# Patient Record
Sex: MALE | Race: BLACK OR AFRICAN AMERICAN | Employment: UNEMPLOYED | URBAN - NONMETROPOLITAN AREA
[De-identification: names, ages, dates, MRNs, and addresses within clinical notes are randomized per-mention and may not be internally consistent; named-entity substitution may affect disease eponyms.]

---

## 2017-04-20 ENCOUNTER — OFFICE VISIT (OUTPATIENT)
Dept: ONCOLOGY | Age: 52
End: 2017-04-20

## 2017-04-20 VITALS
TEMPERATURE: 97.6 F | SYSTOLIC BLOOD PRESSURE: 117 MMHG | OXYGEN SATURATION: 98 % | BODY MASS INDEX: 23.07 KG/M2 | DIASTOLIC BLOOD PRESSURE: 79 MMHG | RESPIRATION RATE: 18 BRPM | HEIGHT: 71 IN | HEART RATE: 84 BPM | WEIGHT: 164.8 LBS

## 2017-04-20 DIAGNOSIS — C06.2 SQUAMOUS CELL CANCER OF RETROMOLAR TRIGONE (HCC): Primary | ICD-10-CM

## 2017-04-20 PROCEDURE — 99214 OFFICE O/P EST MOD 30 MIN: CPT | Performed by: INTERNAL MEDICINE

## 2017-04-28 ENCOUNTER — OFFICE VISIT (OUTPATIENT)
Dept: PHYSICAL MEDICINE AND REHAB | Age: 52
End: 2017-04-28

## 2017-04-28 VITALS
WEIGHT: 171.2 LBS | SYSTOLIC BLOOD PRESSURE: 123 MMHG | HEART RATE: 86 BPM | DIASTOLIC BLOOD PRESSURE: 89 MMHG | HEIGHT: 71 IN | BODY MASS INDEX: 23.97 KG/M2

## 2017-04-28 DIAGNOSIS — M79.18 MYOFASCIAL PAIN SYNDROME: ICD-10-CM

## 2017-04-28 DIAGNOSIS — C06.2 SQUAMOUS CELL CANCER OF RETROMOLAR TRIGONE (HCC): ICD-10-CM

## 2017-04-28 DIAGNOSIS — M47.816 SPONDYLOSIS OF LUMBAR REGION WITHOUT MYELOPATHY OR RADICULOPATHY: Primary | ICD-10-CM

## 2017-04-28 PROCEDURE — 99244 OFF/OP CNSLTJ NEW/EST MOD 40: CPT | Performed by: PAIN MEDICINE

## 2017-04-28 RX ORDER — TRAMADOL HYDROCHLORIDE 50 MG/1
50 TABLET ORAL EVERY 8 HOURS PRN
Qty: 90 TABLET | Refills: 0 | Status: SHIPPED | OUTPATIENT
Start: 2017-04-28 | End: 2017-05-28

## 2017-04-28 RX ORDER — CYCLOBENZAPRINE HCL 5 MG
5 TABLET ORAL 3 TIMES DAILY PRN
Qty: 90 TABLET | Refills: 0 | Status: SHIPPED | OUTPATIENT
Start: 2017-04-28 | End: 2017-05-28

## 2017-04-28 ASSESSMENT — ENCOUNTER SYMPTOMS
SINUS PRESSURE: 0
BACK PAIN: 1
PHOTOPHOBIA: 0
VOMITING: 0
CONSTIPATION: 0
NAUSEA: 0
SHORTNESS OF BREATH: 0
EYE PAIN: 0
ABDOMINAL PAIN: 0
COLOR CHANGE: 0
RHINORRHEA: 0
WHEEZING: 0
COUGH: 0
DIARRHEA: 0
SORE THROAT: 0
CHEST TIGHTNESS: 0

## 2017-06-08 ENCOUNTER — OFFICE VISIT (OUTPATIENT)
Dept: PHYSICAL MEDICINE AND REHAB | Age: 52
End: 2017-06-08

## 2017-06-08 VITALS
SYSTOLIC BLOOD PRESSURE: 121 MMHG | HEIGHT: 71 IN | WEIGHT: 171.3 LBS | HEART RATE: 86 BPM | BODY MASS INDEX: 23.98 KG/M2 | DIASTOLIC BLOOD PRESSURE: 82 MMHG

## 2017-06-08 DIAGNOSIS — M79.18 MYOFASCIAL PAIN SYNDROME: ICD-10-CM

## 2017-06-08 DIAGNOSIS — M47.816 SPONDYLOSIS OF LUMBAR REGION WITHOUT MYELOPATHY OR RADICULOPATHY: Primary | ICD-10-CM

## 2017-06-08 DIAGNOSIS — C06.2 SQUAMOUS CELL CANCER OF RETROMOLAR TRIGONE (HCC): ICD-10-CM

## 2017-06-08 PROCEDURE — 99213 OFFICE O/P EST LOW 20 MIN: CPT | Performed by: NURSE PRACTITIONER

## 2017-06-08 ASSESSMENT — ENCOUNTER SYMPTOMS
NAUSEA: 0
EYE PAIN: 0
SORE THROAT: 0
CONSTIPATION: 0
SINUS PRESSURE: 0
COLOR CHANGE: 0
WHEEZING: 0
PHOTOPHOBIA: 0
COUGH: 0
VOMITING: 0
ABDOMINAL PAIN: 0
SHORTNESS OF BREATH: 0
RHINORRHEA: 0
BACK PAIN: 1
DIARRHEA: 0
CHEST TIGHTNESS: 0

## 2017-07-18 ENCOUNTER — HOSPITAL ENCOUNTER (OUTPATIENT)
Age: 52
Discharge: HOME | End: 2017-07-18
Payer: COMMERCIAL

## 2017-07-18 LAB
T4 FREE: 0.97 NG/DL (ref 0.93–1.76)
TSH SERPL DL<=0.05 MIU/L-ACNC: 6.61 UIU/ML (ref 0.4–4.2)

## 2017-07-18 PROCEDURE — 84443 ASSAY THYROID STIM HORMONE: CPT

## 2017-07-18 PROCEDURE — 84439 ASSAY OF FREE THYROXINE: CPT

## 2017-07-18 PROCEDURE — 36415 COLL VENOUS BLD VENIPUNCTURE: CPT

## 2017-07-24 ENCOUNTER — OFFICE VISIT (OUTPATIENT)
Dept: PHYSICAL MEDICINE AND REHAB | Age: 52
End: 2017-07-24
Payer: COMMERCIAL

## 2017-07-24 VITALS
DIASTOLIC BLOOD PRESSURE: 92 MMHG | BODY MASS INDEX: 23.98 KG/M2 | SYSTOLIC BLOOD PRESSURE: 125 MMHG | HEART RATE: 91 BPM | WEIGHT: 171.3 LBS | HEIGHT: 71 IN

## 2017-07-24 DIAGNOSIS — C06.2 SQUAMOUS CELL CANCER OF RETROMOLAR TRIGONE (HCC): ICD-10-CM

## 2017-07-24 DIAGNOSIS — M79.18 MYOFASCIAL PAIN SYNDROME: ICD-10-CM

## 2017-07-24 DIAGNOSIS — M47.816 SPONDYLOSIS OF LUMBAR REGION WITHOUT MYELOPATHY OR RADICULOPATHY: Primary | ICD-10-CM

## 2017-07-24 PROCEDURE — 99213 OFFICE O/P EST LOW 20 MIN: CPT | Performed by: NURSE PRACTITIONER

## 2017-07-24 ASSESSMENT — ENCOUNTER SYMPTOMS
BACK PAIN: 1
WHEEZING: 0
DIARRHEA: 0
VOMITING: 0
NAUSEA: 0
COLOR CHANGE: 0
CHEST TIGHTNESS: 0
COUGH: 0
PHOTOPHOBIA: 0
EYE PAIN: 0
RHINORRHEA: 0
CONSTIPATION: 0
SHORTNESS OF BREATH: 0
ABDOMINAL PAIN: 0
SORE THROAT: 0
SINUS PRESSURE: 0

## 2017-10-18 DIAGNOSIS — C06.2 SQUAMOUS CELL CANCER OF RETROMOLAR TRIGONE (HCC): Primary | ICD-10-CM

## 2017-10-19 ENCOUNTER — OFFICE VISIT (OUTPATIENT)
Dept: ONCOLOGY | Age: 52
End: 2017-10-19
Payer: MEDICARE

## 2017-10-19 ENCOUNTER — HOSPITAL ENCOUNTER (OUTPATIENT)
Dept: INFUSION THERAPY | Age: 52
Discharge: HOME | End: 2017-10-19
Payer: MEDICARE

## 2017-10-19 VITALS
HEART RATE: 63 BPM | RESPIRATION RATE: 18 BRPM | DIASTOLIC BLOOD PRESSURE: 91 MMHG | TEMPERATURE: 97.3 F | SYSTOLIC BLOOD PRESSURE: 134 MMHG | OXYGEN SATURATION: 99 % | BODY MASS INDEX: 23.6 KG/M2 | WEIGHT: 168.6 LBS | HEIGHT: 71 IN

## 2017-10-19 DIAGNOSIS — C06.2 SQUAMOUS CELL CANCER OF RETROMOLAR TRIGONE (HCC): ICD-10-CM

## 2017-10-19 DIAGNOSIS — D70.1 CHEMOTHERAPY INDUCED NEUTROPENIA (HCC): ICD-10-CM

## 2017-10-19 DIAGNOSIS — T45.1X5A CHEMOTHERAPY INDUCED NEUTROPENIA (HCC): ICD-10-CM

## 2017-10-19 DIAGNOSIS — C06.2 SQUAMOUS CELL CANCER OF RETROMOLAR TRIGONE (HCC): Primary | ICD-10-CM

## 2017-10-19 LAB
ALBUMIN SERPL-MCNC: 4.5 G/DL (ref 3.5–5.1)
ALP BLD-CCNC: 78 U/L (ref 38–126)
ALT SERPL-CCNC: 14 U/L (ref 11–66)
AST SERPL-CCNC: 27 U/L (ref 5–40)
BASINOPHIL, AUTOMATED: 0 % (ref 0–12)
BILIRUB SERPL-MCNC: 0.4 MG/DL (ref 0.3–1.2)
BILIRUBIN DIRECT: < 0.2 MG/DL (ref 0–0.3)
BUN, WHOLE BLOOD: 12 MG/DL (ref 8–26)
CHLORIDE, WHOLE BLOOD: 103 MEQ/L (ref 98–109)
CREATININE, WHOLE BLOOD: 1.1 MG/DL (ref 0.5–1.2)
EOSINOPHILS RELATIVE PERCENT: 2 % (ref 0–12)
GFR, ESTIMATED: 75 ML/MIN/1.73M2
GLUCOSE, WHOLE BLOOD: 94 MG/DL (ref 70–108)
HCT VFR BLD CALC: 40.4 % (ref 42–52)
HEMOGLOBIN: 13.6 GM/DL (ref 14–18)
IONIZED CALCIUM, WHOLE BLOOD: 1.31 MMOL/L (ref 1.12–1.32)
LYMPHOCYTES # BLD: 17 % (ref 15–47)
MCH RBC QN AUTO: 32.1 PG (ref 27–31)
MCHC RBC AUTO-ENTMCNC: 33.5 GM/DL (ref 33–37)
MCV RBC AUTO: 96 FL (ref 80–94)
MONOCYTES: 9 % (ref 0–12)
PDW BLD-RTO: 11.7 % (ref 11.5–14.5)
PLATELET # BLD: 298 THOU/MM3 (ref 130–400)
PMV BLD AUTO: 6.7 MCM (ref 7.4–10.4)
POTASSIUM, WHOLE BLOOD: 4.5 MEQ/L (ref 3.5–4.9)
RBC # BLD: 4.23 MILL/MM3 (ref 4.7–6.1)
SEG NEUTROPHILS: 72 % (ref 43–75)
SODIUM, WHOLE BLOOD: 138 MEQ/L (ref 138–146)
TOTAL CO2, WHOLE BLOOD: 25 MEQ/L (ref 23–33)
TOTAL PROTEIN: 7.4 G/DL (ref 6.1–8)
WBC # BLD: 6.8 THOU/MM3 (ref 4.8–10.8)

## 2017-10-19 PROCEDURE — 1036F TOBACCO NON-USER: CPT | Performed by: INTERNAL MEDICINE

## 2017-10-19 PROCEDURE — 80076 HEPATIC FUNCTION PANEL: CPT

## 2017-10-19 PROCEDURE — G8428 CUR MEDS NOT DOCUMENT: HCPCS | Performed by: INTERNAL MEDICINE

## 2017-10-19 PROCEDURE — G8420 CALC BMI NORM PARAMETERS: HCPCS | Performed by: INTERNAL MEDICINE

## 2017-10-19 PROCEDURE — 99213 OFFICE O/P EST LOW 20 MIN: CPT | Performed by: INTERNAL MEDICINE

## 2017-10-19 PROCEDURE — 36415 COLL VENOUS BLD VENIPUNCTURE: CPT

## 2017-10-19 PROCEDURE — 3017F COLORECTAL CA SCREEN DOC REV: CPT | Performed by: INTERNAL MEDICINE

## 2017-10-19 PROCEDURE — 99211 OFF/OP EST MAY X REQ PHY/QHP: CPT

## 2017-10-19 PROCEDURE — G8484 FLU IMMUNIZE NO ADMIN: HCPCS | Performed by: INTERNAL MEDICINE

## 2017-10-19 PROCEDURE — 80047 BASIC METABLC PNL IONIZED CA: CPT

## 2017-10-19 PROCEDURE — 85025 COMPLETE CBC W/AUTO DIFF WBC: CPT

## 2017-10-19 NOTE — PROGRESS NOTES
[facility] PROFESSIONAL SERVICES  [facility] OF [facility]  [address] [facility] [address]  Nneka Garciabulmaro  Dept: [phone]  Dept Fax: [phone]  Loc: [phone]    Subjective:     Chief Complaint:  Dio Duggan is a 46 y.o. male with head and neck cancer. HPI:  The patient is here today for follow up evaluation. He is here today for follow-up of his history head and neck cancer. The patient previously receiving received combination radiation and chemotherapy treatment. He tolerated this fairly well and any posttreatment symptoms have resolved. The patient continues to be followed by otolaryngology at [facility]. He has had no evidence of recurrence of his malignancy. On my limited examination today there is no evidence of recurrence. He does complain of chronic oral pain related to his previous therapy. His bowel and bladder habits have been fairly stable. The patient denies fever or other signs of infection. His ECOG performance status is level 0. PMH, SH, and FH:  I reviewed the PMH, SH and FH as noted on the electronic medical record. There have been no changes as noted in the previous documentation. Review of Systems   Constitutional: Fatigue. HENT: Chronic pain. Eyes: Negative. Respiratory: Negative. Cardiovascular: Negative. Gastrointestinal: Negative. Genitourinary: Negative. Musculoskeletal: Negative. Skin: Negative. Neurological: Negative. Hematological: Negative. Psychiatric/Behavioral: Negative. Objective:   Physical Exam   Vitals:    10/19/17 1014   BP: (!) 134/91   Pulse: 63   Resp: 18   Temp: 97.3 °F (36.3 °C)   SpO2: 99%   Vitals reviewed and are stable. Constitutional: Well-developed and well-nourished. No acute distress. HENT: Normocephalic and atraumatic. Post surgical changes from ENT noted. No evidence of recurrence of malignancy identified. Eyes: Pupils are equal and reactive. No scleral icterus. Neck: Overall appearance is symmetrical. No identifiable masses. Chest: Inspection and palpation of chest is normal.  Pulmonary: Effort normal. No respiratory distress. Cardiovascular: RRR. No edema in any of the four extremities. Abdominal: Soft. No hepatomegaly or splenomegaly. Musculoskeletal: Gait is normal. Muscle strength and tone good. Neurological: Alert and oriented to person, place, and time. Judgment and thought content normal.  Skin: Skin is warm and dry. No rash. Psychiatric: Mood and affect appropriate for the clinical situation. Behavior is normal.        Data Analysis:    Hematology 10/19/2017 4/20/2017 1/31/2017   WBC 6.8 6.2 5.4   RBC 4.23 (L) 4.56 (L) 4.70   HGB 13.6 (L) 14.7 14.5   HCT 40.4 (L) 44.3 43.7   MCV 96 (H) 97 (H) 93.0   RDW 11.7 11.4 (L) 14.0    269 263     Hematology 11/23/2016   WBC 6.2   RBC 4.41 (L)   HGB 14.1   HCT 41.9 (L)   MCV 95.0 (H)   RDW 13.9        Assessment:   1. Squamous cell carcinoma of the head and neck. 2.  Chronic pain. 3.  Mild anemia. 4.  Hypertension. Plan:   1. Monitor for recurrence of malignancy. 2.  Follow-up with otolaryngology as scheduled. 3.  Follow-up with radiation oncology as scheduled. 4.  Monitor hemoglobin/hematocrit and for any signs of blood loss. 5.  Monitor blood pressure and follow up with primary care provider for further surveillance and management. Enio Nava M.D.   [facility]

## 2017-11-09 ENCOUNTER — HOSPITAL ENCOUNTER (OUTPATIENT)
Dept: INTERVENTIONAL RADIOLOGY/VASCULAR | Age: 52
Discharge: HOME | End: 2017-11-09
Payer: MEDICARE

## 2017-11-09 VITALS
HEART RATE: 78 BPM | WEIGHT: 167.6 LBS | BODY MASS INDEX: 23.46 KG/M2 | DIASTOLIC BLOOD PRESSURE: 62 MMHG | RESPIRATION RATE: 16 BRPM | SYSTOLIC BLOOD PRESSURE: 125 MMHG | TEMPERATURE: 97.9 F

## 2017-11-09 PROCEDURE — 6360000002 HC RX W HCPCS

## 2017-11-09 PROCEDURE — A6402 STERILE GAUZE <= 16 SQ IN: HCPCS

## 2017-11-09 PROCEDURE — 2500000003 HC RX 250 WO HCPCS

## 2017-11-09 PROCEDURE — A6413 ADHESIVE BANDAGE, FIRST-AID: HCPCS

## 2017-11-09 PROCEDURE — 6360000004 HC RX CONTRAST MEDICATION: Performed by: RADIOLOGY

## 2017-11-09 PROCEDURE — 62323 NJX INTERLAMINAR LMBR/SAC: CPT | Performed by: RADIOLOGY

## 2017-11-09 RX ORDER — METHYLPREDNISOLONE ACETATE 80 MG/ML
80 INJECTION, SUSPENSION INTRA-ARTICULAR; INTRALESIONAL; INTRAMUSCULAR; SOFT TISSUE ONCE
Status: COMPLETED | OUTPATIENT
Start: 2017-11-09 | End: 2017-11-09

## 2017-11-09 RX ORDER — ESOMEPRAZOLE MAGNESIUM 20 MG/1
20 FOR SUSPENSION ORAL DAILY
COMMUNITY

## 2017-11-09 RX ORDER — BUPIVACAINE HYDROCHLORIDE 2.5 MG/ML
5 INJECTION, SOLUTION EPIDURAL; INFILTRATION; INTRACAUDAL ONCE
Status: COMPLETED | OUTPATIENT
Start: 2017-11-09 | End: 2017-11-09

## 2017-11-09 RX ADMIN — BUPIVACAINE HYDROCHLORIDE 2 ML: 2.5 INJECTION, SOLUTION EPIDURAL; INFILTRATION; INTRACAUDAL at 14:26

## 2017-11-09 RX ADMIN — IOHEXOL 1 ML: 180 INJECTION INTRAVENOUS at 14:26

## 2017-11-09 RX ADMIN — METHYLPREDNISOLONE ACETATE 80 MG: 80 INJECTION, SUSPENSION INTRA-ARTICULAR; INTRALESIONAL; INTRAMUSCULAR; SOFT TISSUE at 14:27

## 2017-11-09 ASSESSMENT — PAIN SCALES - GENERAL: PAINLEVEL_OUTOF10: 0

## 2017-11-09 ASSESSMENT — PAIN DESCRIPTION - DESCRIPTORS
DESCRIPTORS: THROBBING
DESCRIPTORS: ACHING;THROBBING;CONSTANT

## 2017-11-09 ASSESSMENT — PAIN - FUNCTIONAL ASSESSMENT
PAIN_FUNCTIONAL_ASSESSMENT: 0-10
PAIN_FUNCTIONAL_ASSESSMENT: 0-10

## 2017-11-09 NOTE — PROGRESS NOTES
Formulation and discussion of sedation / procedure plans, risks, benefits, side effects and alternatives with patient and/or responsible adult completed.     Electronically signed by Armando Faye MD on 11/9/2017 at 2:28 PM

## 2017-12-22 ENCOUNTER — HOSPITAL ENCOUNTER (OUTPATIENT)
Dept: RADIATION ONCOLOGY | Age: 52
Discharge: HOME | End: 2017-12-22
Payer: MEDICARE

## 2017-12-22 DIAGNOSIS — Z72.0 TOBACCO ABUSE: Primary | ICD-10-CM

## 2017-12-22 DIAGNOSIS — C06.2 SQUAMOUS CELL CANCER OF RETROMOLAR TRIGONE (HCC): ICD-10-CM

## 2017-12-22 PROCEDURE — 99211 OFF/OP EST MAY X REQ PHY/QHP: CPT

## 2017-12-22 NOTE — PROGRESS NOTES
[facility], [address]  Phone: [phone]   Toll Free: [phone]   Fax: [phone]    RADIATION ONCOLOGY FOLLOW UP REPORT    PATIENT NAME:  Myrna Burrell     : 1965  MEDICAL RECORD NO: [id number]    LOCATION: [facility] NO: [id number]      PROVIDER: Zev Nova CNP        DATE OF SERVICE: 2017    FOLLOW UP PHYSICIANS: Dr. Sivan Patrick; Dr. 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 Inspection revealed a fungating mass in the left retromolar trigone. He completed a CT of the head and neck which again revealed this primary tumor and suspected adenopathy submentally in the left submandibular gland and level II in the left neck. The patient was referred to [facility] and completed a left inferior maxillectomy, composite resection with segmental mandibulectomy, bilateral neck dissections, and reconstruction with free flap and bone graft from a right tibial donor site. Surgery was completed on 10/02/2015. The patient had a PEG placed at this time also. Samuel Feldman had an uncomplicated postoperative course and was seen to discuss adjuvant radiotherapy for which he was agreeable. He did have brisk skin reaction with significant erythema culminating in hyperpigmentation with  patchy dry desquamation. The patient did not develop any moist desquamation.     INTERVAL HISTORY: Samuel Feldman returns to [address] 2017 for a posttreatment checkup as part of his survivorship care after undergoing surgery, chemo and radiation therapy for cancer of the left retromolar trigone. At this time Severa Ingle continues to have mild xerostomia. This is well controlled with Biotene. Feels his taste has improved since completed treatment. He continues to have off and on lymphedema of the left neck. Severa Ingle states this is particularly noticeable on cold days. He has completed physical therapy for left neck decreased range of motion and lymphedema in June 2017. He denies any changes with swallowing. He denies any pain at this time. He does continue to smoke 2-3 cigarettes per day. His weight has remained stable compared to his last visit. TESTS: 7/18/2017: T4: 0.97, 7/18/2017: TSH: 6.610 this has decreased from 8.7 in February. MEDICATIONS:   Current Outpatient Prescriptions   Medication Sig Dispense Refill    esomeprazole Magnesium (NEXIUM) 20 MG PACK Take 20 mg by mouth daily      Mouthwashes (BIOTENE DRY MOUTH) LIQD daily       lisinopril (PRINIVIL;ZESTRIL) 20 MG tablet Take 20 mg by mouth daily       No current facility-administered medications for this encounter. EXAMINATION:   GENERAL: Severa Ingle is a pleasant, well-developed adult male. He is alert  and oriented x3, in no acute distress  VITAL SIGNS:  Weight 77.5 kg, temperature 36.1°C, pulse 86, blood pressure 146/98, respirations 16, pulse ox 98% on room air. HEENT: Head is normocephalic and atraumatic. PERRL. EOMI. Ears,  nose and lips are within normal limits. On external examination,  of the oral cavity and oropharynx, the reconstructed left oropharynx is smooth without any visible lesions or exudates. xerostomia. There are extensive postoperative changes to the left neck and also within the oral cavity as previously mentioned. There is no palpable cervical, supraclavicular, or axillary adenopathy  NECK: Without palpable adenopathy. Postoperative changes to neck as described above.

## 2018-04-06 ENCOUNTER — HOSPITAL ENCOUNTER (OUTPATIENT)
Dept: GENERAL RADIOLOGY | Age: 53
Discharge: HOME | End: 2018-04-06
Payer: MEDICARE

## 2018-04-06 ENCOUNTER — HOSPITAL ENCOUNTER (OUTPATIENT)
Age: 53
Discharge: HOME | End: 2018-04-06
Payer: MEDICARE

## 2018-04-06 DIAGNOSIS — H00.13 CHALAZION OF RIGHT EYE, UNSPECIFIED EYELID: ICD-10-CM

## 2018-04-06 DIAGNOSIS — H26.9 CATARACT, UNSPECIFIED CATARACT TYPE, UNSPECIFIED LATERALITY: ICD-10-CM

## 2018-04-06 DIAGNOSIS — R51.9 INTRACTABLE HEADACHE, UNSPECIFIED CHRONICITY PATTERN, UNSPECIFIED HEADACHE TYPE: ICD-10-CM

## 2018-04-06 LAB
ALBUMIN SERPL-MCNC: 4 G/DL (ref 3.5–5.1)
ALP BLD-CCNC: 66 U/L (ref 38–126)
ALT SERPL-CCNC: 18 U/L (ref 11–66)
ANION GAP SERPL CALCULATED.3IONS-SCNC: 13 MEQ/L (ref 8–16)
AST SERPL-CCNC: 24 U/L (ref 5–40)
BASOPHILS # BLD: 0.7 %
BASOPHILS ABSOLUTE: 0 THOU/MM3 (ref 0–0.1)
BILIRUB SERPL-MCNC: 0.3 MG/DL (ref 0.3–1.2)
BUN BLDV-MCNC: 9 MG/DL (ref 7–22)
CALCIUM SERPL-MCNC: 9.4 MG/DL (ref 8.5–10.5)
CHLORIDE BLD-SCNC: 100 MEQ/L (ref 98–111)
CHOLESTEROL, TOTAL: 208 MG/DL (ref 100–199)
CO2: 26 MEQ/L (ref 23–33)
CREAT SERPL-MCNC: 1.3 MG/DL (ref 0.4–1.2)
EOSINOPHIL # BLD: 1.4 %
EOSINOPHILS ABSOLUTE: 0.1 THOU/MM3 (ref 0–0.4)
GFR SERPL CREATININE-BSD FRML MDRD: 70 ML/MIN/1.73M2
GLUCOSE BLD-MCNC: 83 MG/DL (ref 70–108)
HCT VFR BLD CALC: 41.2 % (ref 42–52)
HDLC SERPL-MCNC: 100 MG/DL
HEMOGLOBIN: 14 GM/DL (ref 14–18)
LDL CHOLESTEROL CALCULATED: 70 MG/DL
LYMPHOCYTES # BLD: 37.7 %
LYMPHOCYTES ABSOLUTE: 1.8 THOU/MM3 (ref 1–4.8)
MCH RBC QN AUTO: 32.8 PG (ref 27–31)
MCHC RBC AUTO-ENTMCNC: 34 GM/DL (ref 33–37)
MCV RBC AUTO: 96.3 FL (ref 80–94)
MONOCYTES # BLD: 13.8 %
MONOCYTES ABSOLUTE: 0.7 THOU/MM3 (ref 0.4–1.3)
NUCLEATED RED BLOOD CELLS: 0 /100 WBC
PDW BLD-RTO: 14 % (ref 11.5–14.5)
PLATELET # BLD: 247 THOU/MM3 (ref 130–400)
PMV BLD AUTO: 8.2 FL (ref 7.4–10.4)
POTASSIUM SERPL-SCNC: 4 MEQ/L (ref 3.5–5.2)
PROSTATE SPECIFIC ANTIGEN: 1 NG/ML (ref 0–1)
RBC # BLD: 4.28 MILL/MM3 (ref 4.7–6.1)
SEG NEUTROPHILS: 46.4 %
SEGMENTED NEUTROPHILS ABSOLUTE COUNT: 2.3 THOU/MM3 (ref 1.8–7.7)
SODIUM BLD-SCNC: 139 MEQ/L (ref 135–145)
T4 FREE: 0.92 NG/DL (ref 0.93–1.76)
TOTAL PROTEIN: 7 G/DL (ref 6.1–8)
TRIGL SERPL-MCNC: 191 MG/DL (ref 0–199)
TSH SERPL DL<=0.05 MIU/L-ACNC: 6.56 UIU/ML (ref 0.4–4.2)
WBC # BLD: 4.9 THOU/MM3 (ref 4.8–10.8)

## 2018-04-06 PROCEDURE — 84439 ASSAY OF FREE THYROXINE: CPT

## 2018-04-06 PROCEDURE — 80061 LIPID PANEL: CPT

## 2018-04-06 PROCEDURE — 80053 COMPREHEN METABOLIC PANEL: CPT

## 2018-04-06 PROCEDURE — 84443 ASSAY THYROID STIM HORMONE: CPT

## 2018-04-06 PROCEDURE — 70220 X-RAY EXAM OF SINUSES: CPT

## 2018-04-06 PROCEDURE — 84153 ASSAY OF PSA TOTAL: CPT

## 2018-04-06 PROCEDURE — 85025 COMPLETE CBC W/AUTO DIFF WBC: CPT

## 2018-04-06 PROCEDURE — 36415 COLL VENOUS BLD VENIPUNCTURE: CPT

## 2018-04-06 PROCEDURE — 70260 X-RAY EXAM OF SKULL: CPT

## 2018-05-04 ENCOUNTER — HOSPITAL ENCOUNTER (OUTPATIENT)
Dept: ULTRASOUND IMAGING | Age: 53
Discharge: HOME | End: 2018-05-04
Payer: MEDICARE

## 2018-05-04 DIAGNOSIS — E03.9 HYPOTHYROIDISM, UNSPECIFIED TYPE: ICD-10-CM

## 2018-05-04 DIAGNOSIS — R79.89 ELEVATED TSH: ICD-10-CM

## 2018-05-04 DIAGNOSIS — C06.9 ORAL CANCER (HCC): ICD-10-CM

## 2018-05-04 PROCEDURE — 76536 US EXAM OF HEAD AND NECK: CPT

## 2018-06-29 ENCOUNTER — HOSPITAL ENCOUNTER (OUTPATIENT)
Dept: RADIATION ONCOLOGY | Age: 53
Discharge: HOME | End: 2018-06-29

## 2018-06-29 DIAGNOSIS — R29.898 DECREASED ROM OF NECK: ICD-10-CM

## 2018-06-29 DIAGNOSIS — E04.1 THYROID NODULE: Primary | ICD-10-CM

## 2018-06-29 DIAGNOSIS — C06.2 SQUAMOUS CELL CANCER OF RETROMOLAR TRIGONE (HCC): ICD-10-CM

## 2018-06-29 PROCEDURE — 99211 OFF/OP EST MAY X REQ PHY/QHP: CPT

## 2019-04-30 ENCOUNTER — HOSPITAL ENCOUNTER (OUTPATIENT)
Age: 54
Discharge: HOME | End: 2019-04-30
Payer: COMMERCIAL

## 2019-04-30 LAB
ALBUMIN SERPL-MCNC: 4.2 G/DL (ref 3.5–5.1)
ALP BLD-CCNC: 83 U/L (ref 38–126)
ALT SERPL-CCNC: 22 U/L (ref 11–66)
ANION GAP SERPL CALCULATED.3IONS-SCNC: 13 MEQ/L (ref 8–16)
AST SERPL-CCNC: 25 U/L (ref 5–40)
BASOPHILS # BLD: 0.4 %
BASOPHILS ABSOLUTE: 0 THOU/MM3 (ref 0–0.1)
BILIRUB SERPL-MCNC: 0.3 MG/DL (ref 0.3–1.2)
BUN BLDV-MCNC: 6 MG/DL (ref 7–22)
CALCIUM SERPL-MCNC: 9.4 MG/DL (ref 8.5–10.5)
CHLORIDE BLD-SCNC: 103 MEQ/L (ref 98–111)
CHOLESTEROL, TOTAL: 217 MG/DL (ref 100–199)
CO2: 26 MEQ/L (ref 23–33)
CREAT SERPL-MCNC: 1 MG/DL (ref 0.4–1.2)
EOSINOPHIL # BLD: 1.3 %
EOSINOPHILS ABSOLUTE: 0.1 THOU/MM3 (ref 0–0.4)
ERYTHROCYTE [DISTWIDTH] IN BLOOD BY AUTOMATED COUNT: 14.8 % (ref 11.5–14.5)
ERYTHROCYTE [DISTWIDTH] IN BLOOD BY AUTOMATED COUNT: 49.6 FL (ref 35–45)
GFR SERPL CREATININE-BSD FRML MDRD: > 90 ML/MIN/1.73M2
GLUCOSE BLD-MCNC: 87 MG/DL (ref 70–108)
HCT VFR BLD CALC: 42.4 % (ref 42–52)
HDLC SERPL-MCNC: 91 MG/DL
HEMOGLOBIN: 14.2 GM/DL (ref 14–18)
IMMATURE GRANS (ABS): 0.04 THOU/MM3 (ref 0–0.07)
IMMATURE GRANULOCYTES: 0.6 %
LDL CHOLESTEROL CALCULATED: 101 MG/DL
LYMPHOCYTES # BLD: 32.9 %
LYMPHOCYTES ABSOLUTE: 2.3 THOU/MM3 (ref 1–4.8)
MCH RBC QN AUTO: 30.8 PG (ref 26–33)
MCHC RBC AUTO-ENTMCNC: 33.5 GM/DL (ref 32.2–35.5)
MCV RBC AUTO: 92 FL (ref 80–94)
MONOCYTES # BLD: 12.7 %
MONOCYTES ABSOLUTE: 0.9 THOU/MM3 (ref 0.4–1.3)
NUCLEATED RED BLOOD CELLS: 0 /100 WBC
PLATELET # BLD: 291 THOU/MM3 (ref 130–400)
PMV BLD AUTO: 9.3 FL (ref 9.4–12.4)
POTASSIUM SERPL-SCNC: 4.3 MEQ/L (ref 3.5–5.2)
RBC # BLD: 4.61 MILL/MM3 (ref 4.7–6.1)
SEG NEUTROPHILS: 52.1 %
SEGMENTED NEUTROPHILS ABSOLUTE COUNT: 3.7 THOU/MM3 (ref 1.8–7.7)
SODIUM BLD-SCNC: 142 MEQ/L (ref 135–145)
T4 FREE: 0.89 NG/DL (ref 0.93–1.76)
TOTAL PROTEIN: 7.5 G/DL (ref 6.1–8)
TRIGL SERPL-MCNC: 124 MG/DL (ref 0–199)
TSH SERPL DL<=0.05 MIU/L-ACNC: 16.16 UIU/ML (ref 0.4–4.2)
WBC # BLD: 7.1 THOU/MM3 (ref 4.8–10.8)

## 2019-04-30 PROCEDURE — 84439 ASSAY OF FREE THYROXINE: CPT

## 2019-04-30 PROCEDURE — 80061 LIPID PANEL: CPT

## 2019-04-30 PROCEDURE — 80053 COMPREHEN METABOLIC PANEL: CPT

## 2019-04-30 PROCEDURE — 85025 COMPLETE CBC W/AUTO DIFF WBC: CPT

## 2019-04-30 PROCEDURE — 36415 COLL VENOUS BLD VENIPUNCTURE: CPT

## 2019-04-30 PROCEDURE — 84443 ASSAY THYROID STIM HORMONE: CPT

## 2019-06-27 ENCOUNTER — HOSPITAL ENCOUNTER (EMERGENCY)
Age: 54
Discharge: HOME | End: 2019-06-27
Payer: COMMERCIAL

## 2019-06-27 ENCOUNTER — APPOINTMENT (OUTPATIENT)
Dept: CT IMAGING | Age: 54
End: 2019-06-27
Payer: COMMERCIAL

## 2019-06-27 VITALS
BODY MASS INDEX: 21.67 KG/M2 | SYSTOLIC BLOOD PRESSURE: 133 MMHG | OXYGEN SATURATION: 97 % | HEIGHT: 72 IN | RESPIRATION RATE: 17 BRPM | DIASTOLIC BLOOD PRESSURE: 91 MMHG | TEMPERATURE: 98.6 F | WEIGHT: 160 LBS | HEART RATE: 68 BPM

## 2019-06-27 DIAGNOSIS — F10.920 ACUTE ALCOHOLIC INTOXICATION WITHOUT COMPLICATION (HCC): Primary | ICD-10-CM

## 2019-06-27 LAB
ALBUMIN SERPL-MCNC: 4.2 G/DL (ref 3.5–5.1)
ALP BLD-CCNC: 80 U/L (ref 38–126)
ALT SERPL-CCNC: 21 U/L (ref 11–66)
AMMONIA: 33 UMOL/L (ref 11–60)
AMPHETAMINE+METHAMPHETAMINE URINE SCREEN: NEGATIVE
ANION GAP SERPL CALCULATED.3IONS-SCNC: 15 MEQ/L (ref 8–16)
AST SERPL-CCNC: 28 U/L (ref 5–40)
BACTERIA: NORMAL
BARBITURATE QUANTITATIVE URINE: NEGATIVE
BASOPHILS # BLD: 0.7 %
BASOPHILS ABSOLUTE: 0.1 THOU/MM3 (ref 0–0.1)
BENZODIAZEPINE QUANTITATIVE URINE: NEGATIVE
BILIRUB SERPL-MCNC: < 0.2 MG/DL (ref 0.3–1.2)
BILIRUBIN URINE: NEGATIVE
BLOOD, URINE: NEGATIVE
BUN BLDV-MCNC: 6 MG/DL (ref 7–22)
CALCIUM SERPL-MCNC: 9.4 MG/DL (ref 8.5–10.5)
CANNABINOID QUANTITATIVE URINE: NEGATIVE
CASTS: NORMAL /LPF
CASTS: NORMAL /LPF
CHARACTER, URINE: CLEAR
CHLORIDE BLD-SCNC: 105 MEQ/L (ref 98–111)
CO2: 20 MEQ/L (ref 23–33)
COCAINE METABOLITE QUANTITATIVE URINE: NEGATIVE
COLOR: YELLOW
CREAT SERPL-MCNC: 0.9 MG/DL (ref 0.4–1.2)
CRYSTALS: NORMAL
EKG ATRIAL RATE: 88 BPM
EKG P AXIS: 60 DEGREES
EKG P-R INTERVAL: 154 MS
EKG Q-T INTERVAL: 360 MS
EKG QRS DURATION: 84 MS
EKG QTC CALCULATION (BAZETT): 435 MS
EKG R AXIS: 14 DEGREES
EKG T AXIS: 48 DEGREES
EKG VENTRICULAR RATE: 88 BPM
EOSINOPHIL # BLD: 1.7 %
EOSINOPHILS ABSOLUTE: 0.1 THOU/MM3 (ref 0–0.4)
EPITHELIAL CELLS, UA: NORMAL /HPF
ERYTHROCYTE [DISTWIDTH] IN BLOOD BY AUTOMATED COUNT: 15.2 % (ref 11.5–14.5)
ERYTHROCYTE [DISTWIDTH] IN BLOOD BY AUTOMATED COUNT: 52.7 FL (ref 35–45)
ETHYL ALCOHOL, SERUM: 0.18 %
GFR SERPL CREATININE-BSD FRML MDRD: > 90 ML/MIN/1.73M2
GLUCOSE BLD-MCNC: 79 MG/DL (ref 70–108)
GLUCOSE BLD-MCNC: 83 MG/DL (ref 70–108)
GLUCOSE, URINE: NEGATIVE MG/DL
HCT VFR BLD CALC: 40.4 % (ref 42–52)
HEMOGLOBIN: 13.5 GM/DL (ref 14–18)
IMMATURE GRANS (ABS): 0.03 THOU/MM3 (ref 0–0.07)
IMMATURE GRANULOCYTES: 0.4 %
INR BLD: 0.89 (ref 0.85–1.13)
KETONES, URINE: NEGATIVE
LEUKOCYTE ESTERASE, URINE: NEGATIVE
LYMPHOCYTES # BLD: 38.1 %
LYMPHOCYTES ABSOLUTE: 2.7 THOU/MM3 (ref 1–4.8)
MAGNESIUM: 2.2 MG/DL (ref 1.6–2.4)
MCH RBC QN AUTO: 31.5 PG (ref 26–33)
MCHC RBC AUTO-ENTMCNC: 33.4 GM/DL (ref 32.2–35.5)
MCV RBC AUTO: 94.2 FL (ref 80–94)
MISCELLANEOUS LAB TEST RESULT: NORMAL
MONOCYTES # BLD: 12.7 %
MONOCYTES ABSOLUTE: 0.9 THOU/MM3 (ref 0.4–1.3)
NITRITE, URINE: NEGATIVE
NUCLEATED RED BLOOD CELLS: 0 /100 WBC
OPIATES, URINE: NEGATIVE
OSMOLALITY CALCULATION: 276.2 MOSMOL/KG (ref 275–300)
OXYCODONE: NEGATIVE
PH UA: 6.5 (ref 5–9)
PHENCYCLIDINE QUANTITATIVE URINE: NEGATIVE
PLATELET # BLD: 286 THOU/MM3 (ref 130–400)
PMV BLD AUTO: 9.5 FL (ref 9.4–12.4)
POTASSIUM SERPL-SCNC: 3.7 MEQ/L (ref 3.5–5.2)
PROTEIN UA: NEGATIVE MG/DL
RBC # BLD: 4.29 MILL/MM3 (ref 4.7–6.1)
RBC URINE: NORMAL /HPF
RENAL EPITHELIAL, UA: NORMAL
SEG NEUTROPHILS: 46.4 %
SEGMENTED NEUTROPHILS ABSOLUTE COUNT: 3.3 THOU/MM3 (ref 1.8–7.7)
SODIUM BLD-SCNC: 140 MEQ/L (ref 135–145)
SPECIFIC GRAVITY UA: < 1.005 (ref 1–1.03)
TOTAL PROTEIN: 7.1 G/DL (ref 6.1–8)
TROPONIN T: < 0.01 NG/ML
UROBILINOGEN, URINE: 0.2 EU/DL (ref 0–1)
WBC # BLD: 7.2 THOU/MM3 (ref 4.8–10.8)
WBC UA: NORMAL /HPF
YEAST: NORMAL

## 2019-06-27 PROCEDURE — 83735 ASSAY OF MAGNESIUM: CPT

## 2019-06-27 PROCEDURE — 70450 CT HEAD/BRAIN W/O DYE: CPT

## 2019-06-27 PROCEDURE — 85610 PROTHROMBIN TIME: CPT

## 2019-06-27 PROCEDURE — 99285 EMERGENCY DEPT VISIT HI MDM: CPT

## 2019-06-27 PROCEDURE — 81001 URINALYSIS AUTO W/SCOPE: CPT

## 2019-06-27 PROCEDURE — 82948 REAGENT STRIP/BLOOD GLUCOSE: CPT

## 2019-06-27 PROCEDURE — 80307 DRUG TEST PRSMV CHEM ANLYZR: CPT

## 2019-06-27 PROCEDURE — 6370000000 HC RX 637 (ALT 250 FOR IP): Performed by: EMERGENCY MEDICINE

## 2019-06-27 PROCEDURE — G0480 DRUG TEST DEF 1-7 CLASSES: HCPCS

## 2019-06-27 PROCEDURE — 82140 ASSAY OF AMMONIA: CPT

## 2019-06-27 PROCEDURE — 85025 COMPLETE CBC W/AUTO DIFF WBC: CPT

## 2019-06-27 PROCEDURE — 93005 ELECTROCARDIOGRAM TRACING: CPT | Performed by: EMERGENCY MEDICINE

## 2019-06-27 PROCEDURE — 80053 COMPREHEN METABOLIC PANEL: CPT

## 2019-06-27 PROCEDURE — 84484 ASSAY OF TROPONIN QUANT: CPT

## 2019-06-27 PROCEDURE — 36415 COLL VENOUS BLD VENIPUNCTURE: CPT

## 2019-06-27 PROCEDURE — 93010 ELECTROCARDIOGRAM REPORT: CPT | Performed by: INTERNAL MEDICINE

## 2019-06-27 RX ORDER — ACETAMINOPHEN 325 MG/1
650 TABLET ORAL ONCE
Status: COMPLETED | OUTPATIENT
Start: 2019-06-27 | End: 2019-06-27

## 2019-06-27 RX ADMIN — ACETAMINOPHEN 650 MG: 325 TABLET ORAL at 21:49

## 2019-06-27 ASSESSMENT — ENCOUNTER SYMPTOMS
BACK PAIN: 0
ABDOMINAL DISTENTION: 0
COLOR CHANGE: 0
SHORTNESS OF BREATH: 0

## 2019-06-27 ASSESSMENT — PAIN SCALES - GENERAL
PAINLEVEL_OUTOF10: 7
PAINLEVEL_OUTOF10: 4

## 2019-06-27 ASSESSMENT — PAIN DESCRIPTION - LOCATION: LOCATION: NECK

## 2019-06-27 ASSESSMENT — PAIN DESCRIPTION - PAIN TYPE: TYPE: ACUTE PAIN

## 2019-06-27 NOTE — ED PROVIDER NOTES
[facility]  eMERGENCY dEPARTMENT eNCOUnter          CHIEF COMPLAINT       Chief Complaint   Patient presents with    Altered Mental Status    Nausea       Nurses Notes reviewed and I agree except as noted in the HPI. HISTORY OF PRESENT ILLNESS    Norris Garcia is a 48 y.o. male who presents to the Emergency Department for the evaluation of confusion that has been increasing over the past week. Wife states that the patient has a history of head and neck cancer. He had multiple surgeries at eyes at [facility]. He was treated 3 years ago with radiation and chemotherapy. He was supposed to have CT of the head neck recently but he missed the appointments. Wife reports that he is typically alert and oriented. She states he has periods of lucidity but then forgets where he is at and startles easily. His gait is off any is confused often. She denies any trauma recent head injuries. Denies any drug use or alcohol use. The patient can help answer some questions but is a poor historian due to his altered mental status. He knows his name, where he is at and is able to identify that he is having problems thinking correctly. The HPI was provided by the patient. REVIEW OF SYSTEMS     Review of Systems   Constitutional: Negative for diaphoresis, fatigue and fever. Respiratory: Negative for shortness of breath. Cardiovascular: Negative for chest pain and leg swelling. Gastrointestinal: Negative for abdominal distention. Genitourinary: Positive for frequency. Musculoskeletal: Negative for arthralgias and back pain. Skin: Negative for color change. Neurological: Positive for weakness. Psychiatric/Behavioral: Positive for confusion and decreased concentration. Negative for hallucinations. PAST MEDICAL HISTORY    has a past medical history of Head and neck cancer ([address] and Hypertension.     SURGICAL HISTORY      has a past surgical history that includes Mandible reconstruction (10/2/15); Skin graft (10/2/15); and Gastrostomy tube placement (10/2/15). CURRENT MEDICATIONS       Discharge Medication List as of 6/27/2019 10:01 PM      CONTINUE these medications which have NOT CHANGED    Details   esomeprazole Magnesium (NEXIUM) 20 MG PACK Take 20 mg by mouth dailyHistorical Med      Mouthwashes (BIOTENE DRY MOUTH) LIQD daily , LILIYA      lisinopril (PRINIVIL;ZESTRIL) 20 MG tablet Take 20 mg by mouth daily             ALLERGIES     has No Known Allergies. FAMILY HISTORY     indicated that his mother is alive. He indicated that his sister is alive. He indicated that his brother is alive. family history includes Asthma in his mother. SOCIAL HISTORY      reports that he has quit smoking. His smoking use included cigarettes. He has a 40.80 pack-year smoking history. He has never used smokeless tobacco. He reports that he drinks alcohol. He reports that he does not use drugs. PHYSICAL EXAM     INITIAL VITALS:  height is 6' (1.829 m) and weight is 160 lb (72.6 kg). His oral temperature is 98.6 °F (37 °C). His blood pressure is 133/91 (abnormal) and his pulse is 68. His respiration is 17 and oxygen saturation is 97%. Physical Exam   Constitutional: He is oriented to person, place, and time. Vital signs are normal. He appears well-developed. No distress. HENT:   Head: Normocephalic and atraumatic. Eyes: Pupils are equal, round, and reactive to light. Conjunctivae and EOM are normal. Right eye exhibits no nystagmus. Left eye exhibits no nystagmus. Neck: Normal range of motion. Muscular tenderness present. No neck rigidity. No Kernig's sign noted. Cardiovascular: Normal rate and regular rhythm. Pulmonary/Chest: Effort normal and breath sounds normal. No respiratory distress. Abdominal: Soft. Bowel sounds are normal.   Lymphadenopathy:     He has cervical adenopathy. Neurological: He is alert and oriented to person, place, and time.  GCS eye subscore is 4. GCS verbal subscore is 5. GCS motor subscore is 6. Gait is unsteady. Slowed thought process. Skin: Skin is warm and dry. Capillary refill takes less than 2 seconds. Psychiatric: He has a normal mood and affect. His behavior is normal. Thought content normal.        DIFFERENTIAL DIAGNOSIS:   Severe hyponatremia, hepatic encephalopathy, metastatic brain tumor, substance abuse,    DIAGNOSTIC RESULTS     EKG: All EKG's are interpreted by the Emergency Department Physician who either signs or Co-signs this chart in the absence of a cardiologist.    Normal sinus rhythm ventricular rate 88 bpm.  NV interval 154, QRS duration 84, corrected  ms. RADIOLOGY: non-plainfilm images(s) such as CT, Ultrasound and MRI are read by the radiologist.    [address]   Final Result    No evidence of acute intracranial abnormality. **This report has been created using voice recognition software. It may contain minor errors which are inherent in voice recognition technology. **      Final report electronically signed by Dr. Abraham Mahoney MD on 6/27/2019 8:18 PM          LABS:     Labs Reviewed   CBC WITH AUTO DIFFERENTIAL - Abnormal; Notable for the following components:       Result Value    RBC 4.29 (*)     Hemoglobin 13.5 (*)     Hematocrit 40.4 (*)     MCV 94.2 (*)     RDW-CV 15.2 (*)     RDW-SD 52.7 (*)     All other components within normal limits   COMPREHENSIVE METABOLIC PANEL - Abnormal; Notable for the following components:    BUN 6 (*)     CO2 20 (*)     Total Bilirubin <0.2 (*)     All other components within normal limits   MAGNESIUM   URINE DRUG SCREEN   URINALYSIS WITH MICROSCOPIC   TROPONIN   AMMONIA   PROTIME-INR   ETHANOL   ANION GAP   OSMOLALITY   GLOMERULAR FILTRATION RATE, ESTIMATED       EMERGENCY DEPARTMENT COURSE:   Vitals:    Vitals:    06/27/19 1928 06/27/19 2053 06/27/19 2150   BP: (!) 159/99 (!) 137/102 (!) 133/91   Pulse: 92 79 68   Resp: 17 16 17 Temp: 98.6 °F (37 °C)     TempSrc: Oral     SpO2: 94% 98% 97%   Weight: 160 lb (72.6 kg)     Height: 6' (1.829 m)         7:45 PM: The patient was seen and evaluated. The patient and significant other initially denied any alcohol or drug use. Patient's ethanol level resulted 0.18. Drug screen negative. Troponin less than 0.01. Ammonia level no elevation. Rest labs and CT reassuring. MDM:  The patient initially denied any alcohol use but has elevation in ethanol level of 0.18. He admitted to having a few beers today that the significant other was not aware of. At this time I decided to discharge the patient with the significant other. He is advised to return to the emergency department if he continues to have symptoms. Otherwise, the patient should follow-up with primary care provider and specialist in [place]. CRITICAL CARE:   None    CONSULTS:  None    PROCEDURES:  NOne    FINAL IMPRESSION      1. Acute alcoholic intoxication without complication [facility])          DISPOSITION/PLAN   Discharge    PATIENT REFERRED TO:  [facility]e [facility]NP[facility] [phone]            DISCHARGE MEDICATIONS:  Discharge Medication List as of 6/27/2019 10:01 PM          (Please note that portions of this note were completed with a voice recognition program.  Efforts were made to edit the dictations but occasionally words are mis-transcribed.)    The patient was given an opportunity to see the Emergency Attending. The patient voiced understanding that I was a Mid-LevelProvider and was in agreement with being seen independently by myself.           MADELIN Rooney CNP  06/27/19 4563

## 2019-06-27 NOTE — ED TRIAGE NOTES
Presents to ED with wife for new onset vision changes. Wife states pt has had vision changes and hearing loss ast few days with intermittent neck pain, but today has been worse. Pt has history of stage 4 bone cancer. Alert and oriented x4. Repeats \"why am I here? \" and \"I want to go home and lay in my bed\" EKG completed.

## 2019-07-02 ENCOUNTER — HOSPITAL ENCOUNTER (OUTPATIENT)
Dept: RADIATION ONCOLOGY | Age: 54
Discharge: HOME | End: 2019-07-02
Payer: COMMERCIAL

## 2019-07-02 VITALS
SYSTOLIC BLOOD PRESSURE: 172 MMHG | RESPIRATION RATE: 18 BRPM | BODY MASS INDEX: 23.77 KG/M2 | DIASTOLIC BLOOD PRESSURE: 112 MMHG | HEART RATE: 90 BPM | WEIGHT: 175.27 LBS | OXYGEN SATURATION: 98 % | TEMPERATURE: 95.5 F

## 2019-07-02 DIAGNOSIS — C06.2 SQUAMOUS CELL CANCER OF RETROMOLAR TRIGONE (HCC): ICD-10-CM

## 2019-07-02 DIAGNOSIS — E04.2 MULTINODULAR THYROID: Primary | ICD-10-CM

## 2019-07-02 PROCEDURE — 99212 OFFICE O/P EST SF 10 MIN: CPT | Performed by: NURSE PRACTITIONER

## 2019-07-02 NOTE — PROGRESS NOTES
[address] [facility], [address]  Phone: [phone]   Toll Free: [phone]   Fax: [phone]    RADIATION ONCOLOGY FOLLOW UP REPORT    PATIENT NAME:  Frankie Johnson     : 1965  MEDICAL RECORD NO: [id number]    LOCATION: [facility] NO: [id number]      PROVIDER: MADELIN Guzman - CNP        DATE OF SERVICE: 2019    FOLLOW UP PHYSICIANS: Dr. Willy Tang; Dr. Vielka Mehta [facility]-CNP     DIAGNOSIS: C06.22 squamous cell carcinoma of the left retromolar trigone, [id number], stage CHAN, status post definitive surgical resection followed by adjuvant chemoradiation     DATE OF DIAGNOSIS: 2015     END OF TREATMENT DATE: 2016     ECOG PERFORMANCE STATUS: 0     PAIN: Denies     CHAPERONE: Significant other        HPI: Lucas is an active smoker with heavy alcohol use history who presented intially to the Emergency Room 2015 for sudden onset of jaw pain after eating chips with a sharp edge. Inspection revealed a fungating mass in the left retromolar trigone.  He completed a CT of the head and neck which again revealed this primary tumor and suspected adenopathy submentally in the left submandibular gland and level II in the left neck.  The patient was referred to [facility] and completed a left inferior maxillectomy, composite resection with segmental mandibulectomy, bilateral neck dissections, and reconstruction with free flap and bone graft from a right tibial donor site. Surgery was completed on 10/02/2015.  The patient had a PEG placed at this time also. Machelle Aguirrein an uncomplicated postoperative course and was seen to discuss adjuvant radiotherapy for which he was agreeable.  He did have brisk skin reaction with significant erythema culminating in hyperpigmentation with  patchy dry desquamation.  The patient did not develop any moist desquamation.     INTERVAL HISTORY: Gaudencio Ordoñez returns to [employer] today

## 2019-07-11 ENCOUNTER — APPOINTMENT (OUTPATIENT)
Dept: WOMENS IMAGING | Age: 54
End: 2019-07-11
Payer: COMMERCIAL

## 2019-07-11 ENCOUNTER — HOSPITAL ENCOUNTER (OUTPATIENT)
Dept: ULTRASOUND IMAGING | Age: 54
Discharge: HOME | End: 2019-07-11
Payer: COMMERCIAL

## 2019-07-11 ENCOUNTER — HOSPITAL ENCOUNTER (OUTPATIENT)
Dept: NUCLEAR MEDICINE | Age: 54
Discharge: HOME | End: 2019-07-11
Payer: COMMERCIAL

## 2019-07-11 DIAGNOSIS — C41.1 MALIGNANT NEOPLASM OF MANDIBLE (HCC): ICD-10-CM

## 2019-07-11 DIAGNOSIS — E04.2 MULTINODULAR THYROID: ICD-10-CM

## 2019-07-11 PROCEDURE — A9503 TC99M MEDRONATE: HCPCS | Performed by: NURSE PRACTITIONER

## 2019-07-11 PROCEDURE — 3430000000 HC RX DIAGNOSTIC RADIOPHARMACEUTICAL: Performed by: NURSE PRACTITIONER

## 2019-07-11 PROCEDURE — 78306 BONE IMAGING WHOLE BODY: CPT

## 2019-07-11 PROCEDURE — 76536 US EXAM OF HEAD AND NECK: CPT

## 2019-07-11 RX ORDER — TC 99M MEDRONATE 20 MG/10ML
23.5 INJECTION, POWDER, LYOPHILIZED, FOR SOLUTION INTRAVENOUS
Status: COMPLETED | OUTPATIENT
Start: 2019-07-11 | End: 2019-07-11

## 2019-07-11 RX ADMIN — TC 99M MEDRONATE 23.5 MILLICURIE: 20 INJECTION, POWDER, LYOPHILIZED, FOR SOLUTION INTRAVENOUS at 10:30

## 2019-07-26 ENCOUNTER — HOSPITAL ENCOUNTER (OUTPATIENT)
Dept: CT IMAGING | Age: 54
Discharge: HOME | End: 2019-07-26
Payer: COMMERCIAL

## 2019-07-26 DIAGNOSIS — C06.2 SQUAMOUS CELL CANCER OF RETROMOLAR TRIGONE (HCC): ICD-10-CM

## 2019-07-26 PROCEDURE — 71260 CT THORAX DX C+: CPT

## 2019-07-26 PROCEDURE — 6360000004 HC RX CONTRAST MEDICATION: Performed by: NURSE PRACTITIONER

## 2019-07-26 RX ADMIN — IOPAMIDOL 85 ML: 755 INJECTION, SOLUTION INTRAVENOUS at 13:10

## 2019-08-07 DIAGNOSIS — C06.2 SQUAMOUS CELL CANCER OF RETROMOLAR TRIGONE (HCC): Primary | ICD-10-CM

## 2019-08-12 ENCOUNTER — HOSPITAL ENCOUNTER (OUTPATIENT)
Dept: CT IMAGING | Age: 54
Discharge: HOME | End: 2019-08-12
Payer: COMMERCIAL

## 2019-08-12 DIAGNOSIS — Z00.6 EXAMINATION FOR NORMAL COMPARISON FOR CLINICAL RESEARCH: ICD-10-CM

## 2019-08-12 PROCEDURE — 3209999900 CT COMPARISON OF OUTSIDE FILMS

## 2019-08-15 ENCOUNTER — TELEPHONE (OUTPATIENT)
Dept: ONCOLOGY | Age: 54
End: 2019-08-15

## 2019-08-15 ENCOUNTER — HOSPITAL ENCOUNTER (OUTPATIENT)
Dept: PET IMAGING | Age: 54
Discharge: HOME | End: 2019-08-15
Payer: COMMERCIAL

## 2019-08-15 DIAGNOSIS — C06.2 SQUAMOUS CELL CANCER OF RETROMOLAR TRIGONE (HCC): ICD-10-CM

## 2019-08-15 PROCEDURE — A9552 F18 FDG: HCPCS | Performed by: NURSE PRACTITIONER

## 2019-08-15 PROCEDURE — 3430000000 HC RX DIAGNOSTIC RADIOPHARMACEUTICAL: Performed by: NURSE PRACTITIONER

## 2019-08-15 PROCEDURE — 78815 PET IMAGE W/CT SKULL-THIGH: CPT

## 2019-08-15 RX ORDER — FLUDEOXYGLUCOSE F 18 200 MCI/ML
14.4 INJECTION, SOLUTION INTRAVENOUS
Status: COMPLETED | OUTPATIENT
Start: 2019-08-15 | End: 2019-08-15

## 2019-08-15 RX ADMIN — FLUDEOXYGLUCOSE F 18 14.4 MILLICURIE: 200 INJECTION, SOLUTION INTRAVENOUS at 10:33

## 2019-08-23 ENCOUNTER — HOSPITAL ENCOUNTER (OUTPATIENT)
Age: 54
Setting detail: SPECIMEN
Discharge: HOME | End: 2019-08-23
Payer: COMMERCIAL

## 2019-08-25 LAB — H PYLORI BREATH TEST: POSITIVE

## 2019-09-06 ENCOUNTER — HOSPITAL ENCOUNTER (OUTPATIENT)
Dept: INFUSION THERAPY | Age: 54
Discharge: HOME | End: 2019-09-06
Payer: COMMERCIAL

## 2019-09-06 ENCOUNTER — OFFICE VISIT (OUTPATIENT)
Dept: ONCOLOGY | Age: 54
End: 2019-09-06
Payer: COMMERCIAL

## 2019-09-06 VITALS
OXYGEN SATURATION: 98 % | HEIGHT: 72 IN | BODY MASS INDEX: 25.11 KG/M2 | TEMPERATURE: 97.9 F | WEIGHT: 185.4 LBS | RESPIRATION RATE: 18 BRPM | HEART RATE: 76 BPM | SYSTOLIC BLOOD PRESSURE: 140 MMHG | DIASTOLIC BLOOD PRESSURE: 105 MMHG

## 2019-09-06 DIAGNOSIS — C06.2 SQUAMOUS CELL CANCER OF RETROMOLAR TRIGONE (HCC): ICD-10-CM

## 2019-09-06 DIAGNOSIS — D64.9 CHRONIC ANEMIA: ICD-10-CM

## 2019-09-06 DIAGNOSIS — M62.838 MUSCLE SPASMS OF NECK: ICD-10-CM

## 2019-09-06 DIAGNOSIS — C06.2 SQUAMOUS CELL CANCER OF RETROMOLAR TRIGONE (HCC): Primary | ICD-10-CM

## 2019-09-06 LAB
BUN, WHOLE BLOOD: 3 MG/DL (ref 8–26)
CHLORIDE, WHOLE BLOOD: 105 MEQ/L (ref 98–109)
CREATININE, WHOLE BLOOD: 1.4 MG/DL (ref 0.5–1.2)
GFR, ESTIMATED: 68 ML/MIN/1.73M2
GLUCOSE, WHOLE BLOOD: 104 MG/DL (ref 70–108)
HCT VFR BLD CALC: 39.9 % (ref 42–52)
HEMOGLOBIN: 13.6 GM/DL (ref 14–18)
IONIZED CALCIUM, WHOLE BLOOD: 1.22 MMOL/L (ref 1.12–1.32)
MCH RBC QN AUTO: 32 PG (ref 27–31)
MCHC RBC AUTO-ENTMCNC: 34.1 GM/DL (ref 33–37)
MCV RBC AUTO: 94 FL (ref 80–94)
PDW BLD-RTO: 12.6 % (ref 11.5–14.5)
PLATELET # BLD: 275 THOU/MM3 (ref 130–400)
PMV BLD AUTO: 6.9 FL (ref 7.4–10.4)
POTASSIUM, WHOLE BLOOD: 3.8 MEQ/L (ref 3.5–4.9)
RBC # BLD: 4.25 MILL/MM3 (ref 4.7–6.1)
SODIUM, WHOLE BLOOD: 141 MEQ/L (ref 138–146)
TOTAL CO2, WHOLE BLOOD: 25 MEQ/L (ref 23–33)
WBC # BLD: 5.9 THOU/MM3 (ref 4.8–10.8)

## 2019-09-06 PROCEDURE — 85027 COMPLETE CBC AUTOMATED: CPT

## 2019-09-06 PROCEDURE — 99211 OFF/OP EST MAY X REQ PHY/QHP: CPT

## 2019-09-06 PROCEDURE — 1036F TOBACCO NON-USER: CPT | Performed by: INTERNAL MEDICINE

## 2019-09-06 PROCEDURE — 3017F COLORECTAL CA SCREEN DOC REV: CPT | Performed by: INTERNAL MEDICINE

## 2019-09-06 PROCEDURE — 36415 COLL VENOUS BLD VENIPUNCTURE: CPT

## 2019-09-06 PROCEDURE — 99214 OFFICE O/P EST MOD 30 MIN: CPT | Performed by: INTERNAL MEDICINE

## 2019-09-06 PROCEDURE — G8427 DOCREV CUR MEDS BY ELIG CLIN: HCPCS | Performed by: INTERNAL MEDICINE

## 2019-09-06 PROCEDURE — G8419 CALC BMI OUT NRM PARAM NOF/U: HCPCS | Performed by: INTERNAL MEDICINE

## 2019-09-06 PROCEDURE — 80047 BASIC METABLC PNL IONIZED CA: CPT

## 2019-09-06 RX ORDER — PANTOPRAZOLE SODIUM 40 MG/1
40 TABLET, DELAYED RELEASE ORAL DAILY
Refills: 6 | COMMUNITY
Start: 2019-08-23

## 2019-09-06 RX ORDER — METHOCARBAMOL 500 MG/1
500 TABLET, FILM COATED ORAL 4 TIMES DAILY
Qty: 40 TABLET | Refills: 3 | Status: SHIPPED | OUTPATIENT
Start: 2019-09-06 | End: 2019-09-16

## 2019-09-06 NOTE — PROGRESS NOTES
[facility] PROFESSIONAL SERVICES  [facility] OF [facility]  [address], [address] [facility],[address]  Joshua Cruz  Dept: [phone]  Dept Fax: [phone]  Loc: [phone]    Subjective:     Chief Complaint:  Josh Landis is a 48 y.o. male with head and neck cancer. HPI: The patient is here today for follow-up evaluation. He has a history of squamous cell carcinoma head and neck. He did complete a course of radiation and chemotherapy given concurrently. The patient tolerated these well without significant complications. He has not had evidence of recurrence of his malignancy since his original treatment. His chief complaint on today's evaluation that he complains of muscle spasms that occur in his neck on a daily basis. He has tried Flexeril in the past but states that this made him too drowsy despite helping the muscle spasms. The patient has no other specific complaints. He denies shortness of breath or chest pain. His bowel bladder habits have been normal.  He has had good oral intake without any evidence of dysphasia. ECOG performance status is level 0. PMH, SH, and FH:  I reviewed the PMH, SH and FH as noted on the electronic medical record. There have been no changes as noted in the previous documentation. Review of Systems   Constitutional: Negative. HENT: Muscle spasms in neck    Eyes: Negative. Respiratory: Negative. Cardiovascular: Negative. Gastrointestinal: Negative. Genitourinary: Negative. Musculoskeletal: Negative. Skin: Negative. Neurological: Negative. Hematological: Negative. Psychiatric/Behavioral: Negative.       Objective:   Physical Exam   Vitals:    09/06/19 1047 09/06/19 1048   BP: (!) 142/98 (!) 140/105   Site: Left Upper Arm Left Upper Arm   Position: Sitting Sitting   Cuff Size: Medium Adult Medium Adult   Pulse: 76    Resp: 18    Temp: 97.9 °F (36.6 °C)    TempSrc: Oral    SpO2: 98%    Weight: 185 lb 6.4 oz (84.1 kg)    Height: 6'

## 2019-09-07 PROBLEM — D64.9 CHRONIC ANEMIA: Status: ACTIVE | Noted: 2019-09-07

## 2019-09-07 PROBLEM — M62.838 MUSCLE SPASMS OF NECK: Status: ACTIVE | Noted: 2019-09-07

## 2019-10-16 ENCOUNTER — HOSPITAL ENCOUNTER (OUTPATIENT)
Dept: NEUROLOGY | Age: 54
Discharge: HOME | End: 2019-10-16
Payer: COMMERCIAL

## 2019-10-16 PROCEDURE — 95819 EEG AWAKE AND ASLEEP: CPT

## 2020-01-23 ENCOUNTER — HOSPITAL ENCOUNTER (OUTPATIENT)
Dept: RADIATION ONCOLOGY | Age: 55
Discharge: HOME | End: 2020-01-23
Payer: COMMERCIAL

## 2020-01-23 VITALS
HEART RATE: 87 BPM | TEMPERATURE: 98.1 F | WEIGHT: 182.98 LBS | SYSTOLIC BLOOD PRESSURE: 130 MMHG | BODY MASS INDEX: 24.82 KG/M2 | RESPIRATION RATE: 18 BRPM | DIASTOLIC BLOOD PRESSURE: 81 MMHG | OXYGEN SATURATION: 97 %

## 2020-01-23 PROCEDURE — 99212 OFFICE O/P EST SF 10 MIN: CPT | Performed by: NURSE PRACTITIONER

## 2020-05-21 ENCOUNTER — TELEPHONE (OUTPATIENT)
Dept: CARDIOLOGY CLINIC | Age: 55
End: 2020-05-21

## 2020-05-21 ENCOUNTER — OFFICE VISIT (OUTPATIENT)
Dept: CARDIOLOGY CLINIC | Age: 55
End: 2020-05-21
Payer: MEDICARE

## 2020-05-21 ENCOUNTER — HOSPITAL ENCOUNTER (OUTPATIENT)
Dept: NON INVASIVE DIAGNOSTICS | Age: 55
Discharge: HOME | End: 2020-05-21

## 2020-05-21 VITALS
SYSTOLIC BLOOD PRESSURE: 120 MMHG | HEIGHT: 69 IN | BODY MASS INDEX: 25.56 KG/M2 | HEART RATE: 84 BPM | DIASTOLIC BLOOD PRESSURE: 78 MMHG | WEIGHT: 172.6 LBS

## 2020-05-21 PROCEDURE — 99204 OFFICE O/P NEW MOD 45 MIN: CPT | Performed by: NUCLEAR MEDICINE

## 2020-05-21 PROCEDURE — 93000 ELECTROCARDIOGRAM COMPLETE: CPT | Performed by: NUCLEAR MEDICINE

## 2020-05-21 ASSESSMENT — ENCOUNTER SYMPTOMS
ANAL BLEEDING: 0
ABDOMINAL PAIN: 0
ABDOMINAL DISTENTION: 0
CONSTIPATION: 0
RECTAL PAIN: 0
DIARRHEA: 0
COLOR CHANGE: 0
NAUSEA: 0
CHEST TIGHTNESS: 0
PHOTOPHOBIA: 0
SHORTNESS OF BREATH: 1
VOMITING: 0
BACK PAIN: 0
BLOOD IN STOOL: 0

## 2020-05-21 NOTE — TELEPHONE ENCOUNTER
LMOM asking for a return call    Patient is scheduled Roula 10, 2020  Echo- 145pm  Arrival time 115pm   Tilt 230pm      instructions mailed to the patient

## 2020-05-21 NOTE — PROGRESS NOTES
[facility],[address] [address]  Dept: [phone]  Dept Fax: [phone]  Loc: [phone]    Visit Date: 5/21/2020    Jessi Kim is a 47 y.o. male who presents todayfor:  Chief Complaint   Patient presents with    New Patient    Loss of Consciousness    Hypertension   here for the first time  Not the best historian   Known bone cancer with radiation and chemo  Some march 2016  In remission   Sent to me from the neurologist  Seen for headaches  Does have syncope  More like near syncope  Was out for few minutes  Getting work up for syncope by neurology   Had two episodes   No chest pain per se  Does have baseline dyspnea on exertion   Does have HTN on medical RX  No kown CAD before  He is smoker  Family history of CAD       HPI:  HPI  Past Medical History:   Diagnosis Date    [address] and neck [facility] [address] [address]     Hypertension       Past Surgical History:   Procedure Laterality Date    GASTROSTOMY TUBE PLACEMENT  10/2/15    MANDIBLE RECONSTRUCTION  10/2/15    SKIN GRAFT  10/2/15    jaw     Family History   Problem Relation Age of Onset    Asthma Mother      Social History     Tobacco Use    Smoking status: Former Smoker     Packs/day: 1.20     Years: 34.00     Pack years: 40.80     Types: Cigarettes    Smokeless tobacco: Never Used   Substance Use Topics    Alcohol use: Yes     Alcohol/week: 0.0 standard drinks     Comment: beer, every other day      Current Outpatient Medications   Medication Sig Dispense Refill    Sertraline HCl (ZOLOFT PO) Take by mouth      pantoprazole (PROTONIX) 40 MG tablet Take 40 mg by mouth daily   6    esomeprazole Magnesium (NEXIUM) 20 MG PACK Take 20 mg by mouth daily      Mouthwashes (BIOTENE DRY MOUTH) LIQD daily       lisinopril (PRINIVIL;ZESTRIL) 20 MG tablet Take 20 mg by mouth daily       No current facility-administered medications for this visit.       No Known Allergies  Health Prescribed:  No orders of the defined types were placed in this encounter. Discussed use, benefit, and side effects of prescribed medications. All patient questions answered. Pt voicedunderstanding. Instructed to continue current medications, diet and exercise. Continue risk factor modification and medical management. Patient agreed with treatment plan. Follow up as directed.     Electronically signedby Kyler Staples MD on 5/21/2020 at 11:14 AM

## 2020-05-21 NOTE — PROGRESS NOTES
New patient for syncope. EKG done today. Patient passed out on Tuesday night. Patient complains of being tired after each episode of passing out. Patient complains of SOB.

## 2020-05-22 ENCOUNTER — HOSPITAL ENCOUNTER (OUTPATIENT)
Dept: CT IMAGING | Age: 55
Discharge: HOME | End: 2020-05-22
Payer: MEDICARE

## 2020-05-22 ENCOUNTER — HOSPITAL ENCOUNTER (OUTPATIENT)
Dept: NON INVASIVE DIAGNOSTICS | Age: 55
Discharge: HOME | End: 2020-05-22
Payer: MEDICARE

## 2020-05-22 PROCEDURE — 93225 XTRNL ECG REC<48 HRS REC: CPT

## 2020-05-22 PROCEDURE — 70470 CT HEAD/BRAIN W/O & W/DYE: CPT

## 2020-05-22 PROCEDURE — 93226 XTRNL ECG REC<48 HR SCAN A/R: CPT

## 2020-05-22 PROCEDURE — 6360000004 HC RX CONTRAST MEDICATION: Performed by: NURSE PRACTITIONER

## 2020-05-22 RX ADMIN — IOPAMIDOL 65 ML: 755 INJECTION, SOLUTION INTRAVENOUS at 09:06

## 2020-05-25 LAB
ACQUISITION DURATION: NORMAL S
AVERAGE HEART RATE: 90 BPM
HOOKUP DATE: NORMAL
HOOKUP TIME: NORMAL
MAX HEART RATE TIME/DATE: NORMAL
MAX HEART RATE: 141 BPM
MIN HEART RATE TIME/DATE: NORMAL
MIN HEART RATE: 60 BPM
NUMBER OF QRS COMPLEXES: NORMAL
NUMBER OF SUPRAVENTRICULAR BEATS IN RUNS: 0
NUMBER OF SUPRAVENTRICULAR COUPLETS: 0
NUMBER OF SUPRAVENTRICULAR ECTOPICS: 2
NUMBER OF SUPRAVENTRICULAR ISOLATED BEATS: 2
NUMBER OF SUPRAVENTRICULAR RUNS: 0
NUMBER OF VENTRICULAR BEATS IN RUNS: 0
NUMBER OF VENTRICULAR BIGEMINAL CYCLES: 0
NUMBER OF VENTRICULAR COUPLETS: 0
NUMBER OF VENTRICULAR ECTOPICS: 1
NUMBER OF VENTRICULAR ISOLATED BEATS: 1
NUMBER OF VENTRICULAR RUNS: 0

## 2020-05-27 ENCOUNTER — HOSPITAL ENCOUNTER (OUTPATIENT)
Dept: NEUROLOGY | Age: 55
Discharge: HOME | End: 2020-05-27
Payer: MEDICARE

## 2020-05-27 PROCEDURE — 95816 EEG AWAKE AND DROWSY: CPT | Performed by: PSYCHIATRY & NEUROLOGY

## 2020-05-27 PROCEDURE — 95819 EEG AWAKE AND ASLEEP: CPT

## 2020-06-10 ENCOUNTER — HOSPITAL ENCOUNTER (OUTPATIENT)
Dept: NON INVASIVE DIAGNOSTICS | Age: 55
Discharge: HOME | End: 2020-06-10
Payer: MEDICAID

## 2020-06-10 LAB
LV EF: 55 %
LVEF MODALITY: NORMAL

## 2020-06-10 PROCEDURE — 93306 TTE W/DOPPLER COMPLETE: CPT

## 2020-06-10 PROCEDURE — 93660 TILT TABLE EVALUATION: CPT | Performed by: NUCLEAR MEDICINE

## 2020-06-11 RX ORDER — METOPROLOL TARTRATE 50 MG/1
50 TABLET, FILM COATED ORAL 2 TIMES DAILY
Qty: 60 TABLET | Refills: 1 | Status: SHIPPED | OUTPATIENT
Start: 2020-06-11 | End: 2020-08-12

## 2020-06-11 RX ORDER — METOPROLOL TARTRATE 50 MG/1
50 TABLET, FILM COATED ORAL 2 TIMES DAILY
COMMUNITY
End: 2020-06-11 | Stop reason: SDUPTHER

## 2020-08-12 ENCOUNTER — HOSPITAL ENCOUNTER (OUTPATIENT)
Dept: RADIATION ONCOLOGY | Age: 55
Discharge: HOME | End: 2020-08-12
Payer: MEDICAID

## 2020-08-12 VITALS
SYSTOLIC BLOOD PRESSURE: 163 MMHG | HEART RATE: 67 BPM | OXYGEN SATURATION: 100 % | RESPIRATION RATE: 16 BRPM | BODY MASS INDEX: 25.64 KG/M2 | DIASTOLIC BLOOD PRESSURE: 91 MMHG | WEIGHT: 173.6 LBS | TEMPERATURE: 97.3 F

## 2020-08-12 PROCEDURE — 99213 OFFICE O/P EST LOW 20 MIN: CPT | Performed by: NURSE PRACTITIONER

## 2020-08-12 PROCEDURE — 99212 OFFICE O/P EST SF 10 MIN: CPT | Performed by: NURSE PRACTITIONER

## 2020-08-12 RX ORDER — METOPROLOL TARTRATE 50 MG/1
TABLET, FILM COATED ORAL
Qty: 60 TABLET | Refills: 3 | Status: SHIPPED | OUTPATIENT
Start: 2020-08-12 | End: 2020-12-16

## 2020-08-12 ASSESSMENT — PAIN DESCRIPTION - DESCRIPTORS: DESCRIPTORS: CRAMPING;SHARP

## 2020-08-12 ASSESSMENT — PAIN DESCRIPTION - PROGRESSION: CLINICAL_PROGRESSION: GRADUALLY WORSENING

## 2020-08-12 ASSESSMENT — PAIN SCALES - GENERAL: PAINLEVEL_OUTOF10: 7

## 2020-08-12 ASSESSMENT — PAIN DESCRIPTION - FREQUENCY: FREQUENCY: CONTINUOUS

## 2020-08-12 ASSESSMENT — PAIN DESCRIPTION - ORIENTATION: ORIENTATION: LEFT

## 2020-08-12 ASSESSMENT — PAIN DESCRIPTION - PAIN TYPE: TYPE: CHRONIC PAIN

## 2020-08-12 ASSESSMENT — PAIN DESCRIPTION - LOCATION: LOCATION: NECK

## 2020-08-12 ASSESSMENT — PAIN DESCRIPTION - ONSET: ONSET: ON-GOING

## 2020-08-12 NOTE — PROGRESS NOTES
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 Inspection revealed a fungating mass in the left retromolar trigone.  He completed a CT of the head and neck which again revealed this primary tumor and suspected adenopathy submentally in the left submandibular gland and level II in the left neck.  The patient was referred to [facility] and completed a left inferior maxillectomy, composite resection with segmental mandibulectomy, bilateral neck dissections, and reconstruction with free flap and bone graft from a right tibial donor site. Surgery was completed on 10/02/2015.  The patient had a PEG placed at this time also. Robert Golds an uncomplicated postoperative course and was seen to discuss adjuvant radiotherapy for which he was agreeable.      He did have brisk skin reaction with significant erythema culminating in hyperpigmentation with patchy dry desquamation.  The patient did not develop any moist desquamation.     INTERVAL HISTORY: Nan Palacios returns to [facility] today for a posttreatment checkup as part of his survivorship care after undergoing surgery, chemo and radiation therapy for cancer of the left retromolar trigone. Tina Solano states he continues to experience xerostomia, he uses biotene which helps a little. He continues to have left neck discomfort, lymphedema and stiffness which he describes as progressively getting worse over the last couple of months. He also states he has been experiencing worsening dysphagia over the last couple of months. He has followed with physical therapy in the past which did help with the lymphedema. He continues to experience balance issues and is following with Neurologist , Dr. Modesta Olivia. He denies chest pain, sob, fever, chills, cough, headaches, nausea, vomiting,changes with bowel or bladder habits. LAB RESULTS:   No recent labs    RADIOLOGY RESULTS:   5/22/2020: CT head w wo contrast:   Impression     Normal CT appearance of the brain. 12/9/19: MRI brain w/wo contrast:   IMPRESSION:  Normal unenhanced and enhanced MRI of the brain    8/15/19: PET:  Impression       No FDG avid malignancy. MEDICATIONS:   Current Outpatient Medications   Medication Sig Dispense Refill    metoprolol tartrate (LOPRESSOR) 50 MG tablet TAKE 1 TABLET BY MOUTH TWICE DAILY 60 tablet 3    Sertraline HCl (ZOLOFT PO) Take by mouth      pantoprazole (PROTONIX) 40 MG tablet Take 40 mg by mouth daily   6    esomeprazole Magnesium (NEXIUM) 20 MG PACK Take 20 mg by mouth daily      Mouthwashes (BIOTENE DRY MOUTH) LIQD daily       lisinopril (PRINIVIL;ZESTRIL) 20 MG tablet Take 20 mg by mouth daily       No current facility-administered medications for this encounter. ROS: As noted in the HPI and Interval history, otherwise negative. EXAMINATION:   GENERAL: Lucas is a pleasant, well-developed adult male. Noel Eye is alert  and oriented x3, in no acute distress  VITAL SIGNS: Weight 78.7 kg, temperature 36.3°C, pulse 67, blood pressure 163/91, respirations 16, pulse ox 100% on room air.   HEENT: Head is normocephalic and atraumatic.  PERRL.  EOMI.  Ears,  nose and lips are within normal limits.  On examination of the oral cavity and oropharynx there are postoperative changes present, the reconstructed left oropharynx is smooth without any visible lesions or exudates. xerostomia. Erendira Rea are extensive postoperative changes to the left neck. NECK: Without palpable cervical adenopathy, however limited exam due to pain.  Postoperative changes to neck as described above. Decreased ROM. Lymphedema left neck. LYMPH: Without palpable supraclavicular or axillary adenopathy. LUNGS: Clear no adventitious sounds. HEART: Regular rate and rhythm without murmur. ABDOMEN: Soft, nontender active bowel sounds x4. EXTREMITIES: Without clubbing or cyanosis no edema noted. NEUROLOGIC EXAMINATION: Cranial nerves grossly intact. ASSESSMENT: Mady Lennox was diagnosed with head and neck cancer in 2015. He underwent treatment with surgery, chemotherapy and radiation. He has been experiencing worsening left neck pain, neck lymphedema and dysphagia over the last couple of months. On physical exam there is swelling and decreased neck ROM. There is no obvious sign of recurrent/new disease. Will obtain CT neck to further evaluate. Also will refer to Speech and Physical therapies. He is a current smoker will require follow up CT chest to follow pulmonary nodules seen in July. However did not light up on PET from 8/2019. CT chest ordered. Mady Lennox' balance continues to be an issue. He has been following with Dr. Ann Parham and has had an MRI brain which showed normal findings in 2019. CT head in May 2020 showed no abnormal findings. He continues follow up with Dr. Ann Parham. PLAN:  Follow up Tyroid US: Completed in July 2019 did not identify the previously seen thyroid nodules, no nodules were seen on the current exam.   CT chest: Completed in July 2019 due to smoking and cancer history.  Showed 2 2mm groundglass pulmonary nodules in the [place]. Follow up PET scan in August 2019 showed: No FDG avid malignancy. CT chest due now order placed. Dysphagia: worsening, speech therapy referral.   Neck pain and lymphedema: will obtain CT neck to rule out any malignant process. Pain could be related to lymphedema. Will also refer to PT for lymphedema management. Neck decreased ROM/stiffness: PT referral placed. Smoking cessation: discussed had quit for a while started back up and is smoking 1ppd. Dizziness: MRI brain in December 2019 and showed normal findings. CT head May 2020 no abnormal findings. Follows with Dr. Indra Kirk  Fatigue: likely related to hypothyroidism. Check TSH  Feeling cold: likely related to hypothyroid  Hypothyroid: managed by PCP  HTN: following with PCP for this. Follow up here in 6 months. Continue follow up with other providers as scheduled  Mariana Aviles is aware he can call for questions, concerns or changes in condition.       Electronically signed by MADELIN Crystal CNP on 8/14/2020 at 7:22 AM    CC: Dr. Suhail Taylor; Dr. Norma Hassan; Evy DOMINGUEZ; Dr. Toro Love

## 2020-08-17 ENCOUNTER — HOSPITAL ENCOUNTER (OUTPATIENT)
Dept: PHYSICAL THERAPY | Age: 55
Setting detail: THERAPIES SERIES
Discharge: HOME | End: 2020-08-17
Payer: MEDICAID

## 2020-08-17 PROCEDURE — 97162 PT EVAL MOD COMPLEX 30 MIN: CPT

## 2020-08-17 PROCEDURE — 97110 THERAPEUTIC EXERCISES: CPT

## 2020-08-17 NOTE — PROGRESS NOTES
Reports was given a walker with wheels due to frequent loss of balance, denies any falls. States he looses his balance posteriorly and to the left. PAIN: 7/10 neck    Social/Functional History and Current Status:  Medications and Allergies have been reviewed and are listed on Medical History Questionnaire. Tayo Diane lives with significant other in a single story home with stairs and no handrail to enter. Task Previous Current   ADLs  Independent Modified Independent, Increased pain with head extension and flexion, reading   Ambulation Independent Mod Independent - fatigue   Transfers Independent Independent   Recreation Independent Dependent/Unable, unable to play basketball, walking for exercise   Community Integration Independent Modified Independent   Driving Does not drive Does not drive   Work Unemployed  Unemployed     OBJECTIVE:   Posture: Poor, Protruded Head, Protracted Scapula  Palpation: Moderate L upper trap and levator tightness  Observation: Poor posture    Range of Motion/Strength (Range of Motion in degrees)    Right Left Comments   Shoulder Flexion PROM 172 154    Shoulder ABDuction PROM 180 177    Shoulder Strength 4+/5 4/5    Elbow Strength 5/5 5/5    Neck flexion 42  4/25/17 62   Neck extension 15  4/25/17 15   Neck rotation 46 58 4/25/17 R 60, L 64   Neck sidebending 22 15 4/25/17 R15, L 30     Circumferential Measurements:   Head and Neck Measurements   NECK (cm)   Superior 40.1   Middle 40.4   Inferior 40   TOTAL NECK COMPOSITE 120.5       Brief Fatigue Inventory: 6.9 (0: none, 1-3: mild, 4-6: moderate, 7-10: severe)    Treatment Initiated: Performed gentle neck distraction and upper trap and levator stretches followed by chin tucks for upper cervical stretch for poor posture. Provided pt with handout of HEP of L upper trap and levator stretches and reclined chin tucks with instructions to perform 3x per day.  Discussed his posture and how it places increased strain on his muscles. Specific Interventions Next Treatment: Gentle neck stretches, posture retraining, neck strengthening, lymphedema management to neck after cleared by CT on 8/22/20    Activity Tolerance: Patient tolerated treatment well    ASSESSMENT:  Assessment: Pt presents with decreased posture and neck mobility and increased neck pain and lymphedema. He would benefit from skilled PT to address these issues and allow improved ease with daily activities. Body Structures/Functions/Activity Limitations::Decreased affected limb strength, Decreased tolerance of activities, Decreased affected limb range of motion, Pain, Lymphedema and Impaired posture  Prognosis: fair    GOALS:  Patient Goal: Reduce neck pain and improve neck motion    Short Term Goals to be met in 6 weeks:  1. Pt to demo neck flexion improved from 42 deg to 62 deg for ease with looking down for safety with walking. 2. Pt to demo neck extension improved from 15 to 25 for ease with showering. 3. Pt to demo R neck rotation improved from 46 to 60 for safety with crossing street. 4. Pt to demo L shoulder flexion improved from 154 deg to 170 deg for ease with reaching overhead. Long Term Goals to be met in 12 weeks:   1. Pt to demo NDI improved from 26 to 15 for improved ease with daily tasks. 2. Pt to demo improved posture with neutral head and neck alignment for reduced frequency of headaches. 3. Pt to demo neck composite girth of 120.5 cm remained stable with progressive neck strengthening program for reduced further complications of lymphedema. Patient Education: Plan of care, goals. See \"Treatment Initiated\" for further details. Education Outcome: Verbalized understanding  Education Barriers: None    PLAN:  Treatment Recommendations: Strengthening, Range of Motion, Lymphedema Management, Manual Techniques, Pain Management, Postural Re-Training, Body Mechanics/Ergonomics, Home Exercise Prescription and Safety Education    Plan of care initiated. Plan to see patient 2 times per week for 12 weeks to address the treatment planned outlined above.     Time In 0810   Time Out 0900       Timed Code Minutes: Min Units   ADL (06571)     Aquatics (03921)     Gait (69709)     Manual Therapy (53401)     Massage (40332)     Neuro (35842)     Th. Activities (77273)     Th. Exercise (97537) 15 1   Ultrasound (11784)     Ionto (41316)     Manual E-Stim (68989)          TOTAL SESSION TIME: 50 min       Electronically Signed by: Gloria Hoover PT, DPT, [facility] [id number] 8/17/2020

## 2020-08-19 ENCOUNTER — HOSPITAL ENCOUNTER (OUTPATIENT)
Dept: PHYSICAL THERAPY | Age: 55
Setting detail: THERAPIES SERIES
Discharge: HOME | End: 2020-08-19
Payer: MEDICAID

## 2020-08-19 PROCEDURE — 97140 MANUAL THERAPY 1/> REGIONS: CPT

## 2020-08-19 PROCEDURE — 97110 THERAPEUTIC EXERCISES: CPT

## 2020-08-19 NOTE — PROGRESS NOTES
[address] [facility]  ONCOLOGY REHABILITATION  PHYSICAL THERAPY  [x] DAILY NOTE [] PROGRESS NOTE [] DISCHARGE NOTE    [x] [facility]     Date: 2020  Patient Name:  Aylin Davidson  : 1965  MRN: [id number]       Referring Practitioner HOOD Carrington*   Diagnosis Malignant neoplasm of retromolar area [C06.2]    Treatment Diagnosis Neck pain, stiffness, abnormal posture and lymphedema   Date of Evaluation 20    Additional Pertinent History Squamus cell carcinoma 9/23/15, extensive surgery at [facility] on 10/2/15 with 8 weeks of radiation and chemo from 12/21/15 thru 16. History of left shoulder pain since 2016 and low back pain since 2017, HTN       Functional Outcome Measure Used O: NDI   Functional Outcome Score Neck Disability Index Raw Score: 26  eval (20)        Insurance: Primary: Payor: Channing Puga /  /  / ,   Secondary:    Authorization Information: Aquatics and modalities covered but no ionto, hot or cold packs   Visit # 1, 1/10 for progress note   Visits Allowed: 30   Recertification Date:    Physician Follow-Up: CT 20, 9/3/20 H[place], 21 SHIN Gillespie CNP        SUBJECTIVE: Reports he hasn't eaten since Tuesday, feels like crap, has no energy and feels really stiff through the left side of his neck. Admits he feels shakey. Reports felt good after last session. Pain: 9/10 L side of neck      Exercise Sets   Repetitions Comments   B manual shoulder depression 3 60 sec supine   Gentle neck manual traction 3 60 sec Supine, performed 4x throughout session   Suboccipital manual traction 2 60 sec    Manual L levator, upper trap and scalene stretches 3 60 sec supine   Active chin tucks 1 10 supine   Retro shoulder rolls 1 10 seated   Scapular retractions 1 10 Seated   Posterior capsule stretch 3 15 sec    Pec stretch 3 15 sec Hands cheek height in doorway   Standing at wall for posture   Pt reports ant.  Neck stretch sensation with upright posture       Specific Interventions for Next Treatment:  Gentle neck stretches, posture retraining, neck strengthening, lymphedema management to neck after cleared by CT on 8/22/20    Activity Tolerance: Patient tolerated treatment well. Reports decreased pain at end of session. ASSESSMENT:  Assessment: Cleared session with pt's Rad. Onc. CNP. Pt with excellent tolerance of session and slow release of tight neck and shoulder girdle muscles. GOALS:  Patient Goal: Reduce neck pain and improve neck motion     Short Term Goals to be met in 6 weeks:  1. Pt to demo neck flexion improved from 42 deg to 62 deg for ease with looking down for safety with walking. 2. Pt to demo neck extension improved from 15 to 25 for ease with showering. 3. Pt to demo R neck rotation improved from 46 to 60 for safety with crossing street. 4. Pt to demo L shoulder flexion improved from 154 deg to 170 deg for ease with reaching overhead.     Long Term Goals to be met in 12 weeks:   1. Pt to demo NDI improved from 26 to 15 for improved ease with daily tasks. 2. Pt to demo improved posture with neutral head and neck alignment for reduced frequency of headaches. 3. Pt to demo neck composite girth of 120.5 cm remained stable with progressive neck strengthening program for reduced further complications of lymphedema.       Patient Education: Continue with previously provided HEP and focus on posture, try backing up to wall to see how far off he is. Importance of posture on allowing neck muscles to stretch and relax.   Education Outcome: Verbalized understanding  Education Barriers: None    PLAN: Continue established plan of care      Time In 0800   Time Out 0845       Timed Code Minutes: Minutes Units   ADL ([id number] [id number])     Aquatics (59183)     Gait (44716)     Manual Therapy (83189) 30 2   Massage (00916)     Neuro (25273)     Th. Activities (57597)     Th. Exercise (19932) 15 1   Ultrasound (46129)     Ionto (05434)     Manual E-Stim (43825)          TOTAL SESSION TIME: 45 min       Electronically Signed by: Teddy Mathew PT, DPT, [facility] [id number] 8/19/2020

## 2020-08-22 ENCOUNTER — HOSPITAL ENCOUNTER (OUTPATIENT)
Dept: CT IMAGING | Age: 55
Discharge: HOME | End: 2020-08-22
Payer: MEDICAID

## 2020-08-22 PROCEDURE — 70491 CT SOFT TISSUE NECK W/DYE: CPT

## 2020-08-22 PROCEDURE — 71260 CT THORAX DX C+: CPT

## 2020-08-22 PROCEDURE — 6360000004 HC RX CONTRAST MEDICATION: Performed by: NURSE PRACTITIONER

## 2020-08-22 RX ADMIN — IOPAMIDOL 80 ML: 755 INJECTION, SOLUTION INTRAVENOUS at 11:21

## 2020-08-24 ENCOUNTER — HOSPITAL ENCOUNTER (OUTPATIENT)
Dept: PHYSICAL THERAPY | Age: 55
Setting detail: THERAPIES SERIES
Discharge: HOME | End: 2020-08-24
Payer: MEDICAID

## 2020-08-26 ENCOUNTER — HOSPITAL ENCOUNTER (OUTPATIENT)
Dept: SPEECH THERAPY | Age: 55
Setting detail: THERAPIES SERIES
Discharge: HOME | End: 2020-08-26
Payer: MEDICAID

## 2020-08-26 PROCEDURE — 92610 EVALUATE SWALLOWING FUNCTION: CPT | Performed by: SPEECH-LANGUAGE PATHOLOGIST

## 2020-08-26 NOTE — PROGRESS NOTES
reports he gets a \"salty\" taste in his mouth and he does not want to eat when that happens. Patient denies a history of pneumonia. Past Medical History:   Diagnosis Date    Head and neck cancer ([address]     Hypertension      Pain:  5/10 in neck - cramps \"every day/all day\"    Current Diet: Regular with thin liquids    Respiratory Status: Independent    Behavioral Observation:  Patient pleasant and cooperative. ORAL MECHANISM EVALUATION:         Comments:  Facial / Labial []WFL [x] Impaired []DNT Decreased labial range of motion and strength on the left, patient reports drooling on the left occasionally   Lingual []WFL [x] Impaired []DNT Decreased range of motion and strength bilaterally, however, left is less than the right   Dentition []WFL [x] Impaired []DNT edentulous   Velum []WFL [x] Impaired []DNT Slight deviation to the left   Vocal Quality [x]WFL [] Impaired []DNT    Sensation []WFL [x] Impaired []DNT Decreased sensation around the left side of the mandible   Cough [x]WFL [] Impaired []DNT Reports productive cough at home   Other: []WFL [] Impaired []DNT    Other: []WFL [] Impaired []DNT        PATIENT WAS EVALUATED USING: [x] Thin liquid [] Nectar thick liquid [] Honey thick liquid   [] Pudding thick liquid  [] Solid [x] Soft   [x] Puree  **Patient refused trials of the solid (cracker) - reports he does not eat them at home    ORAL PHASE: [] [facility]  [x] Impaired   [] Loss of bolus from lips [x] Impaired/slow AP movement [] Pocketing Left   [] Pocketing Right  [] Lingual Pumping  [x] Impaired Mastication - edentulous   [x] Reduced Bolus Formation [] Impaired Oral Initiation    [x] OTHER:suspected decreased bolus control    PHARYNGEAL PHASE: [] WFL: Pharyngeal phase appears WFLs, but cannot completely rule out pharyngeal phase deficits from a bedside swallow evaluation alone.    [x] Impaired   [x] Delayed Swallow  [x] Decreased Hyolaryngeal Elevation   [x] Audible Swallow   [x] Suspected Pharyngeal Residue due to spontaneous multiple swallow - 5+ swallows with majority of food and liquid trials this date. [] OTHER:    SIGNS AND SYMPTOMS OF LARYNGEAL PENETRATION / ASPIRATION:  No overt s/s of aspiration noted throughout this evaluation. Patient reports \"gagging\" and \"choking\" episodes periodically at home. IMPRESSIONS: Patient presents with mild oral phase deficits characterized by impaired/slow AP movement, decreased bolus control and formation and impaired mastication secondary to the patient being edentulous. Patient also presents with pharyngeal phase deficits characterized by a delayed swallow, decreased hyolaryngeal elevation, an audible swallow and suspected pharyngeal residue secondary to spontaneous multiple swallows (5+ per bite/sip) and patient report of feeling residue (around the level of the valleculae). Patient reports liquids assist in clearing the bolus in the pharynx along with multiple swallows. No overt s/s of aspiration evident throughout this evaluation, however, patient reports \"choking/gagging\" episodes at times at home. RECOMMENDATIONS:     Modified Barium Swallow: [] Is indicated to further assess    [x] Is NOT indicated at this time; Will recommend as appropriate. DIET RECOMMENDATIONS: Minced and moist diet with thin liquids    STRATEGIES: [] Strategies pending MBS results.   [x] Full upright position [x] Small bite/sip  [] No Straw   [x]Multiple Swallows as indicated  [] Chin tuck   [] Head turn   []Pulmonary monitoring [] Oral care after all meals [] Supervision    [] Medication in applesauce []Direct 1:1 Supervision [] Spoon all liquids   [x] Alternate solid / liquid [x] Limit distractions  [] Monitor for fatigue  [] PMV in place for all po [x] OTHER: slow rate     EDUCATION:   Learner: [x]Patient [] Significant other [] Son/Daughter      [] Parent [] Other:   Education: [x] Reviewed results and recommendations of this evaluation     [x] Reviewed diet and strategies     [x] Reviewed signs, symptoms and risk of aspiration     [] Demonstrated how to thick liquid appropriately. [x] Reviewed goals and Plan of Care     [x] OTHER: reviewed recommended diet, examples of food on recommended diet and foods to avoid on recommended diet - handouts provided for each this date. Method: [x] Discussion [] Demonstration [x] Hand-out     [] OTHER:   Evaluation of Education:     [x] Verbalizes understanding [] Demonstrates with assistance     [] Demonstrates without assistance [x]Needs further instruction     [] No evidence of learning  [x] Family not present    PATIENT GOALS: [] Pt did not state. Will further assess during treatment. [] Return to the least restricted diet possible     [x] Return to previous level of function     [] OTHER:    PLAN / TREATMENT RECOMMENDATIONS:  [] No further speech therapy services indicated. [] Speech Therapy evaluation to assess speech, language, cognition and/or voice  [] Recommendations pending MBS  [x] Skilled dysphagia treatment 1-2 times per week for 12 weeks. Specific Interventions for next session may include: diet monitor, review s/s of aspiration and swallowing strategies, lingual exercises, pharyngeal strengthening and laryngeal elevation exercises. SHORT TERM GOALS:  Short-term Goals  Timeframe for Short-term Goals: 5 weeks  Goal 1: Patient will tolerate a minced and moist diet with thin liquids without overt s/s of aspiration to safely maintain adequate nutrition and hydration. Goal 2: Patient will verbalize and utilize swallowing strategies and s/s of aspiration with no more than min cues to safely tolerate a minced and moist diet with thin liquids. Goal 3: Patient will complete lingual coordination and strengthening exercises x 20 each for improved bolus control, formation and AP movement. Goal 4: Patient will complete pharyngeal strengthening exercises x 20 for improved strength to reduce potention pharyngeal residue.   Goal 5: Patient will complete laryngeal elevation exercises x 15 for improved airway protection. LONG TERM GOALS:  Long-term Goals  Timeframe for Long-term Goals: 12 weeks  Goal 1: Patient will tolerate a soft and bite sized diet with thin liquids with independent use of swallowing strategies and without overt s/s of aspiration to safely maintain adequate nutrition and hydration.       TIME IN: 0915  TIME OUT: 1014  UNTIMED TREATMENT:  59  TIMED TREATMENT: 0  TOTAL TIME: 59 minutes         YVONNE Dickerson [id number]

## 2020-08-27 ENCOUNTER — HOSPITAL ENCOUNTER (OUTPATIENT)
Dept: PHYSICAL THERAPY | Age: 55
Setting detail: THERAPIES SERIES
Discharge: HOME | End: 2020-08-27
Payer: MEDICAID

## 2020-08-27 PROCEDURE — 97110 THERAPEUTIC EXERCISES: CPT

## 2020-08-27 PROCEDURE — 97140 MANUAL THERAPY 1/> REGIONS: CPT

## 2020-08-27 NOTE — PROGRESS NOTES
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 History of left shoulder pain since 2016 and low back pain since 2017, HTN       Functional Outcome Measure Used O: NDI   Functional Outcome Score Neck Disability Index Raw Score: 26  eval (20)        Insurance: Primary: Payor: Lesle Mention /  /  / ,   Secondary:    Authorization Information: Aquatics and modalities covered but no ionto, hot or cold packs   Visit # 3, 3/10 for progress note   Visits Allowed: 30   Recertification Date:    Physician Follow-Up: CT 20, 9/3/20 Hector, 21 SHIN Kraft CNP        SUBJECTIVE: Pt reports he did some swimming and notes that it helped to relax his neck and shoulder muscles but relief lasted only about a day. Reports CT came back fine and that doctor was only upset about his weight loss and that he may have to have a PEG tube again if he doesn't put on weight.     Pain: 5/10 L side of neck      Exercise Sets   Repetitions Comments   Pulleys 1 1 min Flexion and abduction   AAROM with stick 1 5 Shoulder flexion, abduction, ER, IR, extension for L UE   Green ball AAROM 1 10 Shoulder flexion, abd/add   L levator, upper trap and scalene stretches 3 15 sec    Active chin tucks 1 10 supine   Retro shoulder rolls 1 10 seated   Scapular retractions 1 10 Seated   Posterior capsule stretch 3 15 sec    Pec stretch 3 15 sec Hands cheek height in doorway   Standing at wall for posture   Pt reports ant. Neck stretch sensation with upright posture     Manual Therapy/Provider Interaction:  Performed manual lymphatic drainage to neck and L shoulder with passive gentle shoulder stretches for upper trap, levator and shoulder abduction and flexion mobility to further stimulate lymphatic drainage x15 minutes. Specific Interventions for Next Treatment:  Gentle neck stretches, posture retraining, neck strengthening, manual lymphatic drainage    Activity Tolerance: Patient tolerated treatment well. ASSESSMENT:  Assessment: Good tolerance of stretching program for shoulder mobility with improved ROM noted at end of session. GOALS:  Patient Goal: Reduce neck pain and improve neck motion     Short Term Goals to be met in 6 weeks:  1. Pt to demo neck flexion improved from 42 deg to 62 deg for ease with looking down for safety with walking. 2. Pt to demo neck extension improved from 15 to 25 for ease with showering. 3. Pt to demo R neck rotation improved from 46 to 60 for safety with crossing street. 4. Pt to demo L shoulder flexion improved from 154 deg to 170 deg for ease with reaching overhead.     Long Term Goals to be met in 12 weeks:   1. Pt to demo NDI improved from 26 to 15 for improved ease with daily tasks. 2. Pt to demo improved posture with neutral head and neck alignment for reduced frequency of headaches. 3. Pt to demo neck composite girth of 120.5 cm remained stable with progressive neck strengthening program for reduced further complications of lymphedema.       Patient Education: Provided with HEP from [address] [place] [address] with access code [email or web address] for shoulder AAROM as provided this date. Instructed to perform 2x per day.   Education Outcome: Verbalized understanding  Education Barriers: None    PLAN: Continue established plan of care      Time In 0840   Time Out 920       Timed Code Minutes: Minutes Units   ADL (77585) Aquatics (31997)     Gait (83709)     Manual Therapy (40446) 15 1   Massage (75430)     Neuro (50851)     Th. Activities (70600)     Th. Exercise (96162) 25 2   Ultrasound (79302)     Ionto (16931)     Manual E-Stim (34650)          TOTAL SESSION TIME: 40 min       Electronically Signed by: Teddy Mathew PT, DPT, ELISEO [id number] 8/27/2020

## 2020-08-31 ENCOUNTER — HOSPITAL ENCOUNTER (OUTPATIENT)
Dept: PHYSICAL THERAPY | Age: 55
Setting detail: THERAPIES SERIES
Discharge: HOME | End: 2020-08-31
Payer: MEDICAID

## 2020-08-31 ENCOUNTER — APPOINTMENT (OUTPATIENT)
Dept: SPEECH THERAPY | Age: 55
End: 2020-08-31
Payer: MEDICAID

## 2020-09-03 ENCOUNTER — APPOINTMENT (OUTPATIENT)
Dept: PHYSICAL THERAPY | Age: 55
End: 2020-09-03
Payer: MEDICAID

## 2020-09-03 ENCOUNTER — OFFICE VISIT (OUTPATIENT)
Dept: ONCOLOGY | Age: 55
End: 2020-09-03
Payer: MEDICAID

## 2020-09-03 ENCOUNTER — HOSPITAL ENCOUNTER (OUTPATIENT)
Dept: INFUSION THERAPY | Age: 55
Discharge: HOME | End: 2020-09-03
Payer: MEDICAID

## 2020-09-03 ENCOUNTER — TELEPHONE (OUTPATIENT)
Dept: ONCOLOGY | Age: 55
End: 2020-09-03

## 2020-09-03 VITALS
OXYGEN SATURATION: 98 % | RESPIRATION RATE: 16 BRPM | TEMPERATURE: 98.1 F | WEIGHT: 170.6 LBS | HEART RATE: 65 BPM | DIASTOLIC BLOOD PRESSURE: 96 MMHG | BODY MASS INDEX: 24.42 KG/M2 | SYSTOLIC BLOOD PRESSURE: 137 MMHG | HEIGHT: 70 IN

## 2020-09-03 DIAGNOSIS — C06.2 SQUAMOUS CELL CANCER OF RETROMOLAR TRIGONE (HCC): ICD-10-CM

## 2020-09-03 LAB
ALBUMIN SERPL-MCNC: 3.9 G/DL (ref 3.5–5.1)
ALP BLD-CCNC: 78 U/L (ref 38–126)
ALT SERPL-CCNC: 27 U/L (ref 11–66)
AST SERPL-CCNC: 41 U/L (ref 5–40)
BILIRUB SERPL-MCNC: 0.4 MG/DL (ref 0.3–1.2)
BILIRUBIN DIRECT: < 0.2 MG/DL (ref 0–0.3)
BUN BLDV-MCNC: 5 MG/DL (ref 7–22)
CHLORIDE, WHOLE BLOOD: 106 MEQ/L (ref 98–109)
CO2: 24 MEQ/L (ref 23–33)
CREATININE, WHOLE BLOOD: 1.1 MG/DL (ref 0.5–1.2)
GFR, ESTIMATED: 89 ML/MIN/1.73M2
GLUCOSE, WHOLE BLOOD: 87 MG/DL (ref 70–108)
HCT VFR BLD CALC: 42.3 % (ref 42–52)
HEMOGLOBIN: 13.8 GM/DL (ref 14–18)
IONIZED CALCIUM, WHOLE BLOOD: 1.19 MMOL/L (ref 1.12–1.32)
MCH RBC QN AUTO: 32 PG (ref 26–33)
MCHC RBC AUTO-ENTMCNC: 32.6 GM/DL (ref 32.2–35.5)
MCV RBC AUTO: 98 FL (ref 80–94)
PDW BLD-RTO: 14.4 % (ref 11.5–14.5)
PLATELET # BLD: 210 THOU/MM3 (ref 130–400)
PMV BLD AUTO: 9.7 FL (ref 9.4–12.4)
POTASSIUM, WHOLE BLOOD: 3.6 MEQ/L (ref 3.5–4.9)
RBC # BLD: 4.31 MILL/MM3 (ref 4.7–6.1)
SODIUM, WHOLE BLOOD: 141 MEQ/L (ref 138–146)
TOTAL PROTEIN: 7.1 G/DL (ref 6.1–8)
WBC # BLD: 6.1 THOU/MM3 (ref 4.8–10.8)

## 2020-09-03 PROCEDURE — 99211 OFF/OP EST MAY X REQ PHY/QHP: CPT

## 2020-09-03 PROCEDURE — 36415 COLL VENOUS BLD VENIPUNCTURE: CPT

## 2020-09-03 PROCEDURE — 3017F COLORECTAL CA SCREEN DOC REV: CPT | Performed by: INTERNAL MEDICINE

## 2020-09-03 PROCEDURE — G8427 DOCREV CUR MEDS BY ELIG CLIN: HCPCS | Performed by: INTERNAL MEDICINE

## 2020-09-03 PROCEDURE — 82374 ASSAY BLOOD CARBON DIOXIDE: CPT

## 2020-09-03 PROCEDURE — 99215 OFFICE O/P EST HI 40 MIN: CPT | Performed by: INTERNAL MEDICINE

## 2020-09-03 PROCEDURE — 80076 HEPATIC FUNCTION PANEL: CPT

## 2020-09-03 PROCEDURE — 85027 COMPLETE CBC AUTOMATED: CPT

## 2020-09-03 PROCEDURE — 84520 ASSAY OF UREA NITROGEN: CPT

## 2020-09-03 PROCEDURE — G8420 CALC BMI NORM PARAMETERS: HCPCS | Performed by: INTERNAL MEDICINE

## 2020-09-03 PROCEDURE — 1036F TOBACCO NON-USER: CPT | Performed by: INTERNAL MEDICINE

## 2020-09-03 PROCEDURE — 80047 BASIC METABLC PNL IONIZED CA: CPT

## 2020-09-03 NOTE — TELEPHONE ENCOUNTER
Called the patient and told him of his appointment on 10/7/2020  with Dr Eugenie Link and that he would need to get a CD from [address] [place] [address] of his scans for the appointment with Dr Eugenie Link

## 2020-09-03 NOTE — PROGRESS NOTES
does complain of some neck spasms that is been treated with Flexeril. The patient does state that Flexeril does help his pain. His blood pressure is also modestly elevated today. He is going to continue to monitor his blood pressure and follow-up with his primary care provider for further management. With evidence of an aortic aneurysm that was explained to the patient that it would be of great significant benefit to have his blood pressure well controlled. PMH, SH, and FH:  I reviewed the PMH, SH and FH as noted on the electronic medical record. There have been no changes as noted in the previous documentation. Review of Systems   Constitutional: Negative. HENT: Neck spasms. Eyes: Negative. Respiratory: Negative. Cardiovascular: Negative. Gastrointestinal: Negative. Genitourinary: Negative. Musculoskeletal: Negative. Skin: Negative. Neurological: Negative. Hematological: Negative. Psychiatric/Behavioral: Negative. Objective:   Physical Exam   Vitals:    09/03/20 0818   BP: (!) 137/96   Site: Right Upper Arm   Position: Sitting   Cuff Size: Large Adult   Pulse: 65   Resp: 16   Temp: 98.1 °F (36.7 °C)   TempSrc: Oral   SpO2: 98%   Weight: 170 lb 9.6 oz (77.4 kg)   Height: 5' 10\" (1.778 m)   Vitals reviewed and are stable. Constitutional: Well-developed and well-nourished. No acute distress. HENT: Normocephalic and atraumatic. Eyes: Pupils are equal and reactive. No scleral icterus. Neck: Overall appearance is symmetrical. No identifiable masses. Chest: Inspection and palpation of chest is normal.  Pulmonary: Effort normal. No respiratory distress. Cardiovascular: RRR. No edema in any of the four extremities. Abdominal: Soft. No hepatomegaly or splenomegaly. Musculoskeletal: Gait is normal. Muscle strength and tone good. Neurological: Alert and oriented to person, place, and time. Judgment and thought content normal.  Skin: Skin is warm and dry. No rash. Psychiatric: Mood and affect appropriate for the clinical situation. Behavior is normal.      Data Analysis:    Hematology 9/3/2020 9/6/2019 6/27/2019   WBC 6.1 5.9 7.2   RBC 4.31 (L) 4.25 (L) 4.29 (L)   HGB 13.8 (L) 13.6 (L) 13.5 (L)   HCT 42.3 39.9 (L) 40.4 (L)   MCV 98 (H) 94 94.2 (H)   RDW 14.4 12.6     275 286     Assessment:   1. Squamous cell carcinoma of the head and neck. 2.  Chronic anemia. 3.  Post therapeutic muscle spasms. 4.  Ascending aortic aneurysm. 5.  Hypertension. Plan:   1. Monitor for recurrence of malignancy. 2.  Monitor hemoglobin/hematocrit and for any signs of blood loss. 3.  Continue Flexeril for neck spasms  4. Consult Dr. Adiel Chen, vascular surgery, for evaluation of aortic aneurysm. 5.  Monitor blood pressure and follow up with primary care provider for further surveillance and management. Rain Morris M.D. Medical Director: [facility]  [facility] Network [address] [facility], [address] [place] [address], [address] [place] [address], [address] [place] [address] of the [facility] at [facility]      **This report has been created using voice recognition software. It may contain minor errors which are inherent in voice recognition technology. **

## 2020-09-04 ENCOUNTER — HOSPITAL ENCOUNTER (OUTPATIENT)
Dept: SPEECH THERAPY | Age: 55
Setting detail: THERAPIES SERIES
Discharge: HOME | End: 2020-09-04
Payer: MEDICAID

## 2020-09-04 ENCOUNTER — HOSPITAL ENCOUNTER (OUTPATIENT)
Dept: PHYSICAL THERAPY | Age: 55
Setting detail: THERAPIES SERIES
Discharge: HOME | End: 2020-09-04
Payer: MEDICAID

## 2020-09-04 PROCEDURE — 92526 ORAL FUNCTION THERAPY: CPT | Performed by: SPEECH-LANGUAGE PATHOLOGIST

## 2020-09-04 PROCEDURE — 97110 THERAPEUTIC EXERCISES: CPT

## 2020-09-04 PROCEDURE — 97140 MANUAL THERAPY 1/> REGIONS: CPT

## 2020-09-04 NOTE — PROGRESS NOTES
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 History of left shoulder pain since 2016 and low back pain since 2017, HTN       Functional Outcome Measure Used O: NDI   Functional Outcome Score Neck Disability Index Raw Score: 26  eval (20)        Insurance: Primary: Payor: Rhona Coca /  /  / ,   Secondary:    Authorization Information: Aquatics and modalities covered but no ionto, hot or cold packs   Visit # 4, 4/10 for progress note   Visits Allowed: 30   Recertification Date:    Physician Follow-Up: CT 20, 9/3/20 Hector, 21 SHIN [place], MAKENNA        SUBJECTIVE: States L UE feels \"light\" and has been dropping things in the L UE and has had increasing numbness and tingling in the past 4 days that extends from neck to forearm. Followed up with surgeon last week and was told the cramping will stay with him.      Pain: 5/10 L side of neck      Exercise Sets   Repetitions Comments   Pulleys 1 1 min Flexion and abduction   AAROM with stick 1 5 Shoulder flexion, abduction, ER, IR, extension for L UE   Green ball AAROM 1 10 Shoulder flexion, abd/add   L levator, upper trap and scalene stretches 3 15 sec    Active chin tucks 1 10 Seated with cues for proper tech   Retro shoulder rolls 1 10 Seated   Scapular retractions 1 10 Seated   Posterior capsule stretch 3 15 sec    Pec stretch 3 15 sec Hands cheek height in doorway           Manual Therapy/Provider Interaction:  Performed manual passive gentle shoulder stretches L for upper trap, levator and shoulder abduction with SO release and use of HawkGrips to L upper trap for a total time of 15 minutes. Specific Interventions for Next Treatment:  Gentle neck stretches, posture retraining, neck strengthening, manual lymphatic drainage    Activity Tolerance: Patient tolerated treatment well. ASSESSMENT:  Assessment: Good tolerance of stretching program for shoulder mobility with improved ROM noted at end of session. GOALS:  Patient Goal: Reduce neck pain and improve neck motion     Short Term Goals to be met in 6 weeks:  1. Pt to demo neck flexion improved from 42 deg to 62 deg for ease with looking down for safety with walking. 2. Pt to demo neck extension improved from 15 to 25 for ease with showering. 3. Pt to demo R neck rotation improved from 46 to 60 for safety with crossing street. 4. Pt to demo L shoulder flexion improved from 154 deg to 170 deg for ease with reaching overhead.     Long Term Goals to be met in 12 weeks:   1. Pt to demo NDI improved from 26 to 15 for improved ease with daily tasks. 2. Pt to demo improved posture with neutral head and neck alignment for reduced frequency of headaches. 3. Pt to demo neck composite girth of 120.5 cm remained stable with progressive neck strengthening program for reduced further complications of lymphedema.       Patient Education: Focus on proper posture and frequent stretches to L side of neck.   Education Outcome: Verbalized understanding  Education Barriers: None    PLAN: Continue established plan of care      Time In 1020   Time Out 1100   Timed Code Minutes: 40 min   Total Treatment Time: 40 min       Electronically Signed by: Humberto Gloria PT, DPT, ELISEO [id number] 9/4/2020

## 2020-09-04 NOTE — PROGRESS NOTES
[address] THERAPY    [x] DAILY NOTE [] PROGRESS NOTE [] DISCHARGE NOTE    [x]Outpatient [address]   [][place] [address]   [][facility]              Date: 2020  Patient Name: Marina De Jesus      CSN: [id number]   : 1965  (47 y.o.)  Gender: male     Referring Physician: ALBERTO Knight  Diagnosis: dysphagia, unspecified type (R13.10)  Secondary Diagnosis:  dysphagia  Insurance/Certification Information: [facility]  Visit number / total approved visits: 2 - No precert until after 61OE visit; visit limit based on precert  Visit count since last progress note:  2  Certification Date:   Last scheduled appointment: 20  Date of Last MBS:  n/a   Diet:  Minced and moist diet with thin liquids    Subjective: Patient reports his swallowing is the same, but reports his pain is better, rates it as a 5.5/10 currently. SHORT TERM GOAL #1:  Goal 1: Patient will tolerate a minced and moist diet with thin liquids without overt s/s of aspiration to safely maintain adequate nutrition and hydration. INTERVENTIONS:  Patient reports he had some ribs, mashed potatoes and donut shakes. Patient reports he has also been drinking V8. Patient denies any coughing during meals, but reports he coughs up a lot of mucus in the morning. Ongoing education on foods allowed on minced and moist diet and those that are not included in the diet. Reviewed the importance of making sure that meats are cut up into small pieces and they need to have some kind of sauce on it to make it easier to swallow. Patient verbalized understanding. SHORT TERM GOAL #2:  Goal 2: Patient will verbalize and utilize swallowing strategies and s/s of aspiration with no more than min cues to safely tolerate a minced and moist diet with thin liquids.   INTERVENTIONS:  Further education on swallowing strategies, including upright positioning, small bites/sips, multiple swallows, alternating bites and drinks, limit distractions and take it slow and patient verbalized understanding. Patient reports he has been used to using most of the strategies for awhile now. Briefly reviewed s/s of aspiration with the patient. SHORT TERM GOAL #3:  Goal 3: Patient will complete lingual coordination and strengthening exercises x 20 each for improved bolus control, formation and AP movement. INTERVENTIONS:  Patient provided with a handout of the lingual coordination and strengthening exercises and educated on how to complete each of them and rationale for them. Patient then completed the following lingual exercises this session:   - lingual protrusion with resistance x 5 with fair to good success   - external lingual lateralization x 5 with fair success   - lingual circles x 5 each direction with fair to good success   - internal lingual lateralization with resistance x 5 each side with fair to good success  **Patient encouraged to complete the above exercises up to 20 repetitions of each. SHORT TERM GOAL #4:  Goal 4: Patient will complete pharyngeal strengthening exercises x 20 for improved strength to reduce potention pharyngeal residue. INTERVENTIONS:  Patient educated on the following pharyngeal strengthening exercises and return demonstrated the following this session:   - effortful swallows x 5 with fair to good strength overall   - lingual protrusion with alternating tongue tip back x 5 with fair success  **Patient had a handout with these exercises and educated to complete up to 20 repetitions of each. SHORT TERM GOAL #5:  Goal 5: Patient will complete laryngeal elevation exercises x 15 for improved airway protection. INTERVENTIONS:  Patient completed the mendelsohn maneuver x 5 with fair success and moderate cues. Patient educated on rationale for this exercise.   Patient provided with a handout with this exercise on it and educated to complete up to 20 repetitions. Assessment: [x]Progressing towards goals []Not Progressing towards goals  Plan for Next Session:  Diet monitor, review swallowing strategies, lingual and pharyngeal strengthening exercises, laryngeal elevation exercises  Patient Tolerance of Treatment:  [x]Tolerated well []Tolerated fair []Required rest breaks  []Fatigued    Education:  [x]Education was provided []Education not provided due to:  Learner:  [x]Patient []Significant Other []Other  Education provided regarding:  [x]Goals and POC [x]Diet and swallowing precautions    [x]Home Exercise Program []Progress and/or discharge information  Method of Education: [x]Discussion [x]Demonstration [x]Handout []Other:  Evaluation of Education:  [x]Verbalized understanding []Demonstrates without assistance  [x]Demonstrates with assistance [x]Needs further instruction    []No evidence of learning  [x]No family present      Plan: [x]Continue with current plan of care []Modify current plan of care   []Progress note - frequency and duration:   []Discharge notes - reason for discharge:     [x]Patient continues to require treatment by a licensed therapist to address functional deficits as outlined in the established plan of care. Certification required: [] Yes - See Physician Certification below.       [x] No       Time in: 0958  Time out: 1015  Untimed treatment: 17  Timed treatment: 0  Total time: 17 minutes      Vidya Shore M.S. [address] [id number]

## 2020-09-10 ENCOUNTER — HOSPITAL ENCOUNTER (OUTPATIENT)
Dept: SPEECH THERAPY | Age: 55
Setting detail: THERAPIES SERIES
Discharge: HOME | End: 2020-09-10
Payer: MEDICAID

## 2020-09-10 ENCOUNTER — HOSPITAL ENCOUNTER (OUTPATIENT)
Dept: PHYSICAL THERAPY | Age: 55
Setting detail: THERAPIES SERIES
Discharge: HOME | End: 2020-09-10
Payer: MEDICAID

## 2020-09-10 PROCEDURE — 92526 ORAL FUNCTION THERAPY: CPT | Performed by: SPEECH-LANGUAGE PATHOLOGIST

## 2020-09-10 PROCEDURE — 97110 THERAPEUTIC EXERCISES: CPT

## 2020-09-10 NOTE — PROGRESS NOTES
[address] THERAPY    [x] DAILY NOTE [] PROGRESS NOTE [] DISCHARGE NOTE    [x]Outpatient [address]   [][place] [facility]   [][facility]              Date: 9/10/2020  Patient Name: Elizabeth Cook      CSN: [id number]   : 1965  (47 y.o.)  Gender: male     Referring Physician: ALBERTO Fernández  Diagnosis: dysphagia, unspecified type (R13.10)  Secondary Diagnosis:  dysphagia  Insurance/Certification Information: [facility]  Visit number / total approved visits: 3 - No precert until after 65FP visit; visit limit based on precert  Visit count since last progress note:  3  Certification Date:   Last scheduled appointment: 20  Date of Last MBS:  n/a   Diet:  Minced and moist diet with thin liquids    Subjective: Patient reports his energy level is down today. Reports he has pain of 7/10 in throat, which did not increase with completion of exercises this session. SHORT TERM GOAL #1:  Goal 1: Patient will tolerate a minced and moist diet with thin liquids without overt s/s of aspiration to safely maintain adequate nutrition and hydration. INTERVENTIONS:  Patient reports he continues to have foods get stuck in his throat and he occasionally has to bring them back up into the oral cavity. Patient reports he has been eating fried chicken and hamburgers. Ongoing education to make sure he is only eating foods on the minced and moist diet. Patient reports he has the list of foods that he is allowed to have and those that he should avoid hanging up at home. Encouraged the patient to refer to the list prior to eating any foods to make it easier for him to swallow. Patient completed trials of thin liquids this session without overt difficulty on 5/5 trials. 3-4 swallows completed independently per drink.   Patient reports he has not eaten anything yet this date because of the pain in his throat and just not feeling well. Encouraged the patient to make sure he eats something as the nutrition/hydration should help improve his energy a little. SHORT TERM GOAL #2:  Goal 2: Patient will verbalize and utilize swallowing strategies and s/s of aspiration with no more than min cues to safely tolerate a minced and moist diet with thin liquids. INTERVENTIONS:  Patient was unable to recall swallowing strategies reviewed in past sessions. Ongoing education on the following swallowing strategies: sit upright, small bites/sips, multiple swallows, alternating bites and drinks, limit distractions and take it slow and patient verbalized understanding. Patient reports he typically does not drink anything until after he is finished eating. Educated the patient on the importance of drinking during meals to assist with clearing the bolus through the pharynx. Patient verbalized understanding. SHORT TERM GOAL #3:  Goal 3: Patient will complete lingual coordination and strengthening exercises x 20 each for improved bolus control, formation and AP movement. INTERVENTIONS:  Patient completed the following lingual exercises this session:   - lingual protrusion with resistance x 10 with fair to good success, decreased range of motion with protrusion   - external lingual lateralization x 10 with fair to good success   - lingual circles x 10 each direction with fair to good success   - internal lingual lateralization with resistance x 10 each side with fair to good success  **Patient reports he has been completing his HEP 1 time a day, approximately 5 repetitions of each. Encouraged the patient to increase the number of repetitions to 10 at home. SHORT TERM GOAL #4:  Goal 4: Patient will complete pharyngeal strengthening exercises x 20 for improved strength to reduce potention pharyngeal residue.   INTERVENTIONS:  Patient completed the following pharyngeal strengthening exercises this session:   - effortful swallows x 10 with fair to good strength overall   - lingual protrusion with alternating tongue tip back x 10 with fair success  **Patient encouraged to complete up to 10 repetitions of these exercises, 2 times per day. SHORT TERM GOAL #5:  Goal 5: Patient will complete laryngeal elevation exercises x 15 for improved airway protection. INTERVENTIONS:  Patient completed the mendelsohn maneuver x 10 with fair success and min cues. Further education completed on rationale for this exercise. Patient reported this exercise seemed to wear him out more. Deep breathing encouraged in between each repetition. Assessment: [x]Progressing towards goals []Not Progressing towards goals  Plan for Next Session:  Diet monitor, review swallowing strategies, lingual and pharyngeal strengthening exercises, laryngeal elevation exercises  Patient Tolerance of Treatment:  [x]Tolerated well []Tolerated fair []Required rest breaks  []Fatigued    Education:  [x]Education was provided []Education not provided due to:  Learner:  [x]Patient []Significant Other []Other  Education provided regarding:  [x]Goals and POC [x]Diet and swallowing precautions    [x]Home Exercise Program []Progress and/or discharge information  Method of Education: [x]Discussion [x]Demonstration []Handout []Other:  Evaluation of Education:  [x]Verbalized understanding []Demonstrates without assistance  [x]Demonstrates with assistance [x]Needs further instruction    []No evidence of learning  [x]No family present      Plan: [x]Continue with current plan of care []Modify current plan of care   []Progress note - frequency and duration:   []Discharge notes - reason for discharge:     [x]Patient continues to require treatment by a licensed therapist to address functional deficits as outlined in the established plan of care. Certification required: [] Yes - See Physician Certification below.       [x] No       Time in: 1532  Time out: 1600  Untimed treatment: 28  Timed treatment: 0  Total time: 28 minutes      Haylee Beavers M.S. [id number] [id number]

## 2020-09-10 NOTE — PROGRESS NOTES
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 on 10/2/15 with 8 weeks of radiation and chemo from 12/21/15 thru 16. History of left shoulder pain since 2016 and low back pain since 2017, HTN       Functional Outcome Measure Used O: NDI   Functional Outcome Score Neck Disability Index Raw Score: 26  eval (20)        Insurance: Primary: Payor: Db Arango /  /  / ,   Secondary:    Authorization Information: Aquatics and modalities covered but no ionto, hot or cold packs   Visit # 5, 5/10 for progress note   Visits Allowed: 30   Recertification Date: 06   Physician Follow-Up: CT 20, 9/3/20 Hector, 21 SHIN Duran CNP        SUBJECTIVE: Reports feels shakey today and low energy.     Pain: 0/10 L side of neck      Exercise Sets   Repetitions Comments   Pulleys 1 1 min Flexion and abduction   AAROM with stick 1 10 Shoulder flexion, abduction, ER, IR, extension for L UE   Green ball AAROM 1 10 Shoulder flexion, abd/add   L levator, upper trap and scalene stretches 3 15 sec    Active chin tucks 1 10 Seated with cues for proper tech   C-Stabs with ball: Retro shoulder rolls, scap retractions 1 10    C-Stabs with ball: shoulder flexion, extension, abduction 1 10    Bicep, Tricep, Shoulder Horz ABD 1 10 Peach t-band   Posterior capsule stretch 3 15 sec    Pec stretch 3 15 sec Hands cheek height in doorway             Specific Interventions for Next Treatment:  Gentle neck stretches, posture retraining, neck strengthening, manual lymphatic drainage    Activity Tolerance: Patient tolerated treatment well. ASSESSMENT:  Assessment: Note improving shoulder mobility, strength and posture since starting therapy. GOALS:  Patient Goal: Reduce neck pain and improve neck motion     Short Term Goals to be met in 6 weeks:  1. Pt to demo neck flexion improved from 42 deg to 62 deg for ease with looking down for safety with walking. 2. Pt to demo neck extension improved from 15 to 25 for ease with showering. 3. Pt to demo R neck rotation improved from 46 to 60 for safety with crossing street. 4. Pt to demo L shoulder flexion improved from 154 deg to 170 deg for ease with reaching overhead.     Long Term Goals to be met in 12 weeks:   1. Pt to demo NDI improved from 26 to 15 for improved ease with daily tasks. 2. Pt to demo improved posture with neutral head and neck alignment for reduced frequency of headaches. 3. Pt to demo neck composite girth of 120.5 cm remained stable with progressive neck strengthening program for reduced further complications of lymphedema.       Patient Education: Continue to perform stretches frequently and focus on posture.   Education Outcome: Verbalized understanding  Education Barriers: None    PLAN: Continue established plan of care      Time In 1605   Time Out 1630   Timed Code Minutes: 25   Total Treatment Time: 25       Electronically Signed by: Jenni Youngblood PT, DPT, [facility] [id number] 9/10/2020

## 2020-09-14 ENCOUNTER — APPOINTMENT (OUTPATIENT)
Dept: SPEECH THERAPY | Age: 55
End: 2020-09-14
Payer: MEDICAID

## 2020-09-14 ENCOUNTER — HOSPITAL ENCOUNTER (OUTPATIENT)
Dept: PHYSICAL THERAPY | Age: 55
Setting detail: THERAPIES SERIES
Discharge: HOME | End: 2020-09-14
Payer: MEDICAID

## 2020-09-14 PROCEDURE — 97140 MANUAL THERAPY 1/> REGIONS: CPT

## 2020-09-14 PROCEDURE — 97110 THERAPEUTIC EXERCISES: CPT

## 2020-09-14 NOTE — PROGRESS NOTES
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 History of left shoulder pain since 2016 and low back pain since 2017, HTN       Functional Outcome Measure Used O: NDI   Functional Outcome Score Neck Disability Index Raw Score: 26  eval (20)        Insurance: Primary: Payor: Usman Luciano /  /  / ,   Secondary:    Authorization Information: Aquatics and modalities covered but no ionto, hot or cold packs   Visit # 6, /10 for progress note   Visits Allowed: 30   Recertification Date:    Physician Follow-Up: CT 20, 9/3/20 Hector, 21 SHIN Esparza, MAKENNA        SUBJECTIVE: States he had a better meal and his energy levels are better and doesn't feel shakey today. Admits his  strength is improving and has dropped anything since last session. But notes increased cramping at L side of neck.     Pain: 4/10 L side of neck      Exercise Sets   Repetitions Comments   Pulleys 1 1 min Flexion and abduction   AAROM with stick 1 10 Shoulder flexion, abduction, ER, IR, extension for L UE   Green ball AAROM 1 10 Shoulder flexion, abd/add   L levator, upper trap and scalene stretches 3 15 sec    Active chin tucks 1 10 Seated with cues for proper tech   Craniocervical spine flexion 1 10    C-Stabs with ball: Retro shoulder rolls, scap retractions 1 15    C-Stabs with ball: shoulder flexion, extension, abduction 1 15 Shoulder extension, retractions, punches 2 10 Peach t-band   Cervical spine isometrics 1 10 Flexion, extension, bilateral lateral flexion   Bicep, Tricep, Shoulder Horz ABD 2 10 Orange t-band   Posterior capsule stretch 3 15 sec    Pec stretch 3 15 sec Hands cheek height and with elbows extended in doorway           Manual Treatment: Manual tech for L upper trap trigger point with HawkGrip tool x10 minutes. Specific Interventions for Next Treatment:  Gentle neck stretches, posture retraining, neck strengthening, manual lymphatic drainage    Activity Tolerance: Patient tolerated treatment well. Admits muscle knot at L upper trap is getting smaller. ASSESSMENT:  Assessment: Note improving shoulder mobility, strength and posture since starting therapy. GOALS:  Patient Goal: Reduce neck pain and improve neck motion     Short Term Goals to be met in 6 weeks:  1. Pt to demo neck flexion improved from 42 deg to 62 deg for ease with looking down for safety with walking. 2. Pt to demo neck extension improved from 15 to 25 for ease with showering. 3. Pt to demo R neck rotation improved from 46 to 60 for safety with crossing street. 4. Pt to demo L shoulder flexion improved from 154 deg to 170 deg for ease with reaching overhead.     Long Term Goals to be met in 12 weeks:   1. Pt to demo NDI improved from 26 to 15 for improved ease with daily tasks. 2. Pt to demo improved posture with neutral head and neck alignment for reduced frequency of headaches. 3. Pt to demo neck composite girth of 120.5 cm remained stable with progressive neck strengthening program for reduced further complications of lymphedema.       Patient Education: Continue to stretch frequently throughout the day and focus on posture.   Education Outcome: Verbalized understanding  Education Barriers: None    PLAN: Continue established plan of care      Time In 1345   Time Out 1430   Timed Code Minutes: 45   Total Treatment Time: 45 Electronically Signed by: Willi Owens PT, DPT, [facility] [id number] 9/14/2020

## 2020-09-17 ENCOUNTER — HOSPITAL ENCOUNTER (OUTPATIENT)
Dept: PHYSICAL THERAPY | Age: 55
Setting detail: THERAPIES SERIES
Discharge: HOME | End: 2020-09-17
Payer: MEDICAID

## 2020-09-17 ENCOUNTER — HOSPITAL ENCOUNTER (OUTPATIENT)
Dept: SPEECH THERAPY | Age: 55
Setting detail: THERAPIES SERIES
Discharge: HOME | End: 2020-09-17
Payer: MEDICAID

## 2020-09-17 PROCEDURE — 97110 THERAPEUTIC EXERCISES: CPT

## 2020-09-17 PROCEDURE — 92526 ORAL FUNCTION THERAPY: CPT | Performed by: SPEECH-LANGUAGE PATHOLOGIST

## 2020-09-17 NOTE — PROGRESS NOTES
[address] THERAPY    [x] DAILY NOTE [] PROGRESS NOTE [] DISCHARGE NOTE    [x]Outpatient [address]   [][place] [facility]   [][facility]              Date: 2020  Patient Name: Jose Alvarez      CSN: [id number]   : 1965  (47 y.o.)  Gender: male     Referring Physician: ALBERTO Blake  Diagnosis: dysphagia, unspecified type (R13.10)  Secondary Diagnosis:  dysphagia  Insurance/Certification Information: [facility]  Visit number / total approved visits: 4 - No precert until after 03XU visit; visit limit based on precert  Visit count since last progress note:  4  Certification Date:   Last scheduled appointment: 20  Date of Last MBS:  n/a   Diet:  Minced and moist diet with thin liquids    Subjective: Patient stated, \"I feel great! \"  Patient reports 5/10 in throat this date, but no increase in pain when swallowing. SHORT TERM GOAL #1:  Goal 1: Patient will tolerate a minced and moist diet with thin liquids without overt s/s of aspiration to safely maintain adequate nutrition and hydration. INTERVENTIONS:  Patient reports he had some sloppy joes and some broiled chicken with ranch dressing and he tolerated it well. Patient reports he tolerated ham and beans, but without the ham.  Ongoing education to avoid ham and follow the recommended foods on the minced and moist diet handout that was provided to him the past.  Patient verbalized understanding. Completed trials of thin liquids without overt difficulty on 7/7 trials this session. Patient declined trials of food this session, stating \"I told want to taste anything. \"    SHORT TERM GOAL #2:  Goal 2: Patient will verbalize and utilize swallowing strategies and s/s of aspiration with no more than min cues to safely tolerate a minced and moist diet with thin liquids.   INTERVENTIONS:  Ongoing education to listen for a wet vocal quality after drinking to monitor for possible aspiration. Patient reports he has to take small sips because if he takes too big of drinks or multiple, consecutive drinks, he will cough at times. SHORT TERM GOAL #3:  Goal 3: Patient will complete lingual coordination and strengthening exercises x 20 each for improved bolus control, formation and AP movement. INTERVENTIONS:  Patient completed the following lingual exercises this session:   - lingual protrusion with resistance x 15 with fair to good success, mild fatigue noted with increased repetitions   - external lingual lateralization x 15 with good success   - lingual circles x 15 each direction with fair to good range of motion and coordination   - internal lingual lateralization with resistance x 15 each side with fair to good range of motion to the left, good range of motion to the right, good strength bilaterally. **Patient reports he has been completing his HEP 2 times a day, approximately 5-6 repetitions of each. Encouraged the patient to increase the number of repetitions to 10-15 at home. Patient verbalized understanding. SHORT TERM GOAL #4:  Goal 4: Patient will complete pharyngeal strengthening exercises x 20 for improved strength to reduce potention pharyngeal residue. INTERVENTIONS:  Patient completed the following pharyngeal strengthening exercises this session:   - effortful swallows x 20 (2 sets of 10) with good strength overall   - lingual protrusion with alternating tongue tip back x 15 consecutive with fair to good success  **Patient unable to complete anirudh d/t incomplete closure of his gums to hold the tongue forward    SHORT TERM GOAL #5:  Goal 5: Patient will complete laryngeal elevation exercises x 15 for improved airway protection. INTERVENTIONS:  Patient completed the mendelsohn maneuver x 15 with fair to good success and min cues.   Patient reports he has been completing this exercise at home while lying down because it is a little easier to do. Assessment: [x]Progressing towards goals []Not Progressing towards goals  Plan for Next Session:  Diet monitor, monitor use of swallowing strategies, lingual and pharyngeal strengthening exercises, laryngeal elevation exercises  Patient Tolerance of Treatment:  [x]Tolerated well []Tolerated fair []Required rest breaks  []Fatigued    Education:  [x]Education was provided []Education not provided due to:  Learner:  [x]Patient []Significant Other []Other  Education provided regarding:  [x]Goals and POC [x]Diet and swallowing precautions    [x]Home Exercise Program [x]Progress and/or discharge information  Method of Education: [x]Discussion [x]Demonstration []Handout []Other:  Evaluation of Education:  [x]Verbalized understanding []Demonstrates without assistance  [x]Demonstrates with assistance [x]Needs further instruction    []No evidence of learning  [x]No family present      Plan: [x]Continue with current plan of care []Modify current plan of care   []Progress note - frequency and duration:   []Discharge notes - reason for discharge:     [x]Patient continues to require treatment by a licensed therapist to address functional deficits as outlined in the established plan of care. Certification required: [] Yes - See Physician Certification below.       [x] No       Time in: 1524  Time out: 1551  Untimed treatment: 27  Timed treatment: 0  Total time: 27 minutes      Osman Merlos M.S. [address] [place] [address]

## 2020-09-17 NOTE — PROGRESS NOTES
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 History of left shoulder pain since 2016 and low back pain since 2017, HTN       Functional Outcome Measure Used O: NDI   Functional Outcome Score Neck Disability Index Raw Score: 26  eval (20)        Insurance: Primary: Payor: Duyen Alfonso /  /  / ,   Secondary:    Authorization Information: Aquatics and modalities covered but no ionto, hot or cold packs   Visit # 7, 7/10 for progress note   Visits Allowed: 30   Recertification Date:    Physician Follow-Up: CT 20, 9/3/20 Hector, 21 SHIN Lopez CNP        SUBJECTIVE: Admits felt great after last session but is now a little more tender. Also states he had to walk to the clinic today as his car battery .     Pain: 5/10 L side of neck      Exercise Sets   Repetitions Comments   Pulleys 1 1 min Flexion and abduction   Green ball AAROM 1 10 Shoulder flexion, abd/add   L levator, upper trap and scalene stretches 3 15 sec    Active chin tucks 1 10 Seated with cues for proper tech   Craniocervical spine flexion 1 15    C-Stabs with ball: Retro shoulder rolls, scap retractions 1 15    C-Stabs with ball: shoulder flexion, extension, abduction, horz abd, overhead press 1 15    Shoulder extension, retractions, punches 2 15 Peach t-band   Bicep, Tricep, Shoulder Horz ABD 2 15 Orange t-band   Pec stretch 3 15 sec Hands cheek height and with elbows extended in doorway   Neck flexion and extension stretch 3 15 sec      Specific Interventions for Next Treatment:  Gentle neck stretches, posture retraining, neck strengthening, manual lymphatic drainage    Activity Tolerance: Patient tolerated treatment well. ASSESSMENT:  Assessment: Pt with significant improvement in muscle tightness and posture with only 1 headache in past 2 weeks. Also continues to demo improved neck and shoulder strength and mobility with no longer dropping items in left hand. GOALS:  Patient Goal: Reduce neck pain and improve neck motion     Short Term Goals to be met in 6 weeks:  1. Pt to demo neck flexion improved from 42 deg to 62 deg for ease with looking down for safety with walking. 2. Pt to demo neck extension improved from 15 to 25 for ease with showering. 3. Pt to demo R neck rotation improved from 46 to 60 for safety with crossing street. 4. Pt to demo L shoulder flexion improved from 154 deg to 170 deg for ease with reaching overhead.     Long Term Goals to be met in 12 weeks:   1. Pt to demo NDI improved from 26 to 15 for improved ease with daily tasks. 2. Pt to demo improved posture with neutral head and neck alignment for reduced frequency of headaches. 3. Pt to demo neck composite girth of 120.5 cm remained stable with progressive neck strengthening program for reduced further complications of lymphedema.       Patient Education: Continue with focus on posture and high frequency of stretching.   Education Outcome: Verbalized understanding  Education Barriers: None    PLAN: Continue established plan of care      Time In 1430   Time Out 1513   Timed Code Minutes: 42   Total Treatment Time: 42       Electronically Signed by: Willie Solano PT, DPT, [facility] [id number] 9/17/2020

## 2020-09-21 ENCOUNTER — HOSPITAL ENCOUNTER (OUTPATIENT)
Dept: PHYSICAL THERAPY | Age: 55
Setting detail: THERAPIES SERIES
Discharge: HOME | End: 2020-09-21
Payer: MEDICAID

## 2020-09-21 ENCOUNTER — HOSPITAL ENCOUNTER (OUTPATIENT)
Dept: SPEECH THERAPY | Age: 55
Setting detail: THERAPIES SERIES
Discharge: HOME | End: 2020-09-21
Payer: MEDICAID

## 2020-09-21 PROCEDURE — 92526 ORAL FUNCTION THERAPY: CPT | Performed by: SPEECH-LANGUAGE PATHOLOGIST

## 2020-09-21 PROCEDURE — 97110 THERAPEUTIC EXERCISES: CPT

## 2020-09-21 NOTE — PROGRESS NOTES
[address] THERAPY    [x] DAILY NOTE [] PROGRESS NOTE [] DISCHARGE NOTE    [x]Outpatient [address] [employer] [address]   [][place] [address]   [][facility]              Date: 2020  Patient Name: Tonya Pham      CSN: [id number]   : 1965  (47 y.o.)  Gender: male     Referring Physician: ALBERTO Chou  Diagnosis: dysphagia, unspecified type (R13.10)  Secondary Diagnosis:  dysphagia  Insurance/Certification Information: [facility]  Visit number / total approved visits: 4 - No precert until after 30WJ visit; visit limit based on precert  Visit count since last progress note:  4  Certification Date:   Last scheduled appointment: 20  Date of Last MBS:  n/a   Diet:  Minced and moist diet with thin liquids    Subjective: 5-6/10 pain in his neck. Patient reports he feels better after leaving the therapy sessions. SHORT TERM GOAL #1:  Goal 1: Patient will tolerate a minced and moist diet with thin liquids without overt s/s of aspiration to safely maintain adequate nutrition and hydration. INTERVENTIONS:  Patient reports he continues to have a salty taste in his mouth and the numb tongue (left side) and that affects how much nutrition he takes in. Patient feels it is a little easier to \"get things down\" but he still feels like it is difficult to initiate the swallows. Patient reports he had 1-2 episodes of feeling like foods \"stuck\" in his throat. Reports it happened with a sloppy freeman that he ate with only the bottom half of the bun. Reports the sloppy freeman also had some green peppers in it and that caused a little trouble. Patient educated to avoid breads and and to try the sloppy freeman without the bun next time. Also encouraged the patient to sautee the green peppers prior to making with the hamburger to soften them. Patient verbalized understanding.   Patient completed trials of thin liquids this

## 2020-09-24 ENCOUNTER — HOSPITAL ENCOUNTER (OUTPATIENT)
Dept: PHYSICAL THERAPY | Age: 55
Setting detail: THERAPIES SERIES
Discharge: HOME | End: 2020-09-24
Payer: MEDICAID

## 2020-09-24 ENCOUNTER — APPOINTMENT (OUTPATIENT)
Dept: PHYSICAL THERAPY | Age: 55
End: 2020-09-24
Payer: MEDICAID

## 2020-09-24 ENCOUNTER — HOSPITAL ENCOUNTER (OUTPATIENT)
Dept: SPEECH THERAPY | Age: 55
Setting detail: THERAPIES SERIES
Discharge: HOME | End: 2020-09-24
Payer: MEDICAID

## 2020-09-24 PROCEDURE — 97110 THERAPEUTIC EXERCISES: CPT

## 2020-09-24 PROCEDURE — 92526 ORAL FUNCTION THERAPY: CPT

## 2020-09-24 PROCEDURE — 97535 SELF CARE MNGMENT TRAINING: CPT

## 2020-09-24 NOTE — PROGRESS NOTES
[address] THERAPY    [x] DAILY NOTE [] PROGRESS NOTE [] DISCHARGE NOTE    [x]Outpatient [address]   [][place] [address]   [][facility]              Date: 2020  Patient Name: Marina De Jesus      CSN: [id number]   : 1965  (47 y.o.)  Gender: male     Referring Physician: ALBERTO Knight  Diagnosis: dysphagia, unspecified type (R13.10)  Secondary Diagnosis:  dysphagia  Insurance/Certification Information: [facility]  Visit number / total approved visits: 5 - No precert until after 63OV visit; visit limit based on precert  Visit count since last progress note:  5  Certification Date:   Last scheduled appointment: 20  Date of Last MBS:  n/a   Diet:  Minced and moist diet with thin liquids    Subjective: Pt was pleasant and cooperative. Reported that he was stiff this morning but felt better after having PT. SHORT TERM GOAL #1:  Goal 1: Patient will tolerate a minced and moist diet with thin liquids without overt s/s of aspiration to safely maintain adequate nutrition and hydration. INTERVENTIONS:  Pt reports that he primarily has been eating soup, sloppy freeman, and chicken salad. Patient feels it is a little easier to \"get things down\" but he still feels like it is difficult to initiate the swallows and continued to have food \"stuck\" in his throat, especially with chicken. Encouraged pt to make sure there is plenty of moisture in all of his foods. Patient verbalized understanding. Patient completed trials of thin liquids this session without overt difficulty on 6/6 trials. SHORT TERM GOAL #2:  Goal 2: Patient will verbalize and utilize swallowing strategies and s/s of aspiration with no more than min cues to safely tolerate a minced and moist diet with thin liquids.   INTERVENTIONS:  Pt was able to recall that he needed to be aware of gurgly vocal quality and coughing, and that he should cough if he hears the gurgling. Patient verbalized understanding. Ongoing education on all swallowing strategies. Pt recalled that he should be cutting his foods into small bites, but needed reinforcement to use frequent liquid washes (pt reported that he waits until the end of the meal to take a drink) and utilize multiple swallows, as well as taking small bites and sips. SHORT TERM GOAL #3:  Goal 3: Patient will complete lingual coordination and strengthening exercises x 20 each for improved bolus control, formation and AP movement. INTERVENTIONS:  Patient completed the following lingual exercises this session:   - lingual protrusion with resistance x 20 with fair to good success   - external lingual lateralization x 20 with good success   - lingual circles x 10 each direction with  good range of motion and good coordination   - internal lingual lateralization with resistance x 20 each side with fair range of motion to the left, good range of motion to the right, good strength bilaterally. **Patient reports he has been completing his HEP but unsure about how frequently he's been doing it (\"whenever I think about it\")       SHORT TERM GOAL #4:  Goal 4: Patient will complete pharyngeal strengthening exercises x 20 for improved strength to reduce potention pharyngeal residue. INTERVENTIONS:  Patient completed the following pharyngeal strengthening exercises this session:   - effortful swallows x 20 (2 sets of 10) with good strength overall   - lingual protrusion with alternating tongue tip back x 20 consecutive with fair to good success, min cues for improved range of motion with tongue deviating to the L     SHORT TERM GOAL #5:  Goal 5: Patient will complete laryngeal elevation exercises x 15 for improved airway protection.   INTERVENTIONS:  Patient completed the mendelsohn maneuver x 10 with fair to good success     Assessment: [x]Progressing towards goals []Not Progressing towards goals  Plan for Next

## 2020-09-24 NOTE — PROGRESS NOTES
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 on 10/2/15 with 8 weeks of radiation and chemo from 12/21/15 thru 16. History of left shoulder pain since 2016 and low back pain since 2017, HTN       Functional Outcome Measure Used O: NDI   Functional Outcome Score Neck Disability Index Raw Score: 26  eval (20)        Insurance: Primary: Payor: Sharron Goldsmith /  /  / ,   Secondary:    Authorization Information: Aquatics and modalities covered but no ionto, hot or cold packs   Visit # 9, 0/10 for progress note   Visits Allowed: 30   Recertification Date:    Physician Follow-Up: CT 20, 9/3/20 Hector, 21 SHIN Matthew CNP        SUBJECTIVE: Reports the L UE strength is slowly improving and the painful knot in the L upper trap is signficiantly improved. States he would like to continue with PT to progress his strengthening and improve the range of motion of his shoulder with flexion, abduction and internal rotation.     Pain: 5-6/10 L side of neck      Performed this Date - Indicate with X Exercise Sets   Repetitions Comments   x Pulleys 1 1 min Flexion and abduction   x Shoulder AAROM 1 15 Flexion, abduction, ER   x Green ball AAROM 1 10 Shoulder flexion, abd/add   x C-Stabs with ball: Retro shoulder rolls, scap retractions 1 10 1#   x C-Stabs with ball: shoulder flexion, extension, abduction, horz abd, overhead press 1 10 1#    Ball on wall: shoulder clocks 1 2 Clockwise and counter clockwise each    Shoulder extension, retractions, punches 2 15 Peach t-band    Shoulder ER, IR, protraction, D1 and D2 PNF 1 10 Peach t-band    Bicep, Tricep, Shoulder Horz ABD 2 15 Orange t-band    Pec stretch 3 15 sec Hands cheek height and with elbows extended in doorway    Neck flexion and extension stretch 3 15 sec     L levator, upper trap and scalene stretches 3 15 sec     Active chin tucks 1 10 Seated, without overpressure    Craniocervical spine flexion 1 15      NECK (cm)   Superior 40   Middle 40   Inferior 40.2   TOTAL NECK COMPOSITE 120. 3        8/17/20:          120.5    L shoulder flexion AROM in seated: 128; R: 145 deg  L shoulder abduction AROM in seated: 93; R: 143 deg  L shoulder external rotation AROM in supine: 61; R: 81 deg    Specific Interventions for Next Treatment:  Gentle neck stretches, posture retraining, neck strengthening, manual lymphatic drainage    Activity Tolerance: Patient tolerated treatment well. ASSESSMENT:  Assessment: Pt with excellent progress towards goals with improved neck and shoulder mobility and reduced NDI and headaches. Recommend to continue per POC for remainder of duration, 6 weeks, to focus on further improved shoulder mobility and strength. GOALS:  Patient Goal: Reduce neck pain and improve neck motion     Short Term Goals to be met in 6 weeks:  1. Pt to demo neck flexion improved from 42 deg to 62 deg for ease with looking down for safety with walking. GOAL NOT MET: 48 deg. Continue Goal    2. Pt to demo neck extension improved from 15 to 25 for ease with showering. GOAL MET:  30 deg. Discontinue Goal NEW GOAL: Pt to demo L shoulder AROM abduction in seated improved from 93 deg to 143 deg to assist with dressing. 3. Pt to demo R neck rotation improved from 46 to 60 for safety with crossing street. GOAL MET:  65 deg.   Discontinue Goal NEW GOAL: Pt to demo L shoulder AROM external rotation in supine improved from 61 deg to 81 deg to assist with dressing. 4. Pt to demo L shoulder flexion improved from 154 deg to 170 deg for ease with reaching overhead. GOAL NOT MET: 157 deg in supine. Discontinue Goal. NEW GOAL: Pt to demo L shoulder AROM flexion improved from 128 deg in seated to 145 deg for reaching overhead.       Long Term Goals to be met in 6 weeks:   1. Pt to demo NDI improved from 26 to 15 for improved ease with daily tasks. GOAL NOT MET: Scored 21 this date. .  Continue Goal    2. Pt to demo improved posture with neutral head and neck alignment for reduced frequency of headaches. GOAL NOT MET: Requires cues throughout session to correct. .  Continue Goal    3. Pt to demo neck composite girth of 120.5 cm remained stable with progressive neck strengthening program for reduced further complications of lymphedema. GOAL NOT MET: 120.3 cm this date, program to continue and will continue to monitor. Continue Goal         Patient Education: Stretch throughout the day.   Education Outcome: Verbalized understanding  Education Barriers: None    PLAN: Continue established plan of care 2x per week for 6 weeks      Time In 1430   Time Out 1515   Timed Code Minutes: 45   Total Treatment Time: 45       Electronically Signed by: Darlin Bamberger PT, DPT, ELISEO [id number] 9/24/2020

## 2020-09-29 ENCOUNTER — HOSPITAL ENCOUNTER (OUTPATIENT)
Dept: PHYSICAL THERAPY | Age: 55
Setting detail: THERAPIES SERIES
Discharge: HOME | End: 2020-09-29
Payer: MEDICAID

## 2020-09-29 PROCEDURE — 97110 THERAPEUTIC EXERCISES: CPT

## 2020-09-29 NOTE — PROGRESS NOTES
C-stabs:  Shoulder flexion  Retro shoulder rolls  Shoulder abduction  Scap retractions  Horizontal abduction  Shoulder extension  Overhead press 10x 1# x    Standing shoulder t-band:  D1, D2 - B  Shoulder extension  Shoulder retractions  Shoulder punches  Shoulder protraction  Shoulder ER - L  Shoulder IR - L 15x  x Peach t-band   Wall push ups 10x  x Feet 12 inches from wall   Ball on wall 2 sets each  x Clocks - clockwise and counter clockwise   Seated t-band:  Biceps  Triceps  Shoulder abduction 2x15  x Orange t-band   Craniocervical spine flexion 20x  x    Active chin tucks 10x  x Seated, without overpressure   L neck stretches:  Levator, upper trap, scalene 3x15 sec  x    Neck extension stretches 3x15 sec  x    Pec stretch 3x15 sec  x Hands cheek height and with elbow extended in doorway            Specific Interventions for Next Treatment:  Gentle neck stretches, posture retraining, neck strengthening, manual lymphatic drainage    Activity Tolerance: Patient tolerated treatment well. ASSESSMENT:  Assessment: Continual progression of UE strengthening with excellent tolerance. GOALS:  Patient Goal: Reduce neck pain and improve neck motion     Short Term Goals to be met in 6 weeks:  1. Pt to demo neck flexion improved from 42 deg to 62 deg for ease with looking down for safety with walking. 2. Pt to demo L shoulder AROM abduction in seated improved from 93 deg to 143 deg to assist with dressing. 3. Pt to demo L shoulder AROM external rotation in supine improved from 61 deg to 81 deg to assist with dressing. 4. Pt to demo L shoulder AROM flexion improved from 128 deg in seated to 145 deg for reaching overhead.       Long Term Goals to be met in 6 weeks:   1. Pt to demo NDI improved from 26 to 15 for improved ease with daily tasks. 2. Pt to demo improved posture with neutral head and neck alignment for reduced frequency of headaches.      3. Pt to demo neck composite girth of 120.5 cm remained stable with progressive neck strengthening program for reduced further complications of lymphedema.       Patient Education: Focus on posture and stretching frequently throughout the day  Education Outcome: Verbalized understanding  Education Barriers: None    PLAN: Continue established plan of care       Time In 1545   Time Out 1630   Timed Code Minutes: 45   Total Treatment Time: 45       Electronically Signed by: Anastacia Leach PT, DPT, ELISEO [id number] 9/29/2020

## 2020-09-30 ENCOUNTER — HOSPITAL ENCOUNTER (OUTPATIENT)
Dept: SPEECH THERAPY | Age: 55
Setting detail: THERAPIES SERIES
Discharge: HOME | End: 2020-09-30
Payer: MEDICAID

## 2020-09-30 PROCEDURE — 92526 ORAL FUNCTION THERAPY: CPT | Performed by: SPEECH-LANGUAGE PATHOLOGIST

## 2020-09-30 NOTE — PROGRESS NOTES
[address] THERAPY    [x] DAILY NOTE [] PROGRESS NOTE [] DISCHARGE NOTE    [x]Outpatient [address]   [][place] [address] [facility]   [][facility]              Date: 2020  Patient Name: Kadi Hinkle      CSN: [id number]   : 1965  (47 y.o.)  Gender: male     Referring Physician: ALBERTO Martines  Diagnosis: dysphagia, unspecified type (R13.10)  Secondary Diagnosis:  dysphagia  Insurance/Certification Information: [facility]  Visit number / total approved visits: 6 - No precert until after 51SB visit; visit limit based on precert  Visit count since last progress note:  6  Certification Date:   Last scheduled appointment: 20  Date of Last MBS:  n/a   Diet:  Minced and moist diet with thin liquids    Subjective: Patient reports his throat is raw/dry this morning. No pain. SHORT TERM GOAL #1:  Goal 1: Patient will tolerate a minced and moist diet with thin liquids without overt s/s of aspiration to safely maintain adequate nutrition and hydration. INTERVENTIONS:  Patient denies any overt difficulty with current diet and liquids at home. Completed trials with thin liquids this session without overt difficulty on 8/8 trials. Discussed the recommendation to try to decrease the amount of caffeine he is drinking and increase his water intake to try to help decrease the dry mouth a little. Also discussed that the xerostomia will not go away totally because of his history of radiation. SHORT TERM GOAL #2:  Goal 2: Patient will verbalize and utilize swallowing strategies and s/s of aspiration with no more than min cues to safely tolerate a minced and moist diet with thin liquids. INTERVENTIONS:  Pt was able to recall that he needed to monitor for a gurgly voice, but was unable to verbalize the other s/s of aspiration. Reviewed to watch for coughing and throat clearing as well.   Patient verbalized understanding. Briefly reviewed strategies and patient reports he knows he always has to take small sips and he always has to swallow multiple times to get things down. SHORT TERM GOAL #3:  Goal 3: Patient will complete lingual coordination and strengthening exercises x 20 each for improved bolus control, formation and AP movement. INTERVENTIONS:  Patient completed the following lingual exercises this session:   - lingual protrusion with resistance x 20 with good success   - external lingual lateralization x 20 with good success   - lingual circles x 15 counter clockwise and x 20 going clockwise good range of motion and good coordination   - internal lingual lateralization with resistance x 20 each side with fair range of motion to the left, good range of motion to the right, good strength bilaterally. SHORT TERM GOAL #4:  Goal 4: Patient will complete pharyngeal strengthening exercises x 20 for improved strength to reduce potention pharyngeal residue. INTERVENTIONS:  Patient completed the following pharyngeal strengthening exercises this session:   - effortful swallows x 20 (2 set of 10) with good strength overall   - lingual protrusion with alternating tongue tip back x 20 consecutive with fair to good success, min cues for improved range of motion with protrusion, lingual deviation to the left     SHORT TERM GOAL #5:  Goal 5: Patient will complete laryngeal elevation exercises x 15 for improved airway protection. INTERVENTIONS:  Patient completed the mendelsohn maneuver x 15 (1 set of 10, then 1 set of 5) with fair to good success.      Assessment: [x]Progressing towards goals []Not Progressing towards goals  Plan for Next Session:  Diet monitor, monitor use of swallowing strategies, lingual and pharyngeal strengthening exercises, laryngeal elevation exercises  Patient Tolerance of Treatment:  [x]Tolerated well []Tolerated fair []Required rest breaks  []Fatigued    Education:  [x]Education was provided []Education not provided due to:  Learner:  [x]Patient []Significant Other []Other  Education provided regarding:  [x]Goals and POC [x]Diet and swallowing precautions    [x]Home Exercise Program [x]Progress and/or discharge information  Method of Education: [x]Discussion [x]Demonstration []Handout []Other:  Evaluation of Education:  [x]Verbalized understanding []Demonstrates without assistance  [x]Demonstrates with assistance [x]Needs further instruction    []No evidence of learning  [x]No family present      Plan: [x]Continue with current plan of care []Modify current plan of care   []Progress note - frequency and duration:   []Discharge notes - reason for discharge:     [x]Patient continues to require treatment by a licensed therapist to address functional deficits as outlined in the established plan of care. Certification required: [] Yes - See Physician Certification below.       [x] No       Time in: 1045  Time out: 1112  Untimed treatment: 27  Timed treatment: 0  Total time: 27 minutes      Maria Quintanilla M.S. Elizabeth Curry 5064

## 2020-10-12 ENCOUNTER — HOSPITAL ENCOUNTER (OUTPATIENT)
Dept: PHYSICAL THERAPY | Age: 55
Setting detail: THERAPIES SERIES
Discharge: HOME | End: 2020-10-12
Payer: MEDICAID

## 2020-10-12 ENCOUNTER — HOSPITAL ENCOUNTER (OUTPATIENT)
Dept: SPEECH THERAPY | Age: 55
Setting detail: THERAPIES SERIES
Discharge: HOME | End: 2020-10-12
Payer: MEDICAID

## 2020-10-15 ENCOUNTER — HOSPITAL ENCOUNTER (OUTPATIENT)
Dept: PHYSICAL THERAPY | Age: 55
Setting detail: THERAPIES SERIES
Discharge: HOME | End: 2020-10-15
Payer: MEDICAID

## 2020-10-15 ENCOUNTER — HOSPITAL ENCOUNTER (OUTPATIENT)
Dept: SPEECH THERAPY | Age: 55
Setting detail: THERAPIES SERIES
Discharge: HOME | End: 2020-10-15
Payer: MEDICAID

## 2020-10-15 PROCEDURE — 97110 THERAPEUTIC EXERCISES: CPT

## 2020-10-15 PROCEDURE — 92526 ORAL FUNCTION THERAPY: CPT | Performed by: SPEECH-LANGUAGE PATHOLOGIST

## 2020-10-15 NOTE — PROGRESS NOTES
[address] THERAPY    [x] DAILY NOTE [] PROGRESS NOTE [] DISCHARGE NOTE    [x]Outpatient [address]   [][place] [address] [facility]   [][facility]              Date: 10/15/2020  Patient Name: Ivania Duarte      CSN: [id number]   : 1965  (47 y.o.)  Gender: male     Referring Physician: ALBERTO Castro  Diagnosis: dysphagia, unspecified type (R13.10)  Secondary Diagnosis:  dysphagia  Insurance/Certification Information: [facility]  Visit number / total approved visits: 7 - No precert until after 41RQ visit; visit limit based on precert  Visit count since last progress note:  7  Certification Date:   Last scheduled appointment: 20  Date of Last MBS:  n/a   Diet:  Minced and moist diet with thin liquids  Pain:  5.5-6/10 in left side of neck    Subjective: Patient reports he has been completing his HEP 1 time per day or every other day. Patient denies any change in his swallowing since the last treatment session which was on 20. SHORT TERM GOAL #1:  Goal 1: Patient will tolerate a minced and moist diet with thin liquids without overt s/s of aspiration to safely maintain adequate nutrition and hydration. INTERVENTIONS:  Patient reports good tolerance with the minced and moist diet with thin liquids. Patient reports when he drinks with carbonation make his throat feel scratchy. SHORT TERM GOAL #2:  Goal 2: Patient will verbalize and utilize swallowing strategies and s/s of aspiration with no more than min cues to safely tolerate a minced and moist diet with thin liquids. INTERVENTIONS:  Patient with good recall of s/s of aspiration to monitor for during meals. Briefly reviewed swallowing strategies and patient verbalized understanding and reports \"I have to\" in regards to taking small bites and sips and taking it slow.     SHORT TERM GOAL #3:  Goal 3: Patient will complete lingual coordination and strengthening exercises x 20 each for improved bolus control, formation and AP movement. INTERVENTIONS:  Patient completed the following lingual exercises this session:   - lingual protrusion with resistance x 20 with good success, mild fatigue   - external lingual lateralization x 20 with good success   - lingual circles x 20 in both directions with fair to good range of motion and coordination   - internal lingual lateralization with resistance x 20 each side with mildly decreased range of motion to the left compared to the right, good range of motion to the right, good strength bilaterally. SHORT TERM GOAL #4:  Goal 4: Patient will complete pharyngeal strengthening exercises x 20 for improved strength to reduce potention pharyngeal residue. INTERVENTIONS:  Patient completed the following pharyngeal strengthening exercises this session:   - effortful swallows x 20 (2 set of 10) with good strength overall   - lingual protrusion with alternating tongue tip back x 20 consecutive with good success overall, x 1 cue to hold the stretch 3 seconds    SHORT TERM GOAL #5:  Goal 5: Patient will complete laryngeal elevation exercises x 15 for improved airway protection. INTERVENTIONS:  Patient completed the mendelsohn maneuver x 15 (1 set of 10, then 1 set of 5) with good success. Patient reported feeling scratchy in his throat during completion of this exercise.     Assessment: [x]Progressing towards goals []Not Progressing towards goals  Plan for Next Session:  Diet monitor, monitor use of swallowing strategies, lingual and pharyngeal strengthening exercises, laryngeal elevation exercises  Patient Tolerance of Treatment:  [x]Tolerated well []Tolerated fair []Required rest breaks  []Fatigued    Education:  [x]Education was provided []Education not provided due to:  Learner:  [x]Patient []Significant Other []Other  Education provided regarding:  [x]Goals and POC [x]Diet and swallowing precautions    [x]Home Exercise Program [x]Progress and/or discharge information  Method of Education: [x]Discussion [x]Demonstration []Handout []Other:  Evaluation of Education:  [x]Verbalized understanding []Demonstrates without assistance  [x]Demonstrates with assistance [x]Needs further instruction    []No evidence of learning  [x]No family present      Plan: [x]Continue with current plan of care []Modify current plan of care   []Progress note - frequency and duration:   []Discharge notes - reason for discharge:     [x]Patient continues to require treatment by a licensed therapist to address functional deficits as outlined in the established plan of care. Certification required: [] Yes - See Physician Certification below.       [x] No       Time in: 1548  Time out: 1611  Untimed treatment: 23  Timed treatment: 0  Total time: 23 minutes      Louise Lentz M.S. [id number] [place] [address]

## 2020-10-15 NOTE — PROGRESS NOTES
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 History of left shoulder pain since 2016 and low back pain since 2017, HTN       Functional Outcome Measure Used O: NDI   Functional Outcome Score Neck Disability Index Raw Score: 26  eval (20)        Insurance: Primary: Payor: David Sepulveda /  /  / ,   Secondary:    Authorization Information: Aquatics and modalities covered but no ionto, hot or cold packs   Visit # 11, 2/10 for progress note   Visits Allowed: 30   Recertification Date: 44   Physician Follow-Up: CT 20, 9/3/20 Hector, 21 SHIN Hernández CNP        SUBJECTIVE: Continues to note improved strength but admits more stiffness today, likely due to cold and wet weather.     TREATMENT   Precautions: Difficulty swallowing, neck lymphedema   Pain: 0/10    X in shaded column indicates activity completed today   Modalities Parameters/  Location  Notes         Manual Therapy Time/Technique  Notes         Exercise/Intervention   Notes   Pulleys 1.5 min  x Flexion and abduction          C-stabs:  Shoulder flexion  Retro shoulder rolls  Shoulder abduction  Scap retractions  Horizontal abduction  Shoulder extension  Overhead press 10x 2# x           Standing shoulder t-band:  D1, D2 - B  Shoulder extension  Shoulder retractions  Shoulder punches  Shoulder protraction  Shoulder ER - L  Shoulder IR - L 15x  x Timed Code Minutes: 40   Total Treatment Time: 40       Electronically Signed by: Jose Ordoñez PT, DPT, [facility] [id number] 10/15/2020

## 2020-10-19 ENCOUNTER — HOSPITAL ENCOUNTER (OUTPATIENT)
Dept: SPEECH THERAPY | Age: 55
Setting detail: THERAPIES SERIES
Discharge: HOME | End: 2020-10-19
Payer: MEDICAID

## 2020-10-19 ENCOUNTER — HOSPITAL ENCOUNTER (OUTPATIENT)
Dept: PHYSICAL THERAPY | Age: 55
Setting detail: THERAPIES SERIES
Discharge: HOME | End: 2020-10-19
Payer: MEDICAID

## 2020-10-19 PROCEDURE — 97110 THERAPEUTIC EXERCISES: CPT

## 2020-10-19 PROCEDURE — 92526 ORAL FUNCTION THERAPY: CPT | Performed by: SPEECH-LANGUAGE PATHOLOGIST

## 2020-10-19 NOTE — PROGRESS NOTES
ups 15x  x Feet 18inches from wall   Serratus anterior punches 10x 2# x Standing          Seated t-band:  Biceps  Triceps  Shoulder abduction 2x20  x Orange t-band          Craniocervical spine flexion 20x  x    Active chin tucks 10x  x Seated, without overpressure   L neck stretches:  Levator, upper trap, scalene 3x15 sec  x    Neck extension stretches 3x15 sec  x Arms crossed with hands on clavicles   Pec stretch 3x15 sec  x Hands cheek height and with elbow extended in doorway            Specific Interventions for Next Treatment:  Gentle neck stretches, posture retraining, neck strengthening, manual lymphatic drainage    Activity Tolerance: Patient tolerated treatment well. ASSESSMENT:  Assessment: Progressed reps this date with c-stabs, wall push ups and seated t-band activities with good tolerance. Slowly improving strength. GOALS:  Patient Goal: Reduce neck pain and improve neck motion     Short Term Goals to be met in 6 weeks:  1. Pt to demo neck flexion improved from 42 deg to 62 deg for ease with looking down for safety with walking. 2. Pt to demo L shoulder AROM abduction in seated improved from 93 deg to 143 deg to assist with dressing. 3. Pt to demo L shoulder AROM external rotation in supine improved from 61 deg to 81 deg to assist with dressing. 4. Pt to demo L shoulder AROM flexion improved from 128 deg in seated to 145 deg for reaching overhead.       Long Term Goals to be met in 6 weeks:   1. Pt to demo NDI improved from 26 to 15 for improved ease with daily tasks. 2. Pt to demo improved posture with neutral head and neck alignment for reduced frequency of headaches. 3. Pt to demo neck composite girth of 120.5 cm remained stable with progressive neck strengthening program for reduced further complications of lymphedema.       Patient Education: Progress strengthening at home as tolerated so that the last couple of reps are difficult.  Importance of continuing with stretching

## 2020-10-19 NOTE — PROGRESS NOTES
GOAL #3:  Goal 3: Patient will complete lingual coordination and strengthening exercises x 20 each for improved bolus control, formation and AP movement. INTERVENTIONS:  Patient completed the following lingual exercises this session:   - lingual protrusion with resistance x 20 with good success, x 1 cue for increased protrusion   - external lingual lateralization x 20 with good success   - lingual circles x 20 in both directions with fair to good range of motion and coordination; patient reported his jaw started popping during completion of this exercise. - internal lingual lateralization with resistance x 20 each side with fair to good, good strength bilaterally; patient reported having mild pain in his tongue when going to the right. SHORT TERM GOAL #4:  Goal 4: Patient will complete pharyngeal strengthening exercises x 20 for improved strength to reduce potention pharyngeal residue. INTERVENTIONS:  Patient completed the following pharyngeal strengthening exercises this session:   - effortful swallows x 20 (1 set of 15, then 1 set of 5) with good strength overall   - lingual protrusion with alternating tongue tip back x 20 consecutive with good success overall, mild fatigue noted     SHORT TERM GOAL #5:  Goal 5: Patient will complete laryngeal elevation exercises x 15 for improved airway protection. INTERVENTIONS:  Patient completed the mendelsohn maneuver x 15 consecutive with good success. No cues required.     Assessment: [x]Progressing towards goals []Not Progressing towards goals  Plan for Next Session:  Diet monitor, monitor use of swallowing strategies, lingual and pharyngeal strengthening exercises, laryngeal elevation exercises  Patient Tolerance of Treatment:  [x]Tolerated well []Tolerated fair []Required rest breaks  []Fatigued    Education:  [x]Education was provided []Education not provided due to:  Learner:  [x]Patient []Significant Other []Other  Education provided regarding:  [x]Goals and POC [x]Diet and swallowing precautions    [x]Home Exercise Program [x]Progress and/or discharge information  Method of Education: [x]Discussion [x]Demonstration []Handout []Other:  Evaluation of Education:  [x]Verbalized understanding []Demonstrates without assistance  [x]Demonstrates with assistance [x]Needs further instruction    []No evidence of learning  [x]No family present      Plan: [x]Continue with current plan of care []Modify current plan of care   []Progress note - frequency and duration:   []Discharge notes - reason for discharge:     [x]Patient continues to require treatment by a licensed therapist to address functional deficits as outlined in the established plan of care. Certification required: [] Yes - See Physician Certification below.       [x] No       Time in: 1030  Time out: 1052  Untimed treatment: 22  Timed treatment: 0  Total time: 22 minutes      Melanie Ye M.S. [id number] [id number]

## 2020-10-22 ENCOUNTER — HOSPITAL ENCOUNTER (OUTPATIENT)
Dept: SPEECH THERAPY | Age: 55
Setting detail: THERAPIES SERIES
Discharge: HOME | End: 2020-10-22
Payer: MEDICAID

## 2020-10-22 ENCOUNTER — HOSPITAL ENCOUNTER (OUTPATIENT)
Dept: PHYSICAL THERAPY | Age: 55
Setting detail: THERAPIES SERIES
Discharge: HOME | End: 2020-10-22
Payer: MEDICAID

## 2020-10-22 PROCEDURE — 92526 ORAL FUNCTION THERAPY: CPT | Performed by: SPEECH-LANGUAGE PATHOLOGIST

## 2020-10-22 PROCEDURE — 97110 THERAPEUTIC EXERCISES: CPT

## 2020-10-22 NOTE — PROGRESS NOTES
[address] THERAPY    [x] DAILY NOTE [] PROGRESS NOTE [] DISCHARGE NOTE    [x]Outpatient [address] [facility] [address]   [][place] [facility]   [][facility]              Date: 10/22/2020  Patient Name: Cynthia José      CSN: [id number]   : 1965  (47 y.o.)  Gender: male     Referring Physician: ALBERTO Schmitt  Diagnosis: dysphagia, unspecified type (R13.10)  Secondary Diagnosis:  dysphagia  Insurance/Certification Information: [facility]  Visit number / total approved visits: 9 - No precert until after 41KK visit; visit limit based on precert  Visit count since last progress note:  9  Certification Date:   Last scheduled appointment: 20  Date of Last MBS:  n/a   Diet:  Minced and moist diet with thin liquids  Pain:  6/10 in left side of neck     Subjective: Patient reports he has tightness in his throat (front) that he rates as 5/10. Patient reports he continues to have the scratchy feeling in his throat. SHORT TERM GOAL #1:  Goal 1: Patient will tolerate a minced and moist diet with thin liquids without overt s/s of aspiration to safely maintain adequate nutrition and hydration. INTERVENTIONS:  Patient reports he is tolerating a minced and moist diet with good success. Patient denies any overt difficulty with meals. Trials off thin liquids completed without overt difficulty on 5/5 trials. SHORT TERM GOAL #2:  Goal 2: Patient will verbalize and utilize swallowing strategies and s/s of aspiration with no more than min cues to safely tolerate a minced and moist diet with thin liquids. INTERVENTIONS:  Patient reports he is using all swallowing strategies all the time because he has to. SHORT TERM GOAL #3:  Goal 3: Patient will complete lingual coordination and strengthening exercises x 20 each for improved bolus control, formation and AP movement.   INTERVENTIONS:  Patient completed the following lingual exercises this session:   - lingual protrusion with resistance x 20 with good success, no cues   - external lingual lateralization x 20 with good success, no cues   - lingual circles x 20 in both directions with good range of motion and coordination   - internal lingual lateralization with resistance x 20 each side with fair to good success, good endurance overall, mild pain when pushing to the right     SHORT TERM GOAL #4:  Goal 4: Patient will complete pharyngeal strengthening exercises x 20 for improved strength to reduce potention pharyngeal residue. INTERVENTIONS:  Patient completed the following pharyngeal strengthening exercises this session:   - effortful swallows x 20 consecutive with good strength overall, no cues   - lingual protrusion with alternating tongue tip back x 20 consecutive with good success overall, mild lingual deviation to the left with protrusion    SHORT TERM GOAL #5:  Goal 5: Patient will complete laryngeal elevation exercises x 15 for improved airway protection. INTERVENTIONS:  Patient completed the mendelsohn maneuver x 15 consecutive with good success. No cues required.     Assessment: [x]Progressing towards goals []Not Progressing towards goals  Plan for Next Session:  Diet monitor, monitor use of swallowing strategies, lingual and pharyngeal strengthening exercises, laryngeal elevation exercises  Patient Tolerance of Treatment:  [x]Tolerated well []Tolerated fair []Required rest breaks  []Fatigued    Education:  [x]Education was provided []Education not provided due to:  Learner:  [x]Patient []Significant Other []Other  Education provided regarding:  [x]Goals and POC [x]Diet and swallowing precautions    [x]Home Exercise Program [x]Progress and/or discharge information  Method of Education: [x]Discussion []Demonstration []Handout []Other:  Evaluation of Education:  [x]Verbalized understanding []Demonstrates without assistance  [x]Demonstrates with assistance [x]Needs further instruction    []No evidence of learning  [x]No family present      Plan: [x]Continue with current plan of care []Modify current plan of care   []Progress note - frequency and duration:   []Discharge notes - reason for discharge:     [x]Patient continues to require treatment by a licensed therapist to address functional deficits as outlined in the established plan of care. Certification required: [] Yes - See Physician Certification below.       [x] No       Time in: 1550  Time out: 1610  Untimed treatment: 20  Timed treatment: 0  Total time: 20 minutes      Lawanda Lopez M.S. [id number] [id number]

## 2020-10-22 NOTE — PROGRESS NOTES
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 History of left shoulder pain since 2016 and low back pain since 2017, HTN       Functional Outcome Measure Used O: NDI   Functional Outcome Score Neck Disability Index Raw Score: 26  eval (20)        Insurance: Primary: Payor: Lee Tilley /  /  / ,   Secondary:    Authorization Information: Aquatics and modalities covered but no ionto, hot or cold packs   Visit # 13, 4/10 for progress note   Visits Allowed: 30   Recertification Date:    Physician Follow-Up: CT 20, 9/3/20 Hector, 21 SHIN Mistry, CNP        SUBJECTIVE: Admits increase in fullness and tightness of L side of jaw today for unknown reason.     TREATMENT   Precautions: Difficulty swallowing, neck lymphedema   Pain: 0/10    X in shaded column indicates activity completed today   Modalities Parameters/  Location  Notes         Manual Therapy Time/Technique  Notes         Exercise/Intervention   Notes   Pulleys 1.5 min  x Flexion and abduction          C-stabs:  Shoulder flexion  Retro shoulder rolls  Shoulder abduction  Scap retractions  Horizontal abduction  Shoulder extension  Overhead press 15x 2# x           Standing shoulder t-band:  D1, D2 - B  Shoulder extension  Shoulder retractions  Shoulder punches  Shoulder protraction  Shoulder ER - L  Shoulder IR - L 15-20x  x [place] t-band Wall push ups 20x  x Feet 24 inches from wall   Serratus anterior punches 15x 2# x Standing   Lat pull down 2x10 1 plate x           Seated t-band:  Biceps  Triceps  Shoulder abduction 2x20  x Orange t-band          Craniocervical spine flexion 20x  x    Active chin tucks 10x  x Seated, without overpressure   L neck stretches:  Levator, upper trap, scalene 3x15 sec  x    Neck extension stretches 3x15 sec  x Arms crossed with hands on clavicles   Pec stretch 3x15 sec  x Hands cheek height and with elbow extended in doorway            Specific Interventions for Next Treatment:  Gentle neck stretches, posture retraining, neck strengthening, manual lymphatic drainage    Activity Tolerance: Patient tolerated treatment well. ASSESSMENT:  Assessment: Progressed strengthening this date and used more overpressure with stretches due to increased complaints of neck tightness/fullness. GOALS:  Patient Goal: Reduce neck pain and improve neck motion     Short Term Goals to be met in 6 weeks:  1. Pt to demo neck flexion improved from 42 deg to 62 deg for ease with looking down for safety with walking. 2. Pt to demo L shoulder AROM abduction in seated improved from 93 deg to 143 deg to assist with dressing. 3. Pt to demo L shoulder AROM external rotation in supine improved from 61 deg to 81 deg to assist with dressing. 4. Pt to demo L shoulder AROM flexion improved from 128 deg in seated to 145 deg for reaching overhead.       Long Term Goals to be met in 6 weeks:   1. Pt to demo NDI improved from 26 to 15 for improved ease with daily tasks. 2. Pt to demo improved posture with neutral head and neck alignment for reduced frequency of headaches.      3. Pt to demo neck composite girth of 120.5 cm remained stable with progressive neck strengthening program for reduced further complications of lymphedema.       Patient Education: Use self massage and stretches as often as needed when neck feels more tight and full.  Education Outcome: Verbalized understanding  Education Barriers: None    PLAN: Continue established plan of care       Time In 1500   Time Out 1545   Timed Code Minutes: 45   Total Treatment Time: 45       Electronically Signed by: Gayle Seip PT, DPT, [facility] [id number] 10/22/2020

## 2020-10-26 ENCOUNTER — HOSPITAL ENCOUNTER (OUTPATIENT)
Dept: SPEECH THERAPY | Age: 55
Setting detail: THERAPIES SERIES
Discharge: HOME | End: 2020-10-26
Payer: MEDICAID

## 2020-10-26 ENCOUNTER — APPOINTMENT (OUTPATIENT)
Dept: PHYSICAL THERAPY | Age: 55
End: 2020-10-26
Payer: MEDICAID

## 2020-10-28 ENCOUNTER — HOSPITAL ENCOUNTER (OUTPATIENT)
Age: 55
Discharge: HOME | End: 2020-10-28
Payer: MEDICAID

## 2020-10-28 ENCOUNTER — HOSPITAL ENCOUNTER (OUTPATIENT)
Dept: GENERAL RADIOLOGY | Age: 55
Discharge: HOME | End: 2020-10-28
Payer: MEDICAID

## 2020-10-28 LAB
ANION GAP SERPL CALCULATED.3IONS-SCNC: 11 MEQ/L (ref 8–16)
BASOPHILS # BLD: 0.5 %
BASOPHILS ABSOLUTE: 0 THOU/MM3 (ref 0–0.1)
BUN BLDV-MCNC: 12 MG/DL (ref 7–22)
CALCIUM SERPL-MCNC: 10.3 MG/DL (ref 8.5–10.5)
CHLORIDE BLD-SCNC: 105 MEQ/L (ref 98–111)
CHOLESTEROL, TOTAL: 229 MG/DL (ref 100–199)
CO2: 26 MEQ/L (ref 23–33)
CREAT SERPL-MCNC: 1.1 MG/DL (ref 0.4–1.2)
EOSINOPHIL # BLD: 1.6 %
EOSINOPHILS ABSOLUTE: 0.1 THOU/MM3 (ref 0–0.4)
ERYTHROCYTE [DISTWIDTH] IN BLOOD BY AUTOMATED COUNT: 14 % (ref 11.5–14.5)
ERYTHROCYTE [DISTWIDTH] IN BLOOD BY AUTOMATED COUNT: 50.3 FL (ref 35–45)
GLUCOSE BLD-MCNC: 105 MG/DL (ref 70–108)
HCT VFR BLD CALC: 42.7 % (ref 42–52)
HDLC SERPL-MCNC: 120 MG/DL
HEMOGLOBIN: 14.4 GM/DL (ref 14–18)
IMMATURE GRANS (ABS): 0.02 THOU/MM3 (ref 0–0.07)
IMMATURE GRANULOCYTES: 0.4 %
LDL CHOLESTEROL CALCULATED: 91 MG/DL
LYMPHOCYTES # BLD: 31.7 %
LYMPHOCYTES ABSOLUTE: 1.7 THOU/MM3 (ref 1–4.8)
MCH RBC QN AUTO: 33.5 PG (ref 26–33)
MCHC RBC AUTO-ENTMCNC: 33.7 GM/DL (ref 32.2–35.5)
MCV RBC AUTO: 99.3 FL (ref 80–94)
MONOCYTES # BLD: 13 %
MONOCYTES ABSOLUTE: 0.7 THOU/MM3 (ref 0.4–1.3)
NUCLEATED RED BLOOD CELLS: 0 /100 WBC
PLATELET # BLD: 201 THOU/MM3 (ref 130–400)
PMV BLD AUTO: 10.1 FL (ref 9.4–12.4)
POTASSIUM SERPL-SCNC: 4.9 MEQ/L (ref 3.5–5.2)
RBC # BLD: 4.3 MILL/MM3 (ref 4.7–6.1)
SEG NEUTROPHILS: 52.8 %
SEGMENTED NEUTROPHILS ABSOLUTE COUNT: 2.9 THOU/MM3 (ref 1.8–7.7)
SODIUM BLD-SCNC: 142 MEQ/L (ref 135–145)
T4 FREE: 0.64 NG/DL (ref 0.93–1.76)
TRIGL SERPL-MCNC: 88 MG/DL (ref 0–199)
TSH SERPL DL<=0.05 MIU/L-ACNC: 16.82 UIU/ML (ref 0.4–4.2)
VITAMIN D 25-HYDROXY: 28 NG/ML (ref 30–100)
WBC # BLD: 5.5 THOU/MM3 (ref 4.8–10.8)

## 2020-10-28 PROCEDURE — 80048 BASIC METABOLIC PNL TOTAL CA: CPT

## 2020-10-28 PROCEDURE — 36415 COLL VENOUS BLD VENIPUNCTURE: CPT

## 2020-10-28 PROCEDURE — 82306 VITAMIN D 25 HYDROXY: CPT

## 2020-10-28 PROCEDURE — 80061 LIPID PANEL: CPT

## 2020-10-28 PROCEDURE — 84439 ASSAY OF FREE THYROXINE: CPT

## 2020-10-28 PROCEDURE — 85025 COMPLETE CBC W/AUTO DIFF WBC: CPT

## 2020-10-28 PROCEDURE — 84443 ASSAY THYROID STIM HORMONE: CPT

## 2020-10-28 PROCEDURE — 71046 X-RAY EXAM CHEST 2 VIEWS: CPT

## 2020-10-29 ENCOUNTER — HOSPITAL ENCOUNTER (OUTPATIENT)
Dept: SPEECH THERAPY | Age: 55
Setting detail: THERAPIES SERIES
Discharge: HOME | End: 2020-10-29
Payer: MEDICAID

## 2020-10-29 ENCOUNTER — HOSPITAL ENCOUNTER (OUTPATIENT)
Dept: PHYSICAL THERAPY | Age: 55
Setting detail: THERAPIES SERIES
Discharge: HOME | End: 2020-10-29
Payer: MEDICAID

## 2020-10-29 PROCEDURE — 92526 ORAL FUNCTION THERAPY: CPT | Performed by: SPEECH-LANGUAGE PATHOLOGIST

## 2020-10-29 PROCEDURE — 97110 THERAPEUTIC EXERCISES: CPT

## 2020-10-29 NOTE — PROGRESS NOTES
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 History of left shoulder pain since 2016 and low back pain since 2017, HTN       Functional Outcome Measure Used O: NDI   Functional Outcome Score Neck Disability Index Raw Score: 26  eval (20)        Insurance: Primary: Payor: Jessenia Trejo /  /  / ,   Secondary:    Authorization Information: Aquatics and modalities covered but no ionto, hot or cold packs   Visit # 14, 5\/10 for progress note   Visits Allowed: 30   Recertification Date: 95   Physician Follow-Up: CT 20, 9/3/20 Hector, 21 SHIN Forbes Nurse, CNP        SUBJECTIVE: States has an anyerusym in his heart but its too small to do anything. Reports he slept pretty good last night and has mild pain only today at L neck/jaw. Reports strength is continuing to improve but notes still getting knots in L upper trap when trying to cut the grass.     TREATMENT   Precautions: Difficulty swallowing, neck lymphedema   Pain: 4/10 left neck and jaw    X in shaded column indicates activity completed today   Modalities Parameters/  Location  Notes         Manual Therapy Time/Technique  Notes         Exercise/Intervention   Notes   Pulleys 1.5 min  x Flexion and abduction          C-stabs:  Shoulder flexion  Retro shoulder rolls  Shoulder abduction  Scap retractions  Horizontal abduction  Shoulder extension  Overhead press 20x 2# x           Standing shoulder t-band:  D1, D2 - B  Shoulder extension  Shoulder retractions  Shoulder punches  Shoulder ER - L  Shoulder IR - L 15x  x Orange t-band          Wall push ups 20x  x Feet 24 inches from wall   Serratus anterior punches 20x 2# x Standing   Lat pull down 2x15 1 plate x           Seated t-band:  Biceps  Triceps  Shoulder abduction 2x20  x Orange t-band          Craniocervical spine flexion 20x  x    Active chin tucks 20x  x Seated, without overpressure   L neck stretches:  Levator, upper trap, scalene 3x15 sec  x    Neck extension stretches 3x15 sec  x Arms crossed with hands on clavicles   Pec stretch 3x15 sec  x Hands cheek height and with elbow extended in doorway            Specific Interventions for Next Treatment:  Gentle neck stretches, posture retraining, neck strengthening, manual lymphatic drainage as needed    Activity Tolerance: Patient tolerated treatment well. ASSESSMENT:  Assessment: Pt with improving strength noted with progression this date however pt had popping sensation at L neck and shoulder during strengthening and stretching activities - denied any pain. GOALS:  Patient Goal: Reduce neck pain and improve neck motion     Short Term Goals to be met in 6 weeks:  1. Pt to demo neck flexion improved from 42 deg to 62 deg for ease with looking down for safety with walking. 2. Pt to demo L shoulder AROM abduction in seated improved from 93 deg to 143 deg to assist with dressing. 3. Pt to demo L shoulder AROM external rotation in supine improved from 61 deg to 81 deg to assist with dressing. 4. Pt to demo L shoulder AROM flexion improved from 128 deg in seated to 145 deg for reaching overhead.       Long Term Goals to be met in 6 weeks:   1. Pt to demo NDI improved from 26 to 15 for improved ease with daily tasks. 2. Pt to demo improved posture with neutral head and neck alignment for reduced frequency of headaches.      3. Pt to demo neck composite girth of 120.5 cm remained stable with progressive neck strengthening program for reduced further complications of lymphedema.       Patient Education: When L upper trap \"knots up\" - focus on posture with neutral to depressed/retracted shoulders to help UT to \"turn off\"  +  Education Outcome: Verbalized understanding  Education Barriers: None    PLAN: Continue established plan of care       Time In 1045   Time Out 1130   Timed Code Minutes: 45   Total Treatment Time: 45       Electronically Signed by: Erika Ruelas PT, DPT, [facility] [id number] 10/29/2020

## 2020-10-29 NOTE — PROGRESS NOTES
** PLEASE SIGN, DATE AND TIME CERTIFICATION BELOW AND RETURN TO [facility] OUTPATIENT REHABILITATION (FAX #: [phone]). ATTEST/CO-SIGN IF ACCESSING VIA [place]. THANK YOU.**    I certify that I have examined the patient below and determined that Physical Medicine and Rehabilitation service is necessary and that I approve the established plan of care for up to 90 days or as specifically noted. Attestation, signature or co-signature of physician indicates approval of certification requirements.    ________________________ ____________ __________  Physician Signature   Date   Time    [address] THERAPY    [] DAILY NOTE [x] PROGRESS NOTE [] DISCHARGE NOTE    [x]Outpatient [address]   [][place]   [][facility]         ROBERTO NOMS:  swallowing - 4    Date: 10/29/2020  Patient Name: Ivania Duarte      CSN: [id number]   : 1965  (47 y.o.)  Gender: male     Referring Physician: ALBERTO Castro  Diagnosis: dysphagia, unspecified type (R13.10)  Secondary Diagnosis:  dysphagia  Insurance/Certification Information: [facility]  Visit number / total approved visits: 10 - No precert until after 31NV visit; visit limit based on precert  Visit count since last progress note:  10  Certification Date:   Last scheduled appointment: 20  Date of Last MBS:  n/a   Diet:  Minced and moist diet with thin liquids  Pain:  0/10    Subjective: Patient reports he has not had a sore throat at all this week. \"I still have trouble swallowing, but that's in the morning. \"  Patient feels that his swallowing is better now compared to before treatment. Patient reports it is easier to get things down, but he still feels like he has to bring his chin down a little (about half way).       SHORT TERM GOAL #1:  Goal 1: Patient will tolerate a minced and moist diet with thin liquids without overt s/s of aspiration to safely maintain adequate nutrition and hydration. - GOAL MET. DISCONTINUE GOAL. NEW GOAL:  Patient will tolerate trials of advanced textures (crackers, breads, etc) without overt difficulty on 5/5 trials for safe advancement to a soft and bite sized diet. INTERVENTIONS:  Patient reports he is following the minced and moist diet, except that he had some crackers the other day. Patient reports he had ritz crackers with a sandwich spread on it and he tolerated it well. Patient denies having any breads recently. Completed trials of thin liquids this session without overt difficulty on 10/10 trials. Independently took small sips throughout trials. SHORT TERM GOAL #2:  Goal 2: Patient will verbalize and utilize swallowing strategies and s/s of aspiration with no more than min cues to safely tolerate a minced and moist diet with thin liquids. - GOAL MET. DISCONTINUE GOAL. NO NEW GOAL. INTERVENTIONS:  Patient independently stated 1/3 of the s/s of aspiration, unable to recall the other 2 s/s of aspiration. Patient reports he has not noticed having a wet vocal quality at all. Further education provided on the following swallowing strategies: Full upright position, small bite/sip, multiple swallows as indicated, alternate solid / liquid, limit distractions and slow rate. Patient reports he knows that he has to take small bites and sips or it might cause him trouble when swallowing. SHORT TERM GOAL #3:  Goal 3: Patient will complete lingual coordination and strengthening exercises x 20 each for improved bolus control, formation and AP movement. - GOAL MET. REVISED GOAL:  Patient will complete lingual coordination and strengthening exercises x 25 each for improved bolus control, formation and AP movement.   INTERVENTIONS:  Patient completed the following lingual exercises this session:   - lingual protrusion with resistance x 20 with good success, no cues   - external lingual lateralization x 20 with good success, no cues   - lingual circles x 20 in both directions with good range of motion and coordination, min cues. Patient reports he feels grinding in his jaw when he completes this exercise, but he denies any pain. - internal lingual lateralization with resistance x 20 each side with fair to good success to the left and good success to the right     SHORT TERM GOAL #4:  Goal 4: Patient will complete pharyngeal strengthening exercises x 20 for improved strength to reduce potention pharyngeal residue. - GOAL MET. REVISED GOAL: Patient will complete pharyngeal strengthening exercises x 25 each for improved strength to reduce potention pharyngeal residue. INTERVENTIONS:  Patient completed the following pharyngeal strengthening exercises this session:   - effortful swallows x 20 consecutive with good strength overall, no cues   - lingual protrusion with alternating tongue tip back x 20 consecutive with good success overall, mild lingual deviation to the left with protrusion, x 1 cue for increased retraction  **Patient unable to complete the [facility] d/t his gums not aligning properly. SHORT TERM GOAL #5:  Goal 5: Patient will complete laryngeal elevation exercises x 15 for improved airway protection. - GOAL MET. REVISED GOAL:  Patient will complete the mendelsohn maneuver x 20 for improved airway protection. INTERVENTIONS:  Patient completed the mendelsohn maneuver x 13/15 consecutive with good success. Min cues to hold the elevation on 2/15 trials. Long-term Goals  Timeframe for Long-term Goals: 12 weeks  Goal 1: Patient will tolerate a soft and bite sized diet with thin liquids with independent use of swallowing strategies and without overt s/s of aspiration to safely maintain adequate nutrition and hydration. - GOAL NOT MET. CONTINUE GOAL. Assessment: [x]Progressing towards goals []Not Progressing towards goals  Patient met 5/5 short term goals and 0/1 long term goals this treatment period.   Patient is tolerating a minced and moist diet with thin liquids without overt s/s of aspiration. Patient reports he has been eating a few advanced textures at home, therefore, will plan to complete trials in the upcoming sessions for possible advancement of diet. Patient is completing lingual strengthening and coordination exercises x 20 each and pharyngeal strengthening exercises x 20 with good success overall. Patient is also completing the mendelsohn maneuver x 15 with good success overall. Patient demonstrates good understanding of his HEP, therefore, will plan discharge from therapy after a couple more sessions to assess for advancement of diet. Plan for Next Session:  Diet monitor, trials of advanced textures, lingual and pharyngeal strengthening exercises, laryngeal elevation exercises  Patient Tolerance of Treatment:  [x]Tolerated well []Tolerated fair []Required rest breaks  []Fatigued    Education:  [x]Education was provided []Education not provided due to:  Learner:  [x]Patient []Significant Other []Other  Education provided regarding:  [x]Goals and POC [x]Diet and swallowing precautions    [x]Home Exercise Program [x]Progress and/or discharge information  Method of Education: [x]Discussion []Demonstration []Handout []Other:  Evaluation of Education:  [x]Verbalized understanding []Demonstrates without assistance  [x]Demonstrates with assistance []Needs further instruction    []No evidence of learning  [x]No family present      Plan: []Continue with current plan of care []Modify current plan of care   [x]Progress note - frequency and duration: 2 x per week x 2 weeks   []Discharge notes - reason for discharge:     [x]Patient continues to require treatment by a licensed therapist to address functional deficits as outlined in the established plan of care. Certification required: [x] Yes - See Physician Certification below.       [] No       Time in: 1132  Time out: 1204  Untimed treatment: 32  Timed treatment: 0  Total time: 32 minutes      Brandy Dodd M.S. [id number] [id number]

## 2020-11-04 ENCOUNTER — HOSPITAL ENCOUNTER (OUTPATIENT)
Dept: SPEECH THERAPY | Age: 55
Setting detail: THERAPIES SERIES
Discharge: HOME | End: 2020-11-04
Payer: MEDICAID

## 2020-11-04 ENCOUNTER — HOSPITAL ENCOUNTER (OUTPATIENT)
Dept: PHYSICAL THERAPY | Age: 55
Setting detail: THERAPIES SERIES
Discharge: HOME | End: 2020-11-04
Payer: MEDICAID

## 2020-11-04 PROCEDURE — 92526 ORAL FUNCTION THERAPY: CPT | Performed by: SPEECH-LANGUAGE PATHOLOGIST

## 2020-11-04 PROCEDURE — 97110 THERAPEUTIC EXERCISES: CPT

## 2020-11-04 NOTE — PROGRESS NOTES
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 History of left shoulder pain since 2016 and low back pain since 2017, HTN       Functional Outcome Measure Used O: NDI   Functional Outcome Score Neck Disability Index Raw Score: 26  eval (20)        Insurance: Primary: Payor: Cody Sandoval /  /  / ,   Secondary:    Authorization Information: Aquatics and modalities covered but no ionto, hot or cold packs   Visit # 15, 6/10 for progress note   Visits Allowed: 30   Recertification Date:    Physician Follow-Up:  21 SHIN Beasley CNP        SUBJECTIVE: Reports tightness at L neck today despite self MLD and stretches. Admits L shoulder strength continues to improve.      TREATMENT   Precautions: Difficulty swallowing, neck lymphedema   Pain: 0/10 left neck and jaw    X in shaded column indicates activity completed today   Modalities Parameters/  Location  Notes         Manual Therapy Time/Technique  Notes         Exercise/Intervention   Notes   Pulleys 1.5 min  x Flexion and abduction          C-stabs:  Shoulder flexion  Retro shoulder rolls  Shoulder abduction  Scap retractions  Horizontal abduction  Shoulder extension  Overhead press 20x 2# x           Standing shoulder t-band:  D1, D2 - B  Shoulder extension  Shoulder retractions  Shoulder punches  Shoulder ER - L  Shoulder IR - L 20x  x Orange t-band Wall push ups 20x  x Feet 24 inches from wall   Serratus anterior punches 20x 2# x Standing   Lat pull down 2x20 1 plate x           Seated t-band:  Biceps  Triceps  Shoulder abduction 2x15  x Green t-band          Craniocervical spine flexion 20x  x    Active chin tucks 20x  x Seated, without overpressure   L neck stretches:  Levator, upper trap, scalene 3x15 sec  x    Neck extension stretches 3x15 sec  x Arms crossed with hands on clavicles   Pec stretch 3x15 sec  x Hands cheek height and with elbow extended in doorway            Specific Interventions for Next Treatment:  Gentle neck stretches, posture retraining, neck strengthening, manual lymphatic drainage as needed    Activity Tolerance: Patient tolerated treatment well. ASSESSMENT:  Assessment: L shoulder strength continues to demo slow improvement. GOALS:  Patient Goal: Reduce neck pain and improve neck motion     Short Term Goals to be met in 6 weeks:  1. Pt to demo neck flexion improved from 42 deg to 62 deg for ease with looking down for safety with walking. 2. Pt to demo L shoulder AROM abduction in seated improved from 93 deg to 143 deg to assist with dressing. 3. Pt to demo L shoulder AROM external rotation in supine improved from 61 deg to 81 deg to assist with dressing. 4. Pt to demo L shoulder AROM flexion improved from 128 deg in seated to 145 deg for reaching overhead.       Long Term Goals to be met in 6 weeks:   1. Pt to demo NDI improved from 26 to 15 for improved ease with daily tasks. 2. Pt to demo improved posture with neutral head and neck alignment for reduced frequency of headaches. 3. Pt to demo neck composite girth of 120.5 cm remained stable with progressive neck strengthening program for reduced further complications of lymphedema.       Patient Education: Continue with HEP and focus on posture and avoiding compensation with L upper trap.   Education Outcome: Verbalized understanding  Education Barriers: None    PLAN: Continue established plan of care       Time In 1045   Time Out 1125   Timed Code Minutes: 40   Total Treatment Time: 40       Electronically Signed by: Michelle Kraus PT, DPT, [facility] [id number] 11/4/2020

## 2020-11-04 NOTE — PROGRESS NOTES
swallow. Patient tolerated multiple sips of thin liquids this session without overt difficulty throughout the trials. SHORT TERM GOAL #2:  NO NEW GOAL. INTERVENTIONS:  n/a    SHORT TERM GOAL #3:  Goal 3:  Patient will complete lingual coordination and strengthening exercises x 25 each for improved bolus control, formation and AP movement. INTERVENTIONS:  Patient completed the following lingual exercises this session:   - lingual protrusion with resistance x 25 with good success, no cues   - external lingual lateralization x 25 with good success, no cues   - lingual circles x 25 in both directions with good range of motion and coordination, no cues required. - internal lingual lateralization with resistance x 15 each side with fair to good success to the left and good success to the right, fatigue noted (this was the last exercise completed this session)     SHORT TERM GOAL #4:  Goal 4: Patient will complete pharyngeal strengthening exercises x 25 each for improved strength to reduce potention pharyngeal residue. INTERVENTIONS:  Patient completed the following pharyngeal strengthening exercises this session:   - effortful swallows x 25 (set of 15, then a set of 10) with good strength overall   - lingual protrusion with alternating tongue tip back x 25 consecutive with good success overall, mild lingual deviation to the left with protrusion, mild fatigue noted  **Patient unable to complete the [facility] d/t his gums not aligning properly. SHORT TERM GOAL #5:  Goal 5:  Patient will complete the mendelsohn maneuver x 20 for improved airway protection. INTERVENTIONS:  Patient completed the mendelsohn maneuver x 20 consecutive with good success overall, no cues required    **Patient reports he is completing his HEP every now and then when he thinks about. Ongoing education to complete his HEP at least 1 time per day to get the most benefit from it. Patient verbalized understanding.     Long-term Goals  Timeframe for Long-term Goals: 12 weeks  Goal 1: Patient will tolerate a soft and bite sized diet with thin liquids with independent use of swallowing strategies and without overt s/s of aspiration to safely maintain adequate nutrition and hydration. - ONGOING     Assessment: [x]Progressing towards goals []Not Progressing towards goals  Plan for Next Session:  Diet monitor, trials of advanced textures, lingual and pharyngeal strengthening exercises, laryngeal elevation exercises  Patient Tolerance of Treatment:  [x]Tolerated well []Tolerated fair []Required rest breaks  []Fatigued    Education:  [x]Education was provided []Education not provided due to:  Learner:  [x]Patient []Significant Other []Other  Education provided regarding:  [x]Goals and POC [x]Diet and swallowing precautions    [x]Home Exercise Program [x]Progress and/or discharge information  Method of Education: [x]Discussion []Demonstration []Handout []Other:  Evaluation of Education:  [x]Verbalized understanding []Demonstrates without assistance  [x]Demonstrates with assistance []Needs further instruction    []No evidence of learning  [x]No family present      Plan: [x]Continue with current plan of care []Modify current plan of care   []Progress note - frequency and duration: 2 x per week x 2 weeks   []Discharge notes - reason for discharge:     [x]Patient continues to require treatment by a licensed therapist to address functional deficits as outlined in the established plan of care. Certification required: [] Yes - See Physician Certification below.       [x] No       Time in: 1132  Time out: 1200  Untimed treatment: 28  Timed treatment: 0  Total time: 28 minutes      YVONNE [address]

## 2020-11-05 ENCOUNTER — HOSPITAL ENCOUNTER (OUTPATIENT)
Dept: MRI IMAGING | Age: 55
Discharge: HOME | End: 2020-11-05
Payer: MEDICAID

## 2020-11-05 PROCEDURE — 6360000004 HC RX CONTRAST MEDICATION: Performed by: NURSE PRACTITIONER

## 2020-11-05 PROCEDURE — 70553 MRI BRAIN STEM W/O & W/DYE: CPT

## 2020-11-05 PROCEDURE — A9579 GAD-BASE MR CONTRAST NOS,1ML: HCPCS | Performed by: NURSE PRACTITIONER

## 2020-11-05 RX ADMIN — GADOTERIDOL 15 ML: 279.3 INJECTION, SOLUTION INTRAVENOUS at 09:30

## 2020-11-06 ENCOUNTER — HOSPITAL ENCOUNTER (OUTPATIENT)
Dept: PHYSICAL THERAPY | Age: 55
Setting detail: THERAPIES SERIES
Discharge: HOME | End: 2020-11-06
Payer: MEDICAID

## 2020-11-06 ENCOUNTER — HOSPITAL ENCOUNTER (OUTPATIENT)
Dept: SPEECH THERAPY | Age: 55
Setting detail: THERAPIES SERIES
Discharge: HOME | End: 2020-11-06
Payer: MEDICAID

## 2020-11-06 PROCEDURE — 92526 ORAL FUNCTION THERAPY: CPT

## 2020-11-06 PROCEDURE — 97110 THERAPEUTIC EXERCISES: CPT

## 2020-11-06 NOTE — DISCHARGE SUMMARY
[address] THERAPY    [] DAILY NOTE [] PROGRESS NOTE [x] DISCHARGE NOTE    [x]Outpatient [address]   [][place] [address]   []Charo MEJIA NOMS: Swallowing - 5 (advanced per patient carryover use of strategies)     Date: 2020  Patient Name: Benjamin Cabello      CSN: [id number]   : 1965  (47 y.o.)  Gender: male     Referring Physician: ALBERTO Mejia  Diagnosis: dysphagia, unspecified type (R13.10)  Secondary Diagnosis:  dysphagia  Insurance/Certification Information: [facility]  Visit number / total approved visits: 12 - No precert until after 23RX visit; visit limit based on precert  Visit count since last progress note: 2  Certification Date:   Last scheduled appointment: 20  Date of Last MBS:  n/a   Diet:  Minced and moist diet with thin liquids  Pain:  10 in neck    Subjective: Patient continues to report stiffness in neck. Patient reports 'I know my exercises that I need to keep up with every day.'  Patient feels prepared for discharge this date. SHORT TERM GOAL #1:  Goal 1:  Patient will tolerate trials of advanced textures (crackers, breads, etc) without overt difficulty on 5/5 trials for safe advancement to a soft and bite sized diet. GOAL NOT MET. INTERVENTIONS: Patient tolerated drinks of thin water from water bottle without overt s/s of aspiration with independent use of small drinks and multiple swallows. Patient reports he feels he remembers to take small drinks close to 100% of the time. Patient continues to report that he tolerates mostly minced and moist food consistencies the best and continues to avoid hard solids and breads. PREVIOUS SESSION: Patient reports he had some chili at home recently and he tolerated it well.   Completed trials of yenny crackers x 2 without overt difficulty, however, required 3 swallows to clear most of the residue (per patient report). Patient with difficulty masticating the cracker and reports it was painful to his gums. Discussed the possibility of trying some bread during the next session, but the patient declined this as he reports it \"gets stuck at my alisson's apple\" and it is painful to swallow. Patient reports he can usually eat bread when he dips it in something to moisten the bread, such as soup. Patient reports he is well aware of the foods he tolerates well and those that he does not tolerate. Reports he sticks to eating the foods that are easier for him to swallow. Patient tolerated multiple sips of thin liquids this session without overt difficulty throughout the trials. SHORT TERM GOAL #2:  NO NEW GOAL. INTERVENTIONS:  n/a    SHORT TERM GOAL #3:  Goal 3:  Patient will complete lingual coordination and strengthening exercises x 25 each for improved bolus control, formation and AP movement. GOAL MET. INTERVENTIONS:  Did not address due to focus on discharge education. PREVIOUS SESSION: Patient completed the following lingual exercises this session:   - lingual protrusion with resistance x 25 with good success, no cues   - external lingual lateralization x 25 with good success, no cues   - lingual circles x 25 in both directions with good range of motion and coordination, no cues required. - internal lingual lateralization with resistance x 15 each side with fair to good success to the left and good success to the right, fatigue noted (this was the last exercise completed this session)     SHORT TERM GOAL #4:  Goal 4: Patient will complete pharyngeal strengthening exercises x 25 each for improved strength to reduce potention pharyngeal residue. GOAL MET.   INTERVENTIONS:  Patient completed the following pharyngeal strengthening exercises this session:   - effortful swallows x 25 with signs of good effort, no cues required     SHORT TERM GOAL #5:  Goal 5:  Patient will complete the mendelsohn maneuver x 20 for improved airway protection. GOAL MET. INTERVENTIONS:  Did not address due to focus on discharge education. PREVIOUS SESSION: Patient completed the mendelsohn maneuver x 20 consecutive with good success overall, no cues required    Long-term Goals  Timeframe for Long-term Goals: 12 weeks  Goal 1: Patient will tolerate a soft and bite sized diet with thin liquids with independent use of swallowing strategies and without overt s/s of aspiration to safely maintain adequate nutrition and hydration. GOAL NOT MET. Assessment: [x]Progressing towards goals []Not Progressing towards goals    The patient has met 3/4 short term goals within the progress report period. The patient is tolerating mostly minced and moist food textures and thin liquids without significant difficulty with independent use of strategies, including use of small bolus sizes, slow rate, alternation of liquids/solids, and multiple swallows. The patient has completed clinical trials of advanced food textures in past sessions, but with patient report of painful mastication and significant globus sensation in throat when eating more bread-like consistencies. Patient reports he eats a lot of different soups, but continues to avoid breads. Patient denies s/s of aspiration with recent PO intake, but reports consistent globus sensation in throat with PO intake that clears with use of liquid wash between bites. Patient receives 100% of nutrition and hydration needs orally. Patient reports his weight 'goes up and down' because he 'doesn't eat' on days when food and drink taste 'salty'. Patient states he has maintained his weight in the 170's (pounds) for the last month after being prescribed an appetite stimulant. Patient relates he utilizes a throat spray to address dry mouth. Patient reports he rinses his mouth out with water after eating to clear left buccal cavity of food residuals.   Patient states that he does not utilize any other methods of oral care. Thorough education provided this date re: importance of oral care and different methods to maintaining adequate oral health, including use of a soft bristled toothbrush or toothette with toothpaste followed by a swish and spit as friction is important to break up any bacteria colonization in mouth. HOWEVER, encouraged patient to be careful to avoid any oral lesions when brushing gums and tongue. Toothettes may be an alternative if toothbrush is too abrasive to oral cavity. In addition, suggested equal parts baking soda and water as an oral rinse and spit to assist with oral health. Reviewed s/s of pneumonia to monitor for and the importance of notifying physician immediately if these signs are noted. Encouraged patient to monitor swallow function and to notify physician for potential return to speech therapy as needed if signs of swallow decompensation are noted. Patient demonstrates good understanding of HEP and states he tries to complete HEP when he remembers. Reinforced the importance of continuing completion of HEP daily for swallow function maintenance. Patient demonstrated understanding.      Patient Tolerance of Treatment:  [x]Tolerated well []Tolerated fair []Required rest breaks  []Fatigued    Education:  [x]Education was provided []Education not provided due to:  Learner:  [x]Patient []Significant Other []Other  Education provided regarding:  [x]Goals and POC [x]Diet and swallowing precautions; oral care recommendations (toothettes provided)     [x]Home Exercise Program: increase repetitions to 30 of each as able to tolerate, continue at least 1x daily [x]Progress and/or discharge information  Method of Education: [x]Discussion [x]Demonstration []Handout []Other:  Evaluation of Education:  [x]Verbalized understanding []Demonstrates without assistance  [x]Demonstrates with assistance []Needs further instruction    []No evidence of learning  [x]No family present      Plan: []Continue with current plan of care []Modify current plan of care   []Progress note - frequency and duration:    [x]Discharge notes - reason for discharge: tolerating least restrictive diet; independent with HEP    []Patient continues to require treatment by a licensed therapist to address functional deficits as outlined in the established plan of care. Certification required: [] Yes - See Physician Certification below.       [x] No       Time in: 1115  Time out: 1145  Untimed treatment: 30  Timed treatment: 0  Total time: 30 minutes      Matt Estrada [address], [address]

## 2020-11-06 NOTE — DISCHARGE SUMMARY
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 History of left shoulder pain since 2016 and low back pain since 2017, HTN       Functional Outcome Measure Used O: NDI   Functional Outcome Score Neck Disability Index Raw Score: 26  eval (20)   Discharge: 17       Insurance: Primary: Payor: Braden Garcia /  /  / ,   Secondary:    Authorization Information: Aquatics and modalities covered but no ionto, hot or cold packs   Visit # 16, 7/10 for progress note   Visits Allowed: 30   Recertification Date: 16/3/02   Physician Follow-Up:  21 SHIN Willoughby, CNP        SUBJECTIVE: Notes overall strength has much improved but admits tightness at neck has remained unchanged. States he continues to struggle with fatigue but is on new medication to help. Admits his fatigue limits his abilities more so than his neck.     TREATMENT   Precautions: Difficulty swallowing, neck lymphedema   Pain: 0/10 left neck and jaw    X in shaded column indicates activity completed today   Modalities Parameters/  Location  Notes         Manual Therapy Time/Technique  Notes         Exercise/Intervention   Notes   Pulleys 1.5 min  x Flexion and abduction          C-stabs:  Shoulder flexion  Retro shoulder rolls  Shoulder abduction  Scap retractions  Horizontal abduction  Shoulder extension  Overhead press 20x 2# x           Standing shoulder

## 2020-11-25 ENCOUNTER — OFFICE VISIT (OUTPATIENT)
Dept: CARDIOLOGY CLINIC | Age: 55
End: 2020-11-25
Payer: MEDICAID

## 2020-11-25 VITALS
BODY MASS INDEX: 24.78 KG/M2 | DIASTOLIC BLOOD PRESSURE: 86 MMHG | SYSTOLIC BLOOD PRESSURE: 134 MMHG | WEIGHT: 177 LBS | HEART RATE: 80 BPM | HEIGHT: 71 IN

## 2020-11-25 LAB — GFR SERPL CREATININE-BSD FRML MDRD: 70 ML/MIN/1.73M2

## 2020-11-25 PROCEDURE — G8484 FLU IMMUNIZE NO ADMIN: HCPCS | Performed by: NUCLEAR MEDICINE

## 2020-11-25 PROCEDURE — 99214 OFFICE O/P EST MOD 30 MIN: CPT | Performed by: NUCLEAR MEDICINE

## 2020-11-25 PROCEDURE — 3017F COLORECTAL CA SCREEN DOC REV: CPT | Performed by: NUCLEAR MEDICINE

## 2020-11-25 PROCEDURE — G8427 DOCREV CUR MEDS BY ELIG CLIN: HCPCS | Performed by: NUCLEAR MEDICINE

## 2020-11-25 PROCEDURE — G8420 CALC BMI NORM PARAMETERS: HCPCS | Performed by: NUCLEAR MEDICINE

## 2020-11-25 PROCEDURE — 1036F TOBACCO NON-USER: CPT | Performed by: NUCLEAR MEDICINE

## 2020-11-25 NOTE — PROGRESS NOTES
[address] [place] [address],[address] [address]  Dept: [phone]  Dept Fax: [phone]  Loc: [phone]    Visit Date: 11/25/2020    Twyla Onofre is a 47 y.o. male who presents todayfor:  Chief Complaint   Patient presents with   [place] Hypertension   continues to have syncope  No causes yet   Did have ascending aneurysm 4.4 cm   BP is better  No chest pain  Still smoking   Also drinking   No known CAD  Seeing neurology at [facility]   No records here         HPI:  HPI  Past Medical History:   Diagnosis Date    Head and neck cancer ([facility] [address]     Hypertension       Past Surgical History:   Procedure Laterality Date    GASTROSTOMY TUBE PLACEMENT  10/2/15    MANDIBLE RECONSTRUCTION  10/2/15    SKIN GRAFT  10/2/15    jaw     Family History   Problem Relation Age of Onset    Asthma Mother      Social History     Tobacco Use    Smoking status: Former Smoker     Packs/day: 1.20     Years: 34.00     Pack years: 40.80     Types: Cigarettes    Smokeless tobacco: Never Used   Substance Use Topics    Alcohol use: Yes     Alcohol/week: 0.0 standard drinks     Comment: beer, every other day      Current Outpatient Medications   Medication Sig Dispense Refill    metoprolol tartrate (LOPRESSOR) 50 MG tablet TAKE 1 TABLET BY MOUTH TWICE DAILY 60 tablet 3    Sertraline HCl (ZOLOFT PO) Take by mouth      pantoprazole (PROTONIX) 40 MG tablet Take 40 mg by mouth daily   6    esomeprazole Magnesium (NEXIUM) 20 MG PACK Take 20 mg by mouth daily      Mouthwashes (BIOTENE DRY MOUTH) LIQD daily       lisinopril (PRINIVIL;ZESTRIL) 20 MG tablet Take 20 mg by mouth daily       No current facility-administered medications for this visit.       No Known Allergies  Health Maintenance   Topic Date Due    Hepatitis C screen  1965    Pneumococcal 0-64 years Vaccine (1 of 3 - PCV13) 12/18/1971    HIV screen  12/18/1980    DTaP/Tdap/Td vaccine (1 - Tdap) 12/18/1984    Colon cancer screen colonoscopy  12/18/2015    Flu vaccine (1) 09/01/2020    Potassium monitoring  10/28/2021    Creatinine monitoring  10/28/2021    Lipid screen  10/28/2025    Hepatitis A vaccine  Aged Out    Hepatitis B vaccine  Aged Out    Hib vaccine  Aged Out    Meningococcal (ACWY) vaccine  Aged Out       Subjective:  Review of Systems  General:   No fever, no chills, some fatigue or weight loss  Pulmonary:    some dyspnea, no wheezing  Cardiac:    Denies recent chest pain,   GI:     No nausea or vomiting, no abdominal pain  Neuro:     Recurrent  dizziness or light headedness,   Musculoskeletal:  No recent active issues  Extremities:   No edema, no obvious claudication       Objective:  Physical Exam  /86   Pulse 80   Ht 5' 11\" (1.803 m)   Wt 177 lb (80.3 kg)   BMI 24.69 kg/m²   General:   Well developed, well nourished  Lungs:   Clear to auscultation  Heart:    Normal S1 S2, Slight murmur. no rubs, no gallops  Abdomen:   Soft, non tender, no organomegalies, positive bowel sounds  Extremities:   No edema, no cyanosis, good peripheral pulses  Neurological:   Awake, alert, oriented. No obvious focal deficits  Musculoskelatal:  No obvious deformities    Assessment:      Diagnosis Orders   1. Essential hypertension     2. Thoracic aortic aneurysm without rupture ([address]     3. Syncope and collapse     4. Smoking     concerning patient  Recurrent unexplained syncope  Normal tilt   ?/ arrhythmia   Smoking: discussed with the patient the importance of smoke cessation especially with the risk of CAD. Plan:  No follow-ups on file. Will do a linq  Continue to follow with neurology   Monitor the aneurysm   Continue risk factor modification and medical management  Thank you for allowing me to participate in the care of your patient.  Please don't hesitate to contact me regarding any further issues related to the patient care    Orders Placed:  No orders of the defined types were placed in this encounter. Medications Prescribed:  No orders of the defined types were placed in this encounter. Discussed use, benefit, and side effects of prescribed medications. All patient questions answered. Pt voicedunderstanding. Instructed to continue current medications, diet and exercise. Continue risk factor modification and medical management. Patient agreed with treatment plan. Follow up as directed.     Electronically signedby Donaldo Guevara MD on 11/25/2020 at 8:24 AM

## 2020-11-30 ENCOUNTER — TELEPHONE (OUTPATIENT)
Dept: CARDIOLOGY CLINIC | Age: 55
End: 2020-11-30

## 2020-11-30 NOTE — TELEPHONE ENCOUNTER
Procedure: Loop implant  Date: 12.10.2020  Arrival Time:7am   Meds to Hold: none    Cath lab notified  REp Notified    Covid:  testing ordered, to be done: 12.3.2020      Instructions verbalized to the patient. Patient will  instructions on 12. 3.2020  Instructions at the [facility]

## 2020-11-30 NOTE — LETTER
[address] [facility] Cardiology  [address]  [facility] [address]  Phone: [phone]  Fax: [phone]    Jun Wesley MD        November 30, 2020    Cynthia José  [address]      Dear Vicki New: Your Loop implant is scheduled for 12.10.2020 Arrival time of 7AM    Testing:  Covid test to be done on 12.03.2020 at [facility]. Diet:  Nothing to eat or drink the morning of your procedure. Medications: You may continue your current medications   Admission:  Please report to the 2nd floor - [address] [facility] at [facility]-  2 hours before your scheduled appointment time. Please bring your actual bottle of medications with you to the hospital.   Bring your photo ID and insurance card      If you have any questions or concerns, please don't hesitate to call.     Sincerely,        Jun Wesley MD/ NELLY

## 2020-12-01 ENCOUNTER — TELEPHONE (OUTPATIENT)
Dept: CARDIOLOGY CLINIC | Age: 55
End: 2020-12-01

## 2020-12-01 NOTE — TELEPHONE ENCOUNTER
Auth for loop implant. ,  [id number]  Clinicals sent to Alessandro Hearn via Fax-  [phone]  DOS: [phone]

## 2020-12-03 ENCOUNTER — HOSPITAL ENCOUNTER (OUTPATIENT)
Age: 55
Discharge: HOME | End: 2020-12-03
Payer: MEDICAID

## 2020-12-03 PROCEDURE — U0003 INFECTIOUS AGENT DETECTION BY NUCLEIC ACID (DNA OR RNA); SEVERE ACUTE RESPIRATORY SYNDROME CORONAVIRUS 2 (SARS-COV-2) (CORONAVIRUS DISEASE [COVID-19]), AMPLIFIED PROBE TECHNIQUE, MAKING USE OF HIGH THROUGHPUT TECHNOLOGIES AS DESCRIBED BY CMS-2020-01-R: HCPCS

## 2020-12-05 LAB — SARS-COV-2: NOT DETECTED

## 2020-12-09 ENCOUNTER — PREP FOR PROCEDURE (OUTPATIENT)
Dept: CARDIOLOGY | Age: 55
End: 2020-12-09

## 2020-12-09 RX ORDER — SODIUM CHLORIDE 0.9 % (FLUSH) 0.9 %
10 SYRINGE (ML) INJECTION EVERY 12 HOURS SCHEDULED
Status: CANCELLED | OUTPATIENT
Start: 2020-12-09

## 2020-12-09 RX ORDER — SODIUM CHLORIDE 0.9 % (FLUSH) 0.9 %
10 SYRINGE (ML) INJECTION PRN
Status: CANCELLED | OUTPATIENT
Start: 2020-12-09

## 2020-12-10 ENCOUNTER — HOSPITAL ENCOUNTER (OUTPATIENT)
Dept: INPATIENT UNIT | Age: 55
Discharge: HOME | End: 2020-12-10
Attending: INTERNAL MEDICINE | Admitting: INTERNAL MEDICINE
Payer: MEDICAID

## 2020-12-10 VITALS
DIASTOLIC BLOOD PRESSURE: 104 MMHG | SYSTOLIC BLOOD PRESSURE: 139 MMHG | RESPIRATION RATE: 17 BRPM | HEIGHT: 71 IN | TEMPERATURE: 98.3 F | BODY MASS INDEX: 23.94 KG/M2 | WEIGHT: 171 LBS | OXYGEN SATURATION: 96 % | HEART RATE: 89 BPM

## 2020-12-10 LAB
ANION GAP SERPL CALCULATED.3IONS-SCNC: 18 MEQ/L (ref 8–16)
APTT: 27.1 SECONDS (ref 22–38)
BUN BLDV-MCNC: 10 MG/DL (ref 7–22)
CALCIUM SERPL-MCNC: 9.2 MG/DL (ref 8.5–10.5)
CHLORIDE BLD-SCNC: 102 MEQ/L (ref 98–111)
CO2: 21 MEQ/L (ref 23–33)
CREAT SERPL-MCNC: 1 MG/DL (ref 0.4–1.2)
ERYTHROCYTE [DISTWIDTH] IN BLOOD BY AUTOMATED COUNT: 13.4 % (ref 11.5–14.5)
ERYTHROCYTE [DISTWIDTH] IN BLOOD BY AUTOMATED COUNT: 46.4 FL (ref 35–45)
GLUCOSE BLD-MCNC: 101 MG/DL (ref 70–108)
HCT VFR BLD CALC: 38.8 % (ref 42–52)
HEMOGLOBIN: 13.7 GM/DL (ref 14–18)
INR BLD: 0.92 (ref 0.85–1.13)
MAGNESIUM: 2.1 MG/DL (ref 1.6–2.4)
MCH RBC QN AUTO: 33.5 PG (ref 26–33)
MCHC RBC AUTO-ENTMCNC: 35.3 GM/DL (ref 32.2–35.5)
MCV RBC AUTO: 94.9 FL (ref 80–94)
PLATELET # BLD: 222 THOU/MM3 (ref 130–400)
PMV BLD AUTO: 9.5 FL (ref 9.4–12.4)
POTASSIUM REFLEX MAGNESIUM: 3.3 MEQ/L (ref 3.5–5.2)
RBC # BLD: 4.09 MILL/MM3 (ref 4.7–6.1)
SODIUM BLD-SCNC: 141 MEQ/L (ref 135–145)
WBC # BLD: 5.1 THOU/MM3 (ref 4.8–10.8)

## 2020-12-10 PROCEDURE — 6360000002 HC RX W HCPCS: Performed by: NURSE PRACTITIONER

## 2020-12-10 PROCEDURE — 85027 COMPLETE CBC AUTOMATED: CPT

## 2020-12-10 PROCEDURE — 85610 PROTHROMBIN TIME: CPT

## 2020-12-10 PROCEDURE — 2580000003 HC RX 258: Performed by: NURSE PRACTITIONER

## 2020-12-10 PROCEDURE — 2709999900 HC NON-CHARGEABLE SUPPLY

## 2020-12-10 PROCEDURE — 83735 ASSAY OF MAGNESIUM: CPT

## 2020-12-10 PROCEDURE — 96365 THER/PROPH/DIAG IV INF INIT: CPT

## 2020-12-10 PROCEDURE — 85730 THROMBOPLASTIN TIME PARTIAL: CPT

## 2020-12-10 PROCEDURE — C1764 EVENT RECORDER, CARDIAC: HCPCS

## 2020-12-10 PROCEDURE — 80048 BASIC METABOLIC PNL TOTAL CA: CPT

## 2020-12-10 PROCEDURE — 36415 COLL VENOUS BLD VENIPUNCTURE: CPT

## 2020-12-10 RX ORDER — SODIUM CHLORIDE 0.9 % (FLUSH) 0.9 %
10 SYRINGE (ML) INJECTION PRN
Status: DISCONTINUED | OUTPATIENT
Start: 2020-12-10 | End: 2020-12-10 | Stop reason: HOSPADM

## 2020-12-10 RX ADMIN — Medication 10 ML: at 08:33

## 2020-12-10 RX ADMIN — CEFAZOLIN 2 G: 10 INJECTION, POWDER, FOR SOLUTION INTRAVENOUS at 08:01

## 2020-12-10 NOTE — PROGRESS NOTES
Pt updated on cath lab delays. Pt is expressing wishes to leave and reschedule. RN and Meg Orozco Rep in room encouraging patient to stay to have procedure completed. Pt continues to express desire to leave. RN called over to update Dr Luis Armando Hanks in cath lab. RN also notified nurse manager to come talk to patient.

## 2020-12-10 NOTE — PROGRESS NOTES
Pt admitted to  2E07 ambulatory for loop recorder insertion. Pt NPO. Patient accompanied by his significant other, Jeffrey Anders. Vital signs obtained. Assessment and data collection initiated. Oriented to room. Policies and procedures for  explained   All questions answered with no further questions at this time. Fall prevention and safety precautions discussed with patient.

## 2020-12-10 NOTE — PROGRESS NOTES
José Antonio RN, Manager has spoken with patient. Dr Luis Brewer is aware of situation and states that he is \"okay with patient rescheduling\" stating patient has cancelled on him before. José Antonio making calls to reschedule patient.

## 2020-12-11 ENCOUNTER — PREP FOR PROCEDURE (OUTPATIENT)
Dept: CARDIOLOGY | Age: 55
End: 2020-12-11

## 2020-12-11 RX ORDER — SODIUM CHLORIDE 0.9 % (FLUSH) 0.9 %
10 SYRINGE (ML) INJECTION PRN
Status: CANCELLED | OUTPATIENT
Start: 2020-12-11

## 2020-12-11 RX ORDER — SODIUM CHLORIDE 0.9 % (FLUSH) 0.9 %
10 SYRINGE (ML) INJECTION EVERY 12 HOURS SCHEDULED
Status: CANCELLED | OUTPATIENT
Start: 2020-12-11

## 2020-12-14 ENCOUNTER — HOSPITAL ENCOUNTER (OUTPATIENT)
Dept: INPATIENT UNIT | Age: 55
Discharge: HOME | End: 2020-12-14
Attending: INTERNAL MEDICINE | Admitting: INTERNAL MEDICINE
Payer: MEDICAID

## 2020-12-14 VITALS
OXYGEN SATURATION: 98 % | WEIGHT: 171 LBS | HEIGHT: 71 IN | DIASTOLIC BLOOD PRESSURE: 113 MMHG | HEART RATE: 80 BPM | SYSTOLIC BLOOD PRESSURE: 147 MMHG | BODY MASS INDEX: 23.94 KG/M2 | RESPIRATION RATE: 15 BRPM | TEMPERATURE: 98.4 F

## 2020-12-14 PROCEDURE — 2500000003 HC RX 250 WO HCPCS

## 2020-12-14 PROCEDURE — C1764 EVENT RECORDER, CARDIAC: HCPCS

## 2020-12-14 PROCEDURE — 2709999900 HC NON-CHARGEABLE SUPPLY

## 2020-12-14 PROCEDURE — 96374 THER/PROPH/DIAG INJ IV PUSH: CPT

## 2020-12-14 PROCEDURE — 33285 INSJ SUBQ CAR RHYTHM MNTR: CPT | Performed by: INTERNAL MEDICINE

## 2020-12-14 PROCEDURE — 2580000003 HC RX 258: Performed by: NURSE PRACTITIONER

## 2020-12-14 RX ORDER — SODIUM CHLORIDE 0.9 % (FLUSH) 0.9 %
10 SYRINGE (ML) INJECTION PRN
Status: DISCONTINUED | OUTPATIENT
Start: 2020-12-14 | End: 2020-12-14 | Stop reason: HOSPADM

## 2020-12-14 RX ORDER — SODIUM CHLORIDE 0.9 % (FLUSH) 0.9 %
10 SYRINGE (ML) INJECTION EVERY 12 HOURS SCHEDULED
Status: DISCONTINUED | OUTPATIENT
Start: 2020-12-14 | End: 2020-12-14 | Stop reason: HOSPADM

## 2020-12-14 RX ORDER — CEPHALEXIN 500 MG/1
500 CAPSULE ORAL 3 TIMES DAILY
Qty: 15 CAPSULE | Refills: 0 | Status: SHIPPED | OUTPATIENT
Start: 2020-12-14 | End: 2020-12-19

## 2020-12-14 RX ADMIN — SODIUM CHLORIDE, PRESERVATIVE FREE 10 ML: 5 INJECTION INTRAVENOUS at 06:42

## 2020-12-14 NOTE — PRE SEDATION
Sedation Pre-Procedure Note    Patient Name: Loly Power   YOB: 1965  Room/Bed: 2E-17/017-A  Medical Record Number: [id number]  Date: 12/14/2020   Time: 7:26 AM       Procedure: Loop recorder implantation    Indication:  Syncope    Brief clinical history:  47 male with recurrent syncope was referred for loop recorder implantation. No complaints today. Clinical comorbidities include hypertension. Normal echo, tllt and Holter. Consent: I have discussed with the patient and/or the patient representative the indication, alternatives, and the possible risks and/or complications of the planned procedure and the anesthesia methods. The patient and/or patient representative appear to understand and agree to proceed. Vital Signs:   Vitals:    12/14/20 0633   BP:    Pulse: 88   Resp:    Temp:    SpO2:        Past Medical History:   has a past medical history of Head and neck cancer ([facility] [address] Hypertension, and Syncope and collapse. Past Surgical History:   has a past surgical history that includes Mandible reconstruction (10/2/15); Skin graft (10/2/15); and Gastrostomy tube placement (10/2/15). Medications:   Scheduled Meds:    ceFAZolin (ANCEF) IVPB  2 g Intravenous On Call to OR    sodium chloride flush  10 mL Intravenous 2 times per day     Continuous Infusions:   PRN Meds: sodium chloride flush  Home Meds:   Prior to Admission medications    Medication Sig Start Date End Date Taking?  Authorizing Provider   metoprolol tartrate (LOPRESSOR) 50 MG tablet TAKE 1 TABLET BY MOUTH TWICE DAILY 8/12/20  Yes Hernán Guillen MD   Sertraline HCl (ZOLOFT PO) Take by mouth   Yes Historical Provider, MD   pantoprazole (PROTONIX) 40 MG tablet Take 40 mg by mouth daily  8/23/19  Yes Historical Provider, MD   esomeprazole Magnesium (NEXIUM) 20 MG PACK Take 20 mg by mouth daily   Yes Historical Provider, MD   Mouthwashes ([place]) LIQD daily  4/11/16  Yes Historical Provider, MD   lisinopril

## 2020-12-14 NOTE — OP NOTE
[address]  [id number] [place] [address] [address]  Dept: [phone]  Loc: [phone]          CARDIAC ELECTROPHYSIOLOGY: PROCEDURE NOTE  Date:   12/14/2020  Patient name: Peewee Bell  YOB: 1965  Sex: male   MRN:   [id number]      PRIMARY CARE PROVIDER:     MADELIN Padilla CNP     CARDIOLOGIST:  Eula Dave MD    PROCEDURE PLANNED:  Loop recorder implantation     INDICATION FOR PROCEDURE:   Recurrent syncope (negative evaluation so far). PROCEDURE PERFORMED:  Loop recorder implantation. DESCRIPTION OF THE PROCEDURE:  The patient was brought to the cardiac catheterization lab and identified by 2 identifiers. He was prepped and draped in the usual sterile fashion. Lidocaine, 2% was injected into the left parasternal area in the region of the fourth intercostal space for local anesthesia. Using the provided insertion tool, 1 cm incision was mode in skin and subcutaneous tunnel created. The device was then injected into the subcutaneous pocket. The incision was then closed with 4-0 vicryl suture and site covered with steri-strips and a light pressure dressing. The device was checked and good atrial and ventricular electrograms visualized. The patient tolerated the procedure well. MEDICATIONS:  Lidocaine/Marcaine 15 cc for local anaesthesia. BLOOD LOSS:  Monimal.     COMPLICATIONS:  None. IDEVICE PARAMETERS:    1. Medtronic Reveal Sudhir, model [id number], serial #[id number]. 2. Position:  Left parasternal area. 3. Programmed sensitivity:  0.035 millivolts  4. Measured R wave: 0.17 mV    SETTINGS:  1. Tachycardia, 176 beats per minute for 16 beats. 2. Bradycardia, 40 beats per minute for 4 beats. 3. Pauses more than 3 seconds. 4. AT/AF:  ON.    SUMMARY:  1. Recurrent syncope. 2. Successful loop recorder implantation. RECOMMENDATIONS:  1. Follow-up in device clinic in 1 week.   2. Tylenol 625 mg every 6 hours as needed for pain. 3. Post operative care per protocol.        Electronically signed by Alycia Wise MD, Joey Mendez on 12/14/2020 at 7:56 AM

## 2020-12-14 NOTE — PROGRESS NOTES
0730 cath lab in room, setting up for procedure. 0800 care taken over from cath lab, site with dressing to left side of chest,  dry and intact. Offered patient something to eat but patient states I just want to leave. Spoke with Dr Keara Deluca not given, states not to give, will order Keflex for discharge. Patient states he wants to leave now, not waiting on pills from pharmacy for his blood pressure, instructed to take his home meds soon as he gets home. Dr Luis Armando Hanks aware of blood pressure. 0830 up in room, tolerated activity well.     0820 discharge instructions given, voices understanding. 1755 discharged per wheelchair, stable condition.

## 2020-12-14 NOTE — FLOWSHEET NOTE
Pt admitted to  2E17 ambulatory for linq insertion. Pt NPO. Patient accompanied by wife. Vital signs obtained. Assessment and data collection initiated. Oriented to room. Policies and procedures for 2E explained   All questions answered with no further questions at this time. Fall prevention and safety precautions discussed with patient.

## 2020-12-14 NOTE — FLOWSHEET NOTE
Dr. Prem Bah in for Linq insertion. 14 ml 2% lidocaine inserted under skin. Linq inserted. Stitch applied with steri strips and folded 4 x 4 with Tegaderm dressing. Patient tolerated procedure well. Nikolas Colorado from [employer] was here for insertion.

## 2020-12-16 RX ORDER — METOPROLOL TARTRATE 50 MG/1
TABLET, FILM COATED ORAL
Qty: 60 TABLET | Refills: 4 | Status: SHIPPED | OUTPATIENT
Start: 2020-12-16 | End: 2021-05-12

## 2020-12-22 ENCOUNTER — PROCEDURE VISIT (OUTPATIENT)
Dept: CARDIOLOGY CLINIC | Age: 55
End: 2020-12-22

## 2020-12-22 PROBLEM — R55 SYNCOPE: Status: ACTIVE | Noted: 2020-12-22

## 2020-12-22 LAB — GFR SERPL CREATININE-BSD FRML MDRD: 78 ML/MIN/1.73M2

## 2020-12-22 NOTE — PROGRESS NOTES
medtronic linq in office     No new EGM\"s   Reviewed use of carelink                   steri strips intact, minor puffiness, shadow of bruising, no drainage, tender to touch.     If, at any point, there is any increased redness, swelling, increased discomfort, fever, or the incision opens up, the patient is aware to go to the emergency room

## 2021-01-15 ENCOUNTER — PROCEDURE VISIT (OUTPATIENT)
Dept: CARDIOLOGY CLINIC | Age: 56
End: 2021-01-15
Payer: MEDICAID

## 2021-01-15 DIAGNOSIS — R55 SYNCOPE AND COLLAPSE: Primary | ICD-10-CM

## 2021-01-15 PROCEDURE — 93298 REM INTERROG DEV EVAL SCRMS: CPT | Performed by: NUCLEAR MEDICINE

## 2021-01-15 PROCEDURE — G2066 INTER DEVC REMOTE 30D: HCPCS | Performed by: NUCLEAR MEDICINE

## 2021-02-19 ENCOUNTER — PROCEDURE VISIT (OUTPATIENT)
Dept: CARDIOLOGY CLINIC | Age: 56
End: 2021-02-19
Payer: MEDICAID

## 2021-02-19 DIAGNOSIS — R55 SYNCOPE AND COLLAPSE: Primary | ICD-10-CM

## 2021-02-19 PROCEDURE — G2066 INTER DEVC REMOTE 30D: HCPCS | Performed by: NUCLEAR MEDICINE

## 2021-02-19 PROCEDURE — 93298 REM INTERROG DEV EVAL SCRMS: CPT | Performed by: NUCLEAR MEDICINE

## 2021-02-22 ENCOUNTER — HOSPITAL ENCOUNTER (OUTPATIENT)
Dept: RADIATION ONCOLOGY | Age: 56
Discharge: HOME | End: 2021-02-22
Payer: MEDICAID

## 2021-02-22 VITALS
HEART RATE: 80 BPM | OXYGEN SATURATION: 96 % | WEIGHT: 165.4 LBS | DIASTOLIC BLOOD PRESSURE: 79 MMHG | TEMPERATURE: 97.8 F | BODY MASS INDEX: 23.07 KG/M2 | RESPIRATION RATE: 18 BRPM | SYSTOLIC BLOOD PRESSURE: 116 MMHG

## 2021-02-22 DIAGNOSIS — C06.2 SQUAMOUS CELL CANCER OF RETROMOLAR TRIGONE (HCC): Primary | ICD-10-CM

## 2021-02-22 PROCEDURE — 99212 OFFICE O/P EST SF 10 MIN: CPT | Performed by: NURSE PRACTITIONER

## 2021-02-22 PROCEDURE — 99214 OFFICE O/P EST MOD 30 MIN: CPT | Performed by: NURSE PRACTITIONER

## 2021-02-22 ASSESSMENT — PAIN DESCRIPTION - PAIN TYPE: TYPE: CHRONIC PAIN

## 2021-02-22 ASSESSMENT — PAIN DESCRIPTION - ORIENTATION: ORIENTATION: LEFT

## 2021-02-22 ASSESSMENT — PAIN DESCRIPTION - FREQUENCY: FREQUENCY: INTERMITTENT

## 2021-02-22 ASSESSMENT — PAIN DESCRIPTION - LOCATION: LOCATION: NECK

## 2021-02-22 NOTE — PROGRESS NOTES
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 Inspection revealed a fungating mass in the left retromolar trigone.  He completed a CT of the head and neck which again revealed this primary tumor and suspected adenopathy submentally in the left submandibular gland and level II in the left neck.  The patient was referred to [facility] and completed a left inferior maxillectomy, composite resection with segmental mandibulectomy, bilateral neck dissections, and reconstruction with free flap and bone graft from a right tibial donor site. Surgery was completed on 10/02/2015.  The patient had a PEG placed at this time also.  Jia Crooked an uncomplicated postoperative course and was seen to discuss adjuvant radiotherapy for which he was agreeable.      He did have brisk skin reaction with significant erythema culminating in hyperpigmentation with patchy dry desquamation.  The patient did not develop any moist desquamation.     INTERVAL HISTORY: Sergio Mcdaniels returns to [facility] today for a posttreatment checkup as part of his survivorship care after undergoing surgery, chemo and radiation therapy for cancer of the left retromolar trigone. Nguyen Wakefield states he continues to experience xerostomia, he uses biotene which helps a little. He continues to have left neck discomfort, lymphedema and stiffness which have improved some since working with the physical therapist. He states the left neck swelling comes and goes, it does improve when he massages. Dysphagia improved since working with speech therapy. Still experiencing occasional lightheadedness did have MRI brain in November which was without acute findings. He is scheduled to see GI due to having cramping sensation and occasional drainage from abdomen were his PEG tube was. Fatigue continues to se an issue for him, he also reports extreme hot and cold swings. He denies chest pain, sob, fever, chills, cough, headaches, nausea, vomiting,changes with bowel or bladder habits. LAB RESULTS:   CBC:   Lab Results   Component Value Date    WBC 5.1 12/10/2020    RBC 4.09 12/10/2020    HGB 13.7 12/10/2020    HCT 38.8 12/10/2020    MCV 94.9 12/10/2020    MCH 33.5 12/10/2020    MCHC 35.3 12/10/2020     12/10/2020    MPV 9.5 12/10/2020     CMP:  Lab Results   Component Value Date     12/10/2020    K 3.3 12/10/2020     12/10/2020    CO2 21 12/10/2020    BUN 10 12/10/2020    CREATININE 1.0 12/10/2020    LABGLOM 78 12/10/2020    GLUCOSE 101 12/10/2020    CALCIUM 9.2 12/10/2020     RADIOLOGY RESULTS:   11/5/2020: MRI brain w wo contrast:   Impression       1. Mild atrophy, otherwise negative MRI of brain with and without intravenous contrast, no evidence of intracranial metastatic disease. 2. Mild inflammatory changes in the frontal sinuses and ethmoid air cells bilaterally. 3. Postoperative changes involving the left mandible. .     8/22/2020: CT chest w contrast:   Impression   1. Hepatic steatosis. Parapelvic cyst left kidney. Stable appearing low-density nodule left adrenal gland, likely an adenoma.    2. Stable appearing 4.4 cm fusiform aneurysm ascending thoracic aorta. 3. Lungs are normal in appearance. No infiltrates or nodules seen on today's study. 8/22/2020: CT soft tissue neck:  Impression       1. Anterior subluxation of the left temporomandibular joint. The head mandibular condyle is located  anterior to the glenoid fossa. 2. Expected postoperative changes surrounding the left mandible and left upper neck. MEDICATIONS:   Current Outpatient Medications   Medication Sig Dispense Refill    metoprolol tartrate (LOPRESSOR) 50 MG tablet TAKE 1 TABLET BY MOUTH TWICE DAILY 60 tablet 4    Sertraline HCl (ZOLOFT PO) Take by mouth      pantoprazole (PROTONIX) 40 MG tablet Take 40 mg by mouth daily   6    esomeprazole Magnesium (NEXIUM) 20 MG PACK Take 20 mg by mouth daily      Mouthwashes (BIOTENE DRY MOUTH) LIQD daily       lisinopril (PRINIVIL;ZESTRIL) 20 MG tablet Take 20 mg by mouth daily       No current facility-administered medications for this encounter. ROS: As noted in the HPI and Interval history, otherwise negative. EXAMINATION:   GENERAL: Lucas is a pleasant, well-developed adult male. Mando Mann is alert  and oriented x3, in no acute distress  VITAL SIGNS: Weight 75kg, temperature 36.6°C, pulse 80, blood pressure 116/79, respirations 18, pulse ox 96% on room air. HEENT: Head is normocephalic and atraumatic.  PERRL.  EOMI.  Ears,  nose and lips are within normal limits.  On examination of the oral cavity and oropharynx there are postoperative changes present, the reconstructed left oropharynx is smooth without any visible lesions or exudates. xerostomia. Mike Lapping are extensive postoperative changes to the left neck. NECK: Without palpable cervical adenopathy, however limited exam due to pain.  Postoperative changes to neck as described above. Decreased ROM. Lymphedema left neck. LYMPH: Without palpable supraclavicular or axillary adenopathy.   LUNGS: Clear no adventitious sounds. Respirations easy and unlabored. HEART: Regular rate and rhythm without murmur. ABDOMEN: Soft, nontender active bowel sounds x4. EXTREMITIES: Without clubbing or cyanosis no edema noted. NEUROLOGIC EXAMINATION: Cranial nerves grossly intact.     ASSESSMENT: Delisa Chambers was diagnosed with head and neck cancer in 2015. He underwent treatment with surgery, chemotherapy and radiation.       He has been experiencing worsening left neck pain, neck lymphedema and dysphagia over the last couple of months. On physical exam there is swelling and decreased neck ROM. There is no obvious sign of recurrent/new disease. Will obtain CT neck to further evaluate. Also will refer to Speech and Physical therapies.      He is a current smoker will require follow up CT chest to follow pulmonary nodules seen in July 2019. However did not light up on PET from 8/2019. CT chest August 2020 did not show infiltrates of nodules. Stable low density nodule left adrenal gland, likely an adenoma, hepatic steatosis and parapelvic cyst left kidney. Stable 4.4 fusiform aneurysm of ascending thoracic aorta. CT soft tissue neck showed expected post operative changes surrounding left mandible and left upper neck. Anterior subluxation of the left temporomandibular joint. Delisa Chambers has had improvement with neck ROM and swelling since working with physical therapy. He still notices intermittent swelling int he left neck but it does improve with massage. Lightheaded episodes continue MRI brain in November did not show acute findings. Dysphagia improved since following with speech therapy. Last TSH was elevated at 16.82 he was unsure if medication was adjusted at that time. Could explain the fatigue as well as hot and cold swings he is having. Will discuss with his PCP.  Current smoker will obtain CT chest yearly, due in August. Delisa Chambers does not have concerning findings on today's physical exam.    PLAN:  Neck ROM and pain: improved since seeing PT.   Left neck lymphedema: intermittent, massage does help  Dysphagia: improved since seeing speech therapy. Lightheaded: intermittent, MRI brain in November showed no acute findings. Hypothyroid: last TSH in October was elevated, he was unsure if medication was adjusted at that time. Will discuss with PCP. Fatigue: continues, ? R/t hypothyroid  Hot and cold flashes: ? R/t hypothyroid  CT chest ordered for August, + smoker  HTN: controlled at this time, follows with PCP for this. Continue follow up with other providers as scheduled. Follow up here in 6 months. Sergio Mcdaniels is aware he can call for questions, concerns or changes in condition. Electronically signed by MADELIN Darby CNP on 2/22/21 at 2:37 PM EST    ATTESTATION:  I have spent 50 minutes reviewing previous notes, test results and face to face with the patient discussing the diagnosis and importance of compliance with the treatment plan as well as documenting on the day of the visit.     CC: Dr. Alec Morales; Dr. Manasa Cage; Dulce DOMINGUEZ; Dr. Gabby Padilla

## 2021-03-26 ENCOUNTER — PROCEDURE VISIT (OUTPATIENT)
Dept: CARDIOLOGY CLINIC | Age: 56
End: 2021-03-26
Payer: MEDICAID

## 2021-03-26 DIAGNOSIS — R55 SYNCOPE, UNSPECIFIED SYNCOPE TYPE: Primary | ICD-10-CM

## 2021-03-26 PROCEDURE — G2066 INTER DEVC REMOTE 30D: HCPCS | Performed by: NUCLEAR MEDICINE

## 2021-03-26 PROCEDURE — 93298 REM INTERROG DEV EVAL SCRMS: CPT | Performed by: NUCLEAR MEDICINE

## 2021-04-30 ENCOUNTER — PROCEDURE VISIT (OUTPATIENT)
Dept: CARDIOLOGY CLINIC | Age: 56
End: 2021-04-30
Payer: MEDICAID

## 2021-04-30 DIAGNOSIS — R55 SYNCOPE, UNSPECIFIED SYNCOPE TYPE: Primary | ICD-10-CM

## 2021-04-30 PROCEDURE — 93298 REM INTERROG DEV EVAL SCRMS: CPT | Performed by: NUCLEAR MEDICINE

## 2021-04-30 PROCEDURE — G2066 INTER DEVC REMOTE 30D: HCPCS | Performed by: NUCLEAR MEDICINE

## 2021-04-30 NOTE — PROGRESS NOTES
[facility] [employer]   Patient of Bakvelma  History of Syncope    Battery Okay    Episodes:NONE

## 2021-05-05 ENCOUNTER — HOSPITAL ENCOUNTER (OUTPATIENT)
Age: 56
Discharge: HOME | End: 2021-05-05
Payer: MEDICAID

## 2021-05-05 LAB
INFLUENZA A: NOT DETECTED
INFLUENZA B: NOT DETECTED
SARS-COV-2 RNA, RT PCR: NOT DETECTED

## 2021-05-05 PROCEDURE — 87636 SARSCOV2 & INF A&B AMP PRB: CPT

## 2021-05-11 ENCOUNTER — ANESTHESIA EVENT (OUTPATIENT)
Dept: ENDOSCOPY | Age: 56
End: 2021-05-11
Payer: MEDICAID

## 2021-05-11 ENCOUNTER — HOSPITAL ENCOUNTER (OUTPATIENT)
Age: 56
Setting detail: OUTPATIENT SURGERY
Discharge: HOME | End: 2021-05-11
Attending: INTERNAL MEDICINE | Admitting: INTERNAL MEDICINE
Payer: MEDICAID

## 2021-05-11 ENCOUNTER — ANESTHESIA (OUTPATIENT)
Dept: ENDOSCOPY | Age: 56
End: 2021-05-11
Payer: MEDICAID

## 2021-05-11 VITALS
BODY MASS INDEX: 23.13 KG/M2 | DIASTOLIC BLOOD PRESSURE: 97 MMHG | WEIGHT: 161.6 LBS | OXYGEN SATURATION: 97 % | RESPIRATION RATE: 20 BRPM | TEMPERATURE: 97.6 F | HEIGHT: 70 IN | SYSTOLIC BLOOD PRESSURE: 140 MMHG | HEART RATE: 76 BPM

## 2021-05-11 VITALS
RESPIRATION RATE: 22 BRPM | DIASTOLIC BLOOD PRESSURE: 77 MMHG | SYSTOLIC BLOOD PRESSURE: 136 MMHG | OXYGEN SATURATION: 100 %

## 2021-05-11 PROCEDURE — 2720000010 HC SURG SUPPLY STERILE: Performed by: INTERNAL MEDICINE

## 2021-05-11 PROCEDURE — 2580000003 HC RX 258: Performed by: INTERNAL MEDICINE

## 2021-05-11 PROCEDURE — 3700000000 HC ANESTHESIA ATTENDED CARE: Performed by: INTERNAL MEDICINE

## 2021-05-11 PROCEDURE — 7100000010 HC PHASE II RECOVERY - FIRST 15 MIN: Performed by: INTERNAL MEDICINE

## 2021-05-11 PROCEDURE — 3700000001 HC ADD 15 MINUTES (ANESTHESIA): Performed by: INTERNAL MEDICINE

## 2021-05-11 PROCEDURE — 2709999900 HC NON-CHARGEABLE SUPPLY: Performed by: INTERNAL MEDICINE

## 2021-05-11 PROCEDURE — 3609017100 HC EGD: Performed by: INTERNAL MEDICINE

## 2021-05-11 PROCEDURE — 6360000002 HC RX W HCPCS: Performed by: NURSE ANESTHETIST, CERTIFIED REGISTERED

## 2021-05-11 PROCEDURE — 7100000011 HC PHASE II RECOVERY - ADDTL 15 MIN: Performed by: INTERNAL MEDICINE

## 2021-05-11 RX ORDER — SODIUM CHLORIDE 450 MG/100ML
INJECTION, SOLUTION INTRAVENOUS CONTINUOUS
Status: DISCONTINUED | OUTPATIENT
Start: 2021-05-11 | End: 2021-05-11 | Stop reason: HOSPADM

## 2021-05-11 RX ORDER — PROPOFOL 10 MG/ML
INJECTION, EMULSION INTRAVENOUS PRN
Status: DISCONTINUED | OUTPATIENT
Start: 2021-05-11 | End: 2021-05-11 | Stop reason: SDUPTHER

## 2021-05-11 RX ADMIN — SODIUM CHLORIDE: 4.5 INJECTION, SOLUTION INTRAVENOUS at 12:37

## 2021-05-11 RX ADMIN — PROPOFOL 400 MG: 10 INJECTION, EMULSION INTRAVENOUS at 13:07

## 2021-05-11 ASSESSMENT — LIFESTYLE VARIABLES: SMOKING_STATUS: 1

## 2021-05-11 ASSESSMENT — PAIN SCALES - GENERAL: PAINLEVEL_OUTOF10: 0

## 2021-05-11 ASSESSMENT — PAIN - FUNCTIONAL ASSESSMENT: PAIN_FUNCTIONAL_ASSESSMENT: 0-10

## 2021-05-11 NOTE — PROGRESS NOTES
Recovery mode. , taking fluids. Dr. Vanessa Corrales discussed findings and plan of care, understanding verbalized. Discussed discharge teaching.

## 2021-05-11 NOTE — H&P
Additional information:       PHYSICAL:   Heart:  [x]Regular rate and rhythm  []Other:    Lungs:  [x]Clear    []Other:    Abdomen: [x]Soft    []Other:    Mental Status: [x]Alert & Oriented  []Other:      VITAL SIGNS   Patient Vitals for the past 24 hrs:   BP Temp Temp src Pulse Resp SpO2 Height Weight   05/11/21 1217 (!) 131/97 97.8 °F (36.6 °C) Temporal 80 16 98 % 5' 10\" (1.778 m) 161 lb 9.6 oz (73.3 kg)       PLANNED PROCEDURE  [x]EGD  []Colonoscopy []Flex Sigmoid  []ERCP []EUS   []Cystoscopy  [] CATH [] BRONCH   Consent: I have discussed with the patient and/or the patient representative the indication, alternatives, and the possible risks and/or complications of the planned procedure and the anesthesia methods. The patient and/or patient representative appear to understand and agree to proceed. SEDATION  Planned agent:[]Midazolam []Meperidine []Sublimaze []Morphine  []Diazepam [x]Propofol  []Other:     ASA Classification: Class 2 - A normal healthy patient with mild systemic disease    Airway Assessment: normal    Monitoring and Safety: The patient will be placed on a cardiac monitor and vital signs, pulse oximetry and level of consciousness will be continuously evaluated throughout the procedure. The patient will be closely monitored until recovery from the medications is complete and the patient has returned to baseline status. Respiratory therapy will be on standby during the procedure. [x]Pre-procedure diagnostic studies complete and results available. Comment:    [x]Previous sedation/anesthesia experiences assessed. Comment:    [x]The patient is an appropriate candidate to undergo the planned procedure sedation and anesthesia. (Refer to nursing sedation/analgesia documentation record)  [x]Formulation and discussion of sedation/procedure plan, risks, and expectations with patient and/or responsible adult completed. [x]Patient examined immediately prior to the procedure.  (Refer to nursing sedation/analgesia documentation record)    Anand Ahn MD   Electronically signed 5/11/2021 at 12:45 PM

## 2021-05-11 NOTE — POST SEDATION
[address] [address]  Sedation/Analgesia Post Sedation Record    Patient: Therese Landis : 1965  Med Rec#: [id number] Acc#: [id number]   Procedure Performed By: Myrtle Winchester  Primary Care Physician: MADELIN Galvin CNP    POST-PROCEDURE    Physicians/Assistants: Myrtle Winchester  Procedure Performed:    Sedation/Anesthesia:     Estimated Blood Loss:          ml  Specimens Removed:  [x]None []Other:      Disposition of Specimen:  []Pathology [x]Other      Complications:   [x]None Immediate []Other:     Post-procedure Diagnosis/Findings:             Recommendations:     Esophageal stricture          Myrtle Winchester MD [facility]  Electronically signed 2021 at 1:29 PM

## 2021-05-11 NOTE — ANESTHESIA PRE PROCEDURE
Department of Anesthesiology  Preprocedure Note       Name:  Hermila Salazar   Age:  54 y.o.  :  1965                                          MRN:  [id number]         Date:  2021      Surgeon: Edith Guzman):  Dhara Jennings MD    Procedure: Procedure(s):  EGD ESOPHAGOGASTRODUODENOSCOPY    Medications prior to admission:   Prior to Admission medications    Medication Sig Start Date End Date Taking?  Authorizing Provider   metoprolol tartrate (LOPRESSOR) 50 MG tablet TAKE 1 TABLET BY MOUTH TWICE DAILY 20  Yes Banner MD Laurence   Sertraline HCl (ZOLOFT PO) Take by mouth   Yes Historical Provider, MD   pantoprazole (PROTONIX) 40 MG tablet Take 40 mg by mouth daily  19  Yes Historical Provider, MD   esomeprazole Magnesium (NEXIUM) 20 MG PACK Take 20 mg by mouth daily   Yes Historical Provider, MD   Mouthwashes (Marco A Copa) LIQD daily  16  Yes Historical Provider, MD   lisinopril (PRINIVIL;ZESTRIL) 20 MG tablet Take 20 mg by mouth daily   Yes Historical Provider, MD       Current medications:    Current Facility-Administered Medications   Medication Dose Route Frequency Provider Last Rate Last Admin    0.45 % sodium chloride infusion   Intravenous Continuous Dhara Jennings MD 75 mL/hr at 21 1237 New Bag at 21 1237       Allergies:  No Known Allergies    Problem List:    Patient Active Problem List   Diagnosis Code    Squamous cell cancer of retromolar trigone ([facility] [address] C06.2    Chemotherapy induced neutropenia (HCC) D70.1, T45.1X5A    Chronic anemia D64.9    Muscle spasms of neck M62.838    Syncope R55       Past Medical History:        Diagnosis Date    Asthma     Head and neck cancer ([facility] [address]     Hypertension     Lymphedema     Syncope 2020    Syncope and collapse        Past Surgical History:        Procedure Laterality Date    GASTROSTOMY TUBE PLACEMENT  10/2/15    MANDIBLE RECONSTRUCTION  10/2/15    SKIN GRAFT  10/2/15    jaw       Social History: Component Value Date    INR 0.92 12/10/2020    APTT 27.1 12/10/2020       HCG (If Applicable): No results found for: PREGTESTUR, PREGSERUM, HCG, HCGQUANT     ABGs: No results found for: PHART, PO2ART, YSX2WDA, AWF4IQY, BEART, T8OXNVUE     Type & Screen (If Applicable):  No results found for: LABABO, LABRH    Drug/Infectious Status (If Applicable):  No results found for: HIV, HEPCAB    COVID-19 Screening (If Applicable):   Lab Results   Component Value Date    COVID19 NOT DETECTED 05/05/2021           Anesthesia Evaluation  Patient summary reviewed and Nursing notes reviewed  Airway: Mallampati: III  TM distance: >3 FB   Neck ROM: full  Mouth opening: > = 3 FB Dental:      Comment: endentulous    Pulmonary:   (+) decreased breath sounds,  asthma: current smoker          Patient smoked on day of surgery. Cardiovascular:  Exercise tolerance: good (>4 METS),   (+) hypertension:,          Beta Blocker:  Dose within 24 Hrs         Neuro/Psych:   Negative Neuro/Psych ROS              GI/Hepatic/Renal: Neg GI/Hepatic/Renal ROS            Endo/Other:    (+) blood dyscrasia: anemia:., malignancy/cancer (history of head and neck cancer s/p radiation and mandible reconstruction). Abdominal:           Vascular: negative vascular ROS. Anesthesia Plan      MAC     ASA 3             Anesthetic plan and risks discussed with patient. Plan discussed with attending.                   MADELIN Philip - NATALIYA   5/11/2021

## 2021-05-11 NOTE — ANESTHESIA POSTPROCEDURE EVALUATION
Department of Anesthesiology  Postprocedure Note    Patient: Devin Rodriguez  MRN: [id number]  YOB: 1965  Date of evaluation: 5/11/2021  Time:  1:37 PM     Procedure Summary     Date: 05/11/21 Room / Location: [address] [place] [address]    Anesthesia Start: 2409 Anesthesia Stop: 6365    Procedure: EGD ESOPHAGOGASTRODUODENOSCOPY (N/A ) Diagnosis: (LUQ PAIN)    Surgeons: Leticia Alvarenga MD Responsible Provider:     Anesthesia Type: MAC ASA Status: 3          Anesthesia Type: MAC    Romeo Phase I: Romeo Score: 10    Romeo Phase II:      Last vitals: Reviewed and per EMR flowsheets.        Anesthesia Post Evaluation    Patient location during evaluation: bedside  Patient participation: complete - patient participated  Level of consciousness: awake  Pain score: 0  Airway patency: patent  Nausea & Vomiting: no nausea and no vomiting  Complications: no  Cardiovascular status: hemodynamically stable  Respiratory status: acceptable  Hydration status: euvolemic

## 2021-05-12 RX ORDER — METOPROLOL TARTRATE 50 MG/1
TABLET, FILM COATED ORAL
Qty: 60 TABLET | Refills: 5 | Status: SHIPPED | OUTPATIENT
Start: 2021-05-12 | End: 2021-12-01 | Stop reason: ALTCHOICE

## 2021-05-12 NOTE — OP NOTE
[address] [place] [address]                                OPERATIVE REPORT    PATIENT NAME: Connie Meza                       :        1965  MED REC NO:   [id number]                           ROOM:  ACCOUNT NO:   [de-identified]                           ADMIT DATE: 2021  PROVIDER:     Aracelis Orozco M.D.    Neeru Kelly OF PROCEDURE:  2021    PROCEDURE:  EGD. SURGEON:  Aracelis Orozco MD    INDICATION FOR THE PROCEDURE:  The patient with a history of epigastric  pain, some discharge at times from the previous ostomy. See the preop  note and the office note for rest of clinicals. ASA CLASSIFICATION:  II.    MEDICATION:  Per Anesthesia. BIOPSY:  None. PHOTOGRAPHS:  Yes. BLOOD LOSS:  None. DESCRIPTION OF PROCEDURE:  Informed consent was obtained after  explaining risks and benefits of the procedure and conscious sedation. Possible complications including bleeding, perforation, reaction to  medicine, but not limiting to death, were discussed. Afterwards, GIF-180 gastroscope was advanced through the oropharynx into  the esophagus and the patient having stricturing and changed anatomy and  the scope would not safely go down. At that point, I felt that we  should not push the scope too much and the scope was withdrawn, and  larynx looks normal.  The patient tolerated the procedure well. IMPRESSION:  Limited exam secondary to altered anatomy in the  hypopharynx and difficult to intubate the esophagus from previous  radiation and surgery. RECOMMENDATIONS:  We will bring the patient back in the office to see  Madison Hernandez and we will think about CT of the abdomen to see if  there is any pocket of abscess close to the ostomy site. Clinically, it  is not and I have recommended the patient and wife to put in heating pad  whenever there is history suggestive of muscle spasm around the ostomy  area.         Owen Kuhn Babette Cogan, M.D.    D: 05/11/2021 13:40:12       T: 05/11/2021 15:24:40     [email or web address]  Job#: [id number]     Doc#: [id number]    CC:  Family Physician

## 2021-05-25 ENCOUNTER — HOSPITAL ENCOUNTER (OUTPATIENT)
Dept: ULTRASOUND IMAGING | Age: 56
Discharge: HOME | End: 2021-05-25
Payer: MEDICAID

## 2021-05-25 DIAGNOSIS — C41.1 MALIGNANT NEOPLASM OF MANDIBLE (HCC): ICD-10-CM

## 2021-05-25 PROCEDURE — 76536 US EXAM OF HEAD AND NECK: CPT

## 2021-05-26 ENCOUNTER — OFFICE VISIT (OUTPATIENT)
Dept: CARDIOLOGY CLINIC | Age: 56
End: 2021-05-26
Payer: MEDICAID

## 2021-05-26 VITALS
DIASTOLIC BLOOD PRESSURE: 70 MMHG | HEIGHT: 71 IN | BODY MASS INDEX: 22.54 KG/M2 | SYSTOLIC BLOOD PRESSURE: 112 MMHG | WEIGHT: 161 LBS | HEART RATE: 72 BPM

## 2021-05-26 DIAGNOSIS — R55 SYNCOPE, UNSPECIFIED SYNCOPE TYPE: Primary | ICD-10-CM

## 2021-05-26 DIAGNOSIS — I10 ESSENTIAL HYPERTENSION: ICD-10-CM

## 2021-05-26 DIAGNOSIS — F17.200 SMOKING: ICD-10-CM

## 2021-05-26 DIAGNOSIS — I71.20 THORACIC AORTIC ANEURYSM WITHOUT RUPTURE: ICD-10-CM

## 2021-05-26 PROCEDURE — 4004F PT TOBACCO SCREEN RCVD TLK: CPT | Performed by: NUCLEAR MEDICINE

## 2021-05-26 PROCEDURE — G8420 CALC BMI NORM PARAMETERS: HCPCS | Performed by: NUCLEAR MEDICINE

## 2021-05-26 PROCEDURE — 3017F COLORECTAL CA SCREEN DOC REV: CPT | Performed by: NUCLEAR MEDICINE

## 2021-05-26 PROCEDURE — 93000 ELECTROCARDIOGRAM COMPLETE: CPT | Performed by: NUCLEAR MEDICINE

## 2021-05-26 PROCEDURE — 99214 OFFICE O/P EST MOD 30 MIN: CPT | Performed by: NUCLEAR MEDICINE

## 2021-05-26 PROCEDURE — G8428 CUR MEDS NOT DOCUMENT: HCPCS | Performed by: NUCLEAR MEDICINE

## 2021-05-26 NOTE — PROGRESS NOTES
as well     Plan:  No follow-ups on file. Discussed  Cut back lisinopril to half dose  Monitor the BP at home  Smoking: discussed with the patient the importance of smoke cessation especially with the risk of CAD. Discussed drinking as well   Continue risk factor modification and medical management  Thank you for allowing me to participate in the care of your patient. Please don't hesitate to contact me regarding any further issues related to the patient care    Orders Placed:  Orders Placed This Encounter   Procedures    EKG 12 lead     Order Specific Question:   Reason for Exam?     Answer: Other       Medications Prescribed:  No orders of the defined types were placed in this encounter. Discussed use, benefit, and side effects of prescribed medications. All patient questions answered. Pt voicedunderstanding. Instructed to continue current medications, diet and exercise. Continue risk factor modification and medical management. Patient agreed with treatment plan. Follow up as directed.     Electronically signedby Jocelynn Erickson MD on 5/26/2021 at 9:32 AM

## 2021-06-01 ENCOUNTER — PROCEDURE VISIT (OUTPATIENT)
Dept: CARDIOLOGY CLINIC | Age: 56
End: 2021-06-01
Payer: MEDICAID

## 2021-06-01 DIAGNOSIS — R55 SYNCOPE, UNSPECIFIED SYNCOPE TYPE: Primary | ICD-10-CM

## 2021-06-01 PROCEDURE — G2066 INTER DEVC REMOTE 30D: HCPCS | Performed by: NUCLEAR MEDICINE

## 2021-06-01 PROCEDURE — 93298 REM INTERROG DEV EVAL SCRMS: CPT | Performed by: NUCLEAR MEDICINE

## 2021-06-21 ENCOUNTER — HOSPITAL ENCOUNTER (OUTPATIENT)
Dept: ULTRASOUND IMAGING | Age: 56
Discharge: HOME | End: 2021-06-21
Payer: MEDICAID

## 2021-06-21 DIAGNOSIS — R10.12 ABDOMINAL PAIN, LEFT UPPER QUADRANT: ICD-10-CM

## 2021-06-21 PROCEDURE — 76705 ECHO EXAM OF ABDOMEN: CPT

## 2021-07-06 ENCOUNTER — PROCEDURE VISIT (OUTPATIENT)
Dept: CARDIOLOGY CLINIC | Age: 56
End: 2021-07-06
Payer: MEDICAID

## 2021-07-06 DIAGNOSIS — R55 SYNCOPE, UNSPECIFIED SYNCOPE TYPE: Primary | ICD-10-CM

## 2021-07-06 PROCEDURE — 93298 REM INTERROG DEV EVAL SCRMS: CPT | Performed by: NUCLEAR MEDICINE

## 2021-07-06 PROCEDURE — G2066 INTER DEVC REMOTE 30D: HCPCS | Performed by: NUCLEAR MEDICINE

## 2021-08-10 ENCOUNTER — PROCEDURE VISIT (OUTPATIENT)
Dept: CARDIOLOGY CLINIC | Age: 56
End: 2021-08-10
Payer: MEDICAID

## 2021-08-10 DIAGNOSIS — R55 SYNCOPE, UNSPECIFIED SYNCOPE TYPE: Primary | ICD-10-CM

## 2021-08-11 PROCEDURE — 93298 REM INTERROG DEV EVAL SCRMS: CPT | Performed by: INTERNAL MEDICINE

## 2021-08-11 PROCEDURE — G2066 INTER DEVC REMOTE 30D: HCPCS | Performed by: INTERNAL MEDICINE

## 2021-09-01 ENCOUNTER — HOSPITAL ENCOUNTER (OUTPATIENT)
Age: 56
Discharge: HOME | End: 2021-09-01
Payer: MEDICAID

## 2021-09-01 LAB
BASOPHILS # BLD: 0.8 %
BASOPHILS ABSOLUTE: 0.1 THOU/MM3 (ref 0–0.1)
EOSINOPHIL # BLD: 2.1 %
EOSINOPHILS ABSOLUTE: 0.2 THOU/MM3 (ref 0–0.4)
ERYTHROCYTE [DISTWIDTH] IN BLOOD BY AUTOMATED COUNT: 13.7 % (ref 11.5–14.5)
ERYTHROCYTE [DISTWIDTH] IN BLOOD BY AUTOMATED COUNT: 50.2 FL (ref 35–45)
HCT VFR BLD CALC: 44.9 % (ref 42–52)
HEMOGLOBIN: 14.5 GM/DL (ref 14–18)
IMMATURE GRANS (ABS): 0.04 THOU/MM3 (ref 0–0.07)
IMMATURE GRANULOCYTES: 0.6 %
LYMPHOCYTES # BLD: 31.8 %
LYMPHOCYTES ABSOLUTE: 2.3 THOU/MM3 (ref 1–4.8)
MCH RBC QN AUTO: 32.4 PG (ref 26–33)
MCHC RBC AUTO-ENTMCNC: 32.3 GM/DL (ref 32.2–35.5)
MCV RBC AUTO: 100.2 FL (ref 80–94)
MONOCYTES # BLD: 11.6 %
MONOCYTES ABSOLUTE: 0.8 THOU/MM3 (ref 0.4–1.3)
NUCLEATED RED BLOOD CELLS: 0 /100 WBC
PLATELET # BLD: 265 THOU/MM3 (ref 130–400)
PMV BLD AUTO: 9.3 FL (ref 9.4–12.4)
RBC # BLD: 4.48 MILL/MM3 (ref 4.7–6.1)
SEG NEUTROPHILS: 53.1 %
SEGMENTED NEUTROPHILS ABSOLUTE COUNT: 3.9 THOU/MM3 (ref 1.8–7.7)
T4 FREE: 0.93 NG/DL (ref 0.93–1.76)
TSH SERPL DL<=0.05 MIU/L-ACNC: 17.5 UIU/ML (ref 0.4–4.2)
WBC # BLD: 7.3 THOU/MM3 (ref 4.8–10.8)

## 2021-09-01 PROCEDURE — 36415 COLL VENOUS BLD VENIPUNCTURE: CPT

## 2021-09-01 PROCEDURE — 85025 COMPLETE CBC W/AUTO DIFF WBC: CPT

## 2021-09-01 PROCEDURE — 84439 ASSAY OF FREE THYROXINE: CPT

## 2021-09-01 PROCEDURE — 84443 ASSAY THYROID STIM HORMONE: CPT

## 2021-09-03 ENCOUNTER — HOSPITAL ENCOUNTER (OUTPATIENT)
Dept: INFUSION THERAPY | Age: 56
Discharge: HOME | End: 2021-09-03
Payer: MEDICAID

## 2021-09-03 ENCOUNTER — OFFICE VISIT (OUTPATIENT)
Dept: ONCOLOGY | Age: 56
End: 2021-09-03
Payer: MEDICAID

## 2021-09-03 VITALS
BODY MASS INDEX: 24.77 KG/M2 | DIASTOLIC BLOOD PRESSURE: 83 MMHG | WEIGHT: 173 LBS | TEMPERATURE: 97.6 F | HEIGHT: 70 IN | RESPIRATION RATE: 18 BRPM | OXYGEN SATURATION: 98 % | HEART RATE: 73 BPM | SYSTOLIC BLOOD PRESSURE: 119 MMHG

## 2021-09-03 DIAGNOSIS — C06.2 SQUAMOUS CELL CANCER OF RETROMOLAR TRIGONE (HCC): Primary | ICD-10-CM

## 2021-09-03 DIAGNOSIS — I10 HYPERTENSION, UNSPECIFIED TYPE: ICD-10-CM

## 2021-09-03 DIAGNOSIS — D64.9 CHRONIC ANEMIA: ICD-10-CM

## 2021-09-03 DIAGNOSIS — C06.2 SQUAMOUS CELL CANCER OF RETROMOLAR TRIGONE (HCC): ICD-10-CM

## 2021-09-03 LAB
ABSOLUTE IMMATURE GRANULOCYTE: 0.09 THOU/MM3 (ref 0–0.07)
ALBUMIN SERPL-MCNC: 3.8 G/DL (ref 3.5–5.1)
ALP BLD-CCNC: 118 U/L (ref 38–126)
ALT SERPL-CCNC: 36 U/L (ref 11–66)
AST SERPL-CCNC: 53 U/L (ref 5–40)
BASINOPHIL, AUTOMATED: 0 % (ref 0–3)
BASOPHILS ABSOLUTE: 0 THOU/MM3 (ref 0–0.1)
BILIRUB SERPL-MCNC: 0.2 MG/DL (ref 0.3–1.2)
BILIRUBIN DIRECT: < 0.2 MG/DL (ref 0–0.3)
BUN, WHOLE BLOOD: 7 MG/DL (ref 8–26)
CHLORIDE, WHOLE BLOOD: 111 MEQ/L (ref 98–109)
CREATININE, WHOLE BLOOD: 1.3 MG/DL (ref 0.5–1.2)
EOSINOPHILS ABSOLUTE: 0.1 THOU/MM3 (ref 0–0.4)
EOSINOPHILS RELATIVE PERCENT: 2 % (ref 0–4)
GFR, ESTIMATED: 74 ML/MIN/1.73M2
GLUCOSE, WHOLE BLOOD: 82 MG/DL (ref 70–108)
HCT VFR BLD CALC: 40 % (ref 42–52)
HEMOGLOBIN: 13.2 GM/DL (ref 14–18)
IMMATURE GRANULOCYTES: 1 %
IONIZED CALCIUM, WHOLE BLOOD: 1.32 MMOL/L (ref 1.12–1.32)
LYMPHOCYTES # BLD: 37 % (ref 15–47)
LYMPHOCYTES ABSOLUTE: 2.5 THOU/MM3 (ref 1–4.8)
MCH RBC QN AUTO: 33 PG (ref 26–33)
MCHC RBC AUTO-ENTMCNC: 33 GM/DL (ref 32.2–35.5)
MCV RBC AUTO: 100 FL (ref 80–94)
MONOCYTES ABSOLUTE: 0.6 THOU/MM3 (ref 0.4–1.3)
MONOCYTES: 9 % (ref 0–12)
PDW BLD-RTO: 13.8 % (ref 11.5–14.5)
PLATELET # BLD: 240 THOU/MM3 (ref 130–400)
PMV BLD AUTO: 9.3 FL (ref 9.4–12.4)
POTASSIUM, WHOLE BLOOD: 3.6 MEQ/L (ref 3.5–4.9)
RBC # BLD: 4 MILL/MM3 (ref 4.7–6.1)
SEG NEUTROPHILS: 51 % (ref 43–75)
SEGMENTED NEUTROPHILS ABSOLUTE COUNT: 3.4 THOU/MM3 (ref 1.8–7.7)
SODIUM, WHOLE BLOOD: 146 MEQ/L (ref 138–146)
TOTAL CO2, WHOLE BLOOD: 29 MEQ/L (ref 23–33)
TOTAL PROTEIN: 6.2 G/DL (ref 6.1–8)
WBC # BLD: 6.7 THOU/MM3 (ref 4.8–10.8)

## 2021-09-03 PROCEDURE — G8420 CALC BMI NORM PARAMETERS: HCPCS | Performed by: NURSE PRACTITIONER

## 2021-09-03 PROCEDURE — 3017F COLORECTAL CA SCREEN DOC REV: CPT | Performed by: NURSE PRACTITIONER

## 2021-09-03 PROCEDURE — 80076 HEPATIC FUNCTION PANEL: CPT

## 2021-09-03 PROCEDURE — 99214 OFFICE O/P EST MOD 30 MIN: CPT | Performed by: NURSE PRACTITIONER

## 2021-09-03 PROCEDURE — 99211 OFF/OP EST MAY X REQ PHY/QHP: CPT

## 2021-09-03 PROCEDURE — 80047 BASIC METABLC PNL IONIZED CA: CPT

## 2021-09-03 PROCEDURE — 85025 COMPLETE CBC W/AUTO DIFF WBC: CPT

## 2021-09-03 PROCEDURE — 4004F PT TOBACCO SCREEN RCVD TLK: CPT | Performed by: NURSE PRACTITIONER

## 2021-09-03 PROCEDURE — 36415 COLL VENOUS BLD VENIPUNCTURE: CPT

## 2021-09-03 PROCEDURE — G8428 CUR MEDS NOT DOCUMENT: HCPCS | Performed by: NURSE PRACTITIONER

## 2021-09-03 NOTE — PROGRESS NOTES
[facility], [address], [address] [address],[address]  [address] [address]  Dept: [phone]  Dept Fax: [phone]: [phone]      Visit Date:9/3/2021     Ivania Duarte is a 54 y.o. male who presents today for:   Chief Complaint   Patient presents with    Follow-up     SQUAMOUS CELL CANCER        HPI:   Ivania Duarte is a 54 y.o. male who follows in our office with Dr. Fritz Brown for squamous cell carcinoma of head and neck. Per Dr. Fritz Brown' note on 9/3/2020:  He was originally diagnosed with his malignancy in 2015 and involve the left retromolar trigone. The patient underwent surgical therapy, radiation therapy, and chemotherapy treatment. He has not had evidence of recurrence of his malignancy since his initial diagnosis and treatment. In August 2020 the patient was experiencing some left-sided neck pain and head pressure. He had a CT scan of the neck and abdomen completed. The scans were ordered by the nurse practitioner and radiation oncology. Although this found no evidence of recurrence of his malignancy, there was an ascending aortic aneurysm identified. The patient reports that he has never had an evaluation completed or was aware of a history of an aortic aneurysm. Therefore, I have recommended that he have a evaluation by a vascular surgeon and have referred him to Dr. Luna Churchill. The patient is agreeable to this plan of care. He also continues to be followed by his ear nose and throat physician at [facility]. He was seen by Dr. Brit Perry last week and was noted not to have any evidence of recurrence of his malignancy. The neck pain and head pressure that the patient was experiencing in August 2020 has resolved. He currently has no specific complaints to suggest recurrence of his malignancy. The patient denies pain. He has not had fever, cough, shortness of breath or other signs of infection.   The patient's bowel and bladder habits have been normal.  He has not seen blood in his stool or urine. The patient remains active with an ECOG performance status of level 0. He does complain of some neck spasms that is been treated with Flexeril. The patient does state that Flexeril does help his pain. His blood pressure is also modestly elevated today. He is going to continue to monitor his blood pressure and follow-up with his primary care provider for further management. With evidence of an aortic aneurysm that was explained to the patient that it would be of great significant benefit to have his blood pressure well controlled. Interval History 9/3/2021:   Pt reports to the office today for follow up and evaluation of squamous cell cancer of the head and neck. Pt reports recent chills without fever. TSH recently drawn, 17.500, T4 0.93. Recent medication increase. Pt reports continued chronic H/As, chronic dizziness, chronic cough. Pt reports intermittent cramps in prior PEG area, being worked up. EGD not completed d/t radiation side effects, could not advance tube completely. Pt reports chronic edema in L ankle. Pt reports chronic neck spasms, he continued with home therapy exercises. Pt reports dampening night sweats, recent TSH 17.500 with medication alteration. BP better controlled. Pt denies N/V/C/D, peripheral neuropathy, s/s bleeding, b/b changes, new skeletal pain. Weight has increased by 12 # since 5/26/2021, pt reports he was told if he didn't gain weight he may need PEG tube again. PMH, SH, and FH:  I reviewed the patient's medication and allergy lists as noted on the electronic medical record. The PMH, SH, and FH were also reviewed as noted on the EMR.         Past Medical History:   Diagnosis Date    Asthma     Head and neck cancer ([facility] [address]     Hypertension     Lymphedema     Syncope 12/22/2020    Syncope and collapse       Past Surgical History:   Procedure Laterality Date    GASTROSTOMY TUBE PLACEMENT  10/2/15    MANDIBLE RECONSTRUCTION S1/S2. Abdomen: Soft, non-tender, non-distended with active BS x 4. Musculoskeletal: No clubbing, cyanosis bilaterally. Mild L ankle edema noted. Pt able to ambulate in office without difficulty or use of assistive device. Skin: Skin color, texture, turgor normal.  No visible rashes or lesions. Neurologic:  Neurovascularly intact without any focal sensory/motor deficits. Cranial nerves: II-XII intact, grossly non-focal.  Psychiatric: Alert and oriented x 3, thought content appropriate, normal insight  Capillary Refill: Brisk,< 3 seconds   Peripheral Pulses: +2 palpable, equal bilaterally       Imaging Studies and Labs:   CBC:   Lab Results   Component Value Date    WBC 6.7 09/03/2021    HGB 13.2 (L) 09/03/2021    HCT 40.0 (L) 09/03/2021     (H) 09/03/2021     09/03/2021     BMP:   Lab Results   Component Value Date     09/03/2021     12/10/2020    K 3.6 09/03/2021    K 3.3 12/10/2020     12/10/2020    CO2 21 12/10/2020    BUN 10 12/10/2020    CREATININE 1.3 09/03/2021    CREATININE 1.0 12/10/2020    GLUCOSE 101 12/10/2020    CALCIUM 9.2 12/10/2020      LFT:   Lab Results   Component Value Date    ALT 27 09/03/2020    AST 41 (H) 09/03/2020    ALKPHOS 78 09/03/2020    BILITOT 0.4 09/03/2020         Assessment and Plan:     1. 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 Pt has no s/s of recurrence of disease. Pt does report chills during the day and night sweats with recent TSH of 17.500 and subsequent medication change. Pt advised to monitor this symptom to see if it improves with medication adjustment, pt to report back to our office if night sweats do not improve. 2. Hypertension, unspecified type  /83, improved control from prior office visits. 3. Chronic anemia  H/H 13.2/40. Pt denies s/s bleeding. Mild, trend. Return in about 1 year (around 9/3/2022). All patient questions answered. Pt voiced understanding. Patient agreed with treatment plan. Follow up as directed. Patient instructed to call for questions or concerns. Electronically signed by   MADELIN Rojo CNP     I spent a total of 34 minutes on the day of the visit.

## 2021-09-03 NOTE — PATIENT INSTRUCTIONS
1.  Return to clinic in 1 year to see Dr. Teddy Ambrocio with labs:  CBC, BMP, LFTs  2.  Obtain CT Chest (already scheduled) 9/16/2021 at 10 am

## 2021-09-14 ENCOUNTER — PROCEDURE VISIT (OUTPATIENT)
Dept: CARDIOLOGY CLINIC | Age: 56
End: 2021-09-14
Payer: MEDICAID

## 2021-09-14 DIAGNOSIS — R55 SYNCOPE, UNSPECIFIED SYNCOPE TYPE: Primary | ICD-10-CM

## 2021-09-14 PROCEDURE — 93298 REM INTERROG DEV EVAL SCRMS: CPT | Performed by: NUCLEAR MEDICINE

## 2021-09-14 PROCEDURE — G2066 INTER DEVC REMOTE 30D: HCPCS | Performed by: NUCLEAR MEDICINE

## 2021-09-16 ENCOUNTER — HOSPITAL ENCOUNTER (OUTPATIENT)
Age: 56
Discharge: HOME | End: 2021-09-16
Payer: MEDICAID

## 2021-09-16 ENCOUNTER — HOSPITAL ENCOUNTER (OUTPATIENT)
Dept: CT IMAGING | Age: 56
Discharge: HOME | End: 2021-09-16
Payer: MEDICAID

## 2021-09-16 DIAGNOSIS — C06.2 SQUAMOUS CELL CANCER OF RETROMOLAR TRIGONE (HCC): ICD-10-CM

## 2021-09-16 LAB
BASOPHILS # BLD: 0.9 %
BASOPHILS ABSOLUTE: 0.1 THOU/MM3 (ref 0–0.1)
EOSINOPHIL # BLD: 1.5 %
EOSINOPHILS ABSOLUTE: 0.1 THOU/MM3 (ref 0–0.4)
ERYTHROCYTE [DISTWIDTH] IN BLOOD BY AUTOMATED COUNT: 13.2 % (ref 11.5–14.5)
ERYTHROCYTE [DISTWIDTH] IN BLOOD BY AUTOMATED COUNT: 47.1 FL (ref 35–45)
HCT VFR BLD CALC: 41.1 % (ref 42–52)
HEMOGLOBIN: 14 GM/DL (ref 14–18)
IMMATURE GRANS (ABS): 0.07 THOU/MM3 (ref 0–0.07)
IMMATURE GRANULOCYTES: 1 %
LYMPHOCYTES # BLD: 41.3 %
LYMPHOCYTES ABSOLUTE: 2.8 THOU/MM3 (ref 1–4.8)
MCH RBC QN AUTO: 33.3 PG (ref 26–33)
MCHC RBC AUTO-ENTMCNC: 34.1 GM/DL (ref 32.2–35.5)
MCV RBC AUTO: 97.6 FL (ref 80–94)
MONOCYTES # BLD: 10.6 %
MONOCYTES ABSOLUTE: 0.7 THOU/MM3 (ref 0.4–1.3)
NUCLEATED RED BLOOD CELLS: 0 /100 WBC
PLATELET # BLD: 181 THOU/MM3 (ref 130–400)
PMV BLD AUTO: 10.1 FL (ref 9.4–12.4)
RBC # BLD: 4.21 MILL/MM3 (ref 4.7–6.1)
SEG NEUTROPHILS: 44.7 %
SEGMENTED NEUTROPHILS ABSOLUTE COUNT: 3 THOU/MM3 (ref 1.8–7.7)
T4 FREE: 1.12 NG/DL (ref 0.93–1.76)
TSH SERPL DL<=0.05 MIU/L-ACNC: 11.17 UIU/ML (ref 0.4–4.2)
WBC # BLD: 6.8 THOU/MM3 (ref 4.8–10.8)

## 2021-09-16 PROCEDURE — 6360000004 HC RX CONTRAST MEDICATION: Performed by: NURSE PRACTITIONER

## 2021-09-16 PROCEDURE — 84443 ASSAY THYROID STIM HORMONE: CPT

## 2021-09-16 PROCEDURE — 84439 ASSAY OF FREE THYROXINE: CPT

## 2021-09-16 PROCEDURE — 36415 COLL VENOUS BLD VENIPUNCTURE: CPT

## 2021-09-16 PROCEDURE — 71260 CT THORAX DX C+: CPT

## 2021-09-16 PROCEDURE — 85025 COMPLETE CBC W/AUTO DIFF WBC: CPT

## 2021-09-16 RX ADMIN — IOPAMIDOL 85 ML: 755 INJECTION, SOLUTION INTRAVENOUS at 09:34

## 2021-09-28 ENCOUNTER — CLINICAL DOCUMENTATION (OUTPATIENT)
Dept: NUTRITION | Age: 56
End: 2021-09-28

## 2021-09-28 ENCOUNTER — HOSPITAL ENCOUNTER (OUTPATIENT)
Dept: RADIATION ONCOLOGY | Age: 56
Discharge: HOME | End: 2021-09-28
Payer: MEDICAID

## 2021-09-28 VITALS
RESPIRATION RATE: 18 BRPM | HEART RATE: 88 BPM | BODY MASS INDEX: 24.59 KG/M2 | DIASTOLIC BLOOD PRESSURE: 93 MMHG | WEIGHT: 171.4 LBS | OXYGEN SATURATION: 99 % | TEMPERATURE: 97.9 F | SYSTOLIC BLOOD PRESSURE: 123 MMHG

## 2021-09-28 DIAGNOSIS — C06.2 SQUAMOUS CELL CANCER OF RETROMOLAR TRIGONE (HCC): Primary | ICD-10-CM

## 2021-09-28 PROCEDURE — 99215 OFFICE O/P EST HI 40 MIN: CPT | Performed by: NURSE PRACTITIONER

## 2021-09-28 PROCEDURE — 99212 OFFICE O/P EST SF 10 MIN: CPT | Performed by: NURSE PRACTITIONER

## 2021-09-28 ASSESSMENT — PAIN DESCRIPTION - LOCATION: LOCATION: THROAT

## 2021-09-28 ASSESSMENT — PAIN SCALES - GENERAL: PAINLEVEL_OUTOF10: 7

## 2021-09-28 ASSESSMENT — PAIN DESCRIPTION - ORIENTATION: ORIENTATION: LEFT

## 2021-09-28 ASSESSMENT — PAIN DESCRIPTION - PAIN TYPE: TYPE: CHRONIC PAIN;ACUTE PAIN

## 2021-09-28 NOTE — PROGRESS NOTES
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 Inspection revealed a fungating mass in the left retromolar trigone.  He completed a CT of the head and neck which again revealed this primary tumor and suspected adenopathy submentally in the left submandibular gland and level II in the left neck.  The patient was referred to [place] and completed a left inferior maxillectomy, composite resection with segmental mandibulectomy, bilateral neck dissections, and reconstruction with free flap and bone graft from a right tibial donor site. Surgery was completed on 10/02/2015.  The patient had a PEG placed at this time also.  Angy Felipe an uncomplicated postoperative course and was seen to discuss adjuvant radiotherapy for which he was agreeable.      He did have brisk skin reaction with significant erythema culminating in hyperpigmentation with patchy dry desquamation.  The patient did not develop any moist desquamation.     INTERVAL HISTORY: Ryne Melo returns to [facility] today for a posttreatment checkup as part of his survivorship care after undergoing surgery, chemo and radiation therapy for cancer of the left retromolar trigone. Jeri Moore states he continues to experience xerostomia, he uses biotene which helps a little. He continues to have left neck discomfort, lymphedema and stiffness which have improved some since working with the physical therapist. He states the left neck swelling comes and goes, it does improve when he massages it. He reports worsening dysphagia. He underwent EGD on 5/11/21 which was limited because they were not able to pass the scope due to stricture. He states he was told he will likely require a PEG tube again for nutrition due to this. He is not sure he is wanting to have another tube placed. He reports continued issues with hot and cold flashes. He is following with his PCP for hypothyroidism and she is adjusting his synthroid. He continues to have issues with fatigue. He denies chest pain, sob, fever, chills, cough, headaches, nausea, vomiting,changes with bowel or bladder habits. LAB RESULTS:   9/16/21: T4, Free: 1.12  9/16/21: TSH w Reflex: 11.170    9/16/21: CBC:  Wbc 6.8  Hgb 14.0  Hct 41.1  MCV 97.6  Platelets: 611    CBC:   Lab Results   Component Value Date    WBC 5.1 12/10/2020    RBC 4.09 12/10/2020    HGB 13.7 12/10/2020    HCT 38.8 12/10/2020    MCV 94.9 12/10/2020    MCH 33.5 12/10/2020    MCHC 35.3 12/10/2020     12/10/2020    MPV 9.5 12/10/2020     CMP:  Lab Results   Component Value Date     12/10/2020    K 3.3 12/10/2020     12/10/2020    CO2 21 12/10/2020    BUN 10 12/10/2020    CREATININE 1.0 12/10/2020    LABGLOM 78 12/10/2020    GLUCOSE 101 12/10/2020    CALCIUM 9.2 12/10/2020     RADIOLOGY RESULTS:   9/16/2021: CT chest w contrast:   Impression       1. Stable 2 mm noncalcified nodule in the right middle lobe. No additional pulmonary nodules are identified. No intrathoracic lymphadenopathy.       2. Stable dilation of the ascending thoracic aorta measuring 4.3 cm.       3.  Fatty change of the liver.       4. Additional findings as above.         6/21/21: US Abdomen Limited:   Impression   Focal fluid collection at the PEG tube insertion site as described above. The subcutaneous collection tracks directly to the insertion site on the skin surface. It currently does not have typical appearance of an abscess       5/25/21: US head and neck soft tissue:   Impression   1.: 3.6 x 2.5 x 2.4 mm cystic nodule in the right lobe mid gland. TR1.   2. Heterogeneous echogenicity throughout the right and left lobes of thyroid gland  which could be secondary to thyroiditis, unchanged since previous study dated 11 July 2019. 3. Plaque in the left common carotid artery, unchanged since previous CT scan of the neck dated 8/22/2020. MEDICATIONS:   Current Outpatient Medications   Medication Sig Dispense Refill    metoprolol tartrate (LOPRESSOR) 50 MG tablet TAKE 1 TABLET BY MOUTH TWICE DAILY (Patient taking differently: 100 mg ) 60 tablet 5    Sertraline HCl (ZOLOFT PO) Take by mouth      pantoprazole (PROTONIX) 40 MG tablet Take 40 mg by mouth daily   6    esomeprazole Magnesium (NEXIUM) 20 MG PACK Take 20 mg by mouth daily      Mouthwashes (BIOTENE DRY MOUTH) LIQD daily       lisinopril (PRINIVIL;ZESTRIL) 20 MG tablet Take 10 mg by mouth daily        No current facility-administered medications for this encounter. ROS: As noted in the HPI and Interval history, otherwise negative. EXAMINATION:   GENERAL: Lucas is a pleasant, well-developed adult male.  He is alert  and oriented x3, in no acute distress  VITAL SIGNS: Weight 77.7kg, temperature 36.6°C, pulse 88, blood pressure 123/93, respirations 18, pulse ox 99% on room air.   HEENT: Head is normocephalic and atraumatic.  PERRL.  EOMI.  Ears,  nose and lips are within normal limits.  On examination of the oral cavity and oropharynx there are postoperative changes present, the reconstructed left oropharynx is smooth without any visible lesions or exudates. xerostomia. Noreene Shouts are extensive postoperative changes to the left neck. NECK: Without palpable cervical adenopathy, however limited exam due to pain.  Postoperative changes to neck as described above. Decreased ROM. Lymphedema left neck. LYMPH: Without palpable supraclavicular or axillary adenopathy. LUNGS: Clear no adventitious sounds. Respirations easy and unlabored. HEART: Regular rate and rhythm without murmur. ABDOMEN: Soft, nontender active bowel sounds x4. EXTREMITIES: Without clubbing or cyanosis no edema noted. NEUROLOGIC EXAMINATION: Cranial nerves grossly intact.     ASSESSMENT: Ray Steele was diagnosed with head and neck cancer in 2015. He underwent treatment with surgery, chemotherapy and radiation.       He has been experiencing worsening left neck pain, neck lymphedema and dysphagia over the last couple of months. On physical exam there is swelling and decreased neck ROM. There is no obvious sign of recurrent/new disease. Will obtain CT neck to further evaluate. Also will refer to Speech and Physical therapies.      He is a current smoker will require follow up CT chest to follow pulmonary nodules seen in July 2019. However did not light up on PET from 8/2019. CT chest August 2020 did not show infiltrates of nodules. Stable low density nodule left adrenal gland, likely an adenoma, hepatic steatosis and parapelvic cyst left kidney. Stable 4.4 fusiform aneurysm of ascending thoracic aorta. CT soft tissue neck showed expected post operative changes surrounding left mandible and left upper neck. Anterior subluxation of the left temporomandibular joint. Ray Steele has had improvement with neck ROM and swelling since working with physical therapy. He still notices intermittent swelling and tenderness in the left neck but it does improve with massage. Lightheaded episodes continue MRI brain in November 2021 did not show acute findings.        Dysphagia has worsened since last visit in February. He underwent EGD in May with Dr. Prabhjot Griffin and per report he was not able to pass a  scope due to treatment related changes. He was told he will likely require PEG tube placement again in the near future. He is not certain he would be agreeable to having one placed again. Will have him meet with Dietician today and refer to Speech for swallow evaluation and education on safe foods for him to eat. US head and neck 21 showed cystic nodule RML thyroid gland. Heterogeneous echogenicity throughout the right and left lobes of the tyroid gland which could be secondary to thyroiditis, changed since 19. Fatigue along with hot and cold flashes continue to be an issue for him. His thyroid medication is being adjusted per his PCP. Most recent TSH was improved but still elevated at 11.170, encouraged close continued follow up with PCP for management of this. Can be contributing to his continued symptoms. Current smoker will continue to obtain CT chest yearly. 21 CT chest showed stable 2mm noncalcified nodule in the RML. No new nodules no adenopathy. Conchetta Palmetto does not have concerning findings on today's physical exam.    PLAN:  Neck ROM and pain: improved since seeing PT. Left neck lymphedema: intermittent, massage does help  Dysphagia: worsening, had EGD was a limited study due to treatment related changes and not being able to pass  scope. Was told likely will require PEG tube in the near future. Meet with Dietician today and scheduled Speech evaluation and education. US H&N: showed stable findings from   Lightheaded: intermittent, MRI brain in 2020 showed no acute findings. Hypothyroid: TSH being adjusted per PCP. 2021 TSH was 11.170  Fatigue: continues, ? R/t hypothyroid, offered PT again but declined at this time. Hot and cold flashes: ? R/t hypothyroid  CT chest: 21 showed stable 2mm nodule. No new nodules no adenopathy.

## 2021-10-01 ENCOUNTER — HOSPITAL ENCOUNTER (OUTPATIENT)
Dept: SPEECH THERAPY | Age: 56
Setting detail: THERAPIES SERIES
Discharge: HOME | End: 2021-10-01
Payer: MEDICAID

## 2021-10-01 PROCEDURE — 92610 EVALUATE SWALLOWING FUNCTION: CPT | Performed by: SPEECH-LANGUAGE PATHOLOGIST

## 2021-10-01 NOTE — PROGRESS NOTES
** PLEASE SIGN, DATE AND TIME CERTIFICATION BELOW AND RETURN TO [facility] [place] OUTPATIENT REHABILITATION (FAX #: [phone]). ATTEST/CO-SIGN IF ACCESSING VIA [employer]. THANK YOU.**    I certify that I have examined the patient below and determined that Physical Medicine and Rehabilitation service is necessary and that I approve the established plan of care for up to 90 days or as specifically noted. Attestation, signature or co-signature of physician indicates approval of certification requirements.    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]    Treatment Diagnosis Dysphagia    Date of Evaluation 10/1/21      Functional Outcome Measure Used [facility] NOMS: swallowing    Functional Outcome Score Level 3 (10/1/21)       Insurance: Primary: Payor: Danny Gilmore /  /  / ,   Secondary:    Authorization Information: No precert until after 57UB visit   Visit # 1, 1/10 for progress note   Visits Allowed: Visit limit based on precert   Recertification Date:    Physician Follow-Up: 3/28/22   Physician Orders: Would like dysphagia diet recommend to improve nutrition and safety   Pertinent History: 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   Patient reports having xerostomia, but reports he uses a generic oral moisturizing spray throughout the day, however, the spray has been causing burning/stinging in his throat so he is not using it as consistently as he had  Patient reports this seems to help break up mucus in his throat, especially in the morning. Patient reports he has difficulty swallowing foods and liquids as it seems to \"stick\" in his throat. Patient had an EGD about 1 month ago and they were unable to pass a pediatric scope through his entire esophagus due to radiation treatment related changes. Patient denies any coughing during meals, but reports he \"gags\" a lot in general. Patient reports his weight fluctuates within 10-15 pounds. Patient reports Dr. Norbert Aranda told him he may have to think about getting a feeding tube if his weight continues to fluctuate. SUBJECTIVE: Patient pleasant and cooperative. No family present. Social/Functional History:  Electronic Medical Record reviewed and up to date      Pain:  6/10 - Pain location: left side of neck    Current Diet: Puree/dental soft soups with thin liquids    Respiratory Status:  Independent    Behavioral Observation:  Alert and Oriented    Oral Mechanism Evaluation:      Facial / Labial Impaired Decreased labial range of motion on the left, decreased oral opening   Lingual Impaired Decreased range of motion laterally, impaired strength   Dentition Impaired Patient is edentulous   Velum Impaired Deviates to the right with phonation   Vocal Quality Impaired Hoarse vocal quality   Sensation Impaired Decreased sensation around the incision site   Cough Not Tested      Patient Evaluated Using:  Puree (applesauce), soft solid (diced peaches), thin liquids via cup    Oral Phase:  Impaired:  Impaired Mastication d/t being edentulous and Reduced Bolus Formation    Pharyngeal Phase: Impaired:  Delayed Swallow, Decreased Hyolaryngeal Elevation and Suspected Pharyngeal Residue secondary to spontaneous multiple swallows (5-8 swallows required per food and liquid bolus).   Patient coughed up a chunk of the peaches into the oral cavity following a brief delay.    Signs and Symptoms of Laryngeal Penetration/Aspiration: No signs/symptoms of aspiration evident in this evaluation, but cannot rule out silent aspiration. IMPRESSIONS: Patient presents with oral and pharyngeal phase deficits as evidenced by impaired mastication, decreased bolus formation, delayed swallow, decreased hyolaryngeal elevation with palpation and suspected pharyngeal residue secondary to spontaneous swallows and patient coughed up a chunk of the peaches into the oral cavity shortly after swallowing it. Patient required increased time to masticate the soft textures given no dentition. Patient also required increased time to initiate swallows as well as utilized significant effort every time he swallowed. Patient with a \"built-in\" chin tuck when initiating a swallow. Patient did not demonstrate any overt s/s of aspiration, however, cannot rule out silent aspiration from a clinical swallow evaluation alone. RECOMMENDATIONS/ASSESSMENT:  Instrumental Evaluation: Modified Barium Swallow (MBS)     Diet Recommendations:  Puree/dental soft soups with thin liquids  Strategies:  Full Upright Position, Small Bite/Sip, Multiple Swallow, Monitor for Fatigue and slow rate of intake   Rehabilitation Potential/Prognosis: fair  Areas for Improvement: Impaired swallow function  Specific Interventions Next Treatment: complete MBS    Activity/Treatment Tolerance:  [x]  Patient tolerated treatment well  []  Patient limited by fatigue  []  Patient limited by pain   []  Patient limited by other medical complications  []  Other:     GOALS:  Patient Goal: swallow easier    Short Term Goals:  Short-term Goals  Timeframe for Short-term Goals: 4 weeks  Goal 1: Complete MBS to further assess swallow function. Add goals as indicated. Long Term Goals:  Long-term Goals  Timeframe for Long-term Goals: 4 weeks  Goal 1: Complete MBS to further assess swallow function. Add goals as indicated.     Patient Education:   [x]  HEP/Education Completed: Plan of Care, recommendation to complete MBS to further assess, diet, swallowing strategies  []  No new Education completed  []  Reviewed Prior HEP      [x]  Patient verbalized and/or demonstrated understanding of education provided. []  Patient unable to verbalize and/or demonstrate understanding of education provided. Will continue education. []  Barriers to learning:     PLAN:  Treatment Recommendations:      [x]  Plan of care initiated. Plan to see patient 1 time in 1 month to complete MBS.   []  Continue with current plan of care  []  Modify plan of care as follows:    []  Hold pending physician visit  []  Discharge    Time In 1362   Time Out 0002   Timed Code Minutes: 0 min   Total Treatment Time: 39 min         Jonathan Santana M.SKelsey [id number] [place] [address]

## 2021-10-07 NOTE — TELEPHONE ENCOUNTER
no deformities , breathing is unlabored without accessory muscle use , normal breath sounds. Review abnormal tilt.

## 2021-10-12 ENCOUNTER — HOSPITAL ENCOUNTER (OUTPATIENT)
Dept: GENERAL RADIOLOGY | Age: 56
Discharge: HOME | End: 2021-10-12
Payer: MEDICAID

## 2021-10-12 DIAGNOSIS — C06.2: ICD-10-CM

## 2021-10-12 DIAGNOSIS — R13.12 DYSPHAGIA, OROPHARYNGEAL: ICD-10-CM

## 2021-10-12 PROCEDURE — 2500000003 HC RX 250 WO HCPCS: Performed by: NURSE PRACTITIONER

## 2021-10-12 PROCEDURE — 74230 X-RAY XM SWLNG FUNCJ C+: CPT

## 2021-10-12 PROCEDURE — 92611 MOTION FLUOROSCOPY/SWALLOW: CPT | Performed by: SPEECH-LANGUAGE PATHOLOGIST

## 2021-10-12 RX ADMIN — BARIUM SULFATE 10 ML: 400 PASTE ORAL at 08:34

## 2021-10-12 RX ADMIN — BARIUM SULFATE 20 ML: 0.81 POWDER, FOR SUSPENSION ORAL at 08:34

## 2021-10-12 NOTE — DISCHARGE SUMMARY
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 Patient reports having xerostomia, but reports he uses a generic oral moisturizing spray throughout the day, however, the spray has been causing burning/stinging in his throat so he is not using it as consistently as he had  Patient reports this seems to help break up mucus in his throat, especially in the morning.  Patient reports he has difficulty swallowing foods and liquids as it seems to \"stick\" in his throat. Patient had an EGD about 1 month ago and they were unable to pass a pediatric scope through his entire esophagus due to radiation treatment related changes.  Patient denies any coughing during meals, but reports he \"gags\" a lot in general. Patient reports his weight fluctuates within 10-15 pounds. Patient reports Dr. Kim Jennings told him he may have to think about getting a feeding tube if his weight continues to fluctuate. Patient had a clinical swallow evaluation completed on 10/1/21 with the recommendation for a MBS to further assess swallow function. Patient  has a past medical history of Asthma, Head and neck cancer ([address] Hypertension, Lymphedema, Syncope, and Syncope and collapse.   Current Diet: Puree/dental soft soups with thin liquids    Pain: 7/10 - Pain location: left side of throat     SUBJECTIVE:  Patient sitting upright in the radiology suite chair throughout testing. Patient pleasant and cooperative. Patient's significant other present for education following testing. OBJECTIVE:    Respiratory Status:  Independent    Behavioral Observation:  Alert    PATIENT WAS EVALUATED USING:  Puree, soft solid, thin liquids via cup    ORAL PREPARATION PHASE:  Impaired:  Impaired Mastication (edentulous)    ORAL PHASE: Uncoordinated AP Movement, Slow AP Movement, Uncontrolled Bolus/Diffuse Fall Over Tongue Base and residue along the dorsal tongue and along the tongue base     ORAL PHASE VIMAL SCORE: (Dysphagia outcome and severity scale)  4 = Mild-Moderate Dysphagia - May have one or two diet consistencies restricted - Oral residue clears with cue - Intermittent supervision or cueing    PHARYNGEAL PHASE:  Impaired: Delayed Swallow, Decreased Epiglottic Inversion, Decreased Tongue Based Retraction, Residue in the Valleculae, Residue in the Pyriform Sinus and Residue Along the Posterior Pharyngeal Wall     PHARYNGEAL PHASE VIMAL SCORE: (Dysphagia outcome and severity scale)  4 = Mild-Moderate Dysphagia - One or two consistencies restricted - may exhibit one or more of the following: Residue clears with cue, Aspiration of one consistency with weak or no reflexive cough, Laryngeal penetration to the vocal cords with cough with two consistencies, Laryngeal penetration to the vocal cords without cough on one consistency    EVIDENCE FOR LARYNGEAL PENETRATION AND/OR ASPIRATION:  No evidence of aspiration  Laryngeal penetration evident with thin liquids without use of a chin tuck. Patient able to clear the material in the laryngeal vestibule with a volitional cough. No laryngeal penetration evident with thin liquids with use of a chin tuck. PENETRATION-ASPIRATION SCALE (PAS):   Thin Liquids: 2 = Material enters the airway, remains above vocal folds, and is ejected from the airway - without use of a chin tuck  Puree:  1 = Material does not enter the airway  Soft Solid:  1 = Material does not enter the airway    ESOPHAGEAL PHASE:   Slow passage of the bolus through the upper to mid esophagus, unable to visualize the entire esophagus with testing. Note:  Patient was scheduled to have an EGD 1-2 months ago and reports they were unable to get through the esophagus with use of a pediatric scope. ATTEMPTED TECHNIQUES:  Small Bolus Size Effective    Straw Not Attempted    Cup Effective Especially with use of a chin tuck   Chin Tuck Effective No laryngeal penetration noted with use of a chin tuck   Head Turn Not Attempted    Spoon Presentations Not Attempted with liquids    Volitional Cough Effective Effective in clearing laryngeal penetration of thin liquids   Spontaneous Cough Not Attempted           DIAGNOSTIC IMPRESSIONS:  Patient presents with mild to moderate oropharyngeal dysphagia characterized by impaired mastication (edentulous), slow and uncoordinated AP movement, decreased bolus control, reduced tongue base retraction, delayed swallow, decreased epiglottic movement (especially without use of a chin tuck) and decreased pharyngeal strength resulting in residue in the valleculae, pyriform sinuses and along the posterior pharyngeal wall. Increased effort and time required to initiate most swallows. Patient with less residuals along the base of the tongue and in the pharynx with use of a chin tuck. Liquid assist was also helpful in clearing majority of the residue. Patient with improved airway protection with use of a chin tuck. Patient intermittently brought up some residuals from the pharynx into the oral cavity and expectorated it. This occurred less frequent with use of a chin tuck. Laryngeal penetration evident with thin liquids via cup without use of a chin tuck, however, this cleared with a volitional cough. No laryngeal penetration noted with thin liquids WITH use of a chin tuck. No aspiration identified throughout this testing.     **Slow passage of the bolus through the upper to mid esophagus, but unable to view the entire esophagus. Note:  Patient reports he was scheduled to have an EGD 1-2 months ago, however, the physician was unable to pass a pediatric scope through the esophagus to complete testing. Diet Recommendations:  Minced and moist diet with thin liquids (use of a chin tuck with ALL PO)  Strategies:  Full Upright Position, Small Bite/Sip, No Straw, Multiple Swallow, Chin Tuck with ALL PO, Medications Whole with Thin with use of a chin tuck, Alternate Solids and Liquids, Limit Distractions and slow rate of intake   Rehabilitation Potential: good    EDUCATION:  Learner: Patient and Significant Other  Education:  Reviewed results and recommendations of this evaluation, Reviewed diet and strategies and Reviewed recommendations for follow-up. Patient provided with a handout explaining the minced and moist diet, foods allowed and foods to avoid on the diet. Patient also provided with handout with the diet recommendations and swallowing strategies. Evaluation of Education: Verbalizes understanding and Needs further instruction    PLAN:  Skilled SLP intervention 1 times per week for 8 weeks to address the established treatment plan. Specific interventions for next session may include: review MBS results, diet and swallowing strategies, lingual exercises, pharyngeal strengthening exercises. PATIENT GOAL:    Did not state. Will further assess during treatment.     SHORT TERM GOALS:  Short-term Goals  Timeframe for Short-term Goals: 4 weeks  Goal 1: Patient will tolerate a minced and moist diet with thin liquids without overt s/s of aspiration and implementation of compensatory swallowing strategies with min cues to safely maintain adequate nutrition and hydration/  Goal 2: Patient will tolerate trials of advanced textures without overt s/s of aspiration and use of swallowing strategies with min cues to safely tolerate advancement to a soft and bite sized diet.  Goal 3: Patient will complete lingual range of motion and coordination exercises and bolus manipulation exercises x 20 for improved AP movement and bolus control. Goal 4: Patient will complete pharyngeal strengthening exercises (effortful swallows, tongue base retraction, chin tuck against resistance) x 20 for improved pharyngeal strength and passage of the bolus through the pharynx. LONG TERM GOALS:  Long-term Goals  Timeframe for Long-term Goals: 8 weeks  Goal 1: Patient will tolerate a soft and bite sized diet with thin liquids without overt s/s of aspiration and independent use of swallowing strategies at least 90% of the time to safely maintain adequate nutrition and hydration orally.       Yajaira Fonseca M.S. [facility] [address]

## 2021-10-15 ENCOUNTER — APPOINTMENT (OUTPATIENT)
Dept: SPEECH THERAPY | Age: 56
End: 2021-10-15
Payer: MEDICAID

## 2021-10-18 ENCOUNTER — PROCEDURE VISIT (OUTPATIENT)
Dept: CARDIOLOGY CLINIC | Age: 56
End: 2021-10-18
Payer: MEDICAID

## 2021-10-18 DIAGNOSIS — R55 SYNCOPE, UNSPECIFIED SYNCOPE TYPE: Primary | ICD-10-CM

## 2021-10-18 PROCEDURE — 93298 REM INTERROG DEV EVAL SCRMS: CPT | Performed by: NUCLEAR MEDICINE

## 2021-10-18 PROCEDURE — G2066 INTER DEVC REMOTE 30D: HCPCS | Performed by: NUCLEAR MEDICINE

## 2021-10-20 ENCOUNTER — HOSPITAL ENCOUNTER (OUTPATIENT)
Dept: SPEECH THERAPY | Age: 56
Setting detail: THERAPIES SERIES
Discharge: HOME | End: 2021-10-20
Payer: MEDICAID

## 2021-10-20 PROCEDURE — 92526 ORAL FUNCTION THERAPY: CPT

## 2021-10-20 NOTE — PROGRESS NOTES
throat so he is not using it as consistently as he had  Patient reports this seems to help break up mucus in his throat, especially in the morning.  Patient reports he has difficulty swallowing foods and liquids as it seems to \"stick\" in his throat. Patient had an EGD about 1 month ago and they were unable to pass a pediatric scope through his entire esophagus due to radiation treatment related changes.  Patient denies any coughing during meals, but reports he \"gags\" a lot in general. Patient reports his weight fluctuates within 10-15 pounds. Patient reports Dr. Marcelo Menendez told him he may have to think about getting a feeding tube if his weight continues to fluctuate. MBS completed 10/12/21 recommending a minced and moist diet with thin liquids. See report for further detials. SUBJECTIVE: Patient pleasant and cooperative reporting 5/10 pain located in the throat prior to initiation of session. Patient reports his weight is still fluctuating about 15 pounds. States he often experiences a salty taste on left side of his mouth which reduces his appetite. No observation. *Patient completed MBS on 10/12 and recommended the following goals which are aforementioned. SHORT TERM GOAL #1:  Goal 1: Patient will tolerate a minced and moist diet with thin liquids without overt s/s of aspiration and implementation of compensatory swallowing strategies with min cues to safely maintain adequate nutrition and hydration/ GOAL INITIATED 10/20. ONGOING. INTERVENTIONS: Patient confirms he has been following a minced and moist diet with thin liquids. States he is still experiencing coughing and throat clearing frequently during meals. ST reviewed skilled compensatory swallowing strategies with patient and provided the rationale as evident through [address] on 10/12/2021.  \"Full Upright Position, Small Bite/Sip, No Straw, Multiple Swallow, Chin Tuck with ALL PO, Medications Whole with Thin with use of a chin tuck, Alternate Solids and Liquids, Limit Distractions and slow rate of intake. \" Patient verbalized understanding are reports the chin tuck is \"a lot easier\" to get his food down. ST recommended patient continue to strictly follow all strategies. SHORT TERM GOAL #2:  Goal 2: Patient will tolerate trials of advanced textures without overt s/s of aspiration and use of swallowing strategies with min cues to safely tolerate advancement to a soft and bite sized diet. GOAL INITIATED 10/20. ONGOING. INTERVENTIONS: Not addressed this date due to focus on additional goals. SHORT TERM GOAL #3:  Goal 3: Patient will complete lingual range of motion and coordination exercises and bolus manipulation exercises x 20 for improved AP movement and bolus control. GOAL INITIATED 10/20. ONGOING. INTERVENTIONS: ST provided patient with handout of exercises. Patient and ST completed each of the following exercises following verbal cues. HEP initiated for patient to complete aforementioned exercises 2x a day with 10 repetitions each with the goal to increase to 20 repetitions as therapy progresses.   -Lingual extension: x10 with fair success, min cues not to rest tongue on bottom lip  -Lingual lateralization: x10 with good success, no cues  -Lingual \"lick backs\": E56 with fair success, min cues d/t slightly reduced posterior lingual ROM  -Lingual circles: x10 each direction with fair success, min cues to improve ROM to left bottom lip    SHORT TERM GOAL #4:  Goal 4: Patient will complete pharyngeal strengthening exercises (effortful swallows, tongue base retraction, chin tuck against resistance) x 20 for improved pharyngeal strength and passage of the bolus through the pharynx. GOAL INITIATED 10/20. ONGOING. INTERVENTIONS: ST provided patient with handout of exercises. Patient and ST completed each of the following exercises following verbal cues.  HEP initiated for patient to complete aforementioned exercises 2x a day with 10 repetitions each with the goal to increase to 20 repetitions as therapy progresses.   -Effortful swallow x15 with good success using x3 drinks of thin liquids via cup  -Karol x4 with good success; however, patient reporting fatigue after completing lingual ROM and coordination exercises and pharyngeal strengthening exercises. *ST and patient briefly discussed Mendelsohn maneuver and chin tuck against resistance but did not demonstrate due to time constraints. Long-term Goals  Timeframe for Long-term Goals: 4 weeks  Goal 1: Patient will tolerate a soft and bite sized diet with thin liquids without overt s/s of aspiration and independent use of swallowing strategies at least 90% of the time to safely maintain adequate nutrition and hydration orally. ONGOING. Assessment: Progressing towards goals    PROGRESS SUMMARY: Patient initially seen on 10/1 for a clinical swallow evaluation in which a MBS was recommended to further assess pharyngeal phase integrity. MBS was completed 10/12 in which the aforementioned 4 STGs and 1 LTG were recommended. This session was the first treatment session addressing these skilled goals. ST and patient discussed diet level recommendations and exercises this date in which patient was receptive to education and explanations. Given this very shortened therapy period, patient has not met any of his STGs or LTGs. Continuation of skilled speech therapy warranted 1x/week for 8 weeks to improve lingual coordination/ROM, pharyngeal strength and promote safe swallowing practices within least restrictive diet. Specific Interventions Next Treatment: tolerate minced and moist diet, advanced texture trials, complete lingual ROM and coordination exercises, complete pharyngeal strengthening exercises.      Activity/Treatment Tolerance:  []  Patient tolerated treatment well  [x]  Patient limited by fatigue as repetitions progressed   []  Patient limited by pain   []  Patient limited by other medical complications  []  Other:     Patient Education:   [x]  HEP/Education Completed: Plan of Care, Goals, Swallowing HEP  []  No new Education completed  [x]  Reviewed Prior HEP      [x]  Patient verbalized and/or demonstrated understanding of education provided. []  Patient unable to verbalize and/or demonstrate understanding of education provided. Will continue education. []  Barriers to learning:    PLAN:  []  Plan of care initiated. Plan to see patient 1 times per week for 8 weeks to address the treatment planned outlined above.   [x]  Continue with current plan of care as reported in [facility]  []  Modify plan of care as follows:    []  Hold pending physician visit  []  Discharge    Time In 1330   Time Out 1400   Timed Code Minutes: 0 min   Total Treatment Time: 30 min     Monae Davila M.S., CF-SLP, [id number]

## 2021-11-03 ENCOUNTER — HOSPITAL ENCOUNTER (OUTPATIENT)
Dept: SPEECH THERAPY | Age: 56
Setting detail: THERAPIES SERIES
Discharge: HOME | End: 2021-11-03
Payer: MEDICAID

## 2021-11-03 PROCEDURE — 92526 ORAL FUNCTION THERAPY: CPT | Performed by: SPEECH-LANGUAGE PATHOLOGIST

## 2021-11-03 NOTE — PROGRESS NOTES
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]    Treatment Diagnosis Dysphagia    Date of Evaluation 10/1/21      Functional Outcome Measure Used [facility] NOMS: swallowing    Functional Outcome Score Level 3 (10/1/21)       Insurance: Primary: Payor: Kaelyn Carballo /  /  / ,   Secondary:    Authorization Information: No precert until after 89YR visit   Visit # 3, 3/10 for progress note   Visits Allowed: Visit limit based on precert   Recertification Date: 12/15/18   Physician Follow-Up: 3/28/22   Physician Orders: Would like dysphagia diet recommend to improve nutrition and safety   Pertinent History: 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 Patient reports having xerostomia, but reports he uses a generic oral moisturizing spray throughout the day, however, the spray has been causing burning/stinging in his throat so he is not using it as consistently as he had  Patient reports this seems to help break up mucus in his throat, especially in the morning.  Patient reports he has difficulty swallowing foods and liquids as it seems to \"stick\" in his throat. Patient had an EGD about 1 month ago and they were unable to pass a pediatric scope through his entire esophagus due to radiation treatment related changes.  Patient denies any coughing during meals, but reports he \"gags\" a lot in general. Patient reports his weight fluctuates within 10-15 pounds.   Patient reports Dr. Zee Baez told him he may have to think about getting a feeding tube if his weight continues to fluctuate. [facility] completed 10/12/21 recommending a minced and moist diet with thin liquids. See report for further detials. SUBJECTIVE: Patient states, \"My energy level sucks. \"  Reports he hasn't had much energy for the past couple of days, but reports this is typical - it comes and goes. Patient reports last week for a couple of days his throat felt \"raw. \"  Reports his throat felt extra dry compared to usual.  Patient rated his pain in his throat as 6-7/10 this date. SHORT TERM GOAL #1:  Goal 1: Patient will tolerate a minced and moist diet with thin liquids without overt s/s of aspiration and implementation of compensatory swallowing strategies with min cues to safely maintain adequate nutrition and hydration/  INTERVENTIONS: Patient reports he has been following the minced and moist diet with thin liquids and using a chin tuck with all PO. Patient reports the foods and liquids feel like they are going down much better when he tucks his chin. Patient reports taking his pills whole with water with use of a chin tuck and he is tolerating it well. Reviewed all swallowing strategies and patient reports he is following all strategies. Patient denies any overt difficulty during meals. Completed trials of thin liquids with use of a chin tuck without overt difficulty on 4/4 trials. Patient required min cues for correct use of a chin tuck, but independently took small sips and completed multiple swallows throughout. SHORT TERM GOAL #2:  Goal 2: Patient will tolerate trials of advanced textures without overt s/s of aspiration and use of swallowing strategies with min cues to safely tolerate advancement to a soft and bite sized diet. INTERVENTIONS: Not addressed this date due to focus on additional goals.      SHORT TERM GOAL #3:  Goal 3: Patient will complete lingual range of motion and coordination exercises and bolus manipulation exercises x 20 for improved AP movement and bolus control. INTERVENTIONS: Patient completed the following lingual range of motion exercises this session:  -Lingual extension: x10 with fair to good success, no cues  -Lingual lateralization: x10 with good success, no cues  -Lingual circles: x10 each direction with fair to good success, no cues; reports counter-clockwise was more challenging    SHORT TERM GOAL #4:  Goal 4: Patient will complete pharyngeal strengthening exercises (effortful swallows, tongue base retraction, chin tuck against resistance) x 20 for improved pharyngeal strength and passage of the bolus through the pharynx. INTERVENTIONS: Patient completed the following pharyngeal strengthening exercises this session:  -Effortful swallow x 20 with and without thin liquids with fair to good effort  -Karol - attempted, however, unable to complete d/t patient lacking full closure of mandible  -Lingual protrusion with alternating tongue tip back : x10 with fair to good success, min cues  -Chin tuck against resistance x 10 with 5 second hold each time with good success, min cues  -mendelsohn maneuver x 10 with good success on 9/10 trials, min cue initially  **Patient reports he has not been completing his HEP daily and when he does complete it, it is only 1 time per day. Ongoing education on the recommendation to complete HEP 2 times per day and slowly increase the number of repetitions up to 20 of each exercise. Patient verbalized understanding. Assessment: Progressing towards goals  Specific Interventions Next Treatment: tolerate minced and moist diet, advanced texture trials, complete lingual ROM and coordination exercises, complete pharyngeal strengthening exercises.      Activity/Treatment Tolerance:  [x]  Patient tolerated treatment well  []  Patient limited by fatigue as repetitions progressed   []  Patient limited by pain   []  Patient limited by other medical complications  []  Other:     Patient Education:   [x]  HEP/Education Completed: Plan of Care, Goals, Swallowing HEP and rationale for completing  []  No new Education completed  []  Reviewed Prior HEP      [x]  Patient verbalized and/or demonstrated understanding of education provided. []  Patient unable to verbalize and/or demonstrate understanding of education provided. Will continue education. []  Barriers to learning:    PLAN:  []  Plan of care initiated. Plan to see patient 1 times per week for 8 weeks to address the treatment planned outlined above.   [x]  Continue with current plan of care    []  Modify plan of care as follows:     []  Hold pending physician visit  []  Discharge    Time In 1332   Time Out 1402   Timed Code Minutes: 0 min   Total Treatment Time: 30 min       Arash Arroyo M.S. [address] [place] [address]

## 2021-11-10 ENCOUNTER — HOSPITAL ENCOUNTER (OUTPATIENT)
Dept: SPEECH THERAPY | Age: 56
Setting detail: THERAPIES SERIES
Discharge: HOME | End: 2021-11-10
Payer: MEDICAID

## 2021-11-10 PROCEDURE — 92526 ORAL FUNCTION THERAPY: CPT | Performed by: SPEECH-LANGUAGE PATHOLOGIST

## 2021-11-10 NOTE — PROGRESS NOTES
have to think about getting a feeding tube if his weight continues to fluctuate. [facility] completed 10/12/21 recommending a minced and moist diet with thin liquids. See report for further detials. SUBJECTIVE: Patient reports his throat is \"raw\" when he wakes up in the morning. Reports he tried to use a spray for dry mouth right before bed, but it burned his throat and he had to drink quite a bit of water. Patient reports he is currently having 5-6/10 pain in his throat. SHORT TERM GOAL #1:  Goal 1: Patient will tolerate a minced and moist diet with thin liquids without overt s/s of aspiration and implementation of compensatory swallowing strategies with min cues to safely maintain adequate nutrition and hydration/  INTERVENTIONS: Patient reports his swallowing is \"going pretty good. \" Reports it is about the same. Reports he continues to tuck his chin with everything he eats and drinks. Reports he feels like he occasionally has to throw his head back to get it to go down and \"it goes right down. \"  Further educated the patient on the rationale for tucking his chin. SHORT TERM GOAL #2:  Goal 2: Patient will tolerate trials of advanced textures without overt s/s of aspiration and use of swallowing strategies with min cues to safely tolerate advancement to a soft and bite sized diet. INTERVENTIONS: Discussed with the patient the option of trying some diced peaches. Patient reports he does not do well with the fruit cups, but he does well eating some of the slices of peaches with cottage cheese. Patient reports he will pan to bring in some of the sliced peaches to the next session. SHORT TERM GOAL #3:  Goal 3: Patient will complete lingual range of motion and coordination exercises and bolus manipulation exercises x 20 for improved AP movement and bolus control.   INTERVENTIONS: Patient completed the following lingual range of motion exercises this session:  -Lingual extension: x10 with good success, mild deviation to the left, no cues  -Lingual lateralization: x15 with fair to good success, no cues  -Lingual circles: x15 each direction with fair to good success, min cues for improved range of motion especially with elevation  -internal lingual lateralization x 15 with fair to good success    SHORT TERM GOAL #4:  Goal 4: Patient will complete pharyngeal strengthening exercises (effortful swallows, tongue base retraction, chin tuck against resistance) x 20 for improved pharyngeal strength and passage of the bolus through the pharynx. INTERVENTIONS: Patient completed the following pharyngeal strengthening exercises this session:  -Effortful swallow x 20 with and without thin liquids with good effort  -Karol - attempted, however, unable to complete d/t patient lacking full closure of mandible  -Lingual protrusion with alternating tongue tip back : x 15 with good success, x 1 cue to hold the retraction for 3 seconds  -Chin tuck against resistance x 10 with 5 second hold each time with good success, min cues  **Patient reports he has been completing his HEP 1 x per day. Ongoing education for the recommendation to complete 2 x per day. Encouraged the patient to develop a routine that might make it easier for him to complete twice a day. Patient verbalized understanding. Assessment: Progressing towards goals  Specific Interventions Next Treatment: tolerate minced and moist diet, advanced texture trials, complete lingual ROM and coordination exercises, complete pharyngeal strengthening exercises.      Activity/Treatment Tolerance:  [x]  Patient tolerated treatment well  []  Patient limited by fatigue as repetitions progressed   []  Patient limited by pain   []  Patient limited by other medical complications  []  Other:     Patient Education:   [x]  HEP/Education Completed: Plan of Care, Goals, Swallowing HEP and rationale for completing  []  No new Education completed  []  Reviewed Prior HEP      [x]  Patient verbalized and/or demonstrated understanding of education provided. []  Patient unable to verbalize and/or demonstrate understanding of education provided. Will continue education. []  Barriers to learning:    PLAN:  []  Plan of care initiated. Plan to see patient 1 times per week for 8 weeks to address the treatment planned outlined above.   [x]  Continue with current plan of care    []  Modify plan of care as follows:     []  Hold pending physician visit  []  Discharge    Time In 1047   Time Out 1115   Timed Code Minutes: 0 min   Total Treatment Time: 28 min       Stefan Wallace, M.S. [address] [place] [id number]

## 2021-11-17 ENCOUNTER — HOSPITAL ENCOUNTER (OUTPATIENT)
Dept: SPEECH THERAPY | Age: 56
Setting detail: THERAPIES SERIES
Discharge: HOME | End: 2021-11-17
Payer: MEDICAID

## 2021-11-17 PROCEDURE — 92526 ORAL FUNCTION THERAPY: CPT | Performed by: SPEECH-LANGUAGE PATHOLOGIST

## 2021-11-17 NOTE — PROGRESS NOTES
have to think about getting a feeding tube if his weight continues to fluctuate. [facility] completed 10/12/21 recommending a minced and moist diet with thin liquids. See report for further detials. SUBJECTIVE:  Patient reports his throat is raw again today and has been for the past 2-3 days. Patient reports he also has a \"salty\" taste in his mouth that he has had for the past 2 days and it usually lasts 2-3 days. SHORT TERM GOAL #1:  Goal 1: Patient will tolerate a minced and moist diet with thin liquids without overt s/s of aspiration and implementation of compensatory swallowing strategies with min cues to safely maintain adequate nutrition and hydration/  INTERVENTIONS:  Patient reports he has not been eating much since he has had the salty taste. Patient encouraged to try drinking some kind of supplemental drink that is more sugary tasting, but has good nutrition. Patient reports he has, but it still tastes like salt. Patient reports he will continue to try eating some foods (reports it usually goes away). Completed trials of thin liquids this session without overt difficulty on 5/5 trials. SHORT TERM GOAL #2:  Goal 2: Patient will tolerate trials of advanced textures without overt s/s of aspiration and use of swallowing strategies with min cues to safely tolerate advancement to a soft and bite sized diet. INTERVENTIONS: Patient reports he forgot about bringing in the sliced peaches with cottage cheese. Patient declined any PO trials this session d/t the salty taste that he currently has in his mouth. SHORT TERM GOAL #3:  Goal 3: Patient will complete lingual range of motion and coordination exercises and bolus manipulation exercises x 20 for improved AP movement and bolus control.   INTERVENTIONS: Patient completed the following lingual range of motion exercises this session:  -Lingual lateralization: x 20 with good success, no cues  -Lingual circles: x 20 each direction with fair to good success, mildly decreased range of motion, min cues for improved range of motion especially with elevation  -internal lingual lateralization x 15 with fair to good success to the left, fair to the right; patient reported having some pain on the left side of his tongue when completing this exercise to the right; also reports numbness on the left side of his tongue (reports it comes and goes)    SHORT TERM GOAL #4:  Goal 4: Patient will complete pharyngeal strengthening exercises (effortful swallows, tongue base retraction, chin tuck against resistance) x 20 for improved pharyngeal strength and passage of the bolus through the pharynx. INTERVENTIONS: Patient completed the following pharyngeal strengthening exercises this session:  -Effortful swallow x 20 without thin liquids with good effort  -Lingual protrusion with alternating tongue tip back: x  20 with good success, no cues  -Chin tuck against resistance x 15 with 5 second hold each time with good success, min cues to hold for 5 seconds  **Patient continues to complete his HEP 1 time per day. Ongoing education on the importance/recommendaiton to complete 2 times per day to get the most benefit. Assessment: Progressing towards goals  Specific Interventions Next Treatment: tolerate minced and moist diet, advanced texture trials, complete lingual ROM and coordination exercises, complete pharyngeal strengthening exercises. Activity/Treatment Tolerance:  [x]  Patient tolerated treatment well  []  Patient limited by fatigue as repetitions progressed   []  Patient limited by pain   []  Patient limited by other medical complications  []  Other:     Patient Education:   [x]  HEP/Education Completed: Swallowing HEP and rationale for completing 2 x per day, increase oral intake of foods  []  No new Education completed  []  Reviewed Prior HEP      [x]  Patient verbalized and/or demonstrated understanding of education provided.   []  Patient unable to verbalize and/or demonstrate understanding of education provided. Will continue education. []  Barriers to learning:    PLAN:  []  Plan of care initiated. Plan to see patient 1 times per week for 8 weeks to address the treatment planned outlined above.   [x]  Continue with current plan of care    []  Modify plan of care as follows:     []  Hold pending physician visit  []  Discharge    Time In 1046   Time Out 1115   Timed Code Minutes: 0 min   Total Treatment Time: 29 min       Mo Morris M.S. [address] [place] [address]

## 2021-11-19 ENCOUNTER — HOSPITAL ENCOUNTER (OUTPATIENT)
Age: 56
Setting detail: SPECIMEN
Discharge: HOME | End: 2021-11-19

## 2021-11-19 LAB
SARS-COV-2 ANTIBODY, TOTAL: NEGATIVE
THYROXINE, FREE: 0.68 NG/DL (ref 0.93–1.7)
TSH SERPL DL<=0.05 MIU/L-ACNC: 21.55 MIU/L (ref 0.3–5)

## 2021-11-23 ENCOUNTER — PROCEDURE VISIT (OUTPATIENT)
Dept: CARDIOLOGY CLINIC | Age: 56
End: 2021-11-23
Payer: MEDICAID

## 2021-11-23 DIAGNOSIS — R55 SYNCOPE AND COLLAPSE: Primary | ICD-10-CM

## 2021-11-23 PROCEDURE — 93298 REM INTERROG DEV EVAL SCRMS: CPT | Performed by: NUCLEAR MEDICINE

## 2021-11-23 PROCEDURE — G2066 INTER DEVC REMOTE 30D: HCPCS | Performed by: NUCLEAR MEDICINE

## 2021-12-01 ENCOUNTER — HOSPITAL ENCOUNTER (OUTPATIENT)
Dept: SPEECH THERAPY | Age: 56
Setting detail: THERAPIES SERIES
Discharge: HOME | End: 2021-12-01
Payer: MEDICAID

## 2021-12-01 ENCOUNTER — OFFICE VISIT (OUTPATIENT)
Dept: CARDIOLOGY CLINIC | Age: 56
End: 2021-12-01
Payer: MEDICAID

## 2021-12-01 VITALS
DIASTOLIC BLOOD PRESSURE: 72 MMHG | WEIGHT: 169 LBS | HEIGHT: 71 IN | BODY MASS INDEX: 23.66 KG/M2 | SYSTOLIC BLOOD PRESSURE: 112 MMHG | HEART RATE: 72 BPM

## 2021-12-01 DIAGNOSIS — I10 PRIMARY HYPERTENSION: ICD-10-CM

## 2021-12-01 DIAGNOSIS — R55 SYNCOPE AND COLLAPSE: Primary | ICD-10-CM

## 2021-12-01 PROCEDURE — G8420 CALC BMI NORM PARAMETERS: HCPCS | Performed by: NUCLEAR MEDICINE

## 2021-12-01 PROCEDURE — 99214 OFFICE O/P EST MOD 30 MIN: CPT | Performed by: NUCLEAR MEDICINE

## 2021-12-01 PROCEDURE — 3017F COLORECTAL CA SCREEN DOC REV: CPT | Performed by: NUCLEAR MEDICINE

## 2021-12-01 PROCEDURE — 92526 ORAL FUNCTION THERAPY: CPT | Performed by: SPEECH-LANGUAGE PATHOLOGIST

## 2021-12-01 PROCEDURE — G8427 DOCREV CUR MEDS BY ELIG CLIN: HCPCS | Performed by: NUCLEAR MEDICINE

## 2021-12-01 PROCEDURE — G8484 FLU IMMUNIZE NO ADMIN: HCPCS | Performed by: NUCLEAR MEDICINE

## 2021-12-01 PROCEDURE — 4004F PT TOBACCO SCREEN RCVD TLK: CPT | Performed by: NUCLEAR MEDICINE

## 2021-12-01 RX ORDER — METOPROLOL TARTRATE 100 MG/1
50 TABLET ORAL 2 TIMES DAILY
COMMUNITY

## 2021-12-01 NOTE — PROGRESS NOTES
[address] [facility] [address]  [place] [address]  Dept: [phone]  Dept Fax: [phone]  Loc: [phone]    Visit Date: 12/1/2021    Balbina Bryan is a 54 y.o. male who presents todayfor:  Chief Complaint   Patient presents with    Check-Up    Hypertension    Dizziness   syncope and dizziness  Has a linq  Some fatigue   Lower BP today   Not checked usually   No changes in breathing  No chest pain   Too much caffeine   [place] due usually       HPI:  HPI  Past Medical History:   Diagnosis Date    Asthma     Head and neck cancer ([facility] [address]     Hypertension     Lymphedema     Syncope 12/22/2020    Syncope and collapse       Past Surgical History:   Procedure Laterality Date    GASTROSTOMY TUBE PLACEMENT  10/2/15    MANDIBLE RECONSTRUCTION  10/2/15    SKIN GRAFT  10/2/15    jaw    UPPER GASTROINTESTINAL ENDOSCOPY N/A 5/11/2021    EGD ESOPHAGOGASTRODUODENOSCOPY performed by Kali Larsen MD at [address] [facility]     Family History   Problem Relation Age of Onset    Asthma Mother      Social History     Tobacco Use    Smoking status: Current Every Day Smoker     Packs/day: 0.50     Years: 34.00     Pack years: 17.00     Types: Cigarettes    Smokeless tobacco: Never Used   Substance Use Topics    Alcohol use:  Yes     Alcohol/week: 6.0 standard drinks     Types: 6 Cans of beer per week     Comment: beer, every other day      Current Outpatient Medications   Medication Sig Dispense Refill    metoprolol (LOPRESSOR) 100 MG tablet Take 100 mg by mouth daily Per patient PCP changed dose      Sertraline HCl (ZOLOFT PO) Take by mouth      pantoprazole (PROTONIX) 40 MG tablet Take 40 mg by mouth daily   6    esomeprazole Magnesium (NEXIUM) 20 MG PACK Take 20 mg by mouth daily      Mouthwashes (BIOTENE DRY MOUTH) LIQD daily       lisinopril (PRINIVIL;ZESTRIL) 20 MG tablet Take 10 mg by mouth daily        No current facility-administered medications for this visit. No Known Allergies  Health Maintenance   Topic Date Due    Hepatitis C screen  Never done    Pneumococcal 0-64 years Vaccine (1 of 4 - PCV13) Never done    HIV screen  Never done    DTaP/Tdap/Td vaccine (1 - Tdap) Never done    Colon cancer screen colonoscopy  Never done    COVID-19 Vaccine (3 - Moderna risk 4-dose series) 05/05/2021    Flu vaccine (1) 09/01/2021    Potassium monitoring  12/10/2021    Creatinine monitoring  12/10/2021    Lipid screen  10/28/2025    Hepatitis A vaccine  Aged Out    Hepatitis B vaccine  Aged Out    Hib vaccine  Aged Out    Meningococcal (ACWY) vaccine  Aged Out       Subjective:  Review of Systems  General:   No fever, no chills, No fatigue or weight loss  Pulmonary:    No dyspnea, no wheezing  Cardiac:    Denies recent chest pain,   GI:     No nausea or vomiting, no abdominal pain  Neuro:    some dizziness or light headedness,   Musculoskeletal:  No recent active issues  Extremities:   No edema, no obvious claudication       Objective:  Physical Exam  /72   Pulse 72   Ht 5' 11\" (1.803 m)   Wt 169 lb (76.7 kg)   BMI 23.57 kg/m²   General:   Well developed, well nourished  Lungs:   Clear to auscultation  Heart:    Normal S1 S2, Slight murmur. no rubs, no gallops  Abdomen:   Soft, non tender, no organomegalies, positive bowel sounds  Extremities:   No edema, no cyanosis, good peripheral pulses  Neurological:   Awake, alert, oriented. No obvious focal deficits  Musculoskelatal:  No obvious deformities    Assessment:      Diagnosis Orders   1. Syncope and collapse     2. Primary hypertension     as above   Lower BP  Too much caffeine       Plan:  No follow-ups on file. Discussed  Change metoprolol 50 bid  Cut lisinopril in half  Cut back on caffeine   Consider stopping lisinopril     Orders Placed:  No orders of the defined types were placed in this encounter.       Medications Prescribed:  No orders of the defined types were placed in this encounter. Discussed use, benefit, and side effects of prescribed medications. All patient questions answered. Pt voicedunderstanding. Instructed to continue current medications, diet and exercise. Continue risk factor modification and medical management. Patient agreed with treatment plan. Follow up as directed.     Electronically signedby Mare Bee MD on 12/1/2021 at 11:05 AM

## 2021-12-01 NOTE — PROGRESS NOTES
[address] 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]    Treatment Diagnosis Dysphagia    Date of Evaluation 10/1/21      Functional Outcome Measure Used [facility] NOMS: swallowing    Functional Outcome Score Level 3 (10/1/21)       Insurance: Primary: Payor: Mikael Roque /  /  / ,   Secondary:    Authorization Information: No precert until after 49SJ visit   Visit # 6, 6/10 for progress note   Visits Allowed: Visit limit based on precert   Recertification Date:    Physician Follow-Up: 3/28/22   Physician Orders: Would like dysphagia diet recommend to improve nutrition and safety   Pertinent History: 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 Patient reports having xerostomia, but reports he uses a generic oral moisturizing spray throughout the day, however, the spray has been causing burning/stinging in his throat so he is not using it as consistently as he had  Patient reports this seems to help break up mucus in his throat, especially in the morning.  Patient reports he has difficulty swallowing foods and liquids as it seems to \"stick\" in his throat. Patient had an EGD about 1 month ago and they were unable to pass a pediatric scope through his entire esophagus due to radiation treatment related changes.  Patient denies any coughing during meals, but reports he \"gags\" a lot in general. Patient reports his weight fluctuates within 10-15 pounds.   Patient reports Dr[facility] told him he may have too hard to chew well enough. Patient reports he plans to get some new dentures in the future, but nothing is scheduled at this time. SHORT TERM GOAL #3:  Goal 3: Patient will complete lingual range of motion and coordination exercises and bolus manipulation exercises x 20 for improved AP movement and bolus control. INTERVENTIONS: Patient completed the following lingual range of motion exercises this session:  -Lingual lateralization: x 20 with good success, no cues  -Lingual circles: x 20 each direction with good success  -internal lingual lateralization x 20 with fair to good success overall, improved success going to the right this session    SHORT TERM GOAL #4:  Goal 4: Patient will complete pharyngeal strengthening exercises (effortful swallows, tongue base retraction, chin tuck against resistance) x 20 for improved pharyngeal strength and passage of the bolus through the pharynx. INTERVENTIONS: Patient completed the following pharyngeal strengthening exercises this session:  -Effortful swallow x 27 without thin liquids with good effort  -Lingual protrusion with alternating tongue tip back: x  20 with good success, no cues  -Chin tuck against resistance x 20 (2 sets of 10) with 5 second hold each time with good success  **Patient reports he has not been completing his HEP because of being with family for the .  Highly encouraged the patient to get back to complete his HEP at least 1 time a day, preferably 2 times. Patient verbalized understanding. Assessment: Progressing towards goals  Specific Interventions Next Treatment: tolerate minced and moist diet, advanced texture trials, complete lingual ROM and coordination exercises, complete pharyngeal strengthening exercises.      Activity/Treatment Tolerance:  [x]  Patient tolerated treatment well  []  Patient limited by fatigue as repetitions progressed   []  Patient limited by pain   []  Patient limited by other medical complications  []  Other:

## 2021-12-08 ENCOUNTER — HOSPITAL ENCOUNTER (OUTPATIENT)
Dept: SPEECH THERAPY | Age: 56
Setting detail: THERAPIES SERIES
Discharge: HOME | End: 2021-12-08
Payer: MEDICAID

## 2021-12-08 PROCEDURE — 92526 ORAL FUNCTION THERAPY: CPT | Performed by: SPEECH-LANGUAGE PATHOLOGIST

## 2021-12-08 NOTE — PROGRESS NOTES
[address] [facility]  [] SPEECH LANGUAGE COGNITIVE EVALUATION  [x] DAILY NOTE   [] PROGRESS NOTE [] DISCHARGE NOTE    [x] OUTPATIENT REHABILITATION [facility]   [] [place] [address]    [] 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]    Treatment Diagnosis Dysphagia    Date of Evaluation 10/1/21      Functional Outcome Measure Used [facility] NOMS: swallowing    Functional Outcome Score Level 3 (10/1/21)       Insurance: Primary: Payor: Jayy Chavez /  /  / ,   Secondary:    Authorization Information: No precert until after 18VN visit   Visit # 7, 7/10 for progress note   Visits Allowed: Visit limit based on precert   Recertification Date:    Physician Follow-Up: 3/28/22   Physician Orders: Would like dysphagia diet recommend to improve nutrition and safety   Pertinent History: 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 Patient reports having xerostomia, but reports he uses a generic oral moisturizing spray throughout the day, however, the spray has been causing burning/stinging in his throat so he is not using it as consistently as he had  Patient reports this seems to help break up mucus in his throat, especially in the morning.  Patient reports he has difficulty swallowing foods and liquids as it seems to \"stick\" in his throat. Patient had an EGD about 1 month ago and they were unable to pass a pediatric scope through his entire esophagus due to radiation treatment related changes.  Patient denies any coughing during meals, but reports he \"gags\" a lot in general. Patient reports his weight fluctuates within 10-15 pounds.   Patient reports Dr. Virgil Champagne told him he may have to think about getting a feeding tube if his weight continues to fluctuate. [facility] completed 10/12/21 recommending a minced and moist diet with thin liquids. See report for further detials. SUBJECTIVE:  Patient reports he does not have much energy today and he is having hot and cold flashes. Reports his tongue is numb and he is starting to have the salty taste again. Patient reports he currently has pain 7/10 in his throat. Patient appeared very fatigued this session. Patient reports he does not feel bad enough to see a doctor, but this clinician encouraged him to see a doctor if his pain or fatigue gets worse. SHORT TERM GOAL #1:  Goal 1: Patient will tolerate a minced and moist diet with thin liquids without overt s/s of aspiration and implementation of compensatory swallowing strategies with min cues to safely maintain adequate nutrition and hydration/  INTERVENTIONS:  Patient reports he has not been eating very well over the last 3-4 days. Patient reports he does not feel like eating and his throat is raw. Reports his PCP is looking into trying find him a pain medication to help the 'raw' feeling in his throat. Patient reports he has a follow up with Dr. Will Hernandez in January. Advised the patient to make sure Dr. Will Hernandez is aware of the \"raw\" feeling and the pain that seems to have gotten worse over the last couple of years. Patient reports he has tried Ensure and Ensure clear in the past, but he didn't care for them. Reports when he does not feel well, he mostly drinks water and gatorade. Patient given the option to talk to the Dietician this date, but he declined because he just wants to go home. According to the patient's medical chart, patient's weight was 169 on 12/1/21 and today it was 156.6. Part of the difference could be related to different scales, but highly encouraged the patient to eat what he can or at least take some peter of supplement to try to get his weight back up.   Patient verbalized understanding. SHORT TERM GOAL #2:  Goal 2: Patient will tolerate trials of advanced textures without overt s/s of aspiration and use of swallowing strategies with min cues to safely tolerate advancement to a soft and bite sized diet. INTERVENTIONS: Patient not feeling well enough to try any PO this date. Reports he just wants to get home and lay down. SHORT TERM GOAL #3:  Goal 3: Patient will complete lingual range of motion and coordination exercises and bolus manipulation exercises x 20 for improved AP movement and bolus control. INTERVENTIONS: Patient completed the following lingual range of motion exercises this session:  -Lingual circles: x 20 each direction with fair to good success  -internal lingual lateralization x 11 with fair success overall, reported numbness on the left side of the tongue when lateralizing to the right  **less lingual exercises completed this date d/t pain and fatigue. SHORT TERM GOAL #4:  Goal 4: Patient will complete pharyngeal strengthening exercises (effortful swallows, tongue base retraction, chin tuck against resistance) x 20 for improved pharyngeal strength and passage of the bolus through the pharynx. INTERVENTIONS: Patient completed the following pharyngeal strengthening exercises this session:  -Effortful swallow x 10 without PO with good effort, fatigue and pain noted throughout  -Lingual protrusion with alternating tongue tip back: x  10 with good success, min cues to hold the retraction, fatigue noted  **Patient's fatigue and pain/'raw' feeling in his throat limited the number of exercises/repetitions he could complete this session. Assessment: Progressing towards goals  Specific Interventions Next Treatment: tolerate minced and moist diet, advanced texture trials, complete lingual ROM and coordination exercises, complete pharyngeal strengthening exercises.      Activity/Treatment Tolerance:  [x]  Patient tolerated treatment well  []  Patient

## 2021-12-15 ENCOUNTER — HOSPITAL ENCOUNTER (OUTPATIENT)
Dept: SPEECH THERAPY | Age: 56
Setting detail: THERAPIES SERIES
Discharge: HOME | End: 2021-12-15
Payer: MEDICAID

## 2021-12-15 PROCEDURE — 92526 ORAL FUNCTION THERAPY: CPT | Performed by: SPEECH-LANGUAGE PATHOLOGIST

## 2021-12-15 NOTE — DISCHARGE SUMMARY
[address] [facility]  [] SPEECH LANGUAGE COGNITIVE EVALUATION  [] DAILY NOTE   [] PROGRESS NOTE  [x] DISCHARGE NOTE    [x] OUTPATIENT REHABILITATION 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]    Treatment Diagnosis Dysphagia    Date of Evaluation 10/1/21      Functional Outcome Measure Used [facility] NOMS: swallowing    Functional Outcome Score Level 3 (12/15/21)       Insurance: Primary: Payor: Valle Garber /  /  / ,   Secondary:    Authorization Information: No precert until after 49ZY visit   Visit # 8, /10 for progress note   Visits Allowed: Visit limit based on precert   Recertification Date: 74    Physician Follow-Up: 3/28/22   Physician Orders: Would like dysphagia diet recommend to improve nutrition and safety   Pertinent History: 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 Patient reports having xerostomia, but reports he uses a generic oral moisturizing spray throughout the day, however, the spray has been causing burning/stinging in his throat so he is not using it as consistently as he had  Patient reports this seems to help break up mucus in his throat, especially in the morning.  Patient reports he has difficulty swallowing foods and liquids as it seems to \"stick\" in his throat. Patient had an EGD about 1 month ago and they were unable to pass a pediatric scope through his entire esophagus due to radiation treatment related changes.  Patient denies any coughing during meals, but reports he \"gags\" a lot in general. Patient reports his weight fluctuates within 10-15 pounds.   Patient reports Dr. Mac Dangelo told him he may have to think about getting a feeding tube if his weight continues to fluctuate. [facility] completed 10/12/21 recommending a minced and moist diet with thin liquids. See report for further detials. SUBJECTIVE:  Patient reports he feels better overall today, but his throat is still dry, itchy, scratchy, tender and raw. Patient reports these symptoms have come and gone in the past, but they have lasted for 1-2 months this time, which is the longest duration of the symptoms. Patient reports he also feels \"lightheadedness\" at times. Discussed the importance of making sure he is drinking plenty of fluids so he does not get dehydrated. Also encouraged the patient to go see a doctor if the lightheadedness does not resolve or gets worse. SHORT TERM GOAL #1:  Goal 1: Patient will tolerate a minced and moist diet with thin liquids without overt s/s of aspiration and implementation of compensatory swallowing strategies with min cues to safely maintain adequate nutrition and hydration/ - GOAL MET. INTERVENTIONS:  Patient reports he had some chicken and beef broth over the weekend, meatloaf and potatoes on Monday and then a couple bites of chili yesterday. Patient reports he has been drinking water and gatorade. Patient reports if he takes his time and takes small bites and sips, then he does alright. Patient denies any coughing or choking spells. Patient reports he does not feel that he can eat much more as far as advanced textures since initiating treatment in October. Patient's weight was 156.6 a week ago and 156.4 today. Patient reports he has been eating more over the last week than he was the week prior. Educated the patient to make sure he continues to monitor his weight closely and if he loses too much weight, he needs to go back and see Dr. Norbert Aranda to discuss a possible feeding tube. Patient reports he does not want another feeding tube because he gets spasms around the area where his last one was. If he really does not want a feeding tube, reviewed the importance of taking some kind of supplemental drink to make sure he is getting the nutrition that he needs. Patient verbalized understanding. Brought up the possibility of talking with a Dietician again this date and patient did not care to. SHORT TERM GOAL #2:  Goal 2: Patient will tolerate trials of advanced textures without overt s/s of aspiration and use of swallowing strategies with min cues to safely tolerate advancement to a soft and bite sized diet. - GOAL NOT MET. INTERVENTIONS: Patient declined trials of PO this date d/t having the \"raw\" feeling    SHORT TERM GOAL #3:  Goal 3: Patient will complete lingual range of motion and coordination exercises and bolus manipulation exercises x 20 for improved AP movement and bolus control. - GOAL NOT MET. INTERVENTIONS: Patient completed the following lingual range of motion exercises this session:  -Lingual circles: x 20 each direction with fair to good success, reports a stretch on the left side of the tongue when lateralizing to the right  -internal lingual lateralization x 15 with fair success overall, reported feeling a stretch on the left side of the tongue, fatigue also reported  -bolus manipulation with toothete x 10 each direction with fair success  **Patient reports he completed his exercises this past Sunday and then some of them again yesterday. Patient admits to not completing his HEP 2 x per day as requested. SHORT TERM GOAL #4:  Goal 4: Patient will complete pharyngeal strengthening exercises (effortful swallows, tongue base retraction, chin tuck against resistance) x 20 for improved pharyngeal strength and passage of the bolus through the pharynx. - GOAL NOT MET.   INTERVENTIONS: Patient completed the following pharyngeal strengthening exercises this session:  -Effortful swallow x 10 without PO with good effort, rated pain as 7.5/10   -Lingual protrusion with alternating tongue tip discharge, recommended diet at this time  []  No new Education completed  []  Reviewed Prior HEP      [x]  Patient verbalized and/or demonstrated understanding of education provided. []  Patient unable to verbalize and/or demonstrate understanding of education provided. Will continue education. []  Barriers to learning:    PLAN:  []  Plan of care initiated. Plan to see patient 1 times per week for 8 weeks to address the treatment planned outlined above.   []  Continue with current plan of care    []  Modify plan of care as follows:     []  Hold pending physician visit  [x]  Discharge    Time In 1115   Time Out 1145   Timed Code Minutes: 0 min   Total Treatment Time: 30 min       Maite Blackmon M.S. [id number] [place] [address]

## 2021-12-28 ENCOUNTER — PROCEDURE VISIT (OUTPATIENT)
Dept: CARDIOLOGY CLINIC | Age: 56
End: 2021-12-28
Payer: MEDICAID

## 2021-12-28 DIAGNOSIS — R55 SYNCOPE AND COLLAPSE: Primary | ICD-10-CM

## 2021-12-28 PROCEDURE — G2066 INTER DEVC REMOTE 30D: HCPCS | Performed by: NUCLEAR MEDICINE

## 2021-12-28 PROCEDURE — 93298 REM INTERROG DEV EVAL SCRMS: CPT | Performed by: NUCLEAR MEDICINE

## 2022-01-03 RX ORDER — METOPROLOL TARTRATE 50 MG/1
TABLET, FILM COATED ORAL
Qty: 60 TABLET | Refills: 11 | Status: SHIPPED | OUTPATIENT
Start: 2022-01-03 | End: 2022-06-22

## 2022-01-10 ENCOUNTER — TELEPHONE (OUTPATIENT)
Dept: CARDIOLOGY CLINIC | Age: 57
End: 2022-01-10

## 2022-01-10 NOTE — TELEPHONE ENCOUNTER
[facility] [employer]   Pt of Aram    1/7/22 -- 1 hr 58 minutes afib --? ?PACs. Please review.      No history afib/ [facility]

## 2022-01-12 NOTE — TELEPHONE ENCOUNTER
AFIB per Dr Chris Taylor and Dr Martha Cannon. No history/ [address] on med list.     Pt called. Wife answered. Updated. Stated that patient did wake up one morning and was not feeling well. Will talk with him and call the office back. Dr Chris Taylor, Does he need an office visit or did you want to put him on [address]?

## 2022-01-13 ENCOUNTER — OFFICE VISIT (OUTPATIENT)
Dept: CARDIOLOGY CLINIC | Age: 57
End: 2022-01-13
Payer: MEDICAID

## 2022-01-13 VITALS
HEIGHT: 71 IN | BODY MASS INDEX: 22.51 KG/M2 | DIASTOLIC BLOOD PRESSURE: 82 MMHG | WEIGHT: 160.8 LBS | SYSTOLIC BLOOD PRESSURE: 114 MMHG | HEART RATE: 74 BPM

## 2022-01-13 DIAGNOSIS — I10 ESSENTIAL HYPERTENSION: Primary | ICD-10-CM

## 2022-01-13 DIAGNOSIS — I48.0 PAF (PAROXYSMAL ATRIAL FIBRILLATION) (HCC): ICD-10-CM

## 2022-01-13 PROCEDURE — G8484 FLU IMMUNIZE NO ADMIN: HCPCS | Performed by: STUDENT IN AN ORGANIZED HEALTH CARE EDUCATION/TRAINING PROGRAM

## 2022-01-13 PROCEDURE — 3017F COLORECTAL CA SCREEN DOC REV: CPT | Performed by: STUDENT IN AN ORGANIZED HEALTH CARE EDUCATION/TRAINING PROGRAM

## 2022-01-13 PROCEDURE — 99213 OFFICE O/P EST LOW 20 MIN: CPT | Performed by: STUDENT IN AN ORGANIZED HEALTH CARE EDUCATION/TRAINING PROGRAM

## 2022-01-13 PROCEDURE — G8420 CALC BMI NORM PARAMETERS: HCPCS | Performed by: STUDENT IN AN ORGANIZED HEALTH CARE EDUCATION/TRAINING PROGRAM

## 2022-01-13 PROCEDURE — G8427 DOCREV CUR MEDS BY ELIG CLIN: HCPCS | Performed by: STUDENT IN AN ORGANIZED HEALTH CARE EDUCATION/TRAINING PROGRAM

## 2022-01-13 PROCEDURE — 4004F PT TOBACCO SCREEN RCVD TLK: CPT | Performed by: STUDENT IN AN ORGANIZED HEALTH CARE EDUCATION/TRAINING PROGRAM

## 2022-01-13 RX ORDER — OXCARBAZEPINE 150 MG/1
TABLET, FILM COATED ORAL
COMMUNITY
Start: 2021-12-09

## 2022-01-13 RX ORDER — LEVOTHYROXINE SODIUM 0.12 MG/1
TABLET ORAL
COMMUNITY
Start: 2021-11-22

## 2022-01-13 RX ORDER — OXYCODONE HYDROCHLORIDE AND ACETAMINOPHEN 5; 325 MG/1; MG/1
TABLET ORAL
COMMUNITY
Start: 2021-11-19

## 2022-01-13 RX ORDER — MIRTAZAPINE 15 MG/1
TABLET, FILM COATED ORAL
COMMUNITY
Start: 2021-12-09

## 2022-01-13 RX ORDER — AMLODIPINE BESYLATE 5 MG/1
TABLET ORAL
COMMUNITY
Start: 2021-12-09

## 2022-01-13 RX ORDER — OLANZAPINE 10 MG/1
TABLET ORAL
COMMUNITY
Start: 2021-12-09

## 2022-01-13 NOTE — PROGRESS NOTES
Patient presents in office today for a fu for AFIB. C/o occasional light headedness, dizziness, palpitations, and SOB. SYED Hagen CP.

## 2022-01-13 NOTE — PROGRESS NOTES
[facility] PROFESSIONAL SERVICES  [facility]   [address] [place] [address]   Romulo Buchanan [address]   Dept: [phone]   Dept Fax: [phone]   Loc: [phone]      Chief Complaint   Patient presents with    Follow-up    Atrial Fibrillation     Cardiologist:  Dr. Greyson Sunshine  65 yo male presents for f/u of new afib, found on Linq implant. Hx of HTN, syncope/dizziness. No chest pain, angina, orthopnea, PND, sob at rest, , LE edema. Palpitations at night. Hot flashes periodically. Does have long hx of thyroid issues. Also with bone cancer in remission now per patient. D/w patient about r/b of [facility] [place] newly diagnosed [place]ib. Also discussed his risk specifically using [id number] score, him being a 1 on the scale (HTN). He is still having dizziness and syncope episodes which does not seem correlated to heart rate/rhythm issues. Was sleeping during 2 hour afib episode documented, no symptoms noted by patient. General:   No fever, no chills, no weight loss, no fatigue  Pulmonary:    No dyspnea, no wheezing  Cardiac:    Denies recent chest pain, + palpitations   GI:     No nausea or vomiting, no abdominal pain  Neuro:     + dizziness or light headedness  Musculoskeletal:  sore muscles recent active issues  Extremities:   No edema      Past Medical History:   Diagnosis Date    Asthma     [facility] and neck cancer ([facility] [address]     Hypertension     Lymphedema     Syncope 12/22/2020    Syncope and collapse        No Known Allergies    Current Outpatient Medications   Medication Sig Dispense Refill    amLODIPine (NORVASC) 5 MG tablet       levothyroxine (SYNTHROID) 125 MCG tablet Take one tablet first thing in the morning on an empty stomach.       mirtazapine (REMERON) 15 MG tablet       OLANZapine (ZYPREXA) 10 MG tablet       OXcarbazepine (TRILEPTAL) 150 MG tablet       oxyCODONE-acetaminophen (PERCOCET) 5-325 MG per tablet       metoprolol tartrate (LOPRESSOR) 50 MG tablet TAKE 1 TABLET BY MOUTH TWICE DAILY 60 tablet 11    metoprolol (LOPRESSOR) 100 MG tablet Take 50 mg by mouth 2 times daily Per patient PCP changed dose       Sertraline HCl (ZOLOFT PO) Take by mouth      pantoprazole (PROTONIX) 40 MG tablet Take 40 mg by mouth daily   6    esomeprazole Magnesium (NEXIUM) 20 MG PACK Take 20 mg by mouth daily      Mouthwashes (BIOTENE DRY MOUTH) LIQD daily       lisinopril (PRINIVIL;ZESTRIL) 20 MG tablet Take 5 mg by mouth daily        No current facility-administered medications for this visit. Social History     Socioeconomic History    Marital status: Single     Spouse name: None    Number of children: None    Years of education: None    Highest education level: None   Occupational History    None   Tobacco Use    Smoking status: Current Every Day Smoker     Packs/day: 0.50     Years: 34.00     Pack years: 17.00     Types: Cigarettes    Smokeless tobacco: Never Used   Substance and Sexual Activity    Alcohol use: Yes     Alcohol/week: 6.0 standard drinks     Types: 6 Cans of beer per week     Comment: beer, every other day    Drug use: No    Sexual activity: None   Other Topics Concern    None   Social History Narrative    None     Social Determinants of Health     Financial Resource Strain:     Difficulty of Paying Living Expenses: Not on file   Food Insecurity:     Worried About Running Out of Food in the Last Year: Not on file    Tressa of Food in the Last Year: Not on file   Transportation Needs:     Lack of Transportation (Medical): Not on file    Lack of Transportation (Non-Medical):  Not on file   Physical Activity:     Days of Exercise per Week: Not on file    Minutes of Exercise per Session: Not on file   Stress:     Feeling of Stress : Not on file   Social Connections:     Frequency of Communication with Friends and Family: Not on file    Frequency of Social Gatherings with Friends and Family: Not on file    Attends Worship Services: Not on file   [facility] [employer] or Organizations: Not on file    Attends Club or Organization Meetings: Not on file    Marital Status: Not on file   Intimate Partner Violence:     Fear of Current or Ex-Partner: Not on file    Emotionally Abused: Not on file    Physically Abused: Not on file    Sexually Abused: Not on file   Housing Stability:     Unable to Pay for Housing in the Last Year: Not on file    Number of [place] in the Last Year: Not on file    Unstable Housing in the Last Year: Not on file       Family History   Problem Relation Age of Onset    Asthma Mother        Blood pressure 114/82, pulse 74, height 5' 11\" (1.803 m), weight 160 lb 12.8 oz (72.9 kg). General:   Well developed, well nourished  Lungs:   Clear to auscultation  Heart:    Normal S1 S2, No murmur, rubs, or gallops  Abdomen:   Soft, non tender, no organomegalies, positive bowel sounds  Extremities:   No edema, no cyanosis, good peripheral pulses  Neurological:   Awake, alert, oriented. No obvious focal deficits  Musculoskeletal:  No obvious deformities    EKG:          Diagnosis Orders   1. Essential hypertension     2. PAF (paroxysmal atrial fibrillation) (HCC)         No orders of the defined types were placed in this encounter. Assessment/Plan:     PAF, not on eliquis-- noted by linq monitor. Asymptomatic. Is still having dizziness/syncope episodes, does not appear to be rate/rhythm related. After full discussion of r/b/i for [address] [place] [address], patient has decided to forego [address] [place] [address] at this time. Will continue to monitor for symptoms and consider later. Owing to his still unexplained dizziness/syncope, he is at higher risk for falls still also. HTN-well controlled at present on norvasc 5, lopressor 100, lisinopril 5 daily. Disposition:   F/u with Dr Kierra Altman in 4-5 months.    Continue Dr Talon Almanza current treatment plan  Follow up with Dr Harinder Zafar as scheduled or sooner if needed

## 2022-02-01 ENCOUNTER — PROCEDURE VISIT (OUTPATIENT)
Dept: CARDIOLOGY CLINIC | Age: 57
End: 2022-02-01
Payer: MEDICAID

## 2022-02-01 DIAGNOSIS — R55 SYNCOPE AND COLLAPSE: Primary | ICD-10-CM

## 2022-02-01 PROCEDURE — G2066 INTER DEVC REMOTE 30D: HCPCS | Performed by: NUCLEAR MEDICINE

## 2022-02-01 PROCEDURE — 93298 REM INTERROG DEV EVAL SCRMS: CPT | Performed by: NUCLEAR MEDICINE

## 2022-02-01 NOTE — PROGRESS NOTES
DR Ethelda Cockayne PT   [employer] Query REMOTE  BATTERY OK  1 EPISODE OF NEW ONSET AFIB/ PREVIOUSLY REPORTED AND PT WAS SEEN BY A MID LEVEL.  HE REFUSED AN OAC    NO OTHER EPISODES

## 2022-02-18 ENCOUNTER — HOSPITAL ENCOUNTER (OUTPATIENT)
Age: 57
Setting detail: SPECIMEN
Discharge: HOME | End: 2022-02-18

## 2022-02-18 LAB
THYROXINE, FREE: 0.83 NG/DL (ref 0.93–1.7)
TSH SERPL DL<=0.05 MIU/L-ACNC: 8.32 MIU/L (ref 0.3–5)

## 2022-02-23 ENCOUNTER — HOSPITAL ENCOUNTER (OUTPATIENT)
Age: 57
Discharge: HOME | End: 2022-02-23
Payer: MEDICAID

## 2022-02-23 ENCOUNTER — HOSPITAL ENCOUNTER (OUTPATIENT)
Dept: GENERAL RADIOLOGY | Age: 57
Discharge: HOME | End: 2022-02-23
Payer: MEDICAID

## 2022-02-23 DIAGNOSIS — M54.50 LOW BACK PAIN, UNSPECIFIED BACK PAIN LATERALITY, UNSPECIFIED CHRONICITY, UNSPECIFIED WHETHER SCIATICA PRESENT: ICD-10-CM

## 2022-02-23 DIAGNOSIS — M79.672 PAIN IN LEFT FOOT: ICD-10-CM

## 2022-02-23 DIAGNOSIS — M25.559 HIP PAIN: ICD-10-CM

## 2022-02-23 PROCEDURE — 72100 X-RAY EXAM L-S SPINE 2/3 VWS: CPT

## 2022-02-23 PROCEDURE — 73630 X-RAY EXAM OF FOOT: CPT

## 2022-02-23 PROCEDURE — 73521 X-RAY EXAM HIPS BI 2 VIEWS: CPT

## 2022-03-08 ENCOUNTER — PROCEDURE VISIT (OUTPATIENT)
Dept: CARDIOLOGY CLINIC | Age: 57
End: 2022-03-08
Payer: MEDICAID

## 2022-03-08 DIAGNOSIS — R55 SYNCOPE AND COLLAPSE: Primary | ICD-10-CM

## 2022-03-08 PROCEDURE — 93298 REM INTERROG DEV EVAL SCRMS: CPT | Performed by: NUCLEAR MEDICINE

## 2022-03-08 PROCEDURE — G2066 INTER DEVC REMOTE 30D: HCPCS | Performed by: NUCLEAR MEDICINE

## 2022-04-05 ENCOUNTER — HOSPITAL ENCOUNTER (OUTPATIENT)
Dept: RADIATION ONCOLOGY | Age: 57
Discharge: HOME | End: 2022-04-05
Payer: MEDICAID

## 2022-04-05 VITALS
BODY MASS INDEX: 24.3 KG/M2 | WEIGHT: 174.2 LBS | SYSTOLIC BLOOD PRESSURE: 133 MMHG | OXYGEN SATURATION: 99 % | RESPIRATION RATE: 18 BRPM | DIASTOLIC BLOOD PRESSURE: 90 MMHG | TEMPERATURE: 97.7 F | HEART RATE: 93 BPM

## 2022-04-05 DIAGNOSIS — C06.2 SQUAMOUS CELL CANCER OF RETROMOLAR TRIGONE (HCC): Primary | ICD-10-CM

## 2022-04-05 PROCEDURE — 99212 OFFICE O/P EST SF 10 MIN: CPT | Performed by: RADIOLOGY

## 2022-04-05 PROCEDURE — 99214 OFFICE O/P EST MOD 30 MIN: CPT | Performed by: RADIOLOGY

## 2022-04-05 ASSESSMENT — ENCOUNTER SYMPTOMS
SHORTNESS OF BREATH: 1
SORE THROAT: 1
SINUS PAIN: 0
ABDOMINAL PAIN: 1
VOICE CHANGE: 0
NAUSEA: 0
TROUBLE SWALLOWING: 1
FACIAL SWELLING: 0
SINUS PRESSURE: 0
BACK PAIN: 1
RECTAL PAIN: 0
BLOOD IN STOOL: 0
COUGH: 1

## 2022-04-05 ASSESSMENT — PAIN SCALES - GENERAL: PAINLEVEL_OUTOF10: 5

## 2022-04-05 ASSESSMENT — PAIN DESCRIPTION - LOCATION: LOCATION: THROAT

## 2022-04-05 ASSESSMENT — PAIN DESCRIPTION - PAIN TYPE: TYPE: CHRONIC PAIN

## 2022-04-05 NOTE — PROGRESS NOTES
[address] [facility] [address], [address]        [place], [address] [facility]        Payal Alicia: [phone]        F: [phone]       [employer]            FOLLOW UP NOTE    Date of Service: 2022  Patient ID: Bolivar Pitts   : 1965  MRN: [id number]   Acct Number: [de-identified]       DATE OF SERVICE: 2022   LOCATION: [facility]  PROVIDER: Amee Fuentes MD MS    FOLLOW UP PHYSICIANS: Dr. Donavan Steve ([address]) MADELIN Mazariegos, Dr. Tiki Rubio:  Cancer Staging  Squamous cell cancer of retromolar trigone [facility])  Staging form: Lip And Oral Cavity, AJCC 8th Edition  - Pathologic stage from 2015: Stage CHAN (pT4a, pN2b, cM0) - Signed by Edward Ray MD on 2022    Patient presents today for regularly scheduled follow-up visit doing reasonably well overall. Patient is now over 5 years out from completion of adjuvant treatment completing radiation therapy 2016. Since completion of radiation therapy patient had some difficulty with swallowing, previously was PEG dependent, now tolerating p.o. intake with significant limitations to soft foods and liquids only. Patient being followed by GI medicine, was evaluated for esophageal stricture as well as balloon dilation, as per patient was told that they were unable to pass the scope past the level of his stricture in the upper esophagus. Patient's PEG tube was removed previously, patient states that his weight is stable and/or increasing over time. Patient states that he has intermittent issues associated with his prior PEG tube site noting intermittent leakage. Patient also has complaints of pain with swallowing as well as pain in the left ipsilateral neck and left ear. Also notes intermittent left ear drainage.   Patient was previously treated with surgical resection of his disease at [facility], and has been followed by their ENT clinic previously however wishes to transfer care locally. Will refer to Dr. Rochelle Perla for continued surveillance and evaluation. We will plan to order CT soft tissues neck to evaluate postoperative bed as well as the left neck given recent complaints of pain and discomfort in this area as well as left ear drainage. Patient to continue regular follow-up visits with all the treating physicians. The patient will return to clinic in 6 months or sooner if clinically indicated. They have our clinic number to call with any questions or concerns if needed. Thank you for allowing us to be a part of their care. HPI:  Oncology History Overview Note   Romina Copeland is a 64 y.o. male is an active smoker with heavy alcohol use history who presented intially to the Emergency Room October 2015 for sudden onset of jaw pain after eating chips with a sharp edge. Inspection revealed a fungating mass in the left retromolar trigone. He completed a CT of the head and neck which again revealed this primary tumor and suspected adenopathy submentally in the left submandibular gland and level II in the left neck. The patient was referred to [place] and completed a left inferior maxillectomy, composite resection with segmental mandibulectomy, bilateral neck dissections, and reconstruction with free flap and bone graft from a right tibial donor site. Surgery was completed on 10/02/2015. The patient had a PEG placed at this time also. Jake Valdez had an uncomplicated postoperative course and was seen to discuss adjuvant radiotherapy for which he was agreeable. He did have brisk skin reaction with significant erythema culminating in hyperpigmentation with patchy dry desquamation. The patient did not develop any moist desquamation. 10/05/2021 [facility] Follow up    Jake Valdez returns to [facility] today for a posttreatment checkup as part of his survivorship care after undergoing surgery, chemo and radiation therapy for cancer of the left retromolar trigone. Keysha Roberts states he continues to experience xerostomia, he uses biotene which helps a little. He continues to have left neck discomfort, lymphedema and stiffness which have improved some since working with the physical therapist. He states the left neck swelling comes and goes, it does improve when he massages it. He reports worsening dysphagia. He underwent EGD on 5/11/21 which was limited because they were not able to pass the scope due to stricture. He states he was told he will likely require a PEG tube again for nutrition due to this. He is not sure he is wanting to have another tube placed. He reports continued issues with hot and cold flashes. He is following with his PCP for hypothyroidism and she is adjusting his synthroid. He continues to have issues with fatigue. He denies chest pain, sob, fever, chills, cough, headaches, nausea, vomiting,changes with bowel or bladder habits. Squamous cell cancer of retromolar trigone (HCC)   12/17/2015 Initial Diagnosis    Squamous cell cancer of retromolar trigone ([facility] [address]     12/21/2015 - 2/4/2016 Radiation    Treatment Course Number: 1    Treatment Site (s) Modality Dose (cGy) Fractions Elapsed Days   Left RMT Post Op Bed + Neck Nodes IMRT 5000 25 36   RMT Tumor Bed/Gross Dz IMRT 1400 7 8     Cumulative Dose: 6400 cGy    Radiation therapy delivered under the care of Dr. Nicho Anderson ([facility])          INTERVAL HISTORY:  Peewee Bell is a 64 y.o. male is presenting today for regularly scheduled follow up regarding the above mentioned oncologic history. Review of Systems   Constitutional: Negative for activity change, appetite change, fatigue and unexpected weight change. HENT: Positive for ear pain (left ear), sore throat and trouble swallowing. Negative for congestion, facial swelling, hearing loss, nosebleeds, sinus pressure, sinus pain, tinnitus and voice change. Eyes: Negative for visual disturbance.    Respiratory: Positive for cough (smoking) and shortness of breath (with activity). Cardiovascular: Negative for chest pain. Gastrointestinal: Positive for abdominal pain (Cramps from where PEG was, leaking at site). Negative for blood in stool, nausea and rectal pain. Genitourinary: Negative for dysuria, frequency and urgency. Musculoskeletal: Positive for back pain. Negative for arthralgias, neck pain and neck stiffness. Neurological: Positive for dizziness (afib). Negative for facial asymmetry, speech difficulty, weakness, light-headedness, numbness and headaches. Hematological: Negative for adenopathy. Psychiatric/Behavioral: Negative for confusion. A complete review of systems was performed and found to be negative except as presented above. CHAPERONE: na    PAIN: 5/10    LABORATORY STUDIES:  Onc labs:   Lab Results   Component Value Date    PSA 1.00 04/06/2018       MEDICATIONS:   Current Outpatient Medications   Medication Sig Dispense Refill    amLODIPine (NORVASC) 5 MG tablet       levothyroxine (SYNTHROID) 125 MCG tablet Take one tablet first thing in the morning on an empty stomach.  mirtazapine (REMERON) 15 MG tablet       OLANZapine (ZYPREXA) 10 MG tablet       OXcarbazepine (TRILEPTAL) 150 MG tablet       oxyCODONE-acetaminophen (PERCOCET) 5-325 MG per tablet       metoprolol tartrate (LOPRESSOR) 50 MG tablet TAKE 1 TABLET BY MOUTH TWICE DAILY 60 tablet 11    metoprolol (LOPRESSOR) 100 MG tablet Take 50 mg by mouth 2 times daily Per patient PCP changed dose       Sertraline HCl (ZOLOFT PO) Take by mouth      pantoprazole (PROTONIX) 40 MG tablet Take 40 mg by mouth daily   6    esomeprazole Magnesium (NEXIUM) 20 MG PACK Take 20 mg by mouth daily      Mouthwashes (BIOTENE DRY MOUTH) LIQD daily       lisinopril (PRINIVIL;ZESTRIL) 20 MG tablet Take 5 mg by mouth daily        No current facility-administered medications for this encounter.        PHYSICAL EXAMINATION:  VITAL SIGNS: BP (!) 133/90   Pulse 93

## 2022-04-12 ENCOUNTER — PROCEDURE VISIT (OUTPATIENT)
Dept: CARDIOLOGY CLINIC | Age: 57
End: 2022-04-12
Payer: MEDICAID

## 2022-04-12 DIAGNOSIS — R55 SYNCOPE AND COLLAPSE: Primary | ICD-10-CM

## 2022-04-12 PROCEDURE — G2066 INTER DEVC REMOTE 30D: HCPCS | Performed by: NUCLEAR MEDICINE

## 2022-04-12 PROCEDURE — 93298 REM INTERROG DEV EVAL SCRMS: CPT | Performed by: NUCLEAR MEDICINE

## 2022-04-23 ENCOUNTER — HOSPITAL ENCOUNTER (OUTPATIENT)
Dept: CT IMAGING | Age: 57
Discharge: HOME | End: 2022-04-23
Payer: MEDICAID

## 2022-04-23 DIAGNOSIS — C06.2 SQUAMOUS CELL CANCER OF RETROMOLAR TRIGONE (HCC): ICD-10-CM

## 2022-04-23 DIAGNOSIS — I71.20 THORACIC AORTIC ANEURYSM WITHOUT RUPTURE: ICD-10-CM

## 2022-04-23 PROCEDURE — 6360000004 HC RX CONTRAST MEDICATION: Performed by: THORACIC SURGERY (CARDIOTHORACIC VASCULAR SURGERY)

## 2022-04-23 PROCEDURE — 71275 CT ANGIOGRAPHY CHEST: CPT

## 2022-04-23 PROCEDURE — 70491 CT SOFT TISSUE NECK W/DYE: CPT

## 2022-04-23 RX ADMIN — IOPAMIDOL 90 ML: 755 INJECTION, SOLUTION INTRAVENOUS at 09:32

## 2022-05-12 NOTE — PROGRESS NOTES
[facility] PROFESSIONAL SERVICES  [facility]   [address]   [address] [address]   Dept: [phone]   Dept Fax: [phone]   Loc: [phone]      Chief Complaint   Patient presents with    Follow-up    Atrial Fibrillation     Cardiologist:  Dr. Rachelle Herbert  65 yo male presents for 4 month f/u of afib, found on Linq implant. Hx of HTN, syncope/dizziness. Still having some dizziness but no syncope now. No chest pain, no SOB at rest. No swelling. occ sweating episodes, no associated symptoms. Has night sweats as well, has beeen attributed to thyroid issues. Saw PCP today, is getting thyroid US as well as GI imaging. Energy levels having been improving but still gets fatigued easily. General:   No fever, no chills, no weight loss, no fatigue  Pulmonary:    No dyspnea, no wheezing  Cardiac:    Denies recent chest pain   GI:     No nausea or vomiting, no abdominal pain  Neuro:     + dizziness or light headedness  Musculoskeletal:  sore muscles recent active issues  Extremities:   No edema      Past Medical History:   Diagnosis Date    Aortic aneurysm (HCC)     Asthma     Head and neck cancer ([facility] [address]     Hypertension     Lymphedema     Syncope 12/22/2020    Syncope and collapse        No Known Allergies    Current Outpatient Medications   Medication Sig Dispense Refill    amLODIPine (NORVASC) 5 MG tablet       levothyroxine (SYNTHROID) 125 MCG tablet Take one tablet first thing in the morning on an empty stomach.       mirtazapine (REMERON) 15 MG tablet       OLANZapine (ZYPREXA) 10 MG tablet       OXcarbazepine (TRILEPTAL) 150 MG tablet       oxyCODONE-acetaminophen (PERCOCET) 5-325 MG per tablet       metoprolol tartrate (LOPRESSOR) 50 MG tablet TAKE 1 TABLET BY MOUTH TWICE DAILY 60 tablet 11    metoprolol (LOPRESSOR) 100 MG tablet Take 50 mg by mouth 2 times daily Per patient PCP changed dose       Sertraline HCl (ZOLOFT PO) Take by mouth      pantoprazole (PROTONIX) 40 MG tablet Take 40 mg by mouth daily   6    esomeprazole Magnesium (NEXIUM) 20 MG PACK Take 20 mg by mouth daily      Mouthwashes (BIOTENE DRY MOUTH) LIQD daily       lisinopril (PRINIVIL;ZESTRIL) 20 MG tablet Take 5 mg by mouth daily        No current facility-administered medications for this visit. Social History     Socioeconomic History    Marital status: Single     Spouse name: None    Number of children: None    Years of education: None    Highest education level: None   Occupational History    None   Tobacco Use    Smoking status: Current Every Day Smoker     Packs/day: 0.25     Years: 34.00     Pack years: 8.50     Types: Cigarettes    Smokeless tobacco: Never Used   Substance and Sexual Activity    Alcohol use: Yes     Alcohol/week: 7.0 standard drinks     Types: 7 Cans of beer per week     Comment: 1 beer a day    Drug use: No    Sexual activity: None   Other Topics Concern    None   Social History Narrative    None     Social Determinants of Health     Financial Resource Strain:     Difficulty of Paying Living Expenses: Not on file   Food Insecurity:     Worried About Running Out of Food in the Last Year: Not on file    Tressa of Food in the Last Year: Not on file   Transportation Needs:     Lack of Transportation (Medical): Not on file    Lack of Transportation (Non-Medical):  Not on file   Physical Activity:     Days of Exercise per Week: Not on file    Minutes of Exercise per Session: Not on file   Stress:     Feeling of Stress : Not on file   Social Connections:     Frequency of Communication with Friends and Family: Not on file    Frequency of Social Gatherings with Friends and Family: Not on file    Attends [employer] Services: Not on file    Active Member of Clubs or Organizations: Not on file    Attends Club or Organization Meetings: Not on file    Marital Status: Not on file   Intimate Partner Violence:     Fear of Current or Ex-Partner: Not on file    Emotionally Abused: Not on file    Physically Abused: Not on file    Sexually Abused: Not on file   Housing Stability:     Unable to Pay for Housing in the Last Year: Not on file    Number of Places Lived in the Last Year: Not on file    Unstable Housing in the Last Year: Not on file       Family History   Problem Relation Age of Onset    Asthma Mother        Blood pressure (!) 152/100, pulse 66, height 5' 10\" (1.778 m), weight 172 lb 12.8 oz (78.4 kg). General:   Well developed, well nourished  Lungs:   Clear to auscultation  Heart:    Normal S1 S2, No murmur, rubs, or gallops  Abdomen:   Soft, non tender, no organomegalies, positive bowel sounds  Extremities:   No edema, no cyanosis, good peripheral pulses  Neurological:   Awake, alert, oriented. No obvious focal deficits  Musculoskeletal:  No obvious deformities    EKG:          Diagnosis Orders   1. PAF (paroxysmal atrial fibrillation) ([facility])  EKG 12 lead   2. Syncope, unspecified syncope type  EKG 12 lead       Orders Placed This Encounter   Procedures    EKG 12 lead     Order Specific Question:   Reason for Exam?     Answer: Other       Assessment/Plan:     PAF, not on eliquis-- noted by linq monitor. Asymptomatic. Have discussed [address] multiple times now. Patient states if noticing afib episodes again, will consider [address] and watchman given dizziness and fall history. Will continue without for now and understands the risks and benefits involved. HTN-a little higher today, 152/100, recheck is 140/100. on norvasc 5, lopressor 100, lisinopril 5 daily. Given likely orthostatic changes, do not want to drop him too much and is usually pretty well controlled. Disposition:   F/u with Dr Neha Tubbs as scheduled.    Continue Dr Shakeel Pérez current treatment plan  Follow up with Dr Wilhelminia Claude as scheduled or sooner if needed

## 2022-05-17 ENCOUNTER — PROCEDURE VISIT (OUTPATIENT)
Dept: CARDIOLOGY CLINIC | Age: 57
End: 2022-05-17
Payer: MEDICAID

## 2022-05-17 DIAGNOSIS — R55 SYNCOPE AND COLLAPSE: Primary | ICD-10-CM

## 2022-05-17 PROCEDURE — 93298 REM INTERROG DEV EVAL SCRMS: CPT | Performed by: NUCLEAR MEDICINE

## 2022-05-17 PROCEDURE — G2066 INTER DEVC REMOTE 30D: HCPCS | Performed by: NUCLEAR MEDICINE

## 2022-05-18 ENCOUNTER — OFFICE VISIT (OUTPATIENT)
Dept: CARDIOLOGY CLINIC | Age: 57
End: 2022-05-18
Payer: MEDICAID

## 2022-05-18 VITALS
HEIGHT: 70 IN | HEART RATE: 66 BPM | SYSTOLIC BLOOD PRESSURE: 140 MMHG | BODY MASS INDEX: 24.74 KG/M2 | DIASTOLIC BLOOD PRESSURE: 100 MMHG | WEIGHT: 172.8 LBS

## 2022-05-18 DIAGNOSIS — I48.0 PAF (PAROXYSMAL ATRIAL FIBRILLATION) (HCC): Primary | ICD-10-CM

## 2022-05-18 DIAGNOSIS — R55 SYNCOPE, UNSPECIFIED SYNCOPE TYPE: ICD-10-CM

## 2022-05-18 PROCEDURE — G8420 CALC BMI NORM PARAMETERS: HCPCS | Performed by: STUDENT IN AN ORGANIZED HEALTH CARE EDUCATION/TRAINING PROGRAM

## 2022-05-18 PROCEDURE — 4004F PT TOBACCO SCREEN RCVD TLK: CPT | Performed by: STUDENT IN AN ORGANIZED HEALTH CARE EDUCATION/TRAINING PROGRAM

## 2022-05-18 PROCEDURE — 93000 ELECTROCARDIOGRAM COMPLETE: CPT | Performed by: STUDENT IN AN ORGANIZED HEALTH CARE EDUCATION/TRAINING PROGRAM

## 2022-05-18 PROCEDURE — 99213 OFFICE O/P EST LOW 20 MIN: CPT | Performed by: STUDENT IN AN ORGANIZED HEALTH CARE EDUCATION/TRAINING PROGRAM

## 2022-05-18 PROCEDURE — 3017F COLORECTAL CA SCREEN DOC REV: CPT | Performed by: STUDENT IN AN ORGANIZED HEALTH CARE EDUCATION/TRAINING PROGRAM

## 2022-05-18 PROCEDURE — G8427 DOCREV CUR MEDS BY ELIG CLIN: HCPCS | Performed by: STUDENT IN AN ORGANIZED HEALTH CARE EDUCATION/TRAINING PROGRAM

## 2022-05-24 ENCOUNTER — OFFICE VISIT (OUTPATIENT)
Dept: ENT CLINIC | Age: 57
End: 2022-05-24
Payer: MEDICAID

## 2022-05-24 ENCOUNTER — PREP FOR PROCEDURE (OUTPATIENT)
Dept: ENT CLINIC | Age: 57
End: 2022-05-24

## 2022-05-24 ENCOUNTER — TELEPHONE (OUTPATIENT)
Dept: CARDIOLOGY CLINIC | Age: 57
End: 2022-05-24

## 2022-05-24 VITALS
HEART RATE: 68 BPM | WEIGHT: 172.6 LBS | DIASTOLIC BLOOD PRESSURE: 72 MMHG | SYSTOLIC BLOOD PRESSURE: 126 MMHG | TEMPERATURE: 97.7 F | BODY MASS INDEX: 24.16 KG/M2 | HEIGHT: 71 IN | OXYGEN SATURATION: 98 %

## 2022-05-24 DIAGNOSIS — R13.14 PHARYNGOESOPHAGEAL DYSPHAGIA: ICD-10-CM

## 2022-05-24 DIAGNOSIS — L59.8 RADIATION SKIN FIBROSIS FROM THERAPEUTIC PROCEDURE: Primary | ICD-10-CM

## 2022-05-24 DIAGNOSIS — K22.2 ESOPHAGEAL STRICTURE: ICD-10-CM

## 2022-05-24 DIAGNOSIS — J39.2 PHARYNGEAL STENOSIS: ICD-10-CM

## 2022-05-24 DIAGNOSIS — Z01.818 PRE-OP TESTING: Primary | ICD-10-CM

## 2022-05-24 PROCEDURE — G8420 CALC BMI NORM PARAMETERS: HCPCS | Performed by: OTOLARYNGOLOGY

## 2022-05-24 PROCEDURE — 4004F PT TOBACCO SCREEN RCVD TLK: CPT | Performed by: OTOLARYNGOLOGY

## 2022-05-24 PROCEDURE — 99205 OFFICE O/P NEW HI 60 MIN: CPT | Performed by: OTOLARYNGOLOGY

## 2022-05-24 PROCEDURE — G8427 DOCREV CUR MEDS BY ELIG CLIN: HCPCS | Performed by: OTOLARYNGOLOGY

## 2022-05-24 PROCEDURE — 3017F COLORECTAL CA SCREEN DOC REV: CPT | Performed by: OTOLARYNGOLOGY

## 2022-05-24 PROCEDURE — 92511 NASOPHARYNGOSCOPY: CPT | Performed by: OTOLARYNGOLOGY

## 2022-05-24 NOTE — TELEPHONE ENCOUNTER
Pre op Risk Assessment    Procedure Rigid Laryngoscopy and esophagoscopy with dilation   Physician Manish Nathan  Date of surgery/procedure 6/16/22    Last OV 5/1822 Michela Garzon 12/1/21 Baki  Last Stress none  Last Echo 5/21/20  Last Cath none  Last Stent none  Is patient on blood thinners none

## 2022-05-24 NOTE — TELEPHONE ENCOUNTER
[place] is scheduled for surgery with Dr David Larose on 6/16/22. We are requesting cardiac clearance. Surgery:  Rigid Laryngoscopy and esophagoscopy with dilation    Please advise. Thank you!

## 2022-05-24 NOTE — PROGRESS NOTES
[address] EAR, NOSE AND THROAT  [address] [place] [address] [place] [address]  Dept: [phone]  Dept Fax: [phone]  Loc: [phone]    García Vasquez is a 64 y.o. male who was referred by Jonna Carvajal, * for:  Chief Complaint   Patient presents with    New Patient     New patient with a history of SCC referred by Dr. Nayva Gallardo. Patient had a recent CT Soft Tissue Neck Scan. He said that his back and hip hurts from arthritis, otherwise he feels pretty good. HPI:     García Vasquez is a 64 y.o. 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 He had full course chemotherapy. Radiation therapy as well which included gastrostomy tube feeding and tracheostomy for airway protection. He had his tracheostomy and gastrostomy tube removed and the subsequent year or 2 from his treatment and he is referred for evaluation of persistent dysphagia with difficulty maintaining his body weight. He describes his eating to be very episodic. He states that he can go several days without \"eating anything\" but he drinks daily \"2-3\" 24 ounce beers and greater than a sixpack of [employer]. He also smokes about 1/2 pack/day of cigarettes. His wife states that one of his clinician said that it was fine for him to \"drink his beers\" as a means of increasing his caloric intake based on beer having wheat in it and therefore some degree of nutrition. He understands that his tobacco intake increases his chances of new cancer but he did not realize that alcohol is an independent risk factor for head neck cancer. He wishes to be able to swallow better but has been led to believe by GI medicine that his upper esophageal channel is too scarred to be enlarged.   It is unclear as to whether or not he has had balloon dilations of his upper esophagus in the recent past. In addition he has occasional discharge from his prior gastrostomy site that he describes as green and irritating. This happens to a variable degree and is sometimes associated with the volume of beer he takes in. Other foods he can eat and drink include large volumes of whole milk and chicken nuggets that he minces into small bites. He cannot chew food so he is very limited in this regard. He eats lots of \"cookies\" and often eats and naps. He does not attribute that to his beer intake. He had dentures made for his reconstructed mandible but he states that he \"lost them\". History:     No Known Allergies  Current Outpatient Medications   Medication Sig Dispense Refill    amLODIPine (NORVASC) 5 MG tablet       levothyroxine (SYNTHROID) 125 MCG tablet Take one tablet first thing in the morning on an empty stomach.  mirtazapine (REMERON) 15 MG tablet       OLANZapine (ZYPREXA) 10 MG tablet       OXcarbazepine (TRILEPTAL) 150 MG tablet       oxyCODONE-acetaminophen (PERCOCET) 5-325 MG per tablet       metoprolol tartrate (LOPRESSOR) 50 MG tablet TAKE 1 TABLET BY MOUTH TWICE DAILY 60 tablet 11    metoprolol (LOPRESSOR) 100 MG tablet Take 50 mg by mouth 2 times daily Per patient PCP changed dose       Sertraline HCl (ZOLOFT PO) Take by mouth      pantoprazole (PROTONIX) 40 MG tablet Take 40 mg by mouth daily   6    esomeprazole Magnesium (NEXIUM) 20 MG PACK Take 20 mg by mouth daily      Mouthwashes (BIOTENE DRY MOUTH) LIQD daily       lisinopril (PRINIVIL;ZESTRIL) 20 MG tablet Take 5 mg by mouth daily        No current facility-administered medications for this visit.      Past Medical History:   Diagnosis Date    Aortic aneurysm ([address]     Asthma     [facility] and neck [facility] ([facility]     Hypertension     Lymphedema     Syncope 12/22/2020    Syncope and collapse       Past Surgical History:   Procedure Laterality Date    GASTROSTOMY TUBE PLACEMENT  10/2/15    MANDIBLE RECONSTRUCTION  10/2/15    SKIN GRAFT  10/2/15    jaw    UPPER GASTROINTESTINAL ENDOSCOPY N/A 5/11/2021    EGD ESOPHAGOGASTRODUODENOSCOPY performed by Jesus Snyder MD at [facility]     Family History   Problem Relation Age of Onset    Asthma Mother      Social History     Tobacco Use    Smoking status: Current Every Day Smoker     Packs/day: 0.25     Years: 34.00     Pack years: 8.50     Types: Cigarettes    Smokeless tobacco: Never Used   Substance Use Topics    Alcohol use: Yes     Alcohol/week: 7.0 standard drinks     Types: 7 Cans of beer per week     Comment: 1 beer a day        Subjective:      Review of Systems  Rest of review of systems are negative, except as noted in HPI. Objective:     /72 (Site: Right Upper Arm, Position: Sitting)   Pulse 68   Temp 97.7 °F (36.5 °C) (Infrared)   Ht 5' 11\" (1.803 m)   Wt 172 lb 9.6 oz (78.3 kg)   SpO2 98%   BMI 24.07 kg/m²     Physical Exam       On general physical exam the patient is pleasant cooperative middle-aged -American man who appears significantly older than his stated age. His voice is moderately low in pitch but otherwise clear in character for his age and gender. His speech pattern is somewhat slow and consistent with his edentulous state. His nasal vault is abnormal for marked left-sided nasal septal deviation with obstruction but his right side was widely patent. His facial nerve function was within normal limits with the exception of his marginal mandibular nerve on the left side which is either weak or fully dysfunctional.  His extraocular movements were grossly and symmetrically within normal limits. His oral cavity exam was abnormal for the presence of a large skin grafted area along the left side going from the palate to the floor of mouth in the cul-de-sac of his reconstructed mandible. His soft palate was tented and highly  from the nasopharyngeal aperture. His tongue was fully mobile.   His oral mandibular aperture was normal. He had a surprising amount of salivary flow within his mouth. 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 No adenopathy or thyromegaly was detected but his swallow barely moved any of the surrounding paratracheal structures up or down to definitively examine his thyroid gland. Procedure: Nasopharyngoscopy  Findings: The patient's nasopharynx had vascular ectasia but no stricture. The patient was capable of full closure of the velum on swallowing and on appropriate phonation. The gap of the soft palate to the tongue at the left of the oropharyngeal aperture into the mouth was large and likely not able to be closed on normal swallowing. His split-thickness skin graft was easily visualized and maintained exposure throughout oropharyngeal contraction. His tongue base was somewhat retrodisplaced and his laryngeal inlet was partially obscured by the retrodisplacement of the epiglottis. A trumpet maneuver exposed these tissues to some degree allow me to see into the piriform sinuses on both sides but under an overhang of apparent pharyngeal stricture. No pooling was seen in in the esophageal inlet or in either piriform sinus. The patient's vocal folds were fully mobile. His transglottic aperture was adequate. He tolerated the procedure well. Nasopharyngoscopy images:    High view of hypopharynx    Para mandibular reconstruction    Cords closed with trumpet maneuver    Cords open with trumpet maneuver    Post cricoid space    With trumpet maneuver    Nasopharyngeal aperture          Vitals reviewed. No results found.    Lab Results   Component Value Date     09/03/2021     12/10/2020     10/28/2020     09/03/2020     09/06/2019     06/27/2019    K 3.6 09/03/2021    K 3.3 12/10/2020    K 4.9 10/28/2020    K 3.6 09/03/2020    K 3.8 09/06/2019 K 3.7 06/27/2019    K 4.3 04/30/2019     12/10/2020     10/28/2020     06/27/2019    CO2 21 12/10/2020    CO2 26 10/28/2020    CO2 24 09/03/2020    BUN 10 12/10/2020    BUN 12 10/28/2020    BUN 5 09/03/2020    CREATININE 1.3 09/03/2021    CREATININE 1.0 12/10/2020    CREATININE 1.1 10/28/2020    CREATININE 1.1 09/03/2020    CREATININE 1.4 09/06/2019    CREATININE 0.9 06/27/2019    CALCIUM 9.2 12/10/2020    CALCIUM 10.3 10/28/2020    CALCIUM 9.4 06/27/2019    PROT 6.2 09/03/2021    PROT 7.1 09/03/2020    PROT 7.1 06/27/2019    LABALBU 3.8 09/03/2021    LABALBU 3.9 09/03/2020    LABALBU 4.2 06/27/2019    BILITOT 0.2 09/03/2021    BILITOT 0.4 09/03/2020    BILITOT <0.2 06/27/2019    ALKPHOS 118 09/03/2021    ALKPHOS 78 09/03/2020    ALKPHOS 80 06/27/2019    AST 53 09/03/2021    AST 41 09/03/2020    AST 28 06/27/2019    ALT 36 09/03/2021    ALT 27 09/03/2020    ALT 21 06/27/2019       All of the past medical history, past surgical history, family history,social history, allergies and current medications were reviewed with the patient. Assessment & Plan   Diagnoses and all orders for this visit:     Diagnosis Orders   1. 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 Esophageal stricture  UT ESOPHAGOSCOPY FLEX BALLOON DILAT <30 MM DIAM   3. Pharyngoesophageal dysphagia  LARYNGOSCOPY    UT ESOPHAGOSCOPY FLEX BALLOON DILAT <30 MM DIAM   4. Pharyngeal stenosis         Based on the patient's history and these physical findings, the patient has a complex swallowing mechanism as an adaptation to his reconstructions and his radiation fibrosis. He basically swallows passively until he gets the majority of his food and fluids past the level of the tongue base and then he swallows to Propulsid down through the narrow esophageal inlet.   This is because he cannot seal the oropharyngeal aperture with his soft palate and pharynx due to radiation fibrosis and  of the aperture with his split-thickness skin graft. Given his adaptations have largely worked, I think improving his esophageal inlet is the first order of surgical treatment I would recommend to ease the extent to which she can pass his bolus into the distal esophagus, which is patent just within the thoracic inlet according to the CT scan images. As such I recommend a rigid instrumentation of the esophageal inlet likely with a Daija laryngoscope and noted to visualize the soft tissues involved in dilation to reduce the risk of esophageal perforation. Nonetheless I made it clear to the patient and his wife, who was with us the entire time, that I cannot completely take away the risk of esophageal perforation which could be a very significant complication. But this would also increase the chances that if balloon dilation is a short benefit, he may be a candidate for transoral esophagoplasty with a division of the upper esophageal stricture and the advancement of the mucosa from the hypopharynx into the esophageal aperture. I would be done at a later date if need be. First I will begin with a relatively narrow balloon something on the order of 10 to 15 mm and then gradually enlarge the esophageal inlet up to 20 mm if possible. In addition, and in the future, it may be worth considering every construction of the oropharyngeal inlet to close down the enlarged aperture that is causing him to have this leakage of the tongue base \"piston\" function so that he can better propulse food and fluids into the hypopharynx with his tongue motion. I explained all this in detail to the patient and his wife with the video that was taken of his pharyngeal structures today. They reported understanding the basis of my recommendations and the risks involved and wishing to proceed as recommended.     I spent a substantial period of time (greater than 20 minutes) counseling the patient on his need to definitively quit smoking cigarettes and quit drinking beer in the near future. I recommend that he do both at the same time and that he do his up most to stay off of both given that they are independent risk factors for a new head neck cancer given that he is 7 years out from his original diagnosis. 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 In the meantime I will refer him to Dr Sue Perla to evaluate this fistula with the potential that he can have it removed sometime in the future. For the time being and while he has a chance of needing a gastrostomy tube replaced in the event that he has a perforation in the esophagus, we will leave things as they are. Spent over an hour of face-to-face time with the patient more than half of which was dedicated to reviewing his complex history and structuring this diagnostic and therapeutic program..      Return in about 6 weeks (around 7/5/2022) for Postop follow-up. **This report has been created using voice recognition software. It may contain minor errors which are inherent in voice recognition technology. **

## 2022-05-25 ENCOUNTER — HOSPITAL ENCOUNTER (OUTPATIENT)
Age: 57
Discharge: HOME | End: 2022-05-25
Payer: MEDICAID

## 2022-05-25 ENCOUNTER — HOSPITAL ENCOUNTER (OUTPATIENT)
Dept: GENERAL RADIOLOGY | Age: 57
Discharge: HOME | End: 2022-05-25
Payer: MEDICAID

## 2022-05-25 DIAGNOSIS — Z01.818 PRE-OP TESTING: ICD-10-CM

## 2022-05-25 LAB
ALBUMIN SERPL-MCNC: 4.2 G/DL (ref 3.5–5.1)
ALP BLD-CCNC: 84 U/L (ref 38–126)
ALT SERPL-CCNC: 14 U/L (ref 11–66)
ANION GAP SERPL CALCULATED.3IONS-SCNC: 9 MEQ/L (ref 8–16)
AST SERPL-CCNC: 21 U/L (ref 5–40)
BASOPHILS # BLD: 0.4 %
BASOPHILS ABSOLUTE: 0 THOU/MM3 (ref 0–0.1)
BILIRUB SERPL-MCNC: 0.3 MG/DL (ref 0.3–1.2)
BUN BLDV-MCNC: 7 MG/DL (ref 7–22)
CALCIUM SERPL-MCNC: 9.3 MG/DL (ref 8.5–10.5)
CHLORIDE BLD-SCNC: 104 MEQ/L (ref 98–111)
CO2: 28 MEQ/L (ref 23–33)
CREAT SERPL-MCNC: 1.1 MG/DL (ref 0.4–1.2)
EOSINOPHIL # BLD: 0.9 %
EOSINOPHILS ABSOLUTE: 0.1 THOU/MM3 (ref 0–0.4)
ERYTHROCYTE [DISTWIDTH] IN BLOOD BY AUTOMATED COUNT: 14.6 % (ref 11.5–14.5)
ERYTHROCYTE [DISTWIDTH] IN BLOOD BY AUTOMATED COUNT: 51.2 FL (ref 35–45)
GLUCOSE BLD-MCNC: 88 MG/DL (ref 70–108)
HCT VFR BLD CALC: 44.9 % (ref 42–52)
HEMOGLOBIN: 14.7 GM/DL (ref 14–18)
IMMATURE GRANS (ABS): 0.02 THOU/MM3 (ref 0–0.07)
IMMATURE GRANULOCYTES: 0.3 %
LYMPHOCYTES # BLD: 33.7 %
LYMPHOCYTES ABSOLUTE: 2.5 THOU/MM3 (ref 1–4.8)
MCH RBC QN AUTO: 31.2 PG (ref 26–33)
MCHC RBC AUTO-ENTMCNC: 32.7 GM/DL (ref 32.2–35.5)
MCV RBC AUTO: 95.3 FL (ref 80–94)
MONOCYTES # BLD: 14.1 %
MONOCYTES ABSOLUTE: 1.1 THOU/MM3 (ref 0.4–1.3)
NUCLEATED RED BLOOD CELLS: 0 /100 WBC
PLATELET # BLD: 280 THOU/MM3 (ref 130–400)
PMV BLD AUTO: 9.1 FL (ref 9.4–12.4)
POTASSIUM SERPL-SCNC: 3.9 MEQ/L (ref 3.5–5.2)
RBC # BLD: 4.71 MILL/MM3 (ref 4.7–6.1)
SEG NEUTROPHILS: 50.6 %
SEGMENTED NEUTROPHILS ABSOLUTE COUNT: 3.8 THOU/MM3 (ref 1.8–7.7)
SODIUM BLD-SCNC: 141 MEQ/L (ref 135–145)
TOTAL PROTEIN: 7.3 G/DL (ref 6.1–8)
WBC # BLD: 7.5 THOU/MM3 (ref 4.8–10.8)

## 2022-05-25 PROCEDURE — 80053 COMPREHEN METABOLIC PANEL: CPT

## 2022-05-25 PROCEDURE — 36415 COLL VENOUS BLD VENIPUNCTURE: CPT

## 2022-05-25 PROCEDURE — 85025 COMPLETE CBC W/AUTO DIFF WBC: CPT

## 2022-05-25 PROCEDURE — 71046 X-RAY EXAM CHEST 2 VIEWS: CPT

## 2022-05-25 NOTE — PROGRESS NOTES
Follow all instructions given by your physician    NPO after midnight   Sips of water am of surgery with allowed medications  Bring insurance info and 's license  Wear comfortable clean clothing  No jewelry or contact lenses to be worn day of surgery  No glue on dentures morning of surgery;you will be asked to remove them for surgery. Case for glasses. Shower night before and morning of surgery with a liquid antibacterial soap, dry with fresh clean towel; no lotions, creams or powder. Clean sheets and pillow case on bed night before surgery  Bring medications in original bottles  Bring CPAP/BIPAP machine if you have one ( you may be charged if one is needed in recovery room )   needed at discharge and someone over 18 to stay with you for 24 hours overnight (surgery may be cancelled if you don't have this)  Report to [facility] on 2nd floor  If you would become ill prior to surgery, please call the surgeon  May have a visitor with you, we request that you limit to 2 visitors in pre-op area  Please bring and wear mask  Call [phone] for any questions  Covid questionnaire Complete; Patient negative for symptoms or exposure. See documentation.

## 2022-05-31 ENCOUNTER — PREP FOR PROCEDURE (OUTPATIENT)
Dept: ENT CLINIC | Age: 57
End: 2022-05-31

## 2022-06-02 RX ORDER — SODIUM CHLORIDE 0.9 % (FLUSH) 0.9 %
5-40 SYRINGE (ML) INJECTION EVERY 12 HOURS SCHEDULED
Status: CANCELLED | OUTPATIENT
Start: 2022-06-02

## 2022-06-02 RX ORDER — SODIUM CHLORIDE 0.9 % (FLUSH) 0.9 %
5-40 SYRINGE (ML) INJECTION PRN
Status: CANCELLED | OUTPATIENT
Start: 2022-06-02

## 2022-06-02 RX ORDER — SODIUM CHLORIDE 9 MG/ML
INJECTION, SOLUTION INTRAVENOUS PRN
Status: CANCELLED | OUTPATIENT
Start: 2022-06-02

## 2022-06-02 RX ORDER — DEXAMETHASONE SODIUM PHOSPHATE 4 MG/ML
8 INJECTION, SOLUTION INTRA-ARTICULAR; INTRALESIONAL; INTRAMUSCULAR; INTRAVENOUS; SOFT TISSUE
Status: CANCELLED | OUTPATIENT
Start: 2022-06-02

## 2022-06-03 ENCOUNTER — ANESTHESIA EVENT (OUTPATIENT)
Dept: OPERATING ROOM | Age: 57
End: 2022-06-03
Payer: MEDICAID

## 2022-06-03 ENCOUNTER — ANESTHESIA (OUTPATIENT)
Dept: OPERATING ROOM | Age: 57
End: 2022-06-03
Payer: MEDICAID

## 2022-06-03 ENCOUNTER — HOSPITAL ENCOUNTER (OUTPATIENT)
Age: 57
Setting detail: OUTPATIENT SURGERY
Discharge: HOME | End: 2022-06-03
Attending: OTOLARYNGOLOGY | Admitting: OTOLARYNGOLOGY
Payer: MEDICAID

## 2022-06-03 VITALS
HEART RATE: 91 BPM | BODY MASS INDEX: 23.62 KG/M2 | WEIGHT: 165 LBS | TEMPERATURE: 97.5 F | SYSTOLIC BLOOD PRESSURE: 103 MMHG | DIASTOLIC BLOOD PRESSURE: 71 MMHG | OXYGEN SATURATION: 93 % | RESPIRATION RATE: 16 BRPM | HEIGHT: 70 IN

## 2022-06-03 PROCEDURE — 3600000013 HC SURGERY LEVEL 3 ADDTL 15MIN: Performed by: OTOLARYNGOLOGY

## 2022-06-03 PROCEDURE — 7100000000 HC PACU RECOVERY - FIRST 15 MIN: Performed by: OTOLARYNGOLOGY

## 2022-06-03 PROCEDURE — 7100000011 HC PHASE II RECOVERY - ADDTL 15 MIN: Performed by: OTOLARYNGOLOGY

## 2022-06-03 PROCEDURE — 2709999900 HC NON-CHARGEABLE SUPPLY: Performed by: OTOLARYNGOLOGY

## 2022-06-03 PROCEDURE — 7100000001 HC PACU RECOVERY - ADDTL 15 MIN: Performed by: OTOLARYNGOLOGY

## 2022-06-03 PROCEDURE — 3600000003 HC SURGERY LEVEL 3 BASE: Performed by: OTOLARYNGOLOGY

## 2022-06-03 PROCEDURE — 2580000003 HC RX 258: Performed by: OTOLARYNGOLOGY

## 2022-06-03 PROCEDURE — 2500000003 HC RX 250 WO HCPCS: Performed by: STUDENT IN AN ORGANIZED HEALTH CARE EDUCATION/TRAINING PROGRAM

## 2022-06-03 PROCEDURE — APPNB45 APP NON BILLABLE 31-45 MINUTES: Performed by: NURSE PRACTITIONER

## 2022-06-03 PROCEDURE — 6360000002 HC RX W HCPCS: Performed by: OTOLARYNGOLOGY

## 2022-06-03 PROCEDURE — 6360000002 HC RX W HCPCS: Performed by: STUDENT IN AN ORGANIZED HEALTH CARE EDUCATION/TRAINING PROGRAM

## 2022-06-03 PROCEDURE — 3700000001 HC ADD 15 MINUTES (ANESTHESIA): Performed by: OTOLARYNGOLOGY

## 2022-06-03 PROCEDURE — 2500000003 HC RX 250 WO HCPCS: Performed by: NURSE ANESTHETIST, CERTIFIED REGISTERED

## 2022-06-03 PROCEDURE — 43191 ESOPHAGOSCOPY RIGID TRNSO DX: CPT | Performed by: OTOLARYNGOLOGY

## 2022-06-03 PROCEDURE — 3700000000 HC ANESTHESIA ATTENDED CARE: Performed by: OTOLARYNGOLOGY

## 2022-06-03 PROCEDURE — 7100000010 HC PHASE II RECOVERY - FIRST 15 MIN: Performed by: OTOLARYNGOLOGY

## 2022-06-03 RX ORDER — PROPOFOL 10 MG/ML
INJECTION, EMULSION INTRAVENOUS PRN
Status: DISCONTINUED | OUTPATIENT
Start: 2022-06-03 | End: 2022-06-03 | Stop reason: SDUPTHER

## 2022-06-03 RX ORDER — SUCCINYLCHOLINE/SOD CL,ISO/PF 200MG/10ML
SYRINGE (ML) INTRAVENOUS PRN
Status: DISCONTINUED | OUTPATIENT
Start: 2022-06-03 | End: 2022-06-03 | Stop reason: SDUPTHER

## 2022-06-03 RX ORDER — SODIUM CHLORIDE 9 MG/ML
INJECTION, SOLUTION INTRAVENOUS CONTINUOUS
Status: DISCONTINUED | OUTPATIENT
Start: 2022-06-03 | End: 2022-06-03 | Stop reason: HOSPADM

## 2022-06-03 RX ORDER — MIDAZOLAM HYDROCHLORIDE 1 MG/ML
INJECTION INTRAMUSCULAR; INTRAVENOUS PRN
Status: DISCONTINUED | OUTPATIENT
Start: 2022-06-03 | End: 2022-06-03 | Stop reason: SDUPTHER

## 2022-06-03 RX ORDER — DEXAMETHASONE SODIUM PHOSPHATE 4 MG/ML
8 INJECTION, SOLUTION INTRA-ARTICULAR; INTRALESIONAL; INTRAMUSCULAR; INTRAVENOUS; SOFT TISSUE
Status: DISCONTINUED | OUTPATIENT
Start: 2022-06-03 | End: 2022-06-03 | Stop reason: HOSPADM

## 2022-06-03 RX ORDER — SODIUM CHLORIDE 0.9 % (FLUSH) 0.9 %
5-40 SYRINGE (ML) INJECTION PRN
Status: DISCONTINUED | OUTPATIENT
Start: 2022-06-03 | End: 2022-06-03 | Stop reason: HOSPADM

## 2022-06-03 RX ORDER — SODIUM CHLORIDE 9 MG/ML
INJECTION, SOLUTION INTRAVENOUS PRN
Status: DISCONTINUED | OUTPATIENT
Start: 2022-06-03 | End: 2022-06-03 | Stop reason: HOSPADM

## 2022-06-03 RX ORDER — ROCURONIUM BROMIDE 10 MG/ML
INJECTION, SOLUTION INTRAVENOUS PRN
Status: DISCONTINUED | OUTPATIENT
Start: 2022-06-03 | End: 2022-06-03 | Stop reason: SDUPTHER

## 2022-06-03 RX ORDER — ONDANSETRON 2 MG/ML
4 INJECTION INTRAMUSCULAR; INTRAVENOUS
Status: CANCELLED | OUTPATIENT
Start: 2022-06-03 | End: 2022-06-03

## 2022-06-03 RX ORDER — SODIUM CHLORIDE 9 MG/ML
INJECTION, SOLUTION INTRAVENOUS PRN
Status: CANCELLED | OUTPATIENT
Start: 2022-06-03

## 2022-06-03 RX ORDER — MEPERIDINE HYDROCHLORIDE 25 MG/ML
12.5 INJECTION INTRAMUSCULAR; INTRAVENOUS; SUBCUTANEOUS EVERY 5 MIN PRN
Status: CANCELLED | OUTPATIENT
Start: 2022-06-03

## 2022-06-03 RX ORDER — DIPHENHYDRAMINE HYDROCHLORIDE 50 MG/ML
12.5 INJECTION INTRAMUSCULAR; INTRAVENOUS
Status: CANCELLED | OUTPATIENT
Start: 2022-06-03 | End: 2022-06-03

## 2022-06-03 RX ORDER — SODIUM CHLORIDE 0.9 % (FLUSH) 0.9 %
5-40 SYRINGE (ML) INJECTION PRN
Status: CANCELLED | OUTPATIENT
Start: 2022-06-03

## 2022-06-03 RX ORDER — FENTANYL CITRATE 50 UG/ML
INJECTION, SOLUTION INTRAMUSCULAR; INTRAVENOUS PRN
Status: DISCONTINUED | OUTPATIENT
Start: 2022-06-03 | End: 2022-06-03 | Stop reason: SDUPTHER

## 2022-06-03 RX ORDER — LIDOCAINE HCL/PF 100 MG/5ML
SYRINGE (ML) INJECTION PRN
Status: DISCONTINUED | OUTPATIENT
Start: 2022-06-03 | End: 2022-06-03 | Stop reason: SDUPTHER

## 2022-06-03 RX ORDER — HYDRALAZINE HYDROCHLORIDE 20 MG/ML
10 INJECTION INTRAMUSCULAR; INTRAVENOUS
Status: CANCELLED | OUTPATIENT
Start: 2022-06-03

## 2022-06-03 RX ORDER — SODIUM CHLORIDE 0.9 % (FLUSH) 0.9 %
5-40 SYRINGE (ML) INJECTION EVERY 12 HOURS SCHEDULED
Status: CANCELLED | OUTPATIENT
Start: 2022-06-03

## 2022-06-03 RX ORDER — SODIUM CHLORIDE 0.9 % (FLUSH) 0.9 %
5-40 SYRINGE (ML) INJECTION EVERY 12 HOURS SCHEDULED
Status: DISCONTINUED | OUTPATIENT
Start: 2022-06-03 | End: 2022-06-03 | Stop reason: HOSPADM

## 2022-06-03 RX ORDER — FENTANYL CITRATE 50 UG/ML
50 INJECTION, SOLUTION INTRAMUSCULAR; INTRAVENOUS EVERY 5 MIN PRN
Status: CANCELLED | OUTPATIENT
Start: 2022-06-03

## 2022-06-03 RX ADMIN — MIDAZOLAM 2 MG: 1 INJECTION INTRAMUSCULAR; INTRAVENOUS at 13:50

## 2022-06-03 RX ADMIN — ROCURONIUM BROMIDE 20 MG: 10 INJECTION INTRAVENOUS at 14:29

## 2022-06-03 RX ADMIN — CEFAZOLIN 2000 MG: 10 INJECTION, POWDER, FOR SOLUTION INTRAVENOUS at 14:00

## 2022-06-03 RX ADMIN — PROPOFOL 170 MG: 10 INJECTION, EMULSION INTRAVENOUS at 13:50

## 2022-06-03 RX ADMIN — FENTANYL CITRATE 100 MCG: 50 INJECTION, SOLUTION INTRAMUSCULAR; INTRAVENOUS at 13:50

## 2022-06-03 RX ADMIN — Medication 100 MG: at 13:50

## 2022-06-03 RX ADMIN — DEXAMETHASONE SODIUM PHOSPHATE 8 MG: 4 INJECTION, SOLUTION INTRA-ARTICULAR; INTRALESIONAL; INTRAMUSCULAR; INTRAVENOUS; SOFT TISSUE at 13:16

## 2022-06-03 RX ADMIN — SUGAMMADEX 200 MG: 100 INJECTION, SOLUTION INTRAVENOUS at 14:44

## 2022-06-03 RX ADMIN — Medication 140 MG: at 13:50

## 2022-06-03 RX ADMIN — ROCURONIUM BROMIDE 30 MG: 10 INJECTION INTRAVENOUS at 14:03

## 2022-06-03 RX ADMIN — SODIUM CHLORIDE: 9 INJECTION, SOLUTION INTRAVENOUS at 14:40

## 2022-06-03 RX ADMIN — SODIUM CHLORIDE: 9 INJECTION, SOLUTION INTRAVENOUS at 13:15

## 2022-06-03 ASSESSMENT — PAIN SCALES - GENERAL
PAINLEVEL_OUTOF10: 7
PAINLEVEL_OUTOF10: 0
PAINLEVEL_OUTOF10: 1

## 2022-06-03 ASSESSMENT — PAIN - FUNCTIONAL ASSESSMENT: PAIN_FUNCTIONAL_ASSESSMENT: 0-10

## 2022-06-03 ASSESSMENT — PAIN DESCRIPTION - DESCRIPTORS: DESCRIPTORS: ACHING

## 2022-06-03 ASSESSMENT — LIFESTYLE VARIABLES: SMOKING_STATUS: 1

## 2022-06-03 ASSESSMENT — ENCOUNTER SYMPTOMS: SHORTNESS OF BREATH: 0

## 2022-06-03 NOTE — OP NOTE
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 Highly redundant mucosa of the hypopharynx and esophageal inlet  2. Very tight upper esophageal sphincter despite neuromuscular blockade  3. Very tight thoracic inlet esophagus secondary to a pulsating mass to the left of the esophagus and a rigid mass to the right most likely the patient's first rib    Endoscopy images:    Endolarynx    Post cricoid and hypopharyngeal redundancy with a thick CP bar    Distal cervical esophagus to thoracic inlet with side-to-side compression of esophagus    Same    High view of cervical esophagus with spatula in cricopharyngeus                Detailed Description of Procedure: The patient was taken to the operating room awake and placed in the supine position. General anesthesia was induced and the patient was intubated with a 6.0 endotracheal tube without difficulty or vital sign instability. The table was turned 90 degrees for the procedure. A timeout was employed verifying the patient's identity and planned procedure. The patient was draped in usual fashion for transoral surgery. Given that the patient is edentulous, 2 saline soaked 4 x 4 gauze pads were placed in the maxilla and a Daija laryngoscope was passed into the laryngeal inlet to examine the larynx and hypopharynx. The larynx was abnormal for mild true vocal fold atrophy but no other significant pathology. There was a lot of thick secretions in the hypopharynx which were suctioned away. There was extensive soft tissue redundancy of the hypopharynx particularly posteriorly in the post cricoid space of the larynx and in the hypopharyngeal mucosa proper. No focal lesions were seen. Rigid esophagoscopy: I then passed a 17 cm Daija into the post cricoid space and extended it to allow me rigid instrumentation of the esophageal inlet and beyond. I found the cricopharyngeus to be highly hypertrophic and not responding to neuromuscular blockade. The muscle was tight even with full on rocuronium therapy. I placed a laryngeal spatula within the esophageal inlet and was able to block the thickness of the muscle and feel it is tension despite neuromuscular blockade in a manner consistent with cricopharyngeus achalasia. However when I passed my 30 degree optical telescope through the esophageal inlet I was able to see a secondary point of obstruction that on measurements was consistent with the thoracic inlet at the first rib. The mucosa of the esophagus was compressed side to side and had a vertical orientation AP. I passed the laryngeal spatula into that space and could not move the mucosa to the left or to the right because of the compression of the esophagus at that level. Given the high likelihood that this represented the patient's aortic aneurysm or the takeoff to her left subclavian artery, I opted not to attempt to balloon dilated because of the potential that plaque within this great vessel would cause severe complications including stroke or even myocardial infarction. As such I completed my exam and remove the endoscopic instruments considering the operation concluded. The patient was then reversed from general anesthesia and extubated in the operating room.   He was taken to recovery in satisfactory condition. Estimated blood loss in this in the case was nil and the patient received less than half a liter of intravenous lactated Ringer's intraoperatively. Counts were considered accurate x3. No blood products were transfused. No intraoperative complications were detected. I was present for and participated in the patient's entire operation. Disposition: The patient has multiple etiologies to his obstructive dysphagia but by history the Dysphagia Maisha Galarza is more likely the worse of the primary to problems including cricopharyngeus achalasia. She needs to be evaluated at a [address] [place] [address] for the vascular problem so I will probably send him to the [place] clinic for further work-up.     Electronically signed by Iona Aleman MD on 6/3/2022 at 3:19 PM

## 2022-06-03 NOTE — PROGRESS NOTES
Pt admitted to [facility] room 6 and oriented to unit. SCD sleeves applied. Nares swabbed. Pt verbalized permission for first name, last initial and physicians name on white board. SDS board and discharge criteria explained, pt and family verbalized understanding. Pt denies thoughts of harming self or others. Call light in reach. Family at the bedside.

## 2022-06-03 NOTE — PROGRESS NOTES

## 2022-06-03 NOTE — H&P
Adapted from prior ENT note:    Esophageal stricture; Pharyngoesophageal dysphagia; pharyngeal stenosis. 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   No new symptoms    Past Medical History:   Diagnosis Date    A-fib [facility])     Aortic aneurysm (HCC)     Asthma     Head and neck cancer ([address]     Hypertension     Lymphedema     Syncope 12/22/2020    Syncope and collapse        Past Surgical History:   Procedure Laterality Date    GASTROSTOMY TUBE PLACEMENT  10/2/15    MANDIBLE RECONSTRUCTION  10/2/15    SKIN GRAFT  10/2/15    jaw    TRACHEOSTOMY      UPPER GASTROINTESTINAL ENDOSCOPY N/A 5/11/2021    EGD ESOPHAGOGASTRODUODENOSCOPY performed by Olinda Urbano MD at [facility]       No Known Allergies    Current Facility-Administered Medications   Medication Dose Route Frequency Provider Last Rate Last Admin    0.9 % sodium chloride infusion   IntraVENous PRN Ramona Howell MD        sodium chloride flush 0.9 % injection 5-40 mL  5-40 mL IntraVENous 2 times per day Ramona Howell MD        sodium chloride flush 0.9 % injection 5-40 mL  5-40 mL IntraVENous PRN Ramona Howell MD        ceFAZolin (ANCEF) 2000 mg in dextrose 5 % 50 mL IVPB  2,000 mg IntraVENous On Call to Nataliya Vuong MD        dexamethasone (DECADRON) injection 8 mg  8 mg IntraVENous 30 Min Pre-Op Ramona Howell MD   8 mg at 06/03/22 1316    0.9 % sodium chloride infusion   IntraVENous Continuous Ramona Howell  mL/hr at 06/03/22 1315 New Bag at 06/03/22 1315       Current vitals  /72   Pulse 95   Temp 98 °F (36.7 °C) (Tympanic)   Resp 20   Ht 5' 10\" (1.778 m)   Wt 165 lb (74.8 kg)   SpO2 98%   BMI 23.68 kg/m²     Proceed with original surgical plan:  Suspension rigid laryngoscopy and esophagoscopy with dilation    Electronically signed by Loanne Gottron, APRN - MAKENNA on to be enlarged. It is unclear as to whether or not he has had balloon dilations of his upper esophagus in the recent past.  In addition he has occasional discharge from his prior gastrostomy site that he describes as green and irritating. This happens to a variable degree and is sometimes associated with the volume of beer he takes in. Other foods he can eat and drink include large volumes of whole milk and chicken nuggets that he minces into small bites. He cannot chew food so he is very limited in this regard. He eats lots of \"cookies\" and often eats and naps. He does not attribute that to his beer intake. He had dentures made for his reconstructed mandible but he states that he \"lost them\".                 History:      No Known Allergies  Current Facility-Administered Medications          Current Outpatient Medications   Medication Sig Dispense Refill    amLODIPine (NORVASC) 5 MG tablet          levothyroxine (SYNTHROID) 125 MCG tablet Take one tablet first thing in the morning on an empty stomach.        mirtazapine (REMERON) 15 MG tablet          OLANZapine (ZYPREXA) 10 MG tablet          OXcarbazepine (TRILEPTAL) 150 MG tablet          oxyCODONE-acetaminophen (PERCOCET) 5-325 MG per tablet          metoprolol tartrate (LOPRESSOR) 50 MG tablet TAKE 1 TABLET BY MOUTH TWICE DAILY 60 tablet 11    metoprolol (LOPRESSOR) 100 MG tablet Take 50 mg by mouth 2 times daily Per patient PCP changed dose         Sertraline HCl (ZOLOFT PO) Take by mouth        pantoprazole (PROTONIX) 40 MG tablet Take 40 mg by mouth daily    6    esomeprazole Magnesium (NEXIUM) 20 MG PACK Take 20 mg by mouth daily        Mouthwashes (BIOTENE DRY MOUTH) LIQD daily         lisinopril (PRINIVIL;ZESTRIL) 20 MG tablet Take 5 mg by mouth daily           No current facility-administered medications for this visit.         Past Medical History        Past Medical History:   Diagnosis Date    Aortic aneurysm (HCC)      Asthma    [address] and neck [facility] [address] [address]      Hypertension      Lymphedema      Syncope 12/22/2020    Syncope and collapse           Past Surgical History         Past Surgical History:   Procedure Laterality Date    GASTROSTOMY TUBE PLACEMENT   10/2/15    MANDIBLE RECONSTRUCTION   10/2/15    SKIN GRAFT   10/2/15     jaw    UPPER GASTROINTESTINAL ENDOSCOPY N/A 5/11/2021     EGD ESOPHAGOGASTRODUODENOSCOPY performed by Sony Fowler MD at [facility]         Family History         Family History   Problem Relation Age of Onset    Asthma Mother           Social History            Tobacco Use    Smoking status: Current Every Day Smoker       Packs/day: 0.25       Years: 34.00       Pack years: 8.50       Types: Cigarettes    Smokeless tobacco: Never Used   Substance Use Topics    Alcohol use: Yes       Alcohol/week: 7.0 standard drinks       Types: 7 Cans of beer per week       Comment: 1 beer a day                    Subjective:      Review of Systems  Rest of review of systems are negative, except as noted in HPI.      Objective:      /72 (Site: Right Upper Arm, Position: Sitting)   Pulse 68   Temp 97.7 °F (36.5 °C) (Infrared)   Ht 5' 11\" (1.803 m)   Wt 172 lb 9.6 oz (78.3 kg)   SpO2 98%   BMI 24.07 kg/m²      Physical Exam         On general physical exam the patient is pleasant cooperative middle-aged -American man who appears significantly older than his stated age. His voice is moderately low in pitch but otherwise clear in character for his age and gender. His speech pattern is somewhat slow and consistent with his edentulous state. His nasal vault is abnormal for marked left-sided nasal septal deviation with obstruction but his right side was widely patent.   His facial nerve function was within normal limits with the exception of his marginal mandibular nerve on the left side which is either weak or fully dysfunctional.  His extraocular movements were grossly and symmetrically within normal limits. His oral cavity exam was abnormal for the presence of a large skin grafted area along the left side going from the palate to the floor of mouth in the cul-de-sac of his reconstructed mandible. His soft palate was tented and highly  from the nasopharyngeal aperture. His tongue was fully mobile. His oral mandibular aperture was normal.  He had a surprising amount of salivary flow within his mouth. 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 No adenopathy or thyromegaly was detected but his swallow barely moved any of the surrounding paratracheal structures up or down to definitively examine his thyroid gland.     Procedure: Nasopharyngoscopy  Findings: The patient's nasopharynx had vascular ectasia but no stricture. The patient was capable of full closure of the velum on swallowing and on appropriate phonation. The gap of the soft palate to the tongue at the left of the oropharyngeal aperture into the mouth was large and likely not able to be closed on normal swallowing. His split-thickness skin graft was easily visualized and maintained exposure throughout oropharyngeal contraction. His tongue base was somewhat retrodisplaced and his laryngeal inlet was partially obscured by the retrodisplacement of the epiglottis. A trumpet maneuver exposed these tissues to some degree allow me to see into the piriform sinuses on both sides but under an overhang of apparent pharyngeal stricture. No pooling was seen in in the esophageal inlet or in either piriform sinus. The patient's vocal folds were fully mobile. His transglottic aperture was adequate.   He tolerated the procedure well.     Nasopharyngoscopy images:     High view of hypopharynx    Para mandibular reconstruction    Cords closed with trumpet maneuver    Cords open with trumpet maneuver    Post cricoid space    With trumpet maneuver    Nasopharyngeal aperture             Vitals reviewed.     No results found. Lab Results   Component Value Date      09/03/2021      12/10/2020      10/28/2020      09/03/2020      09/06/2019      06/27/2019     K 3.6 09/03/2021     K 3.3 12/10/2020     K 4.9 10/28/2020     K 3.6 09/03/2020     K 3.8 09/06/2019     K 3.7 06/27/2019     K 4.3 04/30/2019      12/10/2020      10/28/2020      06/27/2019     CO2 21 12/10/2020     CO2 26 10/28/2020     CO2 24 09/03/2020     BUN 10 12/10/2020     BUN 12 10/28/2020     BUN 5 09/03/2020     CREATININE 1.3 09/03/2021     CREATININE 1.0 12/10/2020     CREATININE 1.1 10/28/2020     CREATININE 1.1 09/03/2020     CREATININE 1.4 09/06/2019     CREATININE 0.9 06/27/2019     CALCIUM 9.2 12/10/2020     CALCIUM 10.3 10/28/2020     CALCIUM 9.4 06/27/2019     PROT 6.2 09/03/2021     PROT 7.1 09/03/2020     PROT 7.1 06/27/2019     LABALBU 3.8 09/03/2021     LABALBU 3.9 09/03/2020     LABALBU 4.2 06/27/2019     BILITOT 0.2 09/03/2021     BILITOT 0.4 09/03/2020     BILITOT <0.2 06/27/2019     ALKPHOS 118 09/03/2021     ALKPHOS 78 09/03/2020     ALKPHOS 80 06/27/2019     AST 53 09/03/2021     AST 41 09/03/2020     AST 28 06/27/2019     ALT 36 09/03/2021     ALT 27 09/03/2020     ALT 21 06/27/2019         All of the past medical history, past surgical history, family history,social history, allergies and current medications were reviewed with the patient. Assessment & Plan   Diagnoses and all orders for this visit:       Diagnosis Orders   1. 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 Esophageal stricture  TX ESOPHAGOSCOPY FLEX BALLOON DILAT <30 MM DIAM   3. Pharyngoesophageal dysphagia  LARYNGOSCOPY     TX ESOPHAGOSCOPY FLEX BALLOON DILAT <30 MM DIAM   4.  Pharyngeal stenosis            Based on the patient's history and these physical findings, the patient has a complex swallowing mechanism as an adaptation to his reconstructions and his radiation fibrosis. He basically swallows passively until he gets the majority of his food and fluids past the level of the tongue base and then he swallows to Propulsid down through the narrow esophageal inlet. This is because he cannot seal the oropharyngeal aperture with his soft palate and pharynx due to radiation fibrosis and  of the aperture with his split-thickness skin graft. Given his adaptations have largely worked, I think improving his esophageal inlet is the first order of surgical treatment I would recommend to ease the extent to which she can pass his bolus into the distal esophagus, which is patent just within the thoracic inlet according to the CT scan images. As such I recommend a rigid instrumentation of the esophageal inlet likely with a Daija laryngoscope and noted to visualize the soft tissues involved in dilation to reduce the risk of esophageal perforation. Nonetheless I made it clear to the patient and his wife, who was with us the entire time, that I cannot completely take away the risk of esophageal perforation which could be a very significant complication. But this would also increase the chances that if balloon dilation is a short benefit, he may be a candidate for transoral esophagoplasty with a division of the upper esophageal stricture and the advancement of the mucosa from the hypopharynx into the esophageal aperture. I would be done at a later date if need be.   First I will begin with a relatively narrow balloon something on the order of 10 to 15 mm and then gradually enlarge the esophageal inlet up to 20 mm if possible.     In addition, and in the future, it may be worth considering every construction of the oropharyngeal inlet to close down the enlarged aperture that is causing him to have this leakage of the tongue base \"piston\" function so that he can better propulse food and fluids into the hypopharynx with his tongue motion. I explained all this in detail to the patient and his wife with the video that was taken of his pharyngeal structures today. They reported understanding the basis of my recommendations and the risks involved and wishing to proceed as recommended.     I spent a substantial period of time (greater than 20 minutes) counseling the patient on his need to definitively quit smoking cigarettes and quit drinking beer in the near future. I recommend that he do both at the same time and that he do his up most to stay off of both given that they are independent risk factors for a new head neck cancer given that he is 7 years out from his original diagnosis. 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 In the meantime I will refer him to Dr Birdie Oseguera to evaluate this fistula with the potential that he can have it removed sometime in the future. For the time being and while he has a chance of needing a gastrostomy tube replaced in the event that he has a perforation in the esophagus, we will leave things as they are.     Spent over an hour of face-to-face time with the patient more than half of which was dedicated to reviewing his complex history and structuring this diagnostic and therapeutic program..        Return in about 6 weeks (around 7/5/2022) for Postop follow-up.          **This report has been created using voice recognition software. It may contain minor errors which are inherent in voice recognition technology. **

## 2022-06-03 NOTE — PROGRESS NOTES
Pt returned to [facility] room 6. Vitals and assessment as charted. 0.9 infusing, @500ml to count from PACU. Pt has pudding and water. Family at the bedside. Pt and family verbalized understanding of discharge criteria and call light use. Call light in reach.

## 2022-06-03 NOTE — ANESTHESIA PRE PROCEDURE
Department of Anesthesiology  Preprocedure Note       Name:  Freedom Reed   Age:  64 y.o.  :  1965                                          MRN:  [id number]         Date:  6/3/2022      Surgeon: Joel De La Rosa):  Irene Root MD    Procedure: Procedure(s):  Rigid Laryngoscopy Esophagoscopy with Dilation    Medications prior to admission:   Prior to Admission medications    Medication Sig Start Date End Date Taking? Authorizing Provider   amLODIPine (NORVASC) 5 MG tablet Take am of sx 21   Historical Provider, MD   levothyroxine (SYNTHROID) 125 MCG tablet Take one tablet first thing in the morning on an empty stomach.  21   Historical Provider, MD   mirtazapine (REMERON) 15 MG tablet  21   Historical Provider, MD   OLANZapine (ZYPREXA) 10 MG tablet  21   Historical Provider, MD   OXcarbazepine (TRILEPTAL) 150 MG tablet  21   Historical Provider, MD   oxyCODONE-acetaminophen (PERCOCET) 5-325 MG per tablet  21   Historical Provider, MD   metoprolol tartrate (LOPRESSOR) 50 MG tablet TAKE 1 TABLET BY MOUTH TWICE DAILY 1/3/22   Essie Thorne MD   metoprolol (LOPRESSOR) 100 MG tablet Take 50 mg by mouth 2 times daily Per patient PCP changed dose     Take am of sx    Historical Provider, MD   Sertraline HCl (ZOLOFT PO) Take by mouth    Historical Provider, MD   pantoprazole (PROTONIX) 40 MG tablet Take 40 mg by mouth daily Take am of sx 19   Historical Provider, MD   esomeprazole Magnesium (NEXIUM) 20 MG PACK Take 20 mg by mouth daily    Historical Provider, MD   Mouthwashes (Mliss William) LIQD daily  16   Historical Provider, MD   lisinopril (PRINIVIL;ZESTRIL) 20 MG tablet Take 5 mg by mouth daily Take am of sx    Historical Provider, MD       Current medications:    Current Facility-Administered Medications   Medication Dose Route Frequency Provider Last Rate Last Admin    0.9 % sodium chloride infusion   IntraVENous PRN Irene Root MD        sodium chloride flush 0.9 % injection 5-40 mL  5-40 mL IntraVENous 2 times per day Gadiel Beltrán MD        sodium chloride flush 0.9 % injection 5-40 mL  5-40 mL IntraVENous PRN Gaidel Beltrán MD        ceFAZolin (ANCEF) 2000 mg in dextrose 5 % 50 mL IVPB  2,000 mg IntraVENous On Call to Nataliya Vuong MD        dexamethasone (DECADRON) injection 8 mg  8 mg IntraVENous 30 Min Pre-Op Gadiel Beltrán MD   8 mg at 06/03/22 1316    0.9 % sodium chloride infusion   IntraVENous Continuous Gadiel Beltrán  mL/hr at 06/03/22 1315 New Bag at 06/03/22 1315       Allergies:  No Known Allergies    Problem List:    Patient Active Problem List   Diagnosis Code    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    Chemotherapy induced neutropenia ([facility]) D70.1, T45.1X5A    Chronic anemia D64.9    Muscle spasms of neck M62.838    Syncope R55    PAF (paroxysmal atrial fibrillation) ([facility]) I48.0    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    Esophageal stricture K22.2    Pharyngoesophageal dysphagia R13.14    Pharyngeal stenosis J39.2       Past Medical History:        Diagnosis Date    [facility]-fib [facility])     [facility] [address] [address]     Asthma     Head and neck cancer ([facility] [address]     Hypertension     Lymphedema     Syncope 12/22/2020    Syncope and collapse        Past Surgical History:        Procedure Laterality Date    GASTROSTOMY TUBE PLACEMENT  10/2/15    MANDIBLE RECONSTRUCTION  10/2/15    SKIN GRAFT  10/2/15    jaw    TRACHEOSTOMY      UPPER GASTROINTESTINAL ENDOSCOPY N/A 5/11/2021    EGD ESOPHAGOGASTRODUODENOSCOPY performed by Trevin Gerardo MD at [facility]       Social History:    Social History     Tobacco Use    Smoking status: Current Every Day Smoker     Packs/day: 0.25     Years: 34.00     Pack years: 8.50     Types: Cigarettes    Smokeless tobacco: Never Used   Substance Use Topics    Alcohol use:  Yes     Alcohol/week: 7.0 standard drinks     Types: 7 Cans of beer per week     Comment: occasionally                                Ready to quit: Not Answered  Counseling given: Not Answered      Vital Signs (Current):   Vitals:    05/25/22 1546 06/03/22 1254   BP:  115/72   Pulse:  95   Resp:  20   Temp:  98 °F (36.7 °C)   TempSrc:  Tympanic   SpO2:  98%   Weight: 170 lb (77.1 kg) 165 lb (74.8 kg)   Height: 5' 10\" (1.778 m)                                               BP Readings from Last 3 Encounters:   06/03/22 115/72   05/24/22 126/72   05/18/22 (!) 140/100       NPO Status: Time of last liquid consumption: 2200                        Time of last solid consumption: 0800                        Date of last liquid consumption: 06/02/22                        Date of last solid food consumption: 06/01/22    BMI:   Wt Readings from Last 3 Encounters:   06/03/22 165 lb (74.8 kg)   05/24/22 172 lb 9.6 oz (78.3 kg)   05/18/22 172 lb 12.8 oz (78.4 kg)     Body mass index is 23.68 kg/m². CBC:   Lab Results   Component Value Date    WBC 7.5 05/25/2022    RBC 4.71 05/25/2022    HGB 14.7 05/25/2022    HCT 44.9 05/25/2022    MCV 95.3 05/25/2022    RDW 13.8 09/03/2021     05/25/2022       CMP:   Lab Results   Component Value Date     05/25/2022    K 3.9 05/25/2022    K 3.3 12/10/2020     05/25/2022    CO2 28 05/25/2022    BUN 7 05/25/2022    CREATININE 1.1 05/25/2022    LABGLOM 84 05/25/2022    GLUCOSE 88 05/25/2022    PROT 7.3 05/25/2022    CALCIUM 9.3 05/25/2022    BILITOT 0.3 05/25/2022    ALKPHOS 84 05/25/2022    AST 21 05/25/2022    ALT 14 05/25/2022       POC Tests: No results for input(s): POCGLU, POCNA, POCK, POCCL, POCBUN, POCHEMO, POCHCT in the last 72 hours.     Coags:   Lab Results   Component Value Date    INR 0.92 12/10/2020    APTT 27.1 12/10/2020       HCG (If Applicable): No results found for: PREGTESTUR, PREGSERUM, HCG, HCGQUANT     ABGs: No results found for: PHART, PO2ART, EZL8LZP, QMK7HSM, BEART, F3ILAJIJ     Type & Screen (If Applicable):  No results found for: LABABO, LABRH    Drug/Infectious Status (If Applicable):  No results found for: HIV, HEPCAB    COVID-19 Screening (If Applicable):   Lab Results   Component Value Date    COVID19 NEGATIVE 11/19/2021    COVID19 NOT DETECTED 05/05/2021           Anesthesia Evaluation  Patient summary reviewed no history of anesthetic complications:   Airway: Mallampati: II  TM distance: >3 FB   Neck ROM: full  Mouth opening: > = 3 FB   Dental:    (+) edentulous      Pulmonary:normal exam    (+) asthma (as a child): current smoker    (-) shortness of breath          Patient did not smoke on day of surgery. Cardiovascular:  Exercise tolerance: poor (<4 METS),   (+) hypertension (took lisinopril this morning):, dysrhythmias: atrial fibrillation,     (-) past MI and CAD      Rhythm: irregular                      Neuro/Psych:      (-) seizures and CVA           GI/Hepatic/Renal:   (+) GERD: poorly controlled,      (-) liver disease and no renal disease       Endo/Other:    (+) hypothyroidism::., .    (-) diabetes mellitus, hyperthyroidism               Abdominal:             Vascular:     - DVT and PE. Other Findings:           Anesthesia Plan      general     ASA 3     (GETA. PIV. Additional access can be obtained after induction if needed. Standard ASA monitors. IV/PO opioids and other adjuncts as needed for pain control. PACU post op for recovery. Possible anesthetics complications were discussed with the patient, including but not limited to: PONV, damage to the airway and surrounding structures (teeth, lips, gums, tongue, etc.), adverse reactions to medicine, cardiac complications (MI, CHF, arrhythmias, etc.), respiratory complications (post-op ventilation, respiratory failure, etc.), neurologic complications (nerve damage, stroke, seizure), and death. The patient was given the opportunity to ask questions and all questions were answered to the patient's satisfaction.  The patient is in agreement with the anesthetic plan.  )  Induction: intravenous. Anesthetic plan and risks discussed with patient and spouse.                         Ada Sepulveda,    6/3/2022

## 2022-06-05 NOTE — ANESTHESIA POSTPROCEDURE EVALUATION
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 Romeo Phase I: Romeo Score: 10    Romeo Phase II: Romeo Score: 10    Last vitals: Reviewed and per EMR flowsheets.        Anesthesia Post Evaluation    Patient location during evaluation: PACU  Patient participation: complete - patient participated  Level of consciousness: awake and alert  Airway patency: patent  Nausea & Vomiting: no vomiting and no nausea  Complications: no  Cardiovascular status: hemodynamically stable  Respiratory status: acceptable  Hydration status: stable

## 2022-06-15 ENCOUNTER — HOSPITAL ENCOUNTER (OUTPATIENT)
Dept: ULTRASOUND IMAGING | Age: 57
Discharge: HOME | End: 2022-06-15
Payer: MEDICAID

## 2022-06-15 DIAGNOSIS — R10.9 ABDOMINAL PAIN, UNSPECIFIED ABDOMINAL LOCATION: ICD-10-CM

## 2022-06-15 PROCEDURE — 76705 ECHO EXAM OF ABDOMEN: CPT

## 2022-06-21 ENCOUNTER — PROCEDURE VISIT (OUTPATIENT)
Dept: CARDIOLOGY CLINIC | Age: 57
End: 2022-06-21
Payer: MEDICAID

## 2022-06-21 DIAGNOSIS — R55 SYNCOPE, UNSPECIFIED SYNCOPE TYPE: Primary | ICD-10-CM

## 2022-06-21 PROCEDURE — 93298 REM INTERROG DEV EVAL SCRMS: CPT | Performed by: NUCLEAR MEDICINE

## 2022-06-21 PROCEDURE — G2066 INTER DEVC REMOTE 30D: HCPCS | Performed by: NUCLEAR MEDICINE

## 2022-06-22 ENCOUNTER — OFFICE VISIT (OUTPATIENT)
Dept: ENT CLINIC | Age: 57
End: 2022-06-22
Payer: MEDICAID

## 2022-06-22 VITALS
WEIGHT: 165 LBS | BODY MASS INDEX: 23.68 KG/M2 | OXYGEN SATURATION: 96 % | HEART RATE: 84 BPM | DIASTOLIC BLOOD PRESSURE: 78 MMHG | RESPIRATION RATE: 14 BRPM | SYSTOLIC BLOOD PRESSURE: 112 MMHG | TEMPERATURE: 97.1 F

## 2022-06-22 DIAGNOSIS — Q27.8 DYSPHAGIA LUSORIA: ICD-10-CM

## 2022-06-22 DIAGNOSIS — R13.14 PHARYNGOESOPHAGEAL DYSPHAGIA: ICD-10-CM

## 2022-06-22 DIAGNOSIS — J39.2 PHARYNGEAL STENOSIS: ICD-10-CM

## 2022-06-22 DIAGNOSIS — L59.8 RADIATION SKIN FIBROSIS FROM THERAPEUTIC PROCEDURE: Primary | ICD-10-CM

## 2022-06-22 DIAGNOSIS — K22.2 ESOPHAGEAL STRICTURE: ICD-10-CM

## 2022-06-22 LAB — GFR SERPL CREATININE-BSD FRML MDRD: 69 ML/MIN/1.73M2

## 2022-06-22 PROCEDURE — 4004F PT TOBACCO SCREEN RCVD TLK: CPT | Performed by: OTOLARYNGOLOGY

## 2022-06-22 PROCEDURE — 99213 OFFICE O/P EST LOW 20 MIN: CPT | Performed by: OTOLARYNGOLOGY

## 2022-06-22 PROCEDURE — G8427 DOCREV CUR MEDS BY ELIG CLIN: HCPCS | Performed by: OTOLARYNGOLOGY

## 2022-06-22 PROCEDURE — G8420 CALC BMI NORM PARAMETERS: HCPCS | Performed by: OTOLARYNGOLOGY

## 2022-06-22 PROCEDURE — 3017F COLORECTAL CA SCREEN DOC REV: CPT | Performed by: OTOLARYNGOLOGY

## 2022-06-22 NOTE — PROGRESS NOTES
[facility]   [place] [id number]  Dept: [phone]  Dept Fax: [phone]  Loc: [phone]    Shari Gardner is a 64 y.o. male who was referred by No ref. provider found for:  Chief Complaint   Patient presents with    Post-Op Check     pt is here for post op       HPI:     Shari Gardner is a 64 y.o. male with a history of head neck cancer status postradiation therapy many years ago with resulting obstructive dysphagia. 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 Despite this I dilated his esophageal inlet and he reports having had a significant improvement in his ability to swallow although he describes himself as being significantly less then back to normal.  He has had balloon dilations in the past and states that they usually only work for 1 or 2 months. He is here to discuss options including to be referred to the [facility] for vascular surgery. History:     No Known Allergies  Current Outpatient Medications   Medication Sig Dispense Refill    amLODIPine (NORVASC) 5 MG tablet Take am of sx      levothyroxine (SYNTHROID) 125 MCG tablet Take one tablet first thing in the morning on an empty stomach.       mirtazapine (REMERON) 15 MG tablet       OLANZapine (ZYPREXA) 10 MG tablet       OXcarbazepine (TRILEPTAL) 150 MG tablet       metoprolol (LOPRESSOR) 100 MG tablet Take 50 mg by mouth 2 times daily Per patient PCP changed dose     Take am of sx      Sertraline HCl (ZOLOFT PO) Take by mouth      pantoprazole (PROTONIX) 40 MG tablet Take 40 mg by mouth daily Take am of sx  6    esomeprazole Magnesium (NEXIUM) 20 MG PACK Take 20 mg by mouth daily      Mouthwashes (BIOTENE DRY MOUTH) LIQD daily       lisinopril (PRINIVIL;ZESTRIL) 20 MG tablet Take 5 mg by mouth daily Take am of sx      oxyCODONE-acetaminophen (PERCOCET) 5-325 MG per tablet  (Patient not taking: Reported on 6/22/2022)       No current facility-administered medications for this visit. Past Medical History:   Diagnosis Date    [facility]-fib [facility])     Aortic aneurysm ([facility] [address]     Asthma     [facility]     Hypertension     Lymphedema     Syncope 12/22/2020    Syncope and collapse       Past Surgical History:   Procedure Laterality Date    GASTROSTOMY TUBE PLACEMENT  10/2/15    LARYNGOSCOPY N/A 6/3/2022    Rigid Laryngoscopy Esophagoscopy Diagnostic performed by Jessica Bee MD at [facility]  10/2/15    SKIN GRAFT  10/2/15    jaw    TRACHEOSTOMY      UPPER GASTROINTESTINAL ENDOSCOPY N/A 5/11/2021    EGD ESOPHAGOGASTRODUODENOSCOPY performed by Cinthya Weber MD at [facility]     Family History   Problem Relation Age of Onset    Asthma Mother      Social History     Tobacco Use    Smoking status: Current Every Day Smoker     Packs/day: 0.25     Years: 34.00     Pack years: 8.50     Types: Cigarettes    Smokeless tobacco: Never Used   Substance Use Topics    Alcohol use: Yes     Alcohol/week: 7.0 standard drinks     Types: 7 Cans of beer per week     Comment: occasionally        Subjective:      Review of Systems  Rest of review of systems are negative, except as noted in HPI. Objective:     /78 (Site: Left Upper Arm, Position: Sitting)   Pulse 84   Temp 97.1 °F (36.2 °C) (Infrared)   Resp 14   Wt 165 lb (74.8 kg)   SpO2 96%   BMI 23.68 kg/m²     Physical Exam           Vitals reviewed. XR CHEST (2 VW)    Result Date: 5/25/2022  There is no acute intrathoracic process. **This report has been created using voice recognition software. It may contain minor errors which are inherent in voice recognition technology. ** Final report electronically signed by Dr Deandre Ashraf on 5/25/2022 10:48 AM    US ABDOMEN LIMITED    Result Date: 6/15/2022  1. No definite hernia surrounding the feeding tube in the left upper quadrant. 2. Small hypoechoic area superior to the feeding tube side. CT scan may be helpful for better evaluation if clinically indicated. **This report has been created using voice recognition software. It may contain minor errors which are inherent in voice recognition technology. ** Final report electronically signed by DR Jessica Corbin on 6/15/2022 9:45 AM     Lab Results   Component Value Date     05/25/2022     09/03/2021     12/10/2020     10/28/2020    K 3.9 05/25/2022    K 3.6 09/03/2021    K 3.3 12/10/2020    K 4.9 10/28/2020    K 3.6 09/03/2020    K 3.8 09/06/2019    K 3.7 06/27/2019     05/25/2022     12/10/2020     10/28/2020    CO2 28 05/25/2022    CO2 21 12/10/2020    CO2 26 10/28/2020    BUN 7 05/25/2022    BUN 10 12/10/2020    BUN 12 10/28/2020    CREATININE 1.1 05/25/2022    CREATININE 1.3 09/03/2021    CREATININE 1.0 12/10/2020    CREATININE 1.1 10/28/2020    CALCIUM 9.3 05/25/2022    CALCIUM 9.2 12/10/2020    CALCIUM 10.3 10/28/2020    PROT 7.3 05/25/2022    PROT 6.2 09/03/2021    PROT 7.1 09/03/2020    LABALBU 4.2 05/25/2022    LABALBU 3.8 09/03/2021    LABALBU 3.9 09/03/2020    BILITOT 0.3 05/25/2022    BILITOT 0.2 09/03/2021    BILITOT 0.4 09/03/2020    ALKPHOS 84 05/25/2022    ALKPHOS 118 09/03/2021    ALKPHOS 78 09/03/2020    AST 21 05/25/2022    AST 53 09/03/2021    AST 41 09/03/2020    ALT 14 05/25/2022    ALT 36 09/03/2021    ALT 27 09/03/2020       All of the past medical history, past surgical history, family history,social history, allergies and current medications were reviewed with the patient. Assessment & Plan   Diagnoses and all orders for this visit:     Diagnosis Orders   1. 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  Esophageal stricture 3. Pharyngoesophageal dysphagia     4. Pharyngeal stenosis     5. Dysphagia lusoria         Based on the patient's history and these physical findings, the patient has a complex pair of causes for his obstructive dysphagia. His cricopharyngeus achalasia can be treated with cricopharyngeus myotomy myectomy but his Dysphagia Lusoria can only be treated with extensive vascular surgery. I will set up for him to undergo a cricopharyngeus myotomy myectomy if he wishes to have this done sometime the next couple of months when the results of his dilation have begun to wane. [facility] for a vascular surgical consultation to determine if he is a candidate for vascular reconstruction to improve his ability to swallow. I will see him back in approximately 2 months for an interval follow-up and to plan for surgical care on my side if he wishes to proceed. I spent over 20 minutes of face-to-face time with the patient all of which was dedicated to reviewing his complex history and planning this treatment program.      Return in about 2 months (around 8/22/2022). **This report has been created using voice recognition software. It may contain minor errors which are inherent in voice recognition technology. **

## 2022-08-02 ENCOUNTER — PROCEDURE VISIT (OUTPATIENT)
Dept: CARDIOLOGY CLINIC | Age: 57
End: 2022-08-02
Payer: MEDICAID

## 2022-08-02 DIAGNOSIS — R55 SYNCOPE, UNSPECIFIED SYNCOPE TYPE: Primary | ICD-10-CM

## 2022-08-02 PROCEDURE — G2066 INTER DEVC REMOTE 30D: HCPCS | Performed by: INTERNAL MEDICINE

## 2022-08-02 PROCEDURE — 93298 REM INTERROG DEV EVAL SCRMS: CPT | Performed by: INTERNAL MEDICINE

## 2022-08-24 ENCOUNTER — OFFICE VISIT (OUTPATIENT)
Dept: ENT CLINIC | Age: 57
End: 2022-08-24
Payer: MEDICAID

## 2022-08-24 VITALS
BODY MASS INDEX: 24.35 KG/M2 | TEMPERATURE: 97.5 F | OXYGEN SATURATION: 100 % | DIASTOLIC BLOOD PRESSURE: 84 MMHG | SYSTOLIC BLOOD PRESSURE: 126 MMHG | WEIGHT: 170.1 LBS | RESPIRATION RATE: 16 BRPM | HEIGHT: 70 IN | HEART RATE: 70 BPM

## 2022-08-24 DIAGNOSIS — Q27.8 DYSPHAGIA LUSORIA: Primary | ICD-10-CM

## 2022-08-24 PROCEDURE — 3017F COLORECTAL CA SCREEN DOC REV: CPT | Performed by: NURSE PRACTITIONER

## 2022-08-24 PROCEDURE — G8420 CALC BMI NORM PARAMETERS: HCPCS | Performed by: NURSE PRACTITIONER

## 2022-08-24 PROCEDURE — 4004F PT TOBACCO SCREEN RCVD TLK: CPT | Performed by: NURSE PRACTITIONER

## 2022-08-24 PROCEDURE — 99213 OFFICE O/P EST LOW 20 MIN: CPT | Performed by: NURSE PRACTITIONER

## 2022-08-24 PROCEDURE — G8427 DOCREV CUR MEDS BY ELIG CLIN: HCPCS | Performed by: NURSE PRACTITIONER

## 2022-08-24 NOTE — Clinical Note
Dr. Trista Silver I have placed a request for consultation from you on Mr. Estes See for an obstructive dysphagia problem that appears vascular in nature. While his CT chest angiogram did not show an obvious aberrant subclavian artery, the view from within the patient's esophagus just past the inlet is of a vascular compression that appears to be nearly in the AP plane. I have looked at his reconstructions at length and find them difficult to discern relative to the lumen of the esophagus. I would appreciate your review and we will look for some video that I took of his endoscopy to see if I can send that along to you by some means. This patient has lost a very large amount of weight related to this dysphagia, which fairly is multifactorial.  He also has had radiation therapy to his oropharynx for a head neck cancer several years ago. The obstructive process in his esophagus however is a very important component of his current dysphagia. I look forward to your input. Joey Mathews.  Anthony John, [facility] Cell: [phone]

## 2022-08-29 ENCOUNTER — HOSPITAL ENCOUNTER (OUTPATIENT)
Dept: INFUSION THERAPY | Age: 57
Discharge: HOME | End: 2022-08-29
Payer: MEDICAID

## 2022-08-29 ENCOUNTER — OFFICE VISIT (OUTPATIENT)
Dept: ONCOLOGY | Age: 57
End: 2022-08-29
Payer: MEDICAID

## 2022-08-29 VITALS
OXYGEN SATURATION: 96 % | TEMPERATURE: 98 F | DIASTOLIC BLOOD PRESSURE: 77 MMHG | HEART RATE: 71 BPM | RESPIRATION RATE: 16 BRPM | SYSTOLIC BLOOD PRESSURE: 110 MMHG

## 2022-08-29 VITALS
WEIGHT: 173 LBS | HEART RATE: 71 BPM | RESPIRATION RATE: 16 BRPM | OXYGEN SATURATION: 96 % | DIASTOLIC BLOOD PRESSURE: 77 MMHG | SYSTOLIC BLOOD PRESSURE: 110 MMHG | BODY MASS INDEX: 24.77 KG/M2 | TEMPERATURE: 98 F | HEIGHT: 70 IN

## 2022-08-29 DIAGNOSIS — Z08 ENCOUNTER FOR FOLLOW-UP SURVEILLANCE OF HEAD AND NECK CANCER: ICD-10-CM

## 2022-08-29 DIAGNOSIS — Z85.89 ENCOUNTER FOR FOLLOW-UP SURVEILLANCE OF HEAD AND NECK CANCER: ICD-10-CM

## 2022-08-29 DIAGNOSIS — C06.2 SQUAMOUS CELL CANCER OF RETROMOLAR TRIGONE (HCC): Primary | ICD-10-CM

## 2022-08-29 DIAGNOSIS — C06.2 SQUAMOUS CELL CANCER OF RETROMOLAR TRIGONE (HCC): ICD-10-CM

## 2022-08-29 LAB
ABSOLUTE IMMATURE GRANULOCYTE: 0.03 THOU/MM3 (ref 0–0.07)
ALBUMIN SERPL-MCNC: 3.9 G/DL (ref 3.5–5.1)
ALP BLD-CCNC: 75 U/L (ref 38–126)
ALT SERPL-CCNC: 13 U/L (ref 11–66)
AST SERPL-CCNC: 23 U/L (ref 5–40)
BASINOPHIL, AUTOMATED: 1 % (ref 0–3)
BASOPHILS ABSOLUTE: 0 THOU/MM3 (ref 0–0.1)
BILIRUB SERPL-MCNC: 0.2 MG/DL (ref 0.3–1.2)
BILIRUBIN DIRECT: < 0.2 MG/DL (ref 0–0.3)
BUN, WHOLE BLOOD: 9 MG/DL (ref 8–26)
CHLORIDE, WHOLE BLOOD: 109 MEQ/L (ref 98–109)
CREATININE, WHOLE BLOOD: 1.1 MG/DL (ref 0.5–1.2)
EOSINOPHILS ABSOLUTE: 0.1 THOU/MM3 (ref 0–0.4)
EOSINOPHILS RELATIVE PERCENT: 1 % (ref 0–4)
GLUCOSE, WHOLE BLOOD: 64 MG/DL (ref 70–108)
HCT VFR BLD CALC: 39.9 % (ref 42–52)
HEMOGLOBIN: 13 GM/DL (ref 14–18)
IMMATURE GRANULOCYTES: 1 %
IONIZED CALCIUM, WHOLE BLOOD: 1.24 MMOL/L (ref 1.12–1.32)
LYMPHOCYTES # BLD: 30 % (ref 15–47)
LYMPHOCYTES ABSOLUTE: 1.5 THOU/MM3 (ref 1–4.8)
MCH RBC QN AUTO: 30.3 PG (ref 26–33)
MCHC RBC AUTO-ENTMCNC: 32.6 GM/DL (ref 32.2–35.5)
MCV RBC AUTO: 93 FL (ref 80–94)
MONOCYTES ABSOLUTE: 0.6 THOU/MM3 (ref 0.4–1.3)
MONOCYTES: 12 % (ref 0–12)
PDW BLD-RTO: 14.4 % (ref 11.5–14.5)
PLATELET # BLD: 313 THOU/MM3 (ref 130–400)
PMV BLD AUTO: 8.7 FL (ref 9.4–12.4)
POTASSIUM, WHOLE BLOOD: 4.6 MEQ/L (ref 3.5–4.9)
RBC # BLD: 4.29 MILL/MM3 (ref 4.7–6.1)
SEG NEUTROPHILS: 56 % (ref 43–75)
SEGMENTED NEUTROPHILS ABSOLUTE COUNT: 2.9 THOU/MM3 (ref 1.8–7.7)
SODIUM, WHOLE BLOOD: 143 MEQ/L (ref 138–146)
TOTAL CO2, WHOLE BLOOD: 27 MEQ/L (ref 23–33)
TOTAL PROTEIN: 6.6 G/DL (ref 6.1–8)
WBC # BLD: 5.2 THOU/MM3 (ref 4.8–10.8)

## 2022-08-29 PROCEDURE — 4004F PT TOBACCO SCREEN RCVD TLK: CPT | Performed by: PHYSICIAN ASSISTANT

## 2022-08-29 PROCEDURE — 99211 OFF/OP EST MAY X REQ PHY/QHP: CPT

## 2022-08-29 PROCEDURE — G8420 CALC BMI NORM PARAMETERS: HCPCS | Performed by: PHYSICIAN ASSISTANT

## 2022-08-29 PROCEDURE — 36415 COLL VENOUS BLD VENIPUNCTURE: CPT

## 2022-08-29 PROCEDURE — 80047 BASIC METABLC PNL IONIZED CA: CPT

## 2022-08-29 PROCEDURE — 3017F COLORECTAL CA SCREEN DOC REV: CPT | Performed by: PHYSICIAN ASSISTANT

## 2022-08-29 PROCEDURE — G8427 DOCREV CUR MEDS BY ELIG CLIN: HCPCS | Performed by: PHYSICIAN ASSISTANT

## 2022-08-29 PROCEDURE — 80076 HEPATIC FUNCTION PANEL: CPT

## 2022-08-29 PROCEDURE — 85025 COMPLETE CBC W/AUTO DIFF WBC: CPT

## 2022-08-29 PROCEDURE — 99213 OFFICE O/P EST LOW 20 MIN: CPT | Performed by: PHYSICIAN ASSISTANT

## 2022-08-29 NOTE — PROGRESS NOTES
[facility] [address] [facility] [address]  [facility] [address]  Dept: [phone]  Dept Fax: [phone]: [phone]      Visit Date:8/29/2022     Girish Nunez is a 64 y.o. male who presents today for:   Chief Complaint   Patient presents with    Follow-up     Squamous cell cancer of retromolar trigone ([address]        HPI:   Girish Nunez is a 64 y.o. male history of head and neck cancer. He has followed with Dr. Jake Schaefer previously in the office. Per Dr. Jake Schaefer' note on 9/3/2020:  He was originally diagnosed with his malignancy in 2015 and involve the left retromolar trigone. The patient underwent surgical therapy, radiation therapy, and chemotherapy treatment. He has not had evidence of recurrence of his malignancy since his initial diagnosis and treatment. In August 2020 the patient was experiencing some left-sided neck pain and head pressure. He had a CT scan of the neck and abdomen completed. The scans were ordered by the nurse practitioner and radiation oncology. Although this found no evidence of recurrence of his malignancy, there was an ascending aortic aneurysm identified. The patient reports that he has never had an evaluation completed or was aware of a history of an aortic aneurysm. Therefore, I have recommended that he have a evaluation by a vascular surgeon and have referred him to Dr. Michela Felder. The patient is agreeable to this plan of care. He also continues to be followed by his ear nose and throat physician at [facility]. He was seen by Dr. Tiny Jurado last week and was noted not to have any evidence of recurrence of his malignancy. The neck pain and head pressure that the patient was experiencing in August 2020 has resolved. He currently has no specific complaints to suggest recurrence of his malignancy. The patient denies pain. He has not had fever, cough, shortness of breath or other signs of infection.   The patient's bowel and bladder habits have been normal.  He has not seen blood in his stool or urine. The patient remains active with an ECOG performance status of level 0. He does complain of some neck spasms that is been treated with Flexeril. The patient does state that Flexeril does help his pain. His blood pressure is also modestly elevated today. He is going to continue to monitor his blood pressure and follow-up with his primary care provider for further management. With evidence of an aortic aneurysm that was explained to the patient that it would be of great significant benefit to have his blood pressure well controlled. Interval History 8/29/2022:   The patient is here for annual follow up evaluation of history of head and neck cancer. He is here with his wife today. The patient denies ED visits or hospitalizations in the last year. He affirms weight changes primary fluctuation over the last year. Per chart review his weight was 173 pounds at last visit in August 2021 and 173 pounds today. He affirms history of neuropathy since receiving chemotherapy which has not worsened, described as intermittent and with cold sensitivity. He had follow-up with radiation oncology in April 2022 and had reported firmness around his left neck/surgical site. CT of the neck was obtained on 4/23/2022 which showed redemonstration of partial hemimandibulectomy on the left with retromolar trigone resection with fat graft and left neck dissection without evidence of recurrent disease. No lymphadenopathy is identified. He was last seen at [facility], per Dr. Mana Arizmendi, in August 2021 and recommended one year follow up. He has been following with ENT locally due to dysphagia and underwent rigid laryngoscopy diagnostic esophagoscopy per Dr. Jun Hernández on 6/3/22 with findings of cricopharyngeus achalasia and was referred to cardiothoracic surgery. The patient affirms current tobacco use 3-5 times per week.        PMH, SH, and FH:  I reviewed the patients medication list and allergy list as noted on the electronic medical record. The PMH, SH and FH were also reviewed as noted on the EMR. Review of Systems:   Review of Systems   Pertinent review of systems noted in HPI, all other ROS negative. Objective:   Physical Exam   /77 (Site: Left Upper Arm, Position: Sitting, Cuff Size: Medium Adult)   Pulse 71   Temp 98 °F (36.7 °C) (Oral)   Resp 16   Ht 5' 10\" (1.778 m)   Wt 173 lb (78.5 kg)   SpO2 96%   BMI 24.82 kg/m²    General appearance: No apparent distress, chronically ill appearing, and cooperative. HEENT: Pupils equal, round, and reactive to light. Conjunctivae/corneas clear. Post surgical changes noted. Neck: post surgical changes noted involving left anterior neck. No tenderness with palpation. No skin changes noted. Healed trach scar. Respiratory:  Normal respiratory effort. Clear to auscultation, bilaterally without Rales/Wheezes/Rhonchi. Cardiovascular: Regular rate and rhythm with normal S1/S2   Abdomen: Soft, active bowel sounds. Musculoskeletal: No clubbing, cyanosis or edema bilaterally. Skin: Skin color, texture, turgor normal.  No visible rashes or lesions. Neurologic:  Neurovascularly intact without any focal sensory/motor deficits.    Psychiatric: Alert and oriented       Imaging Studies and Labs:   CBC:   Lab Results   Component Value Date    WBC 7.5 05/25/2022    HGB 14.7 05/25/2022    HCT 44.9 05/25/2022    MCV 95.3 (H) 05/25/2022     05/25/2022     BMP:   Lab Results   Component Value Date/Time     05/25/2022 10:23 AM    K 3.9 05/25/2022 10:23 AM    K 3.3 12/10/2020 07:25 AM     05/25/2022 10:23 AM    CO2 28 05/25/2022 10:23 AM    BUN 7 05/25/2022 10:23 AM    CREATININE 1.1 05/25/2022 10:23 AM    GLUCOSE 88 05/25/2022 10:23 AM    CALCIUM 9.3 05/25/2022 10:23 AM      LFT:   Lab Results   Component Value Date    ALT 14 05/25/2022    AST 21 05/25/2022    ALKPHOS 84 05/25/2022    BILITOT 0.3 05/25/2022      CT soft tissue neck 4/23/22  Impression    Redemonstration of partial hemimandibulectomy on the left with retromolar trigone resection with fat graft and left neck dissection without evidence of recurrent disease. No lymphadenopathy is identified. Assessment and Plan:   Squamous Cell Carcinoma of the Head and Neck  Diagnosed in 2015. S/p left inferior maxillectomy, composite resection with segmental mandibulectomy, left (levels 1-5) and right (levels 2-3) selective neck dissection and reconstruction with FFF per Dr. Merry Tejeda on 10/2/2015. He underwent concurrent chemoradiation completing February 2016. He continues to follow with Dr. Merry Tejeda annually at [facility], [address] [place] [address] appointment on 9/6/2022. He follows with Radiation Oncology and had CT of soft tissue of neck on 4/23/22 which showed no evidence of recurrence disease, no lymphadenopathy. Patient following locally with ENT due to dysphagia.    -Patient instructed to continue to follow up with ENT at [facility], Dr. Merry Tejeda. Appointment as planned on 9/6/2022.   -Follow up as planned with Radiation Oncology, Dr. Bright Flores. Appointment on 10/11/2022.    -will obtain CBC, BMP, LFT today   -Return to Medical Oncology clinic in one year       All patient questions answered. Pt voiced understanding. Patient agreed with treatment plan. Follow up as directed. Patient instructed to call for questions or concerns.           Electronically signed by   Debbie Molina PA-C

## 2022-08-29 NOTE — PATIENT INSTRUCTIONS
Will obtain CBC, BMP, LFT today  Return to clinic with Dr. Mark Hernandez in one year  Labs on RTC  Please call for questions or concerns.

## 2022-09-02 ENCOUNTER — HOSPITAL ENCOUNTER (OUTPATIENT)
Age: 57
Setting detail: SPECIMEN
Discharge: HOME | End: 2022-09-02

## 2022-09-02 LAB — TSH SERPL DL<=0.05 MIU/L-ACNC: 2.51 UIU/ML (ref 0.3–5)

## 2022-09-06 ENCOUNTER — TELEPHONE (OUTPATIENT)
Dept: ONCOLOGY | Age: 57
End: 2022-09-06

## 2022-09-06 ENCOUNTER — PROCEDURE VISIT (OUTPATIENT)
Dept: CARDIOLOGY CLINIC | Age: 57
End: 2022-09-06
Payer: MEDICAID

## 2022-09-06 DIAGNOSIS — R55 SYNCOPE, UNSPECIFIED SYNCOPE TYPE: Primary | ICD-10-CM

## 2022-09-06 PROCEDURE — 93298 REM INTERROG DEV EVAL SCRMS: CPT | Performed by: NUCLEAR MEDICINE

## 2022-09-06 PROCEDURE — G2066 INTER DEVC REMOTE 30D: HCPCS | Performed by: NUCLEAR MEDICINE

## 2022-09-07 ENCOUNTER — TELEPHONE (OUTPATIENT)
Dept: CARDIOTHORACIC SURGERY | Age: 57
End: 2022-09-07

## 2022-09-20 ENCOUNTER — OFFICE VISIT (OUTPATIENT)
Dept: CARDIOTHORACIC SURGERY | Age: 57
End: 2022-09-20
Payer: MEDICAID

## 2022-09-20 VITALS
WEIGHT: 170 LBS | DIASTOLIC BLOOD PRESSURE: 106 MMHG | SYSTOLIC BLOOD PRESSURE: 154 MMHG | HEART RATE: 78 BPM | HEIGHT: 71 IN | BODY MASS INDEX: 23.8 KG/M2

## 2022-09-20 DIAGNOSIS — K22.2 ESOPHAGEAL STRICTURE: Primary | ICD-10-CM

## 2022-09-20 PROCEDURE — 99205 OFFICE O/P NEW HI 60 MIN: CPT | Performed by: THORACIC SURGERY (CARDIOTHORACIC VASCULAR SURGERY)

## 2022-09-20 PROCEDURE — G8427 DOCREV CUR MEDS BY ELIG CLIN: HCPCS | Performed by: THORACIC SURGERY (CARDIOTHORACIC VASCULAR SURGERY)

## 2022-09-20 PROCEDURE — 4004F PT TOBACCO SCREEN RCVD TLK: CPT | Performed by: THORACIC SURGERY (CARDIOTHORACIC VASCULAR SURGERY)

## 2022-09-20 PROCEDURE — G8420 CALC BMI NORM PARAMETERS: HCPCS | Performed by: THORACIC SURGERY (CARDIOTHORACIC VASCULAR SURGERY)

## 2022-09-20 PROCEDURE — 3017F COLORECTAL CA SCREEN DOC REV: CPT | Performed by: THORACIC SURGERY (CARDIOTHORACIC VASCULAR SURGERY)

## 2022-09-20 NOTE — PROGRESS NOTES
CT/CV Surgery New Patient Office Visit      Patient's Name/Date of Birth: Heather Atkinson / 1965 (66 y.o.)      PCP: MADELIN Hoffmann CNP    Date: September 20, 2022     CC: Swallowing difficulty over 1 year. .  Chief Complaint   Patient presents with    New Patient     NP - Dysphagia manuelsoria, referral from [address] [place] [address] Duncan GironMelbourne, CNP   History of left jaw bone cancer 2015, received chemo and radiation therapy and tracheostomy    HPI:   We had the pleasure of seeing Heather Atkinson in the office today, as you know this is a very pleasant 64y.o. year old male with a history of left mandibular cancer, status post composite resection and free flap reconstruction at [address] [place] [address] in 2015 and postop chemo and radiation therapy. He was seen by Dr. Jacky Salgado, ENT surgeon here at [facility] recently. Patient was referred to us for surgical evaluation. He reports that he can only drink water or fluid, not solid food. He had a normal swallowing study a year ago. PastMedical History:  Sylvia Francois  has a past medical history of A-fib [facility]), Aortic aneurysm ([facility] [address] Asthma, Head and neck cancer ([facility] Hypertension, Lymphedema, Syncope, and Syncope and collapse. Past Surgical History:  The patient  has a past surgical history that includes Mandible reconstruction (10/2/15); Skin graft (10/2/15); Gastrostomy tube placement (10/2/15); Upper gastrointestinal endoscopy (N/A, 5/11/2021); tracheostomy; and laryngoscopy (N/A, 6/3/2022). Allergies: The patient has No Known Allergies.     Medications:    Current Outpatient Medications:     amLODIPine (NORVASC) 5 MG tablet, Take am of sx, Disp: , Rfl:     levothyroxine (SYNTHROID) 125 MCG tablet, Take one tablet first thing in the morning on an empty stomach., Disp: , Rfl:     mirtazapine (REMERON) 15 MG tablet, , Disp: , Rfl:     OLANZapine (ZYPREXA) 10 MG tablet, , Disp: , Rfl:     OXcarbazepine (TRILEPTAL) 150 MG tablet, , Disp: , Rfl:     oxyCODONE-acetaminophen (PERCOCET) 5-325 MG per Wall: No deformity, midsternal scar, enlarged palpable supraclavicular lymphnode. Respiratory:  Normal respiratory effort. Clear to auscultation, bilaterally without Rales/Wheezes/Rhonch. Cardiovascular:  Regular rate and rhythm with normal S1/S2 without murmurs, rubs or gallops. Abdomen: Soft, non-tender, non-distended with normal bowel sounds. Scar from previous tube gastrostomy. Ext: No clubbing, cyanosis or edema bilaterally. No chronic ischemic changes, No varicorsity in lower leg. Skin: Skin color, texture, turgor normal.  No rashes or lesions. No rubor. No venous stasis pigmentation. Neurologic:  Neurovascularly intact without any focal sensory/motor deficits. Peripheral Pulses: +2 palpable femoral pulses bilaterally,    Labs:    CBC:  Lab Results   Component Value Date/Time    WBC 5.2 08/29/2022 11:25 AM    HGB 13.0 08/29/2022 11:25 AM    HCT 39.9 08/29/2022 11:25 AM    MCV 93 08/29/2022 11:25 AM     08/29/2022 11:25 AM    INR 0.92 12/10/2020 07:25 AM     BMP:   Lab Results   Component Value Date/Time     08/29/2022 11:25 AM     05/25/2022 10:23 AM    K 4.6 08/29/2022 11:25 AM    K 3.9 05/25/2022 10:23 AM    K 3.3 12/10/2020 07:25 AM     05/25/2022 10:23 AM    CO2 28 05/25/2022 10:23 AM    BUN 7 05/25/2022 10:23 AM    CREATININE 1.1 08/29/2022 11:25 AM    CREATININE 1.1 05/25/2022 10:23 AM    MG 2.1 12/10/2020 07:25 AM       Imaging  I have reviewed all imaging colluding CTA.       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 No radiological evidence of aberrant origin of the right subclavian artery and causing compression of the esophagus. Plan 9/20/22:  1) barium swallow study. 2) follow-up after the study.         Electronically by Saran Soto MD  on 9/20/2022 at 10:42 AM

## 2022-09-28 ENCOUNTER — HOSPITAL ENCOUNTER (OUTPATIENT)
Dept: GENERAL RADIOLOGY | Age: 57
Discharge: HOME | End: 2022-09-28
Payer: MEDICAID

## 2022-09-28 DIAGNOSIS — K22.2 ESOPHAGEAL STRICTURE: ICD-10-CM

## 2022-09-28 LAB — GFR, ESTIMATED: 89 ML/MIN/1.73M2

## 2022-09-28 PROCEDURE — 2500000003 HC RX 250 WO HCPCS: Performed by: THORACIC SURGERY (CARDIOTHORACIC VASCULAR SURGERY)

## 2022-09-28 PROCEDURE — A4641 RADIOPHARM DX AGENT NOC: HCPCS | Performed by: THORACIC SURGERY (CARDIOTHORACIC VASCULAR SURGERY)

## 2022-09-28 PROCEDURE — 6370000000 HC RX 637 (ALT 250 FOR IP): Performed by: THORACIC SURGERY (CARDIOTHORACIC VASCULAR SURGERY)

## 2022-09-28 PROCEDURE — 74220 X-RAY XM ESOPHAGUS 1CNTRST: CPT

## 2022-09-28 PROCEDURE — 6360000004 HC RX CONTRAST MEDICATION: Performed by: THORACIC SURGERY (CARDIOTHORACIC VASCULAR SURGERY)

## 2022-09-28 RX ADMIN — BARIUM SULFATE 50 ML: 0.6 SUSPENSION ORAL at 09:35

## 2022-09-28 RX ADMIN — ANTACID/ANTIFLATULENT 1 EACH: 380; 550; 10; 10 GRANULE, EFFERVESCENT ORAL at 09:35

## 2022-09-28 RX ADMIN — BARIUM SULFATE 140 ML: 980 POWDER, FOR SUSPENSION ORAL at 09:35

## 2022-10-04 ENCOUNTER — OFFICE VISIT (OUTPATIENT)
Dept: CARDIOTHORACIC SURGERY | Age: 57
End: 2022-10-04
Payer: MEDICAID

## 2022-10-04 VITALS
DIASTOLIC BLOOD PRESSURE: 96 MMHG | WEIGHT: 175.4 LBS | BODY MASS INDEX: 24.56 KG/M2 | SYSTOLIC BLOOD PRESSURE: 139 MMHG | HEART RATE: 89 BPM | HEIGHT: 71 IN

## 2022-10-04 DIAGNOSIS — K22.9 ESOPHAGEAL DISORDER: Primary | ICD-10-CM

## 2022-10-04 PROCEDURE — 99214 OFFICE O/P EST MOD 30 MIN: CPT | Performed by: THORACIC SURGERY (CARDIOTHORACIC VASCULAR SURGERY)

## 2022-10-04 PROCEDURE — G8484 FLU IMMUNIZE NO ADMIN: HCPCS | Performed by: THORACIC SURGERY (CARDIOTHORACIC VASCULAR SURGERY)

## 2022-10-04 PROCEDURE — 4004F PT TOBACCO SCREEN RCVD TLK: CPT | Performed by: THORACIC SURGERY (CARDIOTHORACIC VASCULAR SURGERY)

## 2022-10-04 PROCEDURE — G8427 DOCREV CUR MEDS BY ELIG CLIN: HCPCS | Performed by: THORACIC SURGERY (CARDIOTHORACIC VASCULAR SURGERY)

## 2022-10-04 PROCEDURE — G8420 CALC BMI NORM PARAMETERS: HCPCS | Performed by: THORACIC SURGERY (CARDIOTHORACIC VASCULAR SURGERY)

## 2022-10-04 PROCEDURE — 3017F COLORECTAL CA SCREEN DOC REV: CPT | Performed by: THORACIC SURGERY (CARDIOTHORACIC VASCULAR SURGERY)

## 2022-10-04 NOTE — PROGRESS NOTES
[facility], NOSE AND THROAT  [address] [address]  Dept: [phone]  Dept Fax: [phone]  Loc: [phone]    HPI:     Robbi Ye is a 64 y.o. male patient here for follow up regarding dysphagia. His symptoms are unchanged. Last visit:  Robbi Ye is a 64 y.o. male with a history of head neck cancer status postradiation therapy many years ago with resulting obstructive dysphagia. 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 Despite this I dilated his esophageal inlet and he reports having had a significant improvement in his ability to swallow although he describes himself as being significantly less then back to normal.  He has had balloon dilations in the past and states that they usually only work for 1 or 2 months. He is here to discuss options including to be referred to the [facility] clinic for vascular surgery.     History:     No Known Allergies  Current Outpatient Medications   Medication Sig Dispense Refill    amLODIPine (NORVASC) 5 MG tablet Take am of sx      levothyroxine (SYNTHROID) 125 MCG tablet Take one tablet first thing in the morning on an empty stomach.      mirtazapine (REMERON) 15 MG tablet       OLANZapine (ZYPREXA) 10 MG tablet       OXcarbazepine (TRILEPTAL) 150 MG tablet       oxyCODONE-acetaminophen (PERCOCET) 5-325 MG per tablet       metoprolol (LOPRESSOR) 100 MG tablet Take 50 mg by mouth 2 times daily Per patient PCP changed dose     Take am of sx      Sertraline HCl (ZOLOFT PO) Take by mouth      pantoprazole (PROTONIX) 40 MG tablet Take 40 mg by mouth daily Take am of sx  6    esomeprazole Magnesium (NEXIUM) 20 MG PACK Take 20 mg by mouth daily Mouthwashes (BIOTENE DRY MOUTH) LIQD daily       lisinopril (PRINIVIL;ZESTRIL) 20 MG tablet Take 5 mg by mouth daily Take am of sx       No current facility-administered medications for this visit. Past Medical History:   Diagnosis Date    A-fib [facility])     Aortic aneurysm (HCC)     Asthma     Head and neck cancer ([facility] [address]     Hypertension     Lymphedema     Syncope 12/22/2020    Syncope and collapse       Past Surgical History:   Procedure Laterality Date    GASTROSTOMY TUBE PLACEMENT  10/2/15    LARYNGOSCOPY N/A 6/3/2022    Rigid Laryngoscopy Esophagoscopy Diagnostic performed by Farida Alvarado MD at [facility]  10/2/15    SKIN GRAFT  10/2/15    jaw    TRACHEOSTOMY      UPPER GASTROINTESTINAL ENDOSCOPY N/A 5/11/2021    EGD ESOPHAGOGASTRODUODENOSCOPY performed by Luis Carlos Hartley MD at [facility]     Family History   Problem Relation Age of Onset    Asthma Mother      Social History     Tobacco Use    Smoking status: Some Days     Packs/day: 0.25     Years: 34.00     Pack years: 8.50     Types: Cigarettes    Smokeless tobacco: Never   Substance Use Topics    Alcohol use: Yes     Alcohol/week: 7.0 standard drinks     Types: 7 Cans of beer per week     Comment: occasionally        Subjective:      Review of Systems  Rest of review of systems are negative, except as noted in HPI. Objective:     /84 (Site: Left Upper Arm, Position: Sitting)   Pulse 70   Temp 97.5 °F (36.4 °C) (Infrared)   Resp 16   Ht 5' 10\" (1.778 m)   Wt 170 lb 1.6 oz (77.2 kg)   SpO2 100%   BMI 24.41 kg/m²     PHYSICAL EXAM  On general physical exam the patient is pleasant cooperative middle-aged -American man who appears significantly older than his stated age. His voice is moderately low in pitch but otherwise clear in character for his age and gender. His speech pattern is somewhat slow and consistent with his edentulous state.   His nasal vault is abnormal for marked left-sided nasal septal deviation with obstruction but his right side was widely patent. His facial nerve function was within normal limits with the exception of his marginal mandibular nerve on the left side which is either weak or fully dysfunctional.  His extraocular movements were grossly and symmetrically within normal limits. His oral cavity exam was abnormal for the presence of a large skin grafted area along the left side going from the palate to the floor of mouth in the cul-de-sac of his reconstructed mandible. His soft palate was tented and highly  from the nasopharyngeal aperture. His tongue was fully mobile. His oral mandibular aperture was normal.  He had a surprising amount of salivary flow within his mouth. 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 No adenopathy or thyromegaly was detected but his swallow barely moved any of the surrounding paratracheal structures up or down to definitively examine his thyroid gland. Vitals reviewed. Data:  All of the past medical history, past surgical history, family history,social history, allergies and current medications were reviewed with the patient. Assessment & Plan   Diagnoses and all orders for this visit:     Diagnosis Orders   1. Dysphagia lusoria  [id number] [facility] Cardiothoracic and Vascular Surgery          The findings were explained and his questions were answered. After discussion with patient and wife, will refer to Neurosurgeon Dr Toshia Jalloh to see if he can surgically address patient's dysphagia lusoria. Will see patient back in 2 months. If Neurosurgery unable to offer surgical intervention, we will further discuss possibility of performing myotomy release. Patient and wife agreeable to plan of care. Return in about 2 months (around 10/24/2022).            **This report has been created using voice recognition software. It may contain minor errors which are inherent in voice recognition technology. **

## 2022-10-04 NOTE — PROGRESS NOTES
CT/CV Surgery Follow Up Office Visit      Patient's Name/Date of Birth: Terrell Caro / 1965 (70 y.o.)    PCP: MADELIN Fournier - CNP    Date: October 4, 2022    We had the pleasure of seeing Terrell Caro in the office today, as you know this is a very pleasant 64y.o. 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 He returns to the cardiothoracic surgery clinic for follow-up. He still reports difficulty in swallowing of solid food. He also indicates that he cannot swallow water or drink without any difficulty. He had a barium swallow study on 9/28/2022, which demonstrated delayed swallowing with laryngeal penetration of the barium. There was no stricture or extrinsic abnormality identified in the esophagus. PastMedical History:  Steve Barrios  has a past medical history of A-fib [facility]), Aortic aneurysm ([facility] [address] Asthma, Head and neck cancer ([facility] [address] Hypertension, Lymphedema, Syncope, and Syncope and collapse. Past Surgical History:  The patient  has a past surgical history that includes Mandible reconstruction (10/2/15); Skin graft (10/2/15); Gastrostomy tube placement (10/2/15); Upper gastrointestinal endoscopy (N/A, 5/11/2021); tracheostomy; and laryngoscopy (N/A, 6/3/2022). Allergies: The patient has No Known Allergies.     Medications:    Current Outpatient Medications:     amLODIPine (NORVASC) 5 MG tablet, Take am of sx, Disp: , Rfl:     levothyroxine (SYNTHROID) 125 MCG tablet, Take one tablet first thing in the morning on an empty stomach., Disp: , Rfl:     mirtazapine (REMERON) 15 MG tablet, , Disp: , Rfl:     OLANZapine (ZYPREXA) 10 MG tablet, , Disp: , Rfl:     OXcarbazepine (TRILEPTAL) 150 MG tablet, , Disp: , Rfl:     oxyCODONE-acetaminophen (PERCOCET) 5-325 MG per tablet, , Disp: , Rfl:     metoprolol (LOPRESSOR) 100 MG tablet, Take 50 mg by mouth 2 times daily Per patient PCP changed dose   Take am of sx, Disp: , Rfl:     Sertraline HCl (ZOLOFT PO), Take by mouth, Disp: , Rfl:     pantoprazole (PROTONIX) 40 MG tablet, Take 40 mg by mouth daily Take am of sx, Disp: , Rfl: 6    esomeprazole Magnesium (NEXIUM) 20 MG PACK, Take 20 mg by mouth daily, Disp: , Rfl:     Mouthwashes (BIOTENE DRY MOUTH) LIQD, daily , Disp: , Rfl:     lisinopril (PRINIVIL;ZESTRIL) 20 MG tablet, Take 5 mg by mouth daily Take am of sx, Disp: , Rfl:     Family History: This patient's family history includes Asthma in his mother. Social History:  Viraj Marquez  reports that he has been smoking cigarettes. He has a 8.50 pack-year smoking history. He has never used smokeless tobacco. He reports current alcohol use of about 7.0 standard drinks per week. He reports that he does not use drugs. Vital Signs:   BP (!) 139/96   Pulse 89   Ht 5' 11\" (1.803 m)   Wt 175 lb 6.4 oz (79.6 kg)   BMI 24.46 kg/m²     ROS:   Constitutional: Negative for activity change, chills, fatigue, fever and unexpected weight change. Respiratory: COPD from smoking. Cardiac: Negative for midsternal chest pain, arrhythmia, shortness of breath,  Vascular: Negative for claudication, leg  calf muscle pain, hip pain. Gastrointestinal: Negative for hematochezia, melana, constipation, and N/V/D. Musculoskeletal: Status post radiation to the neck. Skin: Negative for color change, rash and wound. Neurological: Negative for dizziness or syncope. Nephrology: Negative for chronic kidney disease,     Physical Exam:  General appearance:  No acute distress, appears stated age and cooperative. Neck: No jugular venous distention. Trachea midline. No carotid bruits. Chest Wall: No deformity, midsternal scar, enlarged palpable supraclavicular lymphnode. Respiratory: Slightly decreased breathing sound bilaterally. Cardiovascular:  Regular rate and rhythm with normal S1/S2 without murmurs, rubs or gallops.   Abdomen: Soft, non-tender, non-distended with normal bowel sounds. Ext: No clubbing, cyanosis or edema bilaterally. No chronic ischemic changes, No varicorsity in lower leg. Skin: Skin color, texture, turgor normal.  No rashes or lesions. No rubor. No venous stasis pigmentation. Neurologic:  Neurovascularly intact without any focal sensory/motor deficits. Peripheral Pulses: +2 palpable femoral pulses bilaterally,    Labs:    CBC:  Lab Results   Component Value Date/Time    WBC 5.2 08/29/2022 11:25 AM    HGB 13.0 08/29/2022 11:25 AM    HCT 39.9 08/29/2022 11:25 AM    MCV 93 08/29/2022 11:25 AM     08/29/2022 11:25 AM    INR 0.92 12/10/2020 07:25 AM     BMP:   Lab Results   Component Value Date/Time     08/29/2022 11:25 AM     05/25/2022 10:23 AM    K 4.6 08/29/2022 11:25 AM    K 3.9 05/25/2022 10:23 AM    K 3.3 12/10/2020 07:25 AM     05/25/2022 10:23 AM    CO2 28 05/25/2022 10:23 AM    BUN 7 05/25/2022 10:23 AM    CREATININE 1.1 08/29/2022 11:25 AM    CREATININE 1.1 05/25/2022 10:23 AM    MG 2.1 12/10/2020 07:25 AM       Imaging: I have reviewed the esophagogram.  There is no extrinsic compression or stricture in the esophagus. 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 Plan 10/4/22:  1) I would recommend conservative treatment. Chewing small amount of solid food well and swallow with water.     Thank you for allowing us to be involved in the patient's care.     Electronically by Valentin Vasquez MD  on 10/4/2022 at 9:22 AM

## 2022-10-11 ENCOUNTER — PROCEDURE VISIT (OUTPATIENT)
Dept: CARDIOLOGY CLINIC | Age: 57
End: 2022-10-11
Payer: MEDICAID

## 2022-10-11 DIAGNOSIS — R55 SYNCOPE, UNSPECIFIED SYNCOPE TYPE: Primary | ICD-10-CM

## 2022-10-11 DIAGNOSIS — R55 SYNCOPE AND COLLAPSE: ICD-10-CM

## 2022-10-11 PROCEDURE — 93298 REM INTERROG DEV EVAL SCRMS: CPT | Performed by: NUCLEAR MEDICINE

## 2022-10-11 PROCEDURE — G2066 INTER DEVC REMOTE 30D: HCPCS | Performed by: NUCLEAR MEDICINE

## 2022-10-13 DIAGNOSIS — C06.2 MALIGNANT NEOPLASM OF RETROMOLAR AREA (HCC): Primary | ICD-10-CM

## 2022-10-13 DIAGNOSIS — Z87.891 PERSONAL HISTORY OF TOBACCO USE, PRESENTING HAZARDS TO HEALTH: ICD-10-CM

## 2022-10-24 ENCOUNTER — OFFICE VISIT (OUTPATIENT)
Dept: ENT CLINIC | Age: 57
End: 2022-10-24
Payer: MEDICAID

## 2022-10-24 VITALS
WEIGHT: 172.2 LBS | DIASTOLIC BLOOD PRESSURE: 70 MMHG | OXYGEN SATURATION: 95 % | HEART RATE: 78 BPM | BODY MASS INDEX: 24.11 KG/M2 | HEIGHT: 71 IN | TEMPERATURE: 97.2 F | SYSTOLIC BLOOD PRESSURE: 110 MMHG

## 2022-10-24 DIAGNOSIS — K22.2 ESOPHAGEAL STRICTURE: Primary | ICD-10-CM

## 2022-10-24 DIAGNOSIS — J39.2 PHARYNGEAL STENOSIS: ICD-10-CM

## 2022-10-24 DIAGNOSIS — L59.8 RADIATION SKIN FIBROSIS FROM THERAPEUTIC PROCEDURE: ICD-10-CM

## 2022-10-24 DIAGNOSIS — R13.14 PHARYNGOESOPHAGEAL DYSPHAGIA: ICD-10-CM

## 2022-10-24 DIAGNOSIS — T85.848A PAIN AROUND PERCUTANEOUS ENDOSCOPIC GASTROSTOMY (PEG) TUBE SITE, INITIAL ENCOUNTER: ICD-10-CM

## 2022-10-24 PROCEDURE — 99214 OFFICE O/P EST MOD 30 MIN: CPT | Performed by: OTOLARYNGOLOGY

## 2022-10-24 PROCEDURE — G8484 FLU IMMUNIZE NO ADMIN: HCPCS | Performed by: OTOLARYNGOLOGY

## 2022-10-24 PROCEDURE — 3017F COLORECTAL CA SCREEN DOC REV: CPT | Performed by: OTOLARYNGOLOGY

## 2022-10-24 PROCEDURE — G8420 CALC BMI NORM PARAMETERS: HCPCS | Performed by: OTOLARYNGOLOGY

## 2022-10-24 PROCEDURE — 4004F PT TOBACCO SCREEN RCVD TLK: CPT | Performed by: OTOLARYNGOLOGY

## 2022-10-24 PROCEDURE — G8427 DOCREV CUR MEDS BY ELIG CLIN: HCPCS | Performed by: OTOLARYNGOLOGY

## 2022-10-24 NOTE — PROGRESS NOTES
[address] EAR, NOSE AND THROAT  [address] [place], [place] [address] [address]  Dept: [phone]  Dept Fax: [phone]  Loc: [phone]    Mike Martinez is a 64 y.o. male who was referred by No ref. provider found for:  Chief Complaint   Patient presents with    Follow-up     Patient is here for a follow up for dysphagia lusoria        HPI:     Mike Martinez is a 64 y.o. 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 He has had this extensively evaluated both here and at [facility]. The potential for a vascular compression was considered given the vascular pulsations right at the stricture which were apparently being transmitted from the innominate artery but through the trachea. The patient reports having gained a couple of pounds despite his stricture by working harder at increasing his overall nutritional intake. He has to liquefy any food that he swallows and it takes him a long time to get an entire meal down. He is having no new problems. History:     No Known Allergies  Current Outpatient Medications   Medication Sig Dispense Refill    amLODIPine (NORVASC) 5 MG tablet Take am of sx      levothyroxine (SYNTHROID) 125 MCG tablet Take one tablet first thing in the morning on an empty stomach.       mirtazapine (REMERON) 15 MG tablet       OLANZapine (ZYPREXA) 10 MG tablet       OXcarbazepine (TRILEPTAL) 150 MG tablet       oxyCODONE-acetaminophen (PERCOCET) 5-325 MG per tablet       metoprolol (LOPRESSOR) 100 MG tablet Take 50 mg by mouth 2 times daily Per patient PCP changed dose     Take am of sx      Sertraline HCl (ZOLOFT PO) Take by mouth      pantoprazole (PROTONIX) 40 MG tablet Take 40 mg by mouth daily Take am of sx  6    esomeprazole Magnesium (NEXIUM) 20 MG PACK Take 20 mg by mouth daily      Mouthwashes (BIOTENE DRY MOUTH) LIQD daily       lisinopril (PRINIVIL;ZESTRIL) 20 MG tablet Take 5 mg by mouth daily Take am of sx       No current facility-administered medications for this visit. Past Medical History:   Diagnosis Date    A-fib [facility])     Aortic aneurysm ([facility] [address]     Asthma     [facility] ([facility]     Hypertension     Lymphedema     Syncope 12/22/2020    Syncope and collapse       Past Surgical History:   Procedure Laterality Date    GASTROSTOMY TUBE PLACEMENT  10/2/15    LARYNGOSCOPY N/A 6/3/2022    Rigid Laryngoscopy Esophagoscopy Diagnostic performed by Nichelle Lott MD at [address] [place] [address]  10/2/15    SKIN GRAFT  10/2/15    jaw    TRACHEOSTOMY      UPPER GASTROINTESTINAL ENDOSCOPY N/A 5/11/2021    EGD ESOPHAGOGASTRODUODENOSCOPY performed by Marcelle Sanchez MD at [facility]     Family History   Problem Relation Age of Onset    Asthma Mother      Social History     Tobacco Use    Smoking status: Some Days     Packs/day: 0.25     Years: 34.00     Pack years: 8.50     Types: Cigarettes    Smokeless tobacco: Never   Substance Use Topics    Alcohol use: Yes     Alcohol/week: 7.0 standard drinks     Types: 7 Cans of beer per week     Comment: occasionally        Subjective:      Review of Systems  Rest of review of systems are negative, except as noted in HPI. Objective:     /70 (Site: Left Upper Arm, Position: Sitting)   Pulse 78   Temp 97.2 °F (36.2 °C) (Infrared)   Ht 5' 11\" (1.803 m)   Wt 172 lb 3.2 oz (78.1 kg)   SpO2 95%   BMI 24.02 kg/m²     Physical Exam       On general physical exam the patient is a pleasant alert cooperative middle-aged man in no acute distress. His voice is within normal limits for his age and gender. His speech pattern is edentulous but otherwise normal.  I heard no throat clearing coughing or inspiratory stridor. He is breathing without labor. Vitals reviewed.     FL ESOPHAGRAM    Result Date: 9/28/2022  Delayed swallowing with laryngeal penetration of barium. Final report electronically signed by Dr. Riley Hawley on 9/28/2022 9:43 AM     Lab Results   Component Value Date/Time     08/29/2022 11:25 AM     05/25/2022 10:23 AM     09/03/2021 10:18 AM     12/10/2020 07:25 AM     10/28/2020 11:41 AM    K 4.6 08/29/2022 11:25 AM    K 3.9 05/25/2022 10:23 AM    K 3.6 09/03/2021 10:18 AM    K 3.3 12/10/2020 07:25 AM    K 4.9 10/28/2020 11:41 AM    K 3.6 09/03/2020 08:33 AM    K 3.7 06/27/2019 07:40 PM     05/25/2022 10:23 AM     12/10/2020 07:25 AM     10/28/2020 11:41 AM    CO2 28 05/25/2022 10:23 AM    CO2 21 12/10/2020 07:25 AM    CO2 26 10/28/2020 11:41 AM    BUN 7 05/25/2022 10:23 AM    BUN 10 12/10/2020 07:25 AM    BUN 12 10/28/2020 11:41 AM    CREATININE 1.1 08/29/2022 11:25 AM    CREATININE 1.1 05/25/2022 10:23 AM    CREATININE 1.3 09/03/2021 10:18 AM    CREATININE 1.0 12/10/2020 07:25 AM    CREATININE 1.1 10/28/2020 11:41 AM    CALCIUM 9.3 05/25/2022 10:23 AM    CALCIUM 9.2 12/10/2020 07:25 AM    CALCIUM 10.3 10/28/2020 11:41 AM    PROT 6.6 08/29/2022 11:25 AM    PROT 7.3 05/25/2022 10:23 AM    PROT 6.2 09/03/2021 10:08 AM    LABALBU 3.9 08/29/2022 11:25 AM    LABALBU 4.2 05/25/2022 10:23 AM    LABALBU 3.8 09/03/2021 10:08 AM    BILITOT 0.2 08/29/2022 11:25 AM    BILITOT 0.3 05/25/2022 10:23 AM    BILITOT 0.2 09/03/2021 10:08 AM    ALKPHOS 75 08/29/2022 11:25 AM    ALKPHOS 84 05/25/2022 10:23 AM    ALKPHOS 118 09/03/2021 10:08 AM    AST 23 08/29/2022 11:25 AM    AST 21 05/25/2022 10:23 AM    AST 53 09/03/2021 10:08 AM    ALT 13 08/29/2022 11:25 AM    ALT 14 05/25/2022 10:23 AM    ALT 36 09/03/2021 10:08 AM       All of the past medical history, past surgical history, family history,social history, allergies and current medications were reviewed with the patient. Assessment & Plan   Diagnoses and all orders for this visit:     Diagnosis Orders   1.  Esophageal stricture  SC ESOPHAGOSCOPY FLEX BALLOON DILAT <30 MM DIAM      2. 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 Pharyngoesophageal dysphagia  NV ESOPHAGOSCOPY FLEX BALLOON DILAT <30 MM DIAM      4. Pharyngeal stenosis        5. Pain around percutaneous endoscopic gastrostomy (PEG) tube site, initial encounter  Ivon Dawson MD, General Surgery, [facility]          Based on the patient's history and physical findings on his last endoscopy as well as his radiographic studies the patient's stricture is a combination of a peptic process and radiation fibrosis. I explained to the patient and his wife, who was with his entire time, that these are very challenging to deal with because of the potential for perforation inherent particularly in balloon dilations. As such I recommend that we proceed with savory wire dilators the can be more gently graduated to larger diameters and that I tried to get him up to somewhere between 8 and 10 mm or up to a 30 Kosovan diameter on the first dilation and then work between 3 and 6 Kosovan increments over 3 additional dilations to further increase his diameter. He may need sustaining dilations thereafter as well. I strongly emphasized to him that there is no way to dilate the esophagus like his without some risk of perforation. I spent over 30 minutes of face-to-face time with the patient and his wife with more than half of it being dedicated to reviewing his complex history and planning this treatment program.    Return in about 6 weeks (around 12/5/2022) for Post dilation follow-up. **This report has been created using voice recognition software. It may contain minor errors which are inherent in voice recognition technology. **

## 2022-10-31 ENCOUNTER — HOSPITAL ENCOUNTER (OUTPATIENT)
Dept: CT IMAGING | Age: 57
Discharge: HOME | End: 2022-10-31
Payer: MEDICAID

## 2022-10-31 DIAGNOSIS — Z87.891 PERSONAL HISTORY OF TOBACCO USE, PRESENTING HAZARDS TO HEALTH: ICD-10-CM

## 2022-10-31 DIAGNOSIS — C06.2 MALIGNANT NEOPLASM OF RETROMOLAR AREA (HCC): ICD-10-CM

## 2022-10-31 PROCEDURE — 71250 CT THORAX DX C-: CPT

## 2022-10-31 NOTE — PROGRESS NOTES
[address] [facility] [address]  Dept: [phone]  Dept Fax: [phone]  Loc: [phone]    Visit Date: 11/1/2022    Starr Maher is a 64 y.o. 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 He is here to discuss takedown of same.   Patient denies any food products coming through but has rather persistent drainage and even pus and occasional blood    Past Medical History:   Diagnosis Date    A-fib [facility])     Aortic aneurysm (HCC)     Asthma     Head and neck cancer ([facility] [address]     Hypertension     Lymphedema     Syncope 12/22/2020    Syncope and collapse       Past Surgical History:   Procedure Laterality Date    GASTROSTOMY TUBE PLACEMENT  10/2/15    LARYNGOSCOPY N/A 6/3/2022    Rigid Laryngoscopy Esophagoscopy Diagnostic performed by Glena Mcardle, MD at [address] [facility] [facility]  10/2/15    SKIN GRAFT  10/2/15    jaw    TRACHEOSTOMY      UPPER GASTROINTESTINAL ENDOSCOPY N/A 5/11/2021    EGD ESOPHAGOGASTRODUODENOSCOPY performed by Jacky Flores MD at [facility]        Family History   Problem Relation Age of Onset    Asthma Mother         Social History     Tobacco Use    Smoking status: Some Days     Packs/day: 0.25     Years: 34.00 Pack years: 8.50     Types: Cigarettes    Smokeless tobacco: Never   Substance Use Topics    Alcohol use: Yes     Alcohol/week: 7.0 standard drinks     Types: 7 Cans of beer per week     Comment: occasionally          Current Outpatient Medications   Medication Sig Dispense Refill    amLODIPine (NORVASC) 5 MG tablet Take am of sx      levothyroxine (SYNTHROID) 125 MCG tablet Take one tablet first thing in the morning on an empty stomach.      mirtazapine (REMERON) 15 MG tablet       OLANZapine (ZYPREXA) 10 MG tablet       OXcarbazepine (TRILEPTAL) 150 MG tablet       oxyCODONE-acetaminophen (PERCOCET) 5-325 MG per tablet       metoprolol (LOPRESSOR) 100 MG tablet Take 50 mg by mouth 2 times daily Per patient PCP changed dose     Take am of sx      Sertraline HCl (ZOLOFT PO) Take by mouth      pantoprazole (PROTONIX) 40 MG tablet Take 40 mg by mouth daily Take am of sx  6    esomeprazole Magnesium (NEXIUM) 20 MG PACK Take 20 mg by mouth daily      Mouthwashes (BIOTENE DRY MOUTH) LIQD daily       lisinopril (PRINIVIL;ZESTRIL) 20 MG tablet Take 5 mg by mouth daily Take am of sx       No current facility-administered medications for this visit. No Known Allergies    Subjective:      Review of Systems   Constitutional:  Positive for fatigue. Negative for activity change, appetite change, chills, diaphoresis, fever and unexpected weight change. HENT:  Positive for facial swelling. Negative for congestion, dental problem, drooling, ear discharge, ear pain, hearing loss, mouth sores, nosebleeds, postnasal drip, rhinorrhea, sinus pressure, sinus pain, sneezing, sore throat, tinnitus, trouble swallowing and voice change. Eyes: Negative. Negative for photophobia, pain, discharge, redness, itching and visual disturbance. Respiratory: Negative. Negative for apnea, cough, choking, chest tightness, shortness of breath, wheezing and stridor. Cardiovascular:  Positive for chest pain, palpitations and leg swelling. Endocrine: Negative for cold intolerance, heat intolerance, polydipsia, polyphagia and polyuria. Genitourinary: Negative. Negative for decreased urine volume, difficulty urinating, dysuria, enuresis, flank pain, frequency, genital sores, hematuria, penile discharge, penile pain, penile swelling, scrotal swelling, testicular pain and urgency. Musculoskeletal:  Positive for back pain. Negative for arthralgias, gait problem, joint swelling, myalgias, neck pain and neck stiffness. Skin:  Negative for color change, pallor, rash and wound. Allergic/Immunologic: Negative. Negative for environmental allergies, food allergies and immunocompromised state. Neurological:  Positive for dizziness, light-headedness, numbness and headaches. Negative for tremors, seizures, syncope, facial asymmetry, speech difficulty and weakness. Hematological: Negative. Negative for adenopathy. Does not bruise/bleed easily. Psychiatric/Behavioral:  Positive for hallucinations and sleep disturbance. Negative for agitation, behavioral problems, confusion, decreased concentration, dysphoric mood, self-injury and suicidal ideas. The patient is nervous/anxious. The patient is not hyperactive. Objective: There were no vitals taken for this visit. Physical Exam  Constitutional:       Appearance: He is well-developed. HENT:      Head: Normocephalic and atraumatic. Eyes:      General: No scleral icterus. Left eye: No discharge. Conjunctiva/sclera: Conjunctivae normal.      Pupils: Pupils are equal, round, and reactive to light. Neck:      Thyroid: No thyromegaly. Trachea: No tracheal deviation. Cardiovascular:      Rate and Rhythm: Normal rate and regular rhythm. Heart sounds: Normal heart sounds. No murmur heard. No friction rub. No gallop. Pulmonary:      Effort: Pulmonary effort is normal. No respiratory distress. Breath sounds: Normal breath sounds. No wheezing or rales.    Chest: Chest wall: No tenderness. Abdominal:      Hernia: A hernia is present. Comments: Gastrocutaneous fistula left upper abdominal wall   patient also has a reducible umbilical hernia   Musculoskeletal:         General: No tenderness. Normal range of motion. Lymphadenopathy:      Cervical: No cervical adenopathy. Skin:     General: Skin is warm and dry. Coloration: Skin is not pale. Findings: No erythema or rash. Neurological:      Mental Status: He is alert and oriented to person, place, and time. Psychiatric:         Behavior: Behavior normal.         Thought Content: Thought content normal.         Judgment: Judgment normal.          Results for orders placed or performed during the hospital encounter of 09/02/22   TSH with Reflex   Result Value Ref Range    TSH 2.51 0.30 - 5.00 uIU/mL       Assessment:     Gastrocutaneous fistula status post PEG placement with the PEG tube removed in June 2016. We discussed exactly what this was and the remedy for it would be a rather simple operation to go down to close the opening in the stomach take out the fistulous tract he also has an umbilical hernia he would like to have it repaired to. needs to do a procedure on the head neck we can do this combined patient would like to proceed    Plan:     1. Schedule Elise Angulo for takedown of gastrocutaneous fistula and umbilical hernia repair  2. The risks, benefits and alternatives were discussed with Elise Angulo. He understands and wishes to proceed with surgical intervention. 3. Restrictions discussed with Elise Angulo and he expresses understanding. 4. He is advised to call back directly if there are further questions/concerns, or if his symptoms worsen prior to surgery.           Electronicallysigned by Malena Kimball MD on 10/31/2022 at 6:27 AM

## 2022-11-01 ENCOUNTER — OFFICE VISIT (OUTPATIENT)
Dept: SURGERY | Age: 57
End: 2022-11-01
Payer: MEDICAID

## 2022-11-01 ENCOUNTER — TELEPHONE (OUTPATIENT)
Dept: SURGERY | Age: 57
End: 2022-11-01

## 2022-11-01 VITALS
SYSTOLIC BLOOD PRESSURE: 120 MMHG | HEART RATE: 72 BPM | RESPIRATION RATE: 18 BRPM | DIASTOLIC BLOOD PRESSURE: 80 MMHG | TEMPERATURE: 97.8 F | OXYGEN SATURATION: 98 % | WEIGHT: 177.5 LBS | HEIGHT: 71 IN | BODY MASS INDEX: 24.85 KG/M2

## 2022-11-01 DIAGNOSIS — K31.6 GASTROCUTANEOUS FISTULA: Primary | ICD-10-CM

## 2022-11-01 PROCEDURE — 99202 OFFICE O/P NEW SF 15 MIN: CPT | Performed by: SURGERY

## 2022-11-01 PROCEDURE — 4004F PT TOBACCO SCREEN RCVD TLK: CPT | Performed by: SURGERY

## 2022-11-01 PROCEDURE — 3017F COLORECTAL CA SCREEN DOC REV: CPT | Performed by: SURGERY

## 2022-11-01 PROCEDURE — G8420 CALC BMI NORM PARAMETERS: HCPCS | Performed by: SURGERY

## 2022-11-01 PROCEDURE — G8484 FLU IMMUNIZE NO ADMIN: HCPCS | Performed by: SURGERY

## 2022-11-01 PROCEDURE — G8427 DOCREV CUR MEDS BY ELIG CLIN: HCPCS | Performed by: SURGERY

## 2022-11-01 ASSESSMENT — ENCOUNTER SYMPTOMS
TROUBLE SWALLOWING: 0
BACK PAIN: 1
PHOTOPHOBIA: 0
STRIDOR: 0
EYE ITCHING: 0
FACIAL SWELLING: 1
SORE THROAT: 0
VOICE CHANGE: 0
RESPIRATORY NEGATIVE: 1
CHOKING: 0
EYE REDNESS: 0
RHINORRHEA: 0
SINUS PAIN: 0
CHEST TIGHTNESS: 0
EYE DISCHARGE: 0
SINUS PRESSURE: 0
COUGH: 0
EYES NEGATIVE: 1
COLOR CHANGE: 0
APNEA: 0
EYE PAIN: 0
ALLERGIC/IMMUNOLOGIC NEGATIVE: 1
WHEEZING: 0
SHORTNESS OF BREATH: 0

## 2022-11-01 NOTE — TELEPHONE ENCOUNTER
Patient to be scheduled for surgery with Dr. Rick Mckeon for a takedown gastrocutaneous fistula and umbilical hernia repair with mesh under general anesthesia. Could you please ask Dr. Demario Nash if the patient is okay to proceed with Surgery?   Thank you

## 2022-11-01 NOTE — TELEPHONE ENCOUNTER
Dr Elsie Dimas appt moved up to 11/9 for pre-op clearance. Spoke to patient's wife, appt time/date given.

## 2022-11-01 NOTE — TELEPHONE ENCOUNTER
When are you looking to schedule this patient? He currently is scheduled to see Dr Ismael Amos on 12/7.

## 2022-11-03 ENCOUNTER — TELEPHONE (OUTPATIENT)
Dept: ENT CLINIC | Age: 57
End: 2022-11-03

## 2022-11-08 ENCOUNTER — TELEPHONE (OUTPATIENT)
Dept: CARDIOLOGY CLINIC | Age: 57
End: 2022-11-08

## 2022-11-08 DIAGNOSIS — Z01.818 PRE-OP TESTING: Primary | ICD-10-CM

## 2022-11-08 NOTE — TELEPHONE ENCOUNTER
I was able to check with Dr Osman Bernard. He said he and Dr Jose Hinkle did speak and agreed to performing surgeries separately. Dr Juana Somers surgery will likely be scheduled ahead of Dr Osman Bernard. I tried to reach the patient to schedule and received voicemail. Left him a # to return my call.

## 2022-11-08 NOTE — TELEPHONE ENCOUNTER
Mark Hernandez returned my call. Yamile Walker is scheduled for the first two procedures on 1/11/23 and 2/23/23. We reviewed instructions and I will mail the instruction sheet to his home address.

## 2022-11-08 NOTE — TELEPHONE ENCOUNTER
Aldair Loaiza is scheduled for surgery with Dr Benny Rodriguez. The first surgery is 1/11/23 and the second is scheduled 2/23/23. He will have 2 more of these every 6 weeks. We are requesting cardiac clearance. Surgery:  Suspension Laryngoscopy for access and Flexible esophagoscopy with savory wire bougie dilation    Please advise. Thank you!

## 2022-11-09 ENCOUNTER — PREP FOR PROCEDURE (OUTPATIENT)
Dept: SURGERY | Age: 57
End: 2022-11-09

## 2022-11-09 ENCOUNTER — TELEPHONE (OUTPATIENT)
Dept: SURGERY | Age: 57
End: 2022-11-09

## 2022-11-09 ENCOUNTER — OFFICE VISIT (OUTPATIENT)
Dept: CARDIOLOGY CLINIC | Age: 57
End: 2022-11-09
Payer: MEDICAID

## 2022-11-09 VITALS
WEIGHT: 176.8 LBS | HEART RATE: 69 BPM | DIASTOLIC BLOOD PRESSURE: 79 MMHG | SYSTOLIC BLOOD PRESSURE: 106 MMHG | HEIGHT: 70 IN | BODY MASS INDEX: 25.31 KG/M2

## 2022-11-09 DIAGNOSIS — I10 PRIMARY HYPERTENSION: ICD-10-CM

## 2022-11-09 DIAGNOSIS — Z01.818 PREOP TESTING: Primary | ICD-10-CM

## 2022-11-09 PROCEDURE — G8427 DOCREV CUR MEDS BY ELIG CLIN: HCPCS | Performed by: NUCLEAR MEDICINE

## 2022-11-09 PROCEDURE — 99214 OFFICE O/P EST MOD 30 MIN: CPT | Performed by: NUCLEAR MEDICINE

## 2022-11-09 PROCEDURE — G8419 CALC BMI OUT NRM PARAM NOF/U: HCPCS | Performed by: NUCLEAR MEDICINE

## 2022-11-09 PROCEDURE — 3074F SYST BP LT 130 MM HG: CPT | Performed by: NUCLEAR MEDICINE

## 2022-11-09 PROCEDURE — 4004F PT TOBACCO SCREEN RCVD TLK: CPT | Performed by: NUCLEAR MEDICINE

## 2022-11-09 PROCEDURE — 3017F COLORECTAL CA SCREEN DOC REV: CPT | Performed by: NUCLEAR MEDICINE

## 2022-11-09 PROCEDURE — 3078F DIAST BP <80 MM HG: CPT | Performed by: NUCLEAR MEDICINE

## 2022-11-09 PROCEDURE — 93000 ELECTROCARDIOGRAM COMPLETE: CPT | Performed by: NUCLEAR MEDICINE

## 2022-11-09 PROCEDURE — G8484 FLU IMMUNIZE NO ADMIN: HCPCS | Performed by: NUCLEAR MEDICINE

## 2022-11-09 RX ORDER — SODIUM CHLORIDE 9 MG/ML
INJECTION, SOLUTION INTRAVENOUS CONTINUOUS
Status: CANCELLED | OUTPATIENT
Start: 2022-11-09

## 2022-11-09 NOTE — PROGRESS NOTES
[address] .  [address] [place] [address]  Dept: [phone]  Dept Fax: [phone]  Loc: [phone]    Visit Date: 11/9/2022    Funmilayo Clark is a 64 y.o. male who presents todayfor:  Chief Complaint   Patient presents with    Cardiac Clearance    Hypertension   Some dizziness  Lower BP  No syncope  Does have a loop recorder  No known CAD  No chest pain   No changes in breathing  Going for hernia and throat surgery   No known Arrhythmia  Some palpitation  High caffeine       HPI:  HPI  Past Medical History:   Diagnosis Date    A-fib ([facility] [address]     Baki-no blood thinners    Aortic aneurysm (HCC)     Asthma     Head and neck cancer ([facility] [address]     Hypertension     Lymphedema     Syncope 12/22/2020    Syncope and collapse       Past Surgical History:   Procedure Laterality Date    GASTROSTOMY TUBE PLACEMENT  10/02/2015    LARYNGOSCOPY N/A 06/03/2022    Rigid Laryngoscopy Esophagoscopy Diagnostic performed by Lucas Taylor MD at [address] [place] [address],[facility]  10/02/2015    OTHER SURGICAL HISTORY  09/06/2022    linq recorder    OTHER SURGICAL HISTORY  06/20/2016    peg tube removal    SKIN GRAFT  10/02/2015    jaw    TRACHEOSTOMY      UPPER GASTROINTESTINAL ENDOSCOPY N/A 05/11/2021    EGD ESOPHAGOGASTRODUODENOSCOPY performed by Yvonne De Anda MD at [facility]     Family History   Problem Relation Age of Onset    Asthma Mother      Social History     Tobacco Use    Smoking status: Some Days     Packs/day: 0.25     Years: 34.00     Pack years: 8.50     Types: Cigarettes    Smokeless tobacco: Never   Substance Use Topics    Alcohol use:  Yes     Alcohol/week: 7.0 standard drinks     Types: 7 Cans of beer per week     Comment: occasionally      Current Outpatient Medications   Medication Sig Dispense Refill    amLODIPine (NORVASC) 5 MG tablet Take am of sx      levothyroxine (SYNTHROID) 125 MCG tablet Take one tablet first thing in the morning on an empty stomach.      mirtazapine (REMERON) 15 MG tablet       OLANZapine (ZYPREXA) 10 MG tablet       OXcarbazepine (TRILEPTAL) 150 MG tablet       oxyCODONE-acetaminophen (PERCOCET) 5-325 MG per tablet       metoprolol (LOPRESSOR) 100 MG tablet Take 50 mg by mouth 2 times daily Per patient PCP changed dose     Take am of sx      Sertraline HCl (ZOLOFT PO) Take by mouth      pantoprazole (PROTONIX) 40 MG tablet Take 40 mg by mouth daily Take am of sx  6    esomeprazole Magnesium (NEXIUM) 20 MG PACK Take 20 mg by mouth daily      Mouthwashes (BIOTENE DRY MOUTH) LIQD daily       lisinopril (PRINIVIL;ZESTRIL) 20 MG tablet Take 5 mg by mouth daily Take am of sx       No current facility-administered medications for this visit. No Known Allergies  Health Maintenance   Topic Date Due    Pneumococcal 0-64 years Vaccine (1 - PCV) Never done    Depression Screen  Never done    HIV screen  Never done    Hepatitis C screen  Never done    DTaP/Tdap/Td vaccine (1 - Tdap) Never done    COVID-19 Vaccine (3 - Moderna risk series) 05/05/2021    Colorectal Cancer Screen  05/20/2022    Flu vaccine (1) 08/01/2022    Lipids  10/28/2025    Hepatitis A vaccine  Aged Out    Hib vaccine  Aged Out    Meningococcal (ACWY) vaccine  Aged Out       Subjective:  Review of Systems  General:   No fever, no chills, some fatigue or weight loss  Pulmonary:    some dyspnea, no wheezing  Cardiac:    Denies recent chest pain,   GI:     No nausea or vomiting, no abdominal pain  Neuro:     No dizziness or light headedness,   Musculoskeletal:  No recent active issues  Extremities:   No edema, no obvious claudication     Objective:  Physical Exam  /79   Pulse 69   Ht 5' 10\" (1.778 m)   Wt 176 lb 12.8 oz (80.2 kg)   BMI 25.37 kg/m²   General:   Well developed, well nourished  Lungs:    Clear to auscultation  Heart:    Normal S1 S2, Slight murmur.  no rubs, no gallops  Abdomen:   Soft, non tender, no organomegalies, positive bowel sounds  Extremities:   No edema, no cyanosis, good peripheral pulses  Neurological:   Awake, alert, oriented. No obvious focal deficits  Musculoskelatal:  No obvious deformities    Assessment:      Diagnosis Orders   1. Preop testing  EKG 12 Lead      2. Primary hypertension        Low BP and symptoms  Some risk for CAD   No angina  ECG in office was done today. I reviewed the ECG. No acute findings      Plan:  No follow-ups on file. Discussed  Hold norvasc  Monitor the BP  Clear for surgery   Continue risk factor modification and medical management  Thank you for allowing me to participate in the care of your patient. Please don't hesitate to contact me regarding any further issues related to the patient care    Orders Placed:  Orders Placed This Encounter   Procedures    EKG 12 Lead     Order Specific Question:   Reason for Exam?     Answer: Other       Medications Prescribed:  No orders of the defined types were placed in this encounter. Discussed use, benefit, and side effects of prescribed medications. All patient questions answered. Pt voicedunderstanding. Instructed to continue current medications, diet and exercise. Continue risk factor modification and medical management. Patient agreed with treatment plan. Follow up as directed.     Electronically signedby Emory Azevedo MD on 11/9/2022 at 10:04 AM

## 2022-11-09 NOTE — TELEPHONE ENCOUNTER
[address]    Phone * [phone]   Surgical Scheduling Direct line Phone * [phone]  Fax * [phone]      Rosy Vegas      1965    male    [address] [place] [address] Way [place] [address]  Marital Status:    Single     Home Phone: [phone]   Cell Phone:   Telephone Information:   Mobile [phone]              Surgeon: Dr. Brigitte Wiggins  Surgery Date:11-   Time: Morris Nava     Procedure: Takedown Gastrocutaneous fistula and umbilical hernia repair possible mesh   Outpatient     Diagnosis: Gastrocutaneous fistula / Umbilical hernia    Important Medical History: In Epic    Special Inst/Equip:     CPT Codes: [id number], [id number]    Latex Allergy:   no Cardiac Device:  no    Anesthesia Type: General    Case Location:  Main OR     Preadmission Testing: Phone Call      PAT Date and Time: ________________________________    PAT Confirmation #: _________________________________    Post Op Visit:  ______________________________________    Need Preop Cardiac Clearance:   yes,     Does patient have Cardiologist/physician?  Cleared by Dr. Nestor Smith #:  ______________________________________________    Alisha Lassiterok: __________________________________ Date:____________________        [employer] Name:  [facility]

## 2022-11-09 NOTE — TELEPHONE ENCOUNTER
Patient scheduled for surgery with Dr. Sarahy Walden on 11- at 10:30am with an arrival of 9:00am.  Preop instructions given verbally to Olimpia Stark, his domestic partner. Surgery consent to be signed on arrival.  Cardiac clearance obtained from Dr. Adilene Miller.

## 2022-11-13 NOTE — H&P
[address] [address]  History and Physical Update      Pt Name: Indu Langford  MRN: [id number]  YOB: 1965  Date of evaluation: 11/13/2022    [x] I have examined the patient and reviewed the H&P/Consult and there are no changes to the patient or plans. [] I have examined the patient and reviewed the H&P/Consult and have noted the following changes:         Mitchell Almaraz MD  Electronically signed 11/13/2022 at 4:28 PM

## 2022-11-13 NOTE — H&P
[address] [facility] [address]  Dept: [phone]  Dept Fax: [phone]  Loc: [phone]    Visit Date: 11/1/2022    Yu Rojas is a 64 y.o. 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 He is here to discuss takedown of same.   Patient denies any food products coming through but has rather persistent drainage and even pus and occasional blood    Past Medical History:   Diagnosis Date    A-fib [facility])     Aortic aneurysm (HCC)     Asthma     Head and neck cancer ([facility] [address]     Hypertension     Lymphedema     Syncope 12/22/2020    Syncope and collapse       Past Surgical History:   Procedure Laterality Date    GASTROSTOMY TUBE PLACEMENT  10/2/15    LARYNGOSCOPY N/A 6/3/2022    Rigid Laryngoscopy Esophagoscopy Diagnostic performed by Josefa Marsh MD at [address] [facility]  10/2/15    SKIN GRAFT  10/2/15    jaw    TRACHEOSTOMY      UPPER GASTROINTESTINAL ENDOSCOPY N/A 5/11/2021    EGD ESOPHAGOGASTRODUODENOSCOPY performed by Jace Francisco MD at [address] [facility]        Family History   Problem Relation Age of Onset    Asthma Mother         Social History     Tobacco Use    Smoking status: Some Days     Packs/day: 0.25     Years: 34.00 Pack years: 8.50     Types: Cigarettes    Smokeless tobacco: Never   Substance Use Topics    Alcohol use: Yes     Alcohol/week: 7.0 standard drinks     Types: 7 Cans of beer per week     Comment: occasionally          Current Outpatient Medications   Medication Sig Dispense Refill    amLODIPine (NORVASC) 5 MG tablet Take am of sx      levothyroxine (SYNTHROID) 125 MCG tablet Take one tablet first thing in the morning on an empty stomach.      mirtazapine (REMERON) 15 MG tablet       OLANZapine (ZYPREXA) 10 MG tablet       OXcarbazepine (TRILEPTAL) 150 MG tablet       oxyCODONE-acetaminophen (PERCOCET) 5-325 MG per tablet       metoprolol (LOPRESSOR) 100 MG tablet Take 50 mg by mouth 2 times daily Per patient PCP changed dose     Take am of sx      Sertraline HCl (ZOLOFT PO) Take by mouth      pantoprazole (PROTONIX) 40 MG tablet Take 40 mg by mouth daily Take am of sx  6    esomeprazole Magnesium (NEXIUM) 20 MG PACK Take 20 mg by mouth daily      Mouthwashes (BIOTENE DRY MOUTH) LIQD daily       lisinopril (PRINIVIL;ZESTRIL) 20 MG tablet Take 5 mg by mouth daily Take am of sx       No current facility-administered medications for this visit. No Known Allergies    Subjective:      Review of Systems   Constitutional:  Positive for fatigue. Negative for activity change, appetite change, chills, diaphoresis, fever and unexpected weight change. HENT:  Positive for facial swelling. Negative for congestion, dental problem, drooling, ear discharge, ear pain, hearing loss, mouth sores, nosebleeds, postnasal drip, rhinorrhea, sinus pressure, sinus pain, sneezing, sore throat, tinnitus, trouble swallowing and voice change. Eyes: Negative. Negative for photophobia, pain, discharge, redness, itching and visual disturbance. Respiratory: Negative. Negative for apnea, cough, choking, chest tightness, shortness of breath, wheezing and stridor. Cardiovascular:  Positive for chest pain, palpitations and leg swelling. Endocrine: Negative for cold intolerance, heat intolerance, polydipsia, polyphagia and polyuria. Genitourinary: Negative. Negative for decreased urine volume, difficulty urinating, dysuria, enuresis, flank pain, frequency, genital sores, hematuria, penile discharge, penile pain, penile swelling, scrotal swelling, testicular pain and urgency. Musculoskeletal:  Positive for back pain. Negative for arthralgias, gait problem, joint swelling, myalgias, neck pain and neck stiffness. Skin:  Negative for color change, pallor, rash and wound. Allergic/Immunologic: Negative. Negative for environmental allergies, food allergies and immunocompromised state. Neurological:  Positive for dizziness, light-headedness, numbness and headaches. Negative for tremors, seizures, syncope, facial asymmetry, speech difficulty and weakness. Hematological: Negative. Negative for adenopathy. Does not bruise/bleed easily. Psychiatric/Behavioral:  Positive for hallucinations and sleep disturbance. Negative for agitation, behavioral problems, confusion, decreased concentration, dysphoric mood, self-injury and suicidal ideas. The patient is nervous/anxious. The patient is not hyperactive. Objective: There were no vitals taken for this visit. Physical Exam  Constitutional:       Appearance: He is well-developed. HENT:      Head: Normocephalic and atraumatic. Eyes:      General: No scleral icterus. Left eye: No discharge. Conjunctiva/sclera: Conjunctivae normal.      Pupils: Pupils are equal, round, and reactive to light. Neck:      Thyroid: No thyromegaly. Trachea: No tracheal deviation. Cardiovascular:      Rate and Rhythm: Normal rate and regular rhythm. Heart sounds: Normal heart sounds. No murmur heard. No friction rub. No gallop. Pulmonary:      Effort: Pulmonary effort is normal. No respiratory distress. Breath sounds: Normal breath sounds. No wheezing or rales.    Chest: Chest wall: No tenderness. Abdominal:      Hernia: A hernia is present. Comments: Gastrocutaneous fistula left upper abdominal wall   patient also has a reducible umbilical hernia   Musculoskeletal:         General: No tenderness. Normal range of motion. Lymphadenopathy:      Cervical: No cervical adenopathy. Skin:     General: Skin is warm and dry. Coloration: Skin is not pale. Findings: No erythema or rash. Neurological:      Mental Status: He is alert and oriented to person, place, and time. Psychiatric:         Behavior: Behavior normal.         Thought Content: Thought content normal.         Judgment: Judgment normal.          Results for orders placed or performed during the hospital encounter of 09/02/22   TSH with Reflex   Result Value Ref Range    TSH 2.51 0.30 - 5.00 uIU/mL       Assessment:     Gastrocutaneous fistula status post PEG placement with the PEG tube removed in June 2016. We discussed exactly what this was and the remedy for it would be a rather simple operation to go down to close the opening in the stomach take out the fistulous tract he also has an umbilical hernia he would like to have it repaired to. needs to do a procedure on the head neck we can do this combined patient would like to proceed    Plan:     1. Schedule Duc Rubio for takedown of gastrocutaneous fistula and umbilical hernia repair  2. The risks, benefits and alternatives were discussed with Duc Rubio. He understands and wishes to proceed with surgical intervention. 3. Restrictions discussed with Duc Rubio and he expresses understanding. 4. He is advised to call back directly if there are further questions/concerns, or if his symptoms worsen prior to surgery.           Electronicallysigned by Lacie Dailey MD on 10/31/2022 at 6:27 AM

## 2022-11-14 ENCOUNTER — ANESTHESIA (OUTPATIENT)
Dept: OPERATING ROOM | Age: 57
End: 2022-11-14
Payer: MEDICAID

## 2022-11-14 ENCOUNTER — ANESTHESIA EVENT (OUTPATIENT)
Dept: OPERATING ROOM | Age: 57
End: 2022-11-14
Payer: MEDICAID

## 2022-11-14 ENCOUNTER — HOSPITAL ENCOUNTER (OUTPATIENT)
Age: 57
Setting detail: OUTPATIENT SURGERY
Discharge: HOME | End: 2022-11-14
Attending: SURGERY | Admitting: SURGERY
Payer: MEDICAID

## 2022-11-14 VITALS
SYSTOLIC BLOOD PRESSURE: 136 MMHG | OXYGEN SATURATION: 94 % | BODY MASS INDEX: 23.76 KG/M2 | HEIGHT: 72 IN | TEMPERATURE: 96.9 F | DIASTOLIC BLOOD PRESSURE: 60 MMHG | WEIGHT: 175.4 LBS | RESPIRATION RATE: 16 BRPM | HEART RATE: 63 BPM

## 2022-11-14 DIAGNOSIS — T85.848D PAIN AROUND PERCUTANEOUS ENDOSCOPIC GASTROSTOMY (PEG) TUBE SITE, SUBSEQUENT ENCOUNTER: Primary | ICD-10-CM

## 2022-11-14 DIAGNOSIS — K31.6 GASTROCUTANEOUS FISTULA: ICD-10-CM

## 2022-11-14 LAB — POTASSIUM SERPL-SCNC: 3.9 MEQ/L (ref 3.5–5.2)

## 2022-11-14 PROCEDURE — 88304 TISSUE EXAM BY PATHOLOGIST: CPT

## 2022-11-14 PROCEDURE — 2500000003 HC RX 250 WO HCPCS: Performed by: NURSE ANESTHETIST, CERTIFIED REGISTERED

## 2022-11-14 PROCEDURE — 7100000010 HC PHASE II RECOVERY - FIRST 15 MIN: Performed by: SURGERY

## 2022-11-14 PROCEDURE — 2709999900 HC NON-CHARGEABLE SUPPLY: Performed by: SURGERY

## 2022-11-14 PROCEDURE — C1781 MESH (IMPLANTABLE): HCPCS | Performed by: SURGERY

## 2022-11-14 PROCEDURE — 49203 PR EXCISION/DESTRUCTION OPEN ABDOMINAL TUMORS 5 CM: CPT | Performed by: SURGERY

## 2022-11-14 PROCEDURE — 7100000011 HC PHASE II RECOVERY - ADDTL 15 MIN: Performed by: SURGERY

## 2022-11-14 PROCEDURE — 49585 PR RPR UMBILICAL HRNA 5 YRS/> REDUCIBLE: CPT | Performed by: SURGERY

## 2022-11-14 PROCEDURE — 49585 REPAIR UMBILICAL HERN,5+Y/O,REDUC: CPT | Performed by: SURGERY

## 2022-11-14 PROCEDURE — 36415 COLL VENOUS BLD VENIPUNCTURE: CPT

## 2022-11-14 PROCEDURE — 6360000002 HC RX W HCPCS: Performed by: NURSE ANESTHETIST, CERTIFIED REGISTERED

## 2022-11-14 PROCEDURE — 6370000000 HC RX 637 (ALT 250 FOR IP)

## 2022-11-14 PROCEDURE — 6360000002 HC RX W HCPCS

## 2022-11-14 PROCEDURE — 3700000000 HC ANESTHESIA ATTENDED CARE: Performed by: SURGERY

## 2022-11-14 PROCEDURE — 49203 PR EXCISION/DESTRUCTION OPEN ABDOMINAL TUMOR 5 CM/<: CPT | Performed by: SURGERY

## 2022-11-14 PROCEDURE — 3700000001 HC ADD 15 MINUTES (ANESTHESIA): Performed by: SURGERY

## 2022-11-14 PROCEDURE — 2500000003 HC RX 250 WO HCPCS: Performed by: SURGERY

## 2022-11-14 PROCEDURE — 2580000003 HC RX 258

## 2022-11-14 PROCEDURE — 7100000001 HC PACU RECOVERY - ADDTL 15 MIN: Performed by: SURGERY

## 2022-11-14 PROCEDURE — 3600000002 HC SURGERY LEVEL 2 BASE: Performed by: SURGERY

## 2022-11-14 PROCEDURE — 3600000012 HC SURGERY LEVEL 2 ADDTL 15MIN: Performed by: SURGERY

## 2022-11-14 PROCEDURE — 7100000000 HC PACU RECOVERY - FIRST 15 MIN: Performed by: SURGERY

## 2022-11-14 PROCEDURE — 84132 ASSAY OF SERUM POTASSIUM: CPT

## 2022-11-14 DEVICE — IMPLANTABLE DEVICE: Type: IMPLANTABLE DEVICE | Status: FUNCTIONAL

## 2022-11-14 RX ORDER — MIDAZOLAM HYDROCHLORIDE 1 MG/ML
INJECTION INTRAMUSCULAR; INTRAVENOUS PRN
Status: DISCONTINUED | OUTPATIENT
Start: 2022-11-14 | End: 2022-11-14 | Stop reason: SDUPTHER

## 2022-11-14 RX ORDER — SODIUM CHLORIDE 0.9 % (FLUSH) 0.9 %
5-40 SYRINGE (ML) INJECTION EVERY 12 HOURS SCHEDULED
Status: CANCELLED | OUTPATIENT
Start: 2022-11-14

## 2022-11-14 RX ORDER — ROCURONIUM BROMIDE 10 MG/ML
INJECTION, SOLUTION INTRAVENOUS PRN
Status: DISCONTINUED | OUTPATIENT
Start: 2022-11-14 | End: 2022-11-14 | Stop reason: SDUPTHER

## 2022-11-14 RX ORDER — FENTANYL CITRATE 50 UG/ML
50 INJECTION, SOLUTION INTRAMUSCULAR; INTRAVENOUS EVERY 5 MIN PRN
Status: CANCELLED | OUTPATIENT
Start: 2022-11-14

## 2022-11-14 RX ORDER — PROPOFOL 10 MG/ML
INJECTION, EMULSION INTRAVENOUS PRN
Status: DISCONTINUED | OUTPATIENT
Start: 2022-11-14 | End: 2022-11-14 | Stop reason: SDUPTHER

## 2022-11-14 RX ORDER — FENTANYL CITRATE 50 UG/ML
INJECTION, SOLUTION INTRAMUSCULAR; INTRAVENOUS PRN
Status: DISCONTINUED | OUTPATIENT
Start: 2022-11-14 | End: 2022-11-14 | Stop reason: SDUPTHER

## 2022-11-14 RX ORDER — OXYCODONE HYDROCHLORIDE AND ACETAMINOPHEN 5; 325 MG/1; MG/1
1 TABLET ORAL EVERY 6 HOURS PRN
Qty: 12 TABLET | Refills: 0 | Status: SHIPPED | OUTPATIENT
Start: 2022-11-14 | End: 2022-11-18

## 2022-11-14 RX ORDER — LABETALOL HYDROCHLORIDE 5 MG/ML
INJECTION, SOLUTION INTRAVENOUS PRN
Status: DISCONTINUED | OUTPATIENT
Start: 2022-11-14 | End: 2022-11-14 | Stop reason: SDUPTHER

## 2022-11-14 RX ORDER — HYDRALAZINE HYDROCHLORIDE 20 MG/ML
10 INJECTION INTRAMUSCULAR; INTRAVENOUS
Status: CANCELLED | OUTPATIENT
Start: 2022-11-14

## 2022-11-14 RX ORDER — MORPHINE SULFATE 2 MG/ML
2 INJECTION, SOLUTION INTRAMUSCULAR; INTRAVENOUS EVERY 5 MIN PRN
Status: CANCELLED | OUTPATIENT
Start: 2022-11-14

## 2022-11-14 RX ORDER — LIDOCAINE HYDROCHLORIDE 20 MG/ML
INJECTION, SOLUTION EPIDURAL; INFILTRATION; INTRACAUDAL; PERINEURAL PRN
Status: DISCONTINUED | OUTPATIENT
Start: 2022-11-14 | End: 2022-11-14 | Stop reason: SDUPTHER

## 2022-11-14 RX ORDER — DEXAMETHASONE SODIUM PHOSPHATE 10 MG/ML
INJECTION, EMULSION INTRAMUSCULAR; INTRAVENOUS PRN
Status: DISCONTINUED | OUTPATIENT
Start: 2022-11-14 | End: 2022-11-14 | Stop reason: SDUPTHER

## 2022-11-14 RX ORDER — SODIUM CHLORIDE 9 MG/ML
INJECTION, SOLUTION INTRAVENOUS CONTINUOUS
Status: DISCONTINUED | OUTPATIENT
Start: 2022-11-14 | End: 2022-11-14 | Stop reason: HOSPADM

## 2022-11-14 RX ORDER — HYDROMORPHONE HCL 110MG/55ML
PATIENT CONTROLLED ANALGESIA SYRINGE INTRAVENOUS PRN
Status: DISCONTINUED | OUTPATIENT
Start: 2022-11-14 | End: 2022-11-14 | Stop reason: SDUPTHER

## 2022-11-14 RX ORDER — SUCCINYLCHOLINE CHLORIDE 20 MG/ML
INJECTION INTRAMUSCULAR; INTRAVENOUS PRN
Status: DISCONTINUED | OUTPATIENT
Start: 2022-11-14 | End: 2022-11-14 | Stop reason: SDUPTHER

## 2022-11-14 RX ORDER — OXYCODONE HYDROCHLORIDE AND ACETAMINOPHEN 5; 325 MG/1; MG/1
1 TABLET ORAL ONCE
Status: COMPLETED | OUTPATIENT
Start: 2022-11-14 | End: 2022-11-14

## 2022-11-14 RX ORDER — OXYCODONE HYDROCHLORIDE AND ACETAMINOPHEN 5; 325 MG/1; MG/1
TABLET ORAL
Status: COMPLETED
Start: 2022-11-14 | End: 2022-11-14

## 2022-11-14 RX ORDER — SODIUM CHLORIDE 9 MG/ML
INJECTION, SOLUTION INTRAVENOUS PRN
Status: CANCELLED | OUTPATIENT
Start: 2022-11-14

## 2022-11-14 RX ORDER — SODIUM CHLORIDE 0.9 % (FLUSH) 0.9 %
5-40 SYRINGE (ML) INJECTION PRN
Status: CANCELLED | OUTPATIENT
Start: 2022-11-14

## 2022-11-14 RX ORDER — BUPIVACAINE HYDROCHLORIDE 5 MG/ML
INJECTION, SOLUTION EPIDURAL; INTRACAUDAL PRN
Status: DISCONTINUED | OUTPATIENT
Start: 2022-11-14 | End: 2022-11-14 | Stop reason: ALTCHOICE

## 2022-11-14 RX ORDER — ONDANSETRON 2 MG/ML
INJECTION INTRAMUSCULAR; INTRAVENOUS PRN
Status: DISCONTINUED | OUTPATIENT
Start: 2022-11-14 | End: 2022-11-14 | Stop reason: SDUPTHER

## 2022-11-14 RX ADMIN — CEFAZOLIN 2000 MG: 10 INJECTION, POWDER, FOR SOLUTION INTRAVENOUS at 12:08

## 2022-11-14 RX ADMIN — OXYCODONE HYDROCHLORIDE AND ACETAMINOPHEN 1 TABLET: 5; 325 TABLET ORAL at 14:12

## 2022-11-14 RX ADMIN — HYDROMORPHONE HYDROCHLORIDE 0.6 MG: 2 INJECTION INTRAMUSCULAR; INTRAVENOUS; SUBCUTANEOUS at 13:12

## 2022-11-14 RX ADMIN — HYDROMORPHONE HYDROCHLORIDE 0.6 MG: 2 INJECTION INTRAMUSCULAR; INTRAVENOUS; SUBCUTANEOUS at 12:32

## 2022-11-14 RX ADMIN — DEXAMETHASONE SODIUM PHOSPHATE 8 MG: 10 INJECTION, EMULSION INTRAMUSCULAR; INTRAVENOUS at 12:04

## 2022-11-14 RX ADMIN — Medication 100 MG: at 12:00

## 2022-11-14 RX ADMIN — Medication 5 MG: at 13:08

## 2022-11-14 RX ADMIN — HYDROMORPHONE HYDROCHLORIDE 0.4 MG: 2 INJECTION INTRAMUSCULAR; INTRAVENOUS; SUBCUTANEOUS at 12:48

## 2022-11-14 RX ADMIN — SODIUM CHLORIDE: 9 INJECTION, SOLUTION INTRAVENOUS at 11:53

## 2022-11-14 RX ADMIN — HYDROMORPHONE HYDROCHLORIDE 0.4 MG: 2 INJECTION INTRAMUSCULAR; INTRAVENOUS; SUBCUTANEOUS at 12:42

## 2022-11-14 RX ADMIN — Medication 5 MG: at 12:48

## 2022-11-14 RX ADMIN — ONDANSETRON 4 MG: 2 INJECTION INTRAMUSCULAR; INTRAVENOUS at 12:45

## 2022-11-14 RX ADMIN — MIDAZOLAM 2 MG: 1 INJECTION INTRAMUSCULAR; INTRAVENOUS at 11:53

## 2022-11-14 RX ADMIN — FENTANYL CITRATE 100 MCG: 50 INJECTION, SOLUTION INTRAMUSCULAR; INTRAVENOUS at 12:04

## 2022-11-14 RX ADMIN — PROPOFOL 150 MG: 10 INJECTION, EMULSION INTRAVENOUS at 12:00

## 2022-11-14 RX ADMIN — SUCCINYLCHOLINE CHLORIDE 140 MG: 20 INJECTION, SOLUTION INTRAMUSCULAR; INTRAVENOUS at 12:00

## 2022-11-14 RX ADMIN — ROCURONIUM BROMIDE 50 MG: 10 INJECTION, SOLUTION INTRAVENOUS at 12:04

## 2022-11-14 RX ADMIN — SUGAMMADEX 200 MG: 100 INJECTION, SOLUTION INTRAVENOUS at 12:45

## 2022-11-14 ASSESSMENT — PAIN SCALES - GENERAL
PAINLEVEL_OUTOF10: 5
PAINLEVEL_OUTOF10: 6

## 2022-11-14 ASSESSMENT — PAIN - FUNCTIONAL ASSESSMENT: PAIN_FUNCTIONAL_ASSESSMENT: 0-10

## 2022-11-14 ASSESSMENT — PAIN DESCRIPTION - LOCATION: LOCATION: ABDOMEN

## 2022-11-14 NOTE — BRIEF OP NOTE
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 Peritoneal Seb Cyst  2. Umbilical Hernia       Procedure(s):  EXCISION OF WOUND AND REMOVAL OF PERITONEAL CYST, Umbilical Hernia Repair with Mesh    Surgeon(s):  Dustin Beltran MD    Assistant:      Anesthesia: General    Estimated Blood Loss (mL): 10 ml    Complications: none    Specimens:   ID Type Source Tests Collected by Time Destination   A : peritoneal mass Tissue Abdomen SURGICAL PATHOLOGY Dustin Beltran MD 11/14/2022 1252        Implants:  Implant Name Type Inv.  Item Serial No.  Lot No. LRB No. Used Action   PATCH EDMOND SM DIA1.7IN CIR W/ STRP [employer] ABS - [id number]  PATCH EDMOND SM DIA1.7IN CIR W/ STRP [employer] ABSRB  [place] DAVOL-WD [id number] N/A 1 Implanted         Drains: none    Findings: see op note    Electronically signed by Dustin Beltran MD on 11/14/2022 at 1:12 PM

## 2022-11-14 NOTE — ANESTHESIA POSTPROCEDURE EVALUATION
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     Romeo Phase I: Romeo Score: 10    Romeo Phase II: Romeo Score: 10      Anesthesia Post Evaluation    Patient location during evaluation: PACU  Patient participation: complete - patient participated  Level of consciousness: awake  Airway patency: patent  Nausea & Vomiting: no vomiting and no nausea  Complications: no  Cardiovascular status: hemodynamically stable  Respiratory status: acceptable and nasal cannula  Hydration status: stable

## 2022-11-14 NOTE — DISCHARGE INSTRUCTIONS
Pt Name: Veronica Lewis  Medical Record Number: [id number]  Today's Date: 11/14/2022  GENERAL ANESTHESIA OR SEDATION   1. Do not drive or operate hazardous machinery for 24 hours. 2. Do not make important business or personal decisions for 24 hours. 3. Do not drink alcoholic beverages or use tobacco for 24 hours. ACTIVITY INSTRUCTIONS   Rest today. Resume light to normal activity tomorrow. You may resume normal activity tomorrow. Do not engage in strenuous activity that may place stress on your incision. Do not drive for 3-5 days and avoid heavy lifting, tugging, pullings greater than 10-20 lbs until seen in the office. DIET INSTRUCTIONS   Begin with clear liquids. If not nauseated, may increase to a low-fat diet when you desire. Greasy and spicy foods are not advised. Regular diet as tolerated. MEDICATIONS   Prescription written for Percocet. Take as directed. Do not drink alcohol or drive while taking pain medications. You may experience dizziness or drowsiness with these medications. You may also experience constipation which can be relieved with stool softners or laxatives. You may resume your daily prescription medication schedule unless otherwise specified. Do not take 325mg Aspirin or other blood thinners such as Coumadin or Plavix for 5 days. WOUND & DRESSING INSTRUCTIONS   Always ensure you and your care giver clean hands before and after caring for the wound. Keep dressing clean and dry for 48 hours. Change when soiled or wet. Allow steri-strips to fall off on their own. Ice operative site for 20 minutes 4 times a day. May wash over incision in shower in 24 hours, but do not soak in a bath. BREAST PROCEDURES   Following breast procedures it is important to use supportive garments  It is also normal with sentinel lymph node biopsy for the urine to have a bluish color. ABDOMINAL & LAPAROSCOPIC PROCEDURES   1.  You are encouraged to get up and move around as this helps with the circulation and speeds up the healing process. 2. Breath deeply and cough from time to time. This helps to clear your lungs and helps prevent pneumonia. 3. Supporting your incision with a pillow or your hand helps to minimize discomfort and pain. 4. Laparoscopic patients may develop shoulder pain in the first 48 hours from the gas used during the procedure. FOLLOW UP CARE, SPECIFICALLY WATCH FOR:    Fever over 101 degrees by mouth   Increased redness, warmth, hardness at operative site. Blood soaked dressing (small amounts of oozing may be normal.)   Increased or progressive drainage from the surgical area   Inability to urinate or blood in the urine   Pain not relieved by the medications ordered   Persistent nausea and/or vomiting, unable to retain fluids. FOLLOW-UP APPOINTMENT in 2 weeks    Call my office if you have any problem that concerns you, [phone]. After hours, you can reach the answering service via the office phone number. IF YOU NEED IMMEDIATE ATTENTION, GO TO THE EMERGENCY ROOM AND YOUR DOCTOR WILL BE CONTACTED.

## 2022-11-14 NOTE — PROGRESS NOTES
DISCHARGE INSTRUCTIONS REVIEWED WITH PATIENT AND FAMILY. DRESSINGS REMAIN CLEAN DRY AND INTACT DISCHARGED BY WHEELCHAIR TO CAR.

## 2022-11-14 NOTE — PROGRESS NOTES
1315 Pt admitted to PACU. Responds to verbal stimuli. VSS. Ice pack applied to dressing. 1330 Pt resting quietly, eyes closed. Resp even and unlabored, NAD.  1342 Pt awake, reports mild pain, tolerable. Denies any needs at this time. 1356 Pt transferred to [facility] in stable condition.

## 2022-11-14 NOTE — PROGRESS NOTES
1561  pt. Admitted to [facility].  VSS. Pt. Denies pain. Wife at bedside. Call light in reach.

## 2022-11-14 NOTE — ANESTHESIA PRE PROCEDURE
Department of Anesthesiology  Preprocedure Note       Name:  Yuni Chapa   Age:  64 y.o.  :  1965                                          MRN:  [id number]         Date:  2022      Surgeon: Bronson Smith):  Seward Saint, MD    Procedure: Procedure(s):  Takedown Gastrocutaneous Fistula Umbilical Hernia Repair possible Mesh    Medications prior to admission:   Prior to Admission medications    Medication Sig Start Date End Date Taking? Authorizing Provider   levothyroxine (SYNTHROID) 125 MCG tablet Take one tablet first thing in the morning on an empty stomach.  21   Historical Provider, MD   mirtazapine (REMERON) 15 MG tablet  21   Historical Provider, MD   OLANZapine (ZYPREXA) 10 MG tablet  21   Historical Provider, MD   OXcarbazepine (TRILEPTAL) 150 MG tablet  21   Historical Provider, MD   oxyCODONE-acetaminophen (PERCOCET) 5-325 MG per tablet  21   Historical Provider, MD   metoprolol (LOPRESSOR) 100 MG tablet Take 50 mg by mouth 2 times daily Per patient PCP changed dose     Take am of sx    Historical Provider, MD   Sertraline HCl (ZOLOFT PO) Take by mouth    Historical Provider, MD   pantoprazole (PROTONIX) 40 MG tablet Take 40 mg by mouth daily Take am of sx 19   Historical Provider, MD   esomeprazole Magnesium (NEXIUM) 20 MG PACK Take 20 mg by mouth daily    Historical Provider, MD   Mouthwashes ([address] [place] [address]) LIQD daily  16   Historical Provider, MD   lisinopril (PRINIVIL;ZESTRIL) 20 MG tablet Take 5 mg by mouth daily Take am of sx    Historical Provider, MD       Current medications:    Current Facility-Administered Medications   Medication Dose Route Frequency Provider Last Rate Last Admin    0.9 % sodium chloride infusion   IntraVENous Continuous Emily Bernard LPN        ceFAZolin (ANCEF) 2000 mg in dextrose 5 % 50 mL IVPB  2,000 mg IntraVENous 30 Min Pre-Op Emily Bernard LPN           Allergies:  No Known Allergies    Problem List: Patient Active Problem List   Diagnosis Code    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    Chemotherapy induced neutropenia ([facility]) D70.1, T45.1X5A    Chronic anemia D64.9    Muscle spasms of neck M62.838    Syncope R55    PAF (paroxysmal atrial fibrillation) ([facility]) I48.0    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    Esophageal stricture K22.2    Pharyngoesophageal dysphagia R13.14    Pharyngeal stenosis J39.2    Laryngeal edema J38.4    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.8, Y84.2    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    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    Pain around PEG tube site T85.848A       Past Medical History:        Diagnosis Date    A-fib ([facility] [address]     Baki-no blood thinners    Aortic aneurysm ([facility])     Asthma     Head and neck cancer ([facility] [address]     Hypertension     Lymphedema     Syncope 12/22/2020    Syncope and collapse        Past Surgical History:        Procedure Laterality Date    GASTROSTOMY TUBE PLACEMENT  10/02/2015    LARYNGOSCOPY N/A 06/03/2022    Rigid Laryngoscopy Esophagoscopy Diagnostic performed by Nichelle Lott MD at [address] [place] [address]  10/02/2015    OTHER SURGICAL HISTORY  09/06/2022    linq recorder    OTHER SURGICAL HISTORY  06/20/2016    peg tube removal    SKIN GRAFT  10/02/2015    jaw    TRACHEOSTOMY      UPPER GASTROINTESTINAL ENDOSCOPY N/A 05/11/2021    EGD ESOPHAGOGASTRODUODENOSCOPY performed by Marcelle Sanchez MD at [facility]       Social History:    Social History     Tobacco Use    Smoking status: Some Days     Packs/day: 0.25     Years: 34.00     Pack years: 8.50     Types: Cigarettes    Smokeless tobacco: Never   Substance Use Topics    Alcohol use:  Yes     Alcohol/week: 7.0 standard drinks     Types: 7 Cans of beer per week     Comment: occasionally                                Ready to quit: Not Answered  Counseling given: Not Answered      Vital Signs (Current):   Vitals:    11/14/22 0914   BP: 121/83   Pulse: 67   Resp: 20   Temp: 97.5 °F (36.4 °C)   TempSrc: Temporal   SpO2: 99%                                              BP Readings from Last 3 Encounters:   11/14/22 121/83   11/09/22 106/79   11/01/22 120/80       NPO Status: Time of last liquid consumption: 1201 (noon)                        Time of last solid consumption: 1200 (noon)                        Date of last liquid consumption: 11/13/22                        Date of last solid food consumption: 11/13/22    BMI:   Wt Readings from Last 3 Encounters:   11/09/22 176 lb 12.8 oz (80.2 kg)   11/01/22 177 lb 8 oz (80.5 kg)   10/24/22 172 lb 3.2 oz (78.1 kg)     There is no height or weight on file to calculate BMI.    CBC:   Lab Results   Component Value Date/Time    WBC 5.2 08/29/2022 11:25 AM    RBC 4.29 08/29/2022 11:25 AM    HGB 13.0 08/29/2022 11:25 AM    HCT 39.9 08/29/2022 11:25 AM    MCV 93 08/29/2022 11:25 AM    RDW 14.4 08/29/2022 11:25 AM     08/29/2022 11:25 AM       CMP:   Lab Results   Component Value Date/Time     08/29/2022 11:25 AM     05/25/2022 10:23 AM    K 4.6 08/29/2022 11:25 AM    K 3.9 05/25/2022 10:23 AM    K 3.3 12/10/2020 07:25 AM     05/25/2022 10:23 AM    CO2 28 05/25/2022 10:23 AM    BUN 7 05/25/2022 10:23 AM    CREATININE 1.1 08/29/2022 11:25 AM    CREATININE 1.1 05/25/2022 10:23 AM    LABGLOM 69 05/25/2022 10:23 AM    GLUCOSE 88 05/25/2022 10:23 AM    PROT 6.6 08/29/2022 11:25 AM    CALCIUM 9.3 05/25/2022 10:23 AM    BILITOT 0.2 08/29/2022 11:25 AM    ALKPHOS 75 08/29/2022 11:25 AM    AST 23 08/29/2022 11:25 AM    ALT 13 08/29/2022 11:25 AM       POC Tests: No results for input(s): POCGLU, POCNA, POCK, POCCL, POCBUN, POCHEMO, POCHCT in the last 72 hours.     Coags:   Lab Results   Component Value Date/Time    INR 0.92 12/10/2020 07:25 AM    APTT 27.1 12/10/2020 07:25 AM       HCG (If Applicable): No results found for: PREGTESTUR, PREGSERUM, HCG, HCGQUANT     ABGs: No results found for: PHART, PO2ART, MFS8ATL, PDO2NSR, BEART, L8FNRGBA     Type & Screen (If Applicable):  No results found for: LABABO, LABRH    Drug/Infectious Status (If Applicable):  No results found for: HIV, HEPCAB    COVID-19 Screening (If Applicable):   Lab Results   Component Value Date/Time    COVID19 NEGATIVE 11/19/2021 10:25 AM    COVID19 NOT DETECTED 05/05/2021 04:54 PM           Anesthesia Evaluation    Airway: Mallampati: III  TM distance: >3 FB   Neck ROM: limited  Mouth opening: > = 3 FB   Dental:          Pulmonary:   (+) decreased breath sounds asthma:                            Cardiovascular:    (+) hypertension:,         Rhythm: regular                      Neuro/Psych:               GI/Hepatic/Renal:             Endo/Other:                     Abdominal:             Vascular: Other Findings:           Anesthesia Plan      general     ASA 3     (S/P MANDIBULAR RECONSTRUCTION AND TRACHEOSTOMY  I TALKED TO DR BROWN REGARDING PT.'S AIR WAY)  Induction: intravenous. MIPS: Postoperative opioids intended and Prophylactic antiemetics administered. Anesthetic plan and risks discussed with patient and spouse. Plan discussed with CRNA.                     Neela Valentin MD   11/14/2022

## 2022-11-15 ENCOUNTER — PROCEDURE VISIT (OUTPATIENT)
Dept: CARDIOLOGY CLINIC | Age: 57
End: 2022-11-15
Payer: MEDICAID

## 2022-11-15 ENCOUNTER — TELEPHONE (OUTPATIENT)
Dept: SURGERY | Age: 57
End: 2022-11-15

## 2022-11-15 DIAGNOSIS — R55 SYNCOPE AND COLLAPSE: Primary | ICD-10-CM

## 2022-11-15 PROCEDURE — 93298 REM INTERROG DEV EVAL SCRMS: CPT | Performed by: NUCLEAR MEDICINE

## 2022-11-15 PROCEDURE — G2066 INTER DEVC REMOTE 30D: HCPCS | Performed by: NUCLEAR MEDICINE

## 2022-11-15 NOTE — TELEPHONE ENCOUNTER
Spoke with patients wife states Bi Landis is still sleeping. He was able to sleep throughout the night without any difficulty. No fever that she is aware of. He was able to eat last evening and keep it down. She is asked to call back if any addition questions or concerns.

## 2022-11-15 NOTE — OP NOTE
[address] [place] [address]                                OPERATIVE REPORT    PATIENT NAME: Erma Cheung                       :        1965  MED REC NO:   [id number]                           ROOM:  ACCOUNT NO:   [de-identified]                           ADMIT DATE: 2022  PROVIDER:     Alycia Martinez. Rosy Cazares MD    DATE OF PROCEDURE:  2022    PREOPERATIVE DIAGNOSES:  1.  Umbilical hernia. 2.  Probable gastrocutaneous fistula. POSTOPERATIVE DIAGNOSES:  1.  Small umbilical hernia. 2.  Peritoneal sebaceous cyst.    OPERATIONS PERFORMED:  1. Repair of umbilical hernia with small Ventralex mesh. 2.  Exploration of PEG site wound with excision of apparent peritoneal  sebaceous cyst.    SURGEON:  Alycia Martinez. MD Cheri    ANESTHESIA:  General.    COMPLICATIONS:  None. ESTIMATED BLOOD LOSS:  10 mL. INDICATIONS FOR PROCEDURE  The patient is a 82-WQFU-PVX male with  umbilical hernia, but also has had persistent drainage from a PEG tube  site. It was felt like he had a probable gastrocutaneous fistula. FINDINGS:  The patient did have a small umbilical hernia. There was no  evidence of gastrocutaneous fistula. However, at the base of the wound  and deep to the muscle was a small mass that was removed and appeared to  be a sebaceous cyst of the peritoneum likely the cause of the drainage. OPERATIVE PROCEDURE:  The patient was brought to the operating suite,  placed supine on the operating room table. After adequate inhalational  anesthesia was administered, the patient's abdomen was prepped and  draped in the usual sterile fashion. An incision was made below the  umbilicus. We took the incision down to the hernia sac. We dissected  the hernia sac off the tissue.   We made sure there were no adhesions of  bowel up to this area, then a small Ventralex mesh was inserted into the  hernia defect and as an underlying suture to the fascia with interrupted  0 Novafil suture. Interrupted 3-0 Vicryl was used to close the  subcutaneous and running 4-0 Vicryl was used to close the skin. At this  point in time, an elliptical incision was made around the opening in the  skin from the previous PEG tube site, taken down through the  subcutaneous tissue and we followed it down to the muscle fascia. We  then dissected in from the periphery towards what was felt to be a  fistula but as we divided the tissue and grabbed the posterior fascia, I  initially thought this was stomach. However, on further investigation,  it was clear that this was just posterior fascia. We incised this and  as we got deep to the fascia and basically the peritoneum, there was a  small round mass, it was sharply excised and proved to be probable  sebaceous cyst.  Otherwise, the stomach was not attached up to the  peritoneum and no other abnormalities there and it was felt this was  likely the cause of the drainage. #1 Vicryl was used to close the  fascia, interrupted 3-0 Vicryl was used to close the subcutaneous, and  running 4-0 Vicryl was used to close the skin. Steri-Strips were  applied. KONG ABARCA [facility], MD    D: 11/14/2022 13:30:15       T: 11/14/2022 13:37:15     [id number]  Job#: [id number]     Doc#: [id number]    CC:  <>

## 2022-11-24 ENCOUNTER — HOSPITAL ENCOUNTER (EMERGENCY)
Age: 57
Discharge: HOME | End: 2022-11-24
Attending: STUDENT IN AN ORGANIZED HEALTH CARE EDUCATION/TRAINING PROGRAM
Payer: MEDICAID

## 2022-11-24 VITALS
SYSTOLIC BLOOD PRESSURE: 105 MMHG | BODY MASS INDEX: 23.06 KG/M2 | DIASTOLIC BLOOD PRESSURE: 78 MMHG | HEART RATE: 59 BPM | RESPIRATION RATE: 16 BRPM | TEMPERATURE: 97.6 F | OXYGEN SATURATION: 98 % | WEIGHT: 170 LBS

## 2022-11-24 DIAGNOSIS — T81.31XA DEHISCENCE OF OPERATIVE WOUND, INITIAL ENCOUNTER: Primary | ICD-10-CM

## 2022-11-24 PROCEDURE — 6370000000 HC RX 637 (ALT 250 FOR IP): Performed by: STUDENT IN AN ORGANIZED HEALTH CARE EDUCATION/TRAINING PROGRAM

## 2022-11-24 PROCEDURE — 99283 EMERGENCY DEPT VISIT LOW MDM: CPT

## 2022-11-24 RX ORDER — GINSENG 100 MG
CAPSULE ORAL 2 TIMES DAILY
Status: DISCONTINUED | OUTPATIENT
Start: 2022-11-24 | End: 2022-11-24 | Stop reason: HOSPADM

## 2022-11-24 RX ADMIN — BACITRACIN: 500 OINTMENT TOPICAL at 14:07

## 2022-11-24 ASSESSMENT — PAIN - FUNCTIONAL ASSESSMENT: PAIN_FUNCTIONAL_ASSESSMENT: NONE - DENIES PAIN

## 2022-11-24 NOTE — DISCHARGE INSTRUCTIONS
You were seen for wound dehiscence. At this time you are stable for discharge. We have discussed this with the surgeon on-call. He feels that you are stable to go home, please continue to dress the wound at all times, apply the antibiotic ointment to the wound, cover with gauze, tape the gauze down. Please return to the ED if any worsening of condition, follow-up as soon as possible with Dr. Yessy Campo office.

## 2022-11-24 NOTE — ED PROVIDER NOTES
[facility] ENCOUNTER          Pt Name: Mike Martinez  MRN: [id number]  [place] 1965  Date of evaluation: 11/24/2022  Treating Resident Physician: Awilda Crawford MD  Supervising Physician: Katya Query COMPLAINT       Chief Complaint   Patient presents with    Wound Dehiscence     History obtained from the patient. HISTORY OF PRESENT ILLNESS    HPI  Mike Martinez is a 64 y.o. male who presents to the emergency department for evaluation of abdominal wound. 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 Surgery showed a sebaceous cyst of the peritoneum, which was excised, patient also had repair of umbilical hernia. Patient coming in for dehiscence of the peritoneal cyst excision site. Patient states that he has had some small amount of serous fluid from the wound, as it started opening today, denies any abdominal pain, nausea, vomiting, change in bowel or bladder habits, fever, chills. The patient has no other acute complaints at this time. REVIEW OF SYSTEMS   Review of Systems   Constitutional:  Negative for fatigue, fever and unexpected weight change. HENT: Negative. Respiratory: Negative. Cardiovascular: Negative. Gastrointestinal:  Negative for abdominal distention, abdominal pain, anal bleeding, constipation, diarrhea, nausea and vomiting. Genitourinary: Negative. Musculoskeletal:  Negative for arthralgias, back pain, myalgias, neck pain and neck stiffness. Skin: Negative. Neurological:  Negative for dizziness, syncope, weakness, light-headedness, numbness and headaches. Hematological: Negative. Psychiatric/Behavioral: Negative.           PAST MEDICAL AND SURGICAL HISTORY     Past Medical History:   Diagnosis Date    A-fib ([address]     Baki-no blood thinners    Aortic aneurysm (HCC)     Asthma     Head and neck cancer [facility])     Hypertension     Lymphedema     Syncope 12/22/2020    Syncope and collapse      Past Surgical History:   Procedure Laterality Date    GASTROSTOMY TUBE PLACEMENT  10/02/2015    LARYNGOSCOPY N/A 06/03/2022    Rigid Laryngoscopy Esophagoscopy Diagnostic performed by Lucas Taylor MD at [address] [facility] [facility]  10/02/2015    OTHER SURGICAL HISTORY  09/06/2022    linq recorder    OTHER SURGICAL HISTORY  06/20/2016    peg tube removal    SKIN GRAFT  10/02/2015    jaw    TRACHEOSTOMY      UMBILICAL HERNIA REPAIR N/A 11/14/2022    EXCISION OF WOUND AND REMOVAL OF PERITONEAL CYST, Umbilical Hernia Repair with Mesh performed by Leandro Martinez MD at [address] 05/11/2021    EGD ESOPHAGOGASTRODUODENOSCOPY performed by Yvonne De Anda MD at [address] [place] [address]   No current facility-administered medications for this encounter.     Current Outpatient Medications:     levothyroxine (SYNTHROID) 125 MCG tablet, Take one tablet first thing in the morning on an empty stomach., Disp: , Rfl:     mirtazapine (REMERON) 15 MG tablet, , Disp: , Rfl:     OLANZapine (ZYPREXA) 10 MG tablet, , Disp: , Rfl:     OXcarbazepine (TRILEPTAL) 150 MG tablet, , Disp: , Rfl:     oxyCODONE-acetaminophen (PERCOCET) 5-325 MG per tablet, , Disp: , Rfl:     metoprolol (LOPRESSOR) 100 MG tablet, Take 50 mg by mouth 2 times daily Per patient PCP changed dose   Take am of sx, Disp: , Rfl:     Sertraline HCl (ZOLOFT PO), Take by mouth, Disp: , Rfl:     pantoprazole (PROTONIX) 40 MG tablet, Take 40 mg by mouth daily Take am of sx, Disp: , Rfl: 6    esomeprazole Magnesium (NEXIUM) 20 MG PACK, Take 20 mg by mouth daily, Disp: , Rfl:     Mouthwashes (BIOTENE DRY MOUTH) LIQD, daily , Disp: , Rfl:     lisinopril (PRINIVIL;ZESTRIL) 20 MG tablet, Take 5 mg by mouth daily Take am of sx, Disp: , Rfl:       SOCIAL HISTORY     Social History     Social History Narrative    Not on file Social History     Tobacco Use    Smoking status: Some Days     Packs/day: 0.25     Years: 34.00     Pack years: 8.50     Types: Cigarettes    Smokeless tobacco: Never   Vaping Use    Vaping Use: Never used   Substance Use Topics    Alcohol use: Yes     Alcohol/week: 7.0 standard drinks     Types: 7 Cans of beer per week     Comment: occasionally    Drug use: No         ALLERGIES   No Known Allergies      FAMILY HISTORY     Family History   Problem Relation Age of Onset    Asthma Mother          PREVIOUS RECORDS   Previous records reviewed: Medical, past surgical, medications, allergies        PHYSICAL EXAM     ED Triage Vitals [11/24/22 1253]   BP Temp Temp src Heart Rate Resp SpO2 Height Weight   105/78 97.6 °F (36.4 °C) -- 59 16 98 % -- 170 lb (77.1 kg)     Initial vital signs and nursing assessment reviewed and normal. Body mass index is 23.06 kg/m². Pulsoximetry is normal per my interpretation. Additional Vital Signs:  Vitals:    11/24/22 1253   BP: 105/78   Pulse: 59   Resp: 16   Temp: 97.6 °F (36.4 °C)   SpO2: 98%       Physical Exam  Constitutional:       Appearance: Normal appearance. He is not ill-appearing. HENT:      Head: Normocephalic and atraumatic. Right Ear: External ear normal.      Left Ear: External ear normal.      Nose: Nose normal.      Mouth/Throat:      Mouth: Mucous membranes are moist.      Pharynx: Oropharynx is clear. Eyes:      Extraocular Movements: Extraocular movements intact. Conjunctiva/sclera: Conjunctivae normal.      Pupils: Pupils are equal, round, and reactive to light. Cardiovascular:      Rate and Rhythm: Normal rate and regular rhythm. Pulses: Normal pulses. Heart sounds: Normal heart sounds. Pulmonary:      Effort: Pulmonary effort is normal.      Breath sounds: Normal breath sounds. Abdominal:      General: Abdomen is flat. Bowel sounds are normal. There is no distension. Palpations: Abdomen is soft. Tenderness:  There is no abdominal tenderness. There is no right CVA tenderness, left CVA tenderness, guarding or rebound. Comments: 4 cm incision superior to the umbilicus, superficial layer dehisced, subcuticular stitches visualized, are unraveling. Musculoskeletal:      Right lower leg: No edema. Left lower leg: No edema. Skin:     General: Skin is warm and dry. Capillary Refill: Capillary refill takes less than 2 seconds. Neurological:      General: No focal deficit present. Mental Status: He is alert and oriented to person, place, and time. Sensory: No sensory deficit. Motor: No weakness. MEDICAL DECISION MAKING   Initial Assessment: This is a 80-year-old male, history of paroxysmal A. fib, 10 days status post peritoneal cyst excision, presenting for wound dehiscence. Physical exam significant for 4 cm incision superior to the umbilicus, superficial layer dehisced, subcuticular stitches visualized, are unraveling. Plan:   Discussed with Dilan Griffin, who understand the depth of dehiscence and recommending abx ointment, bandage changes and followup outpatient  Discharge to home with strict return precautions and followup. ED RESULTS   Laboratory results:  Labs Reviewed - No data to display    Radiologic studies results:  No orders to display       ED Medications administered this visit: Medications - No data to display      ED COURSE         Strict return precautions and follow up instructions were discussed with the patient prior to discharge, with which the patient agrees. MEDICATION CHANGES     Discharge Medication List as of 11/24/2022  2:01 PM            FINAL DISPOSITION     Final diagnoses:   Dehiscence of operative wound, initial encounter     Condition: condition: good  Dispo: Discharge to home      This transcription was electronically signed.  Parts of this transcriptions may have been dictated by use of voice recognition software and electronically transcribed, and parts may have been transcribed with the assistance of an ED scribe. The transcription may contain errors not detected in proofreading. Please refer to my supervising physician's documentation if my documentation differs.     Electronically Signed: Emily Leyva MD, 11/25/22, 1:55 PM          Emily Leyva MD  Resident  11/25/22 4490

## 2022-11-24 NOTE — ED TRIAGE NOTES
Pt to ED due to a wound dehiscence. Pt states that he had a feeding tube removed in 2018, it developed a cyst that he had surgically drained on 11/14/22. Pt states that he sneezed this AM and the site \"burst open. \" Pt states that the site was leaking blood and puss. The site is open at this time, no drainage or blood noted. Pt denies any pain.  Vss.

## 2022-11-25 ASSESSMENT — ENCOUNTER SYMPTOMS
RESPIRATORY NEGATIVE: 1
ABDOMINAL PAIN: 0
DIARRHEA: 0
NAUSEA: 0
BACK PAIN: 0
VOMITING: 0
ABDOMINAL DISTENTION: 0
ANAL BLEEDING: 0
CONSTIPATION: 0

## 2022-11-29 ENCOUNTER — OFFICE VISIT (OUTPATIENT)
Dept: SURGERY | Age: 57
End: 2022-11-29

## 2022-11-29 VITALS
HEART RATE: 76 BPM | HEIGHT: 72 IN | TEMPERATURE: 97.6 F | BODY MASS INDEX: 23.3 KG/M2 | SYSTOLIC BLOOD PRESSURE: 105 MMHG | OXYGEN SATURATION: 97 % | WEIGHT: 172 LBS | DIASTOLIC BLOOD PRESSURE: 68 MMHG

## 2022-11-29 DIAGNOSIS — Z09 POSTOPERATIVE EXAMINATION: Primary | ICD-10-CM

## 2022-11-29 PROCEDURE — 99024 POSTOP FOLLOW-UP VISIT: CPT | Performed by: NURSE PRACTITIONER

## 2022-11-29 RX ORDER — SULFAMETHOXAZOLE AND TRIMETHOPRIM 800; 160 MG/1; MG/1
1 TABLET ORAL 2 TIMES DAILY
Qty: 14 TABLET | Refills: 0 | Status: SHIPPED | OUTPATIENT
Start: 2022-11-29 | End: 2022-12-06

## 2022-11-29 ASSESSMENT — ENCOUNTER SYMPTOMS
SHORTNESS OF BREATH: 0
ABDOMINAL PAIN: 0
VOMITING: 0
NAUSEA: 0

## 2022-11-30 NOTE — PROGRESS NOTES
[address] [facility]  [address] [facility] [address]  Dept: [phone]  Dept Fax: [phone]  Loc: [phone]    Visit Date: 11/29/2022       Yuni Chapa is a 64 y.o. male who presents today for:  Chief Complaint   Patient presents with    Post-Op Check     S/P 1. Repair of umbilical hernia with small Ventralex mesh. 2.  Exploration of PEG site wound with excision of apparent peritoneal sebaceous cyst 11/14/22       HPI:     Citlali Callahan presents today for a post op check. Today He complains of open wound on abdomen. He was seen in ER. The wound dehisced, does not appear to be infected, tissue does have small amount of fat necrosis. Bactrim x 7 days to promote healing. He denies fevers. The hernia repair looks good. He is having bowel movements. Surgical pathology reviewed and discussed. Discussed smoking cessation, smoking inhibits wound healing. Continue lifting restrictions. Follow up in office in 10 days. Call with any concerns.      FINAL DIAGNOSIS:   Soft tissue, peritoneum, biopsy:     Ruptured epidermal inclusion cyst.        Past Medical History:   Diagnosis Date    A-fib ([facility]     Baki-no blood thinners    Aortic aneurysm (HCC)     Asthma     Head and neck cancer ([facility]     Hypertension     Lymphedema     Syncope 12/22/2020    Syncope and collapse       Past Surgical History:   Procedure Laterality Date    GASTROSTOMY TUBE PLACEMENT  10/02/2015    LARYNGOSCOPY N/A 06/03/2022    Rigid Laryngoscopy Esophagoscopy Diagnostic performed by Vielka Pimentel MD at [facility] [facility]  10/02/2015    OTHER SURGICAL HISTORY  09/06/2022    linq recorder    OTHER SURGICAL HISTORY  06/20/2016    peg tube removal    SKIN GRAFT  10/02/2015    jaw    TRACHEOSTOMY      UMBILICAL HERNIA REPAIR N/A 11/14/2022    EXCISION OF WOUND AND REMOVAL OF PERITONEAL CYST, Umbilical Hernia Repair with Mesh performed by Seward Saint, MD at [facility] OR    UPPER GASTROINTESTINAL ENDOSCOPY N/A 05/11/2021    EGD ESOPHAGOGASTRODUODENOSCOPY performed by Amos Maxwell MD at [address] [facility]     Family History   Problem Relation Age of Onset    Asthma Mother      Social History     Tobacco Use    Smoking status: Some Days     Packs/day: 0.50     Years: 34.00     Pack years: 17.00     Types: Cigarettes    Smokeless tobacco: Never   Substance Use Topics    Alcohol use: Yes     Alcohol/week: 7.0 standard drinks     Types: 7 Cans of beer per week     Comment: occasionally        Current Outpatient Medications   Medication Sig Dispense Refill    sulfamethoxazole-trimethoprim (BACTRIM DS) 800-160 MG per tablet Take 1 tablet by mouth 2 times daily for 7 days 14 tablet 0    levothyroxine (SYNTHROID) 125 MCG tablet Take one tablet first thing in the morning on an empty stomach.      mirtazapine (REMERON) 15 MG tablet       OLANZapine (ZYPREXA) 10 MG tablet       OXcarbazepine (TRILEPTAL) 150 MG tablet       oxyCODONE-acetaminophen (PERCOCET) 5-325 MG per tablet       metoprolol (LOPRESSOR) 100 MG tablet Take 50 mg by mouth 2 times daily Per patient PCP changed dose     Take am of sx      Sertraline HCl (ZOLOFT PO) Take by mouth      pantoprazole (PROTONIX) 40 MG tablet Take 40 mg by mouth daily Take am of sx  6    esomeprazole Magnesium (NEXIUM) 20 MG PACK Take 20 mg by mouth daily      Mouthwashes (BIOTENE DRY MOUTH) LIQD daily       lisinopril (PRINIVIL;ZESTRIL) 20 MG tablet Take 5 mg by mouth daily Take am of sx       No current facility-administered medications for this visit. No Known Allergies    Subjective:      Review of Systems   Constitutional:  Negative for chills and fever. Respiratory:  Negative for shortness of breath. Cardiovascular:  Negative for chest pain. Gastrointestinal:  Negative for abdominal pain, nausea and vomiting. Skin:  Positive for wound (open abdominal wound).      Objective:     /68 (Site: Left Upper Arm, Position: Sitting, Cuff Size: Medium Adult)   Pulse 76   Temp 97.6 °F (36.4 °C)   Ht 6' (1.829 m)   Wt 172 lb (78 kg)   SpO2 97%   BMI 23.33 kg/m²     Wt Readings from Last 3 Encounters:   11/29/22 172 lb (78 kg)   11/24/22 170 lb (77.1 kg)   11/14/22 175 lb 6.4 oz (79.6 kg)       Physical Exam  Vitals reviewed. Constitutional:       Appearance: He is well-developed. HENT:      Head: Normocephalic. Eyes:      Pupils: Pupils are equal, round, and reactive to light. Cardiovascular:      Rate and Rhythm: Normal rate. Pulmonary:      Effort: Pulmonary effort is normal.   Abdominal:      Palpations: Abdomen is soft. Musculoskeletal:         General: Normal range of motion. Cervical back: Normal range of motion. Skin:     General: Skin is warm and dry. Neurological:      Mental Status: He is alert and oriented to person, place, and time. Assessment/Plan:     There are no diagnoses linked to this encounter. No follow-ups on file. Patient Instructions   Weight restrictions  x 6 weeks, 10-12lbs or less     Discusseduse, benefit, and side effects of prescribed medications. All patient questionsanswered. Pt voiced understanding.      Electronically signed by MADELIN Jamil CNP on 11/29/2022 at 9:39 PM

## 2022-12-02 ENCOUNTER — HOSPITAL ENCOUNTER (OUTPATIENT)
Age: 57
Setting detail: SPECIMEN
Discharge: HOME | End: 2022-12-02

## 2022-12-02 LAB — TSH SERPL DL<=0.05 MIU/L-ACNC: 1.47 UIU/ML (ref 0.3–5)

## 2022-12-08 ENCOUNTER — OFFICE VISIT (OUTPATIENT)
Dept: SURGERY | Age: 57
End: 2022-12-08

## 2022-12-08 VITALS
HEIGHT: 72 IN | SYSTOLIC BLOOD PRESSURE: 110 MMHG | OXYGEN SATURATION: 98 % | HEART RATE: 72 BPM | TEMPERATURE: 97.6 F | RESPIRATION RATE: 18 BRPM | BODY MASS INDEX: 22.24 KG/M2 | WEIGHT: 164.2 LBS | DIASTOLIC BLOOD PRESSURE: 62 MMHG

## 2022-12-08 DIAGNOSIS — Z09 POSTOPERATIVE EXAMINATION: Primary | ICD-10-CM

## 2022-12-08 PROCEDURE — 99024 POSTOP FOLLOW-UP VISIT: CPT | Performed by: NURSE PRACTITIONER

## 2022-12-09 ASSESSMENT — ENCOUNTER SYMPTOMS
ABDOMINAL PAIN: 0
NAUSEA: 0
SHORTNESS OF BREATH: 0
VOMITING: 0

## 2022-12-09 NOTE — PROGRESS NOTES
[address] [facility]  [address] [facility] [address]  Dept: [phone]  Dept Fax: [phone]  Loc: [phone]    Visit Date: 12/8/2022       Nichol Eduardo is a 64 y.o. male who presents today for:  Chief Complaint   Patient presents with    Post-Op Check     S/P 1. Repair of umbilical hernia with small Ventralex mesh. 2.  Exploration of PEG site wound with excision of apparent peritoneal sebaceous cyst 11/14/22. Last office visit 11/29/22. Started on Bactrim       HPI:     Huong Pond presents today for a wound  post op wound  check. Today He complains of abdominal wound dehiscence. There is drainage noted, there is no erythema. He finished a 7 day supply of Bactrim. The wound is clean. Has minimal pain. Doing well overall. Its starting to close at the depth. He is a smoker. Continue with weight restrictions. Follow up as discussed in 10 days. Call with any needs or concerns.        Past Medical History:   Diagnosis Date    A-fib [facility])     Baki-no blood thinners    Aortic aneurysm (HCC)     Asthma     Head and neck cancer ([facility]     Hypertension     Lymphedema     Syncope 12/22/2020    Syncope and collapse       Past Surgical History:   Procedure Laterality Date    GASTROSTOMY TUBE PLACEMENT  10/02/2015    LARYNGOSCOPY N/A 06/03/2022    Rigid Laryngoscopy Esophagoscopy Diagnostic performed by Jose Leone MD at [address] [facility],[address]  10/02/2015    OTHER SURGICAL HISTORY  09/06/2022    linq recorder    OTHER SURGICAL HISTORY  06/20/2016    peg tube removal    SKIN GRAFT  10/02/2015    jaw    TRACHEOSTOMY      UMBILICAL HERNIA REPAIR N/A 11/14/2022    EXCISION OF WOUND AND REMOVAL OF PERITONEAL CYST, Umbilical Hernia Repair with Mesh performed by Alexis Roman MD at [address] [facility] 05/11/2021    EGD ESOPHAGOGASTRODUODENOSCOPY performed by Ligia Cross MD at [facility]     [facility] History   Problem Relation Age of Onset    Asthma Mother      Social History     Tobacco Use    Smoking status: Some Days     Packs/day: 0.50     Years: 34.00     Pack years: 17.00     Types: Cigarettes    Smokeless tobacco: Never   Substance Use Topics    Alcohol use: Yes     Alcohol/week: 7.0 standard drinks     Types: 7 Cans of beer per week     Comment: occasionally        Current Outpatient Medications   Medication Sig Dispense Refill    levothyroxine (SYNTHROID) 125 MCG tablet Take one tablet first thing in the morning on an empty stomach.      mirtazapine (REMERON) 15 MG tablet       OLANZapine (ZYPREXA) 10 MG tablet       OXcarbazepine (TRILEPTAL) 150 MG tablet       oxyCODONE-acetaminophen (PERCOCET) 5-325 MG per tablet       metoprolol (LOPRESSOR) 100 MG tablet Take 50 mg by mouth 2 times daily Per patient PCP changed dose     Take am of sx      Sertraline HCl (ZOLOFT PO) Take by mouth      pantoprazole (PROTONIX) 40 MG tablet Take 40 mg by mouth daily Take am of sx  6    esomeprazole Magnesium (NEXIUM) 20 MG PACK Take 20 mg by mouth daily      Mouthwashes (BIOTENE DRY MOUTH) LIQD daily       lisinopril (PRINIVIL;ZESTRIL) 20 MG tablet Take 5 mg by mouth daily Take am of sx       No current facility-administered medications for this visit. No Known Allergies    Subjective:      Review of Systems   Constitutional:  Negative for chills and fever. Respiratory:  Negative for shortness of breath. Cardiovascular:  Negative for chest pain. Gastrointestinal:  Negative for abdominal pain, nausea and vomiting. Skin:  Positive for wound (open abdominal wound).      Objective:     /62 (Site: Right Upper Arm, Position: Sitting, Cuff Size: Medium Adult)   Pulse 72   Temp 97.6 °F (36.4 °C) (Temporal)   Resp 18   Ht 6' (1.829 m)   Wt 164 lb 3.2 oz (74.5 kg)   SpO2 98%   BMI 22.27 kg/m²     Wt Readings from Last 3 Encounters:   12/08/22 164 lb 3.2 oz (74.5 kg)   11/29/22 172 lb (78 kg)   11/24/22

## 2022-12-20 ENCOUNTER — OFFICE VISIT (OUTPATIENT)
Dept: SURGERY | Age: 57
End: 2022-12-20

## 2022-12-20 ENCOUNTER — TELEPHONE (OUTPATIENT)
Dept: CARDIOLOGY CLINIC | Age: 57
End: 2022-12-20

## 2022-12-20 VITALS
BODY MASS INDEX: 22.21 KG/M2 | WEIGHT: 164 LBS | TEMPERATURE: 96.4 F | SYSTOLIC BLOOD PRESSURE: 120 MMHG | HEIGHT: 72 IN | HEART RATE: 64 BPM | DIASTOLIC BLOOD PRESSURE: 60 MMHG | RESPIRATION RATE: 18 BRPM | OXYGEN SATURATION: 94 %

## 2022-12-20 DIAGNOSIS — Z09 POSTOPERATIVE EXAMINATION: Primary | ICD-10-CM

## 2022-12-20 PROCEDURE — 99024 POSTOP FOLLOW-UP VISIT: CPT | Performed by: NURSE PRACTITIONER

## 2022-12-20 ASSESSMENT — ENCOUNTER SYMPTOMS
VOMITING: 0
SHORTNESS OF BREATH: 0
ABDOMINAL PAIN: 0
NAUSEA: 0

## 2022-12-20 NOTE — PROGRESS NOTES
[address] [facility]  [address] [facility] [address]  Dept: [phone]  Dept Fax: [phone]  Loc: [phone]    Visit Date: 12/20/2022       Starr Maher is a 62 y.o. male who presents today for:  Chief Complaint   Patient presents with    Post-Op Check     s/p 1. Repair of umbilical hernia with small Ventralex mesh. 2.  Exploration of PEG site wound with excision of apparent peritoneal  sebaceous cyst.11/14/22. Last seen in the office on 12/8/22 -wound check       HPI:     Carola Jean presents today for a post op wound check. Today He has no complaints. The abdominal wall wound has closed nicely and is no longer draining. He is doing well over all. No pain medication needed. No new concerns or complaints. No follow up necessary. Please call with any needs.        Past Medical History:   Diagnosis Date    A-fib [facility])     Baki-no blood thinners    Aortic aneurysm (HCC)     Asthma     Head and neck cancer ([facility] [address]     Hypertension     Lymphedema     Syncope 12/22/2020    Syncope and collapse       Past Surgical History:   Procedure Laterality Date    GASTROSTOMY TUBE PLACEMENT  10/02/2015    LARYNGOSCOPY N/A 06/03/2022    Rigid Laryngoscopy Esophagoscopy Diagnostic performed by Glena Mcardle, MD at [address] [facility]  10/02/2015    OTHER SURGICAL HISTORY  09/06/2022    linq recorder    OTHER SURGICAL HISTORY  06/20/2016    peg tube removal    SKIN GRAFT  10/02/2015    jaw    TRACHEOSTOMY      UMBILICAL HERNIA REPAIR N/A 11/14/2022    EXCISION OF WOUND AND REMOVAL OF PERITONEAL CYST, Umbilical Hernia Repair with Mesh performed by Margie Guillen MD at [address] [facility] N/A 05/11/2021    EGD ESOPHAGOGASTRODUODENOSCOPY performed by Jacky Flores MD at [facility]     Family History   Problem Relation Age of Onset    Asthma Mother      Social History     Tobacco Use    Smoking status: Some Days     Packs/day: 0.50     Years: 34.00     Pack years: 17.00     Types: Cigarettes    Smokeless tobacco: Never   Substance Use Topics    Alcohol use: Yes     Alcohol/week: 7.0 standard drinks     Types: 7 Cans of beer per week     Comment: occasionally        Current Outpatient Medications   Medication Sig Dispense Refill    levothyroxine (SYNTHROID) 125 MCG tablet Take one tablet first thing in the morning on an empty stomach.      mirtazapine (REMERON) 15 MG tablet       OLANZapine (ZYPREXA) 10 MG tablet       OXcarbazepine (TRILEPTAL) 150 MG tablet       oxyCODONE-acetaminophen (PERCOCET) 5-325 MG per tablet       metoprolol (LOPRESSOR) 100 MG tablet Take 50 mg by mouth 2 times daily Per patient PCP changed dose     Take am of sx      Sertraline HCl (ZOLOFT PO) Take by mouth      pantoprazole (PROTONIX) 40 MG tablet Take 40 mg by mouth daily Take am of sx  6    esomeprazole Magnesium (NEXIUM) 20 MG PACK Take 20 mg by mouth daily      Mouthwashes (BIOTENE DRY MOUTH) LIQD daily       lisinopril (PRINIVIL;ZESTRIL) 20 MG tablet Take 5 mg by mouth daily Take am of sx       No current facility-administered medications for this visit. No Known Allergies    Subjective:      Review of Systems   Constitutional:  Negative for chills and fever. Respiratory:  Negative for shortness of breath. Cardiovascular:  Negative for chest pain. Gastrointestinal:  Negative for abdominal pain, nausea and vomiting. Skin:  Positive for wound (abdominal wound). Objective:     /60 (Site: Right Upper Arm, Position: Sitting, Cuff Size: Medium Adult)   Pulse 64   Temp (!) 96.4 °F (35.8 °C) (Temporal)   Resp 18   Ht 6' (1.829 m)   Wt 164 lb (74.4 kg)   SpO2 94%   BMI 22.24 kg/m²     Wt Readings from Last 3 Encounters:   12/20/22 164 lb (74.4 kg)   12/08/22 164 lb 3.2 oz (74.5 kg)   11/29/22 172 lb (78 kg)       Physical Exam  Vitals reviewed. Constitutional:       Appearance: He is well-developed.    HENT: Head: Normocephalic. Eyes:      Pupils: Pupils are equal, round, and reactive to light. Cardiovascular:      Rate and Rhythm: Normal rate. Pulmonary:      Effort: Pulmonary effort is normal.   Abdominal:      Palpations: Abdomen is soft. Musculoskeletal:         General: Normal range of motion. Cervical back: Normal range of motion. Skin:     General: Skin is warm and dry. Neurological:      Mental Status: He is alert and oriented to person, place, and time. Assessment/Plan:     Phillip Donald was seen today for post-op check. Diagnoses and all orders for this visit:    Postoperative examination      Return if symptoms worsen or fail to improve. There are no Patient Instructions on file for this visit. Discusseduse, benefit, and side effects of prescribed medications. All patient questionsanswered. Pt voiced understanding.      Electronically signed by MADELIN Yanes CNP on 12/20/2022 at 5:37 PM

## 2022-12-20 NOTE — TELEPHONE ENCOUNTER
Bedside monitor for loop disconnected since 11/29/22. Summary report due 12/20/22. Call to patient. Wife answered. Asked to send download.

## 2022-12-28 RX ORDER — METOPROLOL TARTRATE 50 MG/1
TABLET, FILM COATED ORAL
Qty: 180 TABLET | Refills: 3 | Status: SHIPPED | OUTPATIENT
Start: 2022-12-28

## 2023-01-03 RX ORDER — TRAZODONE HYDROCHLORIDE 50 MG/1
50 TABLET ORAL DAILY
COMMUNITY
Start: 2022-12-16

## 2023-01-03 RX ORDER — LOSARTAN POTASSIUM 50 MG/1
50 TABLET ORAL DAILY
COMMUNITY
Start: 2022-12-16

## 2023-01-03 NOTE — PROGRESS NOTES
Follow all instructions given by your physician  NPO after midnight   Sips of water am of surgery with allowed medications  Bring insurance info and 's license  Wear comfortable clean, loose fitting clothing  No jewelry or contact lenses to be worn day of surgery  No glue on dentures morning of surgery;you will be asked to remove them for surgery. Case for glasses. Shower night before and morning of surgery with a liquid antibacterial soap, dry with fresh clean towel; no lotions, creams or powder. Clean sheets and pillow case on bed night before surgery  Bring medications in original bottles  Bring CPAP/BIPAP machine if you have one ( you may be charged if one is needed in recovery room )    Please refer to the SSI-Surgical Site Infection Flyer you hopefully received in the mail-together we can prevent infections; signs and symptoms reviewed. When discharged be sure you understand how to care for your wound and that you have the phone # to call if you have any concerns or questions about your wound.  needed at discharge and someone over 18 to stay with you for 24 hours overnight (surgery may be cancelled if you don't have this)  Report to [facility] on 2nd floor  If you would become ill prior to surgery, please call the surgeon  May have a visitor with you, we request that you limit to 2 visitors in pre-op area  Masks are recommended but not required, new masks at entrance desk  Call [phone] for any questions    Covid questionnaire Complete; Patient negative for symptoms or exposure. See documentation.

## 2023-01-06 ENCOUNTER — PREP FOR PROCEDURE (OUTPATIENT)
Dept: ENT CLINIC | Age: 58
End: 2023-01-06

## 2023-01-09 ENCOUNTER — HOSPITAL ENCOUNTER (OUTPATIENT)
Age: 58
Discharge: HOME | End: 2023-01-09
Payer: MEDICAID

## 2023-01-09 DIAGNOSIS — Z01.818 PRE-OP TESTING: ICD-10-CM

## 2023-01-09 LAB
ALBUMIN SERPL-MCNC: 3.9 G/DL (ref 3.5–5.1)
ALP BLD-CCNC: 68 U/L (ref 38–126)
ALT SERPL-CCNC: 11 U/L (ref 11–66)
ANION GAP SERPL CALCULATED.3IONS-SCNC: 16 MEQ/L (ref 8–16)
AST SERPL-CCNC: 19 U/L (ref 5–40)
BASOPHILS # BLD: 0.8 %
BASOPHILS ABSOLUTE: 0.1 THOU/MM3 (ref 0–0.1)
BILIRUB SERPL-MCNC: 0.3 MG/DL (ref 0.3–1.2)
BUN BLDV-MCNC: 9 MG/DL (ref 7–22)
CALCIUM SERPL-MCNC: 9.4 MG/DL (ref 8.5–10.5)
CHLORIDE BLD-SCNC: 98 MEQ/L (ref 98–111)
CO2: 23 MEQ/L (ref 23–33)
CREAT SERPL-MCNC: 1.3 MG/DL (ref 0.4–1.2)
EOSINOPHIL # BLD: 1.6 %
EOSINOPHILS ABSOLUTE: 0.1 THOU/MM3 (ref 0–0.4)
ERYTHROCYTE [DISTWIDTH] IN BLOOD BY AUTOMATED COUNT: 14.6 % (ref 11.5–14.5)
ERYTHROCYTE [DISTWIDTH] IN BLOOD BY AUTOMATED COUNT: 46.5 FL (ref 35–45)
GFR SERPL CREATININE-BSD FRML MDRD: > 60 ML/MIN/1.73M2
GLUCOSE BLD-MCNC: 142 MG/DL (ref 70–108)
HCT VFR BLD CALC: 29.9 % (ref 42–52)
HEMOGLOBIN: 9.3 GM/DL (ref 14–18)
IMMATURE GRANS (ABS): 0.03 THOU/MM3 (ref 0–0.07)
IMMATURE GRANULOCYTES: 0.5 %
LYMPHOCYTES # BLD: 26.7 %
LYMPHOCYTES ABSOLUTE: 1.7 THOU/MM3 (ref 1–4.8)
MCH RBC QN AUTO: 27.2 PG (ref 26–33)
MCHC RBC AUTO-ENTMCNC: 31.1 GM/DL (ref 32.2–35.5)
MCV RBC AUTO: 87.4 FL (ref 80–94)
MONOCYTES # BLD: 9.9 %
MONOCYTES ABSOLUTE: 0.6 THOU/MM3 (ref 0.4–1.3)
NUCLEATED RED BLOOD CELLS: 1 /100 WBC
PLATELET # BLD: 400 THOU/MM3 (ref 130–400)
PMV BLD AUTO: 8.9 FL (ref 9.4–12.4)
POTASSIUM SERPL-SCNC: 4.1 MEQ/L (ref 3.5–5.2)
RBC # BLD: 3.42 MILL/MM3 (ref 4.7–6.1)
SEG NEUTROPHILS: 60.5 %
SEGMENTED NEUTROPHILS ABSOLUTE COUNT: 3.9 THOU/MM3 (ref 1.8–7.7)
SODIUM BLD-SCNC: 137 MEQ/L (ref 135–145)
TOTAL PROTEIN: 6.6 G/DL (ref 6.1–8)
WBC # BLD: 6.5 THOU/MM3 (ref 4.8–10.8)

## 2023-01-09 PROCEDURE — 36415 COLL VENOUS BLD VENIPUNCTURE: CPT

## 2023-01-09 PROCEDURE — 85025 COMPLETE CBC W/AUTO DIFF WBC: CPT

## 2023-01-09 PROCEDURE — 80053 COMPREHEN METABOLIC PANEL: CPT

## 2023-01-11 ENCOUNTER — HOSPITAL ENCOUNTER (OUTPATIENT)
Age: 58
Setting detail: OUTPATIENT SURGERY
Discharge: HOME | End: 2023-01-11
Attending: OTOLARYNGOLOGY | Admitting: OTOLARYNGOLOGY
Payer: MEDICAID

## 2023-01-11 ENCOUNTER — ANESTHESIA (OUTPATIENT)
Dept: OPERATING ROOM | Age: 58
End: 2023-01-11
Payer: MEDICAID

## 2023-01-11 ENCOUNTER — ANESTHESIA EVENT (OUTPATIENT)
Dept: OPERATING ROOM | Age: 58
End: 2023-01-11
Payer: MEDICAID

## 2023-01-11 VITALS
OXYGEN SATURATION: 98 % | BODY MASS INDEX: 24.14 KG/M2 | RESPIRATION RATE: 18 BRPM | HEIGHT: 69 IN | SYSTOLIC BLOOD PRESSURE: 111 MMHG | DIASTOLIC BLOOD PRESSURE: 75 MMHG | HEART RATE: 61 BPM | WEIGHT: 163 LBS | TEMPERATURE: 97 F

## 2023-01-11 DIAGNOSIS — R13.14 PHARYNGOESOPHAGEAL DYSPHAGIA: ICD-10-CM

## 2023-01-11 DIAGNOSIS — K22.2 ESOPHAGEAL STRICTURE: ICD-10-CM

## 2023-01-11 PROCEDURE — 88305 TISSUE EXAM BY PATHOLOGIST: CPT

## 2023-01-11 PROCEDURE — C1769 GUIDE WIRE: HCPCS | Performed by: OTOLARYNGOLOGY

## 2023-01-11 PROCEDURE — 2580000003 HC RX 258: Performed by: OTOLARYNGOLOGY

## 2023-01-11 PROCEDURE — C1726 CATH, BAL DIL, NON-VASCULAR: HCPCS | Performed by: OTOLARYNGOLOGY

## 2023-01-11 PROCEDURE — 3600000014 HC SURGERY LEVEL 4 ADDTL 15MIN: Performed by: OTOLARYNGOLOGY

## 2023-01-11 PROCEDURE — 6360000002 HC RX W HCPCS: Performed by: ANESTHESIOLOGY

## 2023-01-11 PROCEDURE — 3700000001 HC ADD 15 MINUTES (ANESTHESIA): Performed by: OTOLARYNGOLOGY

## 2023-01-11 PROCEDURE — 6370000000 HC RX 637 (ALT 250 FOR IP): Performed by: OTOLARYNGOLOGY

## 2023-01-11 PROCEDURE — 43249 ESOPH EGD DILATION <30 MM: CPT | Performed by: OTOLARYNGOLOGY

## 2023-01-11 PROCEDURE — 7100000001 HC PACU RECOVERY - ADDTL 15 MIN: Performed by: OTOLARYNGOLOGY

## 2023-01-11 PROCEDURE — 6360000002 HC RX W HCPCS

## 2023-01-11 PROCEDURE — 6360000002 HC RX W HCPCS: Performed by: OTOLARYNGOLOGY

## 2023-01-11 PROCEDURE — 7100000011 HC PHASE II RECOVERY - ADDTL 15 MIN: Performed by: OTOLARYNGOLOGY

## 2023-01-11 PROCEDURE — APPNB45 APP NON BILLABLE 31-45 MINUTES: Performed by: NURSE PRACTITIONER

## 2023-01-11 PROCEDURE — 7100000010 HC PHASE II RECOVERY - FIRST 15 MIN: Performed by: OTOLARYNGOLOGY

## 2023-01-11 PROCEDURE — 43239 EGD BIOPSY SINGLE/MULTIPLE: CPT | Performed by: OTOLARYNGOLOGY

## 2023-01-11 PROCEDURE — 3700000000 HC ANESTHESIA ATTENDED CARE: Performed by: OTOLARYNGOLOGY

## 2023-01-11 PROCEDURE — 3600000004 HC SURGERY LEVEL 4 BASE: Performed by: OTOLARYNGOLOGY

## 2023-01-11 PROCEDURE — 7100000000 HC PACU RECOVERY - FIRST 15 MIN: Performed by: OTOLARYNGOLOGY

## 2023-01-11 PROCEDURE — 2709999900 HC NON-CHARGEABLE SUPPLY: Performed by: OTOLARYNGOLOGY

## 2023-01-11 PROCEDURE — 2500000003 HC RX 250 WO HCPCS: Performed by: NURSE ANESTHETIST, CERTIFIED REGISTERED

## 2023-01-11 PROCEDURE — 6360000002 HC RX W HCPCS: Performed by: NURSE ANESTHETIST, CERTIFIED REGISTERED

## 2023-01-11 RX ORDER — FENTANYL CITRATE 50 UG/ML
50 INJECTION, SOLUTION INTRAMUSCULAR; INTRAVENOUS EVERY 5 MIN PRN
Status: COMPLETED | OUTPATIENT
Start: 2023-01-11 | End: 2023-01-11

## 2023-01-11 RX ORDER — MIDAZOLAM HYDROCHLORIDE 1 MG/ML
INJECTION INTRAMUSCULAR; INTRAVENOUS PRN
Status: DISCONTINUED | OUTPATIENT
Start: 2023-01-11 | End: 2023-01-11 | Stop reason: SDUPTHER

## 2023-01-11 RX ORDER — IPRATROPIUM BROMIDE AND ALBUTEROL SULFATE 2.5; .5 MG/3ML; MG/3ML
1 SOLUTION RESPIRATORY (INHALATION) EVERY 4 HOURS PRN
Status: CANCELLED | OUTPATIENT
Start: 2023-01-11

## 2023-01-11 RX ORDER — IPRATROPIUM BROMIDE AND ALBUTEROL SULFATE 2.5; .5 MG/3ML; MG/3ML
1 SOLUTION RESPIRATORY (INHALATION) EVERY 4 HOURS PRN
Status: DISCONTINUED | OUTPATIENT
Start: 2023-01-11 | End: 2023-01-11 | Stop reason: HOSPADM

## 2023-01-11 RX ORDER — SODIUM CHLORIDE 9 MG/ML
INJECTION, SOLUTION INTRAVENOUS PRN
Status: DISCONTINUED | OUTPATIENT
Start: 2023-01-11 | End: 2023-01-11 | Stop reason: HOSPADM

## 2023-01-11 RX ORDER — SODIUM CHLORIDE 0.9 % (FLUSH) 0.9 %
5-40 SYRINGE (ML) INJECTION PRN
Status: DISCONTINUED | OUTPATIENT
Start: 2023-01-11 | End: 2023-01-11 | Stop reason: HOSPADM

## 2023-01-11 RX ORDER — MORPHINE SULFATE 2 MG/ML
2 INJECTION, SOLUTION INTRAMUSCULAR; INTRAVENOUS EVERY 5 MIN PRN
Status: DISCONTINUED | OUTPATIENT
Start: 2023-01-11 | End: 2023-01-11 | Stop reason: HOSPADM

## 2023-01-11 RX ORDER — SODIUM CHLORIDE 9 MG/ML
INJECTION, SOLUTION INTRAVENOUS PRN
Status: CANCELLED | OUTPATIENT
Start: 2023-01-11

## 2023-01-11 RX ORDER — HYDRALAZINE HYDROCHLORIDE 20 MG/ML
10 INJECTION INTRAMUSCULAR; INTRAVENOUS
Status: DISCONTINUED | OUTPATIENT
Start: 2023-01-11 | End: 2023-01-11 | Stop reason: HOSPADM

## 2023-01-11 RX ORDER — SODIUM CHLORIDE 0.9 % (FLUSH) 0.9 %
5-40 SYRINGE (ML) INJECTION EVERY 12 HOURS SCHEDULED
Status: DISCONTINUED | OUTPATIENT
Start: 2023-01-11 | End: 2023-01-11 | Stop reason: HOSPADM

## 2023-01-11 RX ORDER — FENTANYL CITRATE 50 UG/ML
INJECTION, SOLUTION INTRAMUSCULAR; INTRAVENOUS PRN
Status: DISCONTINUED | OUTPATIENT
Start: 2023-01-11 | End: 2023-01-11 | Stop reason: SDUPTHER

## 2023-01-11 RX ORDER — ONDANSETRON 2 MG/ML
INJECTION INTRAMUSCULAR; INTRAVENOUS PRN
Status: DISCONTINUED | OUTPATIENT
Start: 2023-01-11 | End: 2023-01-11 | Stop reason: SDUPTHER

## 2023-01-11 RX ORDER — FENTANYL CITRATE 50 UG/ML
INJECTION, SOLUTION INTRAMUSCULAR; INTRAVENOUS
Status: COMPLETED
Start: 2023-01-11 | End: 2023-01-11

## 2023-01-11 RX ORDER — SODIUM CHLORIDE 0.9 % (FLUSH) 0.9 %
5-40 SYRINGE (ML) INJECTION PRN
Status: CANCELLED | OUTPATIENT
Start: 2023-01-11

## 2023-01-11 RX ORDER — SODIUM CHLORIDE 0.9 % (FLUSH) 0.9 %
5-40 SYRINGE (ML) INJECTION EVERY 12 HOURS SCHEDULED
Status: CANCELLED | OUTPATIENT
Start: 2023-01-11

## 2023-01-11 RX ORDER — SODIUM CHLORIDE 9 MG/ML
INJECTION, SOLUTION INTRAVENOUS CONTINUOUS
Status: DISCONTINUED | OUTPATIENT
Start: 2023-01-11 | End: 2023-01-11 | Stop reason: HOSPADM

## 2023-01-11 RX ORDER — ROCURONIUM BROMIDE 10 MG/ML
INJECTION, SOLUTION INTRAVENOUS PRN
Status: DISCONTINUED | OUTPATIENT
Start: 2023-01-11 | End: 2023-01-11 | Stop reason: SDUPTHER

## 2023-01-11 RX ORDER — HYDROCODONE BITARTRATE AND ACETAMINOPHEN 7.5; 325 MG/1; MG/1
1 TABLET ORAL ONCE
Status: COMPLETED | OUTPATIENT
Start: 2023-01-11 | End: 2023-01-11

## 2023-01-11 RX ORDER — PROPOFOL 10 MG/ML
INJECTION, EMULSION INTRAVENOUS PRN
Status: DISCONTINUED | OUTPATIENT
Start: 2023-01-11 | End: 2023-01-11 | Stop reason: SDUPTHER

## 2023-01-11 RX ORDER — DEXAMETHASONE SODIUM PHOSPHATE 10 MG/ML
10 INJECTION, EMULSION INTRAMUSCULAR; INTRAVENOUS
Status: COMPLETED | OUTPATIENT
Start: 2023-01-11 | End: 2023-01-11

## 2023-01-11 RX ADMIN — HYDROCODONE BITARTRATE AND ACETAMINOPHEN 1 TABLET: 7.5; 325 TABLET ORAL at 17:44

## 2023-01-11 RX ADMIN — PROPOFOL 50 MG: 10 INJECTION, EMULSION INTRAVENOUS at 15:11

## 2023-01-11 RX ADMIN — MIDAZOLAM 2 MG: 1 INJECTION INTRAMUSCULAR; INTRAVENOUS at 14:49

## 2023-01-11 RX ADMIN — FENTANYL CITRATE 100 MCG: 50 INJECTION, SOLUTION INTRAMUSCULAR; INTRAVENOUS at 14:49

## 2023-01-11 RX ADMIN — PROPOFOL 150 MG: 10 INJECTION, EMULSION INTRAVENOUS at 14:49

## 2023-01-11 RX ADMIN — ONDANSETRON 4 MG: 2 INJECTION INTRAMUSCULAR; INTRAVENOUS at 15:13

## 2023-01-11 RX ADMIN — SUGAMMADEX 200 MG: 100 INJECTION, SOLUTION INTRAVENOUS at 16:00

## 2023-01-11 RX ADMIN — SODIUM CHLORIDE 3000 MG: 900 INJECTION INTRAVENOUS at 15:06

## 2023-01-11 RX ADMIN — FENTANYL CITRATE 50 MCG: 50 INJECTION, SOLUTION INTRAMUSCULAR; INTRAVENOUS at 16:33

## 2023-01-11 RX ADMIN — SODIUM CHLORIDE: 9 INJECTION, SOLUTION INTRAVENOUS at 12:34

## 2023-01-11 RX ADMIN — Medication 100 MG: at 14:49

## 2023-01-11 RX ADMIN — DEXAMETHASONE SODIUM PHOSPHATE 10 MG: 10 INJECTION, EMULSION INTRAMUSCULAR; INTRAVENOUS at 12:38

## 2023-01-11 RX ADMIN — SODIUM CHLORIDE: 9 INJECTION, SOLUTION INTRAVENOUS at 16:00

## 2023-01-11 RX ADMIN — ROCURONIUM BROMIDE 50 MG: 100 INJECTION, SOLUTION INTRAVENOUS at 14:49

## 2023-01-11 RX ADMIN — FENTANYL CITRATE 50 MCG: 50 INJECTION, SOLUTION INTRAMUSCULAR; INTRAVENOUS at 16:40

## 2023-01-11 ASSESSMENT — PAIN SCALES - GENERAL
PAINLEVEL_OUTOF10: 3
PAINLEVEL_OUTOF10: 4
PAINLEVEL_OUTOF10: 6
PAINLEVEL_OUTOF10: 6
PAINLEVEL_OUTOF10: 4
PAINLEVEL_OUTOF10: 7
PAINLEVEL_OUTOF10: 7

## 2023-01-11 ASSESSMENT — ENCOUNTER SYMPTOMS: SHORTNESS OF BREATH: 0

## 2023-01-11 ASSESSMENT — PAIN DESCRIPTION - LOCATION
LOCATION: THROAT

## 2023-01-11 ASSESSMENT — PAIN DESCRIPTION - PAIN TYPE
TYPE: SURGICAL PAIN
TYPE: SURGICAL PAIN

## 2023-01-11 ASSESSMENT — PAIN DESCRIPTION - DESCRIPTORS
DESCRIPTORS: SHARP
DESCRIPTORS: ACHING
DESCRIPTORS: SORE

## 2023-01-11 ASSESSMENT — PAIN - FUNCTIONAL ASSESSMENT: PAIN_FUNCTIONAL_ASSESSMENT: 0-10

## 2023-01-11 ASSESSMENT — LIFESTYLE VARIABLES: SMOKING_STATUS: 1

## 2023-01-11 NOTE — ANESTHESIA POSTPROCEDURE EVALUATION
Department of Anesthesiology  Postprocedure Note    Patient: John Sage  MRN: [id number]  YOB: 1965  Date of evaluation: 1/11/2023      Procedure Summary     Date: 01/11/23 Room / Location: [address] [place]    Anesthesia Start: 1445 Anesthesia Stop: 3883    Procedure: SUSPENSION LARYNGOSCOPY (FOR ACCESS), FLEXIBLE ESOPHAGOSCOPY WITH SAVORY WIRE BOUGIE DILATION AND BIOPSIES Diagnosis:       Esophageal stricture      Pharyngoesophageal dysphagia      (Esophageal stricture [K22.2])      (Pharyngoesophageal dysphagia [R13.14])    Surgeons: Linda Torres MD Responsible Provider: Esteban Shields DO    Anesthesia Type: general ASA Status: 3          Anesthesia Type: No value filed.     Romeo Phase I: Romeo Score: 10    Romeo Phase II: Romeo Score: 10      Anesthesia Post Evaluation    Patient location during evaluation: PACU  Patient participation: complete - patient participated  Level of consciousness: awake and alert  Airway patency: patent  Nausea & Vomiting: no vomiting and no nausea  Complications: no  Cardiovascular status: hemodynamically stable  Respiratory status: acceptable and nasal cannula  Hydration status: stable

## 2023-01-11 NOTE — PROGRESS NOTES
1607: Pt arrives to pacu, alert. Pt on room air, respirations easy and unlabored. VSS  1633: Pt states pain increased to 7/10, 50mcg of fentanyl administered  1635: Ice chips provided to patient  1640:  No change in pain, 50mcg of fentanyl given  1645: Pt sleeping, easily awakens to voice. Noted improvement in pain. 1700: Pt meets criteria for discharge from pacu, transported to [facility] in stable condition.  VSS

## 2023-01-11 NOTE — PROGRESS NOTES
ADMITTED TO [facility] AND ORIENTED TO UNIT. SCDS ON. FALL BANDS ON. PT VERBALIZED APPROVAL FOR FIRST NAME, LAST INITIAL AND PHYSICIAN NAME ON UNIT WHITEBOARD.

## 2023-01-11 NOTE — BRIEF OP NOTE
Brief Postoperative Note      Patient: Rosalind Cole  YOB: 1965  MRN: [id number]    Date of Procedure: 1/11/2023    Pre-Op Diagnosis: Esophageal stricture [K22.2]  Pharyngoesophageal dysphagia [R13.14]      Post-Op Diagnosis: Same chronic esophagitis and GERD; upper esophageal sphincter stricture and CP hypertrophy       Procedure(s):  SUSPENSION LARYNGOSCOPY (FOR ACCESS), FLEXIBLE ESOPHAGOSCOPY WITH SAVORY WIRE BOUGIE DILATION AND BIOPSIES    Surgeon(s):  Teodoro Babinski, MD    Assistant:  * No surgical staff found *    Anesthesia: General    Estimated Blood Loss (mL): Minimal    Complications: None    Specimens:   ID Type Source Tests Collected by Time Destination   A : Distal Tubular Esophagus @ 43cm (Tongue of [place] Color Mucosa Tissue Esophagus SURGICAL PATHOLOGY Teodoro Babinski, MD 1/11/2023 1552        Implants:  * No implants in log *      Drains: * No LDAs found *    Findings: 1.  CP muscle very hypertrophic and difficult to get past even with rigid instruments  2. Within the esophageal inlet approximately 5 to 8 cm distally, the carotid arteries were seen to be pushing the esophageal mucosa medially and making flush contact along a distance of approximately 2 cm  3. Guidewire balloon dilation of the esophageal inlet with care being taken to avoid compression of the carotids was performed to 20 mm with superficial mucous membrane shearing consistent with a concomitant cicatricial narrowing of the UES.   4.  The LES had changes consistent with Meeks's esophagus in the form of tongues of salmon-colored mucosa at 44 cm and up; biopsies taken and sent in formalin for permanent section    Electronically signed by Teodoro Babinski, MD on 1/11/2023 at 4:20 PM

## 2023-01-11 NOTE — H&P
Adapted from prior ENT note:    Esophageal stricture; Radiation fibrosis; Pharyngoesophageal dysphagia; Pharyngeal stenosis  No new symptoms    Past Medical History:   Diagnosis Date    A-fib ([facility] [address]     Baki-no blood thinners    Aortic aneurysm (HCC)     Asthma     Head and neck cancer ([facility] [address]     Hyperlipidemia     Hypertension     Lymphedema     Syncope 12/22/2020    Syncope and collapse     Thyroid disease        Past Surgical History:   Procedure Laterality Date    GASTROSTOMY TUBE PLACEMENT  10/02/2015    LARYNGOSCOPY N/A 06/03/2022    Rigid Laryngoscopy Esophagoscopy Diagnostic performed by Jacobo Israel MD at [address] [facility],[address]  10/02/2015    OTHER SURGICAL HISTORY  09/06/2022    linq recorder    OTHER SURGICAL HISTORY  06/20/2016    peg tube removal    SKIN GRAFT  10/02/2015    jaw    TRACHEOSTOMY      UMBILICAL HERNIA REPAIR N/A 11/14/2022    EXCISION OF WOUND AND REMOVAL OF PERITONEAL CYST, Umbilical Hernia Repair with Mesh performed by Mitchell Almaraz MD at [id number] [place] [address] 05/11/2021    EGD ESOPHAGOGASTRODUODENOSCOPY performed by Santa Cummins MD at [facility]       No Known Allergies    No current facility-administered medications for this encounter. Current Outpatient Medications   Medication Sig Dispense Refill    metoprolol tartrate (LOPRESSOR) 50 MG tablet TAKE 1 TABLET BY MOUTH TWICE DAILY 180 tablet 3    losartan (COZAAR) 50 MG tablet Take 50 mg by mouth daily      traZODone (DESYREL) 50 MG tablet Take 50 mg by mouth daily      levothyroxine (SYNTHROID) 125 MCG tablet Take one tablet first thing in the morning on an empty stomach.      mirtazapine (REMERON) 15 MG tablet Take 15 mg by mouth daily      OLANZapine (ZYPREXA) 10 MG tablet       OXcarbazepine (TRILEPTAL) 150 MG tablet Take 150 mg by mouth daily      oxyCODONE-acetaminophen (PERCOCET) 5-325 MG per tablet Take 1 tablet by mouth every 6 hours as needed.       Sertraline HCl (ZOLOFT PO) Take 1 tablet by mouth daily      pantoprazole (PROTONIX) 40 MG tablet Take 40 mg by mouth daily Take am of sx  6    esomeprazole Magnesium (NEXIUM) 20 MG PACK Take 20 mg by mouth daily      Mouthwashes (BIOTENE DRY MOUTH) LIQD daily       lisinopril (PRINIVIL;ZESTRIL) 20 MG tablet Take 5 mg by mouth daily Take am of sx         Current vitals  Ht 6' (1.829 m)   Wt 160 lb (72.6 kg)   BMI 21.70 kg/m²     Proceed with original surgical plan:  Suspension laryngoscopy for access with flexible esophagoscopy using savory wire bougie dilation    Electronically signed by MADELIN Kenr CNP on 1/11/2023 at 11:34 AM      -----------------------------------------  -----------------------------------------      [place]  Donovan Duran [address] [place] [address]  Dept: [phone]  Dept Fax: [phone]  Loc: [phone]     Evaristo Herrera is a 64 y.o. male who was referred by No ref. provider found for:       Chief Complaint   Patient presents with    Follow-up       Patient is here for a follow up for dysphagia lusoria          HPI:      Evaristo Herrera is a 64 y.o. 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 He has had this extensively evaluated both here and at [facility]. The potential for a vascular compression was considered given the vascular pulsations right at the stricture which were apparently being transmitted from the innominate artery but through the trachea. The patient reports having gained a couple of pounds despite his stricture by working harder at increasing his overall nutritional intake. He has to liquefy any food that he swallows and it takes him a long time to get an entire meal down. He is having no new problems.                 History:      No Known Allergies  Current Facility-Administered Medications          Current Outpatient Medications   Medication Sig Dispense Refill    amLODIPine (NORVASC) 5 MG tablet Take am of sx        levothyroxine (SYNTHROID) 125 MCG tablet Take one tablet first thing in the morning on an empty stomach.        mirtazapine (REMERON) 15 MG tablet          OLANZapine (ZYPREXA) 10 MG tablet          OXcarbazepine (TRILEPTAL) 150 MG tablet          oxyCODONE-acetaminophen (PERCOCET) 5-325 MG per tablet          metoprolol (LOPRESSOR) 100 MG tablet Take 50 mg by mouth 2 times daily Per patient PCP changed dose      Take am of sx        Sertraline HCl (ZOLOFT PO) Take by mouth        pantoprazole (PROTONIX) 40 MG tablet Take 40 mg by mouth daily Take am of sx   6    esomeprazole Magnesium (NEXIUM) 20 MG PACK Take 20 mg by mouth daily        Mouthwashes (BIOTENE DRY MOUTH) LIQD daily         lisinopril (PRINIVIL;ZESTRIL) 20 MG tablet Take 5 mg by mouth daily Take am of sx          No current facility-administered medications for this visit.          Past Medical History        Past Medical History:   Diagnosis Date    A-fib [facility])      Aortic aneurysm (HCC)      Asthma      Head and neck cancer ([facility] [address]      Hypertension      Lymphedema      Syncope 12/22/2020    Syncope and collapse           Past Surgical History         Past Surgical History:   Procedure Laterality Date    GASTROSTOMY TUBE PLACEMENT   10/2/15    LARYNGOSCOPY N/A 6/3/2022     Rigid Laryngoscopy Esophagoscopy Diagnostic performed by Glena Mcardle, MD at [address] [place] [address],[facility] Floor   10/2/15    SKIN GRAFT   10/2/15     jaw    TRACHEOSTOMY        UPPER GASTROINTESTINAL ENDOSCOPY N/A 5/11/2021     EGD ESOPHAGOGASTRODUODENOSCOPY performed by Jacky Flores MD at [facility]         Family History         Family History   Problem Relation Age of Onset    Asthma Mother           Social History            Tobacco Use    Smoking status: Some Days       Packs/day: 0.25       Years: 34.00       Pack years: 8.50 Types: Cigarettes    Smokeless tobacco: Never   Substance Use Topics    Alcohol use: Yes       Alcohol/week: 7.0 standard drinks       Types: 7 Cans of beer per week       Comment: occasionally                    Subjective:      Review of Systems  Rest of review of systems are negative, except as noted in HPI. Objective:      /70 (Site: Left Upper Arm, Position: Sitting)   Pulse 78   Temp 97.2 °F (36.2 °C) (Infrared)   Ht 5' 11\" (1.803 m)   Wt 172 lb 3.2 oz (78.1 kg)   SpO2 95%   BMI 24.02 kg/m²      Physical Exam         On general physical exam the patient is a pleasant alert cooperative middle-aged man in no acute distress. His voice is within normal limits for his age and gender. His speech pattern is edentulous but otherwise normal.  I heard no throat clearing coughing or inspiratory stridor. He is breathing without labor. Vitals reviewed. FL ESOPHAGRAM     Result Date: 9/28/2022  Delayed swallowing with laryngeal penetration of barium.  Final report electronically signed by Dr. Dennys Corral on 9/28/2022 9:43 AM           Lab Results   Component Value Date/Time      08/29/2022 11:25 AM      05/25/2022 10:23 AM      09/03/2021 10:18 AM      12/10/2020 07:25 AM      10/28/2020 11:41 AM     K 4.6 08/29/2022 11:25 AM     K 3.9 05/25/2022 10:23 AM     K 3.6 09/03/2021 10:18 AM     K 3.3 12/10/2020 07:25 AM     K 4.9 10/28/2020 11:41 AM     K 3.6 09/03/2020 08:33 AM     K 3.7 06/27/2019 07:40 PM      05/25/2022 10:23 AM      12/10/2020 07:25 AM      10/28/2020 11:41 AM     CO2 28 05/25/2022 10:23 AM     CO2 21 12/10/2020 07:25 AM     CO2 26 10/28/2020 11:41 AM     BUN 7 05/25/2022 10:23 AM     BUN 10 12/10/2020 07:25 AM     BUN 12 10/28/2020 11:41 AM     CREATININE 1.1 08/29/2022 11:25 AM     CREATININE 1.1 05/25/2022 10:23 AM     CREATININE 1.3 09/03/2021 10:18 AM     CREATININE 1.0 12/10/2020 07:25 AM     CREATININE 1.1 10/28/2020 11:41 AM     CALCIUM 9.3 05/25/2022 10:23 AM     CALCIUM 9.2 12/10/2020 07:25 AM     CALCIUM 10.3 10/28/2020 11:41 AM     PROT 6.6 08/29/2022 11:25 AM     PROT 7.3 05/25/2022 10:23 AM     PROT 6.2 09/03/2021 10:08 AM     LABALBU 3.9 08/29/2022 11:25 AM     LABALBU 4.2 05/25/2022 10:23 AM     LABALBU 3.8 09/03/2021 10:08 AM     BILITOT 0.2 08/29/2022 11:25 AM     BILITOT 0.3 05/25/2022 10:23 AM     BILITOT 0.2 09/03/2021 10:08 AM     ALKPHOS 75 08/29/2022 11:25 AM     ALKPHOS 84 05/25/2022 10:23 AM     ALKPHOS 118 09/03/2021 10:08 AM     AST 23 08/29/2022 11:25 AM     AST 21 05/25/2022 10:23 AM     AST 53 09/03/2021 10:08 AM     ALT 13 08/29/2022 11:25 AM     ALT 14 05/25/2022 10:23 AM     ALT 36 09/03/2021 10:08 AM         All of the past medical history, past surgical history, family history,social history, allergies and current medications were reviewed with the patient. Assessment & Plan   Diagnoses and all orders for this visit:       Diagnosis Orders   1. Esophageal stricture  VT ESOPHAGOSCOPY FLEX BALLOON DILAT <30 MM DIAM       2. 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 Pharyngoesophageal dysphagia  VT ESOPHAGOSCOPY FLEX BALLOON DILAT <30 MM DIAM       4. Pharyngeal stenosis          5. Pain around percutaneous endoscopic gastrostomy (PEG) tube site, initial encounter  Kylah Azevedo MD, General Surgery, [facility]             Based on the patient's history and physical findings on his last endoscopy as well as his radiographic studies the patient's stricture is a combination of a peptic process and radiation fibrosis. I explained to the patient and his wife, who was with his entire time, that these are very challenging to deal with because of the potential for perforation inherent particularly in balloon dilations.   As such I recommend that we proceed with savory wire dilators the can be more gently graduated to larger diameters and that I tried to get him up to somewhere between 8 and 10 mm or up to a 30 [place] diameter on the first dilation and then work between 3 and 6 [place] increments over 3 additional dilations to further increase his diameter. He may need sustaining dilations thereafter as well. I strongly emphasized to him that there is no way to dilate the esophagus like his without some risk of perforation. I spent over 30 minutes of face-to-face time with the patient and his wife with more than half of it being dedicated to reviewing his complex history and planning this treatment program.     Return in about 6 weeks (around 12/5/2022) for Post dilation follow-up. **This report has been created using voice recognition software. It may contain minor errors which are inherent in voice recognition technology. **

## 2023-01-11 NOTE — PROGRESS NOTES
Pt has met discharge criteria and states he is ready for discharge to home. IV removed, gauze and tape applied. Dressed in own clothes and personal belongings gathered. Discharge instructions (with opioid medication education information) given to pt and family. Pt and family verbalized understanding of discharge instructions, prescriptions and follow up appointments. Pt transported to discharge lobby by [facility] staff.

## 2023-01-11 NOTE — DISCHARGE INSTRUCTIONS
Soft foods and liquids tonight, then may take any food/drinks he tolerates by mouth tomorrow. No other restrictions.

## 2023-01-11 NOTE — ANESTHESIA PRE PROCEDURE
Department of Anesthesiology  Preprocedure Note       Name:  Indu Langford   Age:  62 y.o.  :  1965                                          MRN:  [id number]         Date:  2023      Surgeon: Jose Stephens):  Jacobo Israel MD    Procedure: Procedure(s):  SUSPENSION LARYNGOSCOPY (FOR ACCESS), FLEXIBLE ESOPHAGOSCOPY WITH SAVORY WIRE BOUGIE DILATION    Medications prior to admission:   Prior to Admission medications    Medication Sig Start Date End Date Taking? Authorizing Provider   metoprolol tartrate (LOPRESSOR) 50 MG tablet TAKE 1 TABLET BY MOUTH TWICE DAILY 22   Zoheir Youlanda Eisenmenger, MD   losartan (COZAAR) 50 MG tablet Take 50 mg by mouth daily 22   Historical Provider, MD   traZODone (DESYREL) 50 MG tablet Take 50 mg by mouth daily 22   Historical Provider, MD   levothyroxine (SYNTHROID) 125 MCG tablet Take one tablet first thing in the morning on an empty stomach. 21   Historical Provider, MD   mirtazapine (REMERON) 15 MG tablet Take 15 mg by mouth daily 21   Historical Provider, MD   OLANZapine (ZYPREXA) 10 MG tablet  21   Historical Provider, MD   OXcarbazepine (TRILEPTAL) 150 MG tablet Take 150 mg by mouth daily 21   Historical Provider, MD   oxyCODONE-acetaminophen (PERCOCET) 5-325 MG per tablet Take 1 tablet by mouth every 6 hours as needed.  21   Historical Provider, MD   Sertraline HCl (ZOLOFT PO) Take 1 tablet by mouth daily    Historical Provider, MD   pantoprazole (PROTONIX) 40 MG tablet Take 40 mg by mouth daily Take am of sx 19   Historical Provider, MD   esomeprazole Magnesium (NEXIUM) 20 MG PACK Take 20 mg by mouth daily    Historical Provider, MD   Mouthwashes ([address] [place] [address]) LIQD daily   Patient not taking: Reported on 2023   Historical Provider, MD   lisinopril (PRINIVIL;ZESTRIL) 20 MG tablet Take 5 mg by mouth daily Take am of sx    Historical Provider, MD       Current medications:    No current facility-administered medications for this visit. No current outpatient medications on file.      Facility-Administered Medications Ordered in Other Visits   Medication Dose Route Frequency Provider Last Rate Last Admin    ampicillin-sulbactam (UNASYN) 3,000 mg in sodium chloride 0.9 % 100 mL IVPB (mini-bag)  3,000 mg IntraVENous On Call to Gage Pérez MD        ipratropium-albuterol (DUONEB) nebulizer solution 1 ampule  1 ampule Inhalation Q4H PRN Jose Leone MD        sodium chloride flush 0.9 % injection 5-40 mL  5-40 mL IntraVENous 2 times per day Jose Leone MD        sodium chloride flush 0.9 % injection 5-40 mL  5-40 mL IntraVENous PRN Jose Lenoe MD        0.9 % sodium chloride infusion   IntraVENous PRN Jose Leone MD        0.9 % sodium chloride infusion   IntraVENous Continuous Jose Leone  mL/hr at 01/11/23 1234 New Bag at 01/11/23 1234       Allergies:  No Known Allergies    Problem List:    Patient Active Problem List   Diagnosis Code    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    Chemotherapy induced neutropenia ([facility]) D70.1, T45.1X5A    Chronic anemia D64.9    Muscle spasms of neck M62.838    Syncope R55    PAF (paroxysmal atrial fibrillation) ([facility]) I48.0    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    Esophageal stricture K22.2    Pharyngoesophageal dysphagia R13.14    Pharyngeal stenosis J39.2    Laryngeal edema J38.4    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 L59.8, Y84.2    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    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    Pain around PEG tube site T85.848A    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       Past Medical History:        Diagnosis Date    A-fib ([facility]     Baki-no blood thinners    Aortic aneurysm (H[facility])     Asthma     Head and neck cancer ([facility]     Hyperlipidemia     Hypertension     Lymphedema     Syncope 12/22/2020    Syncope and collapse     Thyroid disease Past Surgical History:        Procedure Laterality Date    GASTROSTOMY TUBE PLACEMENT  10/02/2015    LARYNGOSCOPY N/A 06/03/2022    Rigid Laryngoscopy Esophagoscopy Diagnostic performed by Elda Packer MD at [address] [place] [address]  10/02/2015    OTHER SURGICAL HISTORY  09/06/2022    linq recorder    OTHER SURGICAL HISTORY  06/20/2016    peg tube removal    SKIN GRAFT  10/02/2015    jaw    TRACHEOSTOMY      UMBILICAL HERNIA REPAIR N/A 11/14/2022    EXCISION OF WOUND AND REMOVAL OF PERITONEAL CYST, Umbilical Hernia Repair with Mesh performed by Enrrique Groves MD at [facility] N/A 05/11/2021    EGD ESOPHAGOGASTRODUODENOSCOPY performed by Sharon Donis MD at [facility]       Social History:    Social History     Tobacco Use    Smoking status: Some Days     Packs/day: 0.50     Years: 34.00     Pack years: 17.00     Types: Cigarettes    Smokeless tobacco: Never   Substance Use Topics    Alcohol use: Yes     Alcohol/week: 7.0 standard drinks     Types: 7 Cans of beer per week     Comment: occasionally                                Ready to quit: Not Answered  Counseling given: Not Answered      Vital Signs (Current): There were no vitals filed for this visit.                                            BP Readings from Last 3 Encounters:   01/11/23 134/77   12/20/22 120/60   12/08/22 110/62       NPO Status:                                                                                 BMI:   Wt Readings from Last 3 Encounters:   01/11/23 163 lb (73.9 kg)   12/20/22 164 lb (74.4 kg)   12/08/22 164 lb 3.2 oz (74.5 kg)     There is no height or weight on file to calculate BMI.    CBC:   Lab Results   Component Value Date/Time    WBC 6.5 01/09/2023 02:23 PM    RBC 3.42 01/09/2023 02:23 PM    HGB 9.3 01/09/2023 02:23 PM    HCT 29.9 01/09/2023 02:23 PM    MCV 87.4 01/09/2023 02:23 PM    RDW 14.4 08/29/2022 11:25 AM     01/09/2023 02:23 PM CMP:   Lab Results   Component Value Date/Time     01/09/2023 02:23 PM    K 4.1 01/09/2023 02:23 PM    K 3.3 12/10/2020 07:25 AM    CL 98 01/09/2023 02:23 PM    CO2 23 01/09/2023 02:23 PM    BUN 9 01/09/2023 02:23 PM    CREATININE 1.3 01/09/2023 02:23 PM    LABGLOM >60 01/09/2023 02:23 PM    GLUCOSE 142 01/09/2023 02:23 PM    PROT 6.6 01/09/2023 02:23 PM    CALCIUM 9.4 01/09/2023 02:23 PM    BILITOT 0.3 01/09/2023 02:23 PM    ALKPHOS 68 01/09/2023 02:23 PM    AST 19 01/09/2023 02:23 PM    ALT 11 01/09/2023 02:23 PM       POC Tests: No results for input(s): POCGLU, POCNA, POCK, POCCL, POCBUN, POCHEMO, POCHCT in the last 72 hours. Coags:   Lab Results   Component Value Date/Time    INR 0.92 12/10/2020 07:25 AM    APTT 27.1 12/10/2020 07:25 AM       HCG (If Applicable): No results found for: PREGTESTUR, PREGSERUM, HCG, HCGQUANT     ABGs: No results found for: PHART, PO2ART, QKZ0LZG, UJD8SQO, BEART, Z5CJROOY     Type & Screen (If Applicable):  No results found for: LABABO, LABRH    Drug/Infectious Status (If Applicable):  No results found for: HIV, HEPCAB    COVID-19 Screening (If Applicable):   Lab Results   Component Value Date/Time    COVID19 NEGATIVE 11/19/2021 10:25 AM    COVID19 NOT DETECTED 05/05/2021 04:54 PM           Anesthesia Evaluation  Patient summary reviewed no history of anesthetic complications:   Airway: Mallampati: II  TM distance: >3 FB   Neck ROM: full  Mouth opening: > = 3 FB   Dental:    (+) edentulous      Pulmonary:normal exam    (+) asthma (as a child): current smoker    (-) shortness of breath          Patient did not smoke on day of surgery.                  Cardiovascular:  Exercise tolerance: poor (<4 METS),   (+) hypertension (took lisinopril this morning):, dysrhythmias: atrial fibrillation,     (-) past MI and CAD      Rhythm: irregular                      Neuro/Psych:      (-) seizures and CVA           GI/Hepatic/Renal:   (+) GERD: no interval change,      (-) liver disease and no renal disease       Endo/Other:    (+) hypothyroidism::., .    (-) diabetes mellitus, hyperthyroidism               Abdominal:             Vascular:     - DVT and PE. Other Findings:             Anesthesia Plan      general     ASA 3     (GETA. PIV. Additional access can be obtained after induction if needed. Standard ASA monitors. IV/PO opioids and other adjuncts as needed for pain control. PACU post op for recovery. Possible anesthetics complications were discussed with the patient, including but not limited to: PONV, damage to the airway and surrounding structures (teeth, lips, gums, tongue, etc.), adverse reactions to medicine, cardiac complications (MI, CHF, arrhythmias, etc.), respiratory complications (post-op ventilation, respiratory failure, etc.), neurologic complications (nerve damage, stroke, seizure), and death. The patient was given the opportunity to ask questions and all questions were answered to the patient's satisfaction. The patient is in agreement with the anesthetic plan.  )  Induction: intravenous. Anesthetic plan and risks discussed with patient and spouse. Plan discussed with CRNA.                     Valarie Enrique DO   1/11/2023

## 2023-01-11 NOTE — PROGRESS NOTES
[facility] Vitals and assessment as charted. Report recieved from PACU. Pt has pudding and water. Family at the bedside. Pt and family verbalized understanding of discharge criteria and call light use. Call light in reach.

## 2023-01-12 NOTE — OP NOTE
Operative Note      Patient: Julee Nathan  YOB: 1965  MRN: [id number]    Date of Procedure: 1/11/2023    Pre-Op Diagnosis: Esophageal stricture [K22.2]  Pharyngoesophageal dysphagia [R13.14]    Post-Op Diagnosis: Same         Procedure(s):  SUSPENSION LARYNGOSCOPY (FOR ACCESS), FLEXIBLE ESOPHAGOSCOPY WITH SAVORY WIRE BOUGIE DILATION AND BIOPSIES    Surgeon(s):  Triston Crow MD    Assistant:   * No surgical staff found *    Anesthesia: General    Estimated Blood Loss (mL): less than 50     Complications: None    Specimens:   ID Type Source Tests Collected by Time Destination   A : Distal Tubular Esophagus @ 43cm (Tongue of [place] Color Mucosa Tissue Esophagus SURGICAL PATHOLOGY Triston Crow MD 1/11/2023 1552        Implants:  * No implants in log *      Drains: * No LDAs found *    Findings: 1.  CP muscle very hypertrophic and difficult to get past even with rigid instruments  2. Within the esophageal inlet approximately 5 to 8 cm distally, the Left subclavian artery were seen to be pushing into the esophageal mucosa medially and making flush contact along a distance of approximately 3-4 cm  3. Guidewire balloon dilation of the esophageal inlet with care being taken to avoid compression of the carotids was performed to 20 mm with superficial mucous membrane shearing consistent with a concomitant cicatricial narrowing of the UES. 4.  The LES had changes consistent with Meeks's esophagus in the form of tongues of salmon-colored mucosa at 44 cm and up; biopsies taken and sent in formalin for permanent section     Surgical images:          Proxima Thoracic esophagus without insufflation    with insufflation    The above images demonstrated to tubular pulsatile structures on the LEFT side of the esophagus without insufflation on the left and with insufflation on the right.   Without insufflation the coapting was firm and required some force to pass the esophagus at this juncture for a distance of between 3 and 4 cm consistent with medially displaced common carotid arteries producing a kind of systemic pressure based obstructive impairment of swallowing. CT angio images above with crosshatched markings of the subclavian artery running along side of the esophagus just within the thorax. Detailed Description of Procedure: The patient was taken to the operating room awake and placed in the supine position. General anesthesia was induced and the patient was intubated with a 6.0 endotracheal tube without difficulty or vital sign instability. The table was turned 90 degrees and the patient was draped in usual fashion for transoral surgery. A timeout was employed verifying the patient's identity and planned procedure. Suspension laryngoscopy for access: With the patient asleep I protected his maxillary dentition with 2 sloppy wet moistened gauze pads. I then passed a 17 cm Daija laryngoscope into the hypopharynx and used to extend the larynx at the level of the post cricoid space anteriorly. This required several tries to get enough extension so that his esophageal inlet would be visible. I then used a 30 degree optical telescope/endoscope to visualize the postcricoid space and traversed the esophageal inlet into the cervical esophagus. While I was able to pass through the cricopharyngeus, it was relatively tight in a manner that felt like muscular tension or muscular hypertrophy. I brought on the field a 10 mm chip tip Olympus gastroscope and was able to enter the esophagus widely past what appeared to be a relatively superficial cicatrix versus simply cricopharyngeus hypertrophy. The proximal esophagus however was abnormal for the presence of longitudinal collapse in a manner that I had seen previously but this time on insufflating with the endoscope I could part this left sided structure that was clearly pulsatile.   As I examined this more carefully I found it to be most definitely a vascular structure that was most likely the LEFT subclavian artery running along the tracheoesophageal groove but pushing medially posterior to the trachea and making contact for a minimum of 3 to 4 cm. The fact that they could be  with insufflation was new since I had not used an insufflating esophagoscope on his original endoscopy. I then continued passing the gastroscope into the distal esophagus and found to have abnormalities of the mucosa consistent with chronic esophagitis but no ulcers were seen. However there were tongues of salmon-colored mucosa beginning at the GE J which was a 44 cm from the maxillary dental ridge extending back 2 to 3 cm. I used cup forceps to take 3 biopsies of 1 of these salmon-colored tongues and placed them in saline to be transferred to formalin for permanent section. I then entered the stomach to perform a retrograde exam and check for a hiatal hernia which was not visualized. The stomach had diffuse gastritis but no ulcers or signs of recent bleeding were seen. I evacuated the air within the stomach and repeated my exam of the esophagus on the way out finding no new pathology with the exception of a relatively shallow cicatrix of the cricopharyngeus. I placed a guidewire balloon dilator into the esophagus and extended it slowly through single atmosphere increments until I reached to 6 pradeep or a 20 mm diameter. No further work was being done by the balloon as it was not deflating spontaneously consistent with a shearing of a cicatrix. No sudden large drops in pressure were seen that might indicate rupture. I deflated the balloon balloon fully and examined the cervical esophagus and confirmed superficial mucosal shearing. I purposefully kept the balloon proximally so that the carotid arteries would not be compressed putting him at risk for a CVA. As such I consider the operation concluded.   I remove the transoral hardware and turned the patient back to anesthesia for reversal extubation in the operating room. This was carried out without incident and the patient was taken recovery in satisfactory condition. Estimated blood loss in the case was less than 20 cc and the patient received approximately half a liter of intravenous lactated Ringer's intraoperatively. No blood products were transfused. No intraoperative complications were detected. Counts were considered accurate x3. I was present for performed the patient's entire operation myself. Disposition: I will reevaluate the patient's CT arteriogram to see if the coapting of the carotid arteries was indeed visible on that study. Neck steps in the patient's care will depend on whether or not the dilation of the esophageal inlet impacts his dysphagia significantly. If it does not then a vascular repositioning operation akin to the movement of an abnormal subclavian artery or a vascular ring may need to be considered for this patient's significant dysphagia.         Electronically signed by Farida Alvarado MD on 1/11/2023 at 8:01 PM

## 2023-01-24 ENCOUNTER — PROCEDURE VISIT (OUTPATIENT)
Dept: CARDIOLOGY CLINIC | Age: 58
End: 2023-01-24
Payer: MEDICAID

## 2023-01-24 DIAGNOSIS — R55 SYNCOPE AND COLLAPSE: Primary | ICD-10-CM

## 2023-01-24 PROCEDURE — 93298 REM INTERROG DEV EVAL SCRMS: CPT | Performed by: NUCLEAR MEDICINE

## 2023-01-24 PROCEDURE — G2066 INTER DEVC REMOTE 30D: HCPCS | Performed by: NUCLEAR MEDICINE

## 2023-01-30 ENCOUNTER — OFFICE VISIT (OUTPATIENT)
Dept: ENT CLINIC | Age: 58
End: 2023-01-30
Payer: MEDICAID

## 2023-01-30 VITALS
OXYGEN SATURATION: 82 % | SYSTOLIC BLOOD PRESSURE: 142 MMHG | RESPIRATION RATE: 22 BRPM | WEIGHT: 159.9 LBS | BODY MASS INDEX: 22.89 KG/M2 | DIASTOLIC BLOOD PRESSURE: 64 MMHG | TEMPERATURE: 97.9 F | HEIGHT: 70 IN

## 2023-01-30 DIAGNOSIS — K22.2 ESOPHAGEAL STRICTURE: ICD-10-CM

## 2023-01-30 DIAGNOSIS — K21.00 GASTROESOPHAGEAL REFLUX DISEASE WITH ESOPHAGITIS WITHOUT HEMORRHAGE: Primary | ICD-10-CM

## 2023-01-30 DIAGNOSIS — J39.2 PHARYNGEAL STENOSIS: ICD-10-CM

## 2023-01-30 DIAGNOSIS — L59.8 RADIATION SKIN FIBROSIS FROM THERAPEUTIC PROCEDURE: ICD-10-CM

## 2023-01-30 DIAGNOSIS — R13.14 PHARYNGOESOPHAGEAL DYSPHAGIA: ICD-10-CM

## 2023-01-30 PROCEDURE — 3017F COLORECTAL CA SCREEN DOC REV: CPT | Performed by: OTOLARYNGOLOGY

## 2023-01-30 PROCEDURE — G8427 DOCREV CUR MEDS BY ELIG CLIN: HCPCS | Performed by: OTOLARYNGOLOGY

## 2023-01-30 PROCEDURE — 99214 OFFICE O/P EST MOD 30 MIN: CPT | Performed by: OTOLARYNGOLOGY

## 2023-01-30 PROCEDURE — 4004F PT TOBACCO SCREEN RCVD TLK: CPT | Performed by: OTOLARYNGOLOGY

## 2023-01-30 PROCEDURE — G8420 CALC BMI NORM PARAMETERS: HCPCS | Performed by: OTOLARYNGOLOGY

## 2023-01-30 PROCEDURE — G8484 FLU IMMUNIZE NO ADMIN: HCPCS | Performed by: OTOLARYNGOLOGY

## 2023-01-30 RX ORDER — SUCRALFATE ORAL 1 G/10ML
1 SUSPENSION ORAL 4 TIMES DAILY
Qty: 1200 ML | Refills: 3 | Status: SHIPPED | OUTPATIENT
Start: 2023-01-30 | End: 2023-04-30

## 2023-01-30 RX ORDER — FAMOTIDINE 40 MG/1
40 TABLET, FILM COATED ORAL 2 TIMES DAILY
Qty: 180 TABLET | Refills: 3 | Status: SHIPPED | OUTPATIENT
Start: 2023-01-30 | End: 2023-04-30

## 2023-01-30 RX ORDER — OMEPRAZOLE 40 MG/1
40 CAPSULE, DELAYED RELEASE ORAL
Qty: 180 CAPSULE | Refills: 4 | Status: SHIPPED | OUTPATIENT
Start: 2023-01-30 | End: 2023-04-30

## 2023-01-30 NOTE — Clinical Note
Dr. Jerald Opitz, Thank you for seeing this patient. 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 I will be able to definitively treat his proximal cicatrix if his reflux can be brought under control. He is very keen for surgical repair if you will find him a suitable candidate.  Thank you [facility]

## 2023-01-31 ENCOUNTER — TELEPHONE (OUTPATIENT)
Dept: BARIATRICS/WEIGHT MGMT | Age: 58
End: 2023-01-31

## 2023-01-31 NOTE — PROGRESS NOTES
[address] EAR, NOSE AND THROAT  [address] [place] [address] [place] [address]  Dept: [phone]  Dept Fax: [phone]  Loc: [phone]    Rohit Castaneda is a 62 y.o. male who was referred by No ref. provider found for:  Chief Complaint   Patient presents with    Post-Op Check     Patient is here post op esophagoscopy/dilation 01/11/23. Patient also needs to see Rosalba to set up 3rd procedure in April. Patient states his energy is real low. He said that it has been like it for the last 2-3 days. He c/o that throat is itchy/scratchy. He states that his acid reflux has been really bad since surgery. HPI:     Rohit Castaneda is a 62 y.o. 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 He has developed severe obstructive dysphagia symptoms felt to be a manifestation of a cicatricial process in the distal pharynx and esophageal inlet versus a compression of the esophagus against the lung from a segment of the esophagus that is abutted against the left-sided aortic takeoff to the common carotid and left subclavian artery. The patient returns with his wife today reporting a dramatic improvement of his swallowing abilities to the point that he can \"eat anything\". The patient's wife is delighted and states that in fact he is eating and drinking anything he likes and having no obstructive symptoms whatsoever. This effectively rules out any possibility that is vascular compression is an issue and rules in the role of his extra esophageal reflux along with his radiation fibrosis as the likely concomitant basis of his cicatricial esophageal inlet scar and dysphagia.      History:     No Known Allergies  Current Outpatient Medications   Medication Sig Dispense Refill    omeprazole (PRILOSEC) 40 MG delayed release capsule Take 1 capsule by mouth 2 times daily (before meals) 180 capsule 4    famotidine (PEPCID) 40 MG tablet Take 1 tablet by mouth 2 times daily 180 tablet 3    sucralfate (CARAFATE) 1 GM/10ML suspension Take 10 mLs by mouth 4 times daily 1200 mL 3    losartan (COZAAR) 50 MG tablet Take 50 mg by mouth daily      traZODone (DESYREL) 50 MG tablet Take 50 mg by mouth daily      metoprolol tartrate (LOPRESSOR) 50 MG tablet TAKE 1 TABLET BY MOUTH TWICE DAILY 180 tablet 3    levothyroxine (SYNTHROID) 125 MCG tablet Take one tablet first thing in the morning on an empty stomach.      mirtazapine (REMERON) 15 MG tablet Take 15 mg by mouth daily      OLANZapine (ZYPREXA) 10 MG tablet       OXcarbazepine (TRILEPTAL) 150 MG tablet Take 150 mg by mouth daily      oxyCODONE-acetaminophen (PERCOCET) 5-325 MG per tablet Take 1 tablet by mouth every 6 hours as needed. Sertraline HCl (ZOLOFT PO) Take 1 tablet by mouth daily      Mouthwashes (BIOTENE DRY MOUTH) LIQD daily      lisinopril (PRINIVIL;ZESTRIL) 20 MG tablet Take 5 mg by mouth daily Take am of sx       No current facility-administered medications for this visit.      Past Medical History:   Diagnosis Date    A-fib [facility])     Baki-no blood thinners    Aortic aneurysm (HCC)     Asthma     Head and neck cancer ([facility] [address]     Hyperlipidemia     Hypertension     Lymphedema     Syncope 12/22/2020    Syncope and collapse     Thyroid disease       Past Surgical History:   Procedure Laterality Date    GASTROSTOMY TUBE PLACEMENT  10/02/2015    LARYNGOSCOPY N/A 06/03/2022    Rigid Laryngoscopy Esophagoscopy Diagnostic performed by Lakeisha Awan MD at [address]  N/A 1/11/2023    SUSPENSION LARYNGOSCOPY (FOR ACCESS), FLEXIBLE ESOPHAGOSCOPY WITH SAVORY WIRE BOUGIE DILATION AND BIOPSIES performed by Lakeisha Awan MD at [address] [facility] [facility]  10/02/2015    OTHER SURGICAL HISTORY  09/06/2022    linq recorder    OTHER SURGICAL HISTORY  06/20/2016    peg tube removal    SKIN GRAFT  10/02/2015    jaw    TRACHEOSTOMY      UMBILICAL HERNIA REPAIR N/A 11/14/2022    EXCISION OF WOUND AND REMOVAL OF PERITONEAL CYST, Umbilical Hernia Repair with Mesh performed by Seth Thomas MD at [facility] [address] 05/11/2021    EGD ESOPHAGOGASTRODUODENOSCOPY performed by Trisha Akbar MD at [facility]     Family History   Problem Relation Age of Onset    Asthma Mother      Social History     Tobacco Use    Smoking status: Some Days     Packs/day: 0.50     Years: 34.00     Pack years: 17.00     Types: Cigarettes    Smokeless tobacco: Never   Substance Use Topics    Alcohol use: Yes     Alcohol/week: 7.0 standard drinks     Types: 7 Cans of beer per week     Comment: occasionally        Subjective:      Review of Systems  Rest of review of systems are negative, except as noted in HPI. Objective:     BP (!) 142/64   Temp 97.9 °F (36.6 °C) (Infrared)   Resp 22   Ht 5' 10\" (1.778 m)   Wt 159 lb 14.4 oz (72.5 kg)   SpO2 (!) 82%   BMI 22.94 kg/m²     Physical Exam       On general physical exam patient is a pleasant alert cooperative well-appearing adult male in no acute distress. His voice and speech pattern are within normal limits for his age and gender with the exception of edentulous speech. I heard no throat clearing coughing or inspiratory stridor. He is breathing without labor. Vitals reviewed. No results found.    Lab Results   Component Value Date/Time     01/09/2023 02:23 PM     08/29/2022 11:25 AM     05/25/2022 10:23 AM     09/03/2021 10:18 AM     12/10/2020 07:25 AM    K 4.1 01/09/2023 02:23 PM    K 3.9 11/14/2022 09:44 AM    K 4.6 08/29/2022 11:25 AM    K 3.9 05/25/2022 10:23 AM    K 3.3 12/10/2020 07:25 AM    CL 98 01/09/2023 02:23 PM     05/25/2022 10:23 AM     12/10/2020 07:25 AM    CO2 23 01/09/2023 02:23 PM    CO2 28 05/25/2022 10:23 AM    CO2 21 12/10/2020 07:25 AM    BUN 9 01/09/2023 02:23 PM    BUN 7 05/25/2022 10:23 AM    BUN 10 12/10/2020 07:25 AM CREATININE 1.3 01/09/2023 02:23 PM    CREATININE 1.1 08/29/2022 11:25 AM    CREATININE 1.1 05/25/2022 10:23 AM    CREATININE 1.3 09/03/2021 10:18 AM    CREATININE 1.0 12/10/2020 07:25 AM    CALCIUM 9.4 01/09/2023 02:23 PM    CALCIUM 9.3 05/25/2022 10:23 AM    CALCIUM 9.2 12/10/2020 07:25 AM    PROT 6.6 01/09/2023 02:23 PM    PROT 6.6 08/29/2022 11:25 AM    PROT 7.3 05/25/2022 10:23 AM    LABALBU 3.9 01/09/2023 02:23 PM    LABALBU 3.9 08/29/2022 11:25 AM    LABALBU 4.2 05/25/2022 10:23 AM    BILITOT 0.3 01/09/2023 02:23 PM    BILITOT 0.2 08/29/2022 11:25 AM    BILITOT 0.3 05/25/2022 10:23 AM    ALKPHOS 68 01/09/2023 02:23 PM    ALKPHOS 75 08/29/2022 11:25 AM    ALKPHOS 84 05/25/2022 10:23 AM    AST 19 01/09/2023 02:23 PM    AST 23 08/29/2022 11:25 AM    AST 21 05/25/2022 10:23 AM    ALT 11 01/09/2023 02:23 PM    ALT 13 08/29/2022 11:25 AM    ALT 14 05/25/2022 10:23 AM       All of the past medical history, past surgical history, family history,social history, allergies and current medications were reviewed with the patient. Assessment & Plan   Diagnoses and all orders for this visit:     Diagnosis Orders   1. Gastroesophageal reflux disease with esophagitis without hemorrhage  1 [facility]      2. 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      3. Esophageal stricture  [address]      4. Pharyngoesophageal dysphagia  [address]      5. Pharyngeal stenosis  1 [facility]          Based on his history and physical findings as well as on the reports of his ability to eat and drink anything he did like, it is incumbent upon me to get him to Dr. Nichelle Hernandez in the [facility]'s for evaluation and treatment of his reflux tendency.   He strongly corroborated reflux is an important problem stating that he has severe heartburn now and has been taking multiple medications to quell this process over the past several months often without success. Fortunately his biopsy during his recent esophagoscopy was negative for Meeks's esophagus. I recommend that he start on Carafate along with his dual agent double dose proton pump inhibitor and twice daily H2 receptor antagonist.  He agreed to do so. I reviewed with him the \"5C's\" diet avoiding caffeine carbonation citrus chocolate and cocktails were all forms of alcohol and certainly avoiding cigarettes the worst \"C\" of all. He voiced his steadfast dedication to not returning to his tobacco addiction. I will see him back in approximately 2 months for an interval exam and to make sure he is moving along towards definitive treatment of his reflux tendency. I have him scheduled for his next dilation in approximately 2 months but I would happily wait longer for him to get his fundoplication or LINX procedure with the hopes that I can perform a definitive upper esophageal reconstruction that would likely not have to be repeated if his reflux is under control. He agreed with pursuing that. He will postpone his planned dilation for at least 1 more month to give Dr. Amor Prather a chance to get him on a schedule for repair. I spent well over 30 minutes of face-to-face time with the patient the majority of which was dedicated to reviewing his complex history and planning this treatment program.      Return in about 2 months (around 3/30/2023) for Follow-up evaluation and plan further care as indicated. **This report has been created using voice recognition software. It may contain minor errors which are inherent in voice recognition technology. **

## 2023-02-24 ENCOUNTER — OFFICE VISIT (OUTPATIENT)
Dept: BARIATRICS/WEIGHT MGMT | Age: 58
End: 2023-02-24

## 2023-02-24 VITALS
TEMPERATURE: 97.9 F | DIASTOLIC BLOOD PRESSURE: 86 MMHG | HEIGHT: 70 IN | WEIGHT: 157 LBS | BODY MASS INDEX: 22.48 KG/M2 | HEART RATE: 84 BPM | SYSTOLIC BLOOD PRESSURE: 132 MMHG

## 2023-02-24 DIAGNOSIS — R13.10 DYSPHAGIA, UNSPECIFIED TYPE: ICD-10-CM

## 2023-02-24 DIAGNOSIS — K21.9 GASTROESOPHAGEAL REFLUX DISEASE, UNSPECIFIED WHETHER ESOPHAGITIS PRESENT: Primary | ICD-10-CM

## 2023-02-24 DIAGNOSIS — K21.9 GASTROESOPHAGEAL REFLUX DISEASE WITHOUT ESOPHAGITIS: Primary | ICD-10-CM

## 2023-02-24 DIAGNOSIS — R63.4 WEIGHT LOSS: ICD-10-CM

## 2023-02-26 ASSESSMENT — ENCOUNTER SYMPTOMS
CONSTIPATION: 0
EYE DISCHARGE: 0
PHOTOPHOBIA: 0
STRIDOR: 0
RECTAL PAIN: 0
VOICE CHANGE: 0
ABDOMINAL PAIN: 1
ANAL BLEEDING: 0
SHORTNESS OF BREATH: 1
APNEA: 0
SINUS PRESSURE: 0
FACIAL SWELLING: 1
CHOKING: 0
CHEST TIGHTNESS: 0
ALLERGIC/IMMUNOLOGIC NEGATIVE: 1
BLOOD IN STOOL: 0
COLOR CHANGE: 0
VOMITING: 0
EYE REDNESS: 0
NAUSEA: 0
EYE ITCHING: 0
ABDOMINAL DISTENTION: 0
WHEEZING: 0
RHINORRHEA: 0
BACK PAIN: 1
COUGH: 0
EYE PAIN: 1
DIARRHEA: 0
SORE THROAT: 1
TROUBLE SWALLOWING: 1

## 2023-02-26 NOTE — PROGRESS NOTES
Starr Maher (:  1965)     ASSESSMENT:  1. GERD  2. Dysphagia  3. Weight loss    PLAN:  1. Obtain most recent upper endoscopy report with Dr. Lisandra Cid. 2.  pH Bravo  3. Esophagram with marshmallow/bagel challenge  4. Esophageal manometry depending on esophagram results. 5.  Dietary and lifestyle modification/restrictions discussed. Nutritional education occurred. Concern for 20 pound weight loss recently and lack of nutrition. Has a history of PEG tube insertion for nutrition. Recommend feeding tube insertion if no improvement over the next few weeks. Patient does not want another feeding tube but will consider it. 6.  Continue PPI/antacid regimen as directed. 7.  Follow-up with Dr. Osman Bernard as directed  8. Follow-up after further imaging and testing completed  9. Discussed the pros and cons of robotic, laparoscopic and open techniques for GERD surgery. Discussed magnetic sphincter augmentation insertion (Linx) as well as fundoplication. Patient currently would not be a candidate for a Linx insertion given insurance. Possible fundoplication in the future depending on testing and imaging results. 10. Signs and symptoms reviewed with patient that would be concerning and need him to return to office for re-evaluation. Patient states He will call if He has questions or concerns. SUBJECTIVE/OBJECTIVE:    Chief Complaint   Patient presents with    New Patient     New pt - GERD esophageal stricture     HPI  Caroal Jean is a 77-year-old male who presents for initial evaluation secondary to significant GERD. He has been followed by Dr. Osman Bernard of ENT. History of squamous cell carcinoma of the head and neck. He is status postresection and radiation. Developed severe obstructive dysphagia. Status post esophagoscopy/dilation with Dr. Osman Bernard. History of PEG tube insertion. Required gastrocutaneous fistula closure. Has been losing weight recently.   Lost about 20 pounds. Poor appetite. Not getting much in. Drinking some milk. Swallowing is better but still has some dysphagia. Shortness of breath with increased activity. Chest discomfort. Epigastric abdominal discomfort. Fatigue. Having had a lot of lightheadedness and dizziness along with weakness over the last several weeks. He states he has had a upper endoscopy with Dr. Sarah Chavez of GI service in the past.  He is having daily issues with regurgitation. Health-related quality of life score questionnaire 36. Denies tobacco abuse. No hematochezia or melena. Normal bowel function. No new urinary complaints. Review of Systems   Constitutional:  Positive for activity change, appetite change and fatigue. Negative for chills, diaphoresis, fever and unexpected weight change. HENT:  Positive for facial swelling, postnasal drip, sneezing, sore throat and trouble swallowing. Negative for congestion, dental problem, drooling, ear discharge, ear pain, hearing loss, mouth sores, nosebleeds, rhinorrhea, sinus pressure, tinnitus and voice change. Eyes:  Positive for pain. Negative for photophobia, discharge, redness, itching and visual disturbance. Respiratory:  Positive for shortness of breath. Negative for apnea, cough, choking, chest tightness, wheezing and stridor. Cardiovascular:  Negative for chest pain, palpitations and leg swelling. Gastrointestinal:  Positive for abdominal pain. Negative for abdominal distention, anal bleeding, blood in stool, constipation, diarrhea, nausea, rectal pain and vomiting. Endocrine: Positive for cold intolerance. Negative for heat intolerance, polydipsia, polyphagia and polyuria. Genitourinary:  Positive for flank pain. Negative for decreased urine volume, difficulty urinating, dysuria, enuresis, frequency, genital sores, hematuria, penile discharge, penile pain, penile swelling, scrotal swelling, testicular pain and urgency.    Musculoskeletal:  Positive for back pain, neck pain and neck stiffness. Negative for arthralgias, gait problem, joint swelling and myalgias. Skin:  Negative for color change, pallor, rash and wound. Allergic/Immunologic: Negative. Neurological:  Positive for dizziness, speech difficulty, weakness, light-headedness, numbness and headaches. Negative for tremors, seizures, syncope and facial asymmetry. Hematological:  Negative for adenopathy. Does not bruise/bleed easily. Psychiatric/Behavioral:  Positive for confusion and sleep disturbance. Negative for agitation, behavioral problems, decreased concentration, dysphoric mood, hallucinations, self-injury and suicidal ideas. The patient is not nervous/anxious and is not hyperactive.       Past Medical History:   Diagnosis Date    A-fib [facility])     Baki-no blood thinners    Anxiety     Aortic aneurysm (HCC)     Asthma     Depression     Head and neck cancer ([facility] [address]     Hyperlipidemia     Hypertension     Lymphedema     Syncope 12/22/2020    Syncope and collapse     Thyroid disease        Past Surgical History:   Procedure Laterality Date    GASTROSTOMY TUBE PLACEMENT  10/02/2015    LARYNGOSCOPY N/A 06/03/2022    Rigid Laryngoscopy Esophagoscopy Diagnostic performed by Josefa Marsh MD at [address] [place] [address] N/A 1/11/2023    SUSPENSION LARYNGOSCOPY (FOR ACCESS), FLEXIBLE ESOPHAGOSCOPY WITH SAVORY WIRE BOUGIE DILATION AND BIOPSIES performed by Josefa Marsh MD at [address] [place] [address],[facility] Floor  10/02/2015    OTHER SURGICAL HISTORY  09/06/2022    linq recorder    OTHER SURGICAL HISTORY  06/20/2016    peg tube removal    SKIN GRAFT  10/02/2015    jaw    TRACHEOSTOMY      UMBILICAL HERNIA REPAIR N/A 11/14/2022    EXCISION OF WOUND AND REMOVAL OF PERITONEAL CYST, Umbilical Hernia Repair with Mesh performed by Srinivasa Carvalho MD at [place] [address] 05/11/2021    EGD ESOPHAGOGASTRODUODENOSCOPY performed by Jace Francisco MD at [facility]       Current Outpatient Medications   Medication Sig Dispense Refill    omeprazole (PRILOSEC) 40 MG delayed release capsule Take 1 capsule by mouth 2 times daily (before meals) 180 capsule 4    famotidine (PEPCID) 40 MG tablet Take 1 tablet by mouth 2 times daily 180 tablet 3    sucralfate (CARAFATE) 1 GM/10ML suspension Take 10 mLs by mouth 4 times daily 1200 mL 3    losartan (COZAAR) 50 MG tablet Take 50 mg by mouth daily      traZODone (DESYREL) 50 MG tablet Take 50 mg by mouth daily      metoprolol tartrate (LOPRESSOR) 50 MG tablet TAKE 1 TABLET BY MOUTH TWICE DAILY 180 tablet 3    levothyroxine (SYNTHROID) 125 MCG tablet Take one tablet first thing in the morning on an empty stomach.      mirtazapine (REMERON) 15 MG tablet Take 15 mg by mouth daily      OLANZapine (ZYPREXA) 10 MG tablet       OXcarbazepine (TRILEPTAL) 150 MG tablet Take 150 mg by mouth daily      oxyCODONE-acetaminophen (PERCOCET) 5-325 MG per tablet Take 1 tablet by mouth every 6 hours as needed. Sertraline HCl (ZOLOFT PO) Take 1 tablet by mouth daily      lisinopril (PRINIVIL;ZESTRIL) 20 MG tablet Take 5 mg by mouth daily Take am of sx      Mouthwashes (BIOTENE DRY MOUTH) LIQD daily (Patient not taking: Reported on 2/24/2023)       No current facility-administered medications for this visit. No Known Allergies    Family History   Problem Relation Age of Onset    Cancer Mother     Asthma Mother     Cancer Sister        Social History     Socioeconomic History    Marital status: Single     Spouse name: Not on file    Number of children: Not on file    Years of education: Not on file    Highest education level: Not on file   Occupational History    Not on file   Tobacco Use    Smoking status: Some Days     Packs/day: 0.50     Years: 34.00     Pack years: 17.00     Types: Cigarettes    Smokeless tobacco: Never   Vaping Use    Vaping Use: Never used   Substance and Sexual Activity    Alcohol use:  Yes     Alcohol/week: 7.0 standard drinks     Types: 7 Cans of beer per week     Comment: occasionally    Drug use: No    Sexual activity: Not on file   Other Topics Concern    Not on file   Social History Narrative    Not on file     Social Determinants of Health     Financial Resource Strain: Not on file   Food Insecurity: Not on file   Transportation Needs: Not on file   Physical Activity: Not on file   Stress: Not on file   Social Connections: Not on file   Intimate Partner Violence: Not on file   Housing Stability: Not on file     Vitals:    02/24/23 0854   BP: 132/86   Site: Right Upper Arm   Position: Sitting   Cuff Size: Large Adult   Pulse: 84   Temp: 97.9 °F (36.6 °C)   TempSrc: Infrared   Weight: 157 lb (71.2 kg)   Height: 5' 10\" (1.778 m)     Body mass index is 22.53 kg/m². Wt Readings from Last 3 Encounters:   02/24/23 157 lb (71.2 kg)   01/30/23 159 lb 14.4 oz (72.5 kg)   01/11/23 163 lb (73.9 kg)     Physical Exam  Vitals reviewed. Constitutional:       General: He is not in acute distress. Appearance: He is well-developed. He is not diaphoretic. HENT:      Head: Normocephalic and atraumatic. Right Ear: External ear normal.      Left Ear: External ear normal.      Nose: Nose normal.   Eyes:      General: No scleral icterus. Right eye: No discharge. Left eye: No discharge. Conjunctiva/sclera: Conjunctivae normal.   Neck:      Thyroid: No thyroid mass or thyromegaly. Cardiovascular:      Rate and Rhythm: Normal rate and regular rhythm. Heart sounds: Normal heart sounds. Pulmonary:      Effort: Pulmonary effort is normal. No respiratory distress. Breath sounds: Normal breath sounds. No wheezing or rales. Chest:      Chest wall: No tenderness. Abdominal:      General: Bowel sounds are normal. There is no distension. Palpations: Abdomen is soft. There is no mass. Tenderness: There is no abdominal tenderness. There is no guarding or rebound. Musculoskeletal:         General: No tenderness. Normal range of motion. Cervical back: Normal range of motion and neck supple. Lymphadenopathy:      Cervical: No cervical adenopathy. Skin:     General: Skin is warm and dry. Coloration: Skin is not pale. Findings: No erythema or rash. Neurological:      Mental Status: He is alert and oriented to person, place, and time. Cranial Nerves: No cranial nerve deficit. Psychiatric:         Behavior: Behavior normal.         Thought Content: Thought content normal.         Judgment: Judgment normal.     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)    Chemotherapy induced neutropenia (HCC)    Chronic anemia    Muscle spasms of neck    Syncope    PAF (paroxysmal atrial fibrillation) (HCC)    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 TECHNIQUE: Following the oral administration of barium, swallowing mechanism and anatomy of the esophagus were evaluated in a double contrast esophagram. 11 images were obtained.        COMPARISON: None FINDINGS: Swallowing is delayed. There is minimal laryngeal penetration of barium. No strictures or extrinsic abnormalities are identified in the esophagus. No mucosal lesions or ulcerations are seen. There is no stenosis or gastroesophageal reflux in    the distal esophagus. No hiatal hernia is present. Impression       Delayed swallowing with laryngeal penetration of barium. Final report electronically signed by Dr. Shelley Butt on 9/28/2022 9:43 AM     Narrative   PROCEDURE: CT CHEST WO CONTRAST       CLINICAL INFORMATION: Malignant neoplasm of retromolar area. Personal history of tobacco use. COMPARISON: CTA chest 4/23/2022. TECHNIQUE:    5 mm axial imaging through the chest without contrast. Coronal and sagittal reconstructions were performed. All CT scans at this facility use dose modulation, iterative reconstruction, and/or weight based dosing when appropriate to reduce the radiation dose to as low as reasonably achievable. FINDINGS:   Heart/mediastinum: The thyroid gland is unremarkable. The ascending thoracic aorta is dilated measuring 4.4 cm in diameter (previously measured 4.4 cm). The heart size is normal. Coronary artery calcifications are observed. No pericardial effusion is    identified. Lungs: Mild scarring at the lung apices is unchanged. No focal consolidation, pleural effusion, or pneumothorax is visualized. A 4 mm left upper lobe pulmonary nodule is stable (series 2, image 21). A 2 mm right middle lobe pulmonary nodule is stable    (series 2, image 37). No new pulmonary mass or nodule is present. Mild centrilobular emphysematous changes are observed. The central airways appear patent. Upper abdomen:  No acute findings are noted in the limited images through the upper abdomen. Thickening of the adrenal glands, left greater than right is unchanged. A 3 cm cyst in the left kidney, incompletely visualized, appear stable.  Colonic diverticulosis is observed. Musculoskeletal:  Multilevel degenerative disc disease in the thoracic spine is unchanged. The visualized skeletal structures appear intact. Impression   1. Stable small bilateral pulmonary nodules. No new pulmonary mass or nodule observed. No acute intrathoracic process visualized. 2. Stable 4.4 cm ascending thoracic aortic aneurysm. Additional stable chronic findings are noted. **This report has been created using voice recognition software. It may contain minor errors which are inherent in voice recognition technology. **       Final report electronically signed by Dr Daniel Blackwell on 10/31/2022 11:29 AM     An electronic signature was used to authenticate this note.     --Yoshi Smith MD

## 2023-02-27 ENCOUNTER — HOSPITAL ENCOUNTER (OUTPATIENT)
Dept: GENERAL RADIOLOGY | Age: 58
Discharge: HOME | End: 2023-02-27
Payer: MEDICAID

## 2023-02-27 DIAGNOSIS — K21.9 GASTROESOPHAGEAL REFLUX DISEASE WITHOUT ESOPHAGITIS: ICD-10-CM

## 2023-02-27 PROCEDURE — A4641 RADIOPHARM DX AGENT NOC: HCPCS | Performed by: SURGERY

## 2023-02-27 PROCEDURE — 6360000004 HC RX CONTRAST MEDICATION: Performed by: SURGERY

## 2023-02-27 PROCEDURE — 74220 X-RAY XM ESOPHAGUS 1CNTRST: CPT

## 2023-02-27 PROCEDURE — 2500000003 HC RX 250 WO HCPCS: Performed by: SURGERY

## 2023-02-27 PROCEDURE — 6370000000 HC RX 637 (ALT 250 FOR IP): Performed by: SURGERY

## 2023-02-27 RX ADMIN — BARIUM SULFATE 100 ML: 0.6 SUSPENSION ORAL at 08:52

## 2023-02-27 RX ADMIN — ANTACID/ANTIFLATULENT 1 EACH: 380; 550; 10; 10 GRANULE, EFFERVESCENT ORAL at 08:52

## 2023-02-27 RX ADMIN — BARIUM SULFATE 140 ML: 980 POWDER, FOR SUSPENSION ORAL at 08:53

## 2023-03-01 ENCOUNTER — TELEPHONE (OUTPATIENT)
Dept: BARIATRICS/WEIGHT MGMT | Age: 58
End: 2023-03-01

## 2023-03-01 ENCOUNTER — PROCEDURE VISIT (OUTPATIENT)
Dept: CARDIOLOGY CLINIC | Age: 58
End: 2023-03-01
Payer: MEDICAID

## 2023-03-01 DIAGNOSIS — R55 SYNCOPE AND COLLAPSE: Primary | ICD-10-CM

## 2023-03-01 DIAGNOSIS — K21.9 GASTROESOPHAGEAL REFLUX DISEASE, UNSPECIFIED WHETHER ESOPHAGITIS PRESENT: Primary | ICD-10-CM

## 2023-03-01 PROCEDURE — G2066 INTER DEVC REMOTE 30D: HCPCS | Performed by: NUCLEAR MEDICINE

## 2023-03-01 PROCEDURE — 93298 REM INTERROG DEV EVAL SCRMS: CPT | Performed by: NUCLEAR MEDICINE

## 2023-03-01 NOTE — TELEPHONE ENCOUNTER
Mildred Hernandez failed his barium swallow- Dr. Shailesh Woody ordered manometry- Endo RN will call him to schedule. Pt voiced  understanding.

## 2023-03-07 ENCOUNTER — HOSPITAL ENCOUNTER (OUTPATIENT)
Age: 58
Setting detail: OUTPATIENT SURGERY
Discharge: HOME | End: 2023-03-07
Attending: INTERNAL MEDICINE | Admitting: INTERNAL MEDICINE
Payer: MEDICAID

## 2023-03-07 ENCOUNTER — TELEPHONE (OUTPATIENT)
Dept: SURGERY | Age: 58
End: 2023-03-07

## 2023-03-07 VITALS
DIASTOLIC BLOOD PRESSURE: 58 MMHG | HEIGHT: 70 IN | TEMPERATURE: 96.2 F | HEART RATE: 64 BPM | BODY MASS INDEX: 22.53 KG/M2 | OXYGEN SATURATION: 96 % | RESPIRATION RATE: 18 BRPM | SYSTOLIC BLOOD PRESSURE: 100 MMHG

## 2023-03-07 PROCEDURE — 6370000000 HC RX 637 (ALT 250 FOR IP): Performed by: INTERNAL MEDICINE

## 2023-03-07 PROCEDURE — 3609015500 HC GASTRIC/DUODENAL MOTILITY &/OR MANOMETRY STUDY: Performed by: INTERNAL MEDICINE

## 2023-03-07 RX ORDER — LIDOCAINE HYDROCHLORIDE 20 MG/ML
JELLY TOPICAL PRN
Status: DISCONTINUED | OUTPATIENT
Start: 2023-03-07 | End: 2023-03-07 | Stop reason: HOSPADM

## 2023-03-07 RX ADMIN — LIDOCAINE HYDROCHLORIDE: 20 JELLY TOPICAL at 08:20

## 2023-03-14 ENCOUNTER — PREP FOR PROCEDURE (OUTPATIENT)
Dept: ENT CLINIC | Age: 58
End: 2023-03-14

## 2023-03-15 RX ORDER — AMLODIPINE BESYLATE 5 MG/1
5 TABLET ORAL DAILY
COMMUNITY
Start: 2023-03-02

## 2023-03-15 RX ORDER — ATORVASTATIN CALCIUM 20 MG/1
20 TABLET, FILM COATED ORAL NIGHTLY
COMMUNITY
Start: 2023-03-02

## 2023-03-15 NOTE — FLOWSHEET NOTE
Follow all instructions given by your physician  NPO after midnight   Sips of water am of surgery with allowed medications  Bring insurance info and 's license  Wear comfortable clean, loose fitting clothing  No jewelry or contact lenses to be worn day of surgery  No glue on dentures morning of surgery;you will be asked to remove them for surgery. Case for glasses. Shower night before and morning of surgery with a liquid antibacterial soap, dry with fresh clean towel; no lotions, creams or powder. Clean sheets and pillow case on bed night before surgery  Bring medications in original bottles    Please refer to the SSI-Surgical Site Infection Flyer you hopefully received in the mail-together we can prevent infections; signs and symptoms reviewed. When discharged be sure you understand how to care for your wound and that you have the phone # to call if you have any concerns or questions about your wound.  needed at discharge and someone over 18 to stay with you for 24 hours overnight (surgery may be cancelled if you don't have this)  Report to [facility] on 2nd floor  If you would become ill prior to surgery, please call the surgeon  May have a visitor with you, we request that you limit to 2 visitors in pre-op area  Masks are recommended but not required, new masks at entrance desk  Call [phone] for any questions    Covid questionnaire Complete; Patient negative for symptoms or exposure. See documentation.

## 2023-03-15 NOTE — PROGRESS NOTES
Pt and S/O was unaware pt was having surgery transferred tp Dr Kym Montiel office after PAT call was done so they could talk to office.

## 2023-03-20 RX ORDER — SODIUM CHLORIDE 0.9 % (FLUSH) 0.9 %
5-40 SYRINGE (ML) INJECTION PRN
Status: CANCELLED | OUTPATIENT
Start: 2023-03-20

## 2023-03-20 RX ORDER — IPRATROPIUM BROMIDE AND ALBUTEROL SULFATE 2.5; .5 MG/3ML; MG/3ML
1 SOLUTION RESPIRATORY (INHALATION) EVERY 4 HOURS PRN
Status: CANCELLED | OUTPATIENT
Start: 2023-03-20

## 2023-03-20 RX ORDER — SODIUM CHLORIDE 9 MG/ML
INJECTION, SOLUTION INTRAVENOUS PRN
Status: CANCELLED | OUTPATIENT
Start: 2023-03-20

## 2023-03-20 RX ORDER — SODIUM CHLORIDE 0.9 % (FLUSH) 0.9 %
5-40 SYRINGE (ML) INJECTION EVERY 12 HOURS SCHEDULED
Status: CANCELLED | OUTPATIENT
Start: 2023-03-20

## 2023-03-22 ENCOUNTER — HOSPITAL ENCOUNTER (OUTPATIENT)
Age: 58
Setting detail: OUTPATIENT SURGERY
Discharge: HOME | End: 2023-03-22
Attending: OTOLARYNGOLOGY | Admitting: OTOLARYNGOLOGY
Payer: MEDICARE

## 2023-03-22 ENCOUNTER — ANESTHESIA EVENT (OUTPATIENT)
Dept: OPERATING ROOM | Age: 58
End: 2023-03-22
Payer: MEDICARE

## 2023-03-22 ENCOUNTER — ANESTHESIA (OUTPATIENT)
Dept: OPERATING ROOM | Age: 58
End: 2023-03-22
Payer: MEDICARE

## 2023-03-22 VITALS
HEIGHT: 70 IN | SYSTOLIC BLOOD PRESSURE: 111 MMHG | OXYGEN SATURATION: 100 % | RESPIRATION RATE: 18 BRPM | HEART RATE: 79 BPM | WEIGHT: 155 LBS | DIASTOLIC BLOOD PRESSURE: 75 MMHG | BODY MASS INDEX: 22.19 KG/M2 | TEMPERATURE: 97.6 F

## 2023-03-22 PROCEDURE — 3700000000 HC ANESTHESIA ATTENDED CARE: Performed by: OTOLARYNGOLOGY

## 2023-03-22 PROCEDURE — 7100000010 HC PHASE II RECOVERY - FIRST 15 MIN: Performed by: OTOLARYNGOLOGY

## 2023-03-22 PROCEDURE — 2709999900 HC NON-CHARGEABLE SUPPLY: Performed by: OTOLARYNGOLOGY

## 2023-03-22 PROCEDURE — 3700000001 HC ADD 15 MINUTES (ANESTHESIA): Performed by: OTOLARYNGOLOGY

## 2023-03-22 PROCEDURE — 2580000003 HC RX 258: Performed by: OTOLARYNGOLOGY

## 2023-03-22 PROCEDURE — APPNB45 APP NON BILLABLE 31-45 MINUTES: Performed by: NURSE PRACTITIONER

## 2023-03-22 PROCEDURE — 6360000002 HC RX W HCPCS: Performed by: OTOLARYNGOLOGY

## 2023-03-22 PROCEDURE — 7100000000 HC PACU RECOVERY - FIRST 15 MIN: Performed by: OTOLARYNGOLOGY

## 2023-03-22 PROCEDURE — 2500000003 HC RX 250 WO HCPCS: Performed by: NURSE ANESTHETIST, CERTIFIED REGISTERED

## 2023-03-22 PROCEDURE — 6360000002 HC RX W HCPCS: Performed by: NURSE ANESTHETIST, CERTIFIED REGISTERED

## 2023-03-22 PROCEDURE — 7100000011 HC PHASE II RECOVERY - ADDTL 15 MIN: Performed by: OTOLARYNGOLOGY

## 2023-03-22 PROCEDURE — 3600000004 HC SURGERY LEVEL 4 BASE: Performed by: OTOLARYNGOLOGY

## 2023-03-22 PROCEDURE — C1726 CATH, BAL DIL, NON-VASCULAR: HCPCS | Performed by: OTOLARYNGOLOGY

## 2023-03-22 PROCEDURE — 3600000014 HC SURGERY LEVEL 4 ADDTL 15MIN: Performed by: OTOLARYNGOLOGY

## 2023-03-22 PROCEDURE — 43195 ESOPHAGOSCOPY RIGID BALLOON: CPT | Performed by: OTOLARYNGOLOGY

## 2023-03-22 PROCEDURE — 7100000001 HC PACU RECOVERY - ADDTL 15 MIN: Performed by: OTOLARYNGOLOGY

## 2023-03-22 RX ORDER — SODIUM CHLORIDE 0.9 % (FLUSH) 0.9 %
5-40 SYRINGE (ML) INJECTION PRN
Status: DISCONTINUED | OUTPATIENT
Start: 2023-03-22 | End: 2023-03-22 | Stop reason: HOSPADM

## 2023-03-22 RX ORDER — SODIUM CHLORIDE 0.9 % (FLUSH) 0.9 %
5-40 SYRINGE (ML) INJECTION EVERY 12 HOURS SCHEDULED
Status: DISCONTINUED | OUTPATIENT
Start: 2023-03-22 | End: 2023-03-22 | Stop reason: HOSPADM

## 2023-03-22 RX ORDER — DEXAMETHASONE SODIUM PHOSPHATE 10 MG/ML
10 INJECTION, EMULSION INTRAMUSCULAR; INTRAVENOUS
Status: COMPLETED | OUTPATIENT
Start: 2023-03-22 | End: 2023-03-22

## 2023-03-22 RX ORDER — SUCCINYLCHOLINE/SOD CL,ISO/PF 200MG/10ML
SYRINGE (ML) INTRAVENOUS PRN
Status: DISCONTINUED | OUTPATIENT
Start: 2023-03-22 | End: 2023-03-22 | Stop reason: SDUPTHER

## 2023-03-22 RX ORDER — PROPOFOL 10 MG/ML
INJECTION, EMULSION INTRAVENOUS CONTINUOUS PRN
Status: DISCONTINUED | OUTPATIENT
Start: 2023-03-22 | End: 2023-03-22 | Stop reason: SDUPTHER

## 2023-03-22 RX ORDER — PROPOFOL 10 MG/ML
INJECTION, EMULSION INTRAVENOUS PRN
Status: DISCONTINUED | OUTPATIENT
Start: 2023-03-22 | End: 2023-03-22 | Stop reason: SDUPTHER

## 2023-03-22 RX ORDER — EPINEPHRINE 1 MG/ML
INJECTION, SOLUTION, CONCENTRATE INTRAVENOUS PRN
Status: DISCONTINUED | OUTPATIENT
Start: 2023-03-22 | End: 2023-03-22 | Stop reason: ALTCHOICE

## 2023-03-22 RX ORDER — IPRATROPIUM BROMIDE AND ALBUTEROL SULFATE 2.5; .5 MG/3ML; MG/3ML
1 SOLUTION RESPIRATORY (INHALATION) EVERY 4 HOURS PRN
Status: DISCONTINUED | OUTPATIENT
Start: 2023-03-22 | End: 2023-03-22 | Stop reason: HOSPADM

## 2023-03-22 RX ORDER — SODIUM CHLORIDE 9 MG/ML
INJECTION, SOLUTION INTRAVENOUS PRN
Status: DISCONTINUED | OUTPATIENT
Start: 2023-03-22 | End: 2023-03-22 | Stop reason: HOSPADM

## 2023-03-22 RX ORDER — ROCURONIUM BROMIDE 10 MG/ML
INJECTION, SOLUTION INTRAVENOUS PRN
Status: DISCONTINUED | OUTPATIENT
Start: 2023-03-22 | End: 2023-03-22 | Stop reason: SDUPTHER

## 2023-03-22 RX ADMIN — PHENYLEPHRINE HYDROCHLORIDE 200 MCG: 10 INJECTION INTRAVENOUS at 14:04

## 2023-03-22 RX ADMIN — ROCURONIUM BROMIDE 10 MG: 50 INJECTION, SOLUTION INTRAVENOUS at 13:55

## 2023-03-22 RX ADMIN — PHENYLEPHRINE HYDROCHLORIDE 200 MCG: 10 INJECTION INTRAVENOUS at 14:16

## 2023-03-22 RX ADMIN — DEXAMETHASONE SODIUM PHOSPHATE 10 MG: 10 INJECTION, EMULSION INTRAMUSCULAR; INTRAVENOUS at 12:27

## 2023-03-22 RX ADMIN — SODIUM CHLORIDE: 9 INJECTION, SOLUTION INTRAVENOUS at 12:24

## 2023-03-22 RX ADMIN — PROPOFOL 100 MCG/KG/MIN: 10 INJECTION, EMULSION INTRAVENOUS at 14:05

## 2023-03-22 RX ADMIN — PROPOFOL 140 MG: 10 INJECTION, EMULSION INTRAVENOUS at 13:55

## 2023-03-22 RX ADMIN — PHENYLEPHRINE HYDROCHLORIDE 200 MCG: 10 INJECTION INTRAVENOUS at 14:17

## 2023-03-22 RX ADMIN — Medication 120 MG: at 13:55

## 2023-03-22 RX ADMIN — Medication 60 MG: at 13:55

## 2023-03-22 RX ADMIN — PHENYLEPHRINE HYDROCHLORIDE 200 MCG: 10 INJECTION INTRAVENOUS at 14:10

## 2023-03-22 RX ADMIN — PHENYLEPHRINE HYDROCHLORIDE 200 MCG: 10 INJECTION INTRAVENOUS at 14:50

## 2023-03-22 RX ADMIN — SODIUM CHLORIDE 3000 MG: 900 INJECTION INTRAVENOUS at 14:05

## 2023-03-22 RX ADMIN — ROCURONIUM BROMIDE 40 MG: 50 INJECTION, SOLUTION INTRAVENOUS at 14:10

## 2023-03-22 ASSESSMENT — PAIN SCALES - GENERAL
PAINLEVEL_OUTOF10: 2

## 2023-03-22 ASSESSMENT — PAIN - FUNCTIONAL ASSESSMENT: PAIN_FUNCTIONAL_ASSESSMENT: 0-10

## 2023-03-22 ASSESSMENT — PAIN DESCRIPTION - DESCRIPTORS: DESCRIPTORS: ACHING

## 2023-03-22 NOTE — PROGRESS NOTES
Pt returned to [facility] room 3. Vitals and assessment as charted. 0.9 infusing, @950ml to count from PACU. Pt has muffin and water. Family at the bedside. Pt and family verbalized understanding of discharge criteria and call light use. Call light in reach.

## 2023-03-22 NOTE — ANESTHESIA POSTPROCEDURE EVALUATION
03/22/23 1510 (!) 113/59 -- -- 73 18 93 %   03/22/23 1505 (!) 99/59 98.4 °F (36.9 °C) Temporal 72 18 94 %       Level of Consciousness:  Awake    Respiratory:  Stable    Oxygen Saturation:  Stable    Cardiovascular:  Stable    Hydration:  Adequate    PONV:  Stable    Post-op Pain:  Adequate analgesia    Post-op Assessment:  No apparent anesthetic complications    Additional Follow-Up / Treatment / Comment:  None    Adnrea Wall MD  March 22, 2023   4:37 PM

## 2023-03-22 NOTE — PROGRESS NOTES
Pt has met discharge criteria and states he is ready for discharge to home. IV removed, gauze and tape applied. Dressed in own clothes and personal belongings gathered. Discharge instructions (with opioid medication education information) given to pt and family; pt and family verbalized understanding of discharge instructions, prescriptions and follow up appointments. Pt transported to discharge lobby by [place] [facility] staff.

## 2023-03-22 NOTE — PROGRESS NOTES
1505 Awake and oriented , denies any pain or nausea and drifts back to sleep   1520 resting resp easy   1530 awakens to name easily denies any needs , drifts back to sleep easily   1535 meets criteria for discharge , transported to [facility]

## 2023-03-22 NOTE — ANESTHESIA POSTPROCEDURE EVALUATION
03/22/23 1510 (!) 113/59 -- -- 73 18 93 %   03/22/23 1505 (!) 99/59 98.4 °F (36.9 °C) Temporal 72 18 94 %       Level of Consciousness:  Awake    Respiratory:  Stable    Oxygen Saturation:  Stable    Cardiovascular:  Stable    Hydration:  Adequate    PONV:  Stable    Post-op Pain:  Adequate analgesia    Post-op Assessment:  No apparent anesthetic complications    Additional Follow-Up / Treatment / Comment:  None    Gloria Hubbard MD  March 22, 2023   4:39 PM

## 2023-03-22 NOTE — ANESTHESIA PRE PROCEDURE
Department of Anesthesiology  Preprocedure Note       Name:  Duke Player   Age:  62 y.o.  :  1965                                          MRN:  [id number]         Date:  3/22/2023      Surgeon: Angelina Valle):  Lambert Schulz MD    Procedure: Procedure(s):  SUSPENSION LARYNGOSCOPY FOR ACCESS, FLEXIBLE ESOPHAGOSCOPY WITH SAVORY WIRE BOUGIE DILATION    Medications prior to admission:   Prior to Admission medications    Medication Sig Start Date End Date Taking? Authorizing Provider   amLODIPine (NORVASC) 5 MG tablet Take 5 mg by mouth daily 3/2/23   Historical Provider, MD   atorvastatin (LIPITOR) 20 MG tablet Take 20 mg by mouth at bedtime 3/2/23   Historical Provider, MD   omeprazole (PRILOSEC) 40 MG delayed release capsule Take 1 capsule by mouth 2 times daily (before meals) 23  Lambert Schulz MD   famotidine (PEPCID) 40 MG tablet Take 1 tablet by mouth 2 times daily 23  Lambert Schulz MD   sucralfate (CARAFATE) 1 GM/10ML suspension Take 10 mLs by mouth 4 times daily 23  Lambert Schulz MD   losartan (COZAAR) 50 MG tablet Take 50 mg by mouth daily 22   Historical Provider, MD   traZODone (DESYREL) 50 MG tablet Take 50 mg by mouth daily 22   Historical Provider, MD   metoprolol tartrate (LOPRESSOR) 50 MG tablet TAKE 1 TABLET BY MOUTH TWICE DAILY 22   Amber Mendes MD   levothyroxine (SYNTHROID) 125 MCG tablet Take one tablet first thing in the morning on an empty stomach. 21   Historical Provider, MD   mirtazapine (REMERON) 15 MG tablet Take 15 mg by mouth daily 21   Historical Provider, MD   OLANZapine (ZYPREXA) 10 MG tablet Take 10 mg by mouth nightly 21   Historical Provider, MD   OXcarbazepine (TRILEPTAL) 150 MG tablet Take 150 mg by mouth daily 21   Historical Provider, MD   oxyCODONE-acetaminophen (PERCOCET) 5-325 MG per tablet Take 1 tablet by mouth every 6 hours as needed.  21   Historical Provider, MD

## 2023-03-22 NOTE — OP NOTE
a direct line of sight view of the esophageal inlet and thus was necessary to do this as safely as possible. We achieved this goal and then passed a 18-20 mm 8 cm pneumatic dilator through the esophageal inlet which was narrow and a transverse fishmouth orientation but free of granulation tissue. While preparing to do this there was a steady flow of gastric secretions into the hypopharynx consistent with extra esophageal reflux being a significant part of this patient's disease. I then began to deploy the balloon going in atmosphere at the time and looking for any signs of rupture and gross disruption of the inlet which was not seen. I gradually got to 6 pradeep and inflated the balloon enough so that no further volume needed to be pumped and to maintain the 6 pradeep pressure. After approximately 30 seconds at this pressure, I deflated the balloon completely and withdrew it. There was mucous membrane shearing but no signs of full-thickness tearing was seen at any point in the perimeter of the esophageal inlet. I suctioned out the bloody effluent and gastric secretions and consider the operation concluded. I removed the rigid hardware and turned the patient back to anesthesia for reversal extubation in the operating room. This was carried out without incident and the patient was taken recovery in satisfactory condition. Estimated blood loss in the case was less than 5 cc and the patient received approximately half a liter of intravenous lactated Ringer's intraoperatively. No blood products were transfused. No intraoperative complications were detected. Counts were considered accurate x3. I was present for and participated in the critical portions of the operation. Disposition: The patient be discharged home following the first and second phases of recovery if he has no particular problems.   He will have his diet advanced as he had previously and we will continue with the efforts in conjunction with

## 2023-03-22 NOTE — DISCHARGE INSTRUCTIONS
Resume diet as before or as tolerated    Resume all prescribed medications (no changes)    Activity as tolerated

## 2023-03-22 NOTE — H&P
05/25/2022 10:23 AM     LABALBU 3.9 01/09/2023 02:23 PM     LABALBU 3.9 08/29/2022 11:25 AM     LABALBU 4.2 05/25/2022 10:23 AM     BILITOT 0.3 01/09/2023 02:23 PM     BILITOT 0.2 08/29/2022 11:25 AM     BILITOT 0.3 05/25/2022 10:23 AM     ALKPHOS 68 01/09/2023 02:23 PM     ALKPHOS 75 08/29/2022 11:25 AM     ALKPHOS 84 05/25/2022 10:23 AM     AST 19 01/09/2023 02:23 PM     AST 23 08/29/2022 11:25 AM     AST 21 05/25/2022 10:23 AM     ALT 11 01/09/2023 02:23 PM     ALT 13 08/29/2022 11:25 AM     ALT 14 05/25/2022 10:23 AM         All of the past medical history, past surgical history, family history,social history, allergies and current medications were reviewed with the patient. Assessment & Plan   Diagnoses and all orders for this visit:       Diagnosis Orders   1. Gastroesophageal reflux disease with esophagitis without hemorrhage  1 [facility]. [facility]       2. 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 Esophageal stricture  [address]       4. Pharyngoesophageal dysphagia  [address]       5. Pharyngeal stenosis  1 [facility]             Based on his history and physical findings as well as on the reports of his ability to eat and drink anything he did like, it is incumbent upon me to get him to Dr. eMlody Ness in the [facility] for evaluation and treatment of his reflux tendency. He strongly corroborated reflux is an important problem stating that he has severe heartburn now and has been taking multiple medications to quell this process over the past several months often without success. Fortunately his biopsy during his recent esophagoscopy was negative for Meeks's esophagus. I recommend that he start on Carafate along with his dual agent double dose proton pump inhibitor and twice daily H2 receptor antagonist.  He agreed to do so.   I reviewed with him the \"5C's\" diet

## 2023-04-04 ENCOUNTER — OFFICE VISIT (OUTPATIENT)
Dept: ENT CLINIC | Age: 58
End: 2023-04-04

## 2023-04-04 VITALS
TEMPERATURE: 97.3 F | BODY MASS INDEX: 22.99 KG/M2 | HEART RATE: 68 BPM | SYSTOLIC BLOOD PRESSURE: 114 MMHG | DIASTOLIC BLOOD PRESSURE: 70 MMHG | RESPIRATION RATE: 16 BRPM | WEIGHT: 160.2 LBS

## 2023-04-04 DIAGNOSIS — R13.14 PHARYNGOESOPHAGEAL DYSPHAGIA: ICD-10-CM

## 2023-04-04 DIAGNOSIS — J34.89 REFRACTORY OBSTRUCTION OF NASAL AIRWAY: ICD-10-CM

## 2023-04-04 DIAGNOSIS — J34.3 HYPERTROPHY OF INFERIOR NASAL TURBINATE: ICD-10-CM

## 2023-04-04 DIAGNOSIS — L59.8 RADIATION SKIN FIBROSIS FROM THERAPEUTIC PROCEDURE: ICD-10-CM

## 2023-04-04 DIAGNOSIS — J39.2 PHARYNGEAL STENOSIS: ICD-10-CM

## 2023-04-04 DIAGNOSIS — J34.2 NASAL SEPTAL DEVIATION: ICD-10-CM

## 2023-04-04 DIAGNOSIS — Z01.818 PRE-OP TESTING: Primary | ICD-10-CM

## 2023-04-04 DIAGNOSIS — K22.2 ESOPHAGEAL STRICTURE: Primary | ICD-10-CM

## 2023-04-04 DIAGNOSIS — K21.00 GASTROESOPHAGEAL REFLUX DISEASE WITH ESOPHAGITIS WITHOUT HEMORRHAGE: ICD-10-CM

## 2023-04-04 NOTE — PROGRESS NOTES
tablet Take 1 tablet by mouth daily      metoprolol tartrate (LOPRESSOR) 50 MG tablet TAKE 1 TABLET BY MOUTH TWICE DAILY 180 tablet 3    levothyroxine (SYNTHROID) 125 MCG tablet Take one tablet first thing in the morning on an empty stomach.      mirtazapine (REMERON) 15 MG tablet Take 1 tablet by mouth daily      OLANZapine (ZYPREXA) 10 MG tablet Take 1 tablet by mouth nightly      OXcarbazepine (TRILEPTAL) 150 MG tablet Take 1 tablet by mouth daily      oxyCODONE-acetaminophen (PERCOCET) 5-325 MG per tablet Take 1 tablet by mouth every 6 hours as needed. Sertraline HCl (ZOLOFT PO) Take 1 tablet by mouth daily Pt unsure on dose      lisinopril (PRINIVIL;ZESTRIL) 20 MG tablet Take 5 mg by mouth daily Take am of sx       No current facility-administered medications for this visit.      Past Medical History:   Diagnosis Date    A-fib ([facility] [address]     Baki-no blood thinners    Anxiety     Aortic aneurysm (HCC)     Arthritis     Asthma     Depression     Head and neck cancer ([facility]     Hyperlipidemia     Hypertension     Lymphedema     Syncope 12/22/2020    Syncope and collapse     Thyroid disease       Past Surgical History:   Procedure Laterality Date    ESOPHAGEAL MOTILITY STUDY N/A 3/7/2023    ESOPHAGEAL MOTILITY/MANOMETRY STUDY performed by Ed Darby MD at [place] [id number]  10/02/2015    LARYNGOSCOPY N/A 06/03/2022    Rigid Laryngoscopy Esophagoscopy Diagnostic performed by Duyen Ayala MD at [address]  N/A 1/11/2023    SUSPENSION LARYNGOSCOPY (FOR ACCESS), FLEXIBLE ESOPHAGOSCOPY WITH SAVORY WIRE BOUGIE DILATION AND BIOPSIES performed by Duyen Ayala MD at [address]  N/A 3/22/2023    SUSPENSION LARYNGOSCOPY FOR ACCESS RIGID ESOPHAGOSCOPY WITH BALLOON DILATION performed by Duyen Ayala MD at [address] [facility]  10/02/2015    OTHER SURGICAL HISTORY  09/06/2022    linq recorder    OTHER SURGICAL HISTORY  06/20/2016    peg tube
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
## 2023-04-04 NOTE — LETTER
April 4, 2023      Brian Woody, APRN - CNP  [address] [facility] [address]      Patient: John Royal   MR Number: [id number]   YOB: 1965   Date of Visit: 4/4/2023       Dear Brian Woody:    Thank you for referring Francine David to me for evaluation/treatment. Below are the relevant portions of my assessment and plan of care. Assessment & Plan   Diagnoses and all orders for this visit:    {No diagnosis found. (Refresh or delete this SmartLink)}    The findings were explained and his questions were answered. Based on the patient's history and these physical findings, the patient has evidence of nasal trauma from the efforts of placing the manometry probe through the right nasal airway. I recommended that he get rescheduled but have the tech instill 2 or 3 puffs of tetracaine instead of lidocaine since he appears to be lidocaine and sensitive. Upwards of 10% of the population are. I encouraged him that his nasal trauma will heal in the next couple of weeks. He is to use saline spray on that side gently blow to clear the fetid mucus that has accumulated around his wound. 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 He has no signs of recurrent cancer or any new lesions. His reflux work-up and the potential that he will get his reflux tendency surgically diminished, is very important and his ultimate care because anything I do to open up his esophageal inlet will make his reflux much worse if his lower esophageal patency is not addressed. He and his wife reported understanding how important this is and will contact the GERD center to get rescheduled for the motility study. If necessary the patient can come up to my office and I will spray him with the tetracaine appear so that he can go downstairs and get the procedure if they have no access to sprayed tetracaine.     I
much worse if his lower esophageal patency is not addressed. He and his wife reported understanding how important this is and will contact the GERD center to get rescheduled for the motility study. If necessary the patient can come up to my office and I will spray him with the tetracaine appear so that he can go downstairs and get the procedure if they have no access to sprayed tetracaine. I spent over 40 minutes of face-to-face time with the patient the majority of which was dedicated to reviewing his complex history and structuring his follow-up care. Return in about 4 weeks (around 5/2/2023) for Follow-up evaluation and to plan additional care as indicated. If you have questions, please do not hesitate to call me. I look forward to following Elise Angulo along with you.     Sincerely,        Duyen Ayala MD

## 2023-04-05 ENCOUNTER — TELEPHONE (OUTPATIENT)
Dept: CARDIOLOGY CLINIC | Age: 58
End: 2023-04-05

## 2023-04-05 ENCOUNTER — PROCEDURE VISIT (OUTPATIENT)
Dept: CARDIOLOGY CLINIC | Age: 58
End: 2023-04-05
Payer: MEDICARE

## 2023-04-05 DIAGNOSIS — R55 VASOVAGAL SYNCOPE: ICD-10-CM

## 2023-04-05 DIAGNOSIS — R55 SYNCOPE, UNSPECIFIED SYNCOPE TYPE: Primary | ICD-10-CM

## 2023-04-05 PROCEDURE — 93298 REM INTERROG DEV EVAL SCRMS: CPT | Performed by: NUCLEAR MEDICINE

## 2023-04-05 PROCEDURE — G2066 INTER DEVC REMOTE 30D: HCPCS | Performed by: NUCLEAR MEDICINE

## 2023-04-05 NOTE — TELEPHONE ENCOUNTER
Tonia Iván is scheduled for surgery with Dr Sierra on 5/4/23. We are requesting cardiac clearance. Surgery:  Laryngoscopy for access, Esophagoscopy with savory wire bougie dilation    Please advise. Thank you!
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
## 2023-04-18 ENCOUNTER — ANESTHESIA EVENT (OUTPATIENT)
Dept: ENDOSCOPY | Age: 58
End: 2023-04-18
Payer: MEDICARE

## 2023-04-18 ENCOUNTER — ANESTHESIA (OUTPATIENT)
Dept: ENDOSCOPY | Age: 58
End: 2023-04-18
Payer: MEDICARE

## 2023-04-18 ENCOUNTER — HOSPITAL ENCOUNTER (OUTPATIENT)
Age: 58
Setting detail: OUTPATIENT SURGERY
Discharge: HOME | End: 2023-04-18
Attending: INTERNAL MEDICINE | Admitting: INTERNAL MEDICINE
Payer: MEDICARE

## 2023-04-18 VITALS
WEIGHT: 152.8 LBS | HEIGHT: 70 IN | HEART RATE: 82 BPM | SYSTOLIC BLOOD PRESSURE: 118 MMHG | OXYGEN SATURATION: 98 % | DIASTOLIC BLOOD PRESSURE: 69 MMHG | TEMPERATURE: 97 F | BODY MASS INDEX: 21.88 KG/M2 | RESPIRATION RATE: 14 BRPM

## 2023-04-18 PROCEDURE — 7100000010 HC PHASE II RECOVERY - FIRST 15 MIN: Performed by: INTERNAL MEDICINE

## 2023-04-18 PROCEDURE — 6360000002 HC RX W HCPCS: Performed by: NURSE ANESTHETIST, CERTIFIED REGISTERED

## 2023-04-18 PROCEDURE — 2500000003 HC RX 250 WO HCPCS: Performed by: NURSE ANESTHETIST, CERTIFIED REGISTERED

## 2023-04-18 PROCEDURE — 3700000000 HC ANESTHESIA ATTENDED CARE: Performed by: INTERNAL MEDICINE

## 2023-04-18 PROCEDURE — 2720000010 HC SURG SUPPLY STERILE: Performed by: INTERNAL MEDICINE

## 2023-04-18 PROCEDURE — 3700000001 HC ADD 15 MINUTES (ANESTHESIA): Performed by: INTERNAL MEDICINE

## 2023-04-18 PROCEDURE — 2580000003 HC RX 258: Performed by: INTERNAL MEDICINE

## 2023-04-18 PROCEDURE — 3604323500 HC GERD TEST W/ELECTRODE (BRAVO): Performed by: INTERNAL MEDICINE

## 2023-04-18 PROCEDURE — 7100000011 HC PHASE II RECOVERY - ADDTL 15 MIN: Performed by: INTERNAL MEDICINE

## 2023-04-18 RX ORDER — PROPOFOL 10 MG/ML
INJECTION, EMULSION INTRAVENOUS PRN
Status: DISCONTINUED | OUTPATIENT
Start: 2023-04-18 | End: 2023-04-18 | Stop reason: SDUPTHER

## 2023-04-18 RX ORDER — SODIUM CHLORIDE, SODIUM LACTATE, POTASSIUM CHLORIDE, CALCIUM CHLORIDE 600; 310; 30; 20 MG/100ML; MG/100ML; MG/100ML; MG/100ML
INJECTION, SOLUTION INTRAVENOUS CONTINUOUS
Status: DISCONTINUED | OUTPATIENT
Start: 2023-04-18 | End: 2023-04-18 | Stop reason: HOSPADM

## 2023-04-18 RX ORDER — LIDOCAINE HYDROCHLORIDE 20 MG/ML
INJECTION, SOLUTION EPIDURAL; INFILTRATION; INTRACAUDAL; PERINEURAL PRN
Status: DISCONTINUED | OUTPATIENT
Start: 2023-04-18 | End: 2023-04-18 | Stop reason: SDUPTHER

## 2023-04-18 RX ADMIN — LIDOCAINE HYDROCHLORIDE 80 MG: 20 INJECTION, SOLUTION EPIDURAL; INFILTRATION; INTRACAUDAL; PERINEURAL at 14:30

## 2023-04-18 RX ADMIN — SODIUM CHLORIDE, POTASSIUM CHLORIDE, SODIUM LACTATE AND CALCIUM CHLORIDE: 600; 310; 30; 20 INJECTION, SOLUTION INTRAVENOUS at 12:34

## 2023-04-18 RX ADMIN — PROPOFOL 100 MG: 10 INJECTION, EMULSION INTRAVENOUS at 14:33

## 2023-04-18 RX ADMIN — PROPOFOL 50 MG: 10 INJECTION, EMULSION INTRAVENOUS at 14:35

## 2023-04-18 RX ADMIN — PROPOFOL 50 MG: 10 INJECTION, EMULSION INTRAVENOUS at 14:37

## 2023-04-18 RX ADMIN — PROPOFOL 100 MG: 10 INJECTION, EMULSION INTRAVENOUS at 14:30

## 2023-04-18 ASSESSMENT — PAIN - FUNCTIONAL ASSESSMENT: PAIN_FUNCTIONAL_ASSESSMENT: NONE - DENIES PAIN

## 2023-04-18 NOTE — ANESTHESIA POSTPROCEDURE EVALUATION
Department of Anesthesiology  Postprocedure Note    Patient: Suzanne Martinez  MRN: [id number]  YOB: 1965  Date of evaluation: 4/18/2023      Procedure Summary     Date: 04/18/23 Room / Location: [address] [place] [address] / [address] [place] [address]    Anesthesia Start: 3811 Anesthesia Stop: 1450    Procedure: BRAVO Diagnosis:       Gastroesophageal reflux disease, unspecified whether esophagitis present      Esophageal dysphagia      (Gastroesophageal reflux disease, unspecified whether esophagitis present [K21.9])      (Esophageal dysphagia [R13.19])    Surgeons: Luc Hernandez MD Responsible Provider:     Anesthesia Type: MAC ASA Status: 3          Anesthesia Type: MAC    Romeo Phase I: Romeo Score: 10    Romeo Phase II:        Anesthesia Post Evaluation    Patient location during evaluation: bedside  Patient participation: complete - patient participated  Level of consciousness: sleepy but conscious  Airway patency: patent  Nausea & Vomiting: no nausea and no vomiting  Complications: no  Cardiovascular status: hemodynamically stable  Respiratory status: acceptable and spontaneous ventilation  Hydration status: stable

## 2023-04-18 NOTE — PROGRESS NOTES
EGD completed. Pictures taken. No biopsies taken. Patient tolerated well. Melendez completed. SCJ located at 39 cm. Bravo capsule placed at 33 cm. BRAVO 01    Scope  used.

## 2023-04-18 NOTE — ANESTHESIA PRE PROCEDURE
01/09/2023 02:23 PM    RBC 3.42 01/09/2023 02:23 PM    HGB 9.3 01/09/2023 02:23 PM    HCT 29.9 01/09/2023 02:23 PM    MCV 87.4 01/09/2023 02:23 PM    RDW 14.4 08/29/2022 11:25 AM     01/09/2023 02:23 PM       CMP:   Lab Results   Component Value Date/Time     01/09/2023 02:23 PM    K 4.1 01/09/2023 02:23 PM    K 3.3 12/10/2020 07:25 AM    CL 98 01/09/2023 02:23 PM    CO2 23 01/09/2023 02:23 PM    BUN 9 01/09/2023 02:23 PM    CREATININE 1.3 01/09/2023 02:23 PM    LABGLOM >60 01/09/2023 02:23 PM    GLUCOSE 142 01/09/2023 02:23 PM    PROT 6.6 01/09/2023 02:23 PM    CALCIUM 9.4 01/09/2023 02:23 PM    BILITOT 0.3 01/09/2023 02:23 PM    ALKPHOS 68 01/09/2023 02:23 PM    AST 19 01/09/2023 02:23 PM    ALT 11 01/09/2023 02:23 PM       POC Tests: No results for input(s): POCGLU, POCNA, POCK, POCCL, POCBUN, POCHEMO, POCHCT in the last 72 hours.     Coags:   Lab Results   Component Value Date/Time    INR 0.92 12/10/2020 07:25 AM    APTT 27.1 12/10/2020 07:25 AM       HCG (If Applicable): No results found for: PREGTESTUR, PREGSERUM, HCG, HCGQUANT     ABGs: No results found for: PHART, PO2ART, FIE3ORS, DRC9LOI, BEART, P1KNDCBV     Type & Screen (If Applicable):  No results found for: LABABO, LABRH    Drug/Infectious Status (If Applicable):  No results found for: HIV, HEPCAB    COVID-19 Screening (If Applicable):   Lab Results   Component Value Date/Time    COVID19 NEGATIVE 11/19/2021 10:25 AM    COVID19 NOT DETECTED 05/05/2021 04:54 PM           Anesthesia Evaluation  Patient summary reviewed no history of anesthetic complications:   Airway: Mallampati: II  TM distance: >3 FB   Neck ROM: full  Mouth opening: > = 3 FB   Dental:          Pulmonary:   (+) decreased breath sounds asthma:                            Cardiovascular:    (+) hypertension:, dysrhythmias: atrial fibrillation, hyperlipidemia                  Neuro/Psych:   (+) depression/anxiety             GI/Hepatic/Renal:             Endo/Other:

## 2023-04-18 NOTE — DISCHARGE INSTRUCTIONS
IMPRESSION:      1. Normal exam from the esophagus to the second portion of the duodenum . 2. There were radiation changes in the oropharynx , altered anatomy . 3. The BRAVO probe was placed in the esophagus  33 cm , the gastroesophageal junction is at 39 cm. RECOMMENDATIONS:    1. Follow instructions for BRAVO  2. It will take a minimum of two weeks for Dr. Sunita Lua to read and download the data from the [facility] device.    3. Forward results to Dr. Aspen Beltran

## 2023-04-18 NOTE — PROGRESS NOTES
Recovery mode, arouses easily, denies discomfort. Taking fluids,  discussed findings and plan of care with pt, understandings verbalized.

## 2023-04-27 ENCOUNTER — PREP FOR PROCEDURE (OUTPATIENT)
Dept: ENT CLINIC | Age: 58
End: 2023-04-27

## 2023-04-28 ENCOUNTER — HOSPITAL ENCOUNTER (INPATIENT)
Age: 58
LOS: 4 days | Discharge: HOME | DRG: 374 | End: 2023-05-02
Attending: INTERNAL MEDICINE | Admitting: INTERNAL MEDICINE
Payer: MEDICARE

## 2023-04-28 ENCOUNTER — HOSPITAL ENCOUNTER (OUTPATIENT)
Age: 58
Discharge: HOME | End: 2023-04-28
Payer: MEDICARE

## 2023-04-28 ENCOUNTER — TELEPHONE (OUTPATIENT)
Dept: ENT CLINIC | Age: 58
End: 2023-04-28

## 2023-04-28 ENCOUNTER — APPOINTMENT (OUTPATIENT)
Dept: GENERAL RADIOLOGY | Age: 58
DRG: 374 | End: 2023-04-28
Payer: MEDICARE

## 2023-04-28 DIAGNOSIS — Z01.818 PRE-OP TESTING: ICD-10-CM

## 2023-04-28 DIAGNOSIS — D64.9 SYMPTOMATIC ANEMIA: Primary | ICD-10-CM

## 2023-04-28 PROBLEM — D62 ACUTE BLOOD LOSS ANEMIA: Status: ACTIVE | Noted: 2023-04-28

## 2023-04-28 LAB
ABO: NORMAL
ACANTHOCYTES: ABNORMAL
ALBUMIN SERPL BCG-MCNC: 4.2 G/DL (ref 3.5–5.1)
ALP SERPL-CCNC: 59 U/L (ref 38–126)
ALT SERPL W/O P-5'-P-CCNC: 7 U/L (ref 11–66)
ANION GAP SERPL CALC-SCNC: 13 MEQ/L (ref 8–16)
ANION GAP SERPL CALC-SCNC: 15 MEQ/L (ref 8–16)
ANISOCYTOSIS BLD QL SMEAR: PRESENT
ANISOCYTOSIS BLD QL SMEAR: PRESENT
ANTIBODY SCREEN: NORMAL
AST SERPL-CCNC: 15 U/L (ref 5–40)
BASOPHILS ABSOLUTE: 0.1 THOU/MM3 (ref 0–0.1)
BASOPHILS ABSOLUTE: 0.1 THOU/MM3 (ref 0–0.1)
BASOPHILS NFR BLD AUTO: 0.8 %
BASOPHILS NFR BLD AUTO: 0.8 %
BILIRUB SERPL-MCNC: 0.3 MG/DL (ref 0.3–1.2)
BUN SERPL-MCNC: 10 MG/DL (ref 7–22)
BUN SERPL-MCNC: 11 MG/DL (ref 7–22)
CALCIUM SERPL-MCNC: 9.7 MG/DL (ref 8.5–10.5)
CALCIUM SERPL-MCNC: 9.8 MG/DL (ref 8.5–10.5)
CHLORIDE SERPL-SCNC: 100 MEQ/L (ref 98–111)
CHLORIDE SERPL-SCNC: 102 MEQ/L (ref 98–111)
CO2 SERPL-SCNC: 21 MEQ/L (ref 23–33)
CO2 SERPL-SCNC: 22 MEQ/L (ref 23–33)
CREAT SERPL-MCNC: 1.1 MG/DL (ref 0.4–1.2)
CREAT SERPL-MCNC: 1.2 MG/DL (ref 0.4–1.2)
DEPRECATED RDW RBC AUTO: 54.9 FL (ref 35–45)
DEPRECATED RDW RBC AUTO: 54.9 FL (ref 35–45)
EOSINOPHIL NFR BLD AUTO: 1.2 %
EOSINOPHIL NFR BLD AUTO: 1.2 %
EOSINOPHILS ABSOLUTE: 0.1 THOU/MM3 (ref 0–0.4)
EOSINOPHILS ABSOLUTE: 0.1 THOU/MM3 (ref 0–0.4)
ERYTHROCYTE [DISTWIDTH] IN BLOOD BY AUTOMATED COUNT: 22.7 % (ref 11.5–14.5)
ERYTHROCYTE [DISTWIDTH] IN BLOOD BY AUTOMATED COUNT: 23 % (ref 11.5–14.5)
GFR SERPL CREATININE-BSD FRML MDRD: > 60 ML/MIN/1.73M2
GFR SERPL CREATININE-BSD FRML MDRD: > 60 ML/MIN/1.73M2
GLUCOSE SERPL-MCNC: 106 MG/DL (ref 70–108)
GLUCOSE SERPL-MCNC: 110 MG/DL (ref 70–108)
HCT VFR BLD AUTO: 22.6 % (ref 42–52)
HCT VFR BLD AUTO: 22.8 % (ref 42–52)
HCT VFR BLD AUTO: 24.9 % (ref 42–52)
HEMOCCULT STL QL: NEGATIVE
HGB BLD-MCNC: 5.8 GM/DL (ref 14–18)
HGB BLD-MCNC: 6 GM/DL (ref 14–18)
HGB BLD-MCNC: 7.1 GM/DL (ref 14–18)
HGB RETIC QN AUTO: 17.8 PG (ref 28.2–35.7)
HYPOCHROMIA BLD QL SMEAR: PRESENT
HYPOCHROMIA BLD QL SMEAR: PRESENT
IMM GRANULOCYTES # BLD AUTO: 0.03 THOU/MM3 (ref 0–0.07)
IMM GRANULOCYTES # BLD AUTO: 0.05 THOU/MM3 (ref 0–0.07)
IMM GRANULOCYTES NFR BLD AUTO: 0.3 %
IMM GRANULOCYTES NFR BLD AUTO: 0.6 %
IMM RETICS NFR: 56.1 % (ref 2.3–13.4)
LYMPHOCYTES ABSOLUTE: 2 THOU/MM3 (ref 1–4.8)
LYMPHOCYTES ABSOLUTE: 3.3 THOU/MM3 (ref 1–4.8)
LYMPHOCYTES NFR BLD AUTO: 23.5 %
LYMPHOCYTES NFR BLD AUTO: 36.9 %
MAGNESIUM SERPL-MCNC: 2.1 MG/DL (ref 1.6–2.4)
MCH RBC QN AUTO: 17.8 PG (ref 26–33)
MCH RBC QN AUTO: 18.5 PG (ref 26–33)
MCHC RBC AUTO-ENTMCNC: 25.7 GM/DL (ref 32.2–35.5)
MCHC RBC AUTO-ENTMCNC: 26.3 GM/DL (ref 32.2–35.5)
MCV RBC AUTO: 69.5 FL (ref 80–94)
MCV RBC AUTO: 70.2 FL (ref 80–94)
MICROCYTES BLD QL SMEAR: PRESENT
MONOCYTES ABSOLUTE: 0.9 THOU/MM3 (ref 0.4–1.3)
MONOCYTES ABSOLUTE: 1.1 THOU/MM3 (ref 0.4–1.3)
MONOCYTES NFR BLD AUTO: 10.3 %
MONOCYTES NFR BLD AUTO: 12.3 %
NEUTROPHILS NFR BLD AUTO: 50.5 %
NEUTROPHILS NFR BLD AUTO: 61.6 %
NRBC BLD AUTO-RTO: 5 /100 WBC
NRBC BLD AUTO-RTO: 6 /100 WBC
OSMOLALITY SERPL CALC.SUM OF ELEC: 272 MOSMOL/KG (ref 275–300)
PATHOLOGIST REVIEW: ABNORMAL
PLATELET # BLD AUTO: 491 THOU/MM3 (ref 130–400)
PLATELET # BLD AUTO: 503 THOU/MM3 (ref 130–400)
PMV BLD AUTO: 9.2 FL (ref 9.4–12.4)
PMV BLD AUTO: 9.7 FL (ref 9.4–12.4)
POIKILOCYTES: ABNORMAL
POLYCHROMASIA BLD QL SMEAR: ABNORMAL
POTASSIUM SERPL-SCNC: 3.9 MEQ/L (ref 3.5–5.2)
POTASSIUM SERPL-SCNC: 4.5 MEQ/L (ref 3.5–5.2)
PROT SERPL-MCNC: 7.1 G/DL (ref 6.1–8)
RBC # BLD AUTO: 3.25 MILL/MM3 (ref 4.7–6.1)
RBC # BLD AUTO: 3.25 MILL/MM3 (ref 4.7–6.1)
RETICS # AUTO: 40 THOU/MM3 (ref 20–115)
RETICS/RBC NFR AUTO: 1.2 % (ref 0.5–2)
RH FACTOR: NORMAL
SCAN OF BLOOD SMEAR: NORMAL
SCHISTOCYTES BLD QL SMEAR: ABNORMAL
SEGMENTED NEUTROPHILS ABSOLUTE COUNT: 4.5 THOU/MM3 (ref 1.8–7.7)
SEGMENTED NEUTROPHILS ABSOLUTE COUNT: 5.3 THOU/MM3 (ref 1.8–7.7)
SODIUM SERPL-SCNC: 136 MEQ/L (ref 135–145)
SODIUM SERPL-SCNC: 137 MEQ/L (ref 135–145)
TARGETS BLD QL SMEAR: ABNORMAL
TROPONIN T: < 0.01 NG/ML
WBC # BLD AUTO: 8.6 THOU/MM3 (ref 4.8–10.8)
WBC # BLD AUTO: 8.9 THOU/MM3 (ref 4.8–10.8)

## 2023-04-28 PROCEDURE — 6360000002 HC RX W HCPCS: Performed by: STUDENT IN AN ORGANIZED HEALTH CARE EDUCATION/TRAINING PROGRAM

## 2023-04-28 PROCEDURE — 85025 COMPLETE CBC W/AUTO DIFF WBC: CPT

## 2023-04-28 PROCEDURE — 86901 BLOOD TYPING SEROLOGIC RH(D): CPT

## 2023-04-28 PROCEDURE — 86900 BLOOD TYPING SEROLOGIC ABO: CPT

## 2023-04-28 PROCEDURE — 84484 ASSAY OF TROPONIN QUANT: CPT

## 2023-04-28 PROCEDURE — 93005 ELECTROCARDIOGRAM TRACING: CPT | Performed by: PHYSICIAN ASSISTANT

## 2023-04-28 PROCEDURE — 2580000003 HC RX 258: Performed by: STUDENT IN AN ORGANIZED HEALTH CARE EDUCATION/TRAINING PROGRAM

## 2023-04-28 PROCEDURE — C9113 INJ PANTOPRAZOLE SODIUM, VIA: HCPCS | Performed by: STUDENT IN AN ORGANIZED HEALTH CARE EDUCATION/TRAINING PROGRAM

## 2023-04-28 PROCEDURE — 6370000000 HC RX 637 (ALT 250 FOR IP): Performed by: STUDENT IN AN ORGANIZED HEALTH CARE EDUCATION/TRAINING PROGRAM

## 2023-04-28 PROCEDURE — 99285 EMERGENCY DEPT VISIT HI MDM: CPT

## 2023-04-28 PROCEDURE — 85018 HEMOGLOBIN: CPT

## 2023-04-28 PROCEDURE — 71045 X-RAY EXAM CHEST 1 VIEW: CPT

## 2023-04-28 PROCEDURE — 85046 RETICYTE/HGB CONCENTRATE: CPT

## 2023-04-28 PROCEDURE — P9016 RBC LEUKOCYTES REDUCED: HCPCS

## 2023-04-28 PROCEDURE — 82728 ASSAY OF FERRITIN: CPT

## 2023-04-28 PROCEDURE — 86923 COMPATIBILITY TEST ELECTRIC: CPT

## 2023-04-28 PROCEDURE — 30233N1 TRANSFUSION OF NONAUTOLOGOUS RED BLOOD CELLS INTO PERIPHERAL VEIN, PERCUTANEOUS APPROACH: ICD-10-PCS | Performed by: INTERNAL MEDICINE

## 2023-04-28 PROCEDURE — 86850 RBC ANTIBODY SCREEN: CPT

## 2023-04-28 PROCEDURE — 36415 COLL VENOUS BLD VENIPUNCTURE: CPT

## 2023-04-28 PROCEDURE — 99223 1ST HOSP IP/OBS HIGH 75: CPT | Performed by: INTERNAL MEDICINE

## 2023-04-28 PROCEDURE — 82607 VITAMIN B-12: CPT

## 2023-04-28 PROCEDURE — 80053 COMPREHEN METABOLIC PANEL: CPT

## 2023-04-28 PROCEDURE — 83540 ASSAY OF IRON: CPT

## 2023-04-28 PROCEDURE — 1200000003 HC TELEMETRY R&B

## 2023-04-28 PROCEDURE — 82272 OCCULT BLD FECES 1-3 TESTS: CPT

## 2023-04-28 PROCEDURE — 82746 ASSAY OF FOLIC ACID SERUM: CPT

## 2023-04-28 PROCEDURE — 83550 IRON BINDING TEST: CPT

## 2023-04-28 PROCEDURE — 83735 ASSAY OF MAGNESIUM: CPT

## 2023-04-28 PROCEDURE — 85014 HEMATOCRIT: CPT

## 2023-04-28 RX ORDER — ONDANSETRON 4 MG/1
4 TABLET, ORALLY DISINTEGRATING ORAL EVERY 8 HOURS PRN
Status: DISCONTINUED | OUTPATIENT
Start: 2023-04-28 | End: 2023-05-02 | Stop reason: HOSPADM

## 2023-04-28 RX ORDER — SODIUM CHLORIDE 0.9 % (FLUSH) 0.9 %
5-40 SYRINGE (ML) INJECTION EVERY 12 HOURS SCHEDULED
Status: DISCONTINUED | OUTPATIENT
Start: 2023-04-28 | End: 2023-04-29

## 2023-04-28 RX ORDER — OXCARBAZEPINE 300 MG/1
150 TABLET, FILM COATED ORAL DAILY
Status: DISCONTINUED | OUTPATIENT
Start: 2023-04-28 | End: 2023-05-02 | Stop reason: HOSPADM

## 2023-04-28 RX ORDER — PANTOPRAZOLE SODIUM 40 MG/10ML
40 INJECTION, POWDER, LYOPHILIZED, FOR SOLUTION INTRAVENOUS 2 TIMES DAILY
Status: DISCONTINUED | OUTPATIENT
Start: 2023-04-28 | End: 2023-04-28

## 2023-04-28 RX ORDER — POLYETHYLENE GLYCOL 3350 17 G/17G
17 POWDER, FOR SOLUTION ORAL DAILY PRN
Status: DISCONTINUED | OUTPATIENT
Start: 2023-04-28 | End: 2023-05-02 | Stop reason: HOSPADM

## 2023-04-28 RX ORDER — TRAZODONE HYDROCHLORIDE 50 MG/1
50 TABLET ORAL DAILY
Status: DISCONTINUED | OUTPATIENT
Start: 2023-04-28 | End: 2023-04-29

## 2023-04-28 RX ORDER — LEVOTHYROXINE SODIUM 0.12 MG/1
125 TABLET ORAL DAILY
Status: DISCONTINUED | OUTPATIENT
Start: 2023-04-29 | End: 2023-05-02 | Stop reason: HOSPADM

## 2023-04-28 RX ORDER — ONDANSETRON 2 MG/ML
4 INJECTION INTRAMUSCULAR; INTRAVENOUS EVERY 6 HOURS PRN
Status: DISCONTINUED | OUTPATIENT
Start: 2023-04-28 | End: 2023-05-02 | Stop reason: HOSPADM

## 2023-04-28 RX ORDER — SUCRALFATE 1 G/1
1 TABLET ORAL
Status: DISCONTINUED | OUTPATIENT
Start: 2023-04-28 | End: 2023-05-02 | Stop reason: HOSPADM

## 2023-04-28 RX ORDER — SODIUM CHLORIDE 0.9 % (FLUSH) 0.9 %
5-40 SYRINGE (ML) INJECTION PRN
Status: DISCONTINUED | OUTPATIENT
Start: 2023-04-28 | End: 2023-04-29

## 2023-04-28 RX ORDER — SODIUM CHLORIDE 9 MG/ML
INJECTION, SOLUTION INTRAVENOUS CONTINUOUS
Status: DISCONTINUED | OUTPATIENT
Start: 2023-04-28 | End: 2023-04-30

## 2023-04-28 RX ORDER — SODIUM CHLORIDE 9 MG/ML
INJECTION, SOLUTION INTRAVENOUS PRN
Status: DISCONTINUED | OUTPATIENT
Start: 2023-04-28 | End: 2023-04-29

## 2023-04-28 RX ORDER — OLANZAPINE 10 MG/1
10 TABLET ORAL NIGHTLY
Status: DISCONTINUED | OUTPATIENT
Start: 2023-04-28 | End: 2023-05-02 | Stop reason: HOSPADM

## 2023-04-28 RX ORDER — SODIUM CHLORIDE 9 MG/ML
INJECTION, SOLUTION INTRAVENOUS PRN
Status: DISCONTINUED | OUTPATIENT
Start: 2023-04-28 | End: 2023-05-02 | Stop reason: HOSPADM

## 2023-04-28 RX ORDER — ACETAMINOPHEN 325 MG/1
650 TABLET ORAL EVERY 6 HOURS PRN
Status: DISCONTINUED | OUTPATIENT
Start: 2023-04-28 | End: 2023-05-02 | Stop reason: HOSPADM

## 2023-04-28 RX ORDER — ACETAMINOPHEN 650 MG/1
650 SUPPOSITORY RECTAL EVERY 6 HOURS PRN
Status: DISCONTINUED | OUTPATIENT
Start: 2023-04-28 | End: 2023-05-02 | Stop reason: HOSPADM

## 2023-04-28 RX ORDER — ATORVASTATIN CALCIUM 20 MG/1
20 TABLET, FILM COATED ORAL NIGHTLY
Status: DISCONTINUED | OUTPATIENT
Start: 2023-04-29 | End: 2023-05-02 | Stop reason: HOSPADM

## 2023-04-28 RX ADMIN — SUCRALFATE 1 G: 1 TABLET ORAL at 22:20

## 2023-04-28 RX ADMIN — OXCARBAZEPINE 150 MG: 300 TABLET, FILM COATED ORAL at 22:20

## 2023-04-28 RX ADMIN — OLANZAPINE 10 MG: 10 TABLET, FILM COATED ORAL at 22:20

## 2023-04-28 RX ADMIN — SODIUM CHLORIDE 8 MG/HR: 9 INJECTION, SOLUTION INTRAVENOUS at 22:18

## 2023-04-28 ASSESSMENT — ENCOUNTER SYMPTOMS
COUGH: 0
EYES NEGATIVE: 1
GASTROINTESTINAL NEGATIVE: 1
ALLERGIC/IMMUNOLOGIC NEGATIVE: 1
DIARRHEA: 0
SHORTNESS OF BREATH: 1
NAUSEA: 0
SORE THROAT: 0
VOMITING: 0

## 2023-04-28 ASSESSMENT — PAIN - FUNCTIONAL ASSESSMENT
PAIN_FUNCTIONAL_ASSESSMENT: NONE - DENIES PAIN

## 2023-04-29 ENCOUNTER — ANESTHESIA (OUTPATIENT)
Dept: ENDOSCOPY | Age: 58
End: 2023-04-29
Payer: MEDICARE

## 2023-04-29 ENCOUNTER — ANESTHESIA EVENT (OUTPATIENT)
Dept: ENDOSCOPY | Age: 58
End: 2023-04-29
Payer: MEDICARE

## 2023-04-29 LAB
ANION GAP SERPL CALC-SCNC: 11 MEQ/L (ref 8–16)
BUN SERPL-MCNC: 11 MG/DL (ref 7–22)
CA-I BLD ISE-SCNC: 1.13 MMOL/L (ref 1.12–1.32)
CALCIUM SERPL-MCNC: 8.4 MG/DL (ref 8.5–10.5)
CHLORIDE SERPL-SCNC: 109 MEQ/L (ref 98–111)
CO2 SERPL-SCNC: 22 MEQ/L (ref 23–33)
CREAT SERPL-MCNC: 1.2 MG/DL (ref 0.4–1.2)
DEPRECATED RDW RBC AUTO: 62.5 FL (ref 35–45)
EKG ATRIAL RATE: 66 BPM
EKG P AXIS: 59 DEGREES
EKG P-R INTERVAL: 152 MS
EKG Q-T INTERVAL: 406 MS
EKG QRS DURATION: 94 MS
EKG QTC CALCULATION (BAZETT): 425 MS
EKG R AXIS: 18 DEGREES
EKG T AXIS: 47 DEGREES
EKG VENTRICULAR RATE: 66 BPM
ERYTHROCYTE [DISTWIDTH] IN BLOOD BY AUTOMATED COUNT: 24.1 % (ref 11.5–14.5)
FERRITIN SERPL IA-MCNC: 13 NG/ML (ref 22–322)
FOLATE SERPL-MCNC: 5.8 NG/ML (ref 4.8–24.2)
GFR SERPL CREATININE-BSD FRML MDRD: > 60 ML/MIN/1.73M2
GLUCOSE SERPL-MCNC: 106 MG/DL (ref 70–108)
HCT VFR BLD AUTO: 23.1 % (ref 42–52)
HCT VFR BLD AUTO: 23.4 % (ref 42–52)
HCT VFR BLD AUTO: 24.6 % (ref 42–52)
HCT VFR BLD AUTO: 25.3 % (ref 42–52)
HCT VFR BLD AUTO: 26.3 % (ref 42–52)
HGB BLD-MCNC: 6.5 GM/DL (ref 14–18)
HGB BLD-MCNC: 6.5 GM/DL (ref 14–18)
HGB BLD-MCNC: 7 GM/DL (ref 14–18)
HGB BLD-MCNC: 7.2 GM/DL (ref 14–18)
HGB BLD-MCNC: 7.3 GM/DL (ref 14–18)
IRON SATN MFR SERPL: 3 % (ref 20–50)
IRON SERPL-MCNC: 13 UG/DL (ref 65–195)
MCH RBC QN AUTO: 20.6 PG (ref 26–33)
MCHC RBC AUTO-ENTMCNC: 27.8 GM/DL (ref 32.2–35.5)
MCV RBC AUTO: 74.1 FL (ref 80–94)
OSMOLALITY SERPL CALC.SUM OF ELEC: 282.9 MOSMOL/KG (ref 275–300)
PLATELET # BLD AUTO: 405 THOU/MM3 (ref 130–400)
PMV BLD AUTO: 10 FL (ref 9.4–12.4)
POTASSIUM SERPL-SCNC: 4 MEQ/L (ref 3.5–5.2)
RBC # BLD AUTO: 3.16 MILL/MM3 (ref 4.7–6.1)
SODIUM SERPL-SCNC: 142 MEQ/L (ref 135–145)
TIBC SERPL-MCNC: 472 UG/DL (ref 171–450)
VIT B12 SERPL-MCNC: 578 PG/ML (ref 211–911)
WBC # BLD AUTO: 7.6 THOU/MM3 (ref 4.8–10.8)

## 2023-04-29 PROCEDURE — C9113 INJ PANTOPRAZOLE SODIUM, VIA: HCPCS | Performed by: STUDENT IN AN ORGANIZED HEALTH CARE EDUCATION/TRAINING PROGRAM

## 2023-04-29 PROCEDURE — 6360000002 HC RX W HCPCS: Performed by: STUDENT IN AN ORGANIZED HEALTH CARE EDUCATION/TRAINING PROGRAM

## 2023-04-29 PROCEDURE — 6360000002 HC RX W HCPCS: Performed by: INTERNAL MEDICINE

## 2023-04-29 PROCEDURE — 6370000000 HC RX 637 (ALT 250 FOR IP)

## 2023-04-29 PROCEDURE — 88342 IMHCHEM/IMCYTCHM 1ST ANTB: CPT

## 2023-04-29 PROCEDURE — 99232 SBSQ HOSP IP/OBS MODERATE 35: CPT | Performed by: INTERNAL MEDICINE

## 2023-04-29 PROCEDURE — 36415 COLL VENOUS BLD VENIPUNCTURE: CPT

## 2023-04-29 PROCEDURE — 0DB58ZX EXCISION OF ESOPHAGUS, VIA NATURAL OR ARTIFICIAL OPENING ENDOSCOPIC, DIAGNOSTIC: ICD-10-PCS | Performed by: INTERNAL MEDICINE

## 2023-04-29 PROCEDURE — 85018 HEMOGLOBIN: CPT

## 2023-04-29 PROCEDURE — 2580000003 HC RX 258: Performed by: STUDENT IN AN ORGANIZED HEALTH CARE EDUCATION/TRAINING PROGRAM

## 2023-04-29 PROCEDURE — 85027 COMPLETE CBC AUTOMATED: CPT

## 2023-04-29 PROCEDURE — 7100000011 HC PHASE II RECOVERY - ADDTL 15 MIN: Performed by: INTERNAL MEDICINE

## 2023-04-29 PROCEDURE — 80048 BASIC METABOLIC PNL TOTAL CA: CPT

## 2023-04-29 PROCEDURE — 93010 ELECTROCARDIOGRAM REPORT: CPT | Performed by: INTERNAL MEDICINE

## 2023-04-29 PROCEDURE — 3609012400 HC EGD TRANSORAL BIOPSY SINGLE/MULTIPLE: Performed by: INTERNAL MEDICINE

## 2023-04-29 PROCEDURE — 7100000010 HC PHASE II RECOVERY - FIRST 15 MIN: Performed by: INTERNAL MEDICINE

## 2023-04-29 PROCEDURE — 36430 TRANSFUSION BLD/BLD COMPNT: CPT

## 2023-04-29 PROCEDURE — 88305 TISSUE EXAM BY PATHOLOGIST: CPT

## 2023-04-29 PROCEDURE — 2580000003 HC RX 258: Performed by: NURSE ANESTHETIST, CERTIFIED REGISTERED

## 2023-04-29 PROCEDURE — 1200000003 HC TELEMETRY R&B

## 2023-04-29 PROCEDURE — 6370000000 HC RX 637 (ALT 250 FOR IP): Performed by: STUDENT IN AN ORGANIZED HEALTH CARE EDUCATION/TRAINING PROGRAM

## 2023-04-29 PROCEDURE — 6360000002 HC RX W HCPCS: Performed by: NURSE ANESTHETIST, CERTIFIED REGISTERED

## 2023-04-29 PROCEDURE — 3700000000 HC ANESTHESIA ATTENDED CARE: Performed by: INTERNAL MEDICINE

## 2023-04-29 PROCEDURE — 2580000003 HC RX 258: Performed by: INTERNAL MEDICINE

## 2023-04-29 PROCEDURE — 82330 ASSAY OF CALCIUM: CPT

## 2023-04-29 PROCEDURE — 3700000001 HC ADD 15 MINUTES (ANESTHESIA): Performed by: INTERNAL MEDICINE

## 2023-04-29 PROCEDURE — 85014 HEMATOCRIT: CPT

## 2023-04-29 RX ORDER — SODIUM CHLORIDE 9 MG/ML
25 INJECTION, SOLUTION INTRAVENOUS PRN
Status: DISCONTINUED | OUTPATIENT
Start: 2023-04-29 | End: 2023-04-29 | Stop reason: HOSPADM

## 2023-04-29 RX ORDER — AMLODIPINE BESYLATE 5 MG/1
5 TABLET ORAL DAILY
Status: DISCONTINUED | OUTPATIENT
Start: 2023-04-29 | End: 2023-04-29

## 2023-04-29 RX ORDER — SODIUM CHLORIDE 9 MG/ML
INJECTION, SOLUTION INTRAVENOUS PRN
Status: DISCONTINUED | OUTPATIENT
Start: 2023-04-29 | End: 2023-05-02 | Stop reason: HOSPADM

## 2023-04-29 RX ORDER — SODIUM CHLORIDE 0.9 % (FLUSH) 0.9 %
5-40 SYRINGE (ML) INJECTION PRN
Status: DISCONTINUED | OUTPATIENT
Start: 2023-04-29 | End: 2023-04-29 | Stop reason: HOSPADM

## 2023-04-29 RX ORDER — SODIUM CHLORIDE 9 MG/ML
INJECTION, SOLUTION INTRAVENOUS CONTINUOUS PRN
Status: DISCONTINUED | OUTPATIENT
Start: 2023-04-29 | End: 2023-04-29 | Stop reason: SDUPTHER

## 2023-04-29 RX ORDER — AMLODIPINE BESYLATE 5 MG/1
5 TABLET ORAL DAILY
Status: DISCONTINUED | OUTPATIENT
Start: 2023-04-30 | End: 2023-05-02 | Stop reason: HOSPADM

## 2023-04-29 RX ORDER — OXYCODONE HYDROCHLORIDE AND ACETAMINOPHEN 5; 325 MG/1; MG/1
1 TABLET ORAL EVERY 6 HOURS PRN
Status: DISCONTINUED | OUTPATIENT
Start: 2023-04-29 | End: 2023-05-02 | Stop reason: HOSPADM

## 2023-04-29 RX ORDER — TRAZODONE HYDROCHLORIDE 50 MG/1
50 TABLET ORAL NIGHTLY
Status: DISCONTINUED | OUTPATIENT
Start: 2023-04-29 | End: 2023-05-02 | Stop reason: HOSPADM

## 2023-04-29 RX ORDER — MIRTAZAPINE 15 MG/1
15 TABLET, FILM COATED ORAL DAILY
Status: DISCONTINUED | OUTPATIENT
Start: 2023-04-30 | End: 2023-05-02 | Stop reason: HOSPADM

## 2023-04-29 RX ORDER — LOSARTAN POTASSIUM 50 MG/1
50 TABLET ORAL DAILY
Status: DISCONTINUED | OUTPATIENT
Start: 2023-04-29 | End: 2023-05-02 | Stop reason: HOSPADM

## 2023-04-29 RX ORDER — SODIUM CHLORIDE 0.9 % (FLUSH) 0.9 %
5-40 SYRINGE (ML) INJECTION EVERY 12 HOURS SCHEDULED
Status: DISCONTINUED | OUTPATIENT
Start: 2023-04-29 | End: 2023-04-29 | Stop reason: HOSPADM

## 2023-04-29 RX ORDER — MIRTAZAPINE 15 MG/1
15 TABLET, FILM COATED ORAL DAILY
Status: DISCONTINUED | OUTPATIENT
Start: 2023-04-29 | End: 2023-04-29

## 2023-04-29 RX ORDER — METOPROLOL TARTRATE 50 MG/1
50 TABLET, FILM COATED ORAL 2 TIMES DAILY
Status: DISCONTINUED | OUTPATIENT
Start: 2023-04-29 | End: 2023-05-02 | Stop reason: HOSPADM

## 2023-04-29 RX ADMIN — SODIUM CHLORIDE: 9 INJECTION, SOLUTION INTRAVENOUS at 10:17

## 2023-04-29 RX ADMIN — SODIUM CHLORIDE: 9 INJECTION, SOLUTION INTRAVENOUS at 05:23

## 2023-04-29 RX ADMIN — IRON SUCROSE 300 MG: 20 INJECTION, SOLUTION INTRAVENOUS at 14:47

## 2023-04-29 RX ADMIN — SODIUM CHLORIDE 8 MG/HR: 9 INJECTION, SOLUTION INTRAVENOUS at 05:53

## 2023-04-29 RX ADMIN — LEVOTHYROXINE SODIUM 125 MCG: 0.12 TABLET ORAL at 06:55

## 2023-04-29 RX ADMIN — SUCRALFATE 1 G: 1 TABLET ORAL at 06:55

## 2023-04-29 RX ADMIN — PROPOFOL 30 MG: 10 INJECTION, EMULSION INTRAVENOUS at 10:22

## 2023-04-29 RX ADMIN — PROPOFOL 30 MG: 10 INJECTION, EMULSION INTRAVENOUS at 10:20

## 2023-04-29 RX ADMIN — ATORVASTATIN CALCIUM 20 MG: 20 TABLET, FILM COATED ORAL at 21:35

## 2023-04-29 RX ADMIN — PROPOFOL 100 MG: 10 INJECTION, EMULSION INTRAVENOUS at 10:25

## 2023-04-29 RX ADMIN — SUCRALFATE 1 G: 1 TABLET ORAL at 18:27

## 2023-04-29 RX ADMIN — SUCRALFATE 1 G: 1 TABLET ORAL at 21:35

## 2023-04-29 RX ADMIN — METOPROLOL TARTRATE 50 MG: 50 TABLET, FILM COATED ORAL at 21:35

## 2023-04-29 RX ADMIN — PROPOFOL 30 MG: 10 INJECTION, EMULSION INTRAVENOUS at 10:21

## 2023-04-29 RX ADMIN — OXCARBAZEPINE 150 MG: 300 TABLET, FILM COATED ORAL at 21:36

## 2023-04-29 RX ADMIN — PROPOFOL 30 MG: 10 INJECTION, EMULSION INTRAVENOUS at 10:19

## 2023-04-29 RX ADMIN — SODIUM CHLORIDE 8 MG/HR: 9 INJECTION, SOLUTION INTRAVENOUS at 21:32

## 2023-04-29 RX ADMIN — OLANZAPINE 10 MG: 10 TABLET, FILM COATED ORAL at 21:35

## 2023-04-29 RX ADMIN — PROPOFOL 80 MG: 10 INJECTION, EMULSION INTRAVENOUS at 10:23

## 2023-04-29 NOTE — ANESTHESIA POSTPROCEDURE EVALUATION
Department of Anesthesiology  Postprocedure Note    Patient: Orlando Santos  MRN: [id number]  YOB: 1965  Date of evaluation: 4/29/2023      Procedure Summary     Date: 04/29/23 Room / Location: [address] [place] [address]    Anesthesia Start: 0597 Anesthesia Stop: 1031    Procedure: EGD BIOPSY (Left: Esophagus) Diagnosis:       Anemia, unspecified type      (Anemia, unspecified type [D64.9])    Surgeons: Juan Miguel Ho MD Responsible Provider: Kanu Menchaca MD    Anesthesia Type: MAC ASA Status: 3          Anesthesia Type: No value filed.     Romeo Phase I: Romeo Score: 10    Romeo Phase II:        Anesthesia Post Evaluation    Patient location during evaluation: bedside  Patient participation: complete - patient cannot participate  Level of consciousness: awake and alert  Pain score: 0  Airway patency: patent  Nausea & Vomiting: no vomiting and no nausea  Complications: no  Cardiovascular status: blood pressure returned to baseline and hemodynamically stable  Respiratory status: acceptable, nasal cannula, nonlabored ventilation and spontaneous ventilation  Hydration status: stable

## 2023-04-30 LAB
ANION GAP SERPL CALC-SCNC: 13 MEQ/L (ref 8–16)
BUN SERPL-MCNC: 6 MG/DL (ref 7–22)
CALCIUM SERPL-MCNC: 8.5 MG/DL (ref 8.5–10.5)
CHLORIDE SERPL-SCNC: 116 MEQ/L (ref 98–111)
CO2 SERPL-SCNC: 19 MEQ/L (ref 23–33)
CREAT SERPL-MCNC: 1.1 MG/DL (ref 0.4–1.2)
DEPRECATED RDW RBC AUTO: 65.6 FL (ref 35–45)
ERYTHROCYTE [DISTWIDTH] IN BLOOD BY AUTOMATED COUNT: 24.6 % (ref 11.5–14.5)
GFR SERPL CREATININE-BSD FRML MDRD: > 60 ML/MIN/1.73M2
GLUCOSE SERPL-MCNC: 99 MG/DL (ref 70–108)
HCT VFR BLD AUTO: 24.5 % (ref 42–52)
HCT VFR BLD AUTO: 27.9 % (ref 42–52)
HCT VFR BLD AUTO: 28.2 % (ref 42–52)
HCT VFR BLD AUTO: 30.4 % (ref 42–52)
HGB BLD-MCNC: 7 GM/DL (ref 14–18)
HGB BLD-MCNC: 8.2 GM/DL (ref 14–18)
HGB BLD-MCNC: 8.4 GM/DL (ref 14–18)
HGB BLD-MCNC: 9 GM/DL (ref 14–18)
MCH RBC QN AUTO: 21.7 PG (ref 26–33)
MCHC RBC AUTO-ENTMCNC: 28.6 GM/DL (ref 32.2–35.5)
MCV RBC AUTO: 76.1 FL (ref 80–94)
PLATELET # BLD AUTO: 335 THOU/MM3 (ref 130–400)
PMV BLD AUTO: 9.3 FL (ref 9.4–12.4)
POTASSIUM SERPL-SCNC: 3.5 MEQ/L (ref 3.5–5.2)
RBC # BLD AUTO: 3.22 MILL/MM3 (ref 4.7–6.1)
SODIUM SERPL-SCNC: 148 MEQ/L (ref 135–145)
WBC # BLD AUTO: 8.4 THOU/MM3 (ref 4.8–10.8)

## 2023-04-30 PROCEDURE — 2580000003 HC RX 258: Performed by: INTERNAL MEDICINE

## 2023-04-30 PROCEDURE — 1200000003 HC TELEMETRY R&B

## 2023-04-30 PROCEDURE — 6370000000 HC RX 637 (ALT 250 FOR IP)

## 2023-04-30 PROCEDURE — 85027 COMPLETE CBC AUTOMATED: CPT

## 2023-04-30 PROCEDURE — 36415 COLL VENOUS BLD VENIPUNCTURE: CPT

## 2023-04-30 PROCEDURE — 36430 TRANSFUSION BLD/BLD COMPNT: CPT

## 2023-04-30 PROCEDURE — 2580000003 HC RX 258: Performed by: STUDENT IN AN ORGANIZED HEALTH CARE EDUCATION/TRAINING PROGRAM

## 2023-04-30 PROCEDURE — 6370000000 HC RX 637 (ALT 250 FOR IP): Performed by: STUDENT IN AN ORGANIZED HEALTH CARE EDUCATION/TRAINING PROGRAM

## 2023-04-30 PROCEDURE — 85014 HEMATOCRIT: CPT

## 2023-04-30 PROCEDURE — 6360000002 HC RX W HCPCS: Performed by: INTERNAL MEDICINE

## 2023-04-30 PROCEDURE — 80048 BASIC METABOLIC PNL TOTAL CA: CPT

## 2023-04-30 PROCEDURE — 6370000000 HC RX 637 (ALT 250 FOR IP): Performed by: INTERNAL MEDICINE

## 2023-04-30 PROCEDURE — 85018 HEMOGLOBIN: CPT

## 2023-04-30 RX ORDER — DEXTROSE MONOHYDRATE 50 MG/ML
INJECTION, SOLUTION INTRAVENOUS CONTINUOUS
Status: DISCONTINUED | OUTPATIENT
Start: 2023-04-30 | End: 2023-05-01

## 2023-04-30 RX ORDER — SENNA AND DOCUSATE SODIUM 50; 8.6 MG/1; MG/1
1 TABLET, FILM COATED ORAL NIGHTLY
Status: DISCONTINUED | OUTPATIENT
Start: 2023-04-30 | End: 2023-05-02 | Stop reason: HOSPADM

## 2023-04-30 RX ORDER — SODIUM CHLORIDE 9 MG/ML
INJECTION, SOLUTION INTRAVENOUS PRN
Status: COMPLETED | OUTPATIENT
Start: 2023-04-30 | End: 2023-05-02

## 2023-04-30 RX ORDER — POTASSIUM CHLORIDE 20 MEQ/1
40 TABLET, EXTENDED RELEASE ORAL ONCE
Status: COMPLETED | OUTPATIENT
Start: 2023-04-30 | End: 2023-04-30

## 2023-04-30 RX ORDER — SODIUM CHLORIDE 0.9 % (FLUSH) 0.9 %
5-40 SYRINGE (ML) INJECTION PRN
Status: CANCELLED | OUTPATIENT
Start: 2023-04-30

## 2023-04-30 RX ORDER — SENNA PLUS 8.6 MG/1
15 TABLET ORAL ONCE
Status: COMPLETED | OUTPATIENT
Start: 2023-04-30 | End: 2023-04-30

## 2023-04-30 RX ORDER — SODIUM CHLORIDE 0.9 % (FLUSH) 0.9 %
5-40 SYRINGE (ML) INJECTION EVERY 12 HOURS SCHEDULED
Status: CANCELLED | OUTPATIENT
Start: 2023-04-30

## 2023-04-30 RX ORDER — SODIUM CHLORIDE 9 MG/ML
INJECTION, SOLUTION INTRAVENOUS PRN
Status: CANCELLED | OUTPATIENT
Start: 2023-04-30

## 2023-04-30 RX ADMIN — DEXTROSE MONOHYDRATE: 50 INJECTION, SOLUTION INTRAVENOUS at 17:02

## 2023-04-30 RX ADMIN — POTASSIUM CHLORIDE 40 MEQ: 1500 TABLET, EXTENDED RELEASE ORAL at 12:03

## 2023-04-30 RX ADMIN — LEVOTHYROXINE SODIUM 125 MCG: 0.12 TABLET ORAL at 06:31

## 2023-04-30 RX ADMIN — SUCRALFATE 1 G: 1 TABLET ORAL at 12:03

## 2023-04-30 RX ADMIN — OXCARBAZEPINE 150 MG: 300 TABLET, FILM COATED ORAL at 20:48

## 2023-04-30 RX ADMIN — SUCRALFATE 1 G: 1 TABLET ORAL at 06:31

## 2023-04-30 RX ADMIN — DOCUSATE SODIUM 50 MG AND SENNOSIDES 8.6 MG 1 TABLET: 8.6; 5 TABLET, FILM COATED ORAL at 20:46

## 2023-04-30 RX ADMIN — LOSARTAN POTASSIUM 50 MG: 50 TABLET, FILM COATED ORAL at 09:45

## 2023-04-30 RX ADMIN — METOPROLOL TARTRATE 50 MG: 50 TABLET, FILM COATED ORAL at 20:46

## 2023-04-30 RX ADMIN — SUCRALFATE 1 G: 1 TABLET ORAL at 20:46

## 2023-04-30 RX ADMIN — TRAZODONE HYDROCHLORIDE 50 MG: 50 TABLET ORAL at 20:46

## 2023-04-30 RX ADMIN — MAGNESIUM HYDROXIDE 30 ML: 400 SUSPENSION ORAL at 20:45

## 2023-04-30 RX ADMIN — METOPROLOL TARTRATE 50 MG: 50 TABLET, FILM COATED ORAL at 09:45

## 2023-04-30 RX ADMIN — OLANZAPINE 10 MG: 10 TABLET, FILM COATED ORAL at 20:46

## 2023-04-30 RX ADMIN — ATORVASTATIN CALCIUM 20 MG: 20 TABLET, FILM COATED ORAL at 20:46

## 2023-04-30 RX ADMIN — SENNOSIDES 129 MG: 8.6 TABLET, FILM COATED ORAL at 15:20

## 2023-04-30 RX ADMIN — MIRTAZAPINE 15 MG: 15 TABLET, FILM COATED ORAL at 09:45

## 2023-04-30 RX ADMIN — MAGNESIUM HYDROXIDE 30 ML: 400 SUSPENSION ORAL at 12:04

## 2023-04-30 RX ADMIN — AMLODIPINE BESYLATE 5 MG: 5 TABLET ORAL at 09:45

## 2023-04-30 RX ADMIN — IRON SUCROSE 300 MG: 20 INJECTION, SOLUTION INTRAVENOUS at 13:13

## 2023-05-01 PROBLEM — E43 SEVERE MALNUTRITION (HCC): Chronic | Status: ACTIVE | Noted: 2023-05-01

## 2023-05-01 LAB
ANION GAP SERPL CALC-SCNC: 13 MEQ/L (ref 8–16)
BUN SERPL-MCNC: 5 MG/DL (ref 7–22)
CALCIUM SERPL-MCNC: 8.9 MG/DL (ref 8.5–10.5)
CHLORIDE SERPL-SCNC: 113 MEQ/L (ref 98–111)
CO2 SERPL-SCNC: 23 MEQ/L (ref 23–33)
CREAT SERPL-MCNC: 1 MG/DL (ref 0.4–1.2)
DEPRECATED RDW RBC AUTO: 71.7 FL (ref 35–45)
ERYTHROCYTE [DISTWIDTH] IN BLOOD BY AUTOMATED COUNT: 25.5 % (ref 11.5–14.5)
GFR SERPL CREATININE-BSD FRML MDRD: > 60 ML/MIN/1.73M2
GLUCOSE SERPL-MCNC: 94 MG/DL (ref 70–108)
HCT VFR BLD AUTO: 31.4 % (ref 42–52)
HCT VFR BLD AUTO: 32 % (ref 42–52)
HGB BLD-MCNC: 8.8 GM/DL (ref 14–18)
HGB BLD-MCNC: 8.9 GM/DL (ref 14–18)
MCH RBC QN AUTO: 22.7 PG (ref 26–33)
MCHC RBC AUTO-ENTMCNC: 27.8 GM/DL (ref 32.2–35.5)
MCV RBC AUTO: 81.6 FL (ref 80–94)
PLATELET # BLD AUTO: 315 THOU/MM3 (ref 130–400)
PMV BLD AUTO: 9.6 FL (ref 9.4–12.4)
POTASSIUM SERPL-SCNC: 4.3 MEQ/L (ref 3.5–5.2)
RBC # BLD AUTO: 3.92 MILL/MM3 (ref 4.7–6.1)
SODIUM SERPL-SCNC: 142 MEQ/L (ref 135–145)
SODIUM SERPL-SCNC: 149 MEQ/L (ref 135–145)
WBC # BLD AUTO: 10.2 THOU/MM3 (ref 4.8–10.8)

## 2023-05-01 PROCEDURE — 6360000002 HC RX W HCPCS: Performed by: NURSE PRACTITIONER

## 2023-05-01 PROCEDURE — C9113 INJ PANTOPRAZOLE SODIUM, VIA: HCPCS | Performed by: STUDENT IN AN ORGANIZED HEALTH CARE EDUCATION/TRAINING PROGRAM

## 2023-05-01 PROCEDURE — 80048 BASIC METABOLIC PNL TOTAL CA: CPT

## 2023-05-01 PROCEDURE — 2580000003 HC RX 258: Performed by: INTERNAL MEDICINE

## 2023-05-01 PROCEDURE — 6370000000 HC RX 637 (ALT 250 FOR IP): Performed by: STUDENT IN AN ORGANIZED HEALTH CARE EDUCATION/TRAINING PROGRAM

## 2023-05-01 PROCEDURE — 6360000002 HC RX W HCPCS: Performed by: STUDENT IN AN ORGANIZED HEALTH CARE EDUCATION/TRAINING PROGRAM

## 2023-05-01 PROCEDURE — 2580000003 HC RX 258

## 2023-05-01 PROCEDURE — 85027 COMPLETE CBC AUTOMATED: CPT

## 2023-05-01 PROCEDURE — 2580000003 HC RX 258: Performed by: STUDENT IN AN ORGANIZED HEALTH CARE EDUCATION/TRAINING PROGRAM

## 2023-05-01 PROCEDURE — 85018 HEMOGLOBIN: CPT

## 2023-05-01 PROCEDURE — 6360000002 HC RX W HCPCS: Performed by: INTERNAL MEDICINE

## 2023-05-01 PROCEDURE — C9113 INJ PANTOPRAZOLE SODIUM, VIA: HCPCS | Performed by: NURSE PRACTITIONER

## 2023-05-01 PROCEDURE — 6370000000 HC RX 637 (ALT 250 FOR IP)

## 2023-05-01 PROCEDURE — 85014 HEMATOCRIT: CPT

## 2023-05-01 PROCEDURE — 84295 ASSAY OF SERUM SODIUM: CPT

## 2023-05-01 PROCEDURE — 1200000003 HC TELEMETRY R&B

## 2023-05-01 PROCEDURE — 6370000000 HC RX 637 (ALT 250 FOR IP): Performed by: NURSE PRACTITIONER

## 2023-05-01 PROCEDURE — 36415 COLL VENOUS BLD VENIPUNCTURE: CPT

## 2023-05-01 RX ORDER — POLYETHYLENE GLYCOL 3350 17 G/17G
238 POWDER, FOR SOLUTION ORAL ONCE
Status: COMPLETED | OUTPATIENT
Start: 2023-05-01 | End: 2023-05-01

## 2023-05-01 RX ORDER — SENNA PLUS 8.6 MG/1
15 TABLET ORAL ONCE
Status: COMPLETED | OUTPATIENT
Start: 2023-05-01 | End: 2023-05-01

## 2023-05-01 RX ORDER — PANTOPRAZOLE SODIUM 40 MG/10ML
40 INJECTION, POWDER, LYOPHILIZED, FOR SOLUTION INTRAVENOUS DAILY
Status: DISCONTINUED | OUTPATIENT
Start: 2023-05-01 | End: 2023-05-02 | Stop reason: HOSPADM

## 2023-05-01 RX ADMIN — MAGNESIUM HYDROXIDE 30 ML: 400 SUSPENSION ORAL at 16:08

## 2023-05-01 RX ADMIN — MIRTAZAPINE 15 MG: 15 TABLET, FILM COATED ORAL at 08:45

## 2023-05-01 RX ADMIN — ATORVASTATIN CALCIUM 20 MG: 20 TABLET, FILM COATED ORAL at 22:09

## 2023-05-01 RX ADMIN — DEXTROSE MONOHYDRATE: 50 INJECTION, SOLUTION INTRAVENOUS at 13:42

## 2023-05-01 RX ADMIN — MAGNESIUM HYDROXIDE 30 ML: 400 SUSPENSION ORAL at 22:08

## 2023-05-01 RX ADMIN — OXCARBAZEPINE 150 MG: 300 TABLET, FILM COATED ORAL at 22:07

## 2023-05-01 RX ADMIN — AMLODIPINE BESYLATE 5 MG: 5 TABLET ORAL at 08:45

## 2023-05-01 RX ADMIN — SUCRALFATE 1 G: 1 TABLET ORAL at 22:07

## 2023-05-01 RX ADMIN — MAGNESIUM HYDROXIDE 30 ML: 400 SUSPENSION ORAL at 08:45

## 2023-05-01 RX ADMIN — TRAZODONE HYDROCHLORIDE 50 MG: 50 TABLET ORAL at 22:07

## 2023-05-01 RX ADMIN — LOSARTAN POTASSIUM 50 MG: 50 TABLET, FILM COATED ORAL at 08:45

## 2023-05-01 RX ADMIN — SUCRALFATE 1 G: 1 TABLET ORAL at 12:00

## 2023-05-01 RX ADMIN — POLYETHYLENE GLYCOL 3350 238 G: 17 POWDER, FOR SOLUTION ORAL at 16:09

## 2023-05-01 RX ADMIN — LEVOTHYROXINE SODIUM 125 MCG: 0.12 TABLET ORAL at 06:17

## 2023-05-01 RX ADMIN — SODIUM CHLORIDE 8 MG/HR: 9 INJECTION, SOLUTION INTRAVENOUS at 05:03

## 2023-05-01 RX ADMIN — SUCRALFATE 1 G: 1 TABLET ORAL at 06:17

## 2023-05-01 RX ADMIN — METOPROLOL TARTRATE 50 MG: 50 TABLET, FILM COATED ORAL at 08:45

## 2023-05-01 RX ADMIN — IRON SUCROSE 300 MG: 20 INJECTION, SOLUTION INTRAVENOUS at 11:57

## 2023-05-01 RX ADMIN — DOCUSATE SODIUM 50 MG AND SENNOSIDES 8.6 MG 1 TABLET: 8.6; 5 TABLET, FILM COATED ORAL at 22:08

## 2023-05-01 RX ADMIN — SENNOSIDES 129 MG: 8.6 TABLET, FILM COATED ORAL at 16:14

## 2023-05-01 RX ADMIN — SUCRALFATE 1 G: 1 TABLET ORAL at 16:15

## 2023-05-01 RX ADMIN — PANTOPRAZOLE SODIUM 40 MG: 40 INJECTION, POWDER, FOR SOLUTION INTRAVENOUS at 09:54

## 2023-05-01 RX ADMIN — OLANZAPINE 10 MG: 10 TABLET, FILM COATED ORAL at 22:08

## 2023-05-01 RX ADMIN — METOPROLOL TARTRATE 50 MG: 50 TABLET, FILM COATED ORAL at 22:07

## 2023-05-01 ASSESSMENT — PAIN SCALES - GENERAL
PAINLEVEL_OUTOF10: 0

## 2023-05-01 NOTE — PROGRESS NOTES
Comprehensive Nutrition Assessment    Type and Reason for Visit:  Initial, Positive Nutrition Screen (poor oral intake, unplanned weight loss)    Nutrition Recommendations/Plan:   Consider MVI. Clear liquid ONS initiated: Ensure Clear TID. Continue current diet, advance when able as GI status allows, patient reports he will need soft and bite sized when on solid diet to assist with chewing/swallowing. Malnutrition Assessment:  Malnutrition Status:  Severe malnutrition (05/01/23 1024)    Context:  Chronic Illness     Findings of the 6 clinical characteristics of malnutrition:  Energy Intake:  75% or less estimated energy requirements for 1 month or longer  Weight Loss:  10% over 6 months (10.1% loss in the last 6 months)     Body Fat Loss:  Severe body fat loss Fat Overlying Ribs   Muscle Mass Loss:  Severe muscle mass loss Thigh (quadraceps), Calf (gastrocnemius)  Fluid Accumulation:  No significant fluid accumulation     Strength:  Not Performed    Nutrition Assessment:     Pt. severely malnourished AEB criteria listed above. At risk for further nutritional compromise r/t clear liquid diet, admit with acute blood loss anemia, s/p EGD 4/29/23, early satiety, taste changes,  and underlying medical condition (hx: head/neck Ca 4470-6015, s/p OR, chemo/radiation, previous trach/PEG-removed, achalasia, GERD, alcohol use, smoking, depression/anxiety, HTN, HLD, thyroid disease). Nutrition Related Findings:    Pt. Report/Treatments/Miscellaneous: Patient and wife seen. Reports previous trach and PEG d/t head/neck cancer 2015, PEG removed ~ 5-7 years ago per patient, trach removed as well  Has achalasia has had esophagus dilated in the past most recently 4/18/23. Is having staged procedures with ENT-Dr. Leanne Rothman, stage 3 procedure planned 4/4/23. He has no teeth. Reports he eats soft bite sized foods with thin liquids. ONS use and homemade milk shakes used.   Gets full fast and occasionally has a

## 2023-05-01 NOTE — PROGRESS NOTES
Gastroenterology Progress Note:     Patient Name:  Joann Hernandez   MRN: [id number]  [id number]  YOB: 1965  Admit Date: 4/28/2023  3:10 PM  Primary Care Physician: MADELIN Montelongo Cap, CNP   6K-19/019-A     Patient seen and examined. 24 hours events and chart reviewed. Subjective: Patient resting in bed, significant other present. Denies abd pain, n/v. He had a BM this morning that he says was brown. Hgb 8.9    Objective:  BP (!) 141/96   Pulse 80   Temp 98 °F (36.7 °C) (Oral)   Resp 16   Ht 5' 11\" (1.803 m)   Wt 157 lb 10.1 oz (71.5 kg)   SpO2 99%   BMI 21.98 kg/m²     Physical Exam:    General:  Chronically ill appearing male  HEENT: Atraumatic, normocephalic. Moist oral mucous membranes. Neck: Supple without adenopathy, JVD, thyromegaly or masses. Trachea midline. CV: Heart RRR, no murmurs, rubs, gallops. Resp: Even, easy without cough or accessory use. Lungs clear, diminished to ascultation bilaterally. Abd: Round, soft, non-tender. No hepatosplenomegaly or mass present. Active bowel sounds heard. No distention noted. Ext:  Without cyanosis, clubbing, edema. Skin: Pink, warm, dry  Neuro:  Alert, oriented x 3 with no obvious deficits.        Rectal: deferred    Labs:   CBC:   Lab Results   Component Value Date/Time    WBC 10.2 05/01/2023 05:17 AM    HGB 8.9 05/01/2023 05:17 AM    HCT 32.0 05/01/2023 05:17 AM    MCV 81.6 05/01/2023 05:17 AM     05/01/2023 05:17 AM     BMP:   Lab Results   Component Value Date/Time     05/01/2023 05:17 AM    K 4.3 05/01/2023 05:17 AM    K 3.3 12/10/2020 07:25 AM     05/01/2023 05:17 AM    CO2 23 05/01/2023 05:17 AM    BUN 5 05/01/2023 05:17 AM    CREATININE 1.0 05/01/2023 05:17 AM    CALCIUM 8.9 05/01/2023 05:17 AM     PT/INR:   Lab Results   Component Value Date/Time    INR 0.92 12/10/2020 07:25 AM     Lipids:   Lab Results   Component Value Date/Time    ALKPHOS 59 04/28/2023 12:21 PM    ALT 7 04/28/2023 12:21 PM    AST 15

## 2023-05-01 NOTE — PROGRESS NOTES
Internal Medicine Resident Progress Note    Patient:  Joann Hernandez    YOB: 1965  Unit/Bed:6K-19/019-A  MRN: [id number]    Acct: [de-identified]   PCP: MADELIN Montelongo Cap, CNP    Date of Admission: 4/28/2023      Assessment/Plan:  Symptomatic Anemia  - Unclear etiology; gastritis versus chronic disease versus GI bleed  -Patient admits to shortness of breath/lightheadedness on admission  - 04/28 Hb 5.8  - S/p blood transfusion 04/28 1 units, 04/29 1 unit, 1 unit 4/30.   - S/p 04/29 EGD performed by Dr. Litzy Arguello with samples taken; no evidence of bleeding ulceration noted  - Clear liquid diet per GI; NPO at midnight   - IV PPI and sucralfate regimen per GI  - Continue to monitor for signs and symptoms of bleeding  - Hemoglobin and hematocrit every 6 hours; transfuse if hemoglobin less than 7  - Iron 13, iron saturation 3, ferritin 13, TIBC 472, folate 5.8, vitamin B12 578, absolute reticulocyte #40  - Iron supplementation per GI  - Colonoscopy planned for 5/2/23 w/ Dr. Mil Jennings      Severe GERD with esophagitis  - S/p Bravo capsule 04/18/2023  - Continue PPI gtt and sucralfate regimen  - S/p EGD on 04/29 by Dr. 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

## 2023-05-01 NOTE — CARE COORDINATION
Case Management Assessment  Initial Evaluation    Date/Time of Evaluation: 5/1/2023 12:23 PM  Assessment Completed by: Benjamin Luther RN    If patient is discharged prior to next notation, then this note serves as note for discharge by case management. Patient Name: Анна Roth                   YOB: 1965  Diagnosis: Acute blood loss anemia [D62]  Symptomatic anemia [D64.9]                   Date / Time: 4/28/2023  3:10 PM  Location: [address] [place] [address]     Patient Admission Status: Inpatient   Readmission Risk Low 0-14, Mod 15-19), High > 20: Readmission Risk Score: 10.1    Current PCP: Merline Arista, APRN - CNP  PCP verified by CM? Yes    Chart Reviewed: Yes      History Provided by: Patient  Patient Orientation: Alert and Oriented    Patient Cognition: Alert    Hospitalization in the last 30 days (Readmission):  No    If yes, Readmission Assessment in CM Navigator will be completed. Advance Directives:      Code Status: Full Code   Patient's Primary Decision Maker is: Named in [address] [place] [address]      Discharge Planning:    Patient lives with: Spouse/Significant Other Type of Home: House  Primary Care Giver: Self  Patient Support Systems include: Spouse/Significant Other   Current Financial resources: Medicare  Current community resources: None  Current services prior to admission: None            Current DME:              Type of Home Care services:  None    ADLS  Prior functional level: Independent in ADLs/IADLs  Current functional level: Independent in ADLs/IADLs    Family can provide assistance at DC: Yes  Would you like Case Management to discuss the discharge plan with any other family members/significant others, and if so, who?  No (s.o present for discussion)  Plans to Return to Present Housing: Yes  Other Identified Issues/Barriers to RETURNING to current housing: none  Potential Assistance needed at discharge: N/A            Potential DME:    Patient expects to discharge to: [address] [place] [address]

## 2023-05-02 ENCOUNTER — ANESTHESIA (OUTPATIENT)
Dept: ENDOSCOPY | Age: 58
DRG: 374 | End: 2023-05-02
Payer: MEDICARE

## 2023-05-02 ENCOUNTER — ANESTHESIA EVENT (OUTPATIENT)
Dept: ENDOSCOPY | Age: 58
DRG: 374 | End: 2023-05-02
Payer: MEDICARE

## 2023-05-02 ENCOUNTER — APPOINTMENT (OUTPATIENT)
Dept: CT IMAGING | Age: 58
DRG: 374 | End: 2023-05-02
Payer: MEDICARE

## 2023-05-02 VITALS
OXYGEN SATURATION: 97 % | WEIGHT: 157.63 LBS | HEIGHT: 71 IN | DIASTOLIC BLOOD PRESSURE: 84 MMHG | RESPIRATION RATE: 16 BRPM | HEART RATE: 76 BPM | SYSTOLIC BLOOD PRESSURE: 116 MMHG | TEMPERATURE: 97.9 F | BODY MASS INDEX: 22.07 KG/M2

## 2023-05-02 LAB
ANION GAP SERPL CALC-SCNC: 9 MEQ/L (ref 8–16)
BUN SERPL-MCNC: 5 MG/DL (ref 7–22)
CALCIUM SERPL-MCNC: 8.9 MG/DL (ref 8.5–10.5)
CEA SERPL-MCNC: 2.9 NG/ML (ref 0–5)
CHLORIDE SERPL-SCNC: 107 MEQ/L (ref 98–111)
CO2 SERPL-SCNC: 25 MEQ/L (ref 23–33)
CREAT SERPL-MCNC: 0.9 MG/DL (ref 0.4–1.2)
DEPRECATED RDW RBC AUTO: 70 FL (ref 35–45)
ERYTHROCYTE [DISTWIDTH] IN BLOOD BY AUTOMATED COUNT: 27 % (ref 11.5–14.5)
GFR SERPL CREATININE-BSD FRML MDRD: > 60 ML/MIN/1.73M2
GLUCOSE SERPL-MCNC: 81 MG/DL (ref 70–108)
HCT VFR BLD AUTO: 31.2 % (ref 42–52)
HGB BLD-MCNC: 9 GM/DL (ref 14–18)
MCH RBC QN AUTO: 22.8 PG (ref 26–33)
MCHC RBC AUTO-ENTMCNC: 28.8 GM/DL (ref 32.2–35.5)
MCV RBC AUTO: 79 FL (ref 80–94)
PLATELET # BLD AUTO: 302 THOU/MM3 (ref 130–400)
PMV BLD AUTO: 9.7 FL (ref 9.4–12.4)
POTASSIUM SERPL-SCNC: 3.8 MEQ/L (ref 3.5–5.2)
RBC # BLD AUTO: 3.95 MILL/MM3 (ref 4.7–6.1)
SODIUM SERPL-SCNC: 141 MEQ/L (ref 135–145)
WBC # BLD AUTO: 12.2 THOU/MM3 (ref 4.8–10.8)

## 2023-05-02 PROCEDURE — 82378 CARCINOEMBRYONIC ANTIGEN: CPT

## 2023-05-02 PROCEDURE — 6360000004 HC RX CONTRAST MEDICATION: Performed by: INTERNAL MEDICINE

## 2023-05-02 PROCEDURE — 80048 BASIC METABOLIC PNL TOTAL CA: CPT

## 2023-05-02 PROCEDURE — 3700000000 HC ANESTHESIA ATTENDED CARE: Performed by: INTERNAL MEDICINE

## 2023-05-02 PROCEDURE — 7100000010 HC PHASE II RECOVERY - FIRST 15 MIN: Performed by: INTERNAL MEDICINE

## 2023-05-02 PROCEDURE — 74177 CT ABD & PELVIS W/CONTRAST: CPT

## 2023-05-02 PROCEDURE — 6370000000 HC RX 637 (ALT 250 FOR IP): Performed by: STUDENT IN AN ORGANIZED HEALTH CARE EDUCATION/TRAINING PROGRAM

## 2023-05-02 PROCEDURE — 2709999900 HC NON-CHARGEABLE SUPPLY: Performed by: INTERNAL MEDICINE

## 2023-05-02 PROCEDURE — 6360000002 HC RX W HCPCS: Performed by: REGISTERED NURSE

## 2023-05-02 PROCEDURE — 99222 1ST HOSP IP/OBS MODERATE 55: CPT | Performed by: SURGERY

## 2023-05-02 PROCEDURE — 2580000003 HC RX 258

## 2023-05-02 PROCEDURE — 0DBH8ZX EXCISION OF CECUM, VIA NATURAL OR ARTIFICIAL OPENING ENDOSCOPIC, DIAGNOSTIC: ICD-10-PCS | Performed by: INTERNAL MEDICINE

## 2023-05-02 PROCEDURE — 7100000011 HC PHASE II RECOVERY - ADDTL 15 MIN: Performed by: INTERNAL MEDICINE

## 2023-05-02 PROCEDURE — 0DBN8ZX EXCISION OF SIGMOID COLON, VIA NATURAL OR ARTIFICIAL OPENING ENDOSCOPIC, DIAGNOSTIC: ICD-10-PCS | Performed by: INTERNAL MEDICINE

## 2023-05-02 PROCEDURE — C9113 INJ PANTOPRAZOLE SODIUM, VIA: HCPCS | Performed by: NURSE PRACTITIONER

## 2023-05-02 PROCEDURE — 85027 COMPLETE CBC AUTOMATED: CPT

## 2023-05-02 PROCEDURE — 6360000002 HC RX W HCPCS: Performed by: NURSE PRACTITIONER

## 2023-05-02 PROCEDURE — 2580000003 HC RX 258: Performed by: INTERNAL MEDICINE

## 2023-05-02 PROCEDURE — 6370000000 HC RX 637 (ALT 250 FOR IP)

## 2023-05-02 PROCEDURE — 3700000001 HC ADD 15 MINUTES (ANESTHESIA): Performed by: INTERNAL MEDICINE

## 2023-05-02 PROCEDURE — 88305 TISSUE EXAM BY PATHOLOGIST: CPT

## 2023-05-02 PROCEDURE — 36415 COLL VENOUS BLD VENIPUNCTURE: CPT

## 2023-05-02 PROCEDURE — 3609010600 HC COLONOSCOPY POLYPECTOMY SNARE/COLD BIOPSY: Performed by: INTERNAL MEDICINE

## 2023-05-02 RX ORDER — SODIUM CHLORIDE 9 MG/ML
INJECTION, SOLUTION INTRAVENOUS ONCE
Status: COMPLETED | OUTPATIENT
Start: 2023-05-02 | End: 2023-05-02

## 2023-05-02 RX ORDER — PROPOFOL 10 MG/ML
INJECTION, EMULSION INTRAVENOUS PRN
Status: DISCONTINUED | OUTPATIENT
Start: 2023-05-02 | End: 2023-05-02 | Stop reason: SDUPTHER

## 2023-05-02 RX ADMIN — SUCRALFATE 1 G: 1 TABLET ORAL at 10:51

## 2023-05-02 RX ADMIN — SUCRALFATE 1 G: 1 TABLET ORAL at 05:51

## 2023-05-02 RX ADMIN — PROPOFOL 50 MG: 10 INJECTION, EMULSION INTRAVENOUS at 12:25

## 2023-05-02 RX ADMIN — PANTOPRAZOLE SODIUM 40 MG: 40 INJECTION, POWDER, FOR SOLUTION INTRAVENOUS at 09:10

## 2023-05-02 RX ADMIN — LOSARTAN POTASSIUM 50 MG: 50 TABLET, FILM COATED ORAL at 09:10

## 2023-05-02 RX ADMIN — MIRTAZAPINE 15 MG: 15 TABLET, FILM COATED ORAL at 09:10

## 2023-05-02 RX ADMIN — SODIUM CHLORIDE: 9 INJECTION, SOLUTION INTRAVENOUS at 11:25

## 2023-05-02 RX ADMIN — PROPOFOL 50 MG: 10 INJECTION, EMULSION INTRAVENOUS at 12:16

## 2023-05-02 RX ADMIN — PROPOFOL 50 MG: 10 INJECTION, EMULSION INTRAVENOUS at 12:11

## 2023-05-02 RX ADMIN — METOPROLOL TARTRATE 50 MG: 50 TABLET, FILM COATED ORAL at 09:10

## 2023-05-02 RX ADMIN — AMLODIPINE BESYLATE 5 MG: 5 TABLET ORAL at 09:10

## 2023-05-02 RX ADMIN — PROPOFOL 50 MG: 10 INJECTION, EMULSION INTRAVENOUS at 12:21

## 2023-05-02 RX ADMIN — PROPOFOL 50 MG: 10 INJECTION, EMULSION INTRAVENOUS at 12:33

## 2023-05-02 RX ADMIN — LEVOTHYROXINE SODIUM 125 MCG: 0.12 TABLET ORAL at 05:50

## 2023-05-02 RX ADMIN — SODIUM CHLORIDE: 9 INJECTION, SOLUTION INTRAVENOUS at 12:07

## 2023-05-02 RX ADMIN — IOPAMIDOL 80 ML: 755 INJECTION, SOLUTION INTRAVENOUS at 15:04

## 2023-05-02 RX ADMIN — PROPOFOL 50 MG: 10 INJECTION, EMULSION INTRAVENOUS at 12:30

## 2023-05-02 RX ADMIN — PROPOFOL 50 MG: 10 INJECTION, EMULSION INTRAVENOUS at 12:27

## 2023-05-02 ASSESSMENT — PAIN SCALES - GENERAL
PAINLEVEL_OUTOF10: 0

## 2023-05-02 NOTE — PROGRESS NOTES
Pt was with hs wife and nurse. He was agitated and angry and was saying that he will be going home. He refused prayer and was feeling like discharging himself. 05/02/23 1521   Encounter Summary   Encounter Overview/Reason  Initial Encounter   Service Provided For: Patient and family together   Referral/Consult From: [facility]   Support System Spouse   Last Encounter  05/02/23   Complexity of Encounter Low   Begin Time 1430   End Time  1435   Total Time Calculated 5 min   Spiritual/Emotional needs   Type Spiritual Support   Assessment/Intervention/Outcome   Assessment Anxious; Angry

## 2023-05-02 NOTE — ANESTHESIA PRE PROCEDURE
Department of Anesthesiology  Preprocedure Note       Name:  Hi Pereira   Age:  62 y.o.  :  1965                                          MRN:  [id number]         Date:  2023      Surgeon: Luca Fagan):  Teena Mendenhall MD    Procedure: Procedure(s):  COLONOSCOPY POLYPECTOMY SNARE/COLD BIOPSY    Medications prior to admission:   Prior to Admission medications    Medication Sig Start Date End Date Taking? Authorizing Provider   amLODIPine (NORVASC) 5 MG tablet Take 1 tablet by mouth daily 3/2/23   Historical Provider, MD   atorvastatin (LIPITOR) 20 MG tablet Take 1 tablet by mouth daily 3/2/23   Historical Provider, MD   omeprazole (PRILOSEC) 40 MG delayed release capsule Take 1 capsule by mouth 2 times daily (before meals) 23  Curry Kenney MD   famotidine (PEPCID) 40 MG tablet Take 1 tablet by mouth 2 times daily 23  Curry Kenney MD   sucralfate (CARAFATE) 1 GM/10ML suspension Take 10 mLs by mouth 4 times daily 23  Curry Kenney MD   losartan (COZAAR) 50 MG tablet Take 1 tablet by mouth daily 22   Historical Provider, MD   traZODone (DESYREL) 50 MG tablet Take 1 tablet by mouth daily 22   Historical Provider, MD   metoprolol tartrate (LOPRESSOR) 50 MG tablet TAKE 1 TABLET BY MOUTH TWICE DAILY  Patient taking differently: Take 1 tablet by mouth 2 times daily 22   Jason Mccullough MD   levothyroxine (SYNTHROID) 125 MCG tablet Take one tablet first thing in the morning on an empty stomach. 21   Historical Provider, MD   mirtazapine (REMERON) 15 MG tablet Take 1 tablet by mouth daily 21   Historical Provider, MD   OLANZapine (ZYPREXA) 10 MG tablet Take 1 tablet by mouth nightly 21   Historical Provider, MD   OXcarbazepine (TRILEPTAL) 150 MG tablet Take 1 tablet by mouth daily 21   Historical Provider, MD   oxyCODONE-acetaminophen (PERCOCET) 5-325 MG per tablet Take 1 tablet by mouth every 6 hours as needed.

## 2023-05-02 NOTE — ANESTHESIA POSTPROCEDURE EVALUATION
Department of Anesthesiology  Postprocedure Note    Patient: Ahsan Singletary  MRN: [id number]  YOB: 1965  Date of evaluation: 5/2/2023      Procedure Summary     Date: 05/02/23 Room / Location: [address] [place] [address] / [address] [place] [address]    Anesthesia Start: 1207 Anesthesia Stop: 4140    Procedure: COLONOSCOPY POLYPECTOMY SNARE/COLD BIOPSY Diagnosis:       Gastrointestinal hemorrhage, unspecified gastrointestinal hemorrhage type      Anemia, unspecified type      (Gastrointestinal hemorrhage, unspecified gastrointestinal hemorrhage type [K92.2])      (Anemia, unspecified type [D64.9])    Surgeons: Campbell Watkins MD Responsible Provider: Emelyn Hernandez MD    Anesthesia Type: Not recorded ASA Status: Not recorded          Anesthesia Type: No value filed.     Romeo Phase I: Romeo Score: 10    Romeo Phase II: Romeo Score: 9      Anesthesia Post Evaluation    Patient location during evaluation: PACU  Patient participation: complete - patient participated  Level of consciousness: awake  Pain score: 0  Airway patency: patent  Nausea & Vomiting: no vomiting and no nausea  Complications: no  Cardiovascular status: hemodynamically stable  Respiratory status: spontaneous ventilation and room air  Hydration status: stable

## 2023-05-02 NOTE — PROGRESS NOTES
Scope # . Colonoscopy completed, tolerated well. 1 polyp removed with hot snare, biopsies taken,1 jars labeled and sent to lab for processing. Photos taken.

## 2023-05-02 NOTE — PROGRESS NOTES
05/02/23 at 0600H: Encouraged to use chair alarm, explained that he is at risk for fall, but patient refused

## 2023-05-02 NOTE — PLAN OF CARE
Problem: Discharge Planning  Goal: Discharge to home or other facility with appropriate resources  5/2/2023 0347 by Enos Denver, RN  Outcome: Progressing  5/2/2023 0347 by Enos Denver, RN  Outcome: Progressing  Flowsheets (Taken 5/1/2023 2000)  Discharge to home or other facility with appropriate resources: Identify barriers to discharge with patient and caregiver     Problem: Skin/Tissue Integrity  Goal: Absence of new skin breakdown  Description: 1. Monitor for areas of redness and/or skin breakdown  2. Assess vascular access sites hourly  3. Every 4-6 hours minimum:  Change oxygen saturation probe site  4. Every 4-6 hours:  If on nasal continuous positive airway pressure, respiratory therapy assess nares and determine need for appliance change or resting period.   5/2/2023 0347 by Enos Denver, RN  Outcome: Progressing  5/2/2023 0347 by Enos Denver, RN  Outcome: Progressing     Problem: Safety - Adult  Goal: Free from fall injury  5/2/2023 0347 by Enos Denver, RN  Outcome: Progressing  5/2/2023 0347 by Enos Denver, RN  Outcome: Progressing     Problem: ABCDS Injury Assessment  Goal: Absence of physical injury  5/2/2023 0347 by Enos Denver, RN  Outcome: Progressing  5/2/2023 0347 by Enos Denver, RN  Outcome: Progressing     Problem: Cardiovascular - Adult  Goal: Maintains optimal cardiac output and hemodynamic stability  5/2/2023 0347 by Enos Denver, RN  Outcome: Progressing  5/2/2023 0347 by Enos Denver, RN  Outcome: Progressing     Problem: Skin/Tissue Integrity - Adult  Goal: Skin integrity remains intact  5/2/2023 0347 by Enos Denver, RN  Outcome: Progressing  5/2/2023 0347 by Enos Denver, RN  Outcome: Progressing  Goal: Oral mucous membranes remain intact  5/2/2023 0347 by Enos Denver, RN  Outcome: Progressing  5/2/2023 0347 by Enos Denver, RN  Outcome: Progressing     Problem: Skin/Tissue Integrity - Adult  Goal: Oral mucous membranes remain intact  5/2/2023 0347

## 2023-05-02 NOTE — PROGRESS NOTES
BRIEF H&P//fENDOSCOPY PREPROCEDURE REPORT     Patient: Lacey Rodriguez  : 1965  Acct#: [de-identified]    Date: 2023  Indications/Brief History: Anemia  Anticipated Procedure: Colonoscopy    ASA class:  3  Airway Adequate? Yes___x__   No_______    Preprocedure Exam:   Neuro: Alert, oriented. Heart:  Regular rate and rhythm   Lungs: Clear to ausculation bilaterally, no evidence respiratory distress  Abdomen:  soft.   NT    PLAN:  EGD_____   COLONOSCOPY ___x___     ERCP________    Dustin Jackson MD MD  2023, 12:05 PM

## 2023-05-02 NOTE — CONSULTS
right   hepatic lobe lesions measure 12 mm 10 mm and 11 mm (series 2, images 19 and 29). Pancreas: Normal.  Spleen : Normal.  Adrenal glands: Bilateral adrenal gland thickening, left greater than right, appears unchanged. Kidneys/ ureters/ bladder: A 3 cm simple cyst in the midpole the left kidney is stable. No renal calculus, hydronephrosis, or hydroureter is present. Gastrointestinal:  Circumferential mural wall thickening is noted in the cecum to the level of the mid descending colon (series 2, images 41 through 51). A small amount of free fluid is noted in the dependent portion of the pelvis. The appendix appears   normal. Mild pericecal fat stranding is observed. No bowel obstruction, fluid collection, or free air is visualized. Colonic diverticulosis without diverticulitis is observed. Retroperitoneum / lymph nodes: The aorta is not dilated. A few prominent pericecal lymph nodes measure up to 8 mm (series 2, image 45). Pelvis: The prostate gland is mildly enlarged measuring 4.2 x 5.2 cm. The prostate gland exerts mass effect on the posterior wall of the urinary bladder. Musculoskeletal: Multilevel degenerative disc disease is noted in the thoracic and lumbar spine. The visualized skeletal structures appear intact. IMPRESSION:  1. Circumferential mural wall thickening in the cecum to the level of the mid ascending colon presumably corresponds to the suspected cecal malignancy (series 2, images 41 through 51). The appendix appears normal. Prominent pericecal lymph nodes measure   up to 8 mm (series 2, image 45) and there is mild infiltration of the pericecal/pericolonic fat. Trace free fluid is noted in the dependent portion of the pelvis. No bowel obstruction, perforation, or drainable fluid collection is observed. 2. Several ill-defined hypoattenuating liver lesion are noted in the right and left hepatic lobes, incompletely characterized.  Contrast enhanced MRI utilizing a liver

## 2023-05-02 NOTE — DISCHARGE SUMMARY
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 Hgb 5.8 on admission. Patient admits to shortness of breath/lightheadedness on admission  Iron 13, iron saturation 3, ferritin 13, TIBC 472, folate 5.8, vitamin B12 578, absolute reticulocyte #40  S/p blood transfusion  1 units,  1 unit,   q unit   S/p  EGD performed by Dr. Ahsan Rodgers with samples taken; no evidence of bleeding ulceration noted  Needs to follow up w/ GI as outpatient   Cecal cancer; new diagnosis  CT abdomen on 23 showed circumferential mural wall thickening in the cecum to the level of the mid ascending colon presumably corresponds to the suspected cecal malignancy (series 2, images 41 through 51). Colonoscopy on  (Dr. Randi Dorantes) showed large (5 cm) malignant mass at the cecum which was biopsied, 6 mm polyp in SC was removed w/ hot snare. Reason for blood loss was found to be cecal cancer. Follow up w/ GI as outpatient   Severe GERD with esophagitis. S/p Bravo capsule 2023  S/p EGD on  by Dr. 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 Likely secondary to radiation therapy and chronic GERD. Hx esophageal dilation. Known aspiration of thin liquids from barium swallow on 2022.  Likely secondary to radiation as well as GERD and known esophageal stricture  Following with Dr. Lambert Schulz as well as CT surgery  Squamous

## 2023-05-02 NOTE — CARE COORDINATION
5/2/23, 9:51 AM EDT    DISCHARGE ON GOING [address] day: 4  Location: -19/019-A Reason for admit: Acute blood loss anemia [D62]  Symptomatic anemia [D64.9]   Procedure: S/p blood transfusion 04/28 1 units, 04/29 1 unit, 1 unit 4/30.   - 04/29 EGD performed by Dr. Sandra Barrios with samples taken; no evidence of bleeding ulceration noted  Barriers to Discharge: Colonoscopy today at 1:30. Possible discharge after. PCP: MADELIN Vásquez CNP  Readmission Risk Score: 10.8%  Patient Goals/Plan/Treatment Preferences: Home with keo Fernandez Kidney has stated he is open to [facility] if physician feels it is needed. Discussed with Dr. Alfredo Yadav, he does not feel services are warranted at this time. 5/2/23, 10:23 AM EDT    Possible discharge later today. Patient goals/plan/ treatment preferences discussed by  and . Patient goals/plan/ treatment preferences reviewed with patient/ family. Patient/ family verbalize understanding of discharge plan and are in agreement with goal/plan/treatment preferences. Understanding was demonstrated using the teach back method. AVS provided by RN at time of discharge, which includes all necessary medical information pertaining to the patients current course of illness, treatment, post-discharge goals of care, and treatment preferences.      Services At/After Discharge: None       IMM Letter  IMM Letter given to Patient/Family/Significant other/Guardian/POA/by[de-identified] Jose Soto CM  IMM Letter date given[de-identified] 05/02/23  IMM Letter time given[de-identified] 1024

## 2023-05-02 NOTE — PROGRESS NOTES
Carlene Coates admitted to [facility] for colonoscopy with Dr. Kristian Galvan. Consent form signed and verified with pt.

## 2023-05-02 NOTE — PROGRESS NOTES
ENDOSCOPY POSTPROCEDURE REPORT     PT NAME: Suhas Mccullough  MEDICAL RECORD NUMBER: [id number]  YOB: 1965    PROCEDURE PERFORMED BY : Gisella Gonzales MD    PROCEDURE TYPE:   EGD ______   COLONOSCOPY ___x____   ERCP ________  MEDICATIONS USED :  VERSED _____   FENTANYL_____   PROPOFOL __x____  Patient tolerated procedure well. No apparent complications. Estimated blood loss:  Nil  Specimens removed:  Yes__x___  No______  Disposition of Specimens:  To Lab      BRIEF  FINDINGS:  Large (5cm) malignant appearing mass lesion at cecum. Bxs done. Tattoo placed distal to mass lesion. 2. Tortuous sigmoid with tics and spasm. Also, poor prep left colon with lots of solid vegetable type mat'l. Unable to get adequate inspection due to poor prep left colon. Did identify a 6mm polyp in SC, removed with hot snare through stalk. 3. O/w neg within limits of poor prep left colon. IMP:  Cecal Cancer as cause for blood loss anemia. PRELIMINARY PLAN:  F/U bxs  2. CT scan A/P with oral and IV contrast  3. Labs, CEA. Surgical consult. 4. Repeat Colonoscopy 3-4 months to clear left colon (post-op). For complete findings and plan, see dictated report.      Gisella Gonzales MD, MD  5/2/2023  12:44 PM

## 2023-05-02 NOTE — DISCHARGE INSTRUCTIONS
Monitor stool at home for signs of bleeding  Return to ED if you feel dizzy, lightheaded, or extremely fatigued   Continue to follow up with GI for biopsy results

## 2023-05-02 NOTE — PROGRESS NOTES
Physician Progress Note      PATIENT:               Dain Hammond  CSN #:                  [id number]  :                       1965  ADMIT DATE:       2023 3:10 PM  DISCH DATE:  RESPONDING  PROVIDER #:        Therese Acosta MD          QUERY TEXT:    Patient admitted with symptomatic anemia. Noted to have poor po intake,   unplanned weight loss and dietician assessment with severe malnutrition   diagnosis. If possible, please document in progress notes and discharge   summary if you are evaluating and /or treating any of the following: The medical record reflects the following:    Risk Factors: poor po intake, unplanned weight loss  Clinical Indicators: severe malnutrition per dietician per AND/ASPEN   guidelines as evidenced by Energy Intake:  75% or less estimated energy   requirements for 1 month or longer, 10.1% weight loss in the last 6 months,   Severe body fat loss Fat Overlying Ribs, Severe muscle mass loss Thigh   (quadraceps), Calf (gastrocnemius)  Treatment: Dietician consult & Ensure Clear TID    ASPEN Criteria:    https://aspenjournals. onlinelibrary. roy. com/doi/full/10.1177/579771537731907  5    Thank you! Poly Laureano, [facility], CRCR  RN Clinical   Options provided:  -- Severe protein calorie malnutrition  -- Other - I will add my own diagnosis  -- Disagree - Not applicable / Not valid  -- Disagree - Clinically unable to determine / Unknown  -- Refer to Clinical Documentation Reviewer    PROVIDER RESPONSE TEXT:    This patient has severe protein calorie malnutrition.     Query created by: Grisel Caba on 2023 3:21 PM      Electronically signed by:  Therese Acosta MD 2023 3:25 PM

## 2023-05-03 ENCOUNTER — TELEPHONE (OUTPATIENT)
Dept: SURGERY | Age: 58
End: 2023-05-03

## 2023-05-03 ENCOUNTER — PREP FOR PROCEDURE (OUTPATIENT)
Dept: SURGERY | Age: 58
End: 2023-05-03

## 2023-05-03 PROBLEM — K63.89 COLONIC MASS: Status: ACTIVE | Noted: 2023-05-03

## 2023-05-03 RX ORDER — SODIUM CHLORIDE 9 MG/ML
INJECTION, SOLUTION INTRAVENOUS CONTINUOUS
Status: CANCELLED | OUTPATIENT
Start: 2023-05-03

## 2023-05-03 RX ORDER — ALVIMOPAN 12 MG/1
12 CAPSULE ORAL ONCE
Status: CANCELLED | OUTPATIENT
Start: 2023-05-03 | End: 2023-05-03

## 2023-05-03 RX ORDER — METRONIDAZOLE 500 MG/1
500 TABLET ORAL 3 TIMES DAILY
Qty: 3 TABLET | Refills: 0 | Status: ON HOLD | OUTPATIENT
Start: 2023-05-08 | End: 2023-05-12 | Stop reason: HOSPADM

## 2023-05-03 NOTE — PROCEDURES
[address] [place] [address]                                 PROCEDURE NOTE    PATIENT NAME: Wale Jones                       :        1965  MED REC NO:   [id number]                           ROOM:       0019  ACCOUNT NO:   [de-identified]                           ADMIT DATE: 2023  PROVIDER:     Bouchra Hale M.D.    [facility]  [facility]    COLONOSCOPY REPORT    PROCEDURE TYPE:  Colonoscopy. INSTRUMENT:  Video colonoscope. MEDICATIONS:  Propofol. See Anesthesia documentation report. BRIEF HISTORY:  The patient is a 59-year-old with a history of anemia. Due to his history, colonoscopy was planned. The procedure was  discussed with him. Following discussion, he expressed understanding  and did wish to proceed. DESCRIPTION OF THE PROCEDURE:  The patient arrived to the patient  endoscopy unit from the medical floor. He was placed on the appropriate  monitoring devices. He was placed in the left lateral decubitus  position. Sedation was provided by nurse anesthetist using propofol. Following performance of unremarkable digital rectal exam, the Olympus  video colonoscope was inserted gently through the anal canal and into  the rectum. The scope was carefully advanced the length of the colon  into the base of the cecum. In the region of the cecum, there was a  large ulcerated malignant-appearing mass lesion. This was estimated to  be about 5 cm. The endoscopic appearance is typical for colon cancer. This appeared to arise from the cecum. The ileocecal valve was not able  to be seen. Again, this appeared to arise right around the cecal verge  and cecum. Multiple biopsies were obtained. These were sent for  pathology. Also, [place] ink tattoo was placed just distal to the mass  lesion in the colon.   The scope was then brought up to the ascending  colon, across splenic flexure, through the

## 2023-05-03 NOTE — TELEPHONE ENCOUNTER
Initiated authorization with [facility] for inpatient code 87460.   Ref# [id number]

## 2023-05-04 ENCOUNTER — ANESTHESIA (OUTPATIENT)
Dept: OPERATING ROOM | Age: 58
End: 2023-05-04
Payer: MEDICARE

## 2023-05-04 ENCOUNTER — HOSPITAL ENCOUNTER (OUTPATIENT)
Age: 58
Setting detail: OUTPATIENT SURGERY
Discharge: HOME | End: 2023-05-04
Attending: OTOLARYNGOLOGY | Admitting: OTOLARYNGOLOGY
Payer: MEDICARE

## 2023-05-04 ENCOUNTER — ANESTHESIA EVENT (OUTPATIENT)
Dept: OPERATING ROOM | Age: 58
End: 2023-05-04
Payer: MEDICARE

## 2023-05-04 VITALS
RESPIRATION RATE: 18 BRPM | WEIGHT: 161 LBS | HEART RATE: 87 BPM | HEIGHT: 71 IN | DIASTOLIC BLOOD PRESSURE: 72 MMHG | TEMPERATURE: 97.4 F | BODY MASS INDEX: 22.54 KG/M2 | OXYGEN SATURATION: 99 % | SYSTOLIC BLOOD PRESSURE: 109 MMHG

## 2023-05-04 PROBLEM — K21.9 GASTROESOPHAGEAL REFLUX DISEASE WITHOUT ESOPHAGITIS: Status: ACTIVE | Noted: 2023-05-04

## 2023-05-04 LAB
ACANTHOCYTES: ABNORMAL
ANISOCYTOSIS BLD QL SMEAR: PRESENT
BASOPHILS ABSOLUTE: 0 THOU/MM3 (ref 0–0.1)
BASOPHILS NFR BLD AUTO: 0.3 %
DACROCYTES: ABNORMAL
DEPRECATED RDW RBC AUTO: 86.2 FL (ref 35–45)
EOSINOPHIL NFR BLD AUTO: 1.2 %
EOSINOPHILS ABSOLUTE: 0.1 THOU/MM3 (ref 0–0.4)
ERYTHROCYTE [DISTWIDTH] IN BLOOD BY AUTOMATED COUNT: 29.8 % (ref 11.5–14.5)
HCT VFR BLD AUTO: 31.8 % (ref 42–52)
HGB BLD-MCNC: 9.2 GM/DL (ref 14–18)
HYPOCHROMIA BLD QL SMEAR: PRESENT
IMM GRANULOCYTES # BLD AUTO: 0.06 THOU/MM3 (ref 0–0.07)
IMM GRANULOCYTES NFR BLD AUTO: 0.6 %
LYMPHOCYTES ABSOLUTE: 2.2 THOU/MM3 (ref 1–4.8)
LYMPHOCYTES NFR BLD AUTO: 20.7 %
MCH RBC QN AUTO: 23.5 PG (ref 26–33)
MCHC RBC AUTO-ENTMCNC: 28.9 GM/DL (ref 32.2–35.5)
MCV RBC AUTO: 81.1 FL (ref 80–94)
MONOCYTES ABSOLUTE: 1.1 THOU/MM3 (ref 0.4–1.3)
MONOCYTES NFR BLD AUTO: 10.6 %
NEUTROPHILS NFR BLD AUTO: 66.6 %
NRBC BLD AUTO-RTO: 0 /100 WBC
PLATELET # BLD AUTO: 291 THOU/MM3 (ref 130–400)
PMV BLD AUTO: 9 FL (ref 9.4–12.4)
POLYCHROMASIA BLD QL SMEAR: ABNORMAL
RBC # BLD AUTO: 3.92 MILL/MM3 (ref 4.7–6.1)
SEGMENTED NEUTROPHILS ABSOLUTE COUNT: 7 THOU/MM3 (ref 1.8–7.7)
SPHEROCYTES BLD QL SMEAR: ABNORMAL
WBC # BLD AUTO: 10.5 THOU/MM3 (ref 4.8–10.8)

## 2023-05-04 PROCEDURE — 6360000002 HC RX W HCPCS: Performed by: OTOLARYNGOLOGY

## 2023-05-04 PROCEDURE — 7100000001 HC PACU RECOVERY - ADDTL 15 MIN: Performed by: OTOLARYNGOLOGY

## 2023-05-04 PROCEDURE — APPNB45 APP NON BILLABLE 31-45 MINUTES: Performed by: NURSE PRACTITIONER

## 2023-05-04 PROCEDURE — 7100000000 HC PACU RECOVERY - FIRST 15 MIN: Performed by: OTOLARYNGOLOGY

## 2023-05-04 PROCEDURE — 2580000003 HC RX 258: Performed by: OTOLARYNGOLOGY

## 2023-05-04 PROCEDURE — 7100000011 HC PHASE II RECOVERY - ADDTL 15 MIN: Performed by: OTOLARYNGOLOGY

## 2023-05-04 PROCEDURE — 2709999900 HC NON-CHARGEABLE SUPPLY: Performed by: OTOLARYNGOLOGY

## 2023-05-04 PROCEDURE — 3700000001 HC ADD 15 MINUTES (ANESTHESIA): Performed by: OTOLARYNGOLOGY

## 2023-05-04 PROCEDURE — 2500000003 HC RX 250 WO HCPCS: Performed by: NURSE ANESTHETIST, CERTIFIED REGISTERED

## 2023-05-04 PROCEDURE — 36415 COLL VENOUS BLD VENIPUNCTURE: CPT

## 2023-05-04 PROCEDURE — C1726 CATH, BAL DIL, NON-VASCULAR: HCPCS | Performed by: OTOLARYNGOLOGY

## 2023-05-04 PROCEDURE — 3600000004 HC SURGERY LEVEL 4 BASE: Performed by: OTOLARYNGOLOGY

## 2023-05-04 PROCEDURE — 3700000000 HC ANESTHESIA ATTENDED CARE: Performed by: OTOLARYNGOLOGY

## 2023-05-04 PROCEDURE — 3600000014 HC SURGERY LEVEL 4 ADDTL 15MIN: Performed by: OTOLARYNGOLOGY

## 2023-05-04 PROCEDURE — 43195 ESOPHAGOSCOPY RIGID BALLOON: CPT | Performed by: OTOLARYNGOLOGY

## 2023-05-04 PROCEDURE — 6360000002 HC RX W HCPCS: Performed by: NURSE ANESTHETIST, CERTIFIED REGISTERED

## 2023-05-04 PROCEDURE — 7100000010 HC PHASE II RECOVERY - FIRST 15 MIN: Performed by: OTOLARYNGOLOGY

## 2023-05-04 PROCEDURE — 85025 COMPLETE CBC W/AUTO DIFF WBC: CPT

## 2023-05-04 RX ORDER — SODIUM CHLORIDE 9 MG/ML
INJECTION, SOLUTION INTRAVENOUS PRN
Status: DISCONTINUED | OUTPATIENT
Start: 2023-05-04 | End: 2023-05-04 | Stop reason: HOSPADM

## 2023-05-04 RX ORDER — FENTANYL CITRATE 50 UG/ML
INJECTION, SOLUTION INTRAMUSCULAR; INTRAVENOUS PRN
Status: DISCONTINUED | OUTPATIENT
Start: 2023-05-04 | End: 2023-05-04 | Stop reason: SDUPTHER

## 2023-05-04 RX ORDER — ONDANSETRON 2 MG/ML
4 INJECTION INTRAMUSCULAR; INTRAVENOUS
Status: CANCELLED | OUTPATIENT
Start: 2023-05-04 | End: 2023-05-05

## 2023-05-04 RX ORDER — PROPOFOL 10 MG/ML
INJECTION, EMULSION INTRAVENOUS CONTINUOUS PRN
Status: DISCONTINUED | OUTPATIENT
Start: 2023-05-04 | End: 2023-05-04 | Stop reason: SDUPTHER

## 2023-05-04 RX ORDER — FENTANYL CITRATE 50 UG/ML
50 INJECTION, SOLUTION INTRAMUSCULAR; INTRAVENOUS EVERY 5 MIN PRN
Status: CANCELLED | OUTPATIENT
Start: 2023-05-04

## 2023-05-04 RX ORDER — PROPOFOL 10 MG/ML
INJECTION, EMULSION INTRAVENOUS PRN
Status: DISCONTINUED | OUTPATIENT
Start: 2023-05-04 | End: 2023-05-04 | Stop reason: SDUPTHER

## 2023-05-04 RX ORDER — SODIUM CHLORIDE 0.9 % (FLUSH) 0.9 %
5-40 SYRINGE (ML) INJECTION PRN
Status: CANCELLED | OUTPATIENT
Start: 2023-05-04

## 2023-05-04 RX ORDER — DEXAMETHASONE SODIUM PHOSPHATE 10 MG/ML
10 INJECTION, EMULSION INTRAMUSCULAR; INTRAVENOUS
Status: COMPLETED | OUTPATIENT
Start: 2023-05-04 | End: 2023-05-04

## 2023-05-04 RX ORDER — SODIUM CHLORIDE 0.9 % (FLUSH) 0.9 %
5-40 SYRINGE (ML) INJECTION EVERY 12 HOURS SCHEDULED
Status: DISCONTINUED | OUTPATIENT
Start: 2023-05-04 | End: 2023-05-04 | Stop reason: HOSPADM

## 2023-05-04 RX ORDER — MEPERIDINE HYDROCHLORIDE 25 MG/ML
12.5 INJECTION INTRAMUSCULAR; INTRAVENOUS; SUBCUTANEOUS EVERY 5 MIN PRN
Status: CANCELLED | OUTPATIENT
Start: 2023-05-04

## 2023-05-04 RX ORDER — FENTANYL CITRATE 50 UG/ML
25 INJECTION, SOLUTION INTRAMUSCULAR; INTRAVENOUS EVERY 5 MIN PRN
Status: CANCELLED | OUTPATIENT
Start: 2023-05-04

## 2023-05-04 RX ORDER — SODIUM CHLORIDE 0.9 % (FLUSH) 0.9 %
5-40 SYRINGE (ML) INJECTION EVERY 12 HOURS SCHEDULED
Status: CANCELLED | OUTPATIENT
Start: 2023-05-04

## 2023-05-04 RX ORDER — ONDANSETRON 2 MG/ML
INJECTION INTRAMUSCULAR; INTRAVENOUS PRN
Status: DISCONTINUED | OUTPATIENT
Start: 2023-05-04 | End: 2023-05-04 | Stop reason: SDUPTHER

## 2023-05-04 RX ORDER — SODIUM CHLORIDE 9 MG/ML
INJECTION, SOLUTION INTRAVENOUS PRN
Status: CANCELLED | OUTPATIENT
Start: 2023-05-04

## 2023-05-04 RX ORDER — ROCURONIUM BROMIDE 10 MG/ML
INJECTION, SOLUTION INTRAVENOUS PRN
Status: DISCONTINUED | OUTPATIENT
Start: 2023-05-04 | End: 2023-05-04 | Stop reason: SDUPTHER

## 2023-05-04 RX ORDER — SODIUM CHLORIDE 0.9 % (FLUSH) 0.9 %
5-40 SYRINGE (ML) INJECTION PRN
Status: DISCONTINUED | OUTPATIENT
Start: 2023-05-04 | End: 2023-05-04 | Stop reason: HOSPADM

## 2023-05-04 RX ADMIN — ROCURONIUM BROMIDE 50 MG: 10 INJECTION INTRAVENOUS at 11:40

## 2023-05-04 RX ADMIN — DEXAMETHASONE SODIUM PHOSPHATE 10 MG: 10 INJECTION, EMULSION INTRAMUSCULAR; INTRAVENOUS at 09:38

## 2023-05-04 RX ADMIN — PROPOFOL 150 MG: 10 INJECTION, EMULSION INTRAVENOUS at 11:40

## 2023-05-04 RX ADMIN — ONDANSETRON 4 MG: 2 INJECTION INTRAMUSCULAR; INTRAVENOUS at 12:10

## 2023-05-04 RX ADMIN — FENTANYL CITRATE 100 MCG: 50 INJECTION, SOLUTION INTRAMUSCULAR; INTRAVENOUS at 11:40

## 2023-05-04 RX ADMIN — SODIUM CHLORIDE 100 ML/HR: 9 INJECTION, SOLUTION INTRAVENOUS at 09:30

## 2023-05-04 RX ADMIN — PROPOFOL 150 MCG/KG/MIN: 10 INJECTION, EMULSION INTRAVENOUS at 11:42

## 2023-05-04 RX ADMIN — SUGAMMADEX 200 MG: 100 INJECTION, SOLUTION INTRAVENOUS at 12:19

## 2023-05-04 RX ADMIN — Medication 2000 MG: at 11:48

## 2023-05-04 ASSESSMENT — PAIN SCALES - GENERAL
PAINLEVEL_OUTOF10: 6
PAINLEVEL_OUTOF10: 7

## 2023-05-04 ASSESSMENT — PAIN - FUNCTIONAL ASSESSMENT: PAIN_FUNCTIONAL_ASSESSMENT: 0-10

## 2023-05-04 NOTE — PROGRESS NOTES
Pt returned to [facility] room 2. Vitals and assessment as charted. 0.9 infusing, @100ml to count from PACU. Pt has snack and drink. Family at the bedside. Pt and family verbalized understanding of discharge criteria and call light use. Call light in reach.

## 2023-05-04 NOTE — ANESTHESIA POSTPROCEDURE EVALUATION
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     Romeo Phase I: Romeo Score: 9    Romeo Phase II: Romeo Score: 10      Anesthesia Post Evaluation    Patient location during evaluation: PACU  Patient participation: complete - patient participated  Level of consciousness: awake and alert  Pain score: 3  Airway patency: patent  Nausea & Vomiting: no nausea and no vomiting  Complications: no  Cardiovascular status: hemodynamically stable and blood pressure returned to baseline  Respiratory status: spontaneous ventilation, acceptable and nasal cannula  Hydration status: stable

## 2023-05-04 NOTE — OP NOTE
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 Stricture at the upper esophageal sphincter  2. Extensive postcricoid redundancy  3. Dilated with 8 cm 20 mm balloon to 6 pradeep; endoscopically evaluated post dilation with no evidence of full-thickness shearing. Detailed Description of Procedure: The patient taken the operating room and placed in supine position. General anesthesia was induced and the patient was intubated with a #6 endotracheal tube without difficulty or vital sign stability. Temperature 90 degrees and the patient was draped in usual fashion for transoral surgery. I performed a timeout verifying patient identity planned procedure. Suspension laryngoscopy for access: My assistant then placed first a 15 cm in the 70 cm Daija laryngoscope into the patient's oropharynx to the larynx and provided direct line of sight view of the esophageal inlet. The cicatrix prevented easy visualization past the sphincter into the cervical esophagus. I then passed a 8 cm x 20 mm pneumatic dilator into the esophageal inlet and set it at approximately the midpoint of the length of the balloon. I had the balloon distended from 2 to 4 to 6 pradeep of pressure until no further fluid needed to be added to sustain the 6 pradeep level.   I then deflated the balloon

## 2023-05-04 NOTE — H&P
Adapted from prior ENT note:    Esophageal stricture; Pharyngoesophageal dysphagia; Pharyngeal stenosis; GERD  No new symptoms    Past Medical History:   Diagnosis Date    A[facility]fib ([facility]     Baki-no blood thinners    Anxiety     Aortic aneurysm ([facility])     Arthritis     Asthma     Depression     Head and neck cancer ([facility]     Hyperlipidemia     Hypertension     Lymphedema     Syncope 12/22/2020    Syncope and collapse     Thyroid disease        Past Surgical History:   Procedure Laterality Date    COLONOSCOPY N/A 5/2/2023    COLONOSCOPY POLYPECTOMY SNARE/COLD BIOPSY performed by Lena Feng MD at [address] [facility] N/A 3/7/2023    ESOPHAGEAL MOTILITY/MANOMETRY STUDY performed by Russell Mejias MD at [place] [address]  10/02/2015    LARYNGOSCOPY N/A 06/03/2022    Rigid Laryngoscopy Esophagoscopy Diagnostic performed by Yosvany Nash MD at [address] N/A 1/11/2023    SUSPENSION LARYNGOSCOPY (FOR ACCESS), FLEXIBLE ESOPHAGOSCOPY WITH SAVORY WIRE BOUGIE DILATION AND BIOPSIES performed by Yosvany Nash MD at [address] N/A 3/22/2023    SUSPENSION LARYNGOSCOPY FOR ACCESS RIGID ESOPHAGOSCOPY WITH BALLOON DILATION performed by Yosvany Nash MD at [address] [facility] [facility]  10/02/2015    OTHER SURGICAL HISTORY  09/06/2022    linq recorder    OTHER SURGICAL HISTORY  06/20/2016    peg tube removal    SKIN GRAFT  10/02/2015    jaw    TRACHEOSTOMY      UMBILICAL HERNIA REPAIR N/A 11/14/2022    EXCISION OF WOUND AND REMOVAL OF PERITONEAL CYST, Umbilical Hernia Repair with Mesh performed by Laly Mccauley MD at [place] [address] 05/11/2021    EGD ESOPHAGOGASTRODUODENOSCOPY performed by Jemma Strong MD at [facility] N/A 4/18/2023    ABREU performed by Cathleen Fonseca MD at [facility] Left 4/29/2023    EGD BIOPSY

## 2023-05-04 NOTE — ANESTHESIA PRE PROCEDURE
Department of Anesthesiology  Preprocedure Note       Name:  Duke Player   Age:  62 y.o.  :  1965                                          MRN:  [id number]         Date:  2023      Surgeon: Angelina Valle):  Lambert Schulz MD    Procedure: Procedure(s):  Suspension Laryngoscopy for Access Flexible Esophagoscopy with [place] Bougie Dilation    Medications prior to admission:   Prior to Admission medications    Medication Sig Start Date End Date Taking? Authorizing Provider   metroNIDAZOLE (FLAGYL) 500 MG tablet Take 1 tablet by mouth 3 times daily Take first tab at 4 pm, second tab at 5 pm, and last tab at 11 pm the day prior to surgery 23   Zi Conklin APRN - CNP   amLODIPine (NORVASC) 5 MG tablet Take 1 tablet by mouth daily  Patient not taking: Reported on 2023 3/2/23   Historical Provider, MD   atorvastatin (LIPITOR) 20 MG tablet Take 1 tablet by mouth daily 3/2/23   Historical Provider, MD   omeprazole (PRILOSEC) 40 MG delayed release capsule Take 1 capsule by mouth 2 times daily (before meals) 23  Lambert Schulz MD   famotidine (PEPCID) 40 MG tablet Take 1 tablet by mouth 2 times daily 23  Lambert Schulz MD   losartan (COZAAR) 50 MG tablet Take 1 tablet by mouth daily 22   Historical Provider, MD   traZODone (DESYREL) 50 MG tablet Take 1 tablet by mouth daily 22   Historical Provider, MD   metoprolol tartrate (LOPRESSOR) 50 MG tablet TAKE 1 TABLET BY MOUTH TWICE DAILY  Patient not taking: Reported on 22   Amber Mendes MD   levothyroxine (SYNTHROID) 125 MCG tablet Take one tablet first thing in the morning on an empty stomach.  21   Historical Provider, MD   mirtazapine (REMERON) 15 MG tablet Take 1 tablet by mouth daily 21   Historical Provider, MD   OLANZapine (ZYPREXA) 10 MG tablet Take 1 tablet by mouth nightly 21   Historical Provider, MD   OXcarbazepine (TRILEPTAL) 150 MG tablet Take 1 tablet by

## 2023-05-04 NOTE — PROGRESS NOTES
1225- pt to pacu. Resp easy. Pt reports chronic pain in left shoulder, no surgerical pain. Pt appears in no acute pain. 26- Dr Esther Levy at bedside to see pt. Pt denies surgical pain, nausea. Resp easy, unlabored. Vss.     1246- pt continues to rest in bed eyes closed.     1255- pt meets criteria for discharge, returned to [facility], report given to

## 2023-05-09 ENCOUNTER — ANESTHESIA (OUTPATIENT)
Dept: OPERATING ROOM | Age: 58
End: 2023-05-09
Payer: MEDICARE

## 2023-05-09 ENCOUNTER — HOSPITAL ENCOUNTER (INPATIENT)
Age: 58
LOS: 4 days | Discharge: HOME | End: 2023-05-13
Attending: SURGERY | Admitting: SURGERY
Payer: MEDICARE

## 2023-05-09 ENCOUNTER — ANESTHESIA EVENT (OUTPATIENT)
Dept: OPERATING ROOM | Age: 58
End: 2023-05-09
Payer: MEDICARE

## 2023-05-09 DIAGNOSIS — C18.9 MALIGNANT NEOPLASM OF COLON, UNSPECIFIED PART OF COLON (HCC): Primary | ICD-10-CM

## 2023-05-09 DIAGNOSIS — K63.89 CECUM MASS: ICD-10-CM

## 2023-05-09 LAB
ABO: NORMAL
ANTIBODY SCREEN: NORMAL
DEPRECATED RDW RBC AUTO: 98.1 FL (ref 35–45)
ERYTHROCYTE [DISTWIDTH] IN BLOOD BY AUTOMATED COUNT: 32.1 % (ref 11.5–14.5)
HCT VFR BLD AUTO: 35.9 % (ref 42–52)
HGB BLD-MCNC: 10 GM/DL (ref 14–18)
MCH RBC QN AUTO: 24.1 PG (ref 26–33)
MCHC RBC AUTO-ENTMCNC: 27.9 GM/DL (ref 32.2–35.5)
MCV RBC AUTO: 86.5 FL (ref 80–94)
PLATELET # BLD AUTO: 421 THOU/MM3 (ref 130–400)
PMV BLD AUTO: 9 FL (ref 9.4–12.4)
RBC # BLD AUTO: 4.15 MILL/MM3 (ref 4.7–6.1)
RH FACTOR: NORMAL
WBC # BLD AUTO: 6.7 THOU/MM3 (ref 4.8–10.8)

## 2023-05-09 PROCEDURE — 2500000003 HC RX 250 WO HCPCS: Performed by: SURGERY

## 2023-05-09 PROCEDURE — 7100000001 HC PACU RECOVERY - ADDTL 15 MIN: Performed by: SURGERY

## 2023-05-09 PROCEDURE — 2500000003 HC RX 250 WO HCPCS: Performed by: NURSE ANESTHETIST, CERTIFIED REGISTERED

## 2023-05-09 PROCEDURE — 86901 BLOOD TYPING SEROLOGIC RH(D): CPT

## 2023-05-09 PROCEDURE — 6360000002 HC RX W HCPCS: Performed by: SURGERY

## 2023-05-09 PROCEDURE — 7100000000 HC PACU RECOVERY - FIRST 15 MIN: Performed by: SURGERY

## 2023-05-09 PROCEDURE — 3600000009 HC SURGERY ROBOT BASE: Performed by: SURGERY

## 2023-05-09 PROCEDURE — 3700000001 HC ADD 15 MINUTES (ANESTHESIA): Performed by: SURGERY

## 2023-05-09 PROCEDURE — 85027 COMPLETE CBC AUTOMATED: CPT

## 2023-05-09 PROCEDURE — 88309 TISSUE EXAM BY PATHOLOGIST: CPT

## 2023-05-09 PROCEDURE — 3600000019 HC SURGERY ROBOT ADDTL 15MIN: Performed by: SURGERY

## 2023-05-09 PROCEDURE — 3700000000 HC ANESTHESIA ATTENDED CARE: Performed by: SURGERY

## 2023-05-09 PROCEDURE — 6370000000 HC RX 637 (ALT 250 FOR IP): Performed by: SURGERY

## 2023-05-09 PROCEDURE — 36415 COLL VENOUS BLD VENIPUNCTURE: CPT

## 2023-05-09 PROCEDURE — 6360000002 HC RX W HCPCS: Performed by: NURSE ANESTHETIST, CERTIFIED REGISTERED

## 2023-05-09 PROCEDURE — 88341 IMHCHEM/IMCYTCHM EA ADD ANTB: CPT

## 2023-05-09 PROCEDURE — 2580000003 HC RX 258: Performed by: NURSE PRACTITIONER

## 2023-05-09 PROCEDURE — 0DTF0ZZ RESECTION OF RIGHT LARGE INTESTINE, OPEN APPROACH: ICD-10-PCS | Performed by: SURGERY

## 2023-05-09 PROCEDURE — S2900 ROBOTIC SURGICAL SYSTEM: HCPCS | Performed by: SURGERY

## 2023-05-09 PROCEDURE — 44205 LAP COLECTOMY PART W/ILEUM: CPT | Performed by: SURGERY

## 2023-05-09 PROCEDURE — 86850 RBC ANTIBODY SCREEN: CPT

## 2023-05-09 PROCEDURE — 1200000000 HC SEMI PRIVATE

## 2023-05-09 PROCEDURE — 86900 BLOOD TYPING SEROLOGIC ABO: CPT

## 2023-05-09 PROCEDURE — 88307 TISSUE EXAM BY PATHOLOGIST: CPT

## 2023-05-09 PROCEDURE — 47001 NDL BIOPSY LVR TM OTH MAJ PX: CPT | Performed by: SURGERY

## 2023-05-09 PROCEDURE — 8E0W4CZ ROBOTIC ASSISTED PROCEDURE OF TRUNK REGION, PERCUTANEOUS ENDOSCOPIC APPROACH: ICD-10-PCS | Performed by: SURGERY

## 2023-05-09 PROCEDURE — 2709999900 HC NON-CHARGEABLE SUPPLY: Performed by: SURGERY

## 2023-05-09 PROCEDURE — 88342 IMHCHEM/IMCYTCHM 1ST ANTB: CPT

## 2023-05-09 PROCEDURE — 2720000010 HC SURG SUPPLY STERILE: Performed by: SURGERY

## 2023-05-09 PROCEDURE — 6360000002 HC RX W HCPCS: Performed by: ANESTHESIOLOGY

## 2023-05-09 PROCEDURE — 2580000003 HC RX 258: Performed by: NURSE ANESTHETIST, CERTIFIED REGISTERED

## 2023-05-09 PROCEDURE — 6370000000 HC RX 637 (ALT 250 FOR IP): Performed by: NURSE PRACTITIONER

## 2023-05-09 RX ORDER — OXYCODONE HYDROCHLORIDE 5 MG/1
5 TABLET ORAL EVERY 4 HOURS PRN
Status: DISCONTINUED | OUTPATIENT
Start: 2023-05-09 | End: 2023-05-13 | Stop reason: HOSPADM

## 2023-05-09 RX ORDER — ATORVASTATIN CALCIUM 20 MG/1
20 TABLET, FILM COATED ORAL DAILY
Status: DISCONTINUED | OUTPATIENT
Start: 2023-05-09 | End: 2023-05-13 | Stop reason: HOSPADM

## 2023-05-09 RX ORDER — PROPOFOL 10 MG/ML
INJECTION, EMULSION INTRAVENOUS PRN
Status: DISCONTINUED | OUTPATIENT
Start: 2023-05-09 | End: 2023-05-09 | Stop reason: SDUPTHER

## 2023-05-09 RX ORDER — SODIUM CHLORIDE 0.9 % (FLUSH) 0.9 %
5-40 SYRINGE (ML) INJECTION PRN
Status: DISCONTINUED | OUTPATIENT
Start: 2023-05-09 | End: 2023-05-09 | Stop reason: HOSPADM

## 2023-05-09 RX ORDER — CEFOXITIN 1 G/1
INJECTION, POWDER, FOR SOLUTION INTRAVENOUS PRN
Status: DISCONTINUED | OUTPATIENT
Start: 2023-05-09 | End: 2023-05-09 | Stop reason: SDUPTHER

## 2023-05-09 RX ORDER — SODIUM CHLORIDE 9 MG/ML
INJECTION, SOLUTION INTRAVENOUS CONTINUOUS
Status: DISCONTINUED | OUTPATIENT
Start: 2023-05-09 | End: 2023-05-12

## 2023-05-09 RX ORDER — SODIUM CHLORIDE 9 MG/ML
INJECTION, SOLUTION INTRAVENOUS CONTINUOUS
Status: DISCONTINUED | OUTPATIENT
Start: 2023-05-09 | End: 2023-05-09 | Stop reason: HOSPADM

## 2023-05-09 RX ORDER — ONDANSETRON 2 MG/ML
INJECTION INTRAMUSCULAR; INTRAVENOUS PRN
Status: DISCONTINUED | OUTPATIENT
Start: 2023-05-09 | End: 2023-05-09 | Stop reason: SDUPTHER

## 2023-05-09 RX ORDER — FAMOTIDINE 20 MG/1
20 TABLET, FILM COATED ORAL 2 TIMES DAILY
Status: DISCONTINUED | OUTPATIENT
Start: 2023-05-09 | End: 2023-05-12

## 2023-05-09 RX ORDER — LOSARTAN POTASSIUM 50 MG/1
50 TABLET ORAL DAILY
Status: DISCONTINUED | OUTPATIENT
Start: 2023-05-09 | End: 2023-05-13 | Stop reason: HOSPADM

## 2023-05-09 RX ORDER — SODIUM CHLORIDE 9 MG/ML
INJECTION, SOLUTION INTRAVENOUS PRN
Status: DISCONTINUED | OUTPATIENT
Start: 2023-05-09 | End: 2023-05-13 | Stop reason: HOSPADM

## 2023-05-09 RX ORDER — SODIUM CHLORIDE 0.9 % (FLUSH) 0.9 %
5-40 SYRINGE (ML) INJECTION EVERY 12 HOURS SCHEDULED
Status: DISCONTINUED | OUTPATIENT
Start: 2023-05-09 | End: 2023-05-09 | Stop reason: HOSPADM

## 2023-05-09 RX ORDER — ALVIMOPAN 12 MG/1
12 CAPSULE ORAL ONCE
Status: DISCONTINUED | OUTPATIENT
Start: 2023-05-09 | End: 2023-05-09 | Stop reason: CLARIF

## 2023-05-09 RX ORDER — OXYCODONE HYDROCHLORIDE 5 MG/1
10 TABLET ORAL EVERY 4 HOURS PRN
Status: DISCONTINUED | OUTPATIENT
Start: 2023-05-09 | End: 2023-05-13 | Stop reason: HOSPADM

## 2023-05-09 RX ORDER — SODIUM CHLORIDE 9 MG/ML
INJECTION, SOLUTION INTRAVENOUS CONTINUOUS PRN
Status: DISCONTINUED | OUTPATIENT
Start: 2023-05-09 | End: 2023-05-09 | Stop reason: SDUPTHER

## 2023-05-09 RX ORDER — ROCURONIUM BROMIDE 10 MG/ML
INJECTION, SOLUTION INTRAVENOUS PRN
Status: DISCONTINUED | OUTPATIENT
Start: 2023-05-09 | End: 2023-05-09 | Stop reason: SDUPTHER

## 2023-05-09 RX ORDER — FENTANYL CITRATE 50 UG/ML
INJECTION, SOLUTION INTRAMUSCULAR; INTRAVENOUS PRN
Status: DISCONTINUED | OUTPATIENT
Start: 2023-05-09 | End: 2023-05-09 | Stop reason: SDUPTHER

## 2023-05-09 RX ORDER — DEXAMETHASONE SODIUM PHOSPHATE 10 MG/ML
INJECTION, EMULSION INTRAMUSCULAR; INTRAVENOUS PRN
Status: DISCONTINUED | OUTPATIENT
Start: 2023-05-09 | End: 2023-05-09 | Stop reason: SDUPTHER

## 2023-05-09 RX ORDER — LEVOTHYROXINE SODIUM 0.12 MG/1
125 TABLET ORAL DAILY
Status: DISCONTINUED | OUTPATIENT
Start: 2023-05-09 | End: 2023-05-13 | Stop reason: HOSPADM

## 2023-05-09 RX ORDER — SODIUM CHLORIDE 0.9 % (FLUSH) 0.9 %
5-40 SYRINGE (ML) INJECTION EVERY 12 HOURS SCHEDULED
Status: DISCONTINUED | OUTPATIENT
Start: 2023-05-09 | End: 2023-05-13 | Stop reason: HOSPADM

## 2023-05-09 RX ORDER — HYDRALAZINE HYDROCHLORIDE 20 MG/ML
INJECTION INTRAMUSCULAR; INTRAVENOUS PRN
Status: DISCONTINUED | OUTPATIENT
Start: 2023-05-09 | End: 2023-05-09 | Stop reason: SDUPTHER

## 2023-05-09 RX ORDER — MIDAZOLAM HYDROCHLORIDE 1 MG/ML
INJECTION INTRAMUSCULAR; INTRAVENOUS PRN
Status: DISCONTINUED | OUTPATIENT
Start: 2023-05-09 | End: 2023-05-09 | Stop reason: SDUPTHER

## 2023-05-09 RX ORDER — MORPHINE SULFATE 2 MG/ML
2 INJECTION, SOLUTION INTRAMUSCULAR; INTRAVENOUS EVERY 5 MIN PRN
Status: DISCONTINUED | OUTPATIENT
Start: 2023-05-09 | End: 2023-05-09 | Stop reason: HOSPADM

## 2023-05-09 RX ORDER — ONDANSETRON 4 MG/1
4 TABLET, ORALLY DISINTEGRATING ORAL EVERY 8 HOURS PRN
Status: DISCONTINUED | OUTPATIENT
Start: 2023-05-09 | End: 2023-05-13 | Stop reason: HOSPADM

## 2023-05-09 RX ORDER — LABETALOL HYDROCHLORIDE 5 MG/ML
10 INJECTION, SOLUTION INTRAVENOUS
Status: DISCONTINUED | OUTPATIENT
Start: 2023-05-09 | End: 2023-05-09 | Stop reason: HOSPADM

## 2023-05-09 RX ORDER — SODIUM CHLORIDE 9 MG/ML
INJECTION, SOLUTION INTRAVENOUS PRN
Status: DISCONTINUED | OUTPATIENT
Start: 2023-05-09 | End: 2023-05-09 | Stop reason: HOSPADM

## 2023-05-09 RX ORDER — ONDANSETRON 2 MG/ML
4 INJECTION INTRAMUSCULAR; INTRAVENOUS EVERY 6 HOURS PRN
Status: DISCONTINUED | OUTPATIENT
Start: 2023-05-09 | End: 2023-05-13 | Stop reason: HOSPADM

## 2023-05-09 RX ORDER — FENTANYL CITRATE 50 UG/ML
50 INJECTION, SOLUTION INTRAMUSCULAR; INTRAVENOUS EVERY 5 MIN PRN
Status: COMPLETED | OUTPATIENT
Start: 2023-05-09 | End: 2023-05-09

## 2023-05-09 RX ORDER — HYOSCYAMINE SULFATE 0.125 MG
125 TABLET,DISINTEGRATING ORAL EVERY 4 HOURS PRN
Status: DISCONTINUED | OUTPATIENT
Start: 2023-05-09 | End: 2023-05-13 | Stop reason: HOSPADM

## 2023-05-09 RX ORDER — LABETALOL HYDROCHLORIDE 5 MG/ML
INJECTION, SOLUTION INTRAVENOUS PRN
Status: DISCONTINUED | OUTPATIENT
Start: 2023-05-09 | End: 2023-05-09 | Stop reason: SDUPTHER

## 2023-05-09 RX ORDER — BUPIVACAINE HYDROCHLORIDE 5 MG/ML
INJECTION, SOLUTION PERINEURAL PRN
Status: DISCONTINUED | OUTPATIENT
Start: 2023-05-09 | End: 2023-05-09 | Stop reason: ALTCHOICE

## 2023-05-09 RX ORDER — SODIUM CHLORIDE 0.9 % (FLUSH) 0.9 %
5-40 SYRINGE (ML) INJECTION PRN
Status: DISCONTINUED | OUTPATIENT
Start: 2023-05-09 | End: 2023-05-13 | Stop reason: HOSPADM

## 2023-05-09 RX ORDER — SUCCINYLCHOLINE/SOD CL,ISO/PF 200MG/10ML
SYRINGE (ML) INTRAVENOUS PRN
Status: DISCONTINUED | OUTPATIENT
Start: 2023-05-09 | End: 2023-05-09 | Stop reason: SDUPTHER

## 2023-05-09 RX ORDER — LIDOCAINE HYDROCHLORIDE 20 MG/ML
INJECTION, SOLUTION INTRAVENOUS PRN
Status: DISCONTINUED | OUTPATIENT
Start: 2023-05-09 | End: 2023-05-09 | Stop reason: SDUPTHER

## 2023-05-09 RX ORDER — ENOXAPARIN SODIUM 100 MG/ML
40 INJECTION SUBCUTANEOUS DAILY
Status: DISCONTINUED | OUTPATIENT
Start: 2023-05-10 | End: 2023-05-13 | Stop reason: HOSPADM

## 2023-05-09 RX ADMIN — FENTANYL CITRATE 50 MCG: 50 INJECTION, SOLUTION INTRAMUSCULAR; INTRAVENOUS at 15:55

## 2023-05-09 RX ADMIN — ONDANSETRON 4 MG: 2 INJECTION INTRAMUSCULAR; INTRAVENOUS at 15:15

## 2023-05-09 RX ADMIN — NALOXEGOL OXALATE 25 MG: 25 TABLET, FILM COATED ORAL at 11:43

## 2023-05-09 RX ADMIN — SODIUM CHLORIDE: 9 INJECTION, SOLUTION INTRAVENOUS at 13:08

## 2023-05-09 RX ADMIN — MIDAZOLAM 2 MG: 1 INJECTION INTRAMUSCULAR; INTRAVENOUS at 13:09

## 2023-05-09 RX ADMIN — FAMOTIDINE 20 MG: 20 TABLET, FILM COATED ORAL at 19:54

## 2023-05-09 RX ADMIN — LIDOCAINE HYDROCHLORIDE 100 MG: 20 INJECTION, SOLUTION INTRAVENOUS at 13:15

## 2023-05-09 RX ADMIN — ROCURONIUM BROMIDE 50 MG: 10 INJECTION INTRAVENOUS at 13:25

## 2023-05-09 RX ADMIN — LOSARTAN POTASSIUM 50 MG: 50 TABLET, FILM COATED ORAL at 18:04

## 2023-05-09 RX ADMIN — PROPOFOL 200 MG: 10 INJECTION, EMULSION INTRAVENOUS at 13:15

## 2023-05-09 RX ADMIN — Medication 120 MG: at 13:15

## 2023-05-09 RX ADMIN — OXYCODONE HYDROCHLORIDE 10 MG: 5 TABLET ORAL at 19:54

## 2023-05-09 RX ADMIN — Medication 5 MG: at 14:15

## 2023-05-09 RX ADMIN — FENTANYL CITRATE 100 MCG: 50 INJECTION, SOLUTION INTRAMUSCULAR; INTRAVENOUS at 15:08

## 2023-05-09 RX ADMIN — SODIUM CHLORIDE: 9 INJECTION, SOLUTION INTRAVENOUS at 12:06

## 2023-05-09 RX ADMIN — DEXAMETHASONE SODIUM PHOSPHATE 5 MG: 10 INJECTION, EMULSION INTRAMUSCULAR; INTRAVENOUS at 15:01

## 2023-05-09 RX ADMIN — SUGAMMADEX 200 MG: 100 INJECTION, SOLUTION INTRAVENOUS at 15:36

## 2023-05-09 RX ADMIN — HYDROMORPHONE HYDROCHLORIDE 0.5 MG: 1 INJECTION, SOLUTION INTRAMUSCULAR; INTRAVENOUS; SUBCUTANEOUS at 18:07

## 2023-05-09 RX ADMIN — HYDRALAZINE HYDROCHLORIDE 10 MG: 20 INJECTION, SOLUTION INTRAMUSCULAR; INTRAVENOUS at 14:25

## 2023-05-09 RX ADMIN — Medication 5 MG: at 13:47

## 2023-05-09 RX ADMIN — LEVOTHYROXINE SODIUM 125 MCG: 0.12 TABLET ORAL at 18:04

## 2023-05-09 RX ADMIN — ATORVASTATIN CALCIUM 20 MG: 20 TABLET, FILM COATED ORAL at 18:04

## 2023-05-09 RX ADMIN — CEFOXITIN 2000 MG: 1 INJECTION, POWDER, FOR SOLUTION INTRAVENOUS at 13:21

## 2023-05-09 RX ADMIN — ROCURONIUM BROMIDE 10 MG: 10 INJECTION INTRAVENOUS at 14:38

## 2023-05-09 RX ADMIN — HYDROMORPHONE HYDROCHLORIDE 0.5 MG: 1 INJECTION, SOLUTION INTRAMUSCULAR; INTRAVENOUS; SUBCUTANEOUS at 23:57

## 2023-05-09 RX ADMIN — Medication 10 MG: at 13:56

## 2023-05-09 RX ADMIN — FENTANYL CITRATE 100 MCG: 50 INJECTION, SOLUTION INTRAMUSCULAR; INTRAVENOUS at 13:18

## 2023-05-09 RX ADMIN — FENTANYL CITRATE 50 MCG: 50 INJECTION, SOLUTION INTRAMUSCULAR; INTRAVENOUS at 15:50

## 2023-05-09 RX ADMIN — SODIUM CHLORIDE: 9 INJECTION, SOLUTION INTRAVENOUS at 13:19

## 2023-05-09 RX ADMIN — FENTANYL CITRATE 100 MCG: 50 INJECTION, SOLUTION INTRAMUSCULAR; INTRAVENOUS at 13:46

## 2023-05-09 ASSESSMENT — PAIN DESCRIPTION - ORIENTATION
ORIENTATION: MID
ORIENTATION: LEFT
ORIENTATION: MID
ORIENTATION: LEFT

## 2023-05-09 ASSESSMENT — PAIN SCALES - GENERAL
PAINLEVEL_OUTOF10: 10
PAINLEVEL_OUTOF10: 8
PAINLEVEL_OUTOF10: 8
PAINLEVEL_OUTOF10: 4
PAINLEVEL_OUTOF10: 7
PAINLEVEL_OUTOF10: 9
PAINLEVEL_OUTOF10: 7
PAINLEVEL_OUTOF10: 5

## 2023-05-09 ASSESSMENT — LIFESTYLE VARIABLES
HOW MANY STANDARD DRINKS CONTAINING ALCOHOL DO YOU HAVE ON A TYPICAL DAY: PATIENT DOES NOT DRINK
HOW OFTEN DO YOU HAVE A DRINK CONTAINING ALCOHOL: NEVER

## 2023-05-09 ASSESSMENT — PAIN DESCRIPTION - DESCRIPTORS
DESCRIPTORS: ACHING

## 2023-05-09 ASSESSMENT — PAIN DESCRIPTION - LOCATION
LOCATION: ABDOMEN

## 2023-05-09 ASSESSMENT — PAIN DESCRIPTION - ONSET: ONSET: ON-GOING

## 2023-05-09 ASSESSMENT — PAIN DESCRIPTION - PAIN TYPE
TYPE: SURGICAL PAIN
TYPE: SURGICAL PAIN

## 2023-05-09 ASSESSMENT — PAIN DESCRIPTION - FREQUENCY: FREQUENCY: CONTINUOUS

## 2023-05-09 ASSESSMENT — PAIN - FUNCTIONAL ASSESSMENT: PAIN_FUNCTIONAL_ASSESSMENT: 0-10

## 2023-05-09 NOTE — INTERVAL H&P NOTE
Update History & Physical    The patient's History and Physical was reviewed with the patient and I examined the patient. There was no change. The surgical site was confirmed by the patient and me. Plan: The risks, benefits, expected outcome, and alternative to the recommended procedure have been discussed with the patient. Patient understands and wants to proceed with the procedure.      Electronically signed by Hubert Hanson MD on 5/9/2023 at 11:41 AM

## 2023-05-09 NOTE — BRIEF OP NOTE
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.89]    Post-Op Diagnosis: Same       Procedure(s):  ROBOTIC RIGHT COLECTOMY    Surgeon(s):  Mignon Lima MD    Assistant:  First Assistant: Angelica Wilburn RN    Anesthesia: General    Estimated Blood Loss (mL): 28TQ    Complications: None    Specimens:   ID Type Source Tests Collected by Time Destination   A : Right Colon Tissue Colon SURGICAL PATHOLOGY Mignon Lima MD 5/9/2023 1512    B : Liver Biopsy Tissue Liver SURGICAL PATHOLOGY Mignon Lima MD 5/9/2023 1513        Implants:  * No implants in log *      Drains:   Urinary Catheter 05/09/23 Romeo-Temperature (Active)       Findings: see op note for details    Colon Resection  Operation performed with curative intent Yes   Tumor Location (select all that apply) Cecum   Extent of colon and vascular resection (select all that apply) Right hemicolectomy - ileocolic, right colic (if present)        Electronically signed by Mignon Lima MD on 5/9/2023 at 3:21 PM

## 2023-05-09 NOTE — ANESTHESIA PRE PROCEDURE
Department of Anesthesiology  Preprocedure Note       Name:  Nathanael Lombardi   Age:  62 y.o.  :  1965                                          MRN:  [id number]         Date:  2023      Surgeon: Jesus Saab):  Sabrina Rodriges MD    Procedure: Procedure(s):  ROBOTIC RIGHT COLECTOMY POSS OPEN    Medications prior to admission:   Prior to Admission medications    Medication Sig Start Date End Date Taking? Authorizing Provider   metroNIDAZOLE (FLAGYL) 500 MG tablet Take 1 tablet by mouth 3 times daily Take first tab at 4 pm, second tab at 5 pm, and last tab at 11 pm the day prior to surgery 23   Brendan Spicer APRN - CNP   atorvastatin (LIPITOR) 20 MG tablet Take 1 tablet by mouth daily 3/2/23   Historical Provider, MD   omeprazole (PRILOSEC) 40 MG delayed release capsule Take 1 capsule by mouth 2 times daily (before meals) 23  Kaya Hoffman MD   famotidine (PEPCID) 40 MG tablet Take 1 tablet by mouth 2 times daily 23  Kaya Hoffman MD   losartan (COZAAR) 50 MG tablet Take 1 tablet by mouth daily 22   Historical Provider, MD   traZODone (DESYREL) 50 MG tablet Take 1 tablet by mouth daily 22   Historical Provider, MD   levothyroxine (SYNTHROID) 125 MCG tablet Take one tablet first thing in the morning on an empty stomach. 21   Historical Provider, MD   mirtazapine (REMERON) 15 MG tablet Take 1 tablet by mouth daily 21   Historical Provider, MD   OLANZapine (ZYPREXA) 10 MG tablet Take 1 tablet by mouth nightly 21   Historical Provider, MD   OXcarbazepine (TRILEPTAL) 150 MG tablet Take 1 tablet by mouth daily 21   Historical Provider, MD   oxyCODONE-acetaminophen (PERCOCET) 5-325 MG per tablet Take 1 tablet by mouth every 6 hours as needed.  21   Historical Provider, MD   Sertraline HCl (ZOLOFT PO) Take 1 tablet by mouth daily Pt unsure on dose    Historical Provider, MD       Current medications:    Current Facility-Administered

## 2023-05-09 NOTE — ANESTHESIA POSTPROCEDURE EVALUATION
Department of Anesthesiology  Postprocedure Note    Patient: Karla Michelle  MRN: [id number]  YOB: 1965  Date of evaluation: 5/9/2023      Procedure Summary     Date: 05/09/23 Room / Location: [facility] OR Stephani / Patience Todd    Anesthesia Start: 1308 Anesthesia Stop: 0309    Procedure: ROBOTIC RIGHT COLECTOMY WITH LIVER BIOPSY (Right: Abdomen) Diagnosis:       Cecum mass      (Cecum mass [K63.89])    Surgeons: Zora Byrne MD Responsible Provider: Margarita Lopez MD    Anesthesia Type: general ASA Status: 4          Anesthesia Type: No value filed.     Romeo Phase I: Romeo Score: 10    Romeo Phase II:        Anesthesia Post Evaluation    Patient location during evaluation: PACU  Patient participation: complete - patient participated  Level of consciousness: awake  Airway patency: patent  Nausea & Vomiting: no vomiting and no nausea  Complications: no  Cardiovascular status: hemodynamically stable  Respiratory status: acceptable and nasal cannula  Hydration status: stable

## 2023-05-10 ENCOUNTER — PROCEDURE VISIT (OUTPATIENT)
Dept: CARDIOLOGY CLINIC | Age: 58
End: 2023-05-10

## 2023-05-10 DIAGNOSIS — R55 SYNCOPE, UNSPECIFIED SYNCOPE TYPE: Primary | ICD-10-CM

## 2023-05-10 LAB
ANION GAP SERPL CALC-SCNC: 13 MEQ/L (ref 8–16)
BUN SERPL-MCNC: 9 MG/DL (ref 7–22)
CALCIUM SERPL-MCNC: 8.5 MG/DL (ref 8.5–10.5)
CHLORIDE SERPL-SCNC: 108 MEQ/L (ref 98–111)
CO2 SERPL-SCNC: 20 MEQ/L (ref 23–33)
CREAT SERPL-MCNC: 0.9 MG/DL (ref 0.4–1.2)
DEPRECATED RDW RBC AUTO: 96.1 FL (ref 35–45)
ERYTHROCYTE [DISTWIDTH] IN BLOOD BY AUTOMATED COUNT: 31.8 % (ref 11.5–14.5)
GFR SERPL CREATININE-BSD FRML MDRD: > 60 ML/MIN/1.73M2
GLUCOSE SERPL-MCNC: 104 MG/DL (ref 70–108)
HCT VFR BLD AUTO: 35.1 % (ref 42–52)
HGB BLD-MCNC: 10.1 GM/DL (ref 14–18)
MCH RBC QN AUTO: 24.8 PG (ref 26–33)
MCHC RBC AUTO-ENTMCNC: 28.8 GM/DL (ref 32.2–35.5)
MCV RBC AUTO: 86.2 FL (ref 80–94)
PLATELET # BLD AUTO: 490 THOU/MM3 (ref 130–400)
PMV BLD AUTO: 9.4 FL (ref 9.4–12.4)
POTASSIUM SERPL-SCNC: 4.9 MEQ/L (ref 3.5–5.2)
RBC # BLD AUTO: 4.07 MILL/MM3 (ref 4.7–6.1)
SODIUM SERPL-SCNC: 141 MEQ/L (ref 135–145)
WBC # BLD AUTO: 16.2 THOU/MM3 (ref 4.8–10.8)

## 2023-05-10 PROCEDURE — 6360000002 HC RX W HCPCS: Performed by: NURSE PRACTITIONER

## 2023-05-10 PROCEDURE — 1200000000 HC SEMI PRIVATE

## 2023-05-10 PROCEDURE — 36415 COLL VENOUS BLD VENIPUNCTURE: CPT

## 2023-05-10 PROCEDURE — 80048 BASIC METABOLIC PNL TOTAL CA: CPT

## 2023-05-10 PROCEDURE — 6370000000 HC RX 637 (ALT 250 FOR IP): Performed by: NURSE PRACTITIONER

## 2023-05-10 PROCEDURE — APPSS30 APP SPLIT SHARED TIME 16-30 MINUTES: Performed by: NURSE PRACTITIONER

## 2023-05-10 PROCEDURE — 6370000000 HC RX 637 (ALT 250 FOR IP): Performed by: SURGERY

## 2023-05-10 PROCEDURE — 99024 POSTOP FOLLOW-UP VISIT: CPT | Performed by: NURSE PRACTITIONER

## 2023-05-10 PROCEDURE — 6360000002 HC RX W HCPCS: Performed by: SURGERY

## 2023-05-10 PROCEDURE — 85027 COMPLETE CBC AUTOMATED: CPT

## 2023-05-10 RX ORDER — TRAZODONE HYDROCHLORIDE 50 MG/1
50 TABLET ORAL NIGHTLY
Status: DISCONTINUED | OUTPATIENT
Start: 2023-05-10 | End: 2023-05-13 | Stop reason: HOSPADM

## 2023-05-10 RX ORDER — OXCARBAZEPINE 300 MG/1
150 TABLET, FILM COATED ORAL DAILY
Status: DISCONTINUED | OUTPATIENT
Start: 2023-05-10 | End: 2023-05-13 | Stop reason: HOSPADM

## 2023-05-10 RX ORDER — OLANZAPINE 10 MG/1
10 TABLET ORAL NIGHTLY
Status: DISCONTINUED | OUTPATIENT
Start: 2023-05-10 | End: 2023-05-13 | Stop reason: HOSPADM

## 2023-05-10 RX ORDER — CHLORPROMAZINE HYDROCHLORIDE 25 MG/1
25 TABLET, FILM COATED ORAL 3 TIMES DAILY PRN
Status: DISCONTINUED | OUTPATIENT
Start: 2023-05-10 | End: 2023-05-13 | Stop reason: HOSPADM

## 2023-05-10 RX ORDER — HYDRALAZINE HYDROCHLORIDE 20 MG/ML
10 INJECTION INTRAMUSCULAR; INTRAVENOUS EVERY 6 HOURS PRN
Status: DISCONTINUED | OUTPATIENT
Start: 2023-05-10 | End: 2023-05-13 | Stop reason: HOSPADM

## 2023-05-10 RX ADMIN — OLANZAPINE 10 MG: 10 TABLET, FILM COATED ORAL at 23:04

## 2023-05-10 RX ADMIN — OXYCODONE HYDROCHLORIDE 10 MG: 5 TABLET ORAL at 19:09

## 2023-05-10 RX ADMIN — FAMOTIDINE 20 MG: 20 TABLET, FILM COATED ORAL at 21:20

## 2023-05-10 RX ADMIN — OXCARBAZEPINE 150 MG: 300 TABLET, FILM COATED ORAL at 13:36

## 2023-05-10 RX ADMIN — HYOSCYAMINE SULFATE 125 MCG: 0.12 TABLET, ORALLY DISINTEGRATING ORAL at 13:35

## 2023-05-10 RX ADMIN — OXYCODONE HYDROCHLORIDE 10 MG: 5 TABLET ORAL at 09:01

## 2023-05-10 RX ADMIN — ATORVASTATIN CALCIUM 20 MG: 20 TABLET, FILM COATED ORAL at 09:05

## 2023-05-10 RX ADMIN — FAMOTIDINE 20 MG: 20 TABLET, FILM COATED ORAL at 09:01

## 2023-05-10 RX ADMIN — TRAZODONE HYDROCHLORIDE 50 MG: 50 TABLET ORAL at 21:20

## 2023-05-10 RX ADMIN — LEVOTHYROXINE SODIUM 125 MCG: 0.12 TABLET ORAL at 06:07

## 2023-05-10 RX ADMIN — HYDROMORPHONE HYDROCHLORIDE 0.5 MG: 1 INJECTION, SOLUTION INTRAMUSCULAR; INTRAVENOUS; SUBCUTANEOUS at 06:07

## 2023-05-10 RX ADMIN — HYOSCYAMINE SULFATE 125 MCG: 0.12 TABLET, ORALLY DISINTEGRATING ORAL at 03:56

## 2023-05-10 RX ADMIN — ENOXAPARIN SODIUM 40 MG: 100 INJECTION SUBCUTANEOUS at 08:59

## 2023-05-10 RX ADMIN — NALOXEGOL OXALATE 12.5 MG: 12.5 TABLET, FILM COATED ORAL at 13:37

## 2023-05-10 RX ADMIN — LOSARTAN POTASSIUM 50 MG: 50 TABLET, FILM COATED ORAL at 09:04

## 2023-05-10 RX ADMIN — HYDRALAZINE HYDROCHLORIDE 10 MG: 20 INJECTION INTRAMUSCULAR; INTRAVENOUS at 21:17

## 2023-05-10 ASSESSMENT — PAIN DESCRIPTION - ORIENTATION
ORIENTATION: MID

## 2023-05-10 ASSESSMENT — PAIN DESCRIPTION - LOCATION
LOCATION: ABDOMEN

## 2023-05-10 ASSESSMENT — PAIN - FUNCTIONAL ASSESSMENT
PAIN_FUNCTIONAL_ASSESSMENT: PREVENTS OR INTERFERES SOME ACTIVE ACTIVITIES AND ADLS

## 2023-05-10 ASSESSMENT — PAIN SCALES - GENERAL
PAINLEVEL_OUTOF10: 8
PAINLEVEL_OUTOF10: 2
PAINLEVEL_OUTOF10: 8
PAINLEVEL_OUTOF10: 4
PAINLEVEL_OUTOF10: 5
PAINLEVEL_OUTOF10: 6
PAINLEVEL_OUTOF10: 8

## 2023-05-10 ASSESSMENT — PAIN DESCRIPTION - PAIN TYPE: TYPE: SURGICAL PAIN

## 2023-05-10 ASSESSMENT — PAIN DESCRIPTION - DESCRIPTORS
DESCRIPTORS: ACHING
DESCRIPTORS: ACHING;CRAMPING
DESCRIPTORS: ACHING;CRUSHING;TENDER

## 2023-05-10 NOTE — CARE COORDINATION
05/10/23 1403   Readmission Assessment   Number of Days since last admission? 1-7 days   Previous Disposition Home with Family   Who is being Interviewed Patient   What was the patient's/caregiver's perception as to why they think they needed to return back to the hospital? Other (Comment)  (Patient scheduled for surgery.)   Did you visit your Primary Care Physician after you left the hospital, before you returned this time? No   Why weren't you able to visit your PCP? Other (Comment)  (Patient had to reschedule his appointment due to planned surgery.)   Did you see a specialist, such as Cardiac, Pulmonary, Orthopedic Physician, etc. after you left the hospital? Yes   Who advised the patient to return to the hospital? Physician   Does the patient report anything that got in the way of taking their medications? No   In our efforts to provide the best possible care to you and others like you, can you think of anything that we could have done to help you after you left the hospital the first time, so that you might not have needed to return so soon? Other (Comment)  Leobardo Boone states he was ready for discharge from the hospital. He is unsure of needs at this time.  He declines HH.)

## 2023-05-10 NOTE — PLAN OF CARE
Problem: Discharge Planning  Goal: Discharge to home or other facility with appropriate resources  Outcome: Progressing  Flowsheets (Taken 5/9/2023 2225)  Discharge to home or other facility with appropriate resources:   Identify barriers to discharge with patient and caregiver   Arrange for needed discharge resources and transportation as appropriate   Identify discharge learning needs (meds, wound care, etc)     Problem: Pain  Goal: Verbalizes/displays adequate comfort level or baseline comfort level  Outcome: Progressing  Flowsheets (Taken 5/9/2023 2225)  Verbalizes/displays adequate comfort level or baseline comfort level:   Encourage patient to monitor pain and request assistance   Assess pain using appropriate pain scale     Problem: Safety - Adult  Goal: Free from fall injury  Outcome: Progressing  Flowsheets (Taken 5/9/2023 2225)  Free From Fall Injury: Instruct family/caregiver on patient safety     Problem: ABCDS Injury Assessment  Goal: Absence of physical injury  Outcome: Progressing  Flowsheets (Taken 5/9/2023 2225)  Absence of Physical Injury: Implement safety measures based on patient assessment     Problem: Respiratory - Adult  Goal: Achieves optimal ventilation and oxygenation  Outcome: Progressing  Flowsheets (Taken 5/9/2023 2225)  Achieves optimal ventilation and oxygenation:   Assess for changes in respiratory status   Assess for changes in mentation and behavior     Problem: Skin/Tissue Integrity - Adult  Goal: Incisions, wounds, or drain sites healing without S/S of infection  Outcome: Progressing  Flowsheets (Taken 5/9/2023 2225)  Incisions, Wounds, or Drain Sites Healing Without Sign and Symptoms of Infection: TWICE DAILY: Assess and document skin integrity     Problem: Musculoskeletal - Adult  Goal: Return mobility to safest level of function  Outcome: Progressing  Flowsheets (Taken 5/9/2023 2225)  Return Mobility to Safest Level of Function: Assess patient stability and activity tolerance

## 2023-05-10 NOTE — CARE COORDINATION
Case Management Assessment  Initial Evaluation    Date/Time of Evaluation: 5/10/2023 2:06 PM  Assessment Completed by: Gabrielle Capps RN    If patient is discharged prior to next notation, then this note serves as note for discharge by case management. Patient Name: Blaine Villalba                   YOB: 1965  Diagnosis: Cecum mass [K63.89]  Colon cancer [facility]) [C18.9]                   Date / Time: 5/9/2023 11:05 AM  Location: [address] [place] [address]     Patient Admission Status: Inpatient   Readmission Risk Low 0-14, Mod 15-19), High > 20: Readmission Risk Score: 15.9    Current PCP: MADELIN Hameed CNP  PCP verified by CM? Yes    Chart Reviewed: Yes      History Provided by: Patient  Patient Orientation: Alert and Oriented    Patient Cognition: Alert    Hospitalization in the last 30 days (Readmission):  Yes    If yes, Readmission Assessment in CM Navigator will be completed. Advance Directives:      Code Status: Full Code   Patient's Primary Decision Maker is: Named in Scanned ACP Document      Discharge Planning:    Patient lives with: Spouse/Significant Other Type of Home: Apartment  Primary Care Giver: Self  Patient Support Systems include: Spouse/Significant Other   Current Financial resources: Medicaid, Medicare  Current community resources: None  Current services prior to admission: None            Current DME:              Type of Home Care services:  None    ADLS  Prior functional level: Independent in ADLs/IADLs  Current functional level: Independent in ADLs/IADLs    Family can provide assistance at DC: Yes  Would you like Case Management to discuss the discharge plan with any other family members/significant others, and if so, who?  No  Plans to Return to Present Housing: Yes  Other Identified Issues/Barriers to RETURNING to current housing: None  Potential Assistance needed at discharge: N/A            Potential DME:    Patient expects to discharge to: [address] [place] [address] for transportation

## 2023-05-10 NOTE — PLAN OF CARE
I have reviewed the patient's plan of care and based on this review, the patient treatment plan has been updated.    Problem: Discharge Planning  Goal: Discharge to home or other facility with appropriate resources  Outcome: Progressing     Problem: Pain  Goal: Verbalizes/displays adequate comfort level or baseline comfort level  Outcome: Progressing     Problem: Safety - Adult  Goal: Free from fall injury  Outcome: Progressing  Flowsheets (Taken 5/10/2023 1118)  Free From Fall Injury: Instruct family/caregiver on patient safety     Problem: ABCDS Injury Assessment  Goal: Absence of physical injury  Outcome: Progressing  Flowsheets (Taken 5/10/2023 1118)  Absence of Physical Injury: Implement safety measures based on patient assessment     Problem: Respiratory - Adult  Goal: Achieves optimal ventilation and oxygenation  Outcome: Progressing     Problem: Skin/Tissue Integrity - Adult  Goal: Incisions, wounds, or drain sites healing without S/S of infection  Outcome: Progressing  Flowsheets (Taken 5/10/2023 1118)  Incisions, Wounds, or Drain Sites Healing Without Sign and Symptoms of Infection: TWICE DAILY: Assess and document skin integrity     Problem: Musculoskeletal - Adult  Goal: Return mobility to safest level of function  Outcome: Progressing     Problem: Gastrointestinal - Adult  Goal: Minimal or absence of nausea and vomiting  Outcome: Progressing  Goal: Maintains or returns to baseline bowel function  Outcome: Progressing  Goal: Maintains adequate nutritional intake  Outcome: Progressing     Problem: Genitourinary - Adult  Goal: Absence of urinary retention  Outcome: Progressing  Goal: Urinary catheter remains patent  Outcome: Progressing     Problem: Infection - Adult  Goal: Absence of infection at discharge  Outcome: Progressing  Goal: Absence of infection during hospitalization  Outcome: Progressing

## 2023-05-10 NOTE — OP NOTE
[address] [place] [address]                                OPERATIVE REPORT    PATIENT NAME: Kiran Copeland                       :        1965  MED REC NO:   [id number]                           ROOM:       [id number]  ACCOUNT NO:   [de-identified]                           ADMIT DATE: 2023  PROVIDER:     Domingo Cowan M.D.    DATE OF PROCEDURE:  2023    PREOPERATIVE DIAGNOSIS:  Poorly differentiated cecal adenocarcinoma. POSTOPERATIVE DIAGNOSIS:  Poorly differentiated cecal adenocarcinoma. OPERATIONS PERFORMED:  1.  Robotic right colectomy. 2.  Core liver needle biopsy x2. SURGEON:  Domingo Cowan MD    ASSISTANT:  Aiden Mata. MARIVEL Hernandez    ANESTHESIA:  General/local.    ESTIMATED BLOOD LOSS:  30 mL. DRAINS:  None. COMPLICATIONS:  None. DISPOSITION:  Stable to the recovery room. INDICATIONS:  The patient is a 59-year-old male who recently had acute  blood loss anemia. He was found to have a cecal mass. Biopsy  demonstrated poorly differentiated adenocarcinoma. He was in need of  resection. Both operative and nonoperative intervention plans were  discussed. Risks of surgery were further discussed. Some of the risks  included but were not limited to bleeding, infection, the need for  reoperation, severe chronic postoperative pain or numbness, major  vascular or nerve injury, cardiopulmonary complications, anesthetic  complications, seroma or hematoma formation, wound breakdown, trocar  site herniation, anastomotic leak, anastomotic bleed, anastomotic  stricture, chronic pain, and death. After all of the questions were  answered in their entirety, the patient was completely aware of the  current situation, he elected to proceed with procedure.     DESCRIPTION OF PROCEDURE:  After informed consent was signed and placed  on the chart, the patient was taken back to the operating room and  placed supine on

## 2023-05-11 LAB
ANION GAP SERPL CALC-SCNC: 12 MEQ/L (ref 8–16)
BUN SERPL-MCNC: 6 MG/DL (ref 7–22)
CALCIUM SERPL-MCNC: 8.8 MG/DL (ref 8.5–10.5)
CHLORIDE SERPL-SCNC: 106 MEQ/L (ref 98–111)
CO2 SERPL-SCNC: 21 MEQ/L (ref 23–33)
CREAT SERPL-MCNC: 0.8 MG/DL (ref 0.4–1.2)
DEPRECATED RDW RBC AUTO: 102.3 FL (ref 35–45)
ERYTHROCYTE [DISTWIDTH] IN BLOOD BY AUTOMATED COUNT: 33.2 % (ref 11.5–14.5)
GFR SERPL CREATININE-BSD FRML MDRD: > 60 ML/MIN/1.73M2
GLUCOSE SERPL-MCNC: 98 MG/DL (ref 70–108)
HCT VFR BLD AUTO: 37.5 % (ref 42–52)
HGB BLD-MCNC: 10.5 GM/DL (ref 14–18)
MCH RBC QN AUTO: 24.7 PG (ref 26–33)
MCHC RBC AUTO-ENTMCNC: 28 GM/DL (ref 32.2–35.5)
MCV RBC AUTO: 88.2 FL (ref 80–94)
PLATELET # BLD AUTO: 466 THOU/MM3 (ref 130–400)
PMV BLD AUTO: 9 FL (ref 9.4–12.4)
POTASSIUM SERPL-SCNC: 4.5 MEQ/L (ref 3.5–5.2)
RBC # BLD AUTO: 4.25 MILL/MM3 (ref 4.7–6.1)
SODIUM SERPL-SCNC: 139 MEQ/L (ref 135–145)
WBC # BLD AUTO: 12.7 THOU/MM3 (ref 4.8–10.8)

## 2023-05-11 PROCEDURE — 6370000000 HC RX 637 (ALT 250 FOR IP): Performed by: SURGERY

## 2023-05-11 PROCEDURE — 85027 COMPLETE CBC AUTOMATED: CPT

## 2023-05-11 PROCEDURE — 1200000000 HC SEMI PRIVATE

## 2023-05-11 PROCEDURE — APPSS30 APP SPLIT SHARED TIME 16-30 MINUTES: Performed by: NURSE PRACTITIONER

## 2023-05-11 PROCEDURE — 36415 COLL VENOUS BLD VENIPUNCTURE: CPT

## 2023-05-11 PROCEDURE — 99024 POSTOP FOLLOW-UP VISIT: CPT | Performed by: NURSE PRACTITIONER

## 2023-05-11 PROCEDURE — 6370000000 HC RX 637 (ALT 250 FOR IP): Performed by: NURSE PRACTITIONER

## 2023-05-11 PROCEDURE — 80048 BASIC METABOLIC PNL TOTAL CA: CPT

## 2023-05-11 PROCEDURE — 2580000003 HC RX 258: Performed by: NURSE PRACTITIONER

## 2023-05-11 PROCEDURE — 6360000002 HC RX W HCPCS: Performed by: SURGERY

## 2023-05-11 PROCEDURE — 2580000003 HC RX 258: Performed by: SURGERY

## 2023-05-11 RX ORDER — IPRATROPIUM BROMIDE AND ALBUTEROL SULFATE 2.5; .5 MG/3ML; MG/3ML
1 SOLUTION RESPIRATORY (INHALATION) EVERY 4 HOURS PRN
Status: DISCONTINUED | OUTPATIENT
Start: 2023-05-11 | End: 2023-05-13 | Stop reason: HOSPADM

## 2023-05-11 RX ADMIN — ATORVASTATIN CALCIUM 20 MG: 20 TABLET, FILM COATED ORAL at 09:45

## 2023-05-11 RX ADMIN — NALOXEGOL OXALATE 12.5 MG: 12.5 TABLET, FILM COATED ORAL at 05:24

## 2023-05-11 RX ADMIN — SODIUM CHLORIDE, PRESERVATIVE FREE 10 ML: 5 INJECTION INTRAVENOUS at 21:27

## 2023-05-11 RX ADMIN — ENOXAPARIN SODIUM 40 MG: 100 INJECTION SUBCUTANEOUS at 09:45

## 2023-05-11 RX ADMIN — FAMOTIDINE 20 MG: 20 TABLET, FILM COATED ORAL at 21:33

## 2023-05-11 RX ADMIN — SODIUM CHLORIDE, PRESERVATIVE FREE 10 ML: 5 INJECTION INTRAVENOUS at 09:45

## 2023-05-11 RX ADMIN — FAMOTIDINE 20 MG: 20 TABLET, FILM COATED ORAL at 09:45

## 2023-05-11 RX ADMIN — OXYCODONE HYDROCHLORIDE 10 MG: 5 TABLET ORAL at 09:45

## 2023-05-11 RX ADMIN — TRAZODONE HYDROCHLORIDE 50 MG: 50 TABLET ORAL at 21:33

## 2023-05-11 RX ADMIN — OLANZAPINE 10 MG: 10 TABLET, FILM COATED ORAL at 21:28

## 2023-05-11 RX ADMIN — HYOSCYAMINE SULFATE 125 MCG: 0.12 TABLET, ORALLY DISINTEGRATING ORAL at 09:45

## 2023-05-11 RX ADMIN — LOSARTAN POTASSIUM 50 MG: 50 TABLET, FILM COATED ORAL at 09:45

## 2023-05-11 RX ADMIN — OXCARBAZEPINE 150 MG: 300 TABLET, FILM COATED ORAL at 09:45

## 2023-05-11 RX ADMIN — SODIUM CHLORIDE: 9 INJECTION, SOLUTION INTRAVENOUS at 21:25

## 2023-05-11 RX ADMIN — LEVOTHYROXINE SODIUM 125 MCG: 0.12 TABLET ORAL at 05:24

## 2023-05-11 ASSESSMENT — PAIN SCALES - GENERAL
PAINLEVEL_OUTOF10: 0
PAINLEVEL_OUTOF10: 7
PAINLEVEL_OUTOF10: 0
PAINLEVEL_OUTOF10: 5
PAINLEVEL_OUTOF10: 0
PAINLEVEL_OUTOF10: 0

## 2023-05-11 NOTE — PLAN OF CARE
Problem: Discharge Planning  Goal: Discharge to home or other facility with appropriate resources  5/11/2023 1506 by Yuko Calle RN  Outcome: Progressing  Note: Patient plans to be discharged to home with wife. 5/11/2023 0249 by Addison Han RN  Outcome: Progressing       Problem: Pain  Goal: Verbalizes/displays adequate comfort level or baseline comfort level  5/11/2023 1506 by Yuko Calle RN  Outcome: Progressing  Note: Pain Assessment: 0-10  Pain Level: 5   Patient's Stated Pain Goal: 1   Is pain goal met at this time? No     Non-Pharmaceutical Pain Intervention(s): Family support/rest/repoistion       Problem: Safety - Adult  Goal: Free from fall injury  5/11/2023 1506 by Yuko Calle RN  Outcome: Progressing  Note: Fall sign posted. Arm band in place. Non-skid slippers on. Bed alarm on. Patient agreeable to use of call light. Call light within reach. Bed in lowest position. Care plan reviewed with patient and wife. Patient and wife verbalize understanding of the plan of care and contribute to goal setting.

## 2023-05-11 NOTE — CARE COORDINATION
5/11/23, 3:12 PM EDT    DISCHARGE ON GOING [address] day: 2  Location: -23/023-A Reason for admit: Cecum mass [K63.89]  Colon cancer ([facility]) [C18.9]   Procedure:   5/09/2023   1. Robotic right colectomy. 2.  Core liver needle biopsy x2. Barriers to Discharge: IV fluids, IV Pepcid, Lovenox, Movantik scheduled, prn Thorazine, Apresoline, Dilaudid, Roxicodone, and Zofran, BMP and CBC in a.m., full liquid diet, abdominal binder, ambulate, SCD's, up as tolerated. PCP: MADELIN King CNP  Readmission Risk Score: 16.4%  Patient Goals/Plan/Treatment Preferences: Babita Akhtar is from home with his S/O. They deny needs at discharge.

## 2023-05-11 NOTE — PLAN OF CARE
Problem: Discharge Planning  Goal: Discharge to home or other facility with appropriate resources  5/11/2023 0249 by Charles Gan RN  Outcome: Progressing  Flowsheets (Taken 5/9/2023 2225 by Dwight Curtis, RN)  Discharge to home or other facility with appropriate resources:   Identify barriers to discharge with patient and caregiver   Arrange for needed discharge resources and transportation as appropriate   Identify discharge learning needs (meds, wound care, etc)     Problem: Pain  Goal: Verbalizes/displays adequate comfort level or baseline comfort level  5/11/2023 0249 by Charles Gan RN  Outcome: Progressing  Flowsheets (Taken 5/9/2023 2225 by Dwight Curtis, RN)  Verbalizes/displays adequate comfort level or baseline comfort level:   Encourage patient to monitor pain and request assistance   Assess pain using appropriate pain scale     Problem: Safety - Adult  Goal: Free from fall injury  5/11/2023 0249 by Charles Gan RN  Outcome: Progressing  Flowsheets (Taken 5/10/2023 1118 by Mert Fry RN)  Free From Fall Injury: Instruct family/caregiver on patient safety     Problem: ABCDS Injury Assessment  Goal: Absence of physical injury  5/11/2023 0249 by Charles Gan RN  Outcome: Progressing  Flowsheets (Taken 5/10/2023 1118 by Mert Fry RN)  Absence of Physical Injury: Implement safety measures based on patient assessment     Problem: Respiratory - Adult  Goal: Achieves optimal ventilation and oxygenation  5/11/2023 0249 by Charles Gan RN  Outcome: Progressing  Flowsheets (Taken 5/9/2023 2225 by Dwight Curtis, RN)  Achieves optimal ventilation and oxygenation:   Assess for changes in respiratory status   Assess for changes in mentation and behavior     Problem: Skin/Tissue Integrity - Adult  Goal: Incisions, wounds, or drain sites healing without S/S of infection  5/11/2023 0249 by Charles Gan RN  Outcome:

## 2023-05-12 LAB
ANION GAP SERPL CALC-SCNC: 10 MEQ/L (ref 8–16)
BUN SERPL-MCNC: 5 MG/DL (ref 7–22)
CALCIUM SERPL-MCNC: 8.5 MG/DL (ref 8.5–10.5)
CHLORIDE SERPL-SCNC: 109 MEQ/L (ref 98–111)
CO2 SERPL-SCNC: 21 MEQ/L (ref 23–33)
CREAT SERPL-MCNC: 0.8 MG/DL (ref 0.4–1.2)
DEPRECATED RDW RBC AUTO: 98.1 FL (ref 35–45)
ERYTHROCYTE [DISTWIDTH] IN BLOOD BY AUTOMATED COUNT: 32.8 % (ref 11.5–14.5)
GFR SERPL CREATININE-BSD FRML MDRD: > 60 ML/MIN/1.73M2
GLUCOSE SERPL-MCNC: 87 MG/DL (ref 70–108)
HCT VFR BLD AUTO: 32.1 % (ref 42–52)
HGB BLD-MCNC: 9.3 GM/DL (ref 14–18)
MCH RBC QN AUTO: 25.1 PG (ref 26–33)
MCHC RBC AUTO-ENTMCNC: 29 GM/DL (ref 32.2–35.5)
MCV RBC AUTO: 86.8 FL (ref 80–94)
PLATELET # BLD AUTO: 420 THOU/MM3 (ref 130–400)
PMV BLD AUTO: 9.1 FL (ref 9.4–12.4)
POTASSIUM SERPL-SCNC: 3.9 MEQ/L (ref 3.5–5.2)
RBC # BLD AUTO: 3.7 MILL/MM3 (ref 4.7–6.1)
SODIUM SERPL-SCNC: 140 MEQ/L (ref 135–145)
WBC # BLD AUTO: 9.8 THOU/MM3 (ref 4.8–10.8)

## 2023-05-12 PROCEDURE — 1200000000 HC SEMI PRIVATE

## 2023-05-12 PROCEDURE — 6370000000 HC RX 637 (ALT 250 FOR IP): Performed by: NURSE PRACTITIONER

## 2023-05-12 PROCEDURE — 6370000000 HC RX 637 (ALT 250 FOR IP): Performed by: SURGERY

## 2023-05-12 PROCEDURE — 94640 AIRWAY INHALATION TREATMENT: CPT

## 2023-05-12 PROCEDURE — 85027 COMPLETE CBC AUTOMATED: CPT

## 2023-05-12 PROCEDURE — APPSS30 APP SPLIT SHARED TIME 16-30 MINUTES: Performed by: NURSE PRACTITIONER

## 2023-05-12 PROCEDURE — 94760 N-INVAS EAR/PLS OXIMETRY 1: CPT

## 2023-05-12 PROCEDURE — 36415 COLL VENOUS BLD VENIPUNCTURE: CPT

## 2023-05-12 PROCEDURE — 6360000002 HC RX W HCPCS: Performed by: SURGERY

## 2023-05-12 PROCEDURE — 99024 POSTOP FOLLOW-UP VISIT: CPT | Performed by: NURSE PRACTITIONER

## 2023-05-12 PROCEDURE — 80048 BASIC METABOLIC PNL TOTAL CA: CPT

## 2023-05-12 RX ORDER — IPRATROPIUM BROMIDE AND ALBUTEROL SULFATE 2.5; .5 MG/3ML; MG/3ML
1 SOLUTION RESPIRATORY (INHALATION) ONCE
Status: COMPLETED | OUTPATIENT
Start: 2023-05-12 | End: 2023-05-12

## 2023-05-12 RX ORDER — FAMOTIDINE 20 MG/1
20 TABLET, FILM COATED ORAL 2 TIMES DAILY
Status: DISCONTINUED | OUTPATIENT
Start: 2023-05-12 | End: 2023-05-13 | Stop reason: HOSPADM

## 2023-05-12 RX ORDER — DOCUSATE SODIUM 100 MG/1
100 CAPSULE, LIQUID FILLED ORAL DAILY
Status: DISCONTINUED | OUTPATIENT
Start: 2023-05-12 | End: 2023-05-13 | Stop reason: HOSPADM

## 2023-05-12 RX ADMIN — FAMOTIDINE 20 MG: 20 TABLET, FILM COATED ORAL at 10:02

## 2023-05-12 RX ADMIN — NALOXEGOL OXALATE 12.5 MG: 12.5 TABLET, FILM COATED ORAL at 06:07

## 2023-05-12 RX ADMIN — LOSARTAN POTASSIUM 50 MG: 50 TABLET, FILM COATED ORAL at 10:02

## 2023-05-12 RX ADMIN — DOCUSATE SODIUM 100 MG: 100 CAPSULE, LIQUID FILLED ORAL at 10:02

## 2023-05-12 RX ADMIN — OXYCODONE HYDROCHLORIDE 10 MG: 5 TABLET ORAL at 08:18

## 2023-05-12 RX ADMIN — FAMOTIDINE 20 MG: 20 TABLET, FILM COATED ORAL at 20:46

## 2023-05-12 RX ADMIN — OLANZAPINE 10 MG: 10 TABLET, FILM COATED ORAL at 20:46

## 2023-05-12 RX ADMIN — LEVOTHYROXINE SODIUM 125 MCG: 0.12 TABLET ORAL at 06:07

## 2023-05-12 RX ADMIN — ATORVASTATIN CALCIUM 20 MG: 20 TABLET, FILM COATED ORAL at 10:02

## 2023-05-12 RX ADMIN — TRAZODONE HYDROCHLORIDE 50 MG: 50 TABLET ORAL at 20:46

## 2023-05-12 RX ADMIN — ENOXAPARIN SODIUM 40 MG: 100 INJECTION SUBCUTANEOUS at 10:02

## 2023-05-12 RX ADMIN — IPRATROPIUM BROMIDE AND ALBUTEROL SULFATE 1 AMPULE: .5; 3 SOLUTION RESPIRATORY (INHALATION) at 14:48

## 2023-05-12 RX ADMIN — OXCARBAZEPINE 150 MG: 300 TABLET, FILM COATED ORAL at 10:02

## 2023-05-12 ASSESSMENT — PAIN SCALES - GENERAL
PAINLEVEL_OUTOF10: 0
PAINLEVEL_OUTOF10: 7
PAINLEVEL_OUTOF10: 0
PAINLEVEL_OUTOF10: 0

## 2023-05-12 ASSESSMENT — PAIN - FUNCTIONAL ASSESSMENT: PAIN_FUNCTIONAL_ASSESSMENT: ACTIVITIES ARE NOT PREVENTED

## 2023-05-12 ASSESSMENT — PAIN DESCRIPTION - LOCATION: LOCATION: ABDOMEN

## 2023-05-12 ASSESSMENT — PAIN DESCRIPTION - DESCRIPTORS: DESCRIPTORS: SHARP

## 2023-05-12 NOTE — PLAN OF CARE
Problem: Discharge Planning  Goal: Discharge to home or other facility with appropriate resources  5/11/2023 2333 by Hunter Sanchez RN  Outcome: Progressing  Flowsheets (Taken 5/9/2023 2225 by Victor Manuel Clarke, RN)  Discharge to home or other facility with appropriate resources:   Identify barriers to discharge with patient and caregiver   Arrange for needed discharge resources and transportation as appropriate   Identify discharge learning needs (meds, wound care, etc)  5/11/2023 1506 by Gita Ortega RN  Outcome: Progressing  Note: Patient plans to be discharged to home with wife. Problem: Pain  Goal: Verbalizes/displays adequate comfort level or baseline comfort level  5/11/2023 2333 by Hunter Sanchez RN  Outcome: Progressing  Flowsheets (Taken 5/9/2023 2225 by Victor Manuel Clarke, RN)  Verbalizes/displays adequate comfort level or baseline comfort level:   Encourage patient to monitor pain and request assistance   Assess pain using appropriate pain scale  5/11/2023 1506 by Gita Ortega RN  Outcome: Progressing  Note: Pain Assessment: 0-10  Pain Level: 5   Patient's Stated Pain Goal: 1   Is pain goal met at this time? No     Non-Pharmaceutical Pain Intervention(s): Family support/rest/repoistion        Problem: Safety - Adult  Goal: Free from fall injury  5/11/2023 2333 by Hunter Sanchez RN  Outcome: Progressing  Flowsheets (Taken 5/10/2023 1118 by Parris Enriquez RN)  Free From Fall Injury: Instruct family/caregiver on patient safety  5/11/2023 1506 by Gita Ortega RN  Outcome: Progressing  Note: Fall sign posted. Arm band in place. Non-skid slippers on. Bed alarm on. Patient agreeable to use of call light. Call light within reach. Bed in lowest position.         Problem: ABCDS Injury Assessment  Goal: Absence of physical injury  Outcome: Progressing  Flowsheets (Taken 5/10/2023 1118 by Parris Enriquez RN)  Absence of Physical Injury: Implement safety measures based on patient assessment

## 2023-05-12 NOTE — PLAN OF CARE
Problem: Discharge Planning  Goal: Discharge to home or other facility with appropriate resources  5/12/2023 1142 by Danetta Gowers RN  Discharge plan is in process. Plan discharge home with family. Problem: Pain  Goal: Verbalizes/displays adequate comfort level or baseline comfort level  5/12/2023 1142 by Danetta Gowers, RN  Outcome: Progressing  Patient states pain relief from PRN pain medications. Pain reassessed one hour post PRN pain medication given. Patient rates pain 5-7 on TRINI 0-10 scale. Problem: Safety - Adult  Goal: Free from fall injury  5/12/2023 1142 by Danetta Gowers, RN  Outcome: Progressing  Fall assessment completed. Patient using call light appropriately to call for assistance with ambulation to bathroom. Personal items within reach. Patient is also compliant with use of non-skid slippers. Problem: ABCDS Injury Assessment  Goal: Absence of physical injury  5/12/2023 1142 by Danetta Gowers, RN  Outcome: Progressing  Fall assessment completed. Patient using call light appropriately to call for assistance with ambulation to bathroom. Personal items within reach. Patient is also compliant with use of non-skid slippers. Problem: Respiratory - Adult  Goal: Achieves optimal ventilation and oxygenation  5/12/2023 1142 by Danetta Gowers, RN  Outcome: Progressing  Patients oxygen saturation 96 % on room air. No shortness of breath noted. Lung sounds clear, able C&DB as ordered. Problem: Skin/Tissue Integrity - Adult  Goal: Incisions, wounds, or drain sites healing without S/S of infection  5/12/2023 1142 by Danetta Gowers, RN  Outcome: Progressing  No skin breakdown this shift. Patient being assisted with turning. Patients states understanding of repositioning every two hours. Abdominal incision healing.      Problem: Gastrointestinal - Adult  Goal: Minimal or absence of nausea and vomiting  5/12/2023 1142 by Danetta Gowers, RN  Outcome:

## 2023-05-12 NOTE — DISCHARGE INSTRUCTIONS
DR. Jessi Moeller DISCHARGE INSTRUCTIONS    Pt Name: [facility] [address] Record Number: [id number]  Today's Date: 5/12/2023    GENERAL ANESTHESIA OR SEDATION  1. Do not drive or operate hazardous machinery for 24 hours. 2. Do not make important business or personal decisions for 24 hours. 3. Do not drink alcoholic beverages or use tobacco for 24 hours. ACTIVITY INSTRUCTIONS:  [] Rest today. Resume light to normal activity tomorrow.   [] You may resume normal activity tomorrow. Do not engage in strenuous activity that may place stress on your incision. [x] Do not drive for 3-5 days or while taking the pain medication. Avoid heavy lifting, tugging, pullings greater than 10 lbs until seen in the office. DIET INSTRUCTIONS:  [x]Begin with clear liquids. If not nauseated, may increase to a low-fat diet when you desire. Greasy and spicy foods are not advised. [x]Regular diet as tolerated. MEDICATIONS  [x]Prescription sent with you to be used as directed. []Lortab   []Norco   []Percocet   []Tylenol #3   [x]Oxycontin  Do not drink alcohol or drive while taking these medications. You may experience dizziness or drowsiness with these medications. You may also experience constipation which can be relieved with stool softners or laxatives. **Pain medication at discharge - use only as prescribed- refills may be available to you at your follow up appointments if needed and warranted. Narcotics should be used for only short term and we highly encouraged our patients to wean off appropriately and use other means for pain such as non pharmacologic measures and over the counter tylenol or ibuprofen if no restrictions apply. We do  know that surgical pain is real and will not hesitate to help eliminate some of your discomfort.  However we will not be able to completely make you pain free and it is important to determine what pain level is tolerable for you **  [x]You may resume your daily prescription medication

## 2023-05-13 VITALS
TEMPERATURE: 98.7 F | HEIGHT: 71 IN | HEART RATE: 83 BPM | OXYGEN SATURATION: 97 % | RESPIRATION RATE: 16 BRPM | SYSTOLIC BLOOD PRESSURE: 135 MMHG | WEIGHT: 162.2 LBS | DIASTOLIC BLOOD PRESSURE: 84 MMHG | BODY MASS INDEX: 22.71 KG/M2

## 2023-05-13 LAB
ANION GAP SERPL CALC-SCNC: 11 MEQ/L (ref 8–16)
BUN SERPL-MCNC: 5 MG/DL (ref 7–22)
CALCIUM SERPL-MCNC: 8.5 MG/DL (ref 8.5–10.5)
CHLORIDE SERPL-SCNC: 108 MEQ/L (ref 98–111)
CO2 SERPL-SCNC: 22 MEQ/L (ref 23–33)
CREAT SERPL-MCNC: 0.8 MG/DL (ref 0.4–1.2)
GFR SERPL CREATININE-BSD FRML MDRD: > 60 ML/MIN/1.73M2
GLUCOSE SERPL-MCNC: 83 MG/DL (ref 70–108)
POTASSIUM SERPL-SCNC: 3.9 MEQ/L (ref 3.5–5.2)
SCAN OF BLOOD SMEAR: NORMAL
SODIUM SERPL-SCNC: 141 MEQ/L (ref 135–145)

## 2023-05-13 PROCEDURE — 85027 COMPLETE CBC AUTOMATED: CPT

## 2023-05-13 PROCEDURE — 2580000003 HC RX 258: Performed by: SURGERY

## 2023-05-13 PROCEDURE — 80048 BASIC METABOLIC PNL TOTAL CA: CPT

## 2023-05-13 PROCEDURE — 6370000000 HC RX 637 (ALT 250 FOR IP): Performed by: NURSE PRACTITIONER

## 2023-05-13 PROCEDURE — 6370000000 HC RX 637 (ALT 250 FOR IP): Performed by: SURGERY

## 2023-05-13 PROCEDURE — 6360000002 HC RX W HCPCS: Performed by: SURGERY

## 2023-05-13 PROCEDURE — 36415 COLL VENOUS BLD VENIPUNCTURE: CPT

## 2023-05-13 RX ORDER — OXYCODONE HYDROCHLORIDE 5 MG/1
5 TABLET ORAL EVERY 6 HOURS PRN
Qty: 20 TABLET | Refills: 0 | Status: SHIPPED | OUTPATIENT
Start: 2023-05-13 | End: 2023-05-13 | Stop reason: HOSPADM

## 2023-05-13 RX ORDER — OXYCODONE HYDROCHLORIDE 5 MG/1
5 TABLET ORAL EVERY 6 HOURS PRN
Qty: 20 TABLET | Refills: 0 | Status: SHIPPED | OUTPATIENT
Start: 2023-05-13 | End: 2023-05-18

## 2023-05-13 RX ADMIN — FAMOTIDINE 20 MG: 20 TABLET, FILM COATED ORAL at 08:52

## 2023-05-13 RX ADMIN — OXYCODONE HYDROCHLORIDE 5 MG: 5 TABLET ORAL at 08:52

## 2023-05-13 RX ADMIN — SODIUM CHLORIDE, PRESERVATIVE FREE 10 ML: 5 INJECTION INTRAVENOUS at 08:50

## 2023-05-13 RX ADMIN — OXYCODONE HYDROCHLORIDE 10 MG: 5 TABLET ORAL at 13:41

## 2023-05-13 RX ADMIN — ATORVASTATIN CALCIUM 20 MG: 20 TABLET, FILM COATED ORAL at 08:54

## 2023-05-13 RX ADMIN — ENOXAPARIN SODIUM 40 MG: 100 INJECTION SUBCUTANEOUS at 08:52

## 2023-05-13 RX ADMIN — OXCARBAZEPINE 150 MG: 300 TABLET, FILM COATED ORAL at 08:54

## 2023-05-13 RX ADMIN — DOCUSATE SODIUM 100 MG: 100 CAPSULE, LIQUID FILLED ORAL at 08:52

## 2023-05-13 RX ADMIN — LEVOTHYROXINE SODIUM 125 MCG: 0.12 TABLET ORAL at 06:11

## 2023-05-13 RX ADMIN — LOSARTAN POTASSIUM 50 MG: 50 TABLET, FILM COATED ORAL at 08:53

## 2023-05-13 RX ADMIN — NALOXEGOL OXALATE 12.5 MG: 12.5 TABLET, FILM COATED ORAL at 06:11

## 2023-05-13 ASSESSMENT — PAIN DESCRIPTION - DESCRIPTORS
DESCRIPTORS: TENDER;ACHING
DESCRIPTORS: ACHING;CRAMPING

## 2023-05-13 ASSESSMENT — PAIN DESCRIPTION - ORIENTATION
ORIENTATION: MID
ORIENTATION: MID

## 2023-05-13 ASSESSMENT — PAIN SCALES - GENERAL
PAINLEVEL_OUTOF10: 5
PAINLEVEL_OUTOF10: 5
PAINLEVEL_OUTOF10: 6
PAINLEVEL_OUTOF10: 7

## 2023-05-13 ASSESSMENT — PAIN - FUNCTIONAL ASSESSMENT
PAIN_FUNCTIONAL_ASSESSMENT: ACTIVITIES ARE NOT PREVENTED
PAIN_FUNCTIONAL_ASSESSMENT: ACTIVITIES ARE NOT PREVENTED

## 2023-05-13 ASSESSMENT — PAIN DESCRIPTION - LOCATION
LOCATION: ABDOMEN
LOCATION: ABDOMEN

## 2023-05-13 NOTE — PLAN OF CARE
Problem: Discharge Planning  Goal: Discharge to home or other facility with appropriate resources  Outcome: Adequate for Discharge     Problem: Pain  Goal: Verbalizes/displays adequate comfort level or baseline comfort level  Outcome: Adequate for Discharge     Problem: Safety - Adult  Goal: Free from fall injury  Outcome: Adequate for Discharge     Problem: ABCDS Injury Assessment  Goal: Absence of physical injury  Outcome: Adequate for Discharge     Problem: Respiratory - Adult  Goal: Achieves optimal ventilation and oxygenation  Outcome: Adequate for Discharge     Problem: Skin/Tissue Integrity - Adult  Goal: Incisions, wounds, or drain sites healing without S/S of infection  Outcome: Adequate for Discharge     Problem: Gastrointestinal - Adult  Goal: Minimal or absence of nausea and vomiting  Outcome: Adequate for Discharge     Problem: Gastrointestinal - Adult  Goal: Maintains or returns to baseline bowel function  Outcome: Adequate for Discharge     Problem: Gastrointestinal - Adult  Goal: Maintains adequate nutritional intake  Outcome: Adequate for Discharge     Problem: Infection - Adult  Goal: Absence of infection at discharge  Outcome: Adequate for Discharge     Problem: Infection - Adult  Goal: Absence of infection during hospitalization  Outcome: Adequate for Discharge

## 2023-05-13 NOTE — PROGRESS NOTES
1543- pt to pacu, resp easy and unlabored, VSS, pt appears in no acute distress  1550- fentanyl given for pain  1555- fentanyl given, pt tolerating  1605- pt asleep in bed with occasional snoring, resp easy and unlabored, VSS, pt states pain has improved and is tolerable, pt appears in no acute distress  1610- Report called to [address] [place] [address] on 5K  1615- pt meets criteria for discharge from pacu  1619- pt transported to floor by transport staff
[address] [place] [address], [facility] Radha Infante Surgery Postoperative Progress Note    Pt Name: Nadine Castañeda Record Number: [id number]  Date of Birth 1965   Today's Date: 5/10/2023    ASSESSMENT   POD # 1 Status post robotic right colectomy   Cecal mass   Leukocytosis - like reactive  Hypertension  Hiccups   has a past medical history of A-fib ([address] Anxiety, Aortic aneurysm ([facility] [address] Arthritis, Asthma, Depression, Head and neck cancer ([facility] [address] Hyperlipidemia, Hypertension, Lymphedema, Syncope, Syncope and collapse, and Thyroid disease. PLAN   On clears. Adv to fulls as tolerated. Okay to remove atkinson  Await bowel function  Movantik  Resume home medications  Hydralazine PRN for hypertension   IV fluids  DVT prophylaxis   Pain & nausea control  Up as tolerated  Pathology pending  Labs reviewed   Looks pretty good  SUBJECTIVE   Patient was stable overnight. BP elevated. Chart reviewed. Updated by nursing staff. Patient not ambulatory yet. Atkinson out and voided. Denies chest discomfort or dyspnea. No N/V; (+) belching, but no flatus or BM. Tolerating ADULT DIET; Full Liquid diet. Pain controlled with analgesia. Up with assistance. Incisions healing well without issues today. CURRENT MEDICATIONS   Scheduled Meds:   levothyroxine  125 mcg Oral Daily    atorvastatin  20 mg Oral Daily    losartan  50 mg Oral Daily    sodium chloride flush  5-40 mL IntraVENous 2 times per day    enoxaparin  40 mg SubCUTAneous Daily    famotidine  20 mg Oral BID    Or    famotidine (PEPCID) injection  20 mg IntraVENous BID     Continuous Infusions:   sodium chloride      sodium chloride 100 mL/hr at 05/09/23 1705     PRN Meds:.chlorproMAZINE, hyoscyamine, sodium chloride flush, sodium chloride, ondansetron **OR** ondansetron, HYDROmorphone **OR** HYDROmorphone, oxyCODONE **OR** oxyCODONE  OBJECTIVE   CURRENT VITALS:  height is 5' 11\" (1.803 m) and weight is 162 lb 3.2 oz (73.6 kg).  His
[address] [place] [place] [address] for [facility] Kadi [facility] Surgery Postoperative Progress Note    Pt Name: Kim Coppola Record Number: [id number]  Date of Birth 1965   Today's Date: 5/11/2023    ASSESSMENT   POD # 2 Status post robotic right colectomy   Poorly differentiated adenocarcinoma with liver metastasis  Leukocytosis - improved today 12.5  Hypertension   has a past medical history of A-fib ([facility] [address] Anxiety, Aortic aneurysm ([facility] [address] Arthritis, Asthma, Depression, Head and neck cancer ([facility] [address] Hyperlipidemia, Hypertension, Lymphedema, Syncope, Syncope and collapse, and Thyroid disease. PLAN   On full liquids. Awaiting bowel function. Romeo out. Urinating spontaneously. Movantik  Decrease IV fluids  DVT prophylaxis   Pain & nausea control  Up as tolerated  Pulmonary toileting. IS. C&DB. Smoking cessation discussed. Pathology just back. Reviewed but not discussed with patient yet. Will discuss tomorrow on rounds. Stage Nina with mets to liver. Oncology referral will be needed for next steps. Labs reviewed. WBC better. Repeat in am.   Clinically, looks pretty good. Patient not willing to move OOB much. Discussed the importance of IS and ambulation. Patient agreed but per nursing refusing to get OOB other than to the bathroom. SUBJECTIVE   Patient was stable overnight. BP a little better. Some tachycardia overnight but improving this morning. A couple 99 temps but no fevers. Chart reviewed. Updated by nursing staff. Patient not ambulatory yet. Romeo out and urinating without complaints. Denies chest discomfort or dyspnea. Chronic cough from tobacco abuse. No N/V; (+) belching, but no flatus or BM. Tolerating ADULT DIET; Full Liquid diet. Pain controlled with analgesia. Up with assistance. Incisions healing well without issues today.    CURRENT MEDICATIONS   Scheduled Meds:   OLANZapine  10 mg Oral Nightly    OXcarbazepine  150 mg Oral Daily    traZODone  50 mg Oral
ADMITTED TO [facility] AND ORIENTED TO UNIT. SCDS ON. FALL AND ALLERGY BANDS ON. PT VERBALIZED APPROVAL FOR FIRST NAME, LAST INITIAL AND PHYSICIAN NAME ON UNIT WHITEBOARD.
After three requests, Riley Lazaro walked around the pod with SBA, no c/o pain. Requested that he take another walk before bedtime.
Attempted to ambulate patient x 2 and he is refusing. States \"maybe later\". Educated patient on importance of ambulation post op. Will try back later.
Notified Dr. Tl Pizano of patient having BM.
PT ARRIVED TO K5 23 FROM PACU. PT ALERT TO VOICE. Family at bedside. Romeo draining clear yellow urine. Ic infusing 0.9 at 100ml/hr. Pt c/o 7/10 abdomen aching pain.  Oriented pt to room , informed of hourly rounding, call light within reach, bed alarm on, updated care board
Patient educated and instructed to use incentive spirometry every hour, ambulating in hallways, CHG soap daily to prevent post-op complications. Patient verbalized understanding.
Pt is a 56y.o. male, lying in bed a bit restless, in 5K-23. A Tech came by shortly thereafter to take vitals, thus our visit was short. Too Varma is pleasant and lamented how he's feeling, but is doing better.  provided encouragement, nurtured hope and prayer-met with gratitude by Too Varma.     05/12/23 1705   Encounter Summary   Encounter Overview/Reason  Initial Encounter   Service Provided For: Patient and family together   Referral/Consult From: [address] [facility] Family members   Last Encounter  05/12/23   Complexity of Encounter Low   Begin Time 1621   End Time  1626   Total Time Calculated 5 min   Assessment/Intervention/Outcome   Assessment Compromised coping;Peaceful; Hopeful   Intervention Active listening;Prayer (assurance of)/[place];Nurtured Hope;Explored/Affirmed feelings, thoughts, concerns   Outcome Expressed Gratitude;Receptive
colon, hemicolectomy:             Poorly differentiated adenocarcinoma. Margins free of adenocarcinoma. 12 of 16 lymph nodes with metastatic carcinoma. Pathologic stage: pT3, pN2b, pM1a. See synoptic report below. Omentum and appendix with no pathologic abnormality. B.  Liver, biopsy:             Metastatic adenocarcinoma. Specimen:   A) RIGHT COLON   B) LIVER BIOPSY       Gross Examination:   A - The container is labeled Hany Kirk, right colon. Received in   formalin is a right hemicolectomy including large bowel, portion of   terminal ileum, and attached appendix. There is also an attached   portion of omentum. The large bowel measures 27 cm in length. The   attached terminal ileum measures 6.5 cm in length. The attached   appendix measures 7.4 cm in length. Tattoo pigment is present near the   cecum. A firm palpable nodule is present within the attached   mesenteric fat. The pericolonic adipose tissue is dissected to reveal   multiple firm nodules consistent with positive lymph nodes. The   largest nodule of metastatic disease appears to be a lymph node   completely replaced by tumor that measures 2.5 cm in maximum dimension   and corresponds to the previously described palpable nodule. This   lymph node comes within 0.3 cm of the mesenteric margin, but appears   free of the margin. The proximal and distal colonic margins are both   stapled. The specimen is opened longitudinally to reveal a fungating   mass in the cecum measuring approximately 4 cm in maximum dimension. The mass is located 9 cm from the proximal resection margin. Sections   through the mass reveal probable extension into the muscularis propria. No other grossly visible mucosal lesions are identified. There is a   separate mucosal ring present covered by staples.   Representative   sections are submitted as follows:  A1 - mesenteric margin; A2 -   omentum; A3, A4, and A5 - lymph node candidates; A6 -
staples. Representative   sections are submitted as follows:  A1 - mesenteric margin; A2 -   omentum; A3, A4, and A5 - lymph node candidates; A6 - largest   positive-appearing lymph node; A7 - proximal margin; A8 - distal   margin; A9 - appendix; A10 through A14 - sections of tumor; and A15 -   separate mucosal ring. 15 ss. B - The container is labeled Celester Quivers, liver biopsy. Received in   formalin are two cores of tissue measuring 1.8 and 2.1 cm in length. 1   ns.   SMW:[email or web address]     Microscopic Examination:   Reporting Template   Protocol Posting Date: June 2022   CASE SUMMARY: (COLON AND RECTUM: Resection, Including Transanal Disk   Excision of Rectal Neoplasms)   Standard(s): AJCC-UICC 8     Procedure   Right hemicolectomy     Tumor Site   Cecum     Histologic Type   Adenocarcinoma     Histologic Grade   G3, poorly differentiated     Tumor Size   Greatest dimension in Centimeters (cm): 4 cm     Multiple Primary Sites (e.g., hepatic flexure and transverse colon)   Not applicable (no additional primary site(s) present)     Tumor Extent   Invades through muscularis propria into the pericolonic or perirectal   tissue     Macroscopic Tumor Perforation   Not identified     Lymphovascular Invasion   Present     Perineural Invasion   Not identified     Treatment Effect   No known presurgical therapy     Margin Status for Invasive Carcinoma   All margins negative for invasive carcinoma     Closest Margin(s) to Invasive Carcinoma   Proximal     Distance from Invasive Carcinoma to Closest Margin   9 cm     Margin Status for Non-Invasive Tumor   All margins negative for high-grade dysplasia / intramucosal carcinoma   and low-grade dysplasia     Regional Lymph Node Status   Tumor present in regional lymph node(s)     Number of Lymph Nodes with Tumor   Exact number: 12     Number of Lymph Nodes Examined   Exact number: 16     Tumor Deposits   Not identified     Distant Site(s) Involved   Liver     PATHOLOGIC STAGE

## 2023-05-14 LAB
DEPRECATED RDW RBC AUTO: ABNORMAL FL (ref 35–45)
ERYTHROCYTE [DISTWIDTH] IN BLOOD BY AUTOMATED COUNT: ABNORMAL % (ref 11.5–14.5)
HCT VFR BLD AUTO: 33.9 % (ref 42–52)
HGB BLD-MCNC: 9.3 GM/DL (ref 14–18)
MCH RBC QN AUTO: 24.5 PG (ref 26–33)
MCHC RBC AUTO-ENTMCNC: 27.4 GM/DL (ref 32.2–35.5)
MCV RBC AUTO: 89.4 FL (ref 80–94)
PATHOLOGIST REVIEW: ABNORMAL
PLATELET # BLD AUTO: 459 THOU/MM3 (ref 130–400)
PMV BLD AUTO: 9.1 FL (ref 9.4–12.4)
RBC # BLD AUTO: 3.79 MILL/MM3 (ref 4.7–6.1)
WBC # BLD AUTO: 7 THOU/MM3 (ref 4.8–10.8)

## 2023-05-15 NOTE — CARE COORDINATION
Late entry, patient discharged 5/13/2023. 5/15/23, 7:22 AM EDT    Patient goals/plan/ treatment preferences discussed by  and . Patient goals/plan/ treatment preferences reviewed with patient/ family. Patient/ family verbalize understanding of discharge plan and are in agreement with goal/plan/treatment preferences. Understanding was demonstrated using the teach back method. AVS provided by RN at time of discharge, which includes all necessary medical information pertaining to the patients current course of illness, treatment, post-discharge goals of care, and treatment preferences.      Services At/After Discharge: None       IMM Letter  IMM Letter given to Patient/Family/Significant other/Guardian/POA/by[de-identified] Deborah Hand RN Mgr  IMM Letter date given[de-identified] 05/12/23  IMM Letter time given[de-identified] 1640

## 2023-05-16 ENCOUNTER — OFFICE VISIT (OUTPATIENT)
Dept: ENT CLINIC | Age: 58
End: 2023-05-16
Payer: MEDICARE

## 2023-05-16 VITALS
DIASTOLIC BLOOD PRESSURE: 72 MMHG | RESPIRATION RATE: 16 BRPM | SYSTOLIC BLOOD PRESSURE: 117 MMHG | HEART RATE: 90 BPM | OXYGEN SATURATION: 98 % | BODY MASS INDEX: 21.74 KG/M2 | TEMPERATURE: 97.2 F | HEIGHT: 71 IN | WEIGHT: 155.3 LBS

## 2023-05-16 DIAGNOSIS — K21.00 GASTROESOPHAGEAL REFLUX DISEASE WITH ESOPHAGITIS WITHOUT HEMORRHAGE: ICD-10-CM

## 2023-05-16 DIAGNOSIS — K22.2 ESOPHAGEAL STRICTURE: Primary | ICD-10-CM

## 2023-05-16 DIAGNOSIS — R13.14 PHARYNGOESOPHAGEAL DYSPHAGIA: ICD-10-CM

## 2023-05-16 DIAGNOSIS — L59.8 RADIATION SKIN FIBROSIS FROM THERAPEUTIC PROCEDURE: ICD-10-CM

## 2023-05-16 PROCEDURE — 3017F COLORECTAL CA SCREEN DOC REV: CPT | Performed by: OTOLARYNGOLOGY

## 2023-05-16 PROCEDURE — G8420 CALC BMI NORM PARAMETERS: HCPCS | Performed by: OTOLARYNGOLOGY

## 2023-05-16 PROCEDURE — 4004F PT TOBACCO SCREEN RCVD TLK: CPT | Performed by: OTOLARYNGOLOGY

## 2023-05-16 PROCEDURE — 99215 OFFICE O/P EST HI 40 MIN: CPT | Performed by: OTOLARYNGOLOGY

## 2023-05-16 PROCEDURE — G8427 DOCREV CUR MEDS BY ELIG CLIN: HCPCS | Performed by: OTOLARYNGOLOGY

## 2023-05-16 PROCEDURE — 1111F DSCHRG MED/CURRENT MED MERGE: CPT | Performed by: OTOLARYNGOLOGY

## 2023-05-16 NOTE — PROGRESS NOTES
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 Gastroesophageal reflux disease with esophagitis without hemorrhage            The findings were explained and his questions were answered. Based on his history and his response to balloon dilation, for the time being that is what we have available to maintain his ability to take oral nutrition and replete his nutritional reserves. I strongly counseled him that he should proceed as recommended by Dr. Maisha Rosario as relates to his colon cancer. His transition into having his esophageal inlet widening with the cicatrix being surgically  and an interposition graft of pharyngeal mucosa introduced into the cervical esophagus using transoral robotic assisted techniques will need to wait until after he has had some form of antireflux surgery. He and his wife reported understanding the basis of my recommendations and be willing to proceed as such. I will communicate with Dr. Maisha Rosario and let him know that we are of the same mind. I spent over 40 minutes of face-to-face time with the patient the majority of which was dedicated to reviewing his complex history and structuring his follow-up care. Return in about 6 weeks (around 6/27/2023) for Close follow-up of obstructive dysphagia. **This report has been created using voice recognition software. It may contain minor errors which are inherent in voice recognition technology. **

## 2023-05-22 PROBLEM — E55.9 VITAMIN D DEFICIENCY: Status: ACTIVE | Noted: 2020-11-19

## 2023-05-22 PROBLEM — E78.5 HYPERLIPIDEMIA: Status: ACTIVE | Noted: 2019-05-23

## 2023-05-22 PROBLEM — E03.9 HYPOTHYROIDISM: Status: ACTIVE | Noted: 2017-08-29

## 2023-05-23 NOTE — PROGRESS NOTES
[address] [place] [address] Dr Duron E [facility] [address]  Dept: [phone]  Dept Fax: [phone]  Loc: [phone]    Visit Date: 5/24/2023    Dionne Irving is a 62 y.o. male who presents today for:  Chief Complaint   Patient presents with    Post-Op Check     S/P 1. Robotic right colectomy. 2.  Core liver needle biopsy x2 5/9/23       HPI:     HPI    Estefanía Thurston is a 58-year old male patient who presents for follow up status post robotic right colectomy and core liver biopsy 2 weeks ago with Dr. Pramod Groves. Pathology demonstrated colon adenocarcinoma with metastatic spread to liver. History of esophageal strictures from chronic extra esophageal reflux and associated obstructive dysphagia with difficulty maintaining his body weight. Following with Dr. Frankie Camacho. He is having surgery in June for further reconstruction and supposed to have nissen fundoplication done by Dr. Pramod Groves for his GERD/esophagitis. This colon mass was found during the workup for his LINX. Recent surgery is taking priority over LINX right now and needs to see oncology to see if patient wants to proceed with chemotherapy. Right now he states he is not interested in chemo. Heron presents with significant other. He is doing overall well. Never took any narcotics. OTC medication for pain as needed. Abdominal pain is minimal. Abdominal incisions are healing. Robotic sites are clean, dry and intact without signs of infection. Lower extraction site healing well. Patient denies any drainage or increasing redness. Appetite back to normal. States he is eating really well compared to what he used to be able to do. Does not tolerate meats well still but otherwise eating what he wants. No nausea or vomiting. Bowel function doing pretty well. He is going daily. No melena or hematochezia. No fevers or chills. No urinary complaints. No SOB or chest pain. No lightheadedness or dizziness.

## 2023-05-24 ENCOUNTER — OFFICE VISIT (OUTPATIENT)
Dept: SURGERY | Age: 58
End: 2023-05-24

## 2023-05-24 VITALS
HEART RATE: 69 BPM | RESPIRATION RATE: 16 BRPM | OXYGEN SATURATION: 97 % | TEMPERATURE: 98 F | HEIGHT: 71 IN | WEIGHT: 154.6 LBS | DIASTOLIC BLOOD PRESSURE: 62 MMHG | SYSTOLIC BLOOD PRESSURE: 134 MMHG | BODY MASS INDEX: 21.65 KG/M2

## 2023-05-24 DIAGNOSIS — Z90.49 S/P PARTIAL RESECTION OF COLON: Primary | ICD-10-CM

## 2023-05-24 DIAGNOSIS — C18.9 COLON ADENOCARCINOMA (HCC): ICD-10-CM

## 2023-05-24 PROCEDURE — 99024 POSTOP FOLLOW-UP VISIT: CPT | Performed by: NURSE PRACTITIONER

## 2023-05-28 ASSESSMENT — ENCOUNTER SYMPTOMS
DIARRHEA: 0
WHEEZING: 0
CHEST TIGHTNESS: 0
COUGH: 0
EYE ITCHING: 0
ABDOMINAL DISTENTION: 0
APNEA: 0
NAUSEA: 0
ANAL BLEEDING: 0
PHOTOPHOBIA: 0
EYE DISCHARGE: 0
ALLERGIC/IMMUNOLOGIC NEGATIVE: 1
SHORTNESS OF BREATH: 0
BLOOD IN STOOL: 0
CHOKING: 0
BACK PAIN: 0
ABDOMINAL PAIN: 0
RHINORRHEA: 0
SINUS PRESSURE: 0
SORE THROAT: 0
VOMITING: 0
COLOR CHANGE: 0
CONSTIPATION: 0
EYE PAIN: 0

## 2023-05-31 ENCOUNTER — TELEPHONE (OUTPATIENT)
Dept: ENT CLINIC | Age: 58
End: 2023-05-31

## 2023-05-31 NOTE — TELEPHONE ENCOUNTER
Received a Teams message from BODØ in [facility] asking the following:    Good afternoon. On [place] 6  65 He has abnormal labs Hbg 9.3 Hct 33.9 Platelets 691 :BMP BUN 5. Wife stated he received blood around  to 5/10. Ok to proceed?

## 2023-06-01 ENCOUNTER — PREP FOR PROCEDURE (OUTPATIENT)
Dept: ENT CLINIC | Age: 58
End: 2023-06-01

## 2023-06-01 NOTE — TELEPHONE ENCOUNTER
I contacted Courtney Larry and spoke to his wife Kailee Castro. She said he is not going through chemo currently. He does not see the oncologist until next week 6/5/23. His surgery with Dr Minnie Rider is on 6/8/23.

## 2023-06-05 ENCOUNTER — HOSPITAL ENCOUNTER (OUTPATIENT)
Dept: INFUSION THERAPY | Age: 58
Discharge: HOME | End: 2023-06-05
Payer: MEDICARE

## 2023-06-05 VITALS
HEART RATE: 63 BPM | DIASTOLIC BLOOD PRESSURE: 89 MMHG | TEMPERATURE: 97.6 F | SYSTOLIC BLOOD PRESSURE: 137 MMHG | OXYGEN SATURATION: 95 %

## 2023-06-05 DIAGNOSIS — C18.9 MALIGNANT NEOPLASM OF COLON, UNSPECIFIED PART OF COLON (HCC): Primary | ICD-10-CM

## 2023-06-05 DIAGNOSIS — C18.9 MALIGNANT NEOPLASM OF COLON, UNSPECIFIED PART OF COLON (HCC): ICD-10-CM

## 2023-06-05 LAB
ABSOLUTE IMMATURE GRANULOCYTE: 0.01 THOU/MM3 (ref 0–0.07)
ALBUMIN SERPL BCG-MCNC: 4.2 G/DL (ref 3.5–5.1)
ALP SERPL-CCNC: 88 U/L (ref 38–126)
ALT SERPL W/O P-5'-P-CCNC: 10 U/L (ref 11–66)
AST SERPL-CCNC: 22 U/L (ref 5–40)
BASOPHILS ABSOLUTE: 0 THOU/MM3 (ref 0–0.1)
BASOPHILS NFR BLD AUTO: 0 % (ref 0–3)
BILIRUB CONJ SERPL-MCNC: < 0.2 MG/DL (ref 0–0.3)
BILIRUB SERPL-MCNC: 0.4 MG/DL (ref 0.3–1.2)
BUN BLDP-MCNC: < 3 MG/DL (ref 8–26)
CHLORIDE BLD-SCNC: 103 MEQ/L (ref 98–109)
CREAT BLD-MCNC: 1 MG/DL (ref 0.5–1.2)
EOSINOPHIL NFR BLD AUTO: 3 % (ref 0–4)
EOSINOPHILS ABSOLUTE: 0.2 THOU/MM3 (ref 0–0.4)
ERYTHROCYTE [DISTWIDTH] IN BLOOD BY AUTOMATED COUNT: 28.1 % (ref 11.5–14.5)
GFR SERPL CREATININE-BSD FRML MDRD: > 60 ML/MIN/1.73M2
GLUCOSE BLD-MCNC: 57 MG/DL (ref 70–108)
HCT VFR BLD AUTO: 42.2 % (ref 42–52)
HGB BLD-MCNC: 12.6 GM/DL (ref 14–18)
IMMATURE GRANULOCYTES: 0 %
IONIZED CALCIUM, WHOLE BLOOD: 1.17 MMOL/L (ref 1.12–1.32)
LYMPHOCYTES ABSOLUTE: 1.9 THOU/MM3 (ref 1–4.8)
LYMPHOCYTES NFR BLD AUTO: 29 % (ref 15–47)
MCH RBC QN AUTO: 26.4 PG (ref 26–33)
MCHC RBC AUTO-ENTMCNC: 29.9 GM/DL (ref 32.2–35.5)
MCV RBC AUTO: 89 FL (ref 80–94)
MONOCYTES ABSOLUTE: 0.6 THOU/MM3 (ref 0.4–1.3)
MONOCYTES NFR BLD AUTO: 9 % (ref 0–12)
NEUTROPHILS NFR BLD AUTO: 59 % (ref 43–75)
PLATELET # BLD AUTO: 255 THOU/MM3 (ref 130–400)
PMV BLD AUTO: 9.3 FL (ref 9.4–12.4)
POTASSIUM BLD-SCNC: 4 MEQ/L (ref 3.5–4.9)
PROT SERPL-MCNC: 8 G/DL (ref 6.1–8)
RBC # BLD AUTO: 4.77 MILL/MM3 (ref 4.7–6.1)
SEGMENTED NEUTROPHILS ABSOLUTE COUNT: 3.9 THOU/MM3 (ref 1.8–7.7)
SODIUM BLD-SCNC: 138 MEQ/L (ref 138–146)
TOTAL CO2, WHOLE BLOOD: 28 MEQ/L (ref 23–33)
WBC # BLD AUTO: 6.5 THOU/MM3 (ref 4.8–10.8)

## 2023-06-05 PROCEDURE — 36415 COLL VENOUS BLD VENIPUNCTURE: CPT

## 2023-06-05 PROCEDURE — 85025 COMPLETE CBC W/AUTO DIFF WBC: CPT

## 2023-06-05 PROCEDURE — 99211 OFF/OP EST MAY X REQ PHY/QHP: CPT

## 2023-06-05 PROCEDURE — 80047 BASIC METABLC PNL IONIZED CA: CPT

## 2023-06-05 PROCEDURE — 80076 HEPATIC FUNCTION PANEL: CPT

## 2023-06-07 RX ORDER — SODIUM CHLORIDE 0.9 % (FLUSH) 0.9 %
5-40 SYRINGE (ML) INJECTION EVERY 12 HOURS SCHEDULED
Status: CANCELLED | OUTPATIENT
Start: 2023-06-07

## 2023-06-07 RX ORDER — SODIUM CHLORIDE 9 MG/ML
INJECTION, SOLUTION INTRAVENOUS PRN
Status: CANCELLED | OUTPATIENT
Start: 2023-06-07

## 2023-06-07 RX ORDER — SODIUM CHLORIDE 0.9 % (FLUSH) 0.9 %
5-40 SYRINGE (ML) INJECTION PRN
Status: CANCELLED | OUTPATIENT
Start: 2023-06-07

## 2023-06-07 RX ORDER — IPRATROPIUM BROMIDE AND ALBUTEROL SULFATE 2.5; .5 MG/3ML; MG/3ML
1 SOLUTION RESPIRATORY (INHALATION) EVERY 4 HOURS PRN
Status: CANCELLED | OUTPATIENT
Start: 2023-06-08

## 2023-06-07 NOTE — H&P
LARYNGOSCOPY N/A 3/22/2023     SUSPENSION LARYNGOSCOPY FOR ACCESS RIGID ESOPHAGOSCOPY WITH BALLOON DILATION performed by Gilbert Amador MD at [address] N/A 5/4/2023     Suspension Laryngoscopy for Access Rigid Esophagoscopy with Balloon Dilation performed by Gilbert Amador MD at [address] [facility] [address]   10/02/2015    OTHER SURGICAL HISTORY   09/06/2022     linq recorder    OTHER SURGICAL HISTORY   06/20/2016     peg tube removal    SKIN GRAFT   10/02/2015     jaw    TRACHEOSTOMY        UMBILICAL HERNIA REPAIR N/A 11/14/2022     EXCISION OF WOUND AND REMOVAL OF PERITONEAL CYST, Umbilical Hernia Repair with Mesh performed by Trav Tucker MD at [facility] 05/11/2021     EGD ESOPHAGOGASTRODUODENOSCOPY performed by Christiana Taylor MD at [facility] [place] [place] [facility]    UPPER GASTROINTESTINAL ENDOSCOPY N/A 4/18/2023     ABREU performed by Freedom Le MD at [address] ENDOSCOPY Left 4/29/2023     EGD BIOPSY performed by Christiana Taylor MD at [facility] [place] [place] [facility]         Family History         Family History   Problem Relation Age of Onset    Cancer Mother      Asthma Mother      Cancer Sister           Social History            Tobacco Use    Smoking status: Every Day       Packs/day: 0.25       Years: 34.00       Pack years: 8.50       Types: Cigarettes    Smokeless tobacco: Never   Substance Use Topics    Alcohol use: Yes       Alcohol/week: 7.0 standard drinks       Types: 7 Cans of beer per week       Comment: still drinks, just not daily as of 3/22/23                    Subjective:      Review of Systems  Rest of review of systems are negative, except as noted in HPI.       Objective:      /72 (Site: Left Upper Arm, Position: Sitting)   Pulse 90   Temp 97.2 °F (36.2 °C) (Infrared)   Resp 16   Ht 5' 11\" (1.803 m)   Wt 155 lb 4.8 oz (70.4 kg)   SpO2 98%   BMI 21.66 kg/m²      Physical Exam         On general physical

## 2023-06-08 ENCOUNTER — HOSPITAL ENCOUNTER (OUTPATIENT)
Age: 58
Setting detail: OUTPATIENT SURGERY
Discharge: HOME | End: 2023-06-08
Attending: OTOLARYNGOLOGY | Admitting: OTOLARYNGOLOGY
Payer: MEDICARE

## 2023-06-08 ENCOUNTER — ANESTHESIA EVENT (OUTPATIENT)
Dept: OPERATING ROOM | Age: 58
End: 2023-06-08
Payer: MEDICARE

## 2023-06-08 ENCOUNTER — ANESTHESIA (OUTPATIENT)
Dept: OPERATING ROOM | Age: 58
End: 2023-06-08
Payer: MEDICARE

## 2023-06-08 VITALS
WEIGHT: 148.4 LBS | BODY MASS INDEX: 20.77 KG/M2 | HEART RATE: 81 BPM | SYSTOLIC BLOOD PRESSURE: 159 MMHG | TEMPERATURE: 96.8 F | RESPIRATION RATE: 18 BRPM | DIASTOLIC BLOOD PRESSURE: 99 MMHG | OXYGEN SATURATION: 98 % | HEIGHT: 71 IN

## 2023-06-08 PROBLEM — R13.19 ESOPHAGEAL DYSPHAGIA: Status: ACTIVE | Noted: 2022-05-24

## 2023-06-08 PROCEDURE — 3600000004 HC SURGERY LEVEL 4 BASE: Performed by: OTOLARYNGOLOGY

## 2023-06-08 PROCEDURE — 3600000014 HC SURGERY LEVEL 4 ADDTL 15MIN: Performed by: OTOLARYNGOLOGY

## 2023-06-08 PROCEDURE — 6360000002 HC RX W HCPCS: Performed by: OTOLARYNGOLOGY

## 2023-06-08 PROCEDURE — 2709999900 HC NON-CHARGEABLE SUPPLY: Performed by: OTOLARYNGOLOGY

## 2023-06-08 PROCEDURE — 2500000003 HC RX 250 WO HCPCS: Performed by: NURSE ANESTHETIST, CERTIFIED REGISTERED

## 2023-06-08 PROCEDURE — 2580000003 HC RX 258: Performed by: OTOLARYNGOLOGY

## 2023-06-08 PROCEDURE — 7100000011 HC PHASE II RECOVERY - ADDTL 15 MIN: Performed by: OTOLARYNGOLOGY

## 2023-06-08 PROCEDURE — 7100000000 HC PACU RECOVERY - FIRST 15 MIN: Performed by: OTOLARYNGOLOGY

## 2023-06-08 PROCEDURE — 6360000002 HC RX W HCPCS

## 2023-06-08 PROCEDURE — 7100000001 HC PACU RECOVERY - ADDTL 15 MIN: Performed by: OTOLARYNGOLOGY

## 2023-06-08 PROCEDURE — APPNB45 APP NON BILLABLE 31-45 MINUTES: Performed by: NURSE PRACTITIONER

## 2023-06-08 PROCEDURE — 43220 ESOPHAGOSCOPY BALLOON <30MM: CPT | Performed by: NURSE PRACTITIONER

## 2023-06-08 PROCEDURE — C1726 CATH, BAL DIL, NON-VASCULAR: HCPCS | Performed by: OTOLARYNGOLOGY

## 2023-06-08 PROCEDURE — 3700000000 HC ANESTHESIA ATTENDED CARE: Performed by: OTOLARYNGOLOGY

## 2023-06-08 PROCEDURE — 3700000001 HC ADD 15 MINUTES (ANESTHESIA): Performed by: OTOLARYNGOLOGY

## 2023-06-08 PROCEDURE — 7100000010 HC PHASE II RECOVERY - FIRST 15 MIN: Performed by: OTOLARYNGOLOGY

## 2023-06-08 PROCEDURE — 6360000002 HC RX W HCPCS: Performed by: NURSE ANESTHETIST, CERTIFIED REGISTERED

## 2023-06-08 RX ORDER — AMLODIPINE BESYLATE 5 MG/1
TABLET ORAL
COMMUNITY
Start: 2023-06-01

## 2023-06-08 RX ORDER — SODIUM CHLORIDE 0.9 % (FLUSH) 0.9 %
5-40 SYRINGE (ML) INJECTION PRN
Status: DISCONTINUED | OUTPATIENT
Start: 2023-06-08 | End: 2023-06-08 | Stop reason: HOSPADM

## 2023-06-08 RX ORDER — ROCURONIUM BROMIDE 10 MG/ML
INJECTION, SOLUTION INTRAVENOUS PRN
Status: DISCONTINUED | OUTPATIENT
Start: 2023-06-08 | End: 2023-06-08 | Stop reason: SDUPTHER

## 2023-06-08 RX ORDER — DEXAMETHASONE SODIUM PHOSPHATE 4 MG/ML
10 INJECTION, SOLUTION INTRA-ARTICULAR; INTRALESIONAL; INTRAMUSCULAR; INTRAVENOUS; SOFT TISSUE
Status: COMPLETED | OUTPATIENT
Start: 2023-06-08 | End: 2023-06-08

## 2023-06-08 RX ORDER — FENTANYL CITRATE 50 UG/ML
50 INJECTION, SOLUTION INTRAMUSCULAR; INTRAVENOUS EVERY 5 MIN PRN
Status: DISCONTINUED | OUTPATIENT
Start: 2023-06-08 | End: 2023-06-08 | Stop reason: HOSPADM

## 2023-06-08 RX ORDER — IPRATROPIUM BROMIDE AND ALBUTEROL SULFATE 2.5; .5 MG/3ML; MG/3ML
1 SOLUTION RESPIRATORY (INHALATION) EVERY 4 HOURS PRN
Status: DISCONTINUED | OUTPATIENT
Start: 2023-06-08 | End: 2023-06-08 | Stop reason: HOSPADM

## 2023-06-08 RX ORDER — LIDOCAINE HYDROCHLORIDE 20 MG/ML
INJECTION, SOLUTION INTRAVENOUS PRN
Status: DISCONTINUED | OUTPATIENT
Start: 2023-06-08 | End: 2023-06-08 | Stop reason: SDUPTHER

## 2023-06-08 RX ORDER — PROPOFOL 10 MG/ML
INJECTION, EMULSION INTRAVENOUS PRN
Status: DISCONTINUED | OUTPATIENT
Start: 2023-06-08 | End: 2023-06-08 | Stop reason: SDUPTHER

## 2023-06-08 RX ORDER — FENTANYL CITRATE 50 UG/ML
INJECTION, SOLUTION INTRAMUSCULAR; INTRAVENOUS
Status: COMPLETED
Start: 2023-06-08 | End: 2023-06-08

## 2023-06-08 RX ORDER — SODIUM CHLORIDE 0.9 % (FLUSH) 0.9 %
5-40 SYRINGE (ML) INJECTION EVERY 12 HOURS SCHEDULED
Status: DISCONTINUED | OUTPATIENT
Start: 2023-06-08 | End: 2023-06-08 | Stop reason: HOSPADM

## 2023-06-08 RX ORDER — ONDANSETRON 2 MG/ML
INJECTION INTRAMUSCULAR; INTRAVENOUS PRN
Status: DISCONTINUED | OUTPATIENT
Start: 2023-06-08 | End: 2023-06-08 | Stop reason: SDUPTHER

## 2023-06-08 RX ORDER — FENTANYL CITRATE 50 UG/ML
INJECTION, SOLUTION INTRAMUSCULAR; INTRAVENOUS PRN
Status: DISCONTINUED | OUTPATIENT
Start: 2023-06-08 | End: 2023-06-08 | Stop reason: SDUPTHER

## 2023-06-08 RX ORDER — SODIUM CHLORIDE 9 MG/ML
INJECTION, SOLUTION INTRAVENOUS PRN
Status: DISCONTINUED | OUTPATIENT
Start: 2023-06-08 | End: 2023-06-08 | Stop reason: HOSPADM

## 2023-06-08 RX ADMIN — FENTANYL CITRATE 50 MCG: 50 INJECTION, SOLUTION INTRAMUSCULAR; INTRAVENOUS at 08:40

## 2023-06-08 RX ADMIN — FENTANYL CITRATE 50 MCG: 50 INJECTION, SOLUTION INTRAMUSCULAR; INTRAVENOUS at 09:02

## 2023-06-08 RX ADMIN — DEXAMETHASONE SODIUM PHOSPHATE 10 MG: 4 INJECTION, SOLUTION INTRA-ARTICULAR; INTRALESIONAL; INTRAMUSCULAR; INTRAVENOUS; SOFT TISSUE at 07:57

## 2023-06-08 RX ADMIN — PROPOFOL 50 MG: 10 INJECTION, EMULSION INTRAVENOUS at 09:00

## 2023-06-08 RX ADMIN — ROCURONIUM BROMIDE 50 MG: 10 INJECTION INTRAVENOUS at 08:40

## 2023-06-08 RX ADMIN — SUGAMMADEX 200 MG: 100 INJECTION, SOLUTION INTRAVENOUS at 09:20

## 2023-06-08 RX ADMIN — FENTANYL CITRATE 50 MCG: 50 INJECTION, SOLUTION INTRAMUSCULAR; INTRAVENOUS at 09:45

## 2023-06-08 RX ADMIN — SODIUM CHLORIDE: 9 INJECTION, SOLUTION INTRAVENOUS at 07:51

## 2023-06-08 RX ADMIN — Medication 2000 MG: at 08:47

## 2023-06-08 RX ADMIN — ONDANSETRON 4 MG: 2 INJECTION INTRAMUSCULAR; INTRAVENOUS at 08:47

## 2023-06-08 RX ADMIN — SUGAMMADEX 200 MG: 100 INJECTION, SOLUTION INTRAVENOUS at 09:08

## 2023-06-08 RX ADMIN — LIDOCAINE HYDROCHLORIDE 100 MG: 20 INJECTION, SOLUTION INTRAVENOUS at 08:40

## 2023-06-08 RX ADMIN — PROPOFOL 150 MG: 10 INJECTION, EMULSION INTRAVENOUS at 08:40

## 2023-06-08 ASSESSMENT — PAIN SCALES - GENERAL
PAINLEVEL_OUTOF10: 6
PAINLEVEL_OUTOF10: 3
PAINLEVEL_OUTOF10: 4
PAINLEVEL_OUTOF10: 3
PAINLEVEL_OUTOF10: 4
PAINLEVEL_OUTOF10: 7
PAINLEVEL_OUTOF10: 3

## 2023-06-08 ASSESSMENT — PAIN DESCRIPTION - DESCRIPTORS: DESCRIPTORS: SORE

## 2023-06-08 ASSESSMENT — PAIN - FUNCTIONAL ASSESSMENT: PAIN_FUNCTIONAL_ASSESSMENT: 0-10

## 2023-06-08 ASSESSMENT — PAIN DESCRIPTION - LOCATION: LOCATION: THROAT

## 2023-06-08 ASSESSMENT — PAIN DESCRIPTION - ORIENTATION: ORIENTATION: INNER

## 2023-06-08 NOTE — PROGRESS NOTES
Pt returned to [facility] room 3. Vitals and assessment as charted. 0.9 infusing, @950ml to count from PACU. Pt has muffin and pop. Family at the bedside. Pt and family verbalized understanding of discharge criteria and call light use. Call light in reach.

## 2023-06-08 NOTE — PROGRESS NOTES
Admitted to same day, Angela Gomez ([phone]) at bedside, call light in reach, warm blanket applied.

## 2023-06-08 NOTE — ANESTHESIA PRE PROCEDURE
Tobacco Use    Smoking status: Former     Packs/day: 0.25     Years: 34.00     Pack years: 8.50     Types: Cigarettes    Smokeless tobacco: Never   Substance Use Topics    Alcohol use: Yes     Alcohol/week: 18.0 standard drinks     Types: 18 Cans of beer per week     Comment: 18 tall boys a week. Counseling given: Not Answered      Vital Signs (Current): There were no vitals filed for this visit. BP Readings from Last 3 Encounters:   06/08/23 (!) 155/102   06/05/23 137/89   06/05/23 137/89       NPO Status:                                                                                 BMI:   Wt Readings from Last 3 Encounters:   06/08/23 148 lb 6.4 oz (67.3 kg)   06/05/23 150 lb 6.4 oz (68.2 kg)   05/24/23 154 lb 9.6 oz (70.1 kg)     There is no height or weight on file to calculate BMI.    CBC:   Lab Results   Component Value Date/Time    WBC 6.5 06/05/2023 01:20 PM    RBC 4.77 06/05/2023 01:20 PM    HGB 12.6 06/05/2023 01:20 PM    HCT 42.2 06/05/2023 01:20 PM    MCV 89 06/05/2023 01:20 PM    RDW 28.1 06/05/2023 01:20 PM     06/05/2023 01:20 PM       CMP:   Lab Results   Component Value Date/Time     06/05/2023 01:20 PM     05/13/2023 07:07 AM    K 4.0 06/05/2023 01:20 PM    K 3.9 05/13/2023 07:07 AM    K 4.9 05/10/2023 04:55 AM     05/13/2023 07:07 AM    CO2 22 05/13/2023 07:07 AM    BUN 5 05/13/2023 07:07 AM    CREATININE 1.0 06/05/2023 01:20 PM    CREATININE 0.8 05/13/2023 07:07 AM    LABGLOM >60 06/05/2023 01:20 PM    GLUCOSE 83 05/13/2023 07:07 AM    PROT 8.0 06/05/2023 01:20 PM    CALCIUM 8.5 05/13/2023 07:07 AM    BILITOT 0.4 06/05/2023 01:20 PM    ALKPHOS 88 06/05/2023 01:20 PM    AST 22 06/05/2023 01:20 PM    ALT 10 06/05/2023 01:20 PM       POC Tests: No results for input(s): POCGLU, POCNA, POCK, POCCL, POCBUN, POCHEMO, POCHCT in the last 72 hours.     Coags:   Lab Results   Component Value Date/Time

## 2023-06-08 NOTE — OP NOTE
laryngoscopy for access: My assistant then placed first a 17 cm  Daija laryngoscope into the patient's oropharynx to the larynx and provided direct line of sight view of the esophageal inlet. The cicatrix prevented easy visualization past the sphincter into the cervical esophagus. I then passed a 8 cm x 20 mm pneumatic dilator into the esophageal inlet and set it at approximately the midpoint of the length of the balloon. I had the balloon distended from 2 to 4 to 6 pradeep of pressure until no further fluid needed to be added to sustain the 6 pradeep level. I then gradually extended the pressure to 7 pradeep which still additional work has evidence by gradual deflation of the pressure. I returned to a 7 pradeep pressure until no further volume needed to be added to sustain that pressure level. I then deflated the balloon and examined the distended esophageal inlet with a 30 degree telescope to determine that no evidence of full-thickness perforation occurred. None was seen. There were shallow mucosal lacerations at 3 and 9:00. No cautery was necessary. I suctioned away the minimal bloody effluent and remove the transoral hardware turning the patient back to anesthesia for reversal next patient in the operating. This was carried out without incident and the patient was taken to recovery in satisfactory condition. Estimated blood loss in the case was minimal and the patient received less than half a liter of intravenous lactated Ringer's intraoperatively. No blood pressure transfused. No intraoperative occasions were intact. Counts were considered accurate x3. I was present for participated in the entire operation as did my assistant at surgery, MARIVEL Sampson.         Electronically signed by Elda Packer MD on 6/8/2023 at 9:32 AM

## 2023-06-08 NOTE — PROGRESS NOTES
2177 pt arrived to PACU, awake and alert. Denies pain. VSS  0935 pt states pain 5/10 and tolerable  0942 pt awake in bed, eating ice chips. VSS.   0945 c/o pain 7/10, medicated with 50 mcg fentanyl  0950 pt states pain 4/10 and tolerable.  VSS  0955 pt states pain 4/10 and tolerable  1005 meets criteria for discharge from PACU, transported to [facility] in stable condition

## 2023-06-08 NOTE — ANESTHESIA POSTPROCEDURE EVALUATION
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     Romeo Phase I: Romeo Score: 10    Romeo Phase II: Romeo Score: 10      Anesthesia Post Evaluation    Patient location during evaluation: PACU  Patient participation: complete - patient participated  Level of consciousness: awake  Airway patency: patent  Nausea & Vomiting: no nausea  Complications: no  Cardiovascular status: hemodynamically stable  Respiratory status: acceptable  Hydration status: stable

## 2023-06-19 ENCOUNTER — HOSPITAL ENCOUNTER (OUTPATIENT)
Dept: INFUSION THERAPY | Age: 58
Discharge: HOME | End: 2023-06-19
Payer: MEDICARE

## 2023-06-19 ENCOUNTER — OFFICE VISIT (OUTPATIENT)
Dept: ONCOLOGY | Age: 58
End: 2023-06-19
Payer: MEDICARE

## 2023-06-19 ENCOUNTER — TELEPHONE (OUTPATIENT)
Dept: CARDIOLOGY CLINIC | Age: 58
End: 2023-06-19

## 2023-06-19 VITALS
HEART RATE: 93 BPM | WEIGHT: 151 LBS | BODY MASS INDEX: 21.14 KG/M2 | TEMPERATURE: 97.7 F | RESPIRATION RATE: 16 BRPM | SYSTOLIC BLOOD PRESSURE: 90 MMHG | DIASTOLIC BLOOD PRESSURE: 64 MMHG | HEIGHT: 71 IN | OXYGEN SATURATION: 97 %

## 2023-06-19 DIAGNOSIS — C06.2 SQUAMOUS CELL CANCER OF RETROMOLAR TRIGONE (HCC): Primary | ICD-10-CM

## 2023-06-19 PROCEDURE — 99211 OFF/OP EST MAY X REQ PHY/QHP: CPT

## 2023-06-19 PROCEDURE — 99214 OFFICE O/P EST MOD 30 MIN: CPT | Performed by: INTERNAL MEDICINE

## 2023-06-19 NOTE — PROGRESS NOTES
have surgical repair of his achalasia. 4.  Continued smoking and continued alcohol use. He has been counseled against these habits. 5.  Multiple comorbidities. He will continue with his usual medication and follow-up with his usual providers for his atrial fibrillation, anxiety and depression, COPD with asthma, arthritis, hypertension, hyperlipidemia, syncope and collapse and thyroid disease and chronic pain. 6.  Health maintenance issues. He was encouraged to use calcium and vitamin D twice a day for his osteoporosis. I encouraged Uyen Barfield to let his primary care provider know about the change in his aneurysm. He was encouraged to be more physically active in the form of walking to improve his performance status. He will complete the above-mentioned tests and I will call Uyenmilton Barfield with the results. He will return to see us in 2 weeks and as needed for any change in his status, questions or concerns.       Caridad Haney MD 6/19/2023 10:35 AM

## 2023-06-19 NOTE — TELEPHONE ENCOUNTER
Okay. Looks like there is a request for Ct chest in the chart  Just change it to CT with contrast and that should take care of the aneurysm

## 2023-06-19 NOTE — TELEPHONE ENCOUNTER
Spoke to patient. He is scheduled for CT chest with contrast this Thursday 6-22-23 at 7:40 am.   Patient asking for result to be sent to Dr. Lucas Iyer. Please follow result and send to Dr. Lucas Iyer.

## 2023-06-19 NOTE — TELEPHONE ENCOUNTER
Pt called today he was told by his oncologist to make an appt with [facility] due to his recent PET scan showing that his AAA is bigger now    Please review pet scan, do you want to see pt?

## 2023-06-19 NOTE — PATIENT INSTRUCTIONS
Reviewed labs and recent medical history. Discussed his PET/CT scan and noted his aneurysmal change. Encouraged him to call his cardiologist about the changes. Discussed his osteoprosis and encouraged him to use Calcium and Vitamin D 2 times daily to help with bone strength. (Caltrate-D or generic version)  Discussed his treatment options for his cancer including chemotherapy. He will consider this after he completes the rest of his testing. Dr. Jc Benedict will call with results and discuss at that time what steps the patient would like to proceed with after testing. Return to see MD in 2 weeks to review.

## 2023-06-22 ENCOUNTER — HOSPITAL ENCOUNTER (OUTPATIENT)
Dept: MRI IMAGING | Age: 58
Discharge: HOME | End: 2023-06-22
Attending: INTERNAL MEDICINE
Payer: MEDICARE

## 2023-06-22 ENCOUNTER — HOSPITAL ENCOUNTER (OUTPATIENT)
Dept: CT IMAGING | Age: 58
Discharge: HOME | End: 2023-06-22
Attending: INTERNAL MEDICINE
Payer: MEDICARE

## 2023-06-22 DIAGNOSIS — C18.9 MALIGNANT NEOPLASM OF COLON, UNSPECIFIED PART OF COLON (HCC): ICD-10-CM

## 2023-06-22 PROCEDURE — 6360000004 HC RX CONTRAST MEDICATION: Performed by: INTERNAL MEDICINE

## 2023-06-22 PROCEDURE — 70553 MRI BRAIN STEM W/O & W/DYE: CPT

## 2023-06-22 PROCEDURE — A9579 GAD-BASE MR CONTRAST NOS,1ML: HCPCS | Performed by: INTERNAL MEDICINE

## 2023-06-22 PROCEDURE — 71260 CT THORAX DX C+: CPT

## 2023-06-22 RX ADMIN — GADOTERIDOL 15 ML: 279.3 INJECTION, SOLUTION INTRAVENOUS at 08:33

## 2023-06-22 RX ADMIN — IOPAMIDOL 80 ML: 755 INJECTION, SOLUTION INTRAVENOUS at 08:08

## 2023-06-27 ENCOUNTER — OFFICE VISIT (OUTPATIENT)
Dept: ENT CLINIC | Age: 58
End: 2023-06-27

## 2023-06-27 VITALS
TEMPERATURE: 97.9 F | RESPIRATION RATE: 20 BRPM | OXYGEN SATURATION: 99 % | DIASTOLIC BLOOD PRESSURE: 82 MMHG | HEART RATE: 84 BPM | BODY MASS INDEX: 20.75 KG/M2 | WEIGHT: 144.9 LBS | HEIGHT: 70 IN | SYSTOLIC BLOOD PRESSURE: 128 MMHG

## 2023-06-27 DIAGNOSIS — R13.14 PHARYNGOESOPHAGEAL DYSPHAGIA: ICD-10-CM

## 2023-06-27 DIAGNOSIS — K22.2 ESOPHAGEAL STRICTURE: Primary | ICD-10-CM

## 2023-06-27 DIAGNOSIS — L59.8 RADIATION SKIN FIBROSIS FROM THERAPEUTIC PROCEDURE: ICD-10-CM

## 2023-06-27 DIAGNOSIS — J39.2 PHARYNGEAL STENOSIS: ICD-10-CM

## 2023-06-27 DIAGNOSIS — K21.00 GASTROESOPHAGEAL REFLUX DISEASE WITH ESOPHAGITIS WITHOUT HEMORRHAGE: ICD-10-CM

## 2023-06-28 ENCOUNTER — TELEPHONE (OUTPATIENT)
Dept: CARDIOLOGY CLINIC | Age: 58
End: 2023-06-28

## 2023-07-03 ENCOUNTER — OFFICE VISIT (OUTPATIENT)
Dept: ONCOLOGY | Age: 58
End: 2023-07-03
Payer: MEDICARE

## 2023-07-03 ENCOUNTER — HOSPITAL ENCOUNTER (OUTPATIENT)
Dept: INFUSION THERAPY | Age: 58
Discharge: HOME | End: 2023-07-03
Payer: MEDICARE

## 2023-07-03 VITALS
BODY MASS INDEX: 21.79 KG/M2 | WEIGHT: 152.2 LBS | DIASTOLIC BLOOD PRESSURE: 64 MMHG | TEMPERATURE: 97.7 F | HEIGHT: 70 IN | OXYGEN SATURATION: 97 % | RESPIRATION RATE: 16 BRPM | HEART RATE: 77 BPM | SYSTOLIC BLOOD PRESSURE: 110 MMHG

## 2023-07-03 DIAGNOSIS — C18.9 MALIGNANT NEOPLASM OF COLON, UNSPECIFIED PART OF COLON (HCC): Primary | ICD-10-CM

## 2023-07-03 DIAGNOSIS — C18.7 MALIGNANT NEOPLASM OF SIGMOID COLON (HCC): ICD-10-CM

## 2023-07-03 PROCEDURE — 99214 OFFICE O/P EST MOD 30 MIN: CPT | Performed by: INTERNAL MEDICINE

## 2023-07-03 PROCEDURE — 99211 OFF/OP EST MAY X REQ PHY/QHP: CPT

## 2023-07-03 RX ORDER — PROCHLORPERAZINE MALEATE 10 MG
10 TABLET ORAL EVERY 6 HOURS PRN
Qty: 30 TABLET | Refills: 1 | Status: SHIPPED | OUTPATIENT
Start: 2023-07-03

## 2023-07-03 RX ORDER — LIDOCAINE AND PRILOCAINE 25; 25 MG/G; MG/G
CREAM TOPICAL
Qty: 5 G | Refills: 5 | Status: SHIPPED | OUTPATIENT
Start: 2023-07-03

## 2023-07-03 NOTE — PATIENT INSTRUCTIONS
Reviewed labs and recent medical history. Discussed CT Scan and MRI of Brain. Reviewed the use of chemotherapy. Plan placed. Referral to Dr. Renee Bejarano for a port placement. Will need chemotherapy teaching. Scripts for Emla cream for his port and compazine for nausea.   Plan to start next Wednesday 7/12/23 and see MD.

## 2023-07-03 NOTE — PROGRESS NOTES
[facility]  [address] [place] [id number]  Dept: [phone]  Loc: [phone]     Sheridan Morris  1965   No ref. provider found   MADELIN Drake - MAKENNA       Sheridan Morris is a 62 y.o.  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 CHIEF COMPLAINT  Chief Complaint   Patient presents with    Follow-up     Squamous cell cancer of retromolar trigone ([address])          HISTORY OF PRESENT ILLNESS    2015. Complaints of pain and trismus with tenderness in the submandibular area for about a month. He went to the emergency department where the patient was found to have squamous cell carcinoma of the retromolar trigone. Had been a user of cigarettes for years and had been an alcoholic. 9/17/2015. Referred to Dr. Jenna Harley at [facility]. He underwent surgery 10/2/2015 at which time laryngoscopy esophagoscopy tracheostomy, right and left neck dissections, composite resection of left retromolar trigone for squamous cell carcinoma including partial resection of the pharynx left floor of the mouth, left mental mandibulectomy and inferior maxillectomy, resection of infratemporal fossa mass, left inferior maxillectomy mandibular plating Brokaw of fibular free flap, microvascular anastomosis and inset of fibular free flap, mandibular reconstruction and complex vestibuloplasty, pharyngoplasty, palatoplasty, and PEG tube placement. 12/2/2015. Consultations with Dr. Kelsey Alcantara and Jerel YUSUF from medical oncology. After he recovered he underwent radiation therapy and chemotherapy with weekly cisplatin. He has had no evidence of recurrence of his malignancy since his original diagnosis and treatment. February 9, 2016. Completed concurrent chemoradiation. August 2020. Left-sided neck pain and head pressure.   CT scan of the neck and abdomen were

## 2023-07-05 ENCOUNTER — TELEPHONE (OUTPATIENT)
Dept: SURGERY | Age: 58
End: 2023-07-05

## 2023-07-05 NOTE — TELEPHONE ENCOUNTER
[address] [facility] [address] [place], [address] [facility],[address]    Phone * [phone]   Surgical Scheduling Direct line Phone * [phone]  Fax * [phone]      Sharon Sousareuben      1965    male    [address] [facility]  [address] [facility],[address]   Marital Status:    Single     Home Phone: [phone]   Cell Phone:   Telephone Information:   Mobile [phone]              Surgeon: Dr. Susie oDwney  Surgery Date:07-   Time: Nitza Smith     Procedure: Insertion single lumen mediport  Outpatient     Diagnosis: Cecal cancer    Important Medical History: In Epic    Special Inst/Equip:     CPT Codes: 60343    Latex Allergy:   no Cardiac Device:  no    Anesthesia Type: MAC    Case Location:  Main OR     Preadmission Testing: Phone Call      PAT Date and Time: ________________________________    PAT Confirmation #: _________________________________    Post Op Visit:  ______________________________________    Need Preop Cardiac Clearance:   no    Does patient have Cardiologist/physician?  No      Surgery Conformation #:  ______________________________________________    : __________________________________ Date:____________________        [employer] Name:  [facility]

## 2023-07-06 ENCOUNTER — TELEPHONE (OUTPATIENT)
Dept: SURGERY | Age: 58
End: 2023-07-06

## 2023-07-06 ENCOUNTER — OFFICE VISIT (OUTPATIENT)
Dept: SURGERY | Age: 58
End: 2023-07-06

## 2023-07-06 VITALS
TEMPERATURE: 97.8 F | BODY MASS INDEX: 21.47 KG/M2 | RESPIRATION RATE: 18 BRPM | SYSTOLIC BLOOD PRESSURE: 114 MMHG | HEIGHT: 70 IN | DIASTOLIC BLOOD PRESSURE: 78 MMHG | WEIGHT: 150 LBS | OXYGEN SATURATION: 98 % | HEART RATE: 84 BPM

## 2023-07-06 DIAGNOSIS — C18.9 COLON ADENOCARCINOMA (HCC): ICD-10-CM

## 2023-07-06 DIAGNOSIS — Z90.49 S/P PARTIAL RESECTION OF COLON: Primary | ICD-10-CM

## 2023-07-06 PROCEDURE — 99024 POSTOP FOLLOW-UP VISIT: CPT | Performed by: SURGERY

## 2023-07-06 ASSESSMENT — ENCOUNTER SYMPTOMS
WHEEZING: 0
PHOTOPHOBIA: 0
COUGH: 0
CHEST TIGHTNESS: 0
BACK PAIN: 0
RHINORRHEA: 0
ABDOMINAL PAIN: 1
SHORTNESS OF BREATH: 0
RECTAL PAIN: 0
VOMITING: 0
APNEA: 0
FACIAL SWELLING: 0
TROUBLE SWALLOWING: 1
EYE DISCHARGE: 0
EYE ITCHING: 0
SINUS PRESSURE: 0
ALLERGIC/IMMUNOLOGIC NEGATIVE: 1
CHOKING: 0
COLOR CHANGE: 0
ANAL BLEEDING: 0
ABDOMINAL DISTENTION: 0
SORE THROAT: 0
DIARRHEA: 0
EYE REDNESS: 0
STRIDOR: 0
NAUSEA: 0
BLOOD IN STOOL: 0
EYE PAIN: 0
VOICE CHANGE: 1
CONSTIPATION: 0

## 2023-07-06 NOTE — PROGRESS NOTES
Jasmin Bah on 5/2/2023 3:37 PM           Exam Ended: 05/02/23 15:11 EDT Last Resulted: 05/02/23 15:37 EDT           An electronic signature was used to authenticate this note.     --Stone Ferrell MD

## 2023-07-06 NOTE — TELEPHONE ENCOUNTER
Per Karen    No Authorization Required  Service Type  2 - Surgical    Place of Service  22 - On [facility]    Service From - To Date  2023-07-11 - 2023-07-11    Quantity  1 Units  Level of Service  Elective    Diagnosis Code 1  C189 - Malignant neoplasm of colon unspecified    Procedure Code 1  94361 - INSERT TUNNELED CV CATH    Quantity  1 Units    Status  NO AUTH REQUIRED

## 2023-07-10 ENCOUNTER — PREP FOR PROCEDURE (OUTPATIENT)
Dept: SURGERY | Age: 58
End: 2023-07-10

## 2023-07-10 RX ORDER — SODIUM CHLORIDE 9 MG/ML
INJECTION, SOLUTION INTRAVENOUS CONTINUOUS
Status: CANCELLED | OUTPATIENT
Start: 2023-07-10

## 2023-07-11 ENCOUNTER — HOSPITAL ENCOUNTER (OUTPATIENT)
Age: 58
Setting detail: OUTPATIENT SURGERY
Discharge: HOME | End: 2023-07-11
Attending: SURGERY | Admitting: SURGERY
Payer: MEDICARE

## 2023-07-11 ENCOUNTER — HOSPITAL ENCOUNTER (OUTPATIENT)
Dept: INFUSION THERAPY | Age: 58
Discharge: HOME | End: 2023-07-11
Payer: MEDICARE

## 2023-07-11 ENCOUNTER — ANESTHESIA (OUTPATIENT)
Dept: OPERATING ROOM | Age: 58
End: 2023-07-11
Payer: MEDICARE

## 2023-07-11 ENCOUNTER — ANESTHESIA EVENT (OUTPATIENT)
Dept: OPERATING ROOM | Age: 58
End: 2023-07-11
Payer: MEDICARE

## 2023-07-11 ENCOUNTER — APPOINTMENT (OUTPATIENT)
Dept: GENERAL RADIOLOGY | Age: 58
End: 2023-07-11
Attending: SURGERY
Payer: MEDICARE

## 2023-07-11 VITALS
HEIGHT: 71 IN | HEART RATE: 73 BPM | RESPIRATION RATE: 18 BRPM | SYSTOLIC BLOOD PRESSURE: 144 MMHG | OXYGEN SATURATION: 100 % | DIASTOLIC BLOOD PRESSURE: 99 MMHG | BODY MASS INDEX: 20.77 KG/M2 | WEIGHT: 148.4 LBS | TEMPERATURE: 97.9 F

## 2023-07-11 VITALS
OXYGEN SATURATION: 100 % | BODY MASS INDEX: 20.77 KG/M2 | HEART RATE: 67 BPM | TEMPERATURE: 96.9 F | WEIGHT: 148.4 LBS | RESPIRATION RATE: 18 BRPM | DIASTOLIC BLOOD PRESSURE: 97 MMHG | SYSTOLIC BLOOD PRESSURE: 151 MMHG | HEIGHT: 71 IN

## 2023-07-11 DIAGNOSIS — C18.9 MALIGNANT NEOPLASM OF COLON, UNSPECIFIED PART OF COLON (HCC): Primary | ICD-10-CM

## 2023-07-11 LAB — POTASSIUM SERPL-SCNC: 4.3 MEQ/L (ref 3.5–5.2)

## 2023-07-11 PROCEDURE — 3700000000 HC ANESTHESIA ATTENDED CARE: Performed by: SURGERY

## 2023-07-11 PROCEDURE — 6360000002 HC RX W HCPCS: Performed by: SURGERY

## 2023-07-11 PROCEDURE — 7100000001 HC PACU RECOVERY - ADDTL 15 MIN: Performed by: SURGERY

## 2023-07-11 PROCEDURE — 2580000003 HC RX 258: Performed by: NURSE PRACTITIONER

## 2023-07-11 PROCEDURE — 3700000001 HC ADD 15 MINUTES (ANESTHESIA): Performed by: SURGERY

## 2023-07-11 PROCEDURE — 99212 OFFICE O/P EST SF 10 MIN: CPT

## 2023-07-11 PROCEDURE — 3600000012 HC SURGERY LEVEL 2 ADDTL 15MIN: Performed by: SURGERY

## 2023-07-11 PROCEDURE — 7100000000 HC PACU RECOVERY - FIRST 15 MIN: Performed by: SURGERY

## 2023-07-11 PROCEDURE — 3600000002 HC SURGERY LEVEL 2 BASE: Performed by: SURGERY

## 2023-07-11 PROCEDURE — 84132 ASSAY OF SERUM POTASSIUM: CPT

## 2023-07-11 PROCEDURE — 77001 FLUOROGUIDE FOR VEIN DEVICE: CPT

## 2023-07-11 PROCEDURE — 71045 X-RAY EXAM CHEST 1 VIEW: CPT

## 2023-07-11 PROCEDURE — 6360000002 HC RX W HCPCS: Performed by: NURSE PRACTITIONER

## 2023-07-11 PROCEDURE — 36561 INSERT TUNNELED CV CATH: CPT | Performed by: SURGERY

## 2023-07-11 PROCEDURE — 77001 FLUOROGUIDE FOR VEIN DEVICE: CPT | Performed by: SURGERY

## 2023-07-11 PROCEDURE — 7100000011 HC PHASE II RECOVERY - ADDTL 15 MIN: Performed by: SURGERY

## 2023-07-11 PROCEDURE — 6360000002 HC RX W HCPCS

## 2023-07-11 PROCEDURE — 7100000010 HC PHASE II RECOVERY - FIRST 15 MIN: Performed by: SURGERY

## 2023-07-11 PROCEDURE — 36415 COLL VENOUS BLD VENIPUNCTURE: CPT

## 2023-07-11 PROCEDURE — 2580000003 HC RX 258

## 2023-07-11 PROCEDURE — 2500000003 HC RX 250 WO HCPCS: Performed by: SURGERY

## 2023-07-11 PROCEDURE — 2709999900 HC NON-CHARGEABLE SUPPLY: Performed by: SURGERY

## 2023-07-11 PROCEDURE — C1788 PORT, INDWELLING, IMP: HCPCS | Performed by: SURGERY

## 2023-07-11 DEVICE — PORT INFUS SGL LUMN ATTCH POLYUR OPN END CATH 8FR POWERPRT: Type: IMPLANTABLE DEVICE | Site: CHEST | Status: FUNCTIONAL

## 2023-07-11 RX ORDER — MORPHINE SULFATE 4 MG/ML
4 INJECTION, SOLUTION INTRAMUSCULAR; INTRAVENOUS
Status: DISCONTINUED | OUTPATIENT
Start: 2023-07-11 | End: 2023-07-11 | Stop reason: HOSPADM

## 2023-07-11 RX ORDER — HEPARIN SODIUM 100 [USP'U]/ML
INJECTION, SOLUTION INTRAVENOUS PRN
Status: DISCONTINUED | OUTPATIENT
Start: 2023-07-11 | End: 2023-07-11 | Stop reason: ALTCHOICE

## 2023-07-11 RX ORDER — DEXAMETHASONE SODIUM PHOSPHATE 4 MG/ML
INJECTION, SOLUTION INTRA-ARTICULAR; INTRALESIONAL; INTRAMUSCULAR; INTRAVENOUS; SOFT TISSUE PRN
Status: DISCONTINUED | OUTPATIENT
Start: 2023-07-11 | End: 2023-07-11 | Stop reason: SDUPTHER

## 2023-07-11 RX ORDER — FENTANYL CITRATE 50 UG/ML
INJECTION, SOLUTION INTRAMUSCULAR; INTRAVENOUS PRN
Status: DISCONTINUED | OUTPATIENT
Start: 2023-07-11 | End: 2023-07-11 | Stop reason: SDUPTHER

## 2023-07-11 RX ORDER — SODIUM CHLORIDE 9 MG/ML
INJECTION, SOLUTION INTRAVENOUS CONTINUOUS PRN
Status: DISCONTINUED | OUTPATIENT
Start: 2023-07-11 | End: 2023-07-11 | Stop reason: SDUPTHER

## 2023-07-11 RX ORDER — LIDOCAINE HCL/PF 100 MG/5ML
SYRINGE (ML) INJECTION PRN
Status: DISCONTINUED | OUTPATIENT
Start: 2023-07-11 | End: 2023-07-11 | Stop reason: SDUPTHER

## 2023-07-11 RX ORDER — SODIUM CHLORIDE 0.9 % (FLUSH) 0.9 %
5-40 SYRINGE (ML) INJECTION PRN
Status: DISCONTINUED | OUTPATIENT
Start: 2023-07-11 | End: 2023-07-11 | Stop reason: HOSPADM

## 2023-07-11 RX ORDER — LIDOCAINE HYDROCHLORIDE 10 MG/ML
INJECTION, SOLUTION EPIDURAL; INFILTRATION; INTRACAUDAL; PERINEURAL PRN
Status: DISCONTINUED | OUTPATIENT
Start: 2023-07-11 | End: 2023-07-11 | Stop reason: ALTCHOICE

## 2023-07-11 RX ORDER — MIDAZOLAM HYDROCHLORIDE 1 MG/ML
INJECTION INTRAMUSCULAR; INTRAVENOUS PRN
Status: DISCONTINUED | OUTPATIENT
Start: 2023-07-11 | End: 2023-07-11 | Stop reason: SDUPTHER

## 2023-07-11 RX ORDER — SODIUM CHLORIDE 0.9 % (FLUSH) 0.9 %
5-40 SYRINGE (ML) INJECTION EVERY 12 HOURS SCHEDULED
Status: DISCONTINUED | OUTPATIENT
Start: 2023-07-11 | End: 2023-07-11 | Stop reason: HOSPADM

## 2023-07-11 RX ORDER — ONDANSETRON 2 MG/ML
INJECTION INTRAMUSCULAR; INTRAVENOUS PRN
Status: DISCONTINUED | OUTPATIENT
Start: 2023-07-11 | End: 2023-07-11 | Stop reason: SDUPTHER

## 2023-07-11 RX ORDER — ONDANSETRON 2 MG/ML
4 INJECTION INTRAMUSCULAR; INTRAVENOUS EVERY 6 HOURS PRN
Status: DISCONTINUED | OUTPATIENT
Start: 2023-07-11 | End: 2023-07-11 | Stop reason: HOSPADM

## 2023-07-11 RX ORDER — MORPHINE SULFATE 2 MG/ML
2 INJECTION, SOLUTION INTRAMUSCULAR; INTRAVENOUS
Status: DISCONTINUED | OUTPATIENT
Start: 2023-07-11 | End: 2023-07-11 | Stop reason: HOSPADM

## 2023-07-11 RX ORDER — HYDROCODONE BITARTRATE AND ACETAMINOPHEN 5; 325 MG/1; MG/1
1-2 TABLET ORAL EVERY 6 HOURS PRN
Qty: 20 TABLET | Refills: 0 | Status: SHIPPED | OUTPATIENT
Start: 2023-07-11 | End: 2023-07-17

## 2023-07-11 RX ORDER — SODIUM CHLORIDE 9 MG/ML
INJECTION, SOLUTION INTRAVENOUS PRN
Status: DISCONTINUED | OUTPATIENT
Start: 2023-07-11 | End: 2023-07-11 | Stop reason: HOSPADM

## 2023-07-11 RX ORDER — SODIUM CHLORIDE 9 MG/ML
INJECTION, SOLUTION INTRAVENOUS CONTINUOUS
Status: DISCONTINUED | OUTPATIENT
Start: 2023-07-11 | End: 2023-07-11 | Stop reason: HOSPADM

## 2023-07-11 RX ORDER — ONDANSETRON 4 MG/1
4 TABLET, ORALLY DISINTEGRATING ORAL EVERY 8 HOURS PRN
Status: DISCONTINUED | OUTPATIENT
Start: 2023-07-11 | End: 2023-07-11 | Stop reason: HOSPADM

## 2023-07-11 RX ORDER — OXYCODONE HYDROCHLORIDE 5 MG/1
10 TABLET ORAL EVERY 4 HOURS PRN
Status: DISCONTINUED | OUTPATIENT
Start: 2023-07-11 | End: 2023-07-11 | Stop reason: HOSPADM

## 2023-07-11 RX ORDER — OXYCODONE HYDROCHLORIDE 5 MG/1
5 TABLET ORAL EVERY 4 HOURS PRN
Status: DISCONTINUED | OUTPATIENT
Start: 2023-07-11 | End: 2023-07-11 | Stop reason: HOSPADM

## 2023-07-11 RX ORDER — PROPOFOL 10 MG/ML
INJECTION, EMULSION INTRAVENOUS PRN
Status: DISCONTINUED | OUTPATIENT
Start: 2023-07-11 | End: 2023-07-11 | Stop reason: SDUPTHER

## 2023-07-11 RX ADMIN — Medication 40 MG: at 09:04

## 2023-07-11 RX ADMIN — DEXAMETHASONE SODIUM PHOSPHATE 10 MG: 4 INJECTION, SOLUTION INTRAMUSCULAR; INTRAVENOUS at 09:18

## 2023-07-11 RX ADMIN — Medication 2000 MG: at 09:01

## 2023-07-11 RX ADMIN — SODIUM CHLORIDE: 9 INJECTION, SOLUTION INTRAVENOUS at 08:59

## 2023-07-11 RX ADMIN — SODIUM CHLORIDE: 9 INJECTION, SOLUTION INTRAVENOUS at 08:27

## 2023-07-11 RX ADMIN — FENTANYL CITRATE 100 MCG: 50 INJECTION, SOLUTION INTRAMUSCULAR; INTRAVENOUS at 09:04

## 2023-07-11 RX ADMIN — PROPOFOL 200 MG: 10 INJECTION, EMULSION INTRAVENOUS at 09:14

## 2023-07-11 RX ADMIN — ONDANSETRON 4 MG: 2 INJECTION INTRAMUSCULAR; INTRAVENOUS at 09:18

## 2023-07-11 RX ADMIN — MIDAZOLAM 2 MG: 1 INJECTION INTRAMUSCULAR; INTRAVENOUS at 09:04

## 2023-07-11 ASSESSMENT — PAIN DESCRIPTION - DESCRIPTORS
DESCRIPTORS_2: ACHING
DESCRIPTORS: ACHING
DESCRIPTORS_2: CRAMPING
DESCRIPTORS: CRAMPING

## 2023-07-11 ASSESSMENT — PAIN SCALES - GENERAL
PAINLEVEL_OUTOF10: 2
PAINLEVEL_OUTOF10: 5
PAINLEVEL_OUTOF10: 4
PAINLEVEL_OUTOF10: 2

## 2023-07-11 ASSESSMENT — PAIN DESCRIPTION - LOCATION
LOCATION_2: ABDOMEN
LOCATION: JAW;NECK
LOCATION: JAW;NECK
LOCATION_2: ABDOMEN

## 2023-07-11 ASSESSMENT — PAIN DESCRIPTION - PAIN TYPE
TYPE_2: SURGICAL
TYPE: CHRONIC PAIN
TYPE: CHRONIC PAIN

## 2023-07-11 ASSESSMENT — PAIN DESCRIPTION - FREQUENCY
FREQUENCY: CONTINUOUS
FREQUENCY: CONTINUOUS

## 2023-07-11 ASSESSMENT — PAIN DESCRIPTION - INTENSITY
RATING_2: 6
RATING_2: 4

## 2023-07-11 ASSESSMENT — PAIN DESCRIPTION - DURATION: DURATION_2: CONTINUOUS

## 2023-07-11 ASSESSMENT — PAIN DESCRIPTION - ORIENTATION
ORIENTATION: LEFT
ORIENTATION_2: LOWER
ORIENTATION_2: LOWER
ORIENTATION: LEFT

## 2023-07-11 ASSESSMENT — PAIN DESCRIPTION - ONSET: ONSET: ON-GOING

## 2023-07-11 NOTE — INTERVAL H&P NOTE
Update History & Physical    The patient's History and Physical was reviewed with the patient and I examined the patient. There was no change. The surgical site was confirmed by the patient and me. Plan: The risks, benefits, expected outcome, and alternative to the recommended procedure have been discussed with the patient. Patient understands and wants to proceed with the procedure.      Electronically signed by Talon Art MD on 7/11/2023 at 8:40 AM

## 2023-07-11 NOTE — PROGRESS NOTES
Patient and significant other instructed on Oxaliplatin/Leucovorin/5FU and Pre-medications. Drug information sheets including side effects, Chemotherapy and you, eating hints,understanding your blood counts, bland diet, importance of hydration, when to call physician sheet discussed and given to patient, reduce risk infection, bleeding precaution and neutropenia precaution sheets also given to patient. Patient instructed to have a  with them for at least the first visit. All questions answered satisfactorily and support given. Patient not given a tour of facility- has received chemotherapy 7 years ago. Approximately 60 minutes spent with patient and significant other.

## 2023-07-11 NOTE — PROGRESS NOTES
0935- pt to pacu. Vss, pt non responsive on arrival. Pt appears in no acute distress. 0940- pt encouraged to cough, deep breathe. Pt suctioned, tolerated well. Pt denies pain, nausea. 0945- CXR complete at this time. 2563- pt resting in bed eyes closed. Vss. Pt denies pain, nausea when asked. Drifts back to sleep quickly. 1005- pt meets criteria for discharge from pacu.      1009-Returned to [facility], report given to Toy Nereyda

## 2023-07-11 NOTE — BRIEF OP NOTE
Brief Postoperative Note      Patient: Jose Arredondo  YOB: 1965  MRN: [id number]    Date of Procedure: 7/11/2023    Pre-Op Diagnosis Codes:     * Cecal cancer ([address]) [C18.0]    Post-Op Diagnosis: Same       Procedure(s):  Right Insertion Single Lumen Mediport; intraoperative fluoroscopy    Surgeon(s):  Brennan Anne MD    Assistant:  First Assistant: Nimco Turner RN    Anesthesia: Monitor Anesthesia Care/local    Estimated Blood Loss (mL): 5 ml    Complications: None    Specimens:   * No specimens in log *    Implants:  Implant Name Type Inv.  Item Serial No.  Lot No. LRB No. Used Action   PORT INFUS SGL LUMN ATTCH POLYUR OPN END CATH 8FR POWERPRT - [id number]  PORT INFUS SGL LUMN ATTCH POLYUR OPN END CATH 8FR POWERPRT  [employer] [id number] N/A 1 Implanted         Drains: * No LDAs found *    Findings: as above - see op note for details      Electronically signed by Brennan Anne MD on 7/11/2023 at 9:29 AM

## 2023-07-11 NOTE — OP NOTE
[place] [address]                                OPERATIVE REPORT    PATIENT NAME: James Han                       :        1965  MED REC NO:   [id number]                           ROOM:  ACCOUNT NO:   [de-identified]                           ADMIT DATE: 2023  PROVIDER:     Antoni Calero M.D.    DATE OF PROCEDURE:  2023    PREOPERATIVE DIAGNOSIS:  Adenocarcinoma of colon. POSTOPERATIVE DIAGNOSIS:  Adenocarcinoma of colon. PROCEDURE:  1. Insertion tunneled right subclavian vein single-lumen port. 2.  Intraoperative fluoroscopy. SURGEON:  Antoni Calero MD    ANESTHESIA:  General/local.    ESTIMATED BLOOD LOSS:  5 ml. DRAINS:  None. COMPLICATIONS:  None. DISPOSITION:  Stable to the recovery room. INDICATIONS:  The patient is a 51-year-old male, who underwent partial  colectomy secondary to adenocarcinoma of colon. He was in need of  chemotherapy. Both operative and nonoperative intervention plans were  discussed for port insertion. Some of the risks included, but were not  limited to bleeding, infection, the need for reoperation, severe chronic  postoperative pain or numbness, major vascular or nerve injury,  cardiopulmonary complications, anesthetic complications, seroma-hematoma  formation, wound breakdown, pneumothorax, air embolism, port/catheter  dislodgement/malfunction, chronic pain, and death. After all of the  questions were answered in their entirety and the patient was completely  aware of the current situation, he wished to proceed with surgery. DESCRIPTION OF PROCEDURE:  After informed consent was signed and placed  on the chart, the patient was taken back to the operating room and  placed supine on the operating room table. IV sedation was  administered. He tolerated this, but continued to have coughing, so an  LMA was then converted.   He then tolerated this well

## 2023-07-11 NOTE — PLAN OF CARE
Problem: Discharge Planning  Goal: Discharge to home or other facility with appropriate resources  7/11/2023 1539 by Debi Holstein, RN  Outcome: Adequate for Discharge  Flowsheets (Taken 7/11/2023 1539)  Discharge to home or other facility with appropriate resources: Identify barriers to discharge with patient and caregiver  Note: Verbalized understanding of discharge instructions, follow-up appointments, and when to call the physician.   7/11/2023 1539 by Debi Holstein, RN  Outcome: Adequate for Discharge  Flowsheets (Taken 7/11/2023 1539)  Discharge to home or other facility with appropriate resources: Identify barriers to discharge with patient and caregiver     Problem: Chronic Conditions and Co-morbidities  Goal: Patient's chronic conditions and co-morbidity symptoms are monitored and maintained or improved  7/11/2023 1539 by Debi Holstein, RN  Outcome: Adequate for Discharge  Flowsheets (Taken 7/11/2023 1539)  Care Plan - Patient's Chronic Conditions and Co-Morbidity Symptoms are Monitored and Maintained or Improved: Monitor and assess patient's chronic conditions and comorbid symptoms for stability, deterioration, or improvement  Note: Chemotherapy Teaching     What is Chemotherapy   Drug action [x]   Method of Administration [x]   Handouts given []     Side Effects  Nausea/vomiting [x]   Diarrhea [x]   Fatigue [x]   Signs / Symptoms of infection [x]   Neutropenia [x]   Thrombocytopenia [x]   Alopecia [x]   neuropathy [x]   [place] diet &  the importance of fluids [x]       Micellaneous  Importance of nutrition [x]   Importance of oral hygiene [x]   When to call the MD [x]   Monitoring labs [x]   Use of supportive services []     Explanation of Drug Regimen / Frequency  Oxaliplatin/Leucovorin/5FU     Comments  Verbalized understanding to drug,action,side effects and when to call MD      7/11/2023 1539 by Debi Holstein, RN  Outcome: Adequate for Discharge  Flowsheets (Taken 7/11/2023 1539)  Care Plan -

## 2023-07-11 NOTE — PROGRESS NOTES
Pt has met discharge criteria and states he is ready for discharge to home. IV removed, gauze and tape applied. Dressed in own clothes and personal belongings gathered. Discharge instructions (with opioid medication education information) given to pt and family; pt and family verbalized understanding of discharge instructions, prescriptions and follow up appointments. Pt transported to discharge lobby by Cassidy Cunningham [facility] staff.

## 2023-07-11 NOTE — PROGRESS NOTES
ADMITTED TO [facility] AND ORIENTED TO UNIT. SCDS ON. FALL BAND ON. PT VERBALIZED APPROVAL FOR FIRST NAME, LAST INITIAL AND PHYSICIAN NAME ON UNIT WHITEBOARD. [facility] is with the patient.

## 2023-07-11 NOTE — DISCHARGE INSTRUCTIONS
DR. Ciera Gutierrez DISCHARGE INSTRUCTIONS    Pt Name: [facility] Record Number: [id number]  Today's Date: 7/11/2023    GENERAL ANESTHESIA OR SEDATION  1. Do not drive or operate hazardous machinery for 24 hours. 2. Do not make important business or personal decisions for 24 hours. 3. Do not drink alcoholic beverages or use tobacco for 24 hours. ACTIVITY INSTRUCTIONS:  [] Rest today. Resume light to normal activity tomorrow. [x] You may resume normal activity tomorrow. Do not engage in strenuous activity that may place stress on your incision. [x] Do not drive for 3-5 days and avoid heavy lifting, tugging, pullings greater than 10-20 lbs until seen in the office. DIET INSTRUCTIONS:  [x]Begin with clear liquids. If not nauseated, may increase to a low-fat diet when you desire. Greasy and spicy foods are not advised. [x]Regular diet as tolerated. []Other:     MEDICATIONS  [x]Prescription sent with you to be used as directed. []Valium   [x]Norco   []Percocet   []Toradol   []Oxycontin     Do not drink alcohol or drive while taking these medications. You may experience dizziness or drowsiness with these medications. You may also experience constipation which can be relieved with stool softners or laxatives. - Take Colace 100 mg 1-2 tabs daily as needed for constipation  - Take MiraLax 1-2 cap fulls daily as needed for constipation    [x]You may resume your daily prescription medication schedule unless otherwise specified. []Do not take 325mg Aspirin or other blood thinners such as Coumadin or Plavix for 5 days. **Pain medication at discharge - use only as prescribed- refills may be available to you at your follow up appointments if needed and warranted. Narcotics should be used for only short term and we highly encouraged our patients to wean off appropriately and use other means for pain such as non pharmacologic measures and over the counter tylenol or ibuprofen if no restrictions apply.

## 2023-07-11 NOTE — ANESTHESIA POSTPROCEDURE EVALUATION
Department of Anesthesiology  Postprocedure Note    Patient: Dee Paredes  MRN: [id number]  YOB: 1965  Date of evaluation: 7/11/2023      Procedure Summary     Date: 07/11/23 Room / Location: [facility]    Anesthesia Start: 5975 Anesthesia Stop: 9794    Procedure: Right Insertion Single Lumen Mediport (Chest) Diagnosis:       Cecal cancer ([address])      (Cecal cancer ([address]) [C18.0])    Surgeons: Haroon Anderson MD Responsible Provider: Trinity Lopez MD    Anesthesia Type: MAC ASA Status: 3          Anesthesia Type: No value filed.     Romeo Phase I: Romeo Score: 10    Romeo Phase II: Romeo Score: 10      Anesthesia Post Evaluation    Complications: no

## 2023-07-11 NOTE — DISCHARGE INSTRUCTIONS
You received chemotherapy teaching while in outpatient oncology clinic. Call if any uncontrolled nausea/vomiting  Call if any fevers/chills/ problems or concerns  Call if any bleeding  Call if any chest pain/pressure  Call if any severe shortness of breath  Drink at least (6) 8oz glasses of fluids daily     If develop any of above condition, please call your MD or go to Emergency Department.

## 2023-07-12 ENCOUNTER — HOSPITAL ENCOUNTER (OUTPATIENT)
Dept: INFUSION THERAPY | Age: 58
Discharge: HOME | End: 2023-07-12
Payer: MEDICARE

## 2023-07-12 ENCOUNTER — OFFICE VISIT (OUTPATIENT)
Dept: ONCOLOGY | Age: 58
End: 2023-07-12
Payer: MEDICARE

## 2023-07-12 VITALS
OXYGEN SATURATION: 100 % | RESPIRATION RATE: 18 BRPM | BODY MASS INDEX: 21.61 KG/M2 | HEIGHT: 71 IN | WEIGHT: 154.38 LBS | DIASTOLIC BLOOD PRESSURE: 85 MMHG | HEART RATE: 66 BPM | TEMPERATURE: 98.4 F | SYSTOLIC BLOOD PRESSURE: 151 MMHG

## 2023-07-12 VITALS
SYSTOLIC BLOOD PRESSURE: 151 MMHG | OXYGEN SATURATION: 100 % | HEIGHT: 71 IN | HEART RATE: 66 BPM | DIASTOLIC BLOOD PRESSURE: 85 MMHG | RESPIRATION RATE: 18 BRPM | WEIGHT: 154.38 LBS | BODY MASS INDEX: 21.61 KG/M2 | TEMPERATURE: 98.4 F

## 2023-07-12 DIAGNOSIS — C06.2 SQUAMOUS CELL CANCER OF RETROMOLAR TRIGONE (HCC): ICD-10-CM

## 2023-07-12 DIAGNOSIS — C18.9 MALIGNANT NEOPLASM OF COLON, UNSPECIFIED PART OF COLON (HCC): Primary | ICD-10-CM

## 2023-07-12 LAB
ABSOLUTE IMMATURE GRANULOCYTE: 0.01 THOU/MM3 (ref 0–0.07)
ALBUMIN SERPL BCG-MCNC: 4.1 G/DL (ref 3.5–5.1)
ALP SERPL-CCNC: 64 U/L (ref 38–126)
ALT SERPL W/O P-5'-P-CCNC: 9 U/L (ref 11–66)
AST SERPL-CCNC: 20 U/L (ref 5–40)
BASOPHILS ABSOLUTE: 0 THOU/MM3 (ref 0–0.1)
BASOPHILS NFR BLD AUTO: 0 % (ref 0–3)
BILIRUB CONJ SERPL-MCNC: < 0.2 MG/DL (ref 0–0.3)
BILIRUB SERPL-MCNC: 0.3 MG/DL (ref 0.3–1.2)
BUN BLDP-MCNC: 8 MG/DL (ref 8–26)
CHLORIDE BLD-SCNC: 102 MEQ/L (ref 98–109)
CREAT BLD-MCNC: 1.1 MG/DL (ref 0.5–1.2)
EOSINOPHIL NFR BLD AUTO: 0 % (ref 0–4)
EOSINOPHILS ABSOLUTE: 0 THOU/MM3 (ref 0–0.4)
ERYTHROCYTE [DISTWIDTH] IN BLOOD BY AUTOMATED COUNT: 19.3 % (ref 11.5–14.5)
GFR SERPL CREATININE-BSD FRML MDRD: > 60 ML/MIN/1.73M2
GLUCOSE BLD-MCNC: 87 MG/DL (ref 70–108)
HCT VFR BLD AUTO: 37.2 % (ref 42–52)
HGB BLD-MCNC: 12 GM/DL (ref 14–18)
IMMATURE GRANULOCYTES: 0 %
IONIZED CALCIUM, WHOLE BLOOD: 1.23 MMOL/L (ref 1.12–1.32)
LYMPHOCYTES ABSOLUTE: 2.8 THOU/MM3 (ref 1–4.8)
LYMPHOCYTES NFR BLD AUTO: 27 % (ref 15–47)
MCH RBC QN AUTO: 28.8 PG (ref 26–33)
MCHC RBC AUTO-ENTMCNC: 32.3 GM/DL (ref 32.2–35.5)
MCV RBC AUTO: 89 FL (ref 80–94)
MONOCYTES ABSOLUTE: 0.8 THOU/MM3 (ref 0.4–1.3)
MONOCYTES NFR BLD AUTO: 7 % (ref 0–12)
NEUTROPHILS NFR BLD AUTO: 66 % (ref 43–75)
PLATELET # BLD AUTO: 231 THOU/MM3 (ref 130–400)
PMV BLD AUTO: 8.5 FL (ref 9.4–12.4)
POTASSIUM BLD-SCNC: 3.7 MEQ/L (ref 3.5–4.9)
PROT SERPL-MCNC: 7.1 G/DL (ref 6.1–8)
RBC # BLD AUTO: 4.17 MILL/MM3 (ref 4.7–6.1)
SEGMENTED NEUTROPHILS ABSOLUTE COUNT: 6.9 THOU/MM3 (ref 1.8–7.7)
SODIUM BLD-SCNC: 137 MEQ/L (ref 138–146)
TOTAL CO2, WHOLE BLOOD: 25 MEQ/L (ref 23–33)
WBC # BLD AUTO: 10.5 THOU/MM3 (ref 4.8–10.8)

## 2023-07-12 PROCEDURE — 99211 OFF/OP EST MAY X REQ PHY/QHP: CPT

## 2023-07-12 PROCEDURE — 85025 COMPLETE CBC W/AUTO DIFF WBC: CPT

## 2023-07-12 PROCEDURE — 96413 CHEMO IV INFUSION 1 HR: CPT

## 2023-07-12 PROCEDURE — 96415 CHEMO IV INFUSION ADDL HR: CPT

## 2023-07-12 PROCEDURE — 80047 BASIC METABLC PNL IONIZED CA: CPT

## 2023-07-12 PROCEDURE — 96367 TX/PROPH/DG ADDL SEQ IV INF: CPT

## 2023-07-12 PROCEDURE — 96416 CHEMO PROLONG INFUSE W/PUMP: CPT

## 2023-07-12 PROCEDURE — 6360000002 HC RX W HCPCS: Performed by: INTERNAL MEDICINE

## 2023-07-12 PROCEDURE — 96368 THER/DIAG CONCURRENT INF: CPT

## 2023-07-12 PROCEDURE — 80076 HEPATIC FUNCTION PANEL: CPT

## 2023-07-12 PROCEDURE — 96411 CHEMO IV PUSH ADDL DRUG: CPT

## 2023-07-12 PROCEDURE — 96375 TX/PRO/DX INJ NEW DRUG ADDON: CPT

## 2023-07-12 PROCEDURE — 2580000003 HC RX 258: Performed by: INTERNAL MEDICINE

## 2023-07-12 PROCEDURE — 99214 OFFICE O/P EST MOD 30 MIN: CPT | Performed by: INTERNAL MEDICINE

## 2023-07-12 PROCEDURE — 36591 DRAW BLOOD OFF VENOUS DEVICE: CPT

## 2023-07-12 RX ORDER — FLUOROURACIL 50 MG/ML
400 INJECTION, SOLUTION INTRAVENOUS ONCE
Status: COMPLETED | OUTPATIENT
Start: 2023-07-12 | End: 2023-07-12

## 2023-07-12 RX ORDER — ONDANSETRON 2 MG/ML
8 INJECTION INTRAMUSCULAR; INTRAVENOUS
Status: CANCELLED | OUTPATIENT
Start: 2023-07-12

## 2023-07-12 RX ORDER — SODIUM CHLORIDE 9 MG/ML
25 INJECTION, SOLUTION INTRAVENOUS PRN
OUTPATIENT
Start: 2023-07-12

## 2023-07-12 RX ORDER — SODIUM CHLORIDE 0.9 % (FLUSH) 0.9 %
5-40 SYRINGE (ML) INJECTION PRN
OUTPATIENT
Start: 2023-07-12

## 2023-07-12 RX ORDER — HEPARIN SODIUM 100 [USP'U]/ML
500 INJECTION, SOLUTION INTRAVENOUS PRN
OUTPATIENT
Start: 2023-07-12

## 2023-07-12 RX ORDER — DEXTROSE MONOHYDRATE 50 MG/ML
5-250 INJECTION, SOLUTION INTRAVENOUS PRN
Status: CANCELLED | OUTPATIENT
Start: 2023-07-12

## 2023-07-12 RX ORDER — FAMOTIDINE 10 MG/ML
20 INJECTION, SOLUTION INTRAVENOUS
Status: CANCELLED | OUTPATIENT
Start: 2023-07-12

## 2023-07-12 RX ORDER — SODIUM CHLORIDE 9 MG/ML
5-250 INJECTION, SOLUTION INTRAVENOUS PRN
Status: CANCELLED | OUTPATIENT
Start: 2023-07-14

## 2023-07-12 RX ORDER — DEXTROSE MONOHYDRATE 50 MG/ML
5-250 INJECTION, SOLUTION INTRAVENOUS PRN
Status: DISCONTINUED | OUTPATIENT
Start: 2023-07-12 | End: 2023-07-13 | Stop reason: HOSPADM

## 2023-07-12 RX ORDER — HEPARIN SODIUM 100 [USP'U]/ML
500 INJECTION, SOLUTION INTRAVENOUS PRN
Status: DISCONTINUED | OUTPATIENT
Start: 2023-07-12 | End: 2023-07-13 | Stop reason: HOSPADM

## 2023-07-12 RX ORDER — ALBUTEROL SULFATE 90 UG/1
4 AEROSOL, METERED RESPIRATORY (INHALATION) PRN
Status: CANCELLED | OUTPATIENT
Start: 2023-07-12

## 2023-07-12 RX ORDER — SODIUM CHLORIDE 0.9 % (FLUSH) 0.9 %
5-40 SYRINGE (ML) INJECTION PRN
Status: CANCELLED | OUTPATIENT
Start: 2023-07-14

## 2023-07-12 RX ORDER — HEPARIN SODIUM (PORCINE) LOCK FLUSH IV SOLN 100 UNIT/ML 100 UNIT/ML
500 SOLUTION INTRAVENOUS PRN
Status: CANCELLED | OUTPATIENT
Start: 2023-07-14

## 2023-07-12 RX ORDER — SODIUM CHLORIDE 9 MG/ML
INJECTION, SOLUTION INTRAVENOUS CONTINUOUS
Status: CANCELLED | OUTPATIENT
Start: 2023-07-12

## 2023-07-12 RX ORDER — FLUOROURACIL 50 MG/ML
400 INJECTION, SOLUTION INTRAVENOUS ONCE
Status: CANCELLED | OUTPATIENT
Start: 2023-07-12 | End: 2023-07-12

## 2023-07-12 RX ORDER — DIPHENHYDRAMINE HYDROCHLORIDE 50 MG/ML
50 INJECTION INTRAMUSCULAR; INTRAVENOUS
Status: CANCELLED | OUTPATIENT
Start: 2023-07-12

## 2023-07-12 RX ORDER — MEPERIDINE HYDROCHLORIDE 50 MG/ML
12.5 INJECTION INTRAMUSCULAR; INTRAVENOUS; SUBCUTANEOUS PRN
Status: CANCELLED | OUTPATIENT
Start: 2023-07-12

## 2023-07-12 RX ORDER — PALONOSETRON 0.05 MG/ML
0.25 INJECTION, SOLUTION INTRAVENOUS ONCE
Status: COMPLETED | OUTPATIENT
Start: 2023-07-12 | End: 2023-07-12

## 2023-07-12 RX ORDER — SODIUM CHLORIDE 0.9 % (FLUSH) 0.9 %
5-40 SYRINGE (ML) INJECTION PRN
Status: DISCONTINUED | OUTPATIENT
Start: 2023-07-12 | End: 2023-07-13 | Stop reason: HOSPADM

## 2023-07-12 RX ORDER — SODIUM CHLORIDE 9 MG/ML
5-250 INJECTION, SOLUTION INTRAVENOUS PRN
Status: CANCELLED | OUTPATIENT
Start: 2023-07-12

## 2023-07-12 RX ORDER — EPINEPHRINE 1 MG/ML
0.3 INJECTION, SOLUTION, CONCENTRATE INTRAVENOUS PRN
Status: CANCELLED | OUTPATIENT
Start: 2023-07-12

## 2023-07-12 RX ORDER — SODIUM CHLORIDE 0.9 % (FLUSH) 0.9 %
5-40 SYRINGE (ML) INJECTION PRN
Status: CANCELLED | OUTPATIENT
Start: 2023-07-12

## 2023-07-12 RX ORDER — ACETAMINOPHEN 325 MG/1
650 TABLET ORAL
Status: CANCELLED | OUTPATIENT
Start: 2023-07-12

## 2023-07-12 RX ORDER — HEPARIN SODIUM (PORCINE) LOCK FLUSH IV SOLN 100 UNIT/ML 100 UNIT/ML
500 SOLUTION INTRAVENOUS PRN
Status: CANCELLED | OUTPATIENT
Start: 2023-07-12

## 2023-07-12 RX ORDER — PALONOSETRON 0.05 MG/ML
0.25 INJECTION, SOLUTION INTRAVENOUS ONCE
Status: CANCELLED | OUTPATIENT
Start: 2023-07-12 | End: 2023-07-12

## 2023-07-12 RX ADMIN — SODIUM CHLORIDE, PRESERVATIVE FREE 20 ML: 5 INJECTION INTRAVENOUS at 09:20

## 2023-07-12 RX ADMIN — PALONOSETRON 0.25 MG: 0.05 INJECTION, SOLUTION INTRAVENOUS at 10:06

## 2023-07-12 RX ADMIN — FLUOROURACIL 4450 MG: 50 INJECTION, SOLUTION INTRAVENOUS at 13:00

## 2023-07-12 RX ADMIN — DEXAMETHASONE SODIUM PHOSPHATE 12 MG: 4 INJECTION, SOLUTION INTRA-ARTICULAR; INTRALESIONAL; INTRAMUSCULAR; INTRAVENOUS; SOFT TISSUE at 10:09

## 2023-07-12 RX ADMIN — DEXTROSE MONOHYDRATE 200 ML/HR: 50 INJECTION, SOLUTION INTRAVENOUS at 10:05

## 2023-07-12 RX ADMIN — LEUCOVORIN CALCIUM 750 MG: 350 INJECTION, POWDER, LYOPHILIZED, FOR SOLUTION INTRAMUSCULAR; INTRAVENOUS at 10:36

## 2023-07-12 RX ADMIN — FLUOROURACIL 750 MG: 50 INJECTION, SOLUTION INTRAVENOUS at 12:53

## 2023-07-12 RX ADMIN — OXALIPLATIN 150 MG: 5 INJECTION, SOLUTION INTRAVENOUS at 10:38

## 2023-07-12 ASSESSMENT — PAIN DESCRIPTION - LOCATION
LOCATION: JAW;NECK;HEAD
LOCATION: JAW;NECK;HEAD

## 2023-07-12 ASSESSMENT — PAIN SCALES - GENERAL
PAINLEVEL_OUTOF10: 6
PAINLEVEL_OUTOF10: 6

## 2023-07-12 NOTE — DISCHARGE INSTRUCTIONS
Please contact your Oncologist if you have any questions regarding the chemotherapy oxilaplatin leucovorin and 5 Fu that you received today. Patient instructed if experience any of the symptoms following today's chemotherapy / to notify MD immediately or go to emergency department.     * dizziness/lightheadedness  *acute nausea/vomiting - not relieved with medication  *headache - not relieved from Tylenol/pain medication  *chest pain/pressure  *rash/itching  *shortness of breath        Drink fluids - 48oz fluids daily  Call if develop fever/ chills/ signs or symptoms of infection

## 2023-07-12 NOTE — PLAN OF CARE
Improved:   Monitor and assess patient's chronic conditions and comorbid symptoms for stability, deterioration, or improvement   Collaborate with multidisciplinary team to address chronic and comorbid conditions and prevent exacerbation or deterioration  Note: Patient verbalizes understanding to verbal information given on folfox,action and possible side effects. Aware to call MD if develop complications. 7/12/2023 1155 by Jaqueline Hightower RN  Outcome: Adequate for Discharge     Problem: Infection - Adult  Goal: Absence of infection at discharge  7/12/2023 1156 by Jaqueline Hightower RN  Outcome: Adequate for Discharge  Flowsheets (Taken 7/12/2023 1156)  Absence of infection at discharge:   Assess and monitor for signs and symptoms of infection   Monitor lab/diagnostic results   Monitor all insertion sites i.e., indwelling lines, tubes and drains  Note: Mediport site with no redness or warmth. Skin over port intact with no signs of breakdown noted. Steri strips in place old red drainage on steristrips. Patient verbalizes signs/symptoms of port infection and when to notify the physician.     7/12/2023 1155 by Jaqueline Hightower RN  Outcome: Adequate for Discharge

## 2023-07-14 ENCOUNTER — HOSPITAL ENCOUNTER (OUTPATIENT)
Dept: INFUSION THERAPY | Age: 58
Discharge: HOME | End: 2023-07-14
Payer: MEDICARE

## 2023-07-14 VITALS
RESPIRATION RATE: 18 BRPM | HEART RATE: 69 BPM | TEMPERATURE: 98 F | OXYGEN SATURATION: 100 % | DIASTOLIC BLOOD PRESSURE: 92 MMHG | SYSTOLIC BLOOD PRESSURE: 141 MMHG

## 2023-07-14 DIAGNOSIS — C18.9 MALIGNANT NEOPLASM OF COLON, UNSPECIFIED PART OF COLON (HCC): Primary | ICD-10-CM

## 2023-07-14 DIAGNOSIS — C06.2 SQUAMOUS CELL CANCER OF RETROMOLAR TRIGONE (HCC): ICD-10-CM

## 2023-07-14 PROCEDURE — 96523 IRRIG DRUG DELIVERY DEVICE: CPT

## 2023-07-14 PROCEDURE — 6360000002 HC RX W HCPCS: Performed by: INTERNAL MEDICINE

## 2023-07-14 PROCEDURE — 2580000003 HC RX 258: Performed by: INTERNAL MEDICINE

## 2023-07-14 RX ORDER — SODIUM CHLORIDE 0.9 % (FLUSH) 0.9 %
5-40 SYRINGE (ML) INJECTION PRN
Status: DISCONTINUED | OUTPATIENT
Start: 2023-07-14 | End: 2023-07-15 | Stop reason: HOSPADM

## 2023-07-14 RX ORDER — HEPARIN SODIUM 100 [USP'U]/ML
500 INJECTION, SOLUTION INTRAVENOUS PRN
Status: DISCONTINUED | OUTPATIENT
Start: 2023-07-14 | End: 2023-07-15 | Stop reason: HOSPADM

## 2023-07-14 RX ADMIN — HEPARIN 500 UNITS: 100 SYRINGE at 11:44

## 2023-07-14 RX ADMIN — SODIUM CHLORIDE, PRESERVATIVE FREE 10 ML: 5 INJECTION INTRAVENOUS at 11:44

## 2023-07-19 ENCOUNTER — PREP FOR PROCEDURE (OUTPATIENT)
Dept: ENT CLINIC | Age: 58
End: 2023-07-19

## 2023-07-26 ENCOUNTER — ANESTHESIA EVENT (OUTPATIENT)
Dept: OPERATING ROOM | Age: 58
End: 2023-07-26
Payer: MEDICARE

## 2023-07-26 ENCOUNTER — ANESTHESIA (OUTPATIENT)
Dept: OPERATING ROOM | Age: 58
End: 2023-07-26
Payer: MEDICARE

## 2023-07-26 ENCOUNTER — TELEPHONE (OUTPATIENT)
Dept: CARDIOLOGY CLINIC | Age: 58
End: 2023-07-26

## 2023-07-26 ENCOUNTER — HOSPITAL ENCOUNTER (OUTPATIENT)
Age: 58
Setting detail: OUTPATIENT SURGERY
Discharge: HOME | End: 2023-07-26
Attending: OTOLARYNGOLOGY | Admitting: OTOLARYNGOLOGY
Payer: MEDICARE

## 2023-07-26 VITALS
BODY MASS INDEX: 20.25 KG/M2 | SYSTOLIC BLOOD PRESSURE: 150 MMHG | WEIGHT: 145.2 LBS | DIASTOLIC BLOOD PRESSURE: 102 MMHG | HEART RATE: 65 BPM | OXYGEN SATURATION: 98 % | RESPIRATION RATE: 16 BRPM | TEMPERATURE: 97.1 F

## 2023-07-26 PROCEDURE — 2580000003 HC RX 258: Performed by: OTOLARYNGOLOGY

## 2023-07-26 PROCEDURE — 7100000010 HC PHASE II RECOVERY - FIRST 15 MIN: Performed by: OTOLARYNGOLOGY

## 2023-07-26 PROCEDURE — 2709999900 HC NON-CHARGEABLE SUPPLY: Performed by: OTOLARYNGOLOGY

## 2023-07-26 PROCEDURE — 6360000002 HC RX W HCPCS

## 2023-07-26 PROCEDURE — 7100000001 HC PACU RECOVERY - ADDTL 15 MIN: Performed by: OTOLARYNGOLOGY

## 2023-07-26 PROCEDURE — 2580000003 HC RX 258: Performed by: NURSE PRACTITIONER

## 2023-07-26 PROCEDURE — 7100000000 HC PACU RECOVERY - FIRST 15 MIN: Performed by: OTOLARYNGOLOGY

## 2023-07-26 PROCEDURE — 3700000001 HC ADD 15 MINUTES (ANESTHESIA): Performed by: OTOLARYNGOLOGY

## 2023-07-26 PROCEDURE — 2580000003 HC RX 258

## 2023-07-26 PROCEDURE — C1726 CATH, BAL DIL, NON-VASCULAR: HCPCS | Performed by: OTOLARYNGOLOGY

## 2023-07-26 PROCEDURE — 3600000004 HC SURGERY LEVEL 4 BASE: Performed by: OTOLARYNGOLOGY

## 2023-07-26 PROCEDURE — 3600000014 HC SURGERY LEVEL 4 ADDTL 15MIN: Performed by: OTOLARYNGOLOGY

## 2023-07-26 PROCEDURE — C9399 UNCLASSIFIED DRUGS OR BIOLOG: HCPCS

## 2023-07-26 PROCEDURE — 6360000002 HC RX W HCPCS: Performed by: OTOLARYNGOLOGY

## 2023-07-26 PROCEDURE — 6360000002 HC RX W HCPCS: Performed by: STUDENT IN AN ORGANIZED HEALTH CARE EDUCATION/TRAINING PROGRAM

## 2023-07-26 PROCEDURE — 7100000011 HC PHASE II RECOVERY - ADDTL 15 MIN: Performed by: OTOLARYNGOLOGY

## 2023-07-26 PROCEDURE — 2500000003 HC RX 250 WO HCPCS

## 2023-07-26 PROCEDURE — 43220 ESOPHAGOSCOPY BALLOON <30MM: CPT | Performed by: OTOLARYNGOLOGY

## 2023-07-26 PROCEDURE — 6360000002 HC RX W HCPCS: Performed by: NURSE PRACTITIONER

## 2023-07-26 PROCEDURE — 3700000000 HC ANESTHESIA ATTENDED CARE: Performed by: OTOLARYNGOLOGY

## 2023-07-26 RX ORDER — FENTANYL CITRATE 50 UG/ML
50 INJECTION, SOLUTION INTRAMUSCULAR; INTRAVENOUS EVERY 5 MIN PRN
Status: CANCELLED | OUTPATIENT
Start: 2023-07-26

## 2023-07-26 RX ORDER — SODIUM CHLORIDE 0.9 % (FLUSH) 0.9 %
5-40 SYRINGE (ML) INJECTION PRN
Status: CANCELLED | OUTPATIENT
Start: 2023-07-26

## 2023-07-26 RX ORDER — SODIUM CHLORIDE 9 MG/ML
INJECTION, SOLUTION INTRAVENOUS PRN
Status: DISCONTINUED | OUTPATIENT
Start: 2023-07-26 | End: 2023-07-26 | Stop reason: HOSPADM

## 2023-07-26 RX ORDER — HEPARIN 100 UNIT/ML
500 SYRINGE INTRAVENOUS PRN
Status: DISCONTINUED | OUTPATIENT
Start: 2023-07-26 | End: 2023-07-26 | Stop reason: HOSPADM

## 2023-07-26 RX ORDER — SODIUM CHLORIDE 0.9 % (FLUSH) 0.9 %
5-40 SYRINGE (ML) INJECTION EVERY 12 HOURS SCHEDULED
Status: CANCELLED | OUTPATIENT
Start: 2023-07-26

## 2023-07-26 RX ORDER — LIDOCAINE HYDROCHLORIDE 20 MG/ML
INJECTION, SOLUTION INTRAVENOUS PRN
Status: DISCONTINUED | OUTPATIENT
Start: 2023-07-26 | End: 2023-07-26 | Stop reason: SDUPTHER

## 2023-07-26 RX ORDER — SUCCINYLCHOLINE CHLORIDE 20 MG/ML
INJECTION INTRAMUSCULAR; INTRAVENOUS PRN
Status: DISCONTINUED | OUTPATIENT
Start: 2023-07-26 | End: 2023-07-26 | Stop reason: SDUPTHER

## 2023-07-26 RX ORDER — SODIUM CHLORIDE 0.9 % (FLUSH) 0.9 %
5-40 SYRINGE (ML) INJECTION EVERY 12 HOURS SCHEDULED
Status: DISCONTINUED | OUTPATIENT
Start: 2023-07-26 | End: 2023-07-26 | Stop reason: HOSPADM

## 2023-07-26 RX ORDER — DEXAMETHASONE SODIUM PHOSPHATE 4 MG/ML
INJECTION, SOLUTION INTRA-ARTICULAR; INTRALESIONAL; INTRAMUSCULAR; INTRAVENOUS; SOFT TISSUE PRN
Status: DISCONTINUED | OUTPATIENT
Start: 2023-07-26 | End: 2023-07-26 | Stop reason: SDUPTHER

## 2023-07-26 RX ORDER — ONDANSETRON 2 MG/ML
4 INJECTION INTRAMUSCULAR; INTRAVENOUS
Status: CANCELLED | OUTPATIENT
Start: 2023-07-26 | End: 2023-07-27

## 2023-07-26 RX ORDER — SODIUM CHLORIDE 0.9 % (FLUSH) 0.9 %
5-40 SYRINGE (ML) INJECTION PRN
Status: DISCONTINUED | OUTPATIENT
Start: 2023-07-26 | End: 2023-07-26 | Stop reason: HOSPADM

## 2023-07-26 RX ORDER — SODIUM CHLORIDE 9 MG/ML
INJECTION, SOLUTION INTRAVENOUS PRN
Status: CANCELLED | OUTPATIENT
Start: 2023-07-26

## 2023-07-26 RX ORDER — MEPERIDINE HYDROCHLORIDE 25 MG/ML
12.5 INJECTION INTRAMUSCULAR; INTRAVENOUS; SUBCUTANEOUS EVERY 5 MIN PRN
Status: CANCELLED | OUTPATIENT
Start: 2023-07-26

## 2023-07-26 RX ORDER — SODIUM CHLORIDE 9 MG/ML
INJECTION, SOLUTION INTRAVENOUS CONTINUOUS PRN
Status: DISCONTINUED | OUTPATIENT
Start: 2023-07-26 | End: 2023-07-26 | Stop reason: SDUPTHER

## 2023-07-26 RX ORDER — DIPHENHYDRAMINE HYDROCHLORIDE 50 MG/ML
12.5 INJECTION INTRAMUSCULAR; INTRAVENOUS
Status: CANCELLED | OUTPATIENT
Start: 2023-07-26 | End: 2023-07-27

## 2023-07-26 RX ORDER — LABETALOL HYDROCHLORIDE 5 MG/ML
INJECTION, SOLUTION INTRAVENOUS PRN
Status: DISCONTINUED | OUTPATIENT
Start: 2023-07-26 | End: 2023-07-26 | Stop reason: SDUPTHER

## 2023-07-26 RX ORDER — DEXAMETHASONE SODIUM PHOSPHATE 10 MG/ML
10 INJECTION INTRAMUSCULAR; INTRAVENOUS ONCE
Status: CANCELLED | OUTPATIENT
Start: 2023-07-26 | End: 2023-07-26

## 2023-07-26 RX ORDER — FENTANYL CITRATE 50 UG/ML
INJECTION, SOLUTION INTRAMUSCULAR; INTRAVENOUS PRN
Status: DISCONTINUED | OUTPATIENT
Start: 2023-07-26 | End: 2023-07-26 | Stop reason: SDUPTHER

## 2023-07-26 RX ORDER — ROCURONIUM BROMIDE 10 MG/ML
INJECTION, SOLUTION INTRAVENOUS PRN
Status: DISCONTINUED | OUTPATIENT
Start: 2023-07-26 | End: 2023-07-26 | Stop reason: SDUPTHER

## 2023-07-26 RX ORDER — DEXAMETHASONE SODIUM PHOSPHATE 4 MG/ML
10 INJECTION, SOLUTION INTRA-ARTICULAR; INTRALESIONAL; INTRAMUSCULAR; INTRAVENOUS; SOFT TISSUE ONCE
Status: COMPLETED | OUTPATIENT
Start: 2023-07-26 | End: 2023-07-26

## 2023-07-26 RX ORDER — ONDANSETRON 2 MG/ML
INJECTION INTRAMUSCULAR; INTRAVENOUS PRN
Status: DISCONTINUED | OUTPATIENT
Start: 2023-07-26 | End: 2023-07-26 | Stop reason: SDUPTHER

## 2023-07-26 RX ADMIN — ROCURONIUM BROMIDE 30 MG: 10 INJECTION INTRAVENOUS at 12:52

## 2023-07-26 RX ADMIN — ONDANSETRON 4 MG: 2 INJECTION INTRAMUSCULAR; INTRAVENOUS at 13:08

## 2023-07-26 RX ADMIN — SODIUM CHLORIDE 3000 MG: 900 INJECTION INTRAVENOUS at 12:21

## 2023-07-26 RX ADMIN — SODIUM CHLORIDE: 9 INJECTION, SOLUTION INTRAVENOUS at 12:16

## 2023-07-26 RX ADMIN — Medication 140 MG: at 12:39

## 2023-07-26 RX ADMIN — DEXAMETHASONE SODIUM PHOSPHATE 10 MG: 4 INJECTION, SOLUTION INTRA-ARTICULAR; INTRALESIONAL; INTRAMUSCULAR; INTRAVENOUS; SOFT TISSUE at 12:18

## 2023-07-26 RX ADMIN — HEPARIN 500 UNITS: 100 SYRINGE at 14:39

## 2023-07-26 RX ADMIN — SODIUM CHLORIDE: 9 INJECTION, SOLUTION INTRAVENOUS at 12:36

## 2023-07-26 RX ADMIN — Medication 10 MG: at 12:54

## 2023-07-26 RX ADMIN — PROPOFOL 150 MG: 10 INJECTION, EMULSION INTRAVENOUS at 12:39

## 2023-07-26 RX ADMIN — PROPOFOL 50 MG: 10 INJECTION, EMULSION INTRAVENOUS at 13:00

## 2023-07-26 RX ADMIN — LIDOCAINE HYDROCHLORIDE 40 MG: 20 INJECTION, SOLUTION INTRAVENOUS at 13:12

## 2023-07-26 RX ADMIN — ROCURONIUM BROMIDE 10 MG: 10 INJECTION INTRAVENOUS at 12:39

## 2023-07-26 RX ADMIN — FENTANYL CITRATE 50 MCG: 50 INJECTION, SOLUTION INTRAMUSCULAR; INTRAVENOUS at 12:50

## 2023-07-26 RX ADMIN — FENTANYL CITRATE 50 MCG: 50 INJECTION, SOLUTION INTRAMUSCULAR; INTRAVENOUS at 12:39

## 2023-07-26 RX ADMIN — DEXAMETHASONE SODIUM PHOSPHATE 10 MG: 4 INJECTION, SOLUTION INTRAMUSCULAR; INTRAVENOUS at 12:44

## 2023-07-26 RX ADMIN — LIDOCAINE HYDROCHLORIDE 60 MG: 20 INJECTION, SOLUTION INTRAVENOUS at 12:39

## 2023-07-26 RX ADMIN — SUGAMMADEX 400 MG: 100 INJECTION, SOLUTION INTRAVENOUS at 13:08

## 2023-07-26 ASSESSMENT — PAIN - FUNCTIONAL ASSESSMENT
PAIN_FUNCTIONAL_ASSESSMENT: ACTIVITIES ARE NOT PREVENTED
PAIN_FUNCTIONAL_ASSESSMENT: ACTIVITIES ARE NOT PREVENTED
PAIN_FUNCTIONAL_ASSESSMENT: 0-10
PAIN_FUNCTIONAL_ASSESSMENT: ACTIVITIES ARE NOT PREVENTED

## 2023-07-26 ASSESSMENT — PAIN DESCRIPTION - DESCRIPTORS
DESCRIPTORS: ACHING

## 2023-07-26 ASSESSMENT — PAIN DESCRIPTION - ORIENTATION
ORIENTATION: LEFT

## 2023-07-26 ASSESSMENT — PAIN DESCRIPTION - LOCATION
LOCATION: NECK

## 2023-07-26 ASSESSMENT — PAIN SCALES - GENERAL
PAINLEVEL_OUTOF10: 6
PAINLEVEL_OUTOF10: 4

## 2023-07-26 ASSESSMENT — PAIN DESCRIPTION - ONSET
ONSET: ON-GOING

## 2023-07-26 ASSESSMENT — PAIN DESCRIPTION - FREQUENCY
FREQUENCY: INTERMITTENT

## 2023-07-26 ASSESSMENT — ENCOUNTER SYMPTOMS: SHORTNESS OF BREATH: 0

## 2023-07-26 ASSESSMENT — LIFESTYLE VARIABLES: SMOKING_STATUS: 1

## 2023-07-26 ASSESSMENT — PAIN DESCRIPTION - PAIN TYPE
TYPE: SURGICAL PAIN

## 2023-07-26 NOTE — PROGRESS NOTES
Pt admitted to [facility] room 15 and oriented to unit. SCD sleeves applied. Nares swabbed. Pt verbalized permission for first name, last initial and physicians name on white board. SDS board and discharge criteria explained, pt and family verbalized understanding. Pt denies thoughts of harming self or others. Call light in reach. Family at the bedside.   Waleska Thrasher [phone]

## 2023-07-26 NOTE — DISCHARGE INSTRUCTIONS
Instructions for Mr. Londonido Evelyn Henry Eth:    1. Stay away from citrus for at least 48 hours  2. Use over-the-counter medications for pain  3. Do not eat all of the beans and sausage at one time  Call me for emergencies:  Clare Frias.  Agus Oscar, [address] [place] [address]  Cell: [phone]

## 2023-07-26 NOTE — TELEPHONE ENCOUNTER
Bedside monitor for loop disconnected since 5/25/23. Summary report due 7/26/23. Pt in OR currently. Letter sent.

## 2023-07-26 NOTE — H&P
I reviewed the patient's history and planned surgical program with the patient and his wife at the bedside. The patient has no new medical problems and is on no new medicines. He continues to get treatment for his colectomy and colon cancer. His obstructive dysphagia has predictably started to worsen.      Esophageal stricture, pharyngoesophageal dysphagia, radiation skin fibrosis, GERD with esophagitis, pharyngeal stenosis  No new symptoms     Past Medical History        Past Medical History:   Diagnosis Date    A-fib ([address])       Baki-no blood thinners    Anxiety      Aortic aneurysm (HCC)      Arthritis      Asthma      Depression      Head and neck cancer ([address])      Hyperlipidemia      Hypertension      Lymphedema      Syncope 12/22/2020    Syncope and collapse      Thyroid disease              Past Surgical History         Past Surgical History:   Procedure Laterality Date    COLONOSCOPY N/A 05/02/2023     COLONOSCOPY POLYPECTOMY SNARE/COLD BIOPSY performed by Sherly Posadas MD at [address] N/A 03/07/2023     ESOPHAGEAL MOTILITY/MANOMETRY STUDY performed by Jane Arnett MD at [address] [facility]   10/02/2015     removed    HEMICOLECTOMY Right 05/09/2023     ROBOTIC RIGHT COLECTOMY WITH LIVER BIOPSY performed by Latisha Thomas MD at [address] [place] [address] N/A 06/03/2022     Rigid Laryngoscopy Esophagoscopy Diagnostic performed by Micah Calvo MD at [address] [place] [address] N/A 01/11/2023     SUSPENSION LARYNGOSCOPY (FOR ACCESS), FLEXIBLE ESOPHAGOSCOPY WITH SAVORY WIRE BOUGIE DILATION AND BIOPSIES performed by Micah Calvo MD at [address] [place] [address] N/A 03/22/2023     SUSPENSION LARYNGOSCOPY FOR ACCESS RIGID ESOPHAGOSCOPY WITH BALLOON DILATION performed by Micah Calvo MD at [address] [place] [address] N/A 05/04/2023     Suspension Laryngoscopy for Access Rigid Esophagoscopy with Balloon Dilation performed by Micah Calvo electronically signed by Dr Italo Parks on 6/22/2023 9:04 AM     PET CT SKULL BASE TO MID THIGH     Result Date: 6/12/2023  FDG avid hypoattenuating lesions in the liver corresponding to known metastatic disease. Final report electronically signed by Dr. Cate Dorsey on 6/12/2023 10:20 AM     MRI BRAIN W WO CONTRAST     Result Date: 6/22/2023   1. Stable MRI scan of the brain, no interval change since previous study dated 11/5/2020. 2. No evidence of intracranial metastatic disease. . **This report has been created using voice recognition software. It may contain minor errors which are inherent in voice recognition technology. ** Final report electronically signed by DR Estela Philip on 6/22/2023 11:03 AM               Lab Results   Component Value Date/Time      06/05/2023 01:20 PM      05/13/2023 07:07 AM      05/12/2023 04:48 AM      05/11/2023 05:06 AM     K 4.0 06/05/2023 01:20 PM     K 3.9 05/13/2023 07:07 AM     K 3.9 05/12/2023 04:48 AM     K 4.5 05/11/2023 05:06 AM     K 4.9 05/10/2023 04:55 AM     K 3.8 05/02/2023 05:34 AM     K 3.3 12/10/2020 07:25 AM      05/13/2023 07:07 AM      05/12/2023 04:48 AM      05/11/2023 05:06 AM     CO2 22 05/13/2023 07:07 AM     CO2 21 05/12/2023 04:48 AM     CO2 21 05/11/2023 05:06 AM     BUN 5 05/13/2023 07:07 AM     BUN 5 05/12/2023 04:48 AM     BUN 6 05/11/2023 05:06 AM     CREATININE 1.0 06/05/2023 01:20 PM     CREATININE 0.8 05/13/2023 07:07 AM     CREATININE 0.8 05/12/2023 04:48 AM     CREATININE 0.8 05/11/2023 05:06 AM     CALCIUM 8.5 05/13/2023 07:07 AM     CALCIUM 8.5 05/12/2023 04:48 AM     CALCIUM 8.8 05/11/2023 05:06 AM     PROT 8.0 06/05/2023 01:20 PM     PROT 7.1 04/28/2023 12:21 PM     PROT 6.6 01/09/2023 02:23 PM     LABALBU 4.2 06/05/2023 01:20 PM     LABALBU 4.2 04/28/2023 12:21 PM     LABALBU 3.9 01/09/2023 02:23 PM     BILITOT 0.4 06/05/2023 01:20 PM     BILITOT 0.3 04/28/2023 12:21 PM     BILITOT 0.3

## 2023-07-26 NOTE — ANESTHESIA POSTPROCEDURE EVALUATION
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     Romeo Phase I: Romeo Score: 10    Romeo Phase II: Romeo Score: 10      Anesthesia Post Evaluation    Patient location during evaluation: PACU  Patient participation: complete - patient participated  Level of consciousness: awake and alert  Airway patency: patent  Nausea & Vomiting: no vomiting and no nausea  Complications: no  Cardiovascular status: hemodynamically stable  Respiratory status: acceptable  Hydration status: stable

## 2023-07-26 NOTE — OP NOTE
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 Stricture at the upper esophageal sphincter  2. Extensive postcricoid redundancy  3. Dilated with 8 cm 20 mm balloon to 7 pradeep; endoscopically evaluated post dilation with no evidence of full-thickness shearing with minimal bleeding at 3 and 9:00. Detailed Description of Procedure: The patient taken the operating room and placed in supine position. General anesthesia was induced and the patient was intubated with a #6 endotracheal tube without difficulty or vital sign stability. Temperature 90 degrees and the patient was draped in usual fashion for transoral surgery. I performed a timeout verifying patient identity planned procedure. Suspension laryngoscopy for access: My assistant then placed first a 15 cm  Daija laryngoscope into the patient's oropharynx to the larynx and provided direct line of sight view of the esophageal inlet. The cicatrix prevented easy visualization past the sphincter into the cervical esophagus. I then passed a 8 cm x 20 mm pneumatic dilator into the esophageal inlet and set it at approximately the midpoint of the length of the balloon. I had the balloon distended from 2 to 4 to 6 pradeep of pressure until no further fluid needed to be added to sustain the 6 pradeep level.   I then will be returned to oral nutrition thereafter. He will be cleared to go back to chemotherapy approximately 1 month following for this planned procedure. Therefore he will need a 2-month hiatus from chemotherapy at a minimum.     Electronically signed by Luca Sharma MD on 7/26/2023 at 1:28 PM

## 2023-07-28 NOTE — PROGRESS NOTES
Lui did extended surgery at [facility] with pathology showing:  single focus endophytic, ulcerated squamous cell carcinoma measuring 5.4 x 3.8 x 0.9 cm , conventional type, partially keratinizing tumor grade 2 of 3, moderately differentiated with 0.9 cm depth of invasion. Negative for LVI, negative for perineural invasion, invasion into the mandibular bone with negative surgical margins (closest margin 4 mm from the medial note coastal margin). In total, 4 of 103 lymph nodes positive for malignancy with the largest lymph node measuring 1.7 cm. pT4a pN2b    -5/2/2023. Colonoscopy: poorly differentiated adenocarcinoma in the cecum and fragments of tubular adenoma in the sigmoid.    -5/9/2023 robotic right colectomy: poorly differentiated adenocarcinoma with free margins. 12 out of 16 lymph nodes had metastatic carcinoma. It was a pathologic stage pT3, PN2B, PM1A. Biopsy showed metastatic adenocarcinoma to the liver. There was low probability of microsatellite instability high. It was a grade 3 tumor, 4 cm with invasion through the muscularis propria into the pericolonic fat. Lymphovascular invasion was present. The appendix and omentum were clear. GENETICS:  none    MOLECULAR:  -5/9/2023 Mismatch repair proteins were all intact.        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

## 2023-07-30 DIAGNOSIS — C18.9 MALIGNANT NEOPLASM OF COLON, UNSPECIFIED PART OF COLON (HCC): Primary | ICD-10-CM

## 2023-07-30 DIAGNOSIS — D50.0 IRON DEFICIENCY ANEMIA DUE TO CHRONIC BLOOD LOSS: ICD-10-CM

## 2023-07-31 ENCOUNTER — OFFICE VISIT (OUTPATIENT)
Dept: ONCOLOGY | Age: 58
End: 2023-07-31
Payer: MEDICARE

## 2023-07-31 ENCOUNTER — HOSPITAL ENCOUNTER (OUTPATIENT)
Dept: INFUSION THERAPY | Age: 58
Discharge: HOME | End: 2023-07-31
Payer: MEDICARE

## 2023-07-31 ENCOUNTER — CLINICAL DOCUMENTATION (OUTPATIENT)
Dept: CASE MANAGEMENT | Age: 58
End: 2023-07-31

## 2023-07-31 VITALS
RESPIRATION RATE: 16 BRPM | HEART RATE: 79 BPM | DIASTOLIC BLOOD PRESSURE: 83 MMHG | SYSTOLIC BLOOD PRESSURE: 145 MMHG | TEMPERATURE: 97.8 F

## 2023-07-31 VITALS
TEMPERATURE: 97.8 F | DIASTOLIC BLOOD PRESSURE: 83 MMHG | RESPIRATION RATE: 16 BRPM | HEART RATE: 79 BPM | SYSTOLIC BLOOD PRESSURE: 145 MMHG

## 2023-07-31 DIAGNOSIS — D50.0 IRON DEFICIENCY ANEMIA DUE TO CHRONIC BLOOD LOSS: Primary | ICD-10-CM

## 2023-07-31 DIAGNOSIS — C18.9 MALIGNANT NEOPLASM OF COLON, UNSPECIFIED PART OF COLON (HCC): ICD-10-CM

## 2023-07-31 DIAGNOSIS — C18.2 MALIGNANT NEOPLASM OF ASCENDING COLON (HCC): Primary | ICD-10-CM

## 2023-07-31 DIAGNOSIS — D62 ACUTE BLOOD LOSS ANEMIA: ICD-10-CM

## 2023-07-31 LAB
ABSOLUTE IMMATURE GRANULOCYTE: 0.04 THOU/MM3 (ref 0–0.07)
ALBUMIN SERPL BCG-MCNC: 3.9 G/DL (ref 3.5–5.1)
ALP SERPL-CCNC: 79 U/L (ref 38–126)
ALT SERPL W/O P-5'-P-CCNC: 15 U/L (ref 11–66)
AST SERPL-CCNC: 25 U/L (ref 5–40)
BASOPHILS ABSOLUTE: 0 THOU/MM3 (ref 0–0.1)
BASOPHILS NFR BLD AUTO: 0 % (ref 0–3)
BILIRUB CONJ SERPL-MCNC: < 0.2 MG/DL (ref 0–0.3)
BILIRUB SERPL-MCNC: < 0.2 MG/DL (ref 0.3–1.2)
BUN BLDP-MCNC: 10 MG/DL (ref 8–26)
CHLORIDE BLD-SCNC: 107 MEQ/L (ref 98–109)
CREAT BLD-MCNC: 0.9 MG/DL (ref 0.5–1.2)
EOSINOPHIL NFR BLD AUTO: 1 % (ref 0–4)
EOSINOPHILS ABSOLUTE: 0 THOU/MM3 (ref 0–0.4)
ERYTHROCYTE [DISTWIDTH] IN BLOOD BY AUTOMATED COUNT: 17.3 % (ref 11.5–14.5)
FERRITIN SERPL IA-MCNC: 51 NG/ML (ref 22–322)
FOLATE SERPL-MCNC: 11.7 NG/ML (ref 4.8–24.2)
GFR SERPL CREATININE-BSD FRML MDRD: > 60 ML/MIN/1.73M2
GLUCOSE BLD-MCNC: 75 MG/DL (ref 70–108)
HCT VFR BLD AUTO: 39.6 % (ref 42–52)
HGB BLD-MCNC: 12.7 GM/DL (ref 14–18)
HGB RETIC QN AUTO: 35 PG (ref 28.2–35.7)
IMM RETICS NFR: 22.7 % (ref 2.3–13.4)
IMMATURE GRANULOCYTES: 1 %
IONIZED CALCIUM, WHOLE BLOOD: 1.2 MMOL/L (ref 1.12–1.32)
IRON SATN MFR SERPL: 21 % (ref 20–50)
IRON SERPL-MCNC: 53 UG/DL (ref 65–195)
LYMPHOCYTES ABSOLUTE: 2.7 THOU/MM3 (ref 1–4.8)
LYMPHOCYTES NFR BLD AUTO: 62 % (ref 15–47)
MCH RBC QN AUTO: 29 PG (ref 26–33)
MCHC RBC AUTO-ENTMCNC: 32.1 GM/DL (ref 32.2–35.5)
MCV RBC AUTO: 90 FL (ref 80–94)
MONOCYTES ABSOLUTE: 0.7 THOU/MM3 (ref 0.4–1.3)
MONOCYTES NFR BLD AUTO: 17 % (ref 0–12)
NEUTROPHILS NFR BLD AUTO: 18 % (ref 43–75)
PLATELET # BLD AUTO: 262 THOU/MM3 (ref 130–400)
PMV BLD AUTO: 9.3 FL (ref 9.4–12.4)
POTASSIUM BLD-SCNC: 4.3 MEQ/L (ref 3.5–4.9)
PROT SERPL-MCNC: 6.9 G/DL (ref 6.1–8)
RBC # BLD AUTO: 4.38 MILL/MM3 (ref 4.7–6.1)
RETICS # AUTO: 69 THOU/MM3 (ref 20–115)
RETICS/RBC NFR AUTO: 1.7 % (ref 0.5–2)
SEGMENTED NEUTROPHILS ABSOLUTE COUNT: 0.8 THOU/MM3 (ref 1.8–7.7)
SODIUM BLD-SCNC: 140 MEQ/L (ref 138–146)
TIBC SERPL-MCNC: 255 UG/DL (ref 171–450)
TOTAL CO2, WHOLE BLOOD: 26 MEQ/L (ref 23–33)
VIT B12 SERPL-MCNC: 482 PG/ML (ref 211–911)
WBC # BLD AUTO: 4.3 THOU/MM3 (ref 4.8–10.8)

## 2023-07-31 PROCEDURE — 36415 COLL VENOUS BLD VENIPUNCTURE: CPT

## 2023-07-31 PROCEDURE — 83550 IRON BINDING TEST: CPT

## 2023-07-31 PROCEDURE — 80047 BASIC METABLC PNL IONIZED CA: CPT

## 2023-07-31 PROCEDURE — 82746 ASSAY OF FOLIC ACID SERUM: CPT

## 2023-07-31 PROCEDURE — 82728 ASSAY OF FERRITIN: CPT

## 2023-07-31 PROCEDURE — 36591 DRAW BLOOD OFF VENOUS DEVICE: CPT

## 2023-07-31 PROCEDURE — 80076 HEPATIC FUNCTION PANEL: CPT

## 2023-07-31 PROCEDURE — 2580000003 HC RX 258: Performed by: INTERNAL MEDICINE

## 2023-07-31 PROCEDURE — 85046 RETICYTE/HGB CONCENTRATE: CPT

## 2023-07-31 PROCEDURE — 99211 OFF/OP EST MAY X REQ PHY/QHP: CPT

## 2023-07-31 PROCEDURE — 85025 COMPLETE CBC W/AUTO DIFF WBC: CPT

## 2023-07-31 PROCEDURE — 6360000002 HC RX W HCPCS: Performed by: INTERNAL MEDICINE

## 2023-07-31 PROCEDURE — 99214 OFFICE O/P EST MOD 30 MIN: CPT | Performed by: INTERNAL MEDICINE

## 2023-07-31 PROCEDURE — 83540 ASSAY OF IRON: CPT

## 2023-07-31 PROCEDURE — 82607 VITAMIN B-12: CPT

## 2023-07-31 RX ORDER — SODIUM CHLORIDE 0.9 % (FLUSH) 0.9 %
5-40 SYRINGE (ML) INJECTION PRN
Status: CANCELLED | OUTPATIENT
Start: 2023-07-31

## 2023-07-31 RX ORDER — HEPARIN 100 UNIT/ML
500 SYRINGE INTRAVENOUS PRN
Status: DISCONTINUED | OUTPATIENT
Start: 2023-07-31 | End: 2023-08-01 | Stop reason: HOSPADM

## 2023-07-31 RX ORDER — SODIUM CHLORIDE 9 MG/ML
25 INJECTION, SOLUTION INTRAVENOUS PRN
Status: CANCELLED | OUTPATIENT
Start: 2023-07-31

## 2023-07-31 RX ORDER — HEPARIN 100 UNIT/ML
500 SYRINGE INTRAVENOUS PRN
Status: CANCELLED | OUTPATIENT
Start: 2023-07-31

## 2023-07-31 RX ORDER — SODIUM CHLORIDE 0.9 % (FLUSH) 0.9 %
5-40 SYRINGE (ML) INJECTION PRN
Status: DISCONTINUED | OUTPATIENT
Start: 2023-07-31 | End: 2023-08-01 | Stop reason: HOSPADM

## 2023-07-31 RX ADMIN — SODIUM CHLORIDE, PRESERVATIVE FREE 10 ML: 5 INJECTION INTRAVENOUS at 08:41

## 2023-07-31 RX ADMIN — HEPARIN 500 UNITS: 100 SYRINGE at 09:57

## 2023-07-31 RX ADMIN — SODIUM CHLORIDE, PRESERVATIVE FREE 20 ML: 5 INJECTION INTRAVENOUS at 09:57

## 2023-07-31 RX ADMIN — SODIUM CHLORIDE, PRESERVATIVE FREE 20 ML: 5 INJECTION INTRAVENOUS at 08:40

## 2023-07-31 ASSESSMENT — PAIN DESCRIPTION - DESCRIPTORS: DESCRIPTORS: SORE

## 2023-07-31 ASSESSMENT — PAIN DESCRIPTION - LOCATION
LOCATION: THROAT
LOCATION: THROAT

## 2023-07-31 ASSESSMENT — PAIN DESCRIPTION - FREQUENCY: FREQUENCY: CONTINUOUS

## 2023-07-31 ASSESSMENT — PAIN SCALES - GENERAL
PAINLEVEL_OUTOF10: 5
PAINLEVEL_OUTOF10: 5

## 2023-07-31 ASSESSMENT — PAIN DESCRIPTION - PAIN TYPE: TYPE: ACUTE PAIN

## 2023-07-31 ASSESSMENT — PAIN DESCRIPTION - ONSET: ONSET: ON-GOING

## 2023-07-31 NOTE — PLAN OF CARE
Problem: Discharge Planning  Goal: Discharge to home or other facility with appropriate resources  Outcome: Adequate for Discharge  Flowsheets (Taken 7/31/2023 1037)  Discharge to home or other facility with appropriate resources:   Identify barriers to discharge with patient and caregiver   Arrange for needed discharge resources and transportation as appropriate   Identify discharge learning needs (meds, wound care, etc)  Note: Verbalize understanding of discharge instructions, follow up appointments, and when to call Physician. Problem: Safety - Adult  Goal: Free from fall injury  Outcome: Adequate for Discharge  Flowsheets (Taken 7/31/2023 1037)  Free From Fall Injury:   Instruct family/caregiver on patient safety   Based on caregiver fall risk screen, instruct family/caregiver to ask for assistance with transferring infant if caregiver noted to have fall risk factors  Note: Free from falls while in O.P. Oncology. Problem: Infection - Adult  Goal: Absence of infection at discharge  Flowsheets (Taken 7/31/2023 1037)  Absence of infection at discharge:   Assess and monitor for signs and symptoms of infection   Monitor lab/diagnostic results   Monitor all insertion sites i.e., indwelling lines, tubes and drains  Note: Mediport site with no redness or warmth. Skin over port site intact with no signs of breakdown noted. Patient verbalizes signs/symptoms of port infection and when to notify the physician. Care plan reviewed with patient and spouse. Patient and spouse verbalize understanding of the plan of care and contribute to goal setting.

## 2023-07-31 NOTE — PROGRESS NOTES
Name: Donlel Jenkins  : 1965  MRN: [id number]    Oncology Navigation Follow-Up Note    Contact Type:  Medical Oncology    Notes: Visited with Janusz Mcmahon and his spouse during oncology infusion unit. Patient states he is feeling okay except for a severe sore throat. Paco Awkward to inform Dr. Gable Halsted at office visit. Voices no other needs at this time. Janusz Mcmahon returned to oncology clinic- treatment held due to neutropenia. He said he forgot to tell Dr. Gable Halsted about severe sore throat. Informed Dr. Gable Halsted and received orders. Janusz Mcmahon and his spouse informed to use warm salt water rinses and call MD if have temperature of 100.3 or higher. Both voiced understanding. Instructed to call with any questions or concerns.         Electronically signed by Dominique Rodriguez RN on 2023 at 3:26 PM

## 2023-07-31 NOTE — DISCHARGE INSTRUCTIONS
No treatment today due to decreased [place]. Patient tolerated lab draw without any complications. Patient verbalized understanding of discharge instructions. Ambulated off unit per self with belongings.

## 2023-07-31 NOTE — PROGRESS NOTES
Patient forgot to tell Dr. Deborah Garcia about sore throat. Throat is extremely red with no sores. Dr. Deborah Garcia informed and orders received for warm salt water rinses and call if develop fevers. Patient and spouse informed of salt water rinses and call if temperature 100.3 or higher - voiced understanding.

## 2023-08-04 NOTE — PROGRESS NOTES
malignancy. There was an ascending aortic aneurysm identified he was thus referred to vascular surgeon Dr. Maria E Jordan. 11/25/2020. Follow-up with Dr. Ana Moore for his hypertension. He continues to have syncopal episodes. His blood pressure was better. He was still smoking and also drinking alcohol. He had an ED visit for alcohol intoxication. He was being seen by neurology [address] [place] [address].  Note was made of an ascending aortic aneurysm of 4.4 cm. Linq loop implant was planned to be placed by Dr. Kaela Michael. August 2021. The patient also continues to be followed by Dr. Moriah Perez from the [facility] ENT service. He was noted not to have any clinical evidence of recurrent malignancy. By that time his head and neck pain had resolved. April 2022. Follow-up with radiation oncology at which time he reported firmness around his left neck and surgical site. -4/23/2022. CT scan of the neck showed redemonstration of partial joesph- mandibulectomy on the left with retromolar trigone resection with fat graft and left neck dissection without evidence of recurrent disease. He had no significant lymphadenopathy.     -6/3/2022. Complaints of dysphagia led to consultation with Dr. Antony Bowser. Rigid laryngoscopy and diagnostic esophagoscopy showed achalasia and he was referred to cardiothoracic surgery. -8/29/2022. Follow-up denying pain and has had no evidence of fever, cough, shortness of breath or other signs of infection. He did complain of some neck spasms which had been treated with Flexeril. His blood pressure was also modestly elevated- referred back to his PCP given his aortic aneurysm, his blood pressure needed to be under better control. He affirmed that he continued to use tobacco products 3-5 times per week. He was described as looking chronically ill.     -8/29/2022. Stable weight. Neuropathy continued since receiving chemotherapy which had not worsened.   It was described as intermittent and

## 2023-08-07 ENCOUNTER — HOSPITAL ENCOUNTER (OUTPATIENT)
Dept: INFUSION THERAPY | Age: 58
Discharge: HOME | End: 2023-08-07
Payer: MEDICARE

## 2023-08-07 ENCOUNTER — OFFICE VISIT (OUTPATIENT)
Dept: ONCOLOGY | Age: 58
End: 2023-08-07
Payer: MEDICARE

## 2023-08-07 VITALS
SYSTOLIC BLOOD PRESSURE: 116 MMHG | TEMPERATURE: 98.7 F | WEIGHT: 152 LBS | OXYGEN SATURATION: 95 % | RESPIRATION RATE: 18 BRPM | DIASTOLIC BLOOD PRESSURE: 76 MMHG | HEART RATE: 80 BPM | HEIGHT: 70 IN | BODY MASS INDEX: 21.76 KG/M2

## 2023-08-07 VITALS
OXYGEN SATURATION: 97 % | WEIGHT: 152 LBS | HEIGHT: 70 IN | DIASTOLIC BLOOD PRESSURE: 64 MMHG | TEMPERATURE: 97.9 F | SYSTOLIC BLOOD PRESSURE: 105 MMHG | BODY MASS INDEX: 21.76 KG/M2 | RESPIRATION RATE: 16 BRPM | HEART RATE: 93 BPM

## 2023-08-07 DIAGNOSIS — D62 ACUTE BLOOD LOSS ANEMIA: ICD-10-CM

## 2023-08-07 DIAGNOSIS — C06.2 SQUAMOUS CELL CANCER OF RETROMOLAR TRIGONE (HCC): ICD-10-CM

## 2023-08-07 DIAGNOSIS — C18.9 MALIGNANT NEOPLASM OF COLON, UNSPECIFIED PART OF COLON (HCC): Primary | ICD-10-CM

## 2023-08-07 DIAGNOSIS — C18.2 MALIGNANT NEOPLASM OF ASCENDING COLON (HCC): Primary | ICD-10-CM

## 2023-08-07 DIAGNOSIS — C18.2 MALIGNANT NEOPLASM OF ASCENDING COLON (HCC): ICD-10-CM

## 2023-08-07 DIAGNOSIS — D50.0 IRON DEFICIENCY ANEMIA DUE TO CHRONIC BLOOD LOSS: ICD-10-CM

## 2023-08-07 LAB
ABSOLUTE IMMATURE GRANULOCYTE: 0.07 THOU/MM3 (ref 0–0.07)
ALBUMIN SERPL BCG-MCNC: 4.2 G/DL (ref 3.5–5.1)
ALP SERPL-CCNC: 64 U/L (ref 38–126)
ALT SERPL W/O P-5'-P-CCNC: 14 U/L (ref 11–66)
AST SERPL-CCNC: 22 U/L (ref 5–40)
BASOPHILS ABSOLUTE: 0 THOU/MM3 (ref 0–0.1)
BASOPHILS NFR BLD AUTO: 0 % (ref 0–3)
BILIRUB CONJ SERPL-MCNC: < 0.2 MG/DL (ref 0–0.3)
BILIRUB SERPL-MCNC: 0.2 MG/DL (ref 0.3–1.2)
BUN BLDP-MCNC: 9 MG/DL (ref 8–26)
CHLORIDE BLD-SCNC: 101 MEQ/L (ref 98–109)
CREAT BLD-MCNC: 1.2 MG/DL (ref 0.5–1.2)
EOSINOPHIL NFR BLD AUTO: 1 % (ref 0–4)
EOSINOPHILS ABSOLUTE: 0 THOU/MM3 (ref 0–0.4)
ERYTHROCYTE [DISTWIDTH] IN BLOOD BY AUTOMATED COUNT: 17 % (ref 11.5–14.5)
GFR SERPL CREATININE-BSD FRML MDRD: > 60 ML/MIN/1.73M2
GLUCOSE BLD-MCNC: 61 MG/DL (ref 70–108)
HCT VFR BLD AUTO: 39.6 % (ref 42–52)
HGB BLD-MCNC: 13.2 GM/DL (ref 14–18)
IMMATURE GRANULOCYTES: 1 %
IONIZED CALCIUM, WHOLE BLOOD: 1.22 MMOL/L (ref 1.12–1.32)
LYMPHOCYTES ABSOLUTE: 3.3 THOU/MM3 (ref 1–4.8)
LYMPHOCYTES NFR BLD AUTO: 47 % (ref 15–47)
MCH RBC QN AUTO: 29.4 PG (ref 26–33)
MCHC RBC AUTO-ENTMCNC: 33.3 GM/DL (ref 32.2–35.5)
MCV RBC AUTO: 88 FL (ref 80–94)
MONOCYTES ABSOLUTE: 0.7 THOU/MM3 (ref 0.4–1.3)
MONOCYTES NFR BLD AUTO: 10 % (ref 0–12)
NEUTROPHILS NFR BLD AUTO: 42 % (ref 43–75)
PLATELET # BLD AUTO: 241 THOU/MM3 (ref 130–400)
PMV BLD AUTO: 8.5 FL (ref 9.4–12.4)
POTASSIUM BLD-SCNC: 3.9 MEQ/L (ref 3.5–4.9)
PROT SERPL-MCNC: 7.1 G/DL (ref 6.1–8)
RBC # BLD AUTO: 4.49 MILL/MM3 (ref 4.7–6.1)
SEGMENTED NEUTROPHILS ABSOLUTE COUNT: 3 THOU/MM3 (ref 1.8–7.7)
SODIUM BLD-SCNC: 134 MEQ/L (ref 138–146)
TOTAL CO2, WHOLE BLOOD: 25 MEQ/L (ref 23–33)
WBC # BLD AUTO: 7.1 THOU/MM3 (ref 4.8–10.8)

## 2023-08-07 PROCEDURE — 2580000003 HC RX 258: Performed by: INTERNAL MEDICINE

## 2023-08-07 PROCEDURE — 96368 THER/DIAG CONCURRENT INF: CPT

## 2023-08-07 PROCEDURE — 80076 HEPATIC FUNCTION PANEL: CPT

## 2023-08-07 PROCEDURE — 6360000002 HC RX W HCPCS: Performed by: INTERNAL MEDICINE

## 2023-08-07 PROCEDURE — 96367 TX/PROPH/DG ADDL SEQ IV INF: CPT

## 2023-08-07 PROCEDURE — 96415 CHEMO IV INFUSION ADDL HR: CPT

## 2023-08-07 PROCEDURE — 96416 CHEMO PROLONG INFUSE W/PUMP: CPT

## 2023-08-07 PROCEDURE — 99214 OFFICE O/P EST MOD 30 MIN: CPT | Performed by: INTERNAL MEDICINE

## 2023-08-07 PROCEDURE — 85025 COMPLETE CBC W/AUTO DIFF WBC: CPT

## 2023-08-07 PROCEDURE — 96413 CHEMO IV INFUSION 1 HR: CPT

## 2023-08-07 PROCEDURE — 99211 OFF/OP EST MAY X REQ PHY/QHP: CPT

## 2023-08-07 PROCEDURE — 80047 BASIC METABLC PNL IONIZED CA: CPT

## 2023-08-07 PROCEDURE — 96375 TX/PRO/DX INJ NEW DRUG ADDON: CPT

## 2023-08-07 PROCEDURE — 36591 DRAW BLOOD OFF VENOUS DEVICE: CPT

## 2023-08-07 RX ORDER — HEPARIN SODIUM (PORCINE) LOCK FLUSH IV SOLN 100 UNIT/ML 100 UNIT/ML
500 SOLUTION INTRAVENOUS PRN
Status: CANCELLED | OUTPATIENT
Start: 2023-08-09

## 2023-08-07 RX ORDER — EPINEPHRINE 1 MG/ML
0.3 INJECTION, SOLUTION, CONCENTRATE INTRAVENOUS PRN
Status: CANCELLED | OUTPATIENT
Start: 2023-08-07

## 2023-08-07 RX ORDER — MEPERIDINE HYDROCHLORIDE 50 MG/ML
12.5 INJECTION INTRAMUSCULAR; INTRAVENOUS; SUBCUTANEOUS PRN
Status: CANCELLED | OUTPATIENT
Start: 2023-08-07

## 2023-08-07 RX ORDER — ONDANSETRON 2 MG/ML
8 INJECTION INTRAMUSCULAR; INTRAVENOUS
Status: CANCELLED | OUTPATIENT
Start: 2023-08-07

## 2023-08-07 RX ORDER — ALBUTEROL SULFATE 90 UG/1
4 AEROSOL, METERED RESPIRATORY (INHALATION) PRN
Status: CANCELLED | OUTPATIENT
Start: 2023-08-07

## 2023-08-07 RX ORDER — DEXTROSE MONOHYDRATE 50 MG/ML
5-250 INJECTION, SOLUTION INTRAVENOUS PRN
Status: DISCONTINUED | OUTPATIENT
Start: 2023-08-07 | End: 2023-08-08 | Stop reason: HOSPADM

## 2023-08-07 RX ORDER — PALONOSETRON 0.05 MG/ML
0.25 INJECTION, SOLUTION INTRAVENOUS ONCE
Status: COMPLETED | OUTPATIENT
Start: 2023-08-07 | End: 2023-08-07

## 2023-08-07 RX ORDER — DEXTROSE MONOHYDRATE 50 MG/ML
5-250 INJECTION, SOLUTION INTRAVENOUS PRN
Status: CANCELLED | OUTPATIENT
Start: 2023-08-07

## 2023-08-07 RX ORDER — SODIUM CHLORIDE 0.9 % (FLUSH) 0.9 %
5-40 SYRINGE (ML) INJECTION PRN
OUTPATIENT
Start: 2023-08-07

## 2023-08-07 RX ORDER — SODIUM CHLORIDE 9 MG/ML
5-250 INJECTION, SOLUTION INTRAVENOUS PRN
Status: CANCELLED | OUTPATIENT
Start: 2023-08-09

## 2023-08-07 RX ORDER — SODIUM CHLORIDE 9 MG/ML
25 INJECTION, SOLUTION INTRAVENOUS PRN
OUTPATIENT
Start: 2023-08-07

## 2023-08-07 RX ORDER — HEPARIN 100 UNIT/ML
500 SYRINGE INTRAVENOUS PRN
OUTPATIENT
Start: 2023-08-07

## 2023-08-07 RX ORDER — SODIUM CHLORIDE 0.9 % (FLUSH) 0.9 %
5-40 SYRINGE (ML) INJECTION PRN
Status: CANCELLED | OUTPATIENT
Start: 2023-08-07

## 2023-08-07 RX ORDER — FAMOTIDINE 10 MG/ML
20 INJECTION, SOLUTION INTRAVENOUS
Status: CANCELLED | OUTPATIENT
Start: 2023-08-07

## 2023-08-07 RX ORDER — SODIUM CHLORIDE 9 MG/ML
5-250 INJECTION, SOLUTION INTRAVENOUS PRN
Status: CANCELLED | OUTPATIENT
Start: 2023-08-07

## 2023-08-07 RX ORDER — PALONOSETRON 0.05 MG/ML
0.25 INJECTION, SOLUTION INTRAVENOUS ONCE
Status: CANCELLED | OUTPATIENT
Start: 2023-08-07 | End: 2023-08-07

## 2023-08-07 RX ORDER — DIPHENHYDRAMINE HYDROCHLORIDE 50 MG/ML
50 INJECTION INTRAMUSCULAR; INTRAVENOUS
Status: CANCELLED | OUTPATIENT
Start: 2023-08-07

## 2023-08-07 RX ORDER — SODIUM CHLORIDE 0.9 % (FLUSH) 0.9 %
5-40 SYRINGE (ML) INJECTION PRN
Status: DISCONTINUED | OUTPATIENT
Start: 2023-08-07 | End: 2023-08-08 | Stop reason: HOSPADM

## 2023-08-07 RX ORDER — HEPARIN SODIUM (PORCINE) LOCK FLUSH IV SOLN 100 UNIT/ML 100 UNIT/ML
500 SOLUTION INTRAVENOUS PRN
Status: CANCELLED | OUTPATIENT
Start: 2023-08-07

## 2023-08-07 RX ORDER — ACETAMINOPHEN 325 MG/1
650 TABLET ORAL
Status: CANCELLED | OUTPATIENT
Start: 2023-08-07

## 2023-08-07 RX ORDER — SODIUM CHLORIDE 0.9 % (FLUSH) 0.9 %
5-40 SYRINGE (ML) INJECTION PRN
Status: CANCELLED | OUTPATIENT
Start: 2023-08-09

## 2023-08-07 RX ORDER — SODIUM CHLORIDE 9 MG/ML
INJECTION, SOLUTION INTRAVENOUS CONTINUOUS
Status: CANCELLED | OUTPATIENT
Start: 2023-08-07

## 2023-08-07 RX ADMIN — SODIUM CHLORIDE, PRESERVATIVE FREE 10 ML: 5 INJECTION INTRAVENOUS at 08:13

## 2023-08-07 RX ADMIN — PALONOSETRON 0.25 MG: 0.05 INJECTION, SOLUTION INTRAVENOUS at 08:52

## 2023-08-07 RX ADMIN — SODIUM CHLORIDE, PRESERVATIVE FREE 10 ML: 5 INJECTION INTRAVENOUS at 08:45

## 2023-08-07 RX ADMIN — SODIUM CHLORIDE, PRESERVATIVE FREE 10 ML: 5 INJECTION INTRAVENOUS at 08:12

## 2023-08-07 RX ADMIN — OXALIPLATIN 120 MG: 5 INJECTION, SOLUTION INTRAVENOUS at 09:52

## 2023-08-07 RX ADMIN — LEUCOVORIN CALCIUM 750 MG: 350 INJECTION, POWDER, LYOPHILIZED, FOR SOLUTION INTRAMUSCULAR; INTRAVENOUS at 09:51

## 2023-08-07 RX ADMIN — FLUOROURACIL 4450 MG: 50 INJECTION, SOLUTION INTRAVENOUS at 12:25

## 2023-08-07 RX ADMIN — DEXAMETHASONE SODIUM PHOSPHATE 12 MG: 4 INJECTION, SOLUTION INTRA-ARTICULAR; INTRALESIONAL; INTRAMUSCULAR; INTRAVENOUS; SOFT TISSUE at 09:26

## 2023-08-07 RX ADMIN — DEXTROSE MONOHYDRATE 20 ML/HR: 50 INJECTION, SOLUTION INTRAVENOUS at 08:47

## 2023-08-07 ASSESSMENT — PAIN DESCRIPTION - LOCATION
LOCATION: NECK
LOCATION: NECK

## 2023-08-07 ASSESSMENT — PAIN DESCRIPTION - FREQUENCY
FREQUENCY: CONTINUOUS
FREQUENCY: CONTINUOUS

## 2023-08-07 ASSESSMENT — PAIN DESCRIPTION - PAIN TYPE
TYPE: ACUTE PAIN
TYPE: ACUTE PAIN

## 2023-08-07 ASSESSMENT — PAIN SCALES - GENERAL
PAINLEVEL_OUTOF10: 5
PAINLEVEL_OUTOF10: 5

## 2023-08-07 ASSESSMENT — PAIN DESCRIPTION - ONSET
ONSET: ON-GOING
ONSET: ON-GOING

## 2023-08-07 ASSESSMENT — PAIN DESCRIPTION - DESCRIPTORS
DESCRIPTORS: SORE
DESCRIPTORS: SORE

## 2023-08-07 NOTE — PROGRESS NOTES
is well-developed. He is not ill-appearing or toxic-appearing. HENT:      Head: Normocephalic and atraumatic. Right Ear: Hearing and external ear normal.      Left Ear: Hearing and external ear normal.      Nose: Nose normal.      Mouth/Throat:      Mouth: Mucous membranes are not pale, not dry and not cyanotic. Eyes:      General: Lids are normal.   Neck:      Trachea: Trachea and phonation normal.   Cardiovascular:      Rate and Rhythm: Normal rate and regular rhythm. Pulses: Normal pulses. Heart sounds: S1 normal and S2 normal.   Pulmonary:      Effort: Pulmonary effort is normal. No tachypnea, bradypnea, accessory muscle usage or respiratory distress. Breath sounds: Normal breath sounds. No decreased breath sounds, wheezing or rales. Chest:      Chest wall: No tenderness. Abdominal:      General: Bowel sounds are normal. There is no distension. Palpations: Abdomen is soft. There is no mass. Tenderness: There is no abdominal tenderness. Musculoskeletal:         General: No tenderness. Normal range of motion. Cervical back: Normal range of motion and neck supple. Skin:     General: Skin is warm and dry. Findings: No abrasion, bruising, burn, ecchymosis, erythema, laceration, lesion or rash. Neurological:      Mental Status: He is alert and oriented to person, place, and time. Motor: No tremor, atrophy or abnormal muscle tone. Coordination: Coordination normal.      Gait: Gait normal.   Psychiatric:         Speech: Speech normal.         Behavior: Behavior normal.         Thought Content:  Thought content normal.       Lab Results   Component Value Date    WBC 7.1 08/07/2023    HGB 13.2 (L) 08/07/2023    HCT 39.6 (L) 08/07/2023    MCV 88 08/07/2023     08/07/2023     Lab Results   Component Value Date/Time     08/07/2023 08:13 AM     05/13/2023 07:07 AM    K 3.9 08/07/2023 08:13 AM    K 4.3 07/11/2023 08:22 AM    K 4.9 05/10/2023

## 2023-08-07 NOTE — DISCHARGE INSTRUCTIONS
Please contact your Oncologist if you have any questions regarding the chemotherapy Oxaliplatin, leucovorin, 5FU CADD that you received today. Patient instructed if experience any of the symptoms following today's chemotherapy / to notify MD immediately or go to emergency department.     * dizziness/lightheadedness  *acute nausea/vomiting - not relieved with medication  *headache - not relieved from Tylenol/pain medication  *chest pain/pressure  *rash/itching  *shortness of breath        Drink fluids - 48oz fluids daily  Call if develop fever/ chills/ signs or symptoms of infection     's note:  Aysha Olivera for treatment today (dose reducing oxali to try to decrease further txt delays)  Pt to call in pharmacy for refill on his EMLA (has 5 refills)  RTC in 2 weeks with labs and tox check prior to C3

## 2023-08-07 NOTE — PLAN OF CARE
Problem: Safety - Adult  Goal: Free from fall injury  Outcome: Adequate for Discharge  Flowsheets (Taken 8/7/2023 0820)  Free From Fall Injury: Instruct family/caregiver on patient safety  Note: Verbalized understanding of fall prevention to ask for assistance with ambulation. Call light within reach. No falls occurred with visit today. Problem: Infection - Adult  Goal: Absence of infection at discharge  Outcome: Adequate for Discharge  Flowsheets (Taken 8/7/2023 0820)  Absence of infection at discharge: Assess and monitor for signs and symptoms of infection  Note: Discuss port maintenance,infection prevention, sign of infection and when to call MD. Mediport site with no redness or warmth. Skin over port site intact with no signs of breakdown noted. Patient verbalizes signs/symptoms of port infection and when to notify the physician. Problem: Discharge Planning  Goal: Discharge to home or other facility with appropriate resources  Outcome: Adequate for Discharge  Flowsheets (Taken 8/7/2023 0820)  Discharge to home or other facility with appropriate resources: Identify barriers to discharge with patient and caregiver  Note: Discuss understanding of discharge instructions,follow-up appointments, and when to call the physician. Verbalized understanding of discharge instructions, follow-up appointments, and when to call the physician.       Problem: Chronic Conditions and Co-morbidities  Goal: Patient's chronic conditions and co-morbidity symptoms are monitored and maintained or improved  Outcome: Adequate for Discharge  Flowsheets (Taken 8/7/2023 0820)  Care Plan - Patient's Chronic Conditions and Co-Morbidity Symptoms are Monitored and Maintained or Improved: Monitor and assess patient's chronic conditions and comorbid symptoms for stability, deterioration, or improvement  Note: Chemotherapy Teaching     What is Chemotherapy   Drug action [x]   Method of Administration [x]   Handouts given []     Side

## 2023-08-07 NOTE — PATIENT INSTRUCTIONS
[facility] for treatment today (dose reducing oxali to try to decrease further txt delays)  Pt to call in pharmacy for refill on his EMLA (has 5 refills)  RTC in 2 weeks with labs and tox check prior to C3

## 2023-08-09 ENCOUNTER — HOSPITAL ENCOUNTER (OUTPATIENT)
Dept: INFUSION THERAPY | Age: 58
Discharge: HOME | End: 2023-08-09
Payer: MEDICARE

## 2023-08-09 ENCOUNTER — OFFICE VISIT (OUTPATIENT)
Dept: SURGERY | Age: 58
End: 2023-08-09

## 2023-08-09 VITALS
TEMPERATURE: 98 F | OXYGEN SATURATION: 97 % | RESPIRATION RATE: 16 BRPM | DIASTOLIC BLOOD PRESSURE: 100 MMHG | HEART RATE: 94 BPM | SYSTOLIC BLOOD PRESSURE: 160 MMHG

## 2023-08-09 VITALS
TEMPERATURE: 97.3 F | OXYGEN SATURATION: 96 % | HEART RATE: 93 BPM | WEIGHT: 146.9 LBS | SYSTOLIC BLOOD PRESSURE: 142 MMHG | DIASTOLIC BLOOD PRESSURE: 78 MMHG | RESPIRATION RATE: 16 BRPM | HEIGHT: 70 IN | BODY MASS INDEX: 21.03 KG/M2

## 2023-08-09 DIAGNOSIS — Z90.49 S/P PARTIAL RESECTION OF COLON: Primary | ICD-10-CM

## 2023-08-09 DIAGNOSIS — Z09 POSTOPERATIVE EXAMINATION: ICD-10-CM

## 2023-08-09 DIAGNOSIS — C06.2 SQUAMOUS CELL CANCER OF RETROMOLAR TRIGONE (HCC): ICD-10-CM

## 2023-08-09 DIAGNOSIS — C18.9 MALIGNANT NEOPLASM OF COLON, UNSPECIFIED PART OF COLON (HCC): Primary | ICD-10-CM

## 2023-08-09 PROCEDURE — 6360000002 HC RX W HCPCS: Performed by: INTERNAL MEDICINE

## 2023-08-09 PROCEDURE — 96523 IRRIG DRUG DELIVERY DEVICE: CPT

## 2023-08-09 PROCEDURE — 2580000003 HC RX 258: Performed by: INTERNAL MEDICINE

## 2023-08-09 RX ORDER — SODIUM CHLORIDE 0.9 % (FLUSH) 0.9 %
5-40 SYRINGE (ML) INJECTION PRN
Status: DISCONTINUED | OUTPATIENT
Start: 2023-08-09 | End: 2023-08-10 | Stop reason: HOSPADM

## 2023-08-09 RX ORDER — HEPARIN 100 UNIT/ML
500 SYRINGE INTRAVENOUS PRN
Status: DISCONTINUED | OUTPATIENT
Start: 2023-08-09 | End: 2023-08-10 | Stop reason: HOSPADM

## 2023-08-09 RX ADMIN — HEPARIN 500 UNITS: 100 SYRINGE at 12:31

## 2023-08-09 RX ADMIN — SODIUM CHLORIDE, PRESERVATIVE FREE 20 ML: 5 INJECTION INTRAVENOUS at 12:31

## 2023-08-09 NOTE — PROGRESS NOTES
Patient tolerated removal of pump without any complications. Discharge instructions given to patient-verbalizes understanding. Ambulated off unit per self with belongings.

## 2023-08-09 NOTE — PLAN OF CARE
Problem: Chronic Conditions and Co-morbidities  Goal: Patient's chronic conditions and co-morbidity symptoms are monitored and maintained or improved  Outcome: Adequate for Discharge  Flowsheets (Taken 8/9/2023 1344)  Care Plan - Patient's Chronic Conditions and Co-Morbidity Symptoms are Monitored and Maintained or Improved: Monitor and assess patient's chronic conditions and comorbid symptoms for stability, deterioration, or improvement  Note: Patient verbalizes understanding to verbal information given on CADD pump removal, action and possible side effects. Aware to call MD if develop complications. Problem: Infection - Adult  Goal: Absence of infection at discharge  Outcome: Adequate for Discharge  Flowsheets (Taken 8/9/2023 1344)  Absence of infection at discharge: Assess and monitor for signs and symptoms of infection  Note: Discuss port maintenance,infection prevention, sign of infection and when to call MD.      Problem: Discharge Planning  Goal: Discharge to home or other facility with appropriate resources  Outcome: Adequate for Discharge  Flowsheets (Taken 8/9/2023 1344)  Discharge to home or other facility with appropriate resources: Identify barriers to discharge with patient and caregiver  Note: Verbalize understanding of discharge instructions, follow up appointments, and when to call Physician. Care plan reviewed with patient. Patient verbalizes understanding of the plan of care and contribute to goal setting.

## 2023-08-09 NOTE — DISCHARGE INSTRUCTIONS
Please contact your Oncologist if you have any questions regarding the chemotherapy Fluorouracil that you received today. Patient instructed if experience any of the symptoms following today's chemotherapy / to notify MD immediately or go to emergency department.     * dizziness/lightheadedness  *acute nausea/vomiting - not relieved with medication  *headache - not relieved from Tylenol/pain medication  *chest pain/pressure  *rash/itching  *shortness of breath        Drink fluids - 48oz fluids daily  Call if develop fever/ chills/ signs or symptoms of infection

## 2023-08-10 ASSESSMENT — ENCOUNTER SYMPTOMS
DIARRHEA: 0
PHOTOPHOBIA: 0
CONSTIPATION: 0
COUGH: 0
EYE ITCHING: 0
ALLERGIC/IMMUNOLOGIC NEGATIVE: 1
SHORTNESS OF BREATH: 0
SINUS PRESSURE: 0
ABDOMINAL DISTENTION: 0
CHEST TIGHTNESS: 0
SORE THROAT: 1
VOMITING: 0
COLOR CHANGE: 0
EYE DISCHARGE: 0
RHINORRHEA: 0
ANAL BLEEDING: 0
BACK PAIN: 0
BLOOD IN STOOL: 0
ABDOMINAL PAIN: 0
CHOKING: 0
APNEA: 0
NAUSEA: 0
EYE PAIN: 0
WHEEZING: 0

## 2023-08-20 NOTE — PROGRESS NOTES
clear.    GENETICS:  none    MOLECULAR:  -5/9/2023 Mismatch repair proteins were all intact. 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    Dysphagia lusoria    Achalasia of the cricopharyngeus muscle    Pain around PEG tube site    Gastrocutaneous fistula    Acute blood loss anemia    Severe malnutrition (HCC)    Colonic mass    Gastroesophageal reflux disease without esophagitis    Cecum mass    Vitamin D deficiency    Hypothyroidism    Hyperlipidemia    Heart murmur, systolic    Gastroesophageal reflux disease with esophagitis without hemorrhage    Malignant neoplasm of colon ([address])        1) Oncology Diagnosis #1:  Metastatic gr3 adenocarcinoma of the colon invading into the pericolonic fat to the liver (Biopsy proven) s/p resection 5/9/2023 currently on palliative chemotherapy.      -pT3, pN2B, pM1A. = stage CHAN  -  Mismatch repair proteins were all intact: low probability of microsatellite instability high. Previously discussed FOLFOX +/- Neulasta support. We discussed side effects of FOLFOX, including 5-FU can cause organ dysfunction such as liver dysfunction, nausea, vomiting, diarrhea, and possible myelosuppression, hand/foot syndrome. Discussed the rare possibility of dihydropyrimidine dehydrogenase deficiency and the signs and symptoms and to call the clinic/on-call physician for the symptoms or fevers for advice. Also discussed the possibility (1%) of coronary vasospasm.   He was also counseled on side effects of oxaliplatin which include but are not inclusive of alopecia,

## 2023-08-21 ENCOUNTER — CLINICAL DOCUMENTATION (OUTPATIENT)
Dept: CASE MANAGEMENT | Age: 58
End: 2023-08-21

## 2023-08-21 ENCOUNTER — HOSPITAL ENCOUNTER (OUTPATIENT)
Dept: INFUSION THERAPY | Age: 58
Discharge: HOME | End: 2023-08-21
Payer: MEDICARE

## 2023-08-21 ENCOUNTER — SOCIAL WORK (OUTPATIENT)
Dept: INFUSION THERAPY | Age: 58
End: 2023-08-21

## 2023-08-21 ENCOUNTER — OFFICE VISIT (OUTPATIENT)
Dept: ONCOLOGY | Age: 58
End: 2023-08-21
Payer: MEDICARE

## 2023-08-21 VITALS
SYSTOLIC BLOOD PRESSURE: 122 MMHG | WEIGHT: 147.5 LBS | HEART RATE: 78 BPM | TEMPERATURE: 98 F | RESPIRATION RATE: 16 BRPM | BODY MASS INDEX: 21.11 KG/M2 | OXYGEN SATURATION: 96 % | HEIGHT: 70 IN | DIASTOLIC BLOOD PRESSURE: 74 MMHG

## 2023-08-21 VITALS
DIASTOLIC BLOOD PRESSURE: 85 MMHG | OXYGEN SATURATION: 96 % | HEIGHT: 70 IN | RESPIRATION RATE: 16 BRPM | SYSTOLIC BLOOD PRESSURE: 126 MMHG | HEART RATE: 69 BPM | BODY MASS INDEX: 21.05 KG/M2 | WEIGHT: 147 LBS | TEMPERATURE: 97.5 F

## 2023-08-21 DIAGNOSIS — C18.9 MALIGNANT NEOPLASM OF COLON, UNSPECIFIED PART OF COLON (HCC): Primary | ICD-10-CM

## 2023-08-21 DIAGNOSIS — C18.2 MALIGNANT NEOPLASM OF ASCENDING COLON (HCC): ICD-10-CM

## 2023-08-21 DIAGNOSIS — C06.2 SQUAMOUS CELL CANCER OF RETROMOLAR TRIGONE (HCC): ICD-10-CM

## 2023-08-21 DIAGNOSIS — D50.0 IRON DEFICIENCY ANEMIA DUE TO CHRONIC BLOOD LOSS: ICD-10-CM

## 2023-08-21 DIAGNOSIS — C18.2 MALIGNANT NEOPLASM OF ASCENDING COLON (HCC): Primary | ICD-10-CM

## 2023-08-21 LAB
ABSOLUTE IMMATURE GRANULOCYTE: 0.01 THOU/MM3 (ref 0–0.07)
ALBUMIN SERPL BCG-MCNC: 3.9 G/DL (ref 3.5–5.1)
ALP SERPL-CCNC: 64 U/L (ref 38–126)
ALT SERPL W/O P-5'-P-CCNC: 17 U/L (ref 11–66)
AST SERPL-CCNC: 26 U/L (ref 5–40)
BASOPHILS ABSOLUTE: 0 THOU/MM3 (ref 0–0.1)
BASOPHILS NFR BLD AUTO: 0 % (ref 0–3)
BILIRUB CONJ SERPL-MCNC: < 0.2 MG/DL (ref 0–0.3)
BILIRUB SERPL-MCNC: 0.2 MG/DL (ref 0.3–1.2)
BUN BLDP-MCNC: 5 MG/DL (ref 8–26)
CEA SERPL-MCNC: 5.2 NG/ML (ref 0–5)
CHLORIDE BLD-SCNC: 103 MEQ/L (ref 98–109)
CREAT BLD-MCNC: 1 MG/DL (ref 0.5–1.2)
EOSINOPHIL NFR BLD AUTO: 2 % (ref 0–4)
EOSINOPHILS ABSOLUTE: 0.1 THOU/MM3 (ref 0–0.4)
ERYTHROCYTE [DISTWIDTH] IN BLOOD BY AUTOMATED COUNT: 17.1 % (ref 11.5–14.5)
GFR SERPL CREATININE-BSD FRML MDRD: > 60 ML/MIN/1.73M2
GLUCOSE BLD-MCNC: 84 MG/DL (ref 70–108)
HCT VFR BLD AUTO: 38.3 % (ref 42–52)
HGB BLD-MCNC: 12.8 GM/DL (ref 14–18)
IMMATURE GRANULOCYTES: 0 %
IONIZED CALCIUM, WHOLE BLOOD: 1.22 MMOL/L (ref 1.12–1.32)
LYMPHOCYTES ABSOLUTE: 2.1 THOU/MM3 (ref 1–4.8)
LYMPHOCYTES NFR BLD AUTO: 47 % (ref 15–47)
MCH RBC QN AUTO: 30 PG (ref 26–33)
MCHC RBC AUTO-ENTMCNC: 33.4 GM/DL (ref 32.2–35.5)
MCV RBC AUTO: 90 FL (ref 80–94)
MONOCYTES ABSOLUTE: 0.4 THOU/MM3 (ref 0.4–1.3)
MONOCYTES NFR BLD AUTO: 10 % (ref 0–12)
NEUTROPHILS NFR BLD AUTO: 41 % (ref 43–75)
PLATELET # BLD AUTO: 188 THOU/MM3 (ref 130–400)
PMV BLD AUTO: 8.3 FL (ref 9.4–12.4)
POTASSIUM BLD-SCNC: 4.8 MEQ/L (ref 3.5–4.9)
PROT SERPL-MCNC: 6.8 G/DL (ref 6.1–8)
RBC # BLD AUTO: 4.26 MILL/MM3 (ref 4.7–6.1)
SEGMENTED NEUTROPHILS ABSOLUTE COUNT: 1.8 THOU/MM3 (ref 1.8–7.7)
SODIUM BLD-SCNC: 138 MEQ/L (ref 138–146)
TOTAL CO2, WHOLE BLOOD: 29 MEQ/L (ref 23–33)
WBC # BLD AUTO: 4.5 THOU/MM3 (ref 4.8–10.8)

## 2023-08-21 PROCEDURE — 6360000002 HC RX W HCPCS: Performed by: INTERNAL MEDICINE

## 2023-08-21 PROCEDURE — 96367 TX/PROPH/DG ADDL SEQ IV INF: CPT

## 2023-08-21 PROCEDURE — 96413 CHEMO IV INFUSION 1 HR: CPT

## 2023-08-21 PROCEDURE — 96416 CHEMO PROLONG INFUSE W/PUMP: CPT

## 2023-08-21 PROCEDURE — 99211 OFF/OP EST MAY X REQ PHY/QHP: CPT

## 2023-08-21 PROCEDURE — 80047 BASIC METABLC PNL IONIZED CA: CPT

## 2023-08-21 PROCEDURE — 36591 DRAW BLOOD OFF VENOUS DEVICE: CPT

## 2023-08-21 PROCEDURE — 96368 THER/DIAG CONCURRENT INF: CPT

## 2023-08-21 PROCEDURE — 2580000003 HC RX 258: Performed by: INTERNAL MEDICINE

## 2023-08-21 PROCEDURE — 80076 HEPATIC FUNCTION PANEL: CPT

## 2023-08-21 PROCEDURE — 99214 OFFICE O/P EST MOD 30 MIN: CPT | Performed by: INTERNAL MEDICINE

## 2023-08-21 PROCEDURE — 96415 CHEMO IV INFUSION ADDL HR: CPT

## 2023-08-21 PROCEDURE — 96375 TX/PRO/DX INJ NEW DRUG ADDON: CPT

## 2023-08-21 PROCEDURE — 85025 COMPLETE CBC W/AUTO DIFF WBC: CPT

## 2023-08-21 PROCEDURE — 82378 CARCINOEMBRYONIC ANTIGEN: CPT

## 2023-08-21 RX ORDER — MEPERIDINE HYDROCHLORIDE 50 MG/ML
12.5 INJECTION INTRAMUSCULAR; INTRAVENOUS; SUBCUTANEOUS PRN
Status: CANCELLED | OUTPATIENT
Start: 2023-08-21

## 2023-08-21 RX ORDER — DIPHENHYDRAMINE HYDROCHLORIDE 50 MG/ML
50 INJECTION INTRAMUSCULAR; INTRAVENOUS
Status: CANCELLED | OUTPATIENT
Start: 2023-08-21

## 2023-08-21 RX ORDER — HEPARIN SODIUM (PORCINE) LOCK FLUSH IV SOLN 100 UNIT/ML 100 UNIT/ML
500 SOLUTION INTRAVENOUS PRN
Status: CANCELLED | OUTPATIENT
Start: 2023-08-21

## 2023-08-21 RX ORDER — HEPARIN 100 UNIT/ML
500 SYRINGE INTRAVENOUS PRN
Status: DISCONTINUED | OUTPATIENT
Start: 2023-08-21 | End: 2023-08-22 | Stop reason: HOSPADM

## 2023-08-21 RX ORDER — DEXTROSE MONOHYDRATE 50 MG/ML
5-250 INJECTION, SOLUTION INTRAVENOUS PRN
Status: DISCONTINUED | OUTPATIENT
Start: 2023-08-21 | End: 2023-08-22 | Stop reason: HOSPADM

## 2023-08-21 RX ORDER — FAMOTIDINE 10 MG/ML
20 INJECTION, SOLUTION INTRAVENOUS
Status: CANCELLED | OUTPATIENT
Start: 2023-08-21

## 2023-08-21 RX ORDER — ACETAMINOPHEN 325 MG/1
650 TABLET ORAL
Status: CANCELLED | OUTPATIENT
Start: 2023-08-21

## 2023-08-21 RX ORDER — SODIUM CHLORIDE 0.9 % (FLUSH) 0.9 %
5-40 SYRINGE (ML) INJECTION PRN
Status: CANCELLED | OUTPATIENT
Start: 2023-08-21

## 2023-08-21 RX ORDER — EPINEPHRINE 1 MG/ML
0.3 INJECTION, SOLUTION, CONCENTRATE INTRAVENOUS PRN
Status: CANCELLED | OUTPATIENT
Start: 2023-08-21

## 2023-08-21 RX ORDER — PALONOSETRON 0.05 MG/ML
0.25 INJECTION, SOLUTION INTRAVENOUS ONCE
Status: CANCELLED | OUTPATIENT
Start: 2023-08-21 | End: 2023-08-21

## 2023-08-21 RX ORDER — ALBUTEROL SULFATE 90 UG/1
4 AEROSOL, METERED RESPIRATORY (INHALATION) PRN
Status: CANCELLED | OUTPATIENT
Start: 2023-08-21

## 2023-08-21 RX ORDER — SODIUM CHLORIDE 9 MG/ML
25 INJECTION, SOLUTION INTRAVENOUS PRN
OUTPATIENT
Start: 2023-08-21

## 2023-08-21 RX ORDER — DEXTROSE MONOHYDRATE 50 MG/ML
5-250 INJECTION, SOLUTION INTRAVENOUS PRN
Status: CANCELLED | OUTPATIENT
Start: 2023-08-21

## 2023-08-21 RX ORDER — SODIUM CHLORIDE 0.9 % (FLUSH) 0.9 %
5-40 SYRINGE (ML) INJECTION PRN
OUTPATIENT
Start: 2023-08-21

## 2023-08-21 RX ORDER — SODIUM CHLORIDE 9 MG/ML
5-250 INJECTION, SOLUTION INTRAVENOUS PRN
Status: CANCELLED | OUTPATIENT
Start: 2023-08-23

## 2023-08-21 RX ORDER — PALONOSETRON 0.05 MG/ML
0.25 INJECTION, SOLUTION INTRAVENOUS ONCE
Status: COMPLETED | OUTPATIENT
Start: 2023-08-21 | End: 2023-08-21

## 2023-08-21 RX ORDER — HEPARIN SODIUM (PORCINE) LOCK FLUSH IV SOLN 100 UNIT/ML 100 UNIT/ML
500 SOLUTION INTRAVENOUS PRN
Status: CANCELLED | OUTPATIENT
Start: 2023-08-23

## 2023-08-21 RX ORDER — SODIUM CHLORIDE 0.9 % (FLUSH) 0.9 %
5-40 SYRINGE (ML) INJECTION PRN
Status: DISCONTINUED | OUTPATIENT
Start: 2023-08-21 | End: 2023-08-22 | Stop reason: HOSPADM

## 2023-08-21 RX ORDER — SODIUM CHLORIDE 9 MG/ML
INJECTION, SOLUTION INTRAVENOUS CONTINUOUS
Status: CANCELLED | OUTPATIENT
Start: 2023-08-21

## 2023-08-21 RX ORDER — HEPARIN 100 UNIT/ML
500 SYRINGE INTRAVENOUS PRN
OUTPATIENT
Start: 2023-08-21

## 2023-08-21 RX ORDER — SODIUM CHLORIDE 0.9 % (FLUSH) 0.9 %
5-40 SYRINGE (ML) INJECTION PRN
Status: CANCELLED | OUTPATIENT
Start: 2023-08-23

## 2023-08-21 RX ORDER — ONDANSETRON 2 MG/ML
8 INJECTION INTRAMUSCULAR; INTRAVENOUS
Status: CANCELLED | OUTPATIENT
Start: 2023-08-21

## 2023-08-21 RX ORDER — SODIUM CHLORIDE 9 MG/ML
5-250 INJECTION, SOLUTION INTRAVENOUS PRN
Status: CANCELLED | OUTPATIENT
Start: 2023-08-21

## 2023-08-21 RX ADMIN — SODIUM CHLORIDE, PRESERVATIVE FREE 10 ML: 5 INJECTION INTRAVENOUS at 08:18

## 2023-08-21 RX ADMIN — FLUOROURACIL 4450 MG: 50 INJECTION, SOLUTION INTRAVENOUS at 11:43

## 2023-08-21 RX ADMIN — OXALIPLATIN 120 MG: 5 INJECTION, SOLUTION INTRAVENOUS at 09:33

## 2023-08-21 RX ADMIN — PALONOSETRON 0.25 MG: 0.05 INJECTION, SOLUTION INTRAVENOUS at 09:08

## 2023-08-21 RX ADMIN — LEUCOVORIN CALCIUM 750 MG: 350 INJECTION, POWDER, LYOPHILIZED, FOR SOLUTION INTRAMUSCULAR; INTRAVENOUS at 09:29

## 2023-08-21 RX ADMIN — DEXTROSE MONOHYDRATE 20 ML/HR: 50 INJECTION, SOLUTION INTRAVENOUS at 09:04

## 2023-08-21 RX ADMIN — SODIUM CHLORIDE, PRESERVATIVE FREE 20 ML: 5 INJECTION INTRAVENOUS at 11:40

## 2023-08-21 RX ADMIN — DEXAMETHASONE SODIUM PHOSPHATE 12 MG: 4 INJECTION, SOLUTION INTRA-ARTICULAR; INTRALESIONAL; INTRAMUSCULAR; INTRAVENOUS; SOFT TISSUE at 09:09

## 2023-08-21 RX ADMIN — SODIUM CHLORIDE, PRESERVATIVE FREE 20 ML: 5 INJECTION INTRAVENOUS at 08:19

## 2023-08-21 NOTE — PATIENT INSTRUCTIONS
Proceed with chemotherapy today (still dose reduced as it was last time)  Continue to eat well, use N/V meds as needed  RTC week of labor day for next chemo

## 2023-08-21 NOTE — PROGRESS NOTES
- This staff received a referral from the Infusion nursing staff requesting a Gas Card for Latonya Vincent. - After review of the patient's scheduled appointments (monthly) and his residence, it was determined the best use of this service would be to provide transportation assistance through the [address] [place] [address] Road only live 2.2 miles from the [facility] and will only be traveling to the facility once a month according to his nurse. - Presumably, the distribution of a gas card would not benefit him in the long term. The service of [facility] [employer] makes more sense if assistance is needed. - Transportation today was provided by his S/O. This staff relayed the determination to [facility] oncology nurse since he was sleeping at the time of visitation by this staff. His nurse agreed to updating him with the determination.

## 2023-08-21 NOTE — PLAN OF CARE
Problem: Chronic Conditions and Co-morbidities  Goal: Patient's chronic conditions and co-morbidity symptoms are monitored and maintained or improved  Outcome: Progressing  Flowsheets (Taken 8/21/2023 0912)  Care Plan - Patient's Chronic Conditions and Co-Morbidity Symptoms are Monitored and Maintained or Improved: Monitor and assess patient's chronic conditions and comorbid symptoms for stability, deterioration, or improvement  Note: Chemotherapy Teaching     What is Chemotherapy   Drug action [x]   Method of Administration [x]   Handouts given []     Side Effects  Nausea/vomiting [x]   Diarrhea [x]   Fatigue [x]   Signs / Symptoms of infection [x]   Neutropenia [x]   Thrombocytopenia [x]   Alopecia [x]   neuropathy [x]   [place] diet &  the importance of fluids [x]       Micellaneous  Importance of nutrition [x]   Importance of oral hygiene [x]   When to call the MD [x]   Monitoring labs [x]   Use of supportive services []     Explanation of Drug Regimen / Frequency  oxaliplatin     Comments  Verbalized understanding to drug,action,side effects and when to call MD         Problem: Infection - Adult  Goal: Absence of infection at discharge  Outcome: Progressing  Flowsheets (Taken 8/21/2023 0912)  Absence of infection at discharge: Assess and monitor for signs and symptoms of infection  Note: Mediport site with no redness or warmth. Skin over port site intact with no signs of breakdown noted. Patient verbalizes signs/symptoms of port infection and when to notify the physician. Problem: Discharge Planning  Goal: Discharge to home or other facility with appropriate resources  Outcome: Progressing  Flowsheets (Taken 8/21/2023 0911)  Discharge to home or other facility with appropriate resources: Identify barriers to discharge with patient and caregiver   Care plan reviewed with patient. Patient verbalizes understanding of the plan of care and contributes to goal setting.

## 2023-08-21 NOTE — PROGRESS NOTES
Met with Gerard Seay and spouse during appointment with Dr. Venessa Burrell. Voices no needs at this time. Instructed to call with any questions or concerns.

## 2023-08-23 ENCOUNTER — HOSPITAL ENCOUNTER (OUTPATIENT)
Dept: INFUSION THERAPY | Age: 58
Discharge: HOME | End: 2023-08-23
Payer: MEDICARE

## 2023-08-23 VITALS
TEMPERATURE: 97.9 F | RESPIRATION RATE: 16 BRPM | BODY MASS INDEX: 21.05 KG/M2 | OXYGEN SATURATION: 96 % | DIASTOLIC BLOOD PRESSURE: 91 MMHG | WEIGHT: 147 LBS | HEART RATE: 79 BPM | SYSTOLIC BLOOD PRESSURE: 159 MMHG | HEIGHT: 70 IN

## 2023-08-23 DIAGNOSIS — C06.2 SQUAMOUS CELL CANCER OF RETROMOLAR TRIGONE (HCC): ICD-10-CM

## 2023-08-23 DIAGNOSIS — C18.9 MALIGNANT NEOPLASM OF COLON, UNSPECIFIED PART OF COLON (HCC): Primary | ICD-10-CM

## 2023-08-23 PROCEDURE — 6360000002 HC RX W HCPCS: Performed by: INTERNAL MEDICINE

## 2023-08-23 PROCEDURE — 2580000003 HC RX 258: Performed by: INTERNAL MEDICINE

## 2023-08-23 PROCEDURE — 96523 IRRIG DRUG DELIVERY DEVICE: CPT

## 2023-08-23 RX ORDER — HEPARIN 100 UNIT/ML
500 SYRINGE INTRAVENOUS PRN
Status: DISCONTINUED | OUTPATIENT
Start: 2023-08-23 | End: 2023-08-24 | Stop reason: HOSPADM

## 2023-08-23 RX ORDER — SODIUM CHLORIDE 0.9 % (FLUSH) 0.9 %
5-40 SYRINGE (ML) INJECTION PRN
Status: DISCONTINUED | OUTPATIENT
Start: 2023-08-23 | End: 2023-08-24 | Stop reason: HOSPADM

## 2023-08-23 RX ADMIN — HEPARIN 500 UNITS: 100 SYRINGE at 10:20

## 2023-08-23 RX ADMIN — SODIUM CHLORIDE, PRESERVATIVE FREE 20 ML: 5 INJECTION INTRAVENOUS at 10:20

## 2023-08-23 NOTE — PLAN OF CARE
Problem: Discharge Planning  Goal: Discharge to home or other facility with appropriate resources  Outcome: Progressing  Flowsheets (Taken 8/23/2023 1440)  Discharge to home or other facility with appropriate resources: Identify barriers to discharge with patient and caregiver  Note: Verbalized understanding of discharge instructions, follow-up appointments, and when to call the physician. Problem: Chronic Conditions and Co-morbidities  Goal: Patient's chronic conditions and co-morbidity symptoms are monitored and maintained or improved  Outcome: Progressing  Flowsheets (Taken 8/23/2023 1440)  Care Plan - Patient's Chronic Conditions and Co-Morbidity Symptoms are Monitored and Maintained or Improved: Monitor and assess patient's chronic conditions and comorbid symptoms for stability, deterioration, or improvement  Note: Chemotherapy Teaching     What is Chemotherapy   Drug action [x]   Method of Administration [x]   Handouts given []     Side Effects  Nausea/vomiting [x]   Diarrhea [x]   Fatigue [x]   Signs / Symptoms of infection [x]   Neutropenia [x]   Thrombocytopenia [x]   Alopecia [x]   neuropathy [x]   [place] diet &  the importance of fluids [x]       Micellaneous  Importance of nutrition [x]   Importance of oral hygiene [x]   When to call the MD [x]   Monitoring labs [x]   Use of supportive services []     Explanation of Drug Regimen / Frequency  5FU     Comments  Verbalized understanding to drug,action,side effects and when to call MD         Problem: Infection - Adult  Goal: Absence of infection at discharge  Outcome: Progressing  Flowsheets (Taken 8/23/2023 1440)  Absence of infection at discharge:   Assess and monitor for signs and symptoms of infection   Monitor lab/diagnostic results   Monitor all insertion sites i.e., indwelling lines, tubes and drains  Note: Mediport site with no redness or warmth. Skin over port site intact with no signs of breakdown noted.  Patient verbalizes signs/symptoms of

## 2023-08-24 ENCOUNTER — PREP FOR PROCEDURE (OUTPATIENT)
Dept: ENT CLINIC | Age: 58
End: 2023-08-24

## 2023-08-30 RX ORDER — SODIUM CHLORIDE 0.9 % (FLUSH) 0.9 %
5-40 SYRINGE (ML) INJECTION PRN
Status: CANCELLED | OUTPATIENT
Start: 2023-08-30

## 2023-08-30 RX ORDER — IPRATROPIUM BROMIDE AND ALBUTEROL SULFATE 2.5; .5 MG/3ML; MG/3ML
1 SOLUTION RESPIRATORY (INHALATION) EVERY 4 HOURS PRN
Status: CANCELLED | OUTPATIENT
Start: 2023-08-31

## 2023-08-30 RX ORDER — DEXAMETHASONE SODIUM PHOSPHATE 10 MG/ML
10 INJECTION INTRAMUSCULAR; INTRAVENOUS ONCE
Status: CANCELLED | OUTPATIENT
Start: 2023-08-30 | End: 2023-08-30

## 2023-08-30 RX ORDER — SODIUM CHLORIDE 9 MG/ML
INJECTION, SOLUTION INTRAVENOUS PRN
Status: CANCELLED | OUTPATIENT
Start: 2023-08-30

## 2023-08-30 RX ORDER — SODIUM CHLORIDE 0.9 % (FLUSH) 0.9 %
5-40 SYRINGE (ML) INJECTION EVERY 12 HOURS SCHEDULED
Status: CANCELLED | OUTPATIENT
Start: 2023-08-30

## 2023-08-30 NOTE — H&P
(REMERON) 15 MG tablet Take 1 tablet by mouth daily        OLANZapine (ZYPREXA) 10 MG tablet Take 1 tablet by mouth nightly        OXcarbazepine (TRILEPTAL) 150 MG tablet Take 1 tablet by mouth daily        Sertraline HCl (ZOLOFT PO) Take 1 tablet by mouth daily Pt unsure on dose        omeprazole (PRILOSEC) 40 MG delayed release capsule Take 1 capsule by mouth 2 times daily (before meals) 180 capsule 4      No current facility-administered medications for this visit.          Past Medical History        Past Medical History:   Diagnosis Date    A-fib ([address])       Baki-no blood thinners    Anxiety      Aortic aneurysm (HCC)      Arthritis      Asthma      Depression      Head and neck cancer ([address])      Hyperlipidemia      Hypertension      Lymphedema      Syncope 12/22/2020    Syncope and collapse      Thyroid disease           Past Surgical History         Past Surgical History:   Procedure Laterality Date    COLONOSCOPY N/A 05/02/2023     COLONOSCOPY POLYPECTOMY SNARE/COLD BIOPSY performed by Marcus Hayes MD at [address] N/A 03/07/2023     ESOPHAGEAL MOTILITY/MANOMETRY STUDY performed by Myrna Coelho MD at [address] [facility]   10/02/2015     removed    HEMICOLECTOMY Right 05/09/2023     ROBOTIC RIGHT COLECTOMY WITH LIVER BIOPSY performed by Stacy Lacy MD at [address] [place] [address] N/A 06/03/2022     Rigid Laryngoscopy Esophagoscopy Diagnostic performed by Salinas Collazo MD at [address] [place] [address] N/A 01/11/2023     SUSPENSION LARYNGOSCOPY (FOR ACCESS), FLEXIBLE ESOPHAGOSCOPY WITH SAVORY WIRE BOUGIE DILATION AND BIOPSIES performed by Salinas Collazo MD at [address] [place] [address] N/A 03/22/2023     SUSPENSION LARYNGOSCOPY FOR ACCESS RIGID ESOPHAGOSCOPY WITH BALLOON DILATION performed by Salinas Collazo MD at [address] [place] [address] N/A 05/04/2023     Suspension Laryngoscopy for Access Rigid Esophagoscopy with Balloon Dilation speech pattern are abnormal for the presence of dental dysarthria consistent with edentulous speech. I heard no throat clearing coughing or inspiratory stridor. He is breathing without labor. Vitals reviewed. CT CHEST W CONTRAST     Result Date: 6/22/2023  No suspicious pulmonary mass or nodule. No acute intrathoracic process. Stable dilatation of the ascending thoracic aorta measuring 4.5 cm in diameter. Chronic findings are discussed. **This report has been created using voice recognition software. It may contain minor errors which are inherent in voice recognition technology. ** Final report electronically signed by Dr Toshia Bedolla on 6/22/2023 9:04 AM     PET CT SKULL BASE TO MID THIGH     Result Date: 6/12/2023  FDG avid hypoattenuating lesions in the liver corresponding to known metastatic disease. Final report electronically signed by Dr. Josh Jackson on 6/12/2023 10:20 AM     MRI BRAIN W WO CONTRAST     Result Date: 6/22/2023   1. Stable MRI scan of the brain, no interval change since previous study dated 11/5/2020. 2. No evidence of intracranial metastatic disease. . **This report has been created using voice recognition software. It may contain minor errors which are inherent in voice recognition technology. ** Final report electronically signed by DR Etelvina Delacruz on 6/22/2023 11:03 AM           Lab Results   Component Value Date/Time      06/05/2023 01:20 PM      05/13/2023 07:07 AM      05/12/2023 04:48 AM      05/11/2023 05:06 AM     K 4.0 06/05/2023 01:20 PM     K 3.9 05/13/2023 07:07 AM     K 3.9 05/12/2023 04:48 AM     K 4.5 05/11/2023 05:06 AM     K 4.9 05/10/2023 04:55 AM     K 3.8 05/02/2023 05:34 AM     K 3.3 12/10/2020 07:25 AM      05/13/2023 07:07 AM      05/12/2023 04:48 AM      05/11/2023 05:06 AM     CO2 22 05/13/2023 07:07 AM     CO2 21 05/12/2023 04:48 AM     CO2 21 05/11/2023 05:06 AM     BUN 5 05/13/2023 07:07 AM     BUN 5

## 2023-08-31 ENCOUNTER — ANESTHESIA EVENT (OUTPATIENT)
Dept: OPERATING ROOM | Age: 58
End: 2023-08-31
Payer: MEDICARE

## 2023-08-31 ENCOUNTER — HOSPITAL ENCOUNTER (OUTPATIENT)
Age: 58
Setting detail: OUTPATIENT SURGERY
Discharge: HOME | End: 2023-08-31
Attending: OTOLARYNGOLOGY | Admitting: OTOLARYNGOLOGY
Payer: MEDICARE

## 2023-08-31 ENCOUNTER — ANESTHESIA (OUTPATIENT)
Dept: OPERATING ROOM | Age: 58
End: 2023-08-31
Payer: MEDICARE

## 2023-08-31 VITALS
DIASTOLIC BLOOD PRESSURE: 87 MMHG | BODY MASS INDEX: 20.9 KG/M2 | WEIGHT: 146 LBS | OXYGEN SATURATION: 96 % | HEIGHT: 70 IN | HEART RATE: 66 BPM | TEMPERATURE: 96.9 F | SYSTOLIC BLOOD PRESSURE: 127 MMHG | RESPIRATION RATE: 18 BRPM
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
## 2023-08-31 PROBLEM — R13.14 PHARYNGOESOPHAGEAL DYSPHAGIA: Status: ACTIVE | Noted: 2023-08-31

## 2023-08-31 PROBLEM — L59.8 RADIATION-INDUCED FIBROSIS OF SOFT TISSUE FROM THERAPEUTIC PROCEDURE: Status: ACTIVE | Noted: 2023-08-31

## 2023-08-31 PROCEDURE — 2500000003 HC RX 250 WO HCPCS: Performed by: NURSE ANESTHETIST, CERTIFIED REGISTERED

## 2023-08-31 PROCEDURE — 7100000000 HC PACU RECOVERY - FIRST 15 MIN: Performed by: OTOLARYNGOLOGY

## 2023-08-31 PROCEDURE — 3700000000 HC ANESTHESIA ATTENDED CARE: Performed by: OTOLARYNGOLOGY

## 2023-08-31 PROCEDURE — C1726 CATH, BAL DIL, NON-VASCULAR: HCPCS | Performed by: OTOLARYNGOLOGY

## 2023-08-31 PROCEDURE — C9399 UNCLASSIFIED DRUGS OR BIOLOG: HCPCS | Performed by: NURSE ANESTHETIST, CERTIFIED REGISTERED

## 2023-08-31 PROCEDURE — 3600000004 HC SURGERY LEVEL 4 BASE: Performed by: OTOLARYNGOLOGY

## 2023-08-31 PROCEDURE — 6360000002 HC RX W HCPCS: Performed by: NURSE PRACTITIONER

## 2023-08-31 PROCEDURE — 2709999900 HC NON-CHARGEABLE SUPPLY: Performed by: OTOLARYNGOLOGY

## 2023-08-31 PROCEDURE — 2580000003 HC RX 258: Performed by: NURSE PRACTITIONER

## 2023-08-31 PROCEDURE — 7100000001 HC PACU RECOVERY - ADDTL 15 MIN: Performed by: OTOLARYNGOLOGY

## 2023-08-31 PROCEDURE — 3700000001 HC ADD 15 MINUTES (ANESTHESIA): Performed by: OTOLARYNGOLOGY

## 2023-08-31 PROCEDURE — 6360000002 HC RX W HCPCS: Performed by: NURSE ANESTHETIST, CERTIFIED REGISTERED

## 2023-08-31 PROCEDURE — 7100000010 HC PHASE II RECOVERY - FIRST 15 MIN: Performed by: OTOLARYNGOLOGY

## 2023-08-31 PROCEDURE — APPNB45 APP NON BILLABLE 31-45 MINUTES: Performed by: NURSE PRACTITIONER

## 2023-08-31 PROCEDURE — 43195 ESOPHAGOSCOPY RIGID BALLOON: CPT | Performed by: NURSE PRACTITIONER

## 2023-08-31 PROCEDURE — 43195 ESOPHAGOSCOPY RIGID BALLOON: CPT | Performed by: OTOLARYNGOLOGY

## 2023-08-31 PROCEDURE — 3600000014 HC SURGERY LEVEL 4 ADDTL 15MIN: Performed by: OTOLARYNGOLOGY

## 2023-08-31 PROCEDURE — 7100000011 HC PHASE II RECOVERY - ADDTL 15 MIN: Performed by: OTOLARYNGOLOGY

## 2023-08-31 RX ORDER — DEXAMETHASONE SODIUM PHOSPHATE 10 MG/ML
10 INJECTION, EMULSION INTRAMUSCULAR; INTRAVENOUS ONCE
Status: COMPLETED | OUTPATIENT
Start: 2023-08-31 | End: 2023-08-31

## 2023-08-31 RX ORDER — SODIUM CHLORIDE 9 MG/ML
INJECTION, SOLUTION INTRAVENOUS PRN
Status: DISCONTINUED | OUTPATIENT
Start: 2023-08-31 | End: 2023-08-31 | Stop reason: HOSPADM

## 2023-08-31 RX ORDER — SODIUM CHLORIDE 0.9 % (FLUSH) 0.9 %
5-40 SYRINGE (ML) INJECTION PRN
Status: DISCONTINUED | OUTPATIENT
Start: 2023-08-31 | End: 2023-08-31 | Stop reason: HOSPADM

## 2023-08-31 RX ORDER — MORPHINE SULFATE 2 MG/ML
1 INJECTION, SOLUTION INTRAMUSCULAR; INTRAVENOUS EVERY 5 MIN PRN
Status: DISCONTINUED | OUTPATIENT
Start: 2023-08-31 | End: 2023-08-31 | Stop reason: HOSPADM

## 2023-08-31 RX ORDER — MORPHINE SULFATE 2 MG/ML
2 INJECTION, SOLUTION INTRAMUSCULAR; INTRAVENOUS EVERY 5 MIN PRN
Status: DISCONTINUED | OUTPATIENT
Start: 2023-08-31 | End: 2023-08-31 | Stop reason: HOSPADM

## 2023-08-31 RX ORDER — ROCURONIUM BROMIDE 10 MG/ML
INJECTION, SOLUTION INTRAVENOUS PRN
Status: DISCONTINUED | OUTPATIENT
Start: 2023-08-31 | End: 2023-08-31 | Stop reason: SDUPTHER

## 2023-08-31 RX ORDER — SODIUM CHLORIDE 0.9 % (FLUSH) 0.9 %
5-40 SYRINGE (ML) INJECTION EVERY 12 HOURS SCHEDULED
Status: DISCONTINUED | OUTPATIENT
Start: 2023-08-31 | End: 2023-08-31 | Stop reason: HOSPADM

## 2023-08-31 RX ORDER — IPRATROPIUM BROMIDE AND ALBUTEROL SULFATE 2.5; .5 MG/3ML; MG/3ML
1 SOLUTION RESPIRATORY (INHALATION) EVERY 4 HOURS PRN
Status: DISCONTINUED | OUTPATIENT
Start: 2023-08-31 | End: 2023-08-31 | Stop reason: HOSPADM

## 2023-08-31 RX ORDER — ONDANSETRON 2 MG/ML
INJECTION INTRAMUSCULAR; INTRAVENOUS PRN
Status: DISCONTINUED | OUTPATIENT
Start: 2023-08-31 | End: 2023-08-31 | Stop reason: SDUPTHER

## 2023-08-31 RX ORDER — FENTANYL CITRATE 50 UG/ML
INJECTION, SOLUTION INTRAMUSCULAR; INTRAVENOUS PRN
Status: DISCONTINUED | OUTPATIENT
Start: 2023-08-31 | End: 2023-08-31 | Stop reason: SDUPTHER

## 2023-08-31 RX ORDER — LEVOTHYROXINE SODIUM 137 UG/1
TABLET ORAL
COMMUNITY
Start: 2023-08-29

## 2023-08-31 RX ORDER — CYCLOBENZAPRINE HCL 10 MG
TABLET ORAL
COMMUNITY
Start: 2023-08-29

## 2023-08-31 RX ORDER — ESMOLOL HYDROCHLORIDE 10 MG/ML
INJECTION INTRAVENOUS PRN
Status: DISCONTINUED | OUTPATIENT
Start: 2023-08-31 | End: 2023-08-31 | Stop reason: SDUPTHER

## 2023-08-31 RX ORDER — PROPOFOL 10 MG/ML
INJECTION, EMULSION INTRAVENOUS PRN
Status: DISCONTINUED | OUTPATIENT
Start: 2023-08-31 | End: 2023-08-31 | Stop reason: SDUPTHER

## 2023-08-31 RX ADMIN — SODIUM CHLORIDE 3000 MG: 900 INJECTION INTRAVENOUS at 12:34

## 2023-08-31 RX ADMIN — ONDANSETRON 4 MG: 2 INJECTION INTRAMUSCULAR; INTRAVENOUS at 13:20

## 2023-08-31 RX ADMIN — FENTANYL CITRATE 100 MCG: 50 INJECTION, SOLUTION INTRAMUSCULAR; INTRAVENOUS at 12:46

## 2023-08-31 RX ADMIN — SUGAMMADEX 100 MG: 100 INJECTION, SOLUTION INTRAVENOUS at 13:19

## 2023-08-31 RX ADMIN — ROCURONIUM BROMIDE 40 MG: 10 INJECTION INTRAVENOUS at 12:46

## 2023-08-31 RX ADMIN — FENTANYL CITRATE 50 MCG: 50 INJECTION, SOLUTION INTRAMUSCULAR; INTRAVENOUS at 12:52

## 2023-08-31 RX ADMIN — SUGAMMADEX 200 MG: 100 INJECTION, SOLUTION INTRAVENOUS at 13:16

## 2023-08-31 RX ADMIN — ESMOLOL HYDROCHLORIDE 30 MG: 100 INJECTION, SOLUTION INTRAVENOUS at 13:11

## 2023-08-31 RX ADMIN — PROPOFOL 200 MG: 10 INJECTION, EMULSION INTRAVENOUS at 12:46

## 2023-08-31 RX ADMIN — DEXAMETHASONE SODIUM PHOSPHATE 10 MG: 10 INJECTION, EMULSION INTRAMUSCULAR; INTRAVENOUS at 09:52

## 2023-08-31 RX ADMIN — SODIUM CHLORIDE: 9 INJECTION, SOLUTION INTRAVENOUS at 09:50

## 2023-08-31 ASSESSMENT — PAIN SCALES - GENERAL
PAINLEVEL_OUTOF10: 0
PAINLEVEL_OUTOF10: 6
PAINLEVEL_OUTOF10: 6
PAINLEVEL_OUTOF10: 0

## 2023-08-31 ASSESSMENT — PAIN DESCRIPTION - DESCRIPTORS
DESCRIPTORS: ACHING
DESCRIPTORS: ACHING

## 2023-08-31 ASSESSMENT — PAIN DESCRIPTION - LOCATION: LOCATION: BACK;HIP

## 2023-08-31 ASSESSMENT — PAIN - FUNCTIONAL ASSESSMENT: PAIN_FUNCTIONAL_ASSESSMENT: 0-10

## 2023-08-31 ASSESSMENT — PAIN DESCRIPTION - PAIN TYPE: TYPE: CHRONIC PAIN

## 2023-08-31 ASSESSMENT — PAIN DESCRIPTION - ORIENTATION: ORIENTATION: RIGHT

## 2023-08-31 NOTE — PROGRESS NOTES
1325 pt arrived to PACU, awakens to voice and denies pain. VSS  1335 pt resting off and on, denies pain at this time.  VSS  1345 pt resting, resp easy  1355 meets criteria for discharge from PACU, transported to [facility] in stable condition

## 2023-08-31 NOTE — DISCHARGE INSTR - DIET

## 2023-08-31 NOTE — PROGRESS NOTES
Patient oriented to Same Day department and admitted to Same Day Surgery room 14. Patient verbalized approval for first name, last initial with physician name on unit whiteboard. Plan of care reviewed with patient. Patient room whiteboard filled out and discussed with patient and responsible adult. Patient and responsible adult offered Same Day Welcome Packet to review. Call light in reach. Bed in lowest position, locked, with one bed rail up. SCDs and warming blanket in place. Appropriate arm bands on patient. Bathroom offered. All questions and concerns of patient addressed. Meds to Beds:   Patient informed of [facility] program during admission.  Patient is agreeable to program.   Contact information for the pharmacy and the [facility] program:   Name: Olga Анна   Relationship to patient:spouse/significant other   Phone number: [phone]

## 2023-08-31 NOTE — PROGRESS NOTES
[facility]. Vitals and assessment as charted. 0.9 infusing, @500ml to count from PACU. Pt has muffin and water. Family at the bedside. Pt and family verbalized understanding of discharge criteria and call light use. Call light in reach.

## 2023-08-31 NOTE — ANESTHESIA PRE PROCEDURE
Department of Anesthesiology  Preprocedure Note       Name:  Joaquim Tate   Age:  62 y.o.  :  1965                                          MRN:  [id number]         Date:  2023      Surgeon: Sunita Jordan):  Guy Harris MD    Procedure: Procedure(s):  Esophagoscopy Rigid with Balloon Dilation    Medications prior to admission:   Prior to Admission medications    Medication Sig Start Date End Date Taking? Authorizing Provider   Multiple Vitamins-Minerals (THERAPEUTIC MULTIVITAMIN-MINERALS) tablet Take 1 tablet by mouth daily    Historical Provider, MD   prochlorperazine (COMPAZINE) 10 MG tablet Take 1 tablet by mouth every 6 hours as needed (nausea) 7/3/23   Maisha Kaye MD   lidocaine-prilocaine (EMLA) 2.5-2.5 % cream Apply topically as needed. 7/3/23   Maisha Kaye MD   ondansetron (ZOFRAN-ODT) 4 MG disintegrating tablet Take 1 tablet by mouth every 6 hours as needed for Nausea or Vomiting 23   MADELIN Skelton - CNP   amLODIPine (NORVASC) 5 MG tablet Take 1 tablet by mouth daily 23   Historical Provider, MD   oxyCODONE-acetaminophen (PERCOCET) 5-325 MG per tablet Take 1 tablet by mouth every 8 hours as needed. 23   Historical Provider, MD   atorvastatin (LIPITOR) 20 MG tablet Take 1 tablet by mouth daily 3/2/23   Historical Provider, MD   omeprazole (PRILOSEC) 40 MG delayed release capsule Take 1 capsule by mouth 2 times daily (before meals) 23  Guy Harris MD   losartan (COZAAR) 50 MG tablet Take 1 tablet by mouth daily 22   Historical Provider, MD   traZODone (DESYREL) 50 MG tablet Take 1 tablet by mouth daily 22   Historical Provider, MD   levothyroxine (SYNTHROID) 125 MCG tablet Take one tablet first thing in the morning on an empty stomach.  21   Historical Provider, MD   mirtazapine (REMERON) 15 MG tablet Take 1 tablet by mouth daily 21   Historical Provider, MD   OLANZapine (ZYPREXA) 10 MG tablet Take 1 tablet by mouth

## 2023-08-31 NOTE — OP NOTE
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 Stricture at the upper esophageal sphincter  2. Extensive postcricoid redundancy with a posterior polyp at 7:00  3. Dilated with 8 cm 20 mm balloon to 7 pradeep; endoscopically evaluated post dilation with no evidence of full-thickness shearing with minimal bleeding at 3 and 9:00. Detailed Description of Procedure: The patient taken the operating room and placed in supine position. General anesthesia was induced and the patient was intubated with a #6 endotracheal tube without difficulty or vital sign stability. Temperature 90 degrees and the patient was draped in usual fashion for transoral surgery. I performed a timeout verifying patient identity planned procedure. Suspension laryngoscopy for access: My assistant then placed first a 15 cm  Daija laryngoscope into the patient's oropharynx to the larynx and provided direct line of sight view of the esophageal inlet. The cicatrix prevented easy visualization past the sphincter into the cervical esophagus. I then passed a 8 cm x 20 mm pneumatic dilator into the esophageal inlet and set it at approximately the midpoint of the length of the balloon.      I had the balloon distended from 2 to 4 to 6 pradeep of pressure until no further fluid needed to be added to study will be performed. If no leak is confirmed, the patient will be returned to oral nutrition thereafter. He will be cleared to go back to chemotherapy approximately 1 month following for this planned procedure. Therefore he will need a 2-month hiatus from chemotherapy at a minimum.     Electronically signed by Luca Sharma MD on 8/31/2023 at 1:47 PM

## 2023-08-31 NOTE — PROGRESS NOTES
Pt has met discharge criteria and states he is ready for discharge to home. IV removed, gauze and tape applied. Dressed in own clothes and personal belongings gathered. Discharge instructions (with opioid medication education information) given to pt and family; pt and family verbalized understanding of discharge instructions, prescriptions and follow up appointments. Pt transported to discharge lobby by [facility] staff.

## 2023-08-31 NOTE — ANESTHESIA POSTPROCEDURE EVALUATION
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     Romeo Phase I: Romeo Score: 10    Romeo Phase II: Romeo Score: 10      Anesthesia Post Evaluation    Patient location during evaluation: PACU  Patient participation: complete - patient participated  Level of consciousness: awake  Airway patency: patent  Nausea & Vomiting: no vomiting and no nausea  Complications: no  Cardiovascular status: hemodynamically stable  Respiratory status: acceptable  Hydration status: stable  Pain management: adequate

## 2023-09-01 NOTE — PROGRESS NOTES
cold sensitivity.     -8/2022 hgb 13 and hct was 39 with MCV of 93.   -9/6/2022. Follow-up with Dr. Lacey Mason at [facility].     -10/11/2022. Follow-up with Dr. Joshua Lara of radiation oncology.     -1/9/2023 preop CBC showed hemoglobin was 9.3 and 29   -1/11/2023. Suspension laryngoscopy, flexible esophagoscopy with savory wire bougie dilatation and biopsies by Dr. Aneita Ormond because of laryngoesophageal dysphagia with chronic esophagitis and GERD, versus esophageal sphincter stricture  with cricopharyngeal hypertrophy. The findings were very hypertrophic cricopharyngeus muscle which was difficult to get past even with rigid instruments. There was guidewire balloon dilatation of the esophageal inlet being careful to avoid compression of the carotids which were seen pushing the esophageal mucosa medially. The lower esophageal sphincter had changes consistent with Meeks's esophagus and biopsies were taken. Biopsy showed squamocolumnar junction tissue without metaplasia or inflammation.     -3/22/2023. Repeat suspension laryngoscopy for access with rigid esophagoscopy and balloon dilatation Dr. Aneita Ormond. 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 The flow of gastric fluids into the hypopharynx was continuous during the dilation procedure.     -4/18/2023. Melendez probe placed by Dr. Stephanie Contreras for evaluation of acid reflux.     -4/28 - 5/2/2023. Hospitalized at [facility] for symptomatic anemia. This was found on preadmission testing ordered by Dr. Aneita Ormond. CBC showed a white count of 8.9 with a hemoglobin of 5.8 hematocrit 22.6 and a platelet count of 054,408. The MCV was 69. This was a marked change since previous CBC from 1/9/2023 at which time his hemoglobin was 9.3 and 29 prior to that in August 2022 his hemoglobin was 13 and hematocrit was 39 with an MCV of 93. Patient admitted to shortness of breath and lightheadedness.   His

## 2023-09-05 ENCOUNTER — SOCIAL WORK (OUTPATIENT)
Dept: INFUSION THERAPY | Age: 58
End: 2023-09-05

## 2023-09-05 ENCOUNTER — CLINICAL DOCUMENTATION (OUTPATIENT)
Dept: CASE MANAGEMENT | Age: 58
End: 2023-09-05

## 2023-09-05 ENCOUNTER — HOSPITAL ENCOUNTER (OUTPATIENT)
Dept: INFUSION THERAPY | Age: 58
Discharge: HOME | End: 2023-09-05
Payer: MEDICARE

## 2023-09-05 ENCOUNTER — OFFICE VISIT (OUTPATIENT)
Dept: ONCOLOGY | Age: 58
End: 2023-09-05
Payer: MEDICARE

## 2023-09-05 VITALS
OXYGEN SATURATION: 97 % | TEMPERATURE: 97.6 F | DIASTOLIC BLOOD PRESSURE: 95 MMHG | WEIGHT: 143.8 LBS | HEART RATE: 59 BPM | BODY MASS INDEX: 20.59 KG/M2 | HEIGHT: 70 IN | SYSTOLIC BLOOD PRESSURE: 147 MMHG | RESPIRATION RATE: 18 BRPM

## 2023-09-05 VITALS
WEIGHT: 143 LBS | TEMPERATURE: 97.6 F | HEART RATE: 62 BPM | SYSTOLIC BLOOD PRESSURE: 145 MMHG | BODY MASS INDEX: 20.47 KG/M2 | OXYGEN SATURATION: 99 % | HEIGHT: 70 IN | RESPIRATION RATE: 18 BRPM | DIASTOLIC BLOOD PRESSURE: 100 MMHG

## 2023-09-05 DIAGNOSIS — D50.0 IRON DEFICIENCY ANEMIA DUE TO CHRONIC BLOOD LOSS: ICD-10-CM

## 2023-09-05 DIAGNOSIS — C18.2 MALIGNANT NEOPLASM OF ASCENDING COLON (HCC): Primary | ICD-10-CM

## 2023-09-05 DIAGNOSIS — C18.2 MALIGNANT NEOPLASM OF ASCENDING COLON (HCC): ICD-10-CM

## 2023-09-05 DIAGNOSIS — C18.9 MALIGNANT NEOPLASM OF COLON, UNSPECIFIED PART OF COLON (HCC): Primary | ICD-10-CM

## 2023-09-05 DIAGNOSIS — C06.2 SQUAMOUS CELL CANCER OF RETROMOLAR TRIGONE (HCC): ICD-10-CM

## 2023-09-05 LAB
ABSOLUTE IMMATURE GRANULOCYTE: 0.02 THOU/MM3 (ref 0–0.07)
ALBUMIN SERPL BCG-MCNC: 4.3 G/DL (ref 3.5–5.1)
ALP SERPL-CCNC: 58 U/L (ref 38–126)
ALT SERPL W/O P-5'-P-CCNC: 20 U/L (ref 11–66)
AST SERPL-CCNC: 27 U/L (ref 5–40)
BASOPHILS ABSOLUTE: 0 THOU/MM3 (ref 0–0.1)
BASOPHILS NFR BLD AUTO: 0 % (ref 0–3)
BILIRUB CONJ SERPL-MCNC: < 0.2 MG/DL (ref 0–0.3)
BILIRUB SERPL-MCNC: 0.3 MG/DL (ref 0.3–1.2)
BUN BLDP-MCNC: 4 MG/DL (ref 8–26)
CHLORIDE BLD-SCNC: 101 MEQ/L (ref 98–109)
CREAT BLD-MCNC: 1 MG/DL (ref 0.5–1.2)
EOSINOPHIL NFR BLD AUTO: 1 % (ref 0–4)
EOSINOPHILS ABSOLUTE: 0 THOU/MM3 (ref 0–0.4)
ERYTHROCYTE [DISTWIDTH] IN BLOOD BY AUTOMATED COUNT: 17.9 % (ref 11.5–14.5)
GFR SERPL CREATININE-BSD FRML MDRD: > 60 ML/MIN/1.73M2
GLUCOSE BLD-MCNC: 76 MG/DL (ref 70–108)
HCT VFR BLD AUTO: 42 % (ref 42–52)
HGB BLD-MCNC: 13.9 GM/DL (ref 14–18)
IMMATURE GRANULOCYTES: 0 %
IONIZED CALCIUM, WHOLE BLOOD: 1.15 MMOL/L (ref 1.12–1.32)
LYMPHOCYTES ABSOLUTE: 2.5 THOU/MM3 (ref 1–4.8)
LYMPHOCYTES NFR BLD AUTO: 52 % (ref 15–47)
MCH RBC QN AUTO: 30.3 PG (ref 26–33)
MCHC RBC AUTO-ENTMCNC: 33.1 GM/DL (ref 32.2–35.5)
MCV RBC AUTO: 92 FL (ref 80–94)
MONOCYTES ABSOLUTE: 0.7 THOU/MM3 (ref 0.4–1.3)
MONOCYTES NFR BLD AUTO: 14 % (ref 0–12)
NEUTROPHILS NFR BLD AUTO: 32 % (ref 43–75)
PLATELET # BLD AUTO: 161 THOU/MM3 (ref 130–400)
PMV BLD AUTO: 9.8 FL (ref 9.4–12.4)
POTASSIUM BLD-SCNC: 3.4 MEQ/L (ref 3.5–4.9)
PROT SERPL-MCNC: 7.1 G/DL (ref 6.1–8)
RBC # BLD AUTO: 4.59 MILL/MM3 (ref 4.7–6.1)
SEGMENTED NEUTROPHILS ABSOLUTE COUNT: 1.5 THOU/MM3 (ref 1.8–7.7)
SODIUM BLD-SCNC: 137 MEQ/L (ref 138–146)
TOTAL CO2, WHOLE BLOOD: 26 MEQ/L (ref 23–33)
WBC # BLD AUTO: 4.7 THOU/MM3 (ref 4.8–10.8)

## 2023-09-05 PROCEDURE — 80076 HEPATIC FUNCTION PANEL: CPT

## 2023-09-05 PROCEDURE — 96375 TX/PRO/DX INJ NEW DRUG ADDON: CPT

## 2023-09-05 PROCEDURE — 85025 COMPLETE CBC W/AUTO DIFF WBC: CPT

## 2023-09-05 PROCEDURE — 6360000002 HC RX W HCPCS: Performed by: INTERNAL MEDICINE

## 2023-09-05 PROCEDURE — 2580000003 HC RX 258: Performed by: INTERNAL MEDICINE

## 2023-09-05 PROCEDURE — 96367 TX/PROPH/DG ADDL SEQ IV INF: CPT

## 2023-09-05 PROCEDURE — 36591 DRAW BLOOD OFF VENOUS DEVICE: CPT

## 2023-09-05 PROCEDURE — 96413 CHEMO IV INFUSION 1 HR: CPT

## 2023-09-05 PROCEDURE — 80047 BASIC METABLC PNL IONIZED CA: CPT

## 2023-09-05 PROCEDURE — 99214 OFFICE O/P EST MOD 30 MIN: CPT | Performed by: INTERNAL MEDICINE

## 2023-09-05 PROCEDURE — 96415 CHEMO IV INFUSION ADDL HR: CPT

## 2023-09-05 PROCEDURE — 96416 CHEMO PROLONG INFUSE W/PUMP: CPT

## 2023-09-05 PROCEDURE — 99211 OFF/OP EST MAY X REQ PHY/QHP: CPT

## 2023-09-05 PROCEDURE — 96368 THER/DIAG CONCURRENT INF: CPT

## 2023-09-05 RX ORDER — SODIUM CHLORIDE 9 MG/ML
5-250 INJECTION, SOLUTION INTRAVENOUS PRN
Status: CANCELLED | OUTPATIENT
Start: 2023-09-07

## 2023-09-05 RX ORDER — SODIUM CHLORIDE 0.9 % (FLUSH) 0.9 %
5-40 SYRINGE (ML) INJECTION PRN
Status: CANCELLED | OUTPATIENT
Start: 2023-09-07

## 2023-09-05 RX ORDER — EPINEPHRINE 1 MG/ML
0.3 INJECTION, SOLUTION, CONCENTRATE INTRAVENOUS PRN
Status: CANCELLED | OUTPATIENT
Start: 2023-09-05

## 2023-09-05 RX ORDER — DIPHENHYDRAMINE HYDROCHLORIDE 50 MG/ML
50 INJECTION INTRAMUSCULAR; INTRAVENOUS
Status: CANCELLED | OUTPATIENT
Start: 2023-09-05

## 2023-09-05 RX ORDER — DEXTROSE MONOHYDRATE 50 MG/ML
5-250 INJECTION, SOLUTION INTRAVENOUS PRN
Status: DISCONTINUED | OUTPATIENT
Start: 2023-09-05 | End: 2023-09-06 | Stop reason: HOSPADM

## 2023-09-05 RX ORDER — DEXTROSE MONOHYDRATE 50 MG/ML
5-250 INJECTION, SOLUTION INTRAVENOUS PRN
Status: CANCELLED | OUTPATIENT
Start: 2023-09-05

## 2023-09-05 RX ORDER — HEPARIN SODIUM (PORCINE) LOCK FLUSH IV SOLN 100 UNIT/ML 100 UNIT/ML
500 SOLUTION INTRAVENOUS PRN
Status: CANCELLED | OUTPATIENT
Start: 2023-09-07

## 2023-09-05 RX ORDER — ALBUTEROL SULFATE 90 UG/1
4 AEROSOL, METERED RESPIRATORY (INHALATION) PRN
Status: CANCELLED | OUTPATIENT
Start: 2023-09-05

## 2023-09-05 RX ORDER — MEPERIDINE HYDROCHLORIDE 50 MG/ML
12.5 INJECTION INTRAMUSCULAR; INTRAVENOUS; SUBCUTANEOUS PRN
Status: CANCELLED | OUTPATIENT
Start: 2023-09-05

## 2023-09-05 RX ORDER — HEPARIN 100 UNIT/ML
500 SYRINGE INTRAVENOUS PRN
OUTPATIENT
Start: 2023-09-05

## 2023-09-05 RX ORDER — SODIUM CHLORIDE 9 MG/ML
25 INJECTION, SOLUTION INTRAVENOUS PRN
OUTPATIENT
Start: 2023-09-05

## 2023-09-05 RX ORDER — ACETAMINOPHEN 325 MG/1
650 TABLET ORAL
Status: CANCELLED | OUTPATIENT
Start: 2023-09-05

## 2023-09-05 RX ORDER — SODIUM CHLORIDE 0.9 % (FLUSH) 0.9 %
5-40 SYRINGE (ML) INJECTION PRN
Status: DISCONTINUED | OUTPATIENT
Start: 2023-09-05 | End: 2023-09-06 | Stop reason: HOSPADM

## 2023-09-05 RX ORDER — HEPARIN 100 UNIT/ML
500 SYRINGE INTRAVENOUS PRN
Status: DISCONTINUED | OUTPATIENT
Start: 2023-09-05 | End: 2023-09-06 | Stop reason: HOSPADM

## 2023-09-05 RX ORDER — SODIUM CHLORIDE 0.9 % (FLUSH) 0.9 %
5-40 SYRINGE (ML) INJECTION PRN
Status: CANCELLED | OUTPATIENT
Start: 2023-09-05

## 2023-09-05 RX ORDER — FAMOTIDINE 10 MG/ML
20 INJECTION, SOLUTION INTRAVENOUS
Status: CANCELLED | OUTPATIENT
Start: 2023-09-05

## 2023-09-05 RX ORDER — PALONOSETRON 0.05 MG/ML
0.25 INJECTION, SOLUTION INTRAVENOUS ONCE
Status: COMPLETED | OUTPATIENT
Start: 2023-09-05 | End: 2023-09-05

## 2023-09-05 RX ORDER — HEPARIN SODIUM (PORCINE) LOCK FLUSH IV SOLN 100 UNIT/ML 100 UNIT/ML
500 SOLUTION INTRAVENOUS PRN
Status: CANCELLED | OUTPATIENT
Start: 2023-09-05

## 2023-09-05 RX ORDER — ONDANSETRON 2 MG/ML
8 INJECTION INTRAMUSCULAR; INTRAVENOUS
Status: CANCELLED | OUTPATIENT
Start: 2023-09-05

## 2023-09-05 RX ORDER — SODIUM CHLORIDE 9 MG/ML
5-250 INJECTION, SOLUTION INTRAVENOUS PRN
Status: CANCELLED | OUTPATIENT
Start: 2023-09-05

## 2023-09-05 RX ORDER — PALONOSETRON 0.05 MG/ML
0.25 INJECTION, SOLUTION INTRAVENOUS ONCE
Status: CANCELLED | OUTPATIENT
Start: 2023-09-05 | End: 2023-09-05

## 2023-09-05 RX ORDER — SODIUM CHLORIDE 9 MG/ML
INJECTION, SOLUTION INTRAVENOUS CONTINUOUS
Status: CANCELLED | OUTPATIENT
Start: 2023-09-05

## 2023-09-05 RX ORDER — SODIUM CHLORIDE 0.9 % (FLUSH) 0.9 %
5-40 SYRINGE (ML) INJECTION PRN
OUTPATIENT
Start: 2023-09-05

## 2023-09-05 RX ADMIN — DEXAMETHASONE SODIUM PHOSPHATE 12 MG: 4 INJECTION, SOLUTION INTRA-ARTICULAR; INTRALESIONAL; INTRAMUSCULAR; INTRAVENOUS; SOFT TISSUE at 10:03

## 2023-09-05 RX ADMIN — LEUCOVORIN CALCIUM 750 MG: 350 INJECTION, POWDER, LYOPHILIZED, FOR SUSPENSION INTRAMUSCULAR; INTRAVENOUS at 10:55

## 2023-09-05 RX ADMIN — SODIUM CHLORIDE, PRESERVATIVE FREE 10 ML: 5 INJECTION INTRAVENOUS at 09:13

## 2023-09-05 RX ADMIN — SODIUM CHLORIDE, PRESERVATIVE FREE 10 ML: 5 INJECTION INTRAVENOUS at 09:59

## 2023-09-05 RX ADMIN — DEXTROSE MONOHYDRATE 20 ML/HR: 50 INJECTION, SOLUTION INTRAVENOUS at 09:59

## 2023-09-05 RX ADMIN — PALONOSETRON 0.25 MG: 0.05 INJECTION, SOLUTION INTRAVENOUS at 10:00

## 2023-09-05 RX ADMIN — FLUOROURACIL 4450 MG: 50 INJECTION, SOLUTION INTRAVENOUS at 13:31

## 2023-09-05 RX ADMIN — OXALIPLATIN 120 MG: 5 INJECTION, SOLUTION INTRAVENOUS at 10:57

## 2023-09-05 RX ADMIN — SODIUM CHLORIDE, PRESERVATIVE FREE 10 ML: 5 INJECTION INTRAVENOUS at 13:30

## 2023-09-05 RX ADMIN — SODIUM CHLORIDE, PRESERVATIVE FREE 10 ML: 5 INJECTION INTRAVENOUS at 09:11

## 2023-09-05 NOTE — PROGRESS NOTES
Oncology Social Work    Date: 9/5/2023  Time: 1:57 PM  Name: Becka Lopez  MRN: [id number]     Contact Type: Follow-up    Note:   Situation: This staff contacted Becka Lopez (goes by Trung Hathaway) to introduce myself as an Oncology Social Worker while he was in the Infusion treatment area. Background: Trung Hathaway was referred to Patricia Barraza by his Oncology nurse during today's treatment in the [facility].  - He was accompanied by his S/O Debbie Hallman) during this conversation. Assessment: Trung Hathaway explained that they currently live in [place] however the patients records indicate he lives in [place]. Debbie Hallman explained that they are moving to that address by the end of the week. Currently that is the home of a friend of theirs and they stay with him while Trung Hathaway comes for treatments. Trung Hathaway asked that I provide them with transportation assistance. After further investigation, this staff presented Trung Hathaway and Debbie Hallman with resources to assist, However, Debbie Hallman pointed out they didn't like those options (will not ride the [employer] bus) but wanted a gas card instead. This staff explained the procedure for distribution of the gas cards and when other local options are available, I am expected to utilize those services first. They expressed understanding although voiced their disapproval stating \"they heard from a friend you can get gas cards from here. \"  - This staff encouraged them to contact [address] [facility] for additional financial support with their utilities. Paperwork explaining the different options was provided to them along with the contact phone number. Unfortunately, Trung Hathaway and Debbie Hallman are limited in their ability to navigate the Internet and they have no access to a computer other than through their phones. Debbie Hallman pointed out her inability to use her phone's Internet capability. She was offered the option of using the computers at [employer] and again she refused, stating they don't like to go there. Patient started having symptoms of covid on  10/5 was the first symptoms. Patient just asking about isolation time.   Writer answered questions per CDC. No further questions.

## 2023-09-05 NOTE — PATIENT INSTRUCTIONS
Treatment today, pt doing well  Foundation one to be collected on his tumor tissue collected  RTC with labs 2 weeks

## 2023-09-05 NOTE — PLAN OF CARE
Problem: Safety - Adult  Goal: Free from fall injury  Outcome: Adequate for Discharge  Flowsheets (Taken 9/5/2023 1112)  Free From Fall Injury: Instruct family/caregiver on patient safety  Note: Patient verbalizes understanding of fall precautions. Patient free from falls this visit. Problem: Infection - Adult  Goal: Absence of infection at discharge  Outcome: Adequate for Discharge  Flowsheets (Taken 9/5/2023 1112)  Absence of infection at discharge:   Assess and monitor for signs and symptoms of infection   Monitor all insertion sites i.e., indwelling lines, tubes and drains  Note: Mediport site with no redness or warmth. Skin over port intact with no signs of breakdown noted. Patient verbalizes signs/symptoms of port infection and when to notify the physician. Problem: Discharge Planning  Goal: Discharge to home or other facility with appropriate resources  Outcome: Adequate for Discharge  Flowsheets (Taken 9/5/2023 1112)  Discharge to home or other facility with appropriate resources: Identify barriers to discharge with patient and caregiver  Note: Verbalized understanding of discharge instructions, follow-up appointments, and when to call the physician. Chemotherapy Teaching     What is Chemotherapy   Drug action [x]   Method of Administration [x]   Handouts given []     Side Effects  Nausea/vomiting [x]   Diarrhea [x]   Fatigue [x]   Signs / Symptoms of infection [x]   Neutropenia [x]   Thrombocytopenia [x]   Alopecia [x]   neuropathy [x]   Thermal diet &  the importance of fluids [x]       Micellaneous  Importance of nutrition [x]   Importance of oral hygiene [x]   When to call the MD [x]   Monitoring labs [x]   Use of supportive services []     Explanation of Drug Regimen / Frequency  Oxaliplatin, leucovorin, and 5FU     Comments  Verbalized understanding to drug,action,side effects and when to call MD     Care plan reviewed with patient and family.   Patient and family verbalize understanding of

## 2023-09-05 NOTE — DISCHARGE INSTRUCTIONS
Notes  Treatment today, pt doing well  Foundation one to be collected on his tumor tissue collected  RTC with labs 2 weeks    Please contact your Oncologist if you have any questions regarding the chemotherapy Oxaliplatin, leucovorin, 5FU that you received today. Patient instructed if experience any of the symptoms following today's chemotherapy / to notify MD immediately or go to emergency department.     * dizziness/lightheadedness  *acute nausea/vomiting - not relieved with medication  *headache - not relieved from Tylenol/pain medication  *chest pain/pressure  *rash/itching  *shortness of breath        Drink fluids - 48oz fluids daily  Call if develop fever/ chills/ signs or symptoms of infection

## 2023-09-05 NOTE — PROGRESS NOTES
Note:   Davonte notified by Dr Phillip Graves [facility] for [facility] on tissue for this patient. Email communication sent to [address] [place] [address] for coordination.

## 2023-09-07 ENCOUNTER — HOSPITAL ENCOUNTER (OUTPATIENT)
Dept: INFUSION THERAPY | Age: 58
Discharge: HOME | End: 2023-09-07
Payer: MEDICARE

## 2023-09-07 VITALS
RESPIRATION RATE: 18 BRPM | DIASTOLIC BLOOD PRESSURE: 92 MMHG | HEIGHT: 70 IN | BODY MASS INDEX: 20.47 KG/M2 | TEMPERATURE: 97.9 F | WEIGHT: 143 LBS | OXYGEN SATURATION: 97 % | HEART RATE: 71 BPM | SYSTOLIC BLOOD PRESSURE: 145 MMHG

## 2023-09-07 DIAGNOSIS — C18.9 MALIGNANT NEOPLASM OF COLON, UNSPECIFIED PART OF COLON (HCC): Primary | ICD-10-CM

## 2023-09-07 DIAGNOSIS — C06.2 SQUAMOUS CELL CANCER OF RETROMOLAR TRIGONE (HCC): ICD-10-CM

## 2023-09-07 PROCEDURE — 2580000003 HC RX 258: Performed by: INTERNAL MEDICINE

## 2023-09-07 PROCEDURE — 6360000002 HC RX W HCPCS: Performed by: INTERNAL MEDICINE

## 2023-09-07 PROCEDURE — 96523 IRRIG DRUG DELIVERY DEVICE: CPT

## 2023-09-07 RX ORDER — SODIUM CHLORIDE 0.9 % (FLUSH) 0.9 %
5-40 SYRINGE (ML) INJECTION PRN
Status: DISCONTINUED | OUTPATIENT
Start: 2023-09-07 | End: 2023-09-08 | Stop reason: HOSPADM

## 2023-09-07 RX ORDER — ONDANSETRON 4 MG/1
4 TABLET, ORALLY DISINTEGRATING ORAL EVERY 6 HOURS PRN
Qty: 30 TABLET | Refills: 3 | Status: SHIPPED | OUTPATIENT
Start: 2023-09-07

## 2023-09-07 RX ORDER — HEPARIN 100 UNIT/ML
500 SYRINGE INTRAVENOUS PRN
Status: DISCONTINUED | OUTPATIENT
Start: 2023-09-07 | End: 2023-09-08 | Stop reason: HOSPADM

## 2023-09-07 RX ADMIN — Medication 500 UNITS: at 11:52

## 2023-09-07 RX ADMIN — SODIUM CHLORIDE, PRESERVATIVE FREE 20 ML: 5 INJECTION INTRAVENOUS at 11:52

## 2023-09-07 NOTE — PLAN OF CARE
Problem: Safety - Adult  Goal: Free from fall injury  Outcome: Adequate for Discharge  Flowsheets (Taken 9/7/2023 1546)  Free From Fall Injury:   Instruct family/caregiver on patient safety   Based on caregiver fall risk screen, instruct family/caregiver to ask for assistance with transferring infant if caregiver noted to have fall risk factors  Note: Free from falls while in O.P. Oncology. Problem: Infection - Adult  Goal: Absence of infection at discharge  Outcome: Adequate for Discharge  Flowsheets (Taken 9/7/2023 1546)  Absence of infection at discharge:   Assess and monitor for signs and symptoms of infection   Monitor all insertion sites i.e., indwelling lines, tubes and drains  Note: Mediport site with no redness or warmth. Skin over port site intact with no signs of breakdown noted. Patient verbalizes signs/symptoms of port infection and when to notify the physician. Problem: Discharge Planning  Goal: Discharge to home or other facility with appropriate resources  Outcome: Adequate for Discharge  Flowsheets (Taken 9/7/2023 1546)  Discharge to home or other facility with appropriate resources: Identify barriers to discharge with patient and caregiver  Note: Verbalize understanding of discharge instructions, follow up appointments, and when to call Physician. Care plan reviewed with patient and spouse. Patient and spouse verbalize understanding of the plan of care and contribute to goal setting.

## 2023-09-07 NOTE — DISCHARGE INSTRUCTIONS
You had your CADD pump off while in Outpatient Oncology clinic, Please call us at [phone] if you have any questions or concerns about your visit or medications that you received today. It is important that you drink 48-64 ounces of water everyday.

## 2023-09-17 NOTE — PROGRESS NOTES
disintegrating tablet Take 1 tablet by mouth every 6 hours as needed for Nausea or Vomiting 30 tablet 3    cyclobenzaprine (FLEXERIL) 10 MG tablet       levothyroxine (SYNTHROID) 137 MCG tablet       Multiple Vitamins-Minerals (THERAPEUTIC MULTIVITAMIN-MINERALS) tablet Take 1 tablet by mouth daily      prochlorperazine (COMPAZINE) 10 MG tablet Take 1 tablet by mouth every 6 hours as needed (nausea) 30 tablet 1    lidocaine-prilocaine (EMLA) 2.5-2.5 % cream Apply topically as needed. 5 g 5    amLODIPine (NORVASC) 5 MG tablet Take 1 tablet by mouth daily      oxyCODONE-acetaminophen (PERCOCET) 5-325 MG per tablet Take 1 tablet by mouth every 8 hours as needed. atorvastatin (LIPITOR) 20 MG tablet Take 1 tablet by mouth daily      losartan (COZAAR) 50 MG tablet Take 1 tablet by mouth daily      traZODone (DESYREL) 50 MG tablet Take 1 tablet by mouth daily      mirtazapine (REMERON) 15 MG tablet Take 1 tablet by mouth daily      OLANZapine (ZYPREXA) 10 MG tablet Take 1 tablet by mouth nightly      OXcarbazepine (TRILEPTAL) 150 MG tablet Take 1 tablet by mouth daily      Sertraline HCl (ZOLOFT PO) Take 1 tablet by mouth daily Pt unsure on dose      omeprazole (PRILOSEC) 40 MG delayed release capsule Take 1 capsule by mouth 2 times daily (before meals) 180 capsule 4     No current facility-administered medications for this visit.      Facility-Administered Medications Ordered in Other Visits   Medication Dose Route Frequency Provider Last Rate Last Admin    sodium chloride flush 0.9 % injection 5-40 mL  5-40 mL IntraVENous PRRAFAELA Singleton MD   10 mL at 09/19/23 1246    heparin (PF) 100 UNIT/ML injection 500 Units  500 Units IntraCATHeter PRRAFAELA Singleton MD         Past Medical History:  Past Medical History:   Diagnosis Date    A-fib [facility])     Baki-no blood thinners    Anxiety     Aortic aneurysm ([address])     Arthritis     Asthma     Colon cancer ([address])     Depression     Head and neck cancer ([address])

## 2023-09-18 ENCOUNTER — OFFICE VISIT (OUTPATIENT)
Dept: ENT CLINIC | Age: 58
End: 2023-09-18
Payer: MEDICARE

## 2023-09-18 VITALS
HEART RATE: 68 BPM | SYSTOLIC BLOOD PRESSURE: 118 MMHG | WEIGHT: 130.2 LBS | TEMPERATURE: 99 F | BODY MASS INDEX: 18.64 KG/M2 | RESPIRATION RATE: 16 BRPM | OXYGEN SATURATION: 97 % | HEIGHT: 70 IN | DIASTOLIC BLOOD PRESSURE: 62 MMHG

## 2023-09-18 DIAGNOSIS — G51.0 FACIAL NERVE PALSY: Primary | ICD-10-CM

## 2023-09-18 DIAGNOSIS — R13.14 PHARYNGOESOPHAGEAL DYSPHAGIA: ICD-10-CM

## 2023-09-18 DIAGNOSIS — K22.2 ESOPHAGEAL STRICTURE: ICD-10-CM

## 2023-09-18 DIAGNOSIS — L59.8 RADIATION SKIN FIBROSIS FROM THERAPEUTIC PROCEDURE: ICD-10-CM

## 2023-09-18 DIAGNOSIS — K21.00 GASTROESOPHAGEAL REFLUX DISEASE WITH ESOPHAGITIS WITHOUT HEMORRHAGE: ICD-10-CM

## 2023-09-18 PROCEDURE — 99213 OFFICE O/P EST LOW 20 MIN: CPT | Performed by: OTOLARYNGOLOGY

## 2023-09-18 NOTE — PROGRESS NOTES
[facility]  [address] [address]  Dept: [phone]  Dept Fax: [phone]  Loc: [phone]    Becka Lopez is a 62 y.o. male who was referred by No ref. provider found for:  Chief Complaint   Patient presents with    Post-Op Check     Patient is here post op esophagoscopy w dilation 08/31. HPI:     Becka Lopez is a 62 y.o. male with history of radiation and chemotherapy for a retromolar trigone cancer completing treatment in 2015 which was staged as a IV A lesion with a pT4a, pN2b designation. As a result of that treatment and his chronic extra esophageal reflux, the patient has been having rigid esophagoscopy with balloon dilation every few months to maintain an oral diet. He has been doing well up to the recent past when he was diagnosed with severe anemia that was found to be coming from a left colon mass that was found to be stage IV colon cancer. He had a partial colectomy and has been getting radiation and chemotherapy for his disease and overall doing fairly well. In the last couple of weeks the patient has fallen off his normal vigorous diet, complaining of bloating of his abdomen and difficulty getting food to pass through at the level of his abdomen. His wife has been concerned about the appearance of his mouth and the way he speaks thinking out loud that may be he has had a stroke. He thinks his mouth is working differently and for some reason he cannot control his lower lip.      History:     No Known Allergies  Current Outpatient Medications   Medication Sig Dispense Refill    ondansetron (ZOFRAN-ODT) 4 MG disintegrating tablet Take 1 tablet by mouth every 6 hours as needed for Nausea or Vomiting 30 tablet 3    cyclobenzaprine (FLEXERIL) 10 MG tablet       levothyroxine (SYNTHROID) 137 MCG tablet       Multiple Vitamins-Minerals (THERAPEUTIC MULTIVITAMIN-MINERALS) tablet Take 1 tablet by

## 2023-09-19 ENCOUNTER — TELEPHONE (OUTPATIENT)
Dept: CARDIOLOGY CLINIC | Age: 58
End: 2023-09-19

## 2023-09-19 ENCOUNTER — OFFICE VISIT (OUTPATIENT)
Dept: ONCOLOGY | Age: 58
End: 2023-09-19
Payer: MEDICARE

## 2023-09-19 ENCOUNTER — HOSPITAL ENCOUNTER (OUTPATIENT)
Dept: INFUSION THERAPY | Age: 58
Discharge: HOME | End: 2023-09-19
Payer: MEDICARE

## 2023-09-19 ENCOUNTER — HOSPITAL ENCOUNTER (OUTPATIENT)
Dept: CT IMAGING | Age: 58
Discharge: HOME | End: 2023-09-19
Attending: OTOLARYNGOLOGY
Payer: MEDICARE

## 2023-09-19 VITALS
SYSTOLIC BLOOD PRESSURE: 136 MMHG | DIASTOLIC BLOOD PRESSURE: 81 MMHG | OXYGEN SATURATION: 98 % | TEMPERATURE: 97.8 F | RESPIRATION RATE: 18 BRPM | HEART RATE: 59 BPM

## 2023-09-19 VITALS
HEIGHT: 70 IN | RESPIRATION RATE: 18 BRPM | DIASTOLIC BLOOD PRESSURE: 85 MMHG | SYSTOLIC BLOOD PRESSURE: 146 MMHG | TEMPERATURE: 98.9 F | WEIGHT: 130 LBS | OXYGEN SATURATION: 99 % | BODY MASS INDEX: 18.61 KG/M2 | HEART RATE: 55 BPM

## 2023-09-19 DIAGNOSIS — D50.0 IRON DEFICIENCY ANEMIA DUE TO CHRONIC BLOOD LOSS: ICD-10-CM

## 2023-09-19 DIAGNOSIS — C18.7 MALIGNANT NEOPLASM OF SIGMOID COLON (HCC): ICD-10-CM

## 2023-09-19 DIAGNOSIS — G51.0 FACIAL NERVE PALSY: ICD-10-CM

## 2023-09-19 DIAGNOSIS — R47.81 SLURRED SPEECH: ICD-10-CM

## 2023-09-19 DIAGNOSIS — C18.2 MALIGNANT NEOPLASM OF ASCENDING COLON (HCC): Primary | ICD-10-CM

## 2023-09-19 DIAGNOSIS — C18.9 MALIGNANT NEOPLASM OF COLON, UNSPECIFIED PART OF COLON (HCC): ICD-10-CM

## 2023-09-19 LAB
ABSOLUTE IMMATURE GRANULOCYTE: 0.01 THOU/MM3 (ref 0–0.07)
ALBUMIN SERPL BCG-MCNC: 4 G/DL (ref 3.5–5.1)
ALP SERPL-CCNC: 56 U/L (ref 38–126)
ALT SERPL W/O P-5'-P-CCNC: 14 U/L (ref 11–66)
AST SERPL-CCNC: 19 U/L (ref 5–40)
BASOPHILS ABSOLUTE: 0 THOU/MM3 (ref 0–0.1)
BASOPHILS NFR BLD AUTO: 1 % (ref 0–3)
BILIRUB CONJ SERPL-MCNC: < 0.2 MG/DL (ref 0–0.3)
BILIRUB SERPL-MCNC: 0.3 MG/DL (ref 0.3–1.2)
BUN BLDP-MCNC: 22 MG/DL (ref 8–26)
CHLORIDE BLD-SCNC: 96 MEQ/L (ref 98–109)
CREAT BLD-MCNC: 1.6 MG/DL (ref 0.5–1.2)
EOSINOPHIL NFR BLD AUTO: 1 % (ref 0–4)
EOSINOPHILS ABSOLUTE: 0 THOU/MM3 (ref 0–0.4)
ERYTHROCYTE [DISTWIDTH] IN BLOOD BY AUTOMATED COUNT: 18 % (ref 11.5–14.5)
GFR SERPL CREATININE-BSD FRML MDRD: 50 ML/MIN/1.73M2
GLUCOSE BLD-MCNC: 79 MG/DL (ref 70–108)
HCT VFR BLD AUTO: 38.4 % (ref 42–52)
HGB BLD-MCNC: 12.9 GM/DL (ref 14–18)
IMMATURE GRANULOCYTES: 0 %
IONIZED CALCIUM, WHOLE BLOOD: 1.23 MMOL/L (ref 1.12–1.32)
LYMPHOCYTES ABSOLUTE: 1.8 THOU/MM3 (ref 1–4.8)
LYMPHOCYTES NFR BLD AUTO: 43 % (ref 15–47)
MCH RBC QN AUTO: 30.8 PG (ref 26–33)
MCHC RBC AUTO-ENTMCNC: 33.6 GM/DL (ref 32.2–35.5)
MCV RBC AUTO: 92 FL (ref 80–94)
MONOCYTES ABSOLUTE: 0.6 THOU/MM3 (ref 0.4–1.3)
MONOCYTES NFR BLD AUTO: 14 % (ref 0–12)
NEUTROPHILS NFR BLD AUTO: 42 % (ref 43–75)
PLATELET # BLD AUTO: 142 THOU/MM3 (ref 130–400)
PMV BLD AUTO: 9.4 FL (ref 9.4–12.4)
POTASSIUM BLD-SCNC: 3.2 MEQ/L (ref 3.5–4.9)
PROT SERPL-MCNC: 6.6 G/DL (ref 6.1–8)
RBC # BLD AUTO: 4.19 MILL/MM3 (ref 4.7–6.1)
SEGMENTED NEUTROPHILS ABSOLUTE COUNT: 1.7 THOU/MM3 (ref 1.8–7.7)
SODIUM BLD-SCNC: 134 MEQ/L (ref 138–146)
TOTAL CO2, WHOLE BLOOD: 28 MEQ/L (ref 23–33)
WBC # BLD AUTO: 4.2 THOU/MM3 (ref 4.8–10.8)

## 2023-09-19 PROCEDURE — 36591 DRAW BLOOD OFF VENOUS DEVICE: CPT

## 2023-09-19 PROCEDURE — 6360000002 HC RX W HCPCS: Performed by: INTERNAL MEDICINE

## 2023-09-19 PROCEDURE — 99211 OFF/OP EST MAY X REQ PHY/QHP: CPT

## 2023-09-19 PROCEDURE — 99214 OFFICE O/P EST MOD 30 MIN: CPT | Performed by: INTERNAL MEDICINE

## 2023-09-19 PROCEDURE — 85025 COMPLETE CBC W/AUTO DIFF WBC: CPT

## 2023-09-19 PROCEDURE — 80076 HEPATIC FUNCTION PANEL: CPT

## 2023-09-19 PROCEDURE — 6360000004 HC RX CONTRAST MEDICATION: Performed by: OTOLARYNGOLOGY

## 2023-09-19 PROCEDURE — 80047 BASIC METABLC PNL IONIZED CA: CPT

## 2023-09-19 PROCEDURE — 70491 CT SOFT TISSUE NECK W/DYE: CPT

## 2023-09-19 PROCEDURE — 96365 THER/PROPH/DIAG IV INF INIT: CPT

## 2023-09-19 PROCEDURE — 2580000003 HC RX 258: Performed by: INTERNAL MEDICINE

## 2023-09-19 RX ORDER — HEPARIN 100 UNIT/ML
500 SYRINGE INTRAVENOUS PRN
Status: DISCONTINUED | OUTPATIENT
Start: 2023-09-19 | End: 2023-09-20 | Stop reason: HOSPADM

## 2023-09-19 RX ORDER — SODIUM CHLORIDE 9 MG/ML
25 INJECTION, SOLUTION INTRAVENOUS PRN
Status: CANCELLED | OUTPATIENT
Start: 2023-09-19

## 2023-09-19 RX ORDER — SODIUM CHLORIDE 0.9 % (FLUSH) 0.9 %
5-40 SYRINGE (ML) INJECTION PRN
Status: DISCONTINUED | OUTPATIENT
Start: 2023-09-19 | End: 2023-09-20 | Stop reason: HOSPADM

## 2023-09-19 RX ORDER — SODIUM CHLORIDE 0.9 % (FLUSH) 0.9 %
5-40 SYRINGE (ML) INJECTION PRN
Status: CANCELLED | OUTPATIENT
Start: 2023-09-19

## 2023-09-19 RX ORDER — HEPARIN 100 UNIT/ML
500 SYRINGE INTRAVENOUS PRN
Status: CANCELLED | OUTPATIENT
Start: 2023-09-19

## 2023-09-19 RX ORDER — POTASSIUM CHLORIDE 29.8 MG/ML
20 INJECTION INTRAVENOUS ONCE
Status: COMPLETED | OUTPATIENT
Start: 2023-09-19 | End: 2023-09-19

## 2023-09-19 RX ADMIN — SODIUM CHLORIDE, PRESERVATIVE FREE 10 ML: 5 INJECTION INTRAVENOUS at 13:48

## 2023-09-19 RX ADMIN — HEPARIN 500 UNITS: 100 SYRINGE at 13:48

## 2023-09-19 RX ADMIN — IOPAMIDOL 75 ML: 755 INJECTION, SOLUTION INTRAVENOUS at 09:38

## 2023-09-19 RX ADMIN — SODIUM CHLORIDE, PRESERVATIVE FREE 10 ML: 5 INJECTION INTRAVENOUS at 11:45

## 2023-09-19 RX ADMIN — SODIUM CHLORIDE, PRESERVATIVE FREE 10 ML: 5 INJECTION INTRAVENOUS at 12:46

## 2023-09-19 RX ADMIN — POTASSIUM CHLORIDE 20 MEQ: 29.8 INJECTION, SOLUTION INTRAVENOUS at 12:49

## 2023-09-19 RX ADMIN — SODIUM CHLORIDE, PRESERVATIVE FREE 20 ML: 5 INJECTION INTRAVENOUS at 11:46

## 2023-09-19 NOTE — DISCHARGE INSTRUCTIONS
Hold treatment today 2/2 pt not feeling well  Pt does need 20 mEq PO potassium today  Get MRI ASAP as having some post CVA symptoms for a week  RTC Monday with labs with plans of resuming txt     Please contact your Oncologist if you have any questions regarding the lab draw that you received today. You are instructed to call the office or go to the Emergency Dept. If you experience any of the following symptoms:    Dizziness/lightheadedness   Acute nausea or vomiting-not relieved by medications  Headaches-not relieved by medications  New chest pain or pressure  New rash /itching  New shortness of breath  Fever,chills or signs or symptoms of infection    Make sure you are drinking 48 to 64 ounces of water daily-if you are unable to drink fluids let us know right away.

## 2023-09-19 NOTE — PLAN OF CARE
Problem: Safety - Adult  Goal: Free from fall injury  Outcome: Adequate for Discharge  Flowsheets (Taken 9/19/2023 1532)  Free From Fall Injury: Instruct family/caregiver on patient safety  Note: Free from falls while in O.P. Oncology. Discussed the need to use the call light for assistance when getting up to ambulate. Problem: Discharge Planning  Goal: Discharge to home or other facility with appropriate resources  Outcome: Adequate for Discharge  Flowsheets (Taken 9/19/2023 1532)  Discharge to home or other facility with appropriate resources: Identify barriers to discharge with patient and caregiver  Note: Verbalize understanding of discharge instructions, follow up appointments, and when to call Physician. Discuss understanding of discharge instructions, follow up appointments and when to call Physician. Problem: Chronic Conditions and Co-morbidities  Goal: Patient's chronic conditions and co-morbidity symptoms are monitored and maintained or improved  Outcome: Adequate for Discharge  Flowsheets (Taken 9/19/2023 1532)  Care Plan - Patient's Chronic Conditions and Co-Morbidity Symptoms are Monitored and Maintained or Improved: Monitor and assess patient's chronic conditions and comorbid symptoms for stability, deterioration, or improvement  Note: Patient verbalizes understanding to verbal information given on lab draw from mediport and IV potassium,action and possible side effects. Aware to call MD if develop complications. Care plan reviewed with patient. Patient verbalizes understanding of the plan of care and contribute to goal setting.

## 2023-09-19 NOTE — PROGRESS NOTES
Patient tolerated IV potassium and lab draw from mediport without any complications. Discharge instructions given to patient-verbalizes understanding. Ambulated off unit per self with belongings.

## 2023-09-20 ENCOUNTER — HOSPITAL ENCOUNTER (OUTPATIENT)
Dept: MRI IMAGING | Age: 58
Discharge: HOME | End: 2023-09-20
Attending: INTERNAL MEDICINE
Payer: MEDICARE

## 2023-09-20 DIAGNOSIS — C18.2 MALIGNANT NEOPLASM OF ASCENDING COLON (HCC): ICD-10-CM

## 2023-09-20 DIAGNOSIS — R47.81 SLURRED SPEECH: ICD-10-CM

## 2023-09-20 PROCEDURE — 6360000002 HC RX W HCPCS: Performed by: RADIOLOGY

## 2023-09-20 PROCEDURE — A9579 GAD-BASE MR CONTRAST NOS,1ML: HCPCS | Performed by: INTERNAL MEDICINE

## 2023-09-20 PROCEDURE — 6360000004 HC RX CONTRAST MEDICATION: Performed by: INTERNAL MEDICINE

## 2023-09-20 PROCEDURE — 70553 MRI BRAIN STEM W/O & W/DYE: CPT

## 2023-09-20 RX ORDER — HEPARIN SODIUM (PORCINE) LOCK FLUSH IV SOLN 100 UNIT/ML 100 UNIT/ML
500 SOLUTION INTRAVENOUS ONCE
Status: COMPLETED | OUTPATIENT
Start: 2023-09-20 | End: 2023-09-20

## 2023-09-20 RX ADMIN — HEPARIN 500 UNITS: 100 SYRINGE at 07:34

## 2023-09-20 RX ADMIN — GADOTERIDOL 15 ML: 279.3 INJECTION, SOLUTION INTRAVENOUS at 07:29

## 2023-09-20 NOTE — PROGRESS NOTES
and symptoms and to call the clinic/on-call physician for the symptoms or fevers for advice. Also discussed the possibility (1%) of coronary vasospasm. He was also counseled on side effects of oxaliplatin which include but are not inclusive of alopecia, nausea, vomiting, cramping, pharyngeal laryngeal dysesthesia in 1 to 2% of patients (avoid cold air/drinks), peripheral neuropathy, rare risk of anaphylaxis. PLAN: FOLFOX +/- Neulasta support Q14 days for approximately 12 cycles/until disease progression or toxicity    -each cycle will have toxicity check with labs CBC, CMP  -Mediport already placed, has EMLA cream    -9/2023 MRI brain with \"edema\" but not CVA or metastatic disease.  -discussed Foundation 1 with KRAS mutation on his prior tumor samples to see if he would be a candidate for either panitumumab/cetuximab versus Avastin (still pending given recent MRI with brain swelling; pt agrees)  -Given today's PE and labs, proceed for C5 (continue with dose reduces oxaliplatin 25% and no push of 5FU- if further delays, request Neulasta)    -RTC in 2 weeks with labs and tox check prior to C6. He has significant other Luca Agosto will be watching over his care and knows to call if he has any difficulties, questions or concerns.     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    -We will make sure that his counts are good before he has

## 2023-09-22 PROCEDURE — G2066 INTER DEVC REMOTE 30D: HCPCS | Performed by: NUCLEAR MEDICINE

## 2023-09-22 PROCEDURE — 93298 REM INTERROG DEV EVAL SCRMS: CPT | Performed by: NUCLEAR MEDICINE

## 2023-09-25 ENCOUNTER — HOSPITAL ENCOUNTER (OUTPATIENT)
Dept: INFUSION THERAPY | Age: 58
Discharge: HOME | End: 2023-09-25
Payer: MEDICARE

## 2023-09-25 ENCOUNTER — PROCEDURE VISIT (OUTPATIENT)
Dept: CARDIOLOGY CLINIC | Age: 58
End: 2023-09-25
Payer: MEDICARE

## 2023-09-25 ENCOUNTER — OFFICE VISIT (OUTPATIENT)
Dept: ONCOLOGY | Age: 58
End: 2023-09-25
Payer: MEDICARE

## 2023-09-25 VITALS
SYSTOLIC BLOOD PRESSURE: 138 MMHG | DIASTOLIC BLOOD PRESSURE: 62 MMHG | TEMPERATURE: 98.1 F | HEART RATE: 63 BPM | BODY MASS INDEX: 19.39 KG/M2 | OXYGEN SATURATION: 98 % | WEIGHT: 143 LBS | RESPIRATION RATE: 18 BRPM

## 2023-09-25 VITALS
HEART RATE: 63 BPM | RESPIRATION RATE: 18 BRPM | BODY MASS INDEX: 19.39 KG/M2 | WEIGHT: 143 LBS | OXYGEN SATURATION: 99 % | SYSTOLIC BLOOD PRESSURE: 129 MMHG | TEMPERATURE: 98.2 F | DIASTOLIC BLOOD PRESSURE: 79 MMHG

## 2023-09-25 DIAGNOSIS — R55 SYNCOPE, UNSPECIFIED SYNCOPE TYPE: Primary | ICD-10-CM

## 2023-09-25 DIAGNOSIS — C18.2 MALIGNANT NEOPLASM OF ASCENDING COLON (HCC): ICD-10-CM

## 2023-09-25 DIAGNOSIS — D50.0 IRON DEFICIENCY ANEMIA DUE TO CHRONIC BLOOD LOSS: ICD-10-CM

## 2023-09-25 DIAGNOSIS — C18.7 MALIGNANT NEOPLASM OF SIGMOID COLON (HCC): ICD-10-CM

## 2023-09-25 DIAGNOSIS — C18.9 MALIGNANT NEOPLASM OF COLON, UNSPECIFIED PART OF COLON (HCC): Primary | ICD-10-CM

## 2023-09-25 DIAGNOSIS — R47.81 SLURRED SPEECH: ICD-10-CM

## 2023-09-25 DIAGNOSIS — C06.2 SQUAMOUS CELL CANCER OF RETROMOLAR TRIGONE (HCC): ICD-10-CM

## 2023-09-25 DIAGNOSIS — C18.2 MALIGNANT NEOPLASM OF ASCENDING COLON (HCC): Primary | ICD-10-CM

## 2023-09-25 LAB
ABSOLUTE IMMATURE GRANULOCYTE: 0.04 THOU/MM3 (ref 0–0.07)
ALBUMIN SERPL BCG-MCNC: 3.6 G/DL (ref 3.5–5.1)
ALP SERPL-CCNC: 49 U/L (ref 38–126)
ALT SERPL W/O P-5'-P-CCNC: 10 U/L (ref 11–66)
AST SERPL-CCNC: 18 U/L (ref 5–40)
BASOPHILS ABSOLUTE: 0 THOU/MM3 (ref 0–0.1)
BASOPHILS NFR BLD AUTO: 1 % (ref 0–3)
BILIRUB CONJ SERPL-MCNC: < 0.2 MG/DL (ref 0–0.3)
BILIRUB SERPL-MCNC: 0.2 MG/DL (ref 0.3–1.2)
BUN BLDP-MCNC: 6 MG/DL (ref 8–26)
CHLORIDE BLD-SCNC: 104 MEQ/L (ref 98–109)
CREAT BLD-MCNC: 0.9 MG/DL (ref 0.5–1.2)
EOSINOPHIL NFR BLD AUTO: 1 % (ref 0–4)
EOSINOPHILS ABSOLUTE: 0.1 THOU/MM3 (ref 0–0.4)
ERYTHROCYTE [DISTWIDTH] IN BLOOD BY AUTOMATED COUNT: 19.2 % (ref 11.5–14.5)
GFR SERPL CREATININE-BSD FRML MDRD: > 60 ML/MIN/1.73M2
GLUCOSE BLD-MCNC: 73 MG/DL (ref 70–108)
HCT VFR BLD AUTO: 39.5 % (ref 42–52)
HGB BLD-MCNC: 12.9 GM/DL (ref 14–18)
IMMATURE GRANULOCYTES: 1 %
IONIZED CALCIUM, WHOLE BLOOD: 1.19 MMOL/L (ref 1.12–1.32)
LYMPHOCYTES ABSOLUTE: 1.7 THOU/MM3 (ref 1–4.8)
LYMPHOCYTES NFR BLD AUTO: 39 % (ref 15–47)
MCH RBC QN AUTO: 30.6 PG (ref 26–33)
MCHC RBC AUTO-ENTMCNC: 32.7 GM/DL (ref 32.2–35.5)
MCV RBC AUTO: 94 FL (ref 80–94)
MONOCYTES ABSOLUTE: 0.7 THOU/MM3 (ref 0.4–1.3)
MONOCYTES NFR BLD AUTO: 16 % (ref 0–12)
NEUTROPHILS NFR BLD AUTO: 43 % (ref 43–75)
PLATELET # BLD AUTO: 296 THOU/MM3 (ref 130–400)
PMV BLD AUTO: 10 FL (ref 9.4–12.4)
POTASSIUM BLD-SCNC: 4.5 MEQ/L (ref 3.5–4.9)
PROT SERPL-MCNC: 6.2 G/DL (ref 6.1–8)
RBC # BLD AUTO: 4.22 MILL/MM3 (ref 4.7–6.1)
SEGMENTED NEUTROPHILS ABSOLUTE COUNT: 1.9 THOU/MM3 (ref 1.8–7.7)
SODIUM BLD-SCNC: 142 MEQ/L (ref 138–146)
TOTAL CO2, WHOLE BLOOD: 26 MEQ/L (ref 23–33)
WBC # BLD AUTO: 4.4 THOU/MM3 (ref 4.8–10.8)

## 2023-09-25 PROCEDURE — 96415 CHEMO IV INFUSION ADDL HR: CPT

## 2023-09-25 PROCEDURE — 96368 THER/DIAG CONCURRENT INF: CPT

## 2023-09-25 PROCEDURE — 99211 OFF/OP EST MAY X REQ PHY/QHP: CPT

## 2023-09-25 PROCEDURE — 6360000002 HC RX W HCPCS: Performed by: INTERNAL MEDICINE

## 2023-09-25 PROCEDURE — 36591 DRAW BLOOD OFF VENOUS DEVICE: CPT

## 2023-09-25 PROCEDURE — 99214 OFFICE O/P EST MOD 30 MIN: CPT | Performed by: INTERNAL MEDICINE

## 2023-09-25 PROCEDURE — 80047 BASIC METABLC PNL IONIZED CA: CPT

## 2023-09-25 PROCEDURE — 96416 CHEMO PROLONG INFUSE W/PUMP: CPT

## 2023-09-25 PROCEDURE — 80076 HEPATIC FUNCTION PANEL: CPT

## 2023-09-25 PROCEDURE — 96367 TX/PROPH/DG ADDL SEQ IV INF: CPT

## 2023-09-25 PROCEDURE — 96375 TX/PRO/DX INJ NEW DRUG ADDON: CPT

## 2023-09-25 PROCEDURE — 85025 COMPLETE CBC W/AUTO DIFF WBC: CPT

## 2023-09-25 PROCEDURE — 96413 CHEMO IV INFUSION 1 HR: CPT

## 2023-09-25 PROCEDURE — 2580000003 HC RX 258: Performed by: INTERNAL MEDICINE

## 2023-09-25 RX ORDER — ACETAMINOPHEN 325 MG/1
650 TABLET ORAL
OUTPATIENT
Start: 2023-09-25

## 2023-09-25 RX ORDER — HEPARIN SODIUM (PORCINE) LOCK FLUSH IV SOLN 100 UNIT/ML 100 UNIT/ML
500 SOLUTION INTRAVENOUS PRN
OUTPATIENT
Start: 2023-09-27

## 2023-09-25 RX ORDER — DIPHENHYDRAMINE HYDROCHLORIDE 50 MG/ML
50 INJECTION INTRAMUSCULAR; INTRAVENOUS
OUTPATIENT
Start: 2023-09-25

## 2023-09-25 RX ORDER — HEPARIN 100 UNIT/ML
500 SYRINGE INTRAVENOUS PRN
Status: DISCONTINUED | OUTPATIENT
Start: 2023-09-25 | End: 2023-09-26 | Stop reason: HOSPADM

## 2023-09-25 RX ORDER — HEPARIN SODIUM (PORCINE) LOCK FLUSH IV SOLN 100 UNIT/ML 100 UNIT/ML
500 SOLUTION INTRAVENOUS PRN
Status: CANCELLED | OUTPATIENT
Start: 2023-09-25

## 2023-09-25 RX ORDER — HEPARIN 100 UNIT/ML
500 SYRINGE INTRAVENOUS PRN
Status: CANCELLED | OUTPATIENT
Start: 2023-09-25

## 2023-09-25 RX ORDER — DEXTROSE MONOHYDRATE 50 MG/ML
5-250 INJECTION, SOLUTION INTRAVENOUS PRN
Status: DISCONTINUED | OUTPATIENT
Start: 2023-09-25 | End: 2023-09-26 | Stop reason: HOSPADM

## 2023-09-25 RX ORDER — SODIUM CHLORIDE 0.9 % (FLUSH) 0.9 %
5-40 SYRINGE (ML) INJECTION PRN
OUTPATIENT
Start: 2023-09-27

## 2023-09-25 RX ORDER — ALBUTEROL SULFATE 90 UG/1
4 AEROSOL, METERED RESPIRATORY (INHALATION) PRN
OUTPATIENT
Start: 2023-09-25

## 2023-09-25 RX ORDER — SODIUM CHLORIDE 0.9 % (FLUSH) 0.9 %
5-40 SYRINGE (ML) INJECTION PRN
Status: DISCONTINUED | OUTPATIENT
Start: 2023-09-25 | End: 2023-09-26 | Stop reason: HOSPADM

## 2023-09-25 RX ORDER — SODIUM CHLORIDE 0.9 % (FLUSH) 0.9 %
5-40 SYRINGE (ML) INJECTION PRN
Status: CANCELLED | OUTPATIENT
Start: 2023-09-25

## 2023-09-25 RX ORDER — EPINEPHRINE 1 MG/ML
0.3 INJECTION, SOLUTION, CONCENTRATE INTRAVENOUS PRN
OUTPATIENT
Start: 2023-09-25

## 2023-09-25 RX ORDER — SODIUM CHLORIDE 9 MG/ML
5-250 INJECTION, SOLUTION INTRAVENOUS PRN
OUTPATIENT
Start: 2023-09-25

## 2023-09-25 RX ORDER — PALONOSETRON 0.05 MG/ML
0.25 INJECTION, SOLUTION INTRAVENOUS ONCE
Status: COMPLETED | OUTPATIENT
Start: 2023-09-25 | End: 2023-09-25

## 2023-09-25 RX ORDER — MEPERIDINE HYDROCHLORIDE 50 MG/ML
12.5 INJECTION INTRAMUSCULAR; INTRAVENOUS; SUBCUTANEOUS PRN
OUTPATIENT
Start: 2023-09-25

## 2023-09-25 RX ORDER — ONDANSETRON 2 MG/ML
8 INJECTION INTRAMUSCULAR; INTRAVENOUS
OUTPATIENT
Start: 2023-09-25

## 2023-09-25 RX ORDER — SODIUM CHLORIDE 9 MG/ML
INJECTION, SOLUTION INTRAVENOUS CONTINUOUS
OUTPATIENT
Start: 2023-09-25

## 2023-09-25 RX ORDER — SODIUM CHLORIDE 9 MG/ML
25 INJECTION, SOLUTION INTRAVENOUS PRN
Status: CANCELLED | OUTPATIENT
Start: 2023-09-25

## 2023-09-25 RX ORDER — DEXTROSE MONOHYDRATE 50 MG/ML
5-250 INJECTION, SOLUTION INTRAVENOUS PRN
Status: CANCELLED | OUTPATIENT
Start: 2023-09-25

## 2023-09-25 RX ORDER — PALONOSETRON 0.05 MG/ML
0.25 INJECTION, SOLUTION INTRAVENOUS ONCE
Status: CANCELLED | OUTPATIENT
Start: 2023-09-25 | End: 2023-09-25

## 2023-09-25 RX ORDER — FAMOTIDINE 10 MG/ML
20 INJECTION, SOLUTION INTRAVENOUS
OUTPATIENT
Start: 2023-09-25

## 2023-09-25 RX ORDER — SODIUM CHLORIDE 9 MG/ML
5-250 INJECTION, SOLUTION INTRAVENOUS PRN
OUTPATIENT
Start: 2023-09-27

## 2023-09-25 RX ADMIN — OXALIPLATIN 120 MG: 5 INJECTION, SOLUTION INTRAVENOUS at 13:33

## 2023-09-25 RX ADMIN — SODIUM CHLORIDE, PRESERVATIVE FREE 10 ML: 5 INJECTION INTRAVENOUS at 12:01

## 2023-09-25 RX ADMIN — DEXTROSE MONOHYDRATE 25 ML/HR: 50 INJECTION, SOLUTION INTRAVENOUS at 12:57

## 2023-09-25 RX ADMIN — PALONOSETRON 0.25 MG: 0.05 INJECTION, SOLUTION INTRAVENOUS at 12:59

## 2023-09-25 RX ADMIN — SODIUM CHLORIDE, PRESERVATIVE FREE 10 ML: 5 INJECTION INTRAVENOUS at 12:58

## 2023-09-25 RX ADMIN — FLUOROURACIL 4450 MG: 50 INJECTION, SOLUTION INTRAVENOUS at 15:49

## 2023-09-25 RX ADMIN — SODIUM CHLORIDE, PRESERVATIVE FREE 10 ML: 5 INJECTION INTRAVENOUS at 12:00

## 2023-09-25 RX ADMIN — DEXAMETHASONE SODIUM PHOSPHATE 12 MG: 4 INJECTION, SOLUTION INTRAMUSCULAR; INTRAVENOUS at 13:02

## 2023-09-25 RX ADMIN — LEUCOVORIN CALCIUM 750 MG: 350 INJECTION, POWDER, LYOPHILIZED, FOR SUSPENSION INTRAMUSCULAR; INTRAVENOUS at 13:30

## 2023-09-25 NOTE — PLAN OF CARE
Problem: Safety - Adult  Goal: Free from fall injury  Outcome: Adequate for Discharge  Flowsheets (Taken 9/25/2023 1309)  Free From Fall Injury: Instruct family/caregiver on patient safety  Note: No falls this admission Patient aware of fall precautions for here and at home -call light in reach while here       Problem: Chronic Conditions and Co-morbidities  Goal: Patient's chronic conditions and co-morbidity symptoms are monitored and maintained or improved  Outcome: Adequate for Discharge  Flowsheets (Taken 9/25/2023 1309)  Care Plan - Patient's Chronic Conditions and Co-Morbidity Symptoms are Monitored and Maintained or Improved:   Monitor and assess patient's chronic conditions and comorbid symptoms for stability, deterioration, or improvement   Collaborate with multidisciplinary team to address chronic and comorbid conditions and prevent exacerbation or deterioration  Note:   Chemotherapy Teaching     What is Chemotherapy   Drug action [x]   Method of Administration [x]   Handouts given []     Side Effects  Nausea/vomiting [x]   Diarrhea [x]   Fatigue [x]   Signs / Symptoms of infection [x]   Neutropenia [x]   Thrombocytopenia [x]   Alopecia [x]   neuropathy [x]   [place] diet &  the importance of fluids [x]       Micellaneous  Importance of nutrition [x]   Importance of oral hygiene [x]   When to call the MD [x]   Monitoring labs [x]   Use of supportive services []     Explanation of Drug Regimen / Frequency  Oxaliplatin leucovorin and 5fu     Comments  Verbalized understanding to drug,action,side effects and when to call MD         Problem: Discharge Planning  Goal: Discharge to home or other facility with appropriate resources  Outcome: Adequate for Discharge  Flowsheets (Taken 9/25/2023 1309)  Discharge to home or other facility with appropriate resources:   Identify discharge learning needs (meds, wound care, etc)   Arrange for needed discharge resources and transportation as appropriate   Identify

## 2023-09-25 NOTE — PROGRESS NOTES
[employer]   Patient of Aram    Current EGM 9/22/23    History of syncope    Battery okay    Episodes:  9/20/23- symptom NSR

## 2023-09-25 NOTE — DISCHARGE INSTRUCTIONS
Please contact your Oncologist if you have any questions regarding the chemotherapy oxaliplatin leucovorin and 5fu that you received today. Patient instructed if experience any of the symptoms following today's chemotherapy / to notify MD immediately or go to emergency department.     * dizziness/lightheadedness  *acute nausea/vomiting - not relieved with medication  *headache - not relieved from Tylenol/pain medication  *chest pain/pressure  *rash/itching  *shortness of breath        Drink fluids - 48oz fluids daily  Call if develop fever/ chills/ signs or symptoms of infection

## 2023-09-27 ENCOUNTER — HOSPITAL ENCOUNTER (OUTPATIENT)
Dept: INFUSION THERAPY | Age: 58
Discharge: HOME | End: 2023-09-27
Payer: MEDICARE

## 2023-09-27 VITALS
RESPIRATION RATE: 18 BRPM | TEMPERATURE: 97.9 F | SYSTOLIC BLOOD PRESSURE: 173 MMHG | HEART RATE: 83 BPM | OXYGEN SATURATION: 97 % | DIASTOLIC BLOOD PRESSURE: 99 MMHG

## 2023-09-27 DIAGNOSIS — C18.9 MALIGNANT NEOPLASM OF COLON, UNSPECIFIED PART OF COLON (HCC): Primary | ICD-10-CM

## 2023-09-27 PROCEDURE — 6360000002 HC RX W HCPCS: Performed by: INTERNAL MEDICINE

## 2023-09-27 PROCEDURE — 2580000003 HC RX 258: Performed by: INTERNAL MEDICINE

## 2023-09-27 PROCEDURE — 96523 IRRIG DRUG DELIVERY DEVICE: CPT

## 2023-09-27 RX ORDER — SODIUM CHLORIDE 0.9 % (FLUSH) 0.9 %
5-40 SYRINGE (ML) INJECTION PRN
Status: DISCONTINUED | OUTPATIENT
Start: 2023-09-27 | End: 2023-09-28 | Stop reason: HOSPADM

## 2023-09-27 RX ORDER — HEPARIN 100 UNIT/ML
500 SYRINGE INTRAVENOUS PRN
Status: DISCONTINUED | OUTPATIENT
Start: 2023-09-27 | End: 2023-09-28 | Stop reason: HOSPADM

## 2023-09-27 RX ORDER — HEPARIN 100 UNIT/ML
500 SYRINGE INTRAVENOUS PRN
OUTPATIENT
Start: 2023-09-27

## 2023-09-27 RX ORDER — SODIUM CHLORIDE 9 MG/ML
25 INJECTION, SOLUTION INTRAVENOUS PRN
OUTPATIENT
Start: 2023-09-27

## 2023-09-27 RX ORDER — SODIUM CHLORIDE 0.9 % (FLUSH) 0.9 %
5-40 SYRINGE (ML) INJECTION PRN
OUTPATIENT
Start: 2023-09-27

## 2023-09-27 RX ADMIN — HEPARIN 500 UNITS: 100 SYRINGE at 13:55

## 2023-09-27 RX ADMIN — SODIUM CHLORIDE, PRESERVATIVE FREE 10 ML: 5 INJECTION INTRAVENOUS at 13:55

## 2023-09-27 NOTE — PLAN OF CARE
Problem: Safety - Adult  Goal: Free from fall injury  Outcome: Adequate for Discharge  Flowsheets (Taken 9/27/2023 1454)  Free From Fall Injury: Instruct family/caregiver on patient safety  Note: Free from falls while in O.P. Oncology. Discussed the need to use the call light for assistance when getting up to ambulate. Problem: Chronic Conditions and Co-morbidities  Goal: Patient's chronic conditions and co-morbidity symptoms are monitored and maintained or improved  Outcome: Adequate for Discharge  Flowsheets (Taken 9/27/2023 1454)  Care Plan - Patient's Chronic Conditions and Co-Morbidity Symptoms are Monitored and Maintained or Improved:   Monitor and assess patient's chronic conditions and comorbid symptoms for stability, deterioration, or improvement   Collaborate with multidisciplinary team to address chronic and comorbid conditions and prevent exacerbation or deterioration   Update acute care plan with appropriate goals if chronic or comorbid symptoms are exacerbated and prevent overall improvement and discharge  Note: Patient verbalizes understanding to verbal information given on pump disconnect,action and possible side effects. Aware to call MD if develop complications. Problem: Discharge Planning  Goal: Discharge to home or other facility with appropriate resources  Outcome: Adequate for Discharge  Flowsheets (Taken 9/27/2023 1454)  Discharge to home or other facility with appropriate resources:   Identify barriers to discharge with patient and caregiver   Identify discharge learning needs (meds, wound care, etc)   Refer to discharge planning if patient needs post-hospital services based on physician order or complex needs related to functional status, cognitive ability or social support system  Note: Verbalize understanding of discharge instructions, follow up appointments, and when to call Physician.  Discuss understanding of discharge instructions, follow up appointments and when to call

## 2023-09-27 NOTE — PROGRESS NOTES
Patient tolerated mediport disconnect without any complications. Discharge instructions given to patient-verbalizes understanding. Ambulated off unit per self with belongings.

## 2023-10-02 DIAGNOSIS — C78.7 COLON CANCER METASTASIZED TO LIVER (HCC): Primary | ICD-10-CM

## 2023-10-02 DIAGNOSIS — C18.9 COLON CANCER METASTASIZED TO LIVER (HCC): Primary | ICD-10-CM

## 2023-10-03 NOTE — PROGRESS NOTES
[facility]  [address] [place] [address]  Dept: [phone]  Loc: [phone]   Hematology/Oncology Consult (Clinic)        10/09/23       Estle Laser   1965     No ref. provider found   MADELIN Morin CNP       Reason:   Chief Complaint   Patient presents with    Follow-up     Malignant neoplasm of ascending colon ([address])      HPI: Chico Anderson is a 62 y.o. 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    -9/2015. Patient p/w complaints of pain and trismus with tenderness in the submandibular area for about a month. 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       -9/17/2015. Referred to Dr. Apoorva Girard at [facility].    -10/2/2015 Dr Apoorva Girard did laryngoscopy esophagoscopy tracheostomy, right and left neck dissections, composite resection of left retromolar trigone for squamous cell carcinoma including partial resection of the pharynx left floor of the mouth, left mental mandibulectomy and inferior maxillectomy, resection of infratemporal fossa mass, left inferior maxillectomy mandibular plating Emblem of fibular free flap, microvascular anastomosis and inset of fibular free flap, mandibular reconstruction and complex vestibuloplasty, pharyngoplasty, palatoplasty, and PEG tube placement.  Left mental nerve negative for carcinoma, posterior mandibular margin negative for carcinoma, anterior mandible margin biopsy negative for carcinoma base of tongue margin, pharynx, ATF, buccal

## 2023-10-09 ENCOUNTER — OFFICE VISIT (OUTPATIENT)
Dept: ONCOLOGY | Age: 58
End: 2023-10-09
Payer: MEDICARE

## 2023-10-09 ENCOUNTER — HOSPITAL ENCOUNTER (OUTPATIENT)
Dept: INFUSION THERAPY | Age: 58
Discharge: HOME | End: 2023-10-09
Attending: OTOLARYNGOLOGY
Payer: MEDICARE

## 2023-10-09 VITALS
HEIGHT: 72 IN | DIASTOLIC BLOOD PRESSURE: 104 MMHG | OXYGEN SATURATION: 96 % | TEMPERATURE: 97.6 F | BODY MASS INDEX: 18.69 KG/M2 | WEIGHT: 138 LBS | RESPIRATION RATE: 20 BRPM | HEART RATE: 81 BPM | SYSTOLIC BLOOD PRESSURE: 204 MMHG

## 2023-10-09 VITALS
SYSTOLIC BLOOD PRESSURE: 190 MMHG | TEMPERATURE: 97.6 F | DIASTOLIC BLOOD PRESSURE: 110 MMHG | OXYGEN SATURATION: 96 % | RESPIRATION RATE: 20 BRPM | HEART RATE: 81 BPM | WEIGHT: 138 LBS | BODY MASS INDEX: 18.72 KG/M2

## 2023-10-09 DIAGNOSIS — C06.2 SQUAMOUS CELL CANCER OF RETROMOLAR TRIGONE (HCC): ICD-10-CM

## 2023-10-09 DIAGNOSIS — C18.2 MALIGNANT NEOPLASM OF ASCENDING COLON (HCC): Primary | ICD-10-CM

## 2023-10-09 DIAGNOSIS — D50.0 IRON DEFICIENCY ANEMIA DUE TO CHRONIC BLOOD LOSS: ICD-10-CM

## 2023-10-09 DIAGNOSIS — T45.1X5A CHEMOTHERAPY INDUCED NAUSEA AND VOMITING: ICD-10-CM

## 2023-10-09 DIAGNOSIS — Z51.11 ENCOUNTER FOR ANTINEOPLASTIC CHEMOTHERAPY: ICD-10-CM

## 2023-10-09 DIAGNOSIS — C18.7 MALIGNANT NEOPLASM OF SIGMOID COLON (HCC): ICD-10-CM

## 2023-10-09 DIAGNOSIS — R11.2 CHEMOTHERAPY INDUCED NAUSEA AND VOMITING: ICD-10-CM

## 2023-10-09 DIAGNOSIS — C18.9 MALIGNANT NEOPLASM OF COLON, UNSPECIFIED PART OF COLON (HCC): ICD-10-CM

## 2023-10-09 LAB
ABSOLUTE IMMATURE GRANULOCYTE: 0.01 THOU/MM3 (ref 0–0.07)
ALBUMIN SERPL BCG-MCNC: 4.6 G/DL (ref 3.5–5.1)
ALP SERPL-CCNC: 63 U/L (ref 38–126)
ALT SERPL W/O P-5'-P-CCNC: 14 U/L (ref 11–66)
AST SERPL-CCNC: 26 U/L (ref 5–40)
BASOPHILS ABSOLUTE: 0 THOU/MM3 (ref 0–0.1)
BASOPHILS NFR BLD AUTO: 0 % (ref 0–3)
BILIRUB CONJ SERPL-MCNC: < 0.2 MG/DL (ref 0–0.3)
BILIRUB SERPL-MCNC: 0.6 MG/DL (ref 0.3–1.2)
BUN BLDP-MCNC: 5 MG/DL (ref 8–26)
CHLORIDE BLD-SCNC: 101 MEQ/L (ref 98–109)
CREAT BLD-MCNC: 0.8 MG/DL (ref 0.5–1.2)
EOSINOPHIL NFR BLD AUTO: 1 % (ref 0–4)
EOSINOPHILS ABSOLUTE: 0 THOU/MM3 (ref 0–0.4)
ERYTHROCYTE [DISTWIDTH] IN BLOOD BY AUTOMATED COUNT: 19.6 % (ref 11.5–14.5)
GFR SERPL CREATININE-BSD FRML MDRD: > 60 ML/MIN/1.73M2
GLUCOSE BLD-MCNC: 94 MG/DL (ref 70–108)
HCT VFR BLD AUTO: 38.7 % (ref 42–52)
HGB BLD-MCNC: 13 GM/DL (ref 14–18)
IMMATURE GRANULOCYTES: 0 %
IONIZED CALCIUM, WHOLE BLOOD: 1.13 MMOL/L (ref 1.12–1.32)
LYMPHOCYTES ABSOLUTE: 1.5 THOU/MM3 (ref 1–4.8)
LYMPHOCYTES NFR BLD AUTO: 36 % (ref 15–47)
MCH RBC QN AUTO: 31.6 PG (ref 26–33)
MCHC RBC AUTO-ENTMCNC: 33.6 GM/DL (ref 32.2–35.5)
MCV RBC AUTO: 94 FL (ref 80–94)
MONOCYTES ABSOLUTE: 0.4 THOU/MM3 (ref 0.4–1.3)
MONOCYTES NFR BLD AUTO: 9 % (ref 0–12)
NEUTROPHILS NFR BLD AUTO: 54 % (ref 43–75)
PLATELET # BLD AUTO: 148 THOU/MM3 (ref 130–400)
PMV BLD AUTO: 9.3 FL (ref 9.4–12.4)
POTASSIUM BLD-SCNC: 3.6 MEQ/L (ref 3.5–4.9)
PROT SERPL-MCNC: 7.6 G/DL (ref 6.1–8)
RBC # BLD AUTO: 4.12 MILL/MM3 (ref 4.7–6.1)
SEGMENTED NEUTROPHILS ABSOLUTE COUNT: 2.3 THOU/MM3 (ref 1.8–7.7)
SODIUM BLD-SCNC: 136 MEQ/L (ref 138–146)
TOTAL CO2, WHOLE BLOOD: 28 MEQ/L (ref 23–33)
WBC # BLD AUTO: 4.2 THOU/MM3 (ref 4.8–10.8)

## 2023-10-09 PROCEDURE — 96413 CHEMO IV INFUSION 1 HR: CPT

## 2023-10-09 PROCEDURE — 96368 THER/DIAG CONCURRENT INF: CPT

## 2023-10-09 PROCEDURE — 2580000003 HC RX 258: Performed by: INTERNAL MEDICINE

## 2023-10-09 PROCEDURE — 85025 COMPLETE CBC W/AUTO DIFF WBC: CPT

## 2023-10-09 PROCEDURE — 99211 OFF/OP EST MAY X REQ PHY/QHP: CPT

## 2023-10-09 PROCEDURE — 6360000002 HC RX W HCPCS: Performed by: INTERNAL MEDICINE

## 2023-10-09 PROCEDURE — 80076 HEPATIC FUNCTION PANEL: CPT

## 2023-10-09 PROCEDURE — 96367 TX/PROPH/DG ADDL SEQ IV INF: CPT

## 2023-10-09 PROCEDURE — 80047 BASIC METABLC PNL IONIZED CA: CPT

## 2023-10-09 PROCEDURE — 96416 CHEMO PROLONG INFUSE W/PUMP: CPT

## 2023-10-09 PROCEDURE — 96375 TX/PRO/DX INJ NEW DRUG ADDON: CPT

## 2023-10-09 PROCEDURE — 96415 CHEMO IV INFUSION ADDL HR: CPT

## 2023-10-09 PROCEDURE — 36591 DRAW BLOOD OFF VENOUS DEVICE: CPT

## 2023-10-09 PROCEDURE — 99214 OFFICE O/P EST MOD 30 MIN: CPT | Performed by: INTERNAL MEDICINE

## 2023-10-09 RX ORDER — PALONOSETRON 0.05 MG/ML
0.25 INJECTION, SOLUTION INTRAVENOUS ONCE
Status: COMPLETED | OUTPATIENT
Start: 2023-10-09 | End: 2023-10-09

## 2023-10-09 RX ORDER — FAMOTIDINE 10 MG/ML
20 INJECTION, SOLUTION INTRAVENOUS
Status: CANCELLED | OUTPATIENT
Start: 2023-10-09

## 2023-10-09 RX ORDER — DEXTROSE MONOHYDRATE 50 MG/ML
5-250 INJECTION, SOLUTION INTRAVENOUS PRN
Status: CANCELLED | OUTPATIENT
Start: 2023-10-09

## 2023-10-09 RX ORDER — DEXTROSE MONOHYDRATE 50 MG/ML
5-250 INJECTION, SOLUTION INTRAVENOUS PRN
Status: DISCONTINUED | OUTPATIENT
Start: 2023-10-09 | End: 2023-10-10 | Stop reason: HOSPADM

## 2023-10-09 RX ORDER — HEPARIN 100 UNIT/ML
500 SYRINGE INTRAVENOUS PRN
OUTPATIENT
Start: 2023-10-09

## 2023-10-09 RX ORDER — HEPARIN SODIUM (PORCINE) LOCK FLUSH IV SOLN 100 UNIT/ML 100 UNIT/ML
500 SOLUTION INTRAVENOUS PRN
Status: CANCELLED | OUTPATIENT
Start: 2023-10-11

## 2023-10-09 RX ORDER — MEPERIDINE HYDROCHLORIDE 50 MG/ML
12.5 INJECTION INTRAMUSCULAR; INTRAVENOUS; SUBCUTANEOUS PRN
Status: CANCELLED | OUTPATIENT
Start: 2023-10-09

## 2023-10-09 RX ORDER — SODIUM CHLORIDE 0.9 % (FLUSH) 0.9 %
5-40 SYRINGE (ML) INJECTION PRN
Status: DISCONTINUED | OUTPATIENT
Start: 2023-10-09 | End: 2023-10-10 | Stop reason: HOSPADM

## 2023-10-09 RX ORDER — SODIUM CHLORIDE 9 MG/ML
INJECTION, SOLUTION INTRAVENOUS CONTINUOUS
Status: CANCELLED | OUTPATIENT
Start: 2023-10-09

## 2023-10-09 RX ORDER — PALONOSETRON 0.05 MG/ML
0.25 INJECTION, SOLUTION INTRAVENOUS ONCE
Status: CANCELLED | OUTPATIENT
Start: 2023-10-09 | End: 2023-10-09

## 2023-10-09 RX ORDER — ONDANSETRON 4 MG/1
4 TABLET, ORALLY DISINTEGRATING ORAL EVERY 6 HOURS PRN
Qty: 45 TABLET | Refills: 3 | Status: SHIPPED | OUTPATIENT
Start: 2023-10-09

## 2023-10-09 RX ORDER — HEPARIN 100 UNIT/ML
500 SYRINGE INTRAVENOUS PRN
Status: DISCONTINUED | OUTPATIENT
Start: 2023-10-09 | End: 2023-10-10 | Stop reason: HOSPADM

## 2023-10-09 RX ORDER — DIPHENHYDRAMINE HYDROCHLORIDE 50 MG/ML
50 INJECTION INTRAMUSCULAR; INTRAVENOUS
Status: CANCELLED | OUTPATIENT
Start: 2023-10-09

## 2023-10-09 RX ORDER — SODIUM CHLORIDE 0.9 % (FLUSH) 0.9 %
5-40 SYRINGE (ML) INJECTION PRN
Status: CANCELLED | OUTPATIENT
Start: 2023-10-11

## 2023-10-09 RX ORDER — SODIUM CHLORIDE 9 MG/ML
5-250 INJECTION, SOLUTION INTRAVENOUS PRN
Status: CANCELLED | OUTPATIENT
Start: 2023-10-11

## 2023-10-09 RX ORDER — PROCHLORPERAZINE MALEATE 10 MG
10 TABLET ORAL EVERY 6 HOURS PRN
Qty: 60 TABLET | Refills: 1 | Status: SHIPPED | OUTPATIENT
Start: 2023-10-09

## 2023-10-09 RX ORDER — SODIUM CHLORIDE 9 MG/ML
25 INJECTION, SOLUTION INTRAVENOUS PRN
OUTPATIENT
Start: 2023-10-09

## 2023-10-09 RX ORDER — ALBUTEROL SULFATE 90 UG/1
4 AEROSOL, METERED RESPIRATORY (INHALATION) PRN
Status: CANCELLED | OUTPATIENT
Start: 2023-10-09

## 2023-10-09 RX ORDER — ACETAMINOPHEN 325 MG/1
650 TABLET ORAL
Status: CANCELLED | OUTPATIENT
Start: 2023-10-09

## 2023-10-09 RX ORDER — HEPARIN SODIUM (PORCINE) LOCK FLUSH IV SOLN 100 UNIT/ML 100 UNIT/ML
500 SOLUTION INTRAVENOUS PRN
Status: CANCELLED | OUTPATIENT
Start: 2023-10-09

## 2023-10-09 RX ORDER — SODIUM CHLORIDE 0.9 % (FLUSH) 0.9 %
5-40 SYRINGE (ML) INJECTION PRN
OUTPATIENT
Start: 2023-10-09

## 2023-10-09 RX ORDER — SODIUM CHLORIDE 9 MG/ML
5-250 INJECTION, SOLUTION INTRAVENOUS PRN
Status: CANCELLED | OUTPATIENT
Start: 2023-10-09

## 2023-10-09 RX ORDER — EPINEPHRINE 1 MG/ML
0.3 INJECTION, SOLUTION, CONCENTRATE INTRAVENOUS PRN
Status: CANCELLED | OUTPATIENT
Start: 2023-10-09

## 2023-10-09 RX ORDER — SODIUM CHLORIDE 0.9 % (FLUSH) 0.9 %
5-40 SYRINGE (ML) INJECTION PRN
Status: CANCELLED | OUTPATIENT
Start: 2023-10-09

## 2023-10-09 RX ORDER — ONDANSETRON 2 MG/ML
8 INJECTION INTRAMUSCULAR; INTRAVENOUS
Status: CANCELLED | OUTPATIENT
Start: 2023-10-09

## 2023-10-09 RX ADMIN — DEXAMETHASONE SODIUM PHOSPHATE 12 MG: 4 INJECTION, SOLUTION INTRA-ARTICULAR; INTRALESIONAL; INTRAMUSCULAR; INTRAVENOUS; SOFT TISSUE at 10:13

## 2023-10-09 RX ADMIN — OXALIPLATIN 120 MG: 5 INJECTION, SOLUTION INTRAVENOUS at 12:13

## 2023-10-09 RX ADMIN — SODIUM CHLORIDE, PRESERVATIVE FREE 10 ML: 5 INJECTION INTRAVENOUS at 09:00

## 2023-10-09 RX ADMIN — LEUCOVORIN CALCIUM 750 MG: 350 INJECTION, POWDER, LYOPHILIZED, FOR SOLUTION INTRAMUSCULAR; INTRAVENOUS at 11:41

## 2023-10-09 RX ADMIN — PALONOSETRON 0.25 MG: 0.05 INJECTION, SOLUTION INTRAVENOUS at 09:59

## 2023-10-09 RX ADMIN — DEXTROSE MONOHYDRATE 20 ML/HR: 50 INJECTION, SOLUTION INTRAVENOUS at 09:49

## 2023-10-09 RX ADMIN — SODIUM CHLORIDE, PRESERVATIVE FREE 10 ML: 5 INJECTION INTRAVENOUS at 09:48

## 2023-10-09 RX ADMIN — SODIUM CHLORIDE, PRESERVATIVE FREE 20 ML: 5 INJECTION INTRAVENOUS at 09:01

## 2023-10-09 RX ADMIN — FLUOROURACIL 4450 MG: 50 INJECTION, SOLUTION INTRAVENOUS at 15:10

## 2023-10-09 NOTE — DISCHARGE INSTRUCTIONS
Treatment today  RTC 2 weeks labs NP/PA for C7  3 weeks CT C/A/P  RTC 4 weeks labs Dr Coughlin Manual for C8 and review CTS    Please contact your Oncologist if you have any questions regarding the chemotherapy Oxaliplatin/Leucovorin/5FU that you received today. Patient instructed if experience any of the symptoms following today's chemotherapy / to notify MD immediately or go to emergency department.     * dizziness/lightheadedness  *acute nausea/vomiting - not relieved with medication  *headache - not relieved from Tylenol/pain medication  *chest pain/pressure  *rash/itching  *shortness of breath        Drink fluids - 48oz fluids daily  Call if develop fever/ chills/ signs or symptoms of infection

## 2023-10-09 NOTE — PATIENT INSTRUCTIONS
Treatment today  RTC 2 weeks labs NP/PA for C7  3 weeks CT C/A/P  RTC 4 weeks labs Dr Delmer Lang foro C8 and review CTS

## 2023-10-09 NOTE — PLAN OF CARE
Problem: Safety - Adult  Goal: Free from fall injury  Outcome: Progressing  Flowsheets (Taken 10/9/2023 1723)  Free From Fall Injury: Instruct family/caregiver on patient safety  Note: No falls occurred with visit today. Problem: Chronic Conditions and Co-morbidities  Goal: Patient's chronic conditions and co-morbidity symptoms are monitored and maintained or improved  Outcome: Progressing  Flowsheets (Taken 10/9/2023 1723)  Care Plan - Patient's Chronic Conditions and Co-Morbidity Symptoms are Monitored and Maintained or Improved: Monitor and assess patient's chronic conditions and comorbid symptoms for stability, deterioration, or improvement  Note:   Chemotherapy Teaching     What is Chemotherapy   Drug action [x]   Method of Administration [x]   Handouts given []     Side Effects  Nausea/vomiting [x]   Diarrhea [x]   Fatigue [x]   Signs / Symptoms of infection [x]   Neutropenia [x]   Thrombocytopenia [x]   Alopecia [x]   neuropathy [x]   [place] diet &  the importance of fluids [x]       Micellaneous  Importance of nutrition [x]   Importance of oral hygiene [x]   When to call the MD [x]   Monitoring labs [x]   Use of supportive services []     Explanation of Drug Regimen / Frequency  Oxaliplatin/Leucovorin/5FU     Comments  Verbalized understanding to drug,action,side effects and when to call MD         Problem: Discharge Planning  Goal: Discharge to home or other facility with appropriate resources  Outcome: Progressing  Flowsheets (Taken 10/9/2023 1723)  Discharge to home or other facility with appropriate resources: Identify barriers to discharge with patient and caregiver  Note: Verbalized understanding of discharge instructions, follow-up appointments, and when to call the physician. Care plan reviewed with patient and spouse. Patient and spouse verbalize understanding of the plan of care and contribute to goal setting.

## 2023-10-11 ENCOUNTER — HOSPITAL ENCOUNTER (OUTPATIENT)
Dept: INFUSION THERAPY | Age: 58
Discharge: HOME | End: 2023-10-11
Payer: MEDICARE

## 2023-10-11 VITALS
SYSTOLIC BLOOD PRESSURE: 118 MMHG | TEMPERATURE: 97.6 F | OXYGEN SATURATION: 99 % | RESPIRATION RATE: 18 BRPM | HEART RATE: 69 BPM | DIASTOLIC BLOOD PRESSURE: 77 MMHG

## 2023-10-11 DIAGNOSIS — C18.9 MALIGNANT NEOPLASM OF COLON, UNSPECIFIED PART OF COLON (HCC): Primary | ICD-10-CM

## 2023-10-11 DIAGNOSIS — C06.2 SQUAMOUS CELL CANCER OF RETROMOLAR TRIGONE (HCC): ICD-10-CM

## 2023-10-11 PROCEDURE — 2580000003 HC RX 258: Performed by: INTERNAL MEDICINE

## 2023-10-11 PROCEDURE — 6360000002 HC RX W HCPCS: Performed by: INTERNAL MEDICINE

## 2023-10-11 PROCEDURE — 96523 IRRIG DRUG DELIVERY DEVICE: CPT

## 2023-10-11 RX ORDER — SODIUM CHLORIDE 0.9 % (FLUSH) 0.9 %
5-40 SYRINGE (ML) INJECTION PRN
Status: DISCONTINUED | OUTPATIENT
Start: 2023-10-11 | End: 2023-10-12 | Stop reason: HOSPADM

## 2023-10-11 RX ORDER — HEPARIN 100 UNIT/ML
500 SYRINGE INTRAVENOUS PRN
Status: DISCONTINUED | OUTPATIENT
Start: 2023-10-11 | End: 2023-10-12 | Stop reason: HOSPADM

## 2023-10-11 RX ADMIN — HEPARIN 500 UNITS: 100 SYRINGE at 13:07

## 2023-10-11 RX ADMIN — SODIUM CHLORIDE, PRESERVATIVE FREE 20 ML: 5 INJECTION INTRAVENOUS at 13:07

## 2023-10-11 NOTE — PLAN OF CARE
Problem: Discharge Planning  Goal: Discharge to home or other facility with appropriate resources  Outcome: Adequate for Discharge  Flowsheets (Taken 10/11/2023 1746)  Discharge to home or other facility with appropriate resources:   Identify barriers to discharge with patient and caregiver   Identify discharge learning needs (meds, wound care, etc)  Note: Verbalize understanding of discharge instructions, follow up appointments, and when to call Physician. Problem: Infection - Adult  Goal: Absence of infection at discharge  Outcome: Adequate for Discharge  Flowsheets (Taken 10/11/2023 1746)  Absence of infection at discharge:   Assess and monitor for signs and symptoms of infection   Monitor all insertion sites i.e., indwelling lines, tubes and drains  Note: Mediport site with no redness or warmth. Skin over port site intact with no signs of breakdown noted. Patient verbalizes signs/symptoms of port infection and when to notify the physician. Care plan reviewed with patient and family. Patient and family verbalize understanding of the plan of care and contribute to goal setting.

## 2023-10-15 PROCEDURE — G2066 INTER DEVC REMOTE 30D: HCPCS | Performed by: NUCLEAR MEDICINE

## 2023-10-15 PROCEDURE — 93298 REM INTERROG DEV EVAL SCRMS: CPT | Performed by: NUCLEAR MEDICINE

## 2023-10-18 ENCOUNTER — TELEMEDICINE (OUTPATIENT)
Dept: PALLATIVE CARE | Age: 58
End: 2023-10-18
Payer: MEDICARE

## 2023-10-18 DIAGNOSIS — T45.1X5A CHEMOTHERAPY-INDUCED PERIPHERAL NEUROPATHY (HCC): ICD-10-CM

## 2023-10-18 DIAGNOSIS — G62.0 CHEMOTHERAPY-INDUCED PERIPHERAL NEUROPATHY (HCC): ICD-10-CM

## 2023-10-18 DIAGNOSIS — C18.2 MALIGNANT NEOPLASM OF ASCENDING COLON (HCC): Primary | ICD-10-CM

## 2023-10-18 DIAGNOSIS — R11.2 NAUSEA AND VOMITING, UNSPECIFIED VOMITING TYPE: ICD-10-CM

## 2023-10-18 DIAGNOSIS — Z51.5 PALLIATIVE CARE ENCOUNTER: ICD-10-CM

## 2023-10-18 DIAGNOSIS — Z51.5 PALLIATIVE CARE PATIENT: ICD-10-CM

## 2023-10-18 DIAGNOSIS — C06.2 SQUAMOUS CELL CANCER OF RETROMOLAR TRIGONE (HCC): ICD-10-CM

## 2023-10-18 DIAGNOSIS — G89.3 CANCER RELATED PAIN: ICD-10-CM

## 2023-10-18 PROCEDURE — 99205 OFFICE O/P NEW HI 60 MIN: CPT | Performed by: STUDENT IN AN ORGANIZED HEALTH CARE EDUCATION/TRAINING PROGRAM

## 2023-10-18 RX ORDER — OXYCODONE HYDROCHLORIDE 10 MG/1
10 TABLET ORAL EVERY 4 HOURS PRN
Qty: 120 TABLET | Refills: 0 | Status: SHIPPED | OUTPATIENT
Start: 2023-10-18 | End: 2023-11-17

## 2023-10-18 RX ORDER — OLANZAPINE 5 MG/1
5 TABLET ORAL NIGHTLY
Qty: 30 TABLET | Refills: 0 | Status: SHIPPED | OUTPATIENT
Start: 2023-10-18

## 2023-10-18 RX ORDER — MORPHINE SULFATE 15 MG/1
15 TABLET, FILM COATED, EXTENDED RELEASE ORAL 3 TIMES DAILY
Qty: 90 TABLET | Refills: 0 | Status: SHIPPED | OUTPATIENT
Start: 2023-10-18 | End: 2023-11-17

## 2023-10-18 NOTE — PATIENT INSTRUCTIONS
-Stop taking the Percocet and dispose of the medication at the pharmacy  -Start morphine extended release 15 mg 3 times a day  -Start oxycodone 10 mg every 4 hours as needed for pain  -Start olanzapine 5 mg nightly for nausea and vomiting  -Continue Zofran and Compazine as needed for nausea and vomiting  -Please call the office with any concerns or questions regarding his pain medication

## 2023-10-18 NOTE — PROGRESS NOTES
needed for pain  -Start olanzapine 5 mg nightly for nausea and vomiting  -Continue Zofran and Compazine as needed for nausea and vomiting       Return in about 2 weeks (around 11/1/2023) for Cancer related pain. Medications Prescribed:  Orders Placed This Encounter   Medications    OLANZapine (ZYPREXA) 5 MG tablet     Sig: Take 1 tablet by mouth nightly     Dispense:  30 tablet     Refill:  0    oxyCODONE HCl (OXY-IR) 10 MG immediate release tablet     Sig: Take 1 tablet by mouth every 4 hours as needed for Pain for up to 30 days. Intended supply: 30 days Max Daily Amount: 60 mg     Dispense:  120 tablet     Refill:  0     Reduce doses taken as pain becomes manageable    morphine (MS CONTIN) 15 MG extended release tablet     Sig: Take 1 tablet by mouth 3 times daily for 30 days. Max Daily Amount: 45 mg     Dispense:  90 tablet     Refill:  0     Reduce doses taken as pain becomes manageable       Future Appointments   Date Time Provider [address]   10/24/2023  8:00 AM STR OUT PT ONC CMA STR[facility] OP ONC [facility] HOD   10/24/2023  8:00 AM STR OUT PT ONC INJ RM 10 STRZ OP ONC [facility]ava HOD   10/24/2023  8:20 AM Alicia Up [place] Oncology [facility]   10/31/2023  8:30 AM STR OUT PT ONC INJ RM 12 STRZ OP ONC Nava HOD   10/31/2023  9:00 AM STR CT IMAGING RM1  [facility] STRZ OUT EXP STR Rad/Card   11/1/2023  1:30 PM Thomas Maya MD N SRPX PALLI [facility]   11/6/2023  9:00 AM Eulalia Steinberg MD PhD N Oncology [facility]   11/6/2023 10:00 AM Devin Broussard MD N ENT [facility]   11/8/2023 10:00 AM Andria Segovia MD N SRPX Heart [address]       Patient given educational materials - see patient instructions. Discussed use, benefit, and side effects of prescribed medications. All patient questions answered. Patient voiced understanding. Patient agreed with treatment plan. Follow up as directed. Kendell Mosquera, was evaluated through a synchronous (real-time) audio-video encounter.  The

## 2023-10-24 ENCOUNTER — HOSPITAL ENCOUNTER (OUTPATIENT)
Dept: INFUSION THERAPY | Age: 58
Discharge: HOME | End: 2023-10-24
Payer: MEDICARE

## 2023-10-24 ENCOUNTER — OFFICE VISIT (OUTPATIENT)
Dept: ONCOLOGY | Age: 58
End: 2023-10-24
Payer: MEDICARE

## 2023-10-24 VITALS
DIASTOLIC BLOOD PRESSURE: 92 MMHG | TEMPERATURE: 97.6 F | RESPIRATION RATE: 18 BRPM | SYSTOLIC BLOOD PRESSURE: 132 MMHG | HEIGHT: 72 IN | WEIGHT: 143 LBS | HEART RATE: 84 BPM | OXYGEN SATURATION: 98 % | BODY MASS INDEX: 19.37 KG/M2

## 2023-10-24 VITALS
WEIGHT: 143 LBS | RESPIRATION RATE: 18 BRPM | TEMPERATURE: 97.6 F | SYSTOLIC BLOOD PRESSURE: 132 MMHG | HEART RATE: 84 BPM | DIASTOLIC BLOOD PRESSURE: 92 MMHG | HEIGHT: 72 IN | OXYGEN SATURATION: 98 % | BODY MASS INDEX: 19.37 KG/M2

## 2023-10-24 DIAGNOSIS — C18.2 MALIGNANT NEOPLASM OF ASCENDING COLON (HCC): Primary | ICD-10-CM

## 2023-10-24 DIAGNOSIS — D50.0 IRON DEFICIENCY ANEMIA DUE TO CHRONIC BLOOD LOSS: ICD-10-CM

## 2023-10-24 DIAGNOSIS — C18.2 MALIGNANT NEOPLASM OF ASCENDING COLON (HCC): ICD-10-CM

## 2023-10-24 DIAGNOSIS — T45.1X5A CHEMOTHERAPY INDUCED NAUSEA AND VOMITING: ICD-10-CM

## 2023-10-24 DIAGNOSIS — R11.2 CHEMOTHERAPY INDUCED NAUSEA AND VOMITING: ICD-10-CM

## 2023-10-24 DIAGNOSIS — Z51.11 ENCOUNTER FOR ANTINEOPLASTIC CHEMOTHERAPY: ICD-10-CM

## 2023-10-24 DIAGNOSIS — C18.9 MALIGNANT NEOPLASM OF COLON, UNSPECIFIED PART OF COLON (HCC): Primary | ICD-10-CM

## 2023-10-24 LAB
ABSOLUTE IMMATURE GRANULOCYTE: 0.01 THOU/MM3 (ref 0–0.07)
ALBUMIN SERPL BCG-MCNC: 3.8 G/DL (ref 3.5–5.1)
ALP SERPL-CCNC: 68 U/L (ref 38–126)
ALT SERPL W/O P-5'-P-CCNC: 9 U/L (ref 11–66)
AST SERPL-CCNC: 18 U/L (ref 5–40)
BASOPHILS ABSOLUTE: 0 THOU/MM3 (ref 0–0.1)
BASOPHILS NFR BLD AUTO: 1 % (ref 0–3)
BILIRUB CONJ SERPL-MCNC: < 0.2 MG/DL (ref 0–0.3)
BILIRUB SERPL-MCNC: 0.2 MG/DL (ref 0.3–1.2)
BUN BLDP-MCNC: 6 MG/DL (ref 8–26)
CEA SERPL-MCNC: 5.6 NG/ML (ref 0–5)
CHLORIDE BLD-SCNC: 99 MEQ/L (ref 98–109)
CREAT BLD-MCNC: 1 MG/DL (ref 0.5–1.2)
EOSINOPHIL NFR BLD AUTO: 1 % (ref 0–4)
EOSINOPHILS ABSOLUTE: 0 THOU/MM3 (ref 0–0.4)
ERYTHROCYTE [DISTWIDTH] IN BLOOD BY AUTOMATED COUNT: 20.5 % (ref 11.5–14.5)
FERRITIN SERPL IA-MCNC: 194 NG/ML (ref 22–322)
FOLATE SERPL-MCNC: > 20 NG/ML (ref 4.8–24.2)
GFR SERPL CREATININE-BSD FRML MDRD: > 60 ML/MIN/1.73M2
GLUCOSE BLD-MCNC: 60 MG/DL (ref 70–108)
HCT VFR BLD AUTO: 34.5 % (ref 42–52)
HGB BLD-MCNC: 11.4 GM/DL (ref 14–18)
HGB RETIC QN AUTO: 39.6 PG (ref 28.2–35.7)
IMM RETICS NFR: 32.9 % (ref 2.3–13.4)
IMMATURE GRANULOCYTES: 0 %
IONIZED CALCIUM, WHOLE BLOOD: 1.21 MMOL/L (ref 1.12–1.32)
IRON SATN MFR SERPL: 32 % (ref 20–50)
IRON SERPL-MCNC: 71 UG/DL (ref 65–195)
LYMPHOCYTES ABSOLUTE: 2.5 THOU/MM3 (ref 1–4.8)
LYMPHOCYTES NFR BLD AUTO: 60 % (ref 15–47)
MCH RBC QN AUTO: 32.9 PG (ref 26–33)
MCHC RBC AUTO-ENTMCNC: 33 GM/DL (ref 32.2–35.5)
MCV RBC AUTO: 100 FL (ref 80–94)
MONOCYTES ABSOLUTE: 0.5 THOU/MM3 (ref 0.4–1.3)
MONOCYTES NFR BLD AUTO: 13 % (ref 0–12)
NEUTROPHILS NFR BLD AUTO: 26 % (ref 43–75)
PLATELET # BLD AUTO: 144 THOU/MM3 (ref 130–400)
PMV BLD AUTO: 9.4 FL (ref 9.4–12.4)
POTASSIUM BLD-SCNC: 3.4 MEQ/L (ref 3.5–4.9)
PROT SERPL-MCNC: 6.5 G/DL (ref 6.1–8)
RBC # BLD AUTO: 3.46 MILL/MM3 (ref 4.7–6.1)
RETICS # AUTO: 117 THOU/MM3 (ref 20–115)
RETICS/RBC NFR AUTO: 3.4 % (ref 0.5–2)
SEGMENTED NEUTROPHILS ABSOLUTE COUNT: 1.1 THOU/MM3 (ref 1.8–7.7)
SODIUM BLD-SCNC: 138 MEQ/L (ref 138–146)
TIBC SERPL-MCNC: 225 UG/DL (ref 171–450)
TOTAL CO2, WHOLE BLOOD: 31 MEQ/L (ref 23–33)
VIT B12 SERPL-MCNC: 594 PG/ML (ref 211–911)
WBC # BLD AUTO: 4.1 THOU/MM3 (ref 4.8–10.8)

## 2023-10-24 PROCEDURE — 99211 OFF/OP EST MAY X REQ PHY/QHP: CPT

## 2023-10-24 PROCEDURE — G8427 DOCREV CUR MEDS BY ELIG CLIN: HCPCS | Performed by: PHYSICIAN ASSISTANT

## 2023-10-24 PROCEDURE — 82607 VITAMIN B-12: CPT

## 2023-10-24 PROCEDURE — 2580000003 HC RX 258: Performed by: INTERNAL MEDICINE

## 2023-10-24 PROCEDURE — G8420 CALC BMI NORM PARAMETERS: HCPCS | Performed by: PHYSICIAN ASSISTANT

## 2023-10-24 PROCEDURE — 6360000002 HC RX W HCPCS: Performed by: INTERNAL MEDICINE

## 2023-10-24 PROCEDURE — 80047 BASIC METABLC PNL IONIZED CA: CPT

## 2023-10-24 PROCEDURE — G8484 FLU IMMUNIZE NO ADMIN: HCPCS | Performed by: PHYSICIAN ASSISTANT

## 2023-10-24 PROCEDURE — 85046 RETICYTE/HGB CONCENTRATE: CPT

## 2023-10-24 PROCEDURE — 82746 ASSAY OF FOLIC ACID SERUM: CPT

## 2023-10-24 PROCEDURE — 99214 OFFICE O/P EST MOD 30 MIN: CPT | Performed by: PHYSICIAN ASSISTANT

## 2023-10-24 PROCEDURE — 82728 ASSAY OF FERRITIN: CPT

## 2023-10-24 PROCEDURE — 83540 ASSAY OF IRON: CPT

## 2023-10-24 PROCEDURE — 36591 DRAW BLOOD OFF VENOUS DEVICE: CPT

## 2023-10-24 PROCEDURE — 85025 COMPLETE CBC W/AUTO DIFF WBC: CPT

## 2023-10-24 PROCEDURE — 3017F COLORECTAL CA SCREEN DOC REV: CPT | Performed by: PHYSICIAN ASSISTANT

## 2023-10-24 PROCEDURE — 83550 IRON BINDING TEST: CPT

## 2023-10-24 PROCEDURE — 1036F TOBACCO NON-USER: CPT | Performed by: PHYSICIAN ASSISTANT

## 2023-10-24 PROCEDURE — 80076 HEPATIC FUNCTION PANEL: CPT

## 2023-10-24 PROCEDURE — 82378 CARCINOEMBRYONIC ANTIGEN: CPT

## 2023-10-24 RX ORDER — HEPARIN 100 UNIT/ML
500 SYRINGE INTRAVENOUS PRN
Status: DISCONTINUED | OUTPATIENT
Start: 2023-10-24 | End: 2023-10-25 | Stop reason: HOSPADM

## 2023-10-24 RX ORDER — SODIUM CHLORIDE 0.9 % (FLUSH) 0.9 %
5-40 SYRINGE (ML) INJECTION PRN
Status: CANCELLED | OUTPATIENT
Start: 2023-10-24

## 2023-10-24 RX ORDER — SODIUM CHLORIDE 9 MG/ML
25 INJECTION, SOLUTION INTRAVENOUS PRN
Status: CANCELLED | OUTPATIENT
Start: 2023-10-24

## 2023-10-24 RX ORDER — HEPARIN 100 UNIT/ML
500 SYRINGE INTRAVENOUS PRN
Status: CANCELLED | OUTPATIENT
Start: 2023-10-24

## 2023-10-24 RX ORDER — SODIUM CHLORIDE 0.9 % (FLUSH) 0.9 %
5-40 SYRINGE (ML) INJECTION PRN
Status: DISCONTINUED | OUTPATIENT
Start: 2023-10-24 | End: 2023-10-25 | Stop reason: HOSPADM

## 2023-10-24 RX ADMIN — HEPARIN 500 UNITS: 100 SYRINGE at 10:16

## 2023-10-24 RX ADMIN — SODIUM CHLORIDE, PRESERVATIVE FREE 10 ML: 5 INJECTION INTRAVENOUS at 09:03

## 2023-10-24 RX ADMIN — SODIUM CHLORIDE, PRESERVATIVE FREE 10 ML: 5 INJECTION INTRAVENOUS at 09:04

## 2023-10-24 RX ADMIN — SODIUM CHLORIDE, PRESERVATIVE FREE 10 ML: 5 INJECTION INTRAVENOUS at 10:16

## 2023-10-24 NOTE — PATIENT INSTRUCTIONS
Hold chemotherapy today due to low ANC. Return to clinic in one week with Dr. Coughlin Manual for possible treatment   Please reschedule CT of chest, CT of abdomen/pelvis to be 2 weeks from now (during off week of chemo)  Return to clinic in 3 weeks with Dr. Coughlin Manual for possible chemo   Labs on RTC  Please call for questions or concerns.

## 2023-10-24 NOTE — PROGRESS NOTES
Patient tolerated lab draw from [facility] without any complications. Discharge instructions given to patient-verbalizes understanding. Ambulated off unit per self with belongings.

## 2023-10-24 NOTE — PLAN OF CARE
Problem: Infection - Adult  Goal: Absence of infection at discharge  Outcome: Adequate for Discharge  Flowsheets (Taken 10/24/2023 1101)  Absence of infection at discharge:   Monitor all insertion sites i.e., indwelling lines, tubes and drains   Assess and monitor for signs and symptoms of infection  Note: Mediport site with no redness or warmth. Skin over port site intact with no signs of breakdown noted. Patient verbalizes signs/symptoms of port infection and when to notify the physician. Discuss port maintenance,infection prevention, sign of infection and when to call MD.      Problem: Safety - Adult  Goal: Free from fall injury  Outcome: Adequate for Discharge  Flowsheets (Taken 10/24/2023 1101)  Free From Fall Injury:   Based on caregiver fall risk screen, instruct family/caregiver to ask for assistance with transferring infant if caregiver noted to have fall risk factors   Instruct family/caregiver on patient safety  Note: Free from falls while in O.P. Oncology. Discussed the need to use the call light for assistance when getting up to ambulate. Problem: Chronic Conditions and Co-morbidities  Goal: Patient's chronic conditions and co-morbidity symptoms are monitored and maintained or improved  Outcome: Adequate for Discharge  Flowsheets (Taken 10/24/2023 1101)  Care Plan - Patient's Chronic Conditions and Co-Morbidity Symptoms are Monitored and Maintained or Improved:   Monitor and assess patient's chronic conditions and comorbid symptoms for stability, deterioration, or improvement   Collaborate with multidisciplinary team to address chronic and comorbid conditions and prevent exacerbation or deterioration  Note: Patient verbalizes understanding to verbal information given on lab draw from mediport,action and possible side effects. Aware to call MD if develop complications.         Problem: Discharge Planning  Goal: Discharge to home or other facility with appropriate resources  Outcome: Adequate for Discharge  Flowsheets (Taken 10/24/2023 1101)  Discharge to home or other facility with appropriate resources:   Identify discharge learning needs (meds, wound care, etc)   Identify barriers to discharge with patient and caregiver  Note: Verbalize understanding of discharge instructions, follow up appointments, and when to call Physician. Discuss understanding of discharge instructions, follow up appointments and when to call Physician. Care plan reviewed with patient. Patient verbalizes understanding of the plan of care and contribute to goal setting.

## 2023-10-24 NOTE — DISCHARGE INSTRUCTIONS
Hold chemotherapy today due to low ANC. Return to clinic in one week with Dr. Marline Price for possible treatment   Please reschedule CT of chest, CT of abdomen/pelvis to be 2 weeks from now (during off week of chemo)  Return to clinic in 3 weeks with Dr. Marline Price for possible chemo   Labs on RTC  Please call for questions or concerns. Please contact your Oncologist if you have any questions regarding the lab draw that you received today. You are instructed to call the office or go to the Emergency Dept. If you experience any of the following symptoms:    Dizziness/lightheadedness   Acute nausea or vomiting-not relieved by medications  Headaches-not relieved by medications  New chest pain or pressure  New rash /itching  New shortness of breath  Fever,chills or signs or symptoms of infection    Make sure you are drinking 48 to 64 ounces of water daily-if you are unable to drink fluids let us know right away.

## 2023-10-26 ENCOUNTER — TELEPHONE (OUTPATIENT)
Dept: ENT CLINIC | Age: 58
End: 2023-10-26

## 2023-10-26 NOTE — TELEPHONE ENCOUNTER
Dylan Acevedo (wife) called to reschedule Romero's 6 week follow up appointment with Dr [facility] 11/6/23 because he has chemo that same day. He already had his CT/neck and MRI done. The patient had to reschedule his CT/abdomen for 11/9/23 due to chemo appointment as well. Dr [facility] requested a 6 week close follow up but patient rescheduled for 8 weeks. Is this okay???    9/18/23 last OV  9/19/23 CT neck  9/20/23 MRI brain  11/9/23 CT abdomen  11/22/23 Next OV (if ok)    Please advise.

## 2023-10-30 NOTE — PROGRESS NOTES
is good with several small meals throughout the day. He reports he still does not drink alcohol, smoke cigarettes but smokes weed -but had not smoked prior to this event. His long-term SO Amaryllis Liming is helping out. Sore throat- still resolved. No F/C. -Allergies and medications, past medical history, past surgical history, family history, and social history reviewed. Review of Systems:  Constitutional: +fatigue (better), Denies any weakness, lack of appetite, excessive appetite, weight change, chills or fever. Eyes: Reports no blurry vision, no double vision, no pain in the eyes, dry eyes or tearing. ENT:  Reports no decreased hearing capacity, ear pain or tinnitus. Denies any epistaxis, nasal congestion, mouth sores or bleeding, tooth or jaw pain, or hoarseness. Respiratory: Denies any cough, sputum production or hemoptysis. There is no wheezing, shortness of breath or any other respiratory complaints. Cardiovascular: Denies any chest pain, shortness of breath when lying down, shortness of breath with activity, palpitations, or leg swelling. Breasts: Denies any lumps, bumps, pain, nipple changes or discharge in the breasts. Gastrointestinal: +nausea, diarrhea, constipation, (in the last week, better) There are no complaints of abdominal pain, difficulty swallowing or painful swallowing, anorexia, vomiting, heartburn, blood in the stools, dark stools, or change in bowel habits or hemorrhoids. Genitourinary: Denies any pain or burning on urination, blood in the urine or frequent urination. Denies erectile dysfunction. Musculoskeletal: Denies painful joints, no swelling of the joints, muscle or back pain. Denies any pain or tingling in the legs, hands or feet. Neuropsychiatric: + per HPI Denies any nervousness, depressed mood, headaches, blackouts, dizziness, weakness of limbs, loss of sensation, loss of balance, loss of coordination or difficulty in speaking.   Cutaneous: Denies any dry skin, itching,

## 2023-10-31 ENCOUNTER — OFFICE VISIT (OUTPATIENT)
Dept: ONCOLOGY | Age: 58
End: 2023-10-31
Payer: MEDICARE

## 2023-10-31 ENCOUNTER — PROCEDURE VISIT (OUTPATIENT)
Dept: CARDIOLOGY CLINIC | Age: 58
End: 2023-10-31
Payer: MEDICARE

## 2023-10-31 ENCOUNTER — HOSPITAL ENCOUNTER (OUTPATIENT)
Dept: INFUSION THERAPY | Age: 58
Discharge: HOME | End: 2023-10-31
Payer: MEDICARE

## 2023-10-31 ENCOUNTER — TELEPHONE (OUTPATIENT)
Dept: CARDIOLOGY CLINIC | Age: 58
End: 2023-10-31

## 2023-10-31 VITALS
SYSTOLIC BLOOD PRESSURE: 182 MMHG | DIASTOLIC BLOOD PRESSURE: 92 MMHG | HEART RATE: 84 BPM | RESPIRATION RATE: 18 BRPM | OXYGEN SATURATION: 97 % | TEMPERATURE: 97.4 F

## 2023-10-31 VITALS
RESPIRATION RATE: 18 BRPM | SYSTOLIC BLOOD PRESSURE: 182 MMHG | HEART RATE: 84 BPM | DIASTOLIC BLOOD PRESSURE: 92 MMHG | OXYGEN SATURATION: 97 % | TEMPERATURE: 97.4 F

## 2023-10-31 DIAGNOSIS — C18.9 MALIGNANT NEOPLASM OF COLON, UNSPECIFIED PART OF COLON (HCC): Primary | ICD-10-CM

## 2023-10-31 DIAGNOSIS — C18.2 MALIGNANT NEOPLASM OF ASCENDING COLON (HCC): Primary | ICD-10-CM

## 2023-10-31 DIAGNOSIS — C18.2 MALIGNANT NEOPLASM OF ASCENDING COLON (HCC): ICD-10-CM

## 2023-10-31 DIAGNOSIS — C06.2 SQUAMOUS CELL CANCER OF RETROMOLAR TRIGONE (HCC): ICD-10-CM

## 2023-10-31 DIAGNOSIS — Z51.11 ENCOUNTER FOR ANTINEOPLASTIC CHEMOTHERAPY: ICD-10-CM

## 2023-10-31 DIAGNOSIS — R55 SYNCOPE, UNSPECIFIED SYNCOPE TYPE: Primary | ICD-10-CM

## 2023-10-31 LAB
ABSOLUTE IMMATURE GRANULOCYTE: 0.13 THOU/MM3 (ref 0–0.07)
ALBUMIN SERPL BCG-MCNC: 4.3 G/DL (ref 3.5–5.1)
ALP SERPL-CCNC: 66 U/L (ref 38–126)
ALT SERPL W/O P-5'-P-CCNC: 9 U/L (ref 11–66)
AST SERPL-CCNC: 21 U/L (ref 5–40)
BASOPHILS ABSOLUTE: 0 THOU/MM3 (ref 0–0.1)
BASOPHILS NFR BLD AUTO: 0 % (ref 0–3)
BILIRUB CONJ SERPL-MCNC: < 0.2 MG/DL (ref 0–0.3)
BILIRUB SERPL-MCNC: 0.4 MG/DL (ref 0.3–1.2)
BUN BLDP-MCNC: 6 MG/DL (ref 8–26)
CHLORIDE BLD-SCNC: 97 MEQ/L (ref 98–109)
CREAT BLD-MCNC: 0.8 MG/DL (ref 0.5–1.2)
EOSINOPHIL NFR BLD AUTO: 0 % (ref 0–4)
EOSINOPHILS ABSOLUTE: 0 THOU/MM3 (ref 0–0.4)
ERYTHROCYTE [DISTWIDTH] IN BLOOD BY AUTOMATED COUNT: 19.8 % (ref 11.5–14.5)
GFR SERPL CREATININE-BSD FRML MDRD: > 60 ML/MIN/1.73M2
GLUCOSE BLD-MCNC: 100 MG/DL (ref 70–108)
HCT VFR BLD AUTO: 39.2 % (ref 42–52)
HGB BLD-MCNC: 13 GM/DL (ref 14–18)
IMMATURE GRANULOCYTES: 1 %
IONIZED CALCIUM, WHOLE BLOOD: 1.17 MMOL/L (ref 1.12–1.32)
LYMPHOCYTES ABSOLUTE: 2.8 THOU/MM3 (ref 1–4.8)
LYMPHOCYTES NFR BLD AUTO: 28 % (ref 15–47)
MCH RBC QN AUTO: 33.3 PG (ref 26–33)
MCHC RBC AUTO-ENTMCNC: 33.2 GM/DL (ref 32.2–35.5)
MCV RBC AUTO: 101 FL (ref 80–94)
MONOCYTES ABSOLUTE: 1.2 THOU/MM3 (ref 0.4–1.3)
MONOCYTES NFR BLD AUTO: 12 % (ref 0–12)
NEUTROPHILS NFR BLD AUTO: 58 % (ref 43–75)
PLATELET # BLD AUTO: 305 THOU/MM3 (ref 130–400)
PMV BLD AUTO: 9.4 FL (ref 9.4–12.4)
POTASSIUM BLD-SCNC: 3.4 MEQ/L (ref 3.5–4.9)
PROT SERPL-MCNC: 7.7 G/DL (ref 6.1–8)
RBC # BLD AUTO: 3.9 MILL/MM3 (ref 4.7–6.1)
SEGMENTED NEUTROPHILS ABSOLUTE COUNT: 5.8 THOU/MM3 (ref 1.8–7.7)
SODIUM BLD-SCNC: 135 MEQ/L (ref 138–146)
TOTAL CO2, WHOLE BLOOD: 31 MEQ/L (ref 23–33)
WBC # BLD AUTO: 9.9 THOU/MM3 (ref 4.8–10.8)

## 2023-10-31 PROCEDURE — G8427 DOCREV CUR MEDS BY ELIG CLIN: HCPCS | Performed by: INTERNAL MEDICINE

## 2023-10-31 PROCEDURE — 96413 CHEMO IV INFUSION 1 HR: CPT

## 2023-10-31 PROCEDURE — 85025 COMPLETE CBC W/AUTO DIFF WBC: CPT

## 2023-10-31 PROCEDURE — 2580000003 HC RX 258: Performed by: INTERNAL MEDICINE

## 2023-10-31 PROCEDURE — 99211 OFF/OP EST MAY X REQ PHY/QHP: CPT

## 2023-10-31 PROCEDURE — 80047 BASIC METABLC PNL IONIZED CA: CPT

## 2023-10-31 PROCEDURE — 80076 HEPATIC FUNCTION PANEL: CPT

## 2023-10-31 PROCEDURE — 96367 TX/PROPH/DG ADDL SEQ IV INF: CPT

## 2023-10-31 PROCEDURE — 96368 THER/DIAG CONCURRENT INF: CPT

## 2023-10-31 PROCEDURE — G8484 FLU IMMUNIZE NO ADMIN: HCPCS | Performed by: INTERNAL MEDICINE

## 2023-10-31 PROCEDURE — 96415 CHEMO IV INFUSION ADDL HR: CPT

## 2023-10-31 PROCEDURE — 3017F COLORECTAL CA SCREEN DOC REV: CPT | Performed by: INTERNAL MEDICINE

## 2023-10-31 PROCEDURE — 96416 CHEMO PROLONG INFUSE W/PUMP: CPT

## 2023-10-31 PROCEDURE — 1036F TOBACCO NON-USER: CPT | Performed by: INTERNAL MEDICINE

## 2023-10-31 PROCEDURE — 96375 TX/PRO/DX INJ NEW DRUG ADDON: CPT

## 2023-10-31 PROCEDURE — G8420 CALC BMI NORM PARAMETERS: HCPCS | Performed by: INTERNAL MEDICINE

## 2023-10-31 PROCEDURE — 99214 OFFICE O/P EST MOD 30 MIN: CPT | Performed by: INTERNAL MEDICINE

## 2023-10-31 PROCEDURE — 6360000002 HC RX W HCPCS: Performed by: INTERNAL MEDICINE

## 2023-10-31 RX ORDER — DIPHENHYDRAMINE HYDROCHLORIDE 50 MG/ML
50 INJECTION INTRAMUSCULAR; INTRAVENOUS
Status: CANCELLED | OUTPATIENT
Start: 2023-10-31

## 2023-10-31 RX ORDER — DEXTROSE MONOHYDRATE 50 MG/ML
5-250 INJECTION, SOLUTION INTRAVENOUS PRN
Status: DISCONTINUED | OUTPATIENT
Start: 2023-10-31 | End: 2023-11-01 | Stop reason: HOSPADM

## 2023-10-31 RX ORDER — SODIUM CHLORIDE 9 MG/ML
INJECTION, SOLUTION INTRAVENOUS CONTINUOUS
Status: CANCELLED | OUTPATIENT
Start: 2023-10-31

## 2023-10-31 RX ORDER — HEPARIN 100 UNIT/ML
500 SYRINGE INTRAVENOUS PRN
OUTPATIENT
Start: 2023-10-31

## 2023-10-31 RX ORDER — MEPERIDINE HYDROCHLORIDE 50 MG/ML
12.5 INJECTION INTRAMUSCULAR; INTRAVENOUS; SUBCUTANEOUS PRN
Status: CANCELLED | OUTPATIENT
Start: 2023-10-31

## 2023-10-31 RX ORDER — PALONOSETRON 0.05 MG/ML
0.25 INJECTION, SOLUTION INTRAVENOUS ONCE
Status: COMPLETED | OUTPATIENT
Start: 2023-10-31 | End: 2023-10-31

## 2023-10-31 RX ORDER — SODIUM CHLORIDE 9 MG/ML
5-250 INJECTION, SOLUTION INTRAVENOUS PRN
Status: CANCELLED | OUTPATIENT
Start: 2023-10-31

## 2023-10-31 RX ORDER — SODIUM CHLORIDE 9 MG/ML
5-250 INJECTION, SOLUTION INTRAVENOUS PRN
Status: CANCELLED | OUTPATIENT
Start: 2023-11-02

## 2023-10-31 RX ORDER — DEXTROSE MONOHYDRATE 50 MG/ML
5-250 INJECTION, SOLUTION INTRAVENOUS PRN
Status: CANCELLED | OUTPATIENT
Start: 2023-10-31

## 2023-10-31 RX ORDER — EPINEPHRINE 1 MG/ML
0.3 INJECTION, SOLUTION, CONCENTRATE INTRAVENOUS PRN
Status: CANCELLED | OUTPATIENT
Start: 2023-10-31

## 2023-10-31 RX ORDER — SODIUM CHLORIDE 0.9 % (FLUSH) 0.9 %
5-40 SYRINGE (ML) INJECTION PRN
Status: DISCONTINUED | OUTPATIENT
Start: 2023-10-31 | End: 2023-11-01 | Stop reason: HOSPADM

## 2023-10-31 RX ORDER — HEPARIN SODIUM (PORCINE) LOCK FLUSH IV SOLN 100 UNIT/ML 100 UNIT/ML
500 SOLUTION INTRAVENOUS PRN
Status: CANCELLED | OUTPATIENT
Start: 2023-10-31

## 2023-10-31 RX ORDER — ACETAMINOPHEN 325 MG/1
650 TABLET ORAL
Status: CANCELLED | OUTPATIENT
Start: 2023-10-31

## 2023-10-31 RX ORDER — FAMOTIDINE 10 MG/ML
20 INJECTION, SOLUTION INTRAVENOUS
Status: CANCELLED | OUTPATIENT
Start: 2023-10-31

## 2023-10-31 RX ORDER — SODIUM CHLORIDE 9 MG/ML
25 INJECTION, SOLUTION INTRAVENOUS PRN
OUTPATIENT
Start: 2023-10-31

## 2023-10-31 RX ORDER — ONDANSETRON 2 MG/ML
8 INJECTION INTRAMUSCULAR; INTRAVENOUS
Status: CANCELLED | OUTPATIENT
Start: 2023-10-31

## 2023-10-31 RX ORDER — HEPARIN SODIUM (PORCINE) LOCK FLUSH IV SOLN 100 UNIT/ML 100 UNIT/ML
500 SOLUTION INTRAVENOUS PRN
Status: CANCELLED | OUTPATIENT
Start: 2023-11-02

## 2023-10-31 RX ORDER — SODIUM CHLORIDE 0.9 % (FLUSH) 0.9 %
5-40 SYRINGE (ML) INJECTION PRN
OUTPATIENT
Start: 2023-10-31

## 2023-10-31 RX ORDER — SODIUM CHLORIDE 0.9 % (FLUSH) 0.9 %
5-40 SYRINGE (ML) INJECTION PRN
Status: CANCELLED | OUTPATIENT
Start: 2023-10-31

## 2023-10-31 RX ORDER — HEPARIN 100 UNIT/ML
500 SYRINGE INTRAVENOUS PRN
Status: DISCONTINUED | OUTPATIENT
Start: 2023-10-31 | End: 2023-11-01 | Stop reason: HOSPADM

## 2023-10-31 RX ORDER — ALBUTEROL SULFATE 90 UG/1
4 AEROSOL, METERED RESPIRATORY (INHALATION) PRN
Status: CANCELLED | OUTPATIENT
Start: 2023-10-31

## 2023-10-31 RX ORDER — PALONOSETRON 0.05 MG/ML
0.25 INJECTION, SOLUTION INTRAVENOUS ONCE
Status: CANCELLED | OUTPATIENT
Start: 2023-10-31 | End: 2023-10-31

## 2023-10-31 RX ORDER — SODIUM CHLORIDE 0.9 % (FLUSH) 0.9 %
5-40 SYRINGE (ML) INJECTION PRN
Status: CANCELLED | OUTPATIENT
Start: 2023-11-02

## 2023-10-31 RX ADMIN — SODIUM CHLORIDE, PRESERVATIVE FREE 10 ML: 5 INJECTION INTRAVENOUS at 08:20

## 2023-10-31 RX ADMIN — OXALIPLATIN 120 MG: 5 INJECTION, SOLUTION INTRAVENOUS at 10:11

## 2023-10-31 RX ADMIN — PALONOSETRON 0.25 MG: 0.05 INJECTION, SOLUTION INTRAVENOUS at 09:47

## 2023-10-31 RX ADMIN — LEUCOVORIN CALCIUM 750 MG: 350 INJECTION, POWDER, LYOPHILIZED, FOR SUSPENSION INTRAMUSCULAR; INTRAVENOUS at 10:13

## 2023-10-31 RX ADMIN — SODIUM CHLORIDE, PRESERVATIVE FREE 10 ML: 5 INJECTION INTRAVENOUS at 08:21

## 2023-10-31 RX ADMIN — DEXTROSE MONOHYDRATE 20 ML/HR: 50 INJECTION, SOLUTION INTRAVENOUS at 09:46

## 2023-10-31 RX ADMIN — FLUOROURACIL 3325 MG: 50 INJECTION, SOLUTION INTRAVENOUS at 12:49

## 2023-10-31 RX ADMIN — DEXAMETHASONE SODIUM PHOSPHATE 12 MG: 4 INJECTION, SOLUTION INTRAMUSCULAR; INTRAVENOUS at 09:50

## 2023-10-31 RX ADMIN — SODIUM CHLORIDE, PRESERVATIVE FREE 20 ML: 5 INJECTION INTRAVENOUS at 12:48

## 2023-10-31 RX ADMIN — SODIUM CHLORIDE, PRESERVATIVE FREE 10 ML: 5 INJECTION INTRAVENOUS at 09:46

## 2023-10-31 NOTE — PROGRESS NOTES
[facility] [employer] linq     Egm from 10/15/23  Aware of disconnected monitor. He's in chemo right now for 6 hours, wife given number to [employer].   Knows to send     9/22/23 symptom episode showing NSR

## 2023-10-31 NOTE — PATIENT INSTRUCTIONS
Proceed with treatment today  2. Scans next week in [place]  3.  RTC in 2 weeks with labs for next txt and review scans

## 2023-10-31 NOTE — PLAN OF CARE
Problem: Safety - Adult  Goal: Free from fall injury  Outcome: Adequate for Discharge  Flowsheets (Taken 10/31/2023 1518)  Free From Fall Injury:   Instruct family/caregiver on patient safety   Based on caregiver fall risk screen, instruct family/caregiver to ask for assistance with transferring infant if caregiver noted to have fall risk factors  Note: Free from falls while in O.P. Oncology. Discussed the need to use the call light for assistance when getting up to ambulate.       Problem: Chronic Conditions and Co-morbidities  Goal: Patient's chronic conditions and co-morbidity symptoms are monitored and maintained or improved  Outcome: Adequate for Discharge  Flowsheets (Taken 10/31/2023 1518)  Care Plan - Patient's Chronic Conditions and Co-Morbidity Symptoms are Monitored and Maintained or Improved:   Monitor and assess patient's chronic conditions and comorbid symptoms for stability, deterioration, or improvement   Collaborate with multidisciplinary team to address chronic and comorbid conditions and prevent exacerbation or deterioration  Note:   Chemotherapy Teaching     What is Chemotherapy   Drug action [x]   Method of Administration [x]   Handouts given []     Side Effects  Nausea/vomiting [x]   Diarrhea [x]   Fatigue [x]   Signs / Symptoms of infection [x]   Neutropenia [x]   Thrombocytopenia [x]   Alopecia [x]   neuropathy [x]   [place] diet &  the importance of fluids [x]       Micellaneous  Importance of nutrition [x]   Importance of oral hygiene [x]   When to call the MD [x]   Monitoring labs [x]   Use of supportive services []     Explanation of Drug Regimen / Frequency  Oxaliplatin and leucovorin and 5FU CADD     Comments  Verbalized understanding to drug,action,side effects and when to call MD          Problem: Discharge Planning  Goal: Discharge to home or other facility with appropriate resources  Outcome: Adequate for Discharge  Flowsheets (Taken 10/31/2023 1518)  Discharge to home or other

## 2023-10-31 NOTE — TELEPHONE ENCOUNTER
Wife, zo answered phone, Colon Box is in chemo right now, aware of disconnected monitor, has number to [employer]  will call when they have a chance

## 2023-10-31 NOTE — DISCHARGE INSTRUCTIONS
Proceed with treatment today  2. Scans next week in [place]  3. RTC in 2 weeks with labs for next txt and review scans       Please contact your Oncologist if you have any questions regarding the [place] and lupron that you received today. You are instructed to call the office or go to the Emergency Dept. If you experience any of the following symptoms:    Dizziness/lightheadedness   Acute nausea or vomiting-not relieved by medications  Headaches-not relieved by medications  New chest pain or pressure  New rash /itching  New shortness of breath  Fever,chills or signs or symptoms of infection    Make sure you are drinking 48 to 64 ounces of water daily-if you are unable to drink fluids let us know right away.

## 2023-11-01 ENCOUNTER — TELEMEDICINE (OUTPATIENT)
Dept: PALLATIVE CARE | Age: 58
End: 2023-11-01
Payer: MEDICARE

## 2023-11-01 ENCOUNTER — TELEPHONE (OUTPATIENT)
Dept: ENT CLINIC | Age: 58
End: 2023-11-01

## 2023-11-01 DIAGNOSIS — C18.2 MALIGNANT NEOPLASM OF ASCENDING COLON (HCC): Primary | ICD-10-CM

## 2023-11-01 DIAGNOSIS — C06.2 SQUAMOUS CELL CANCER OF RETROMOLAR TRIGONE (HCC): ICD-10-CM

## 2023-11-01 DIAGNOSIS — G89.3 CANCER RELATED PAIN: ICD-10-CM

## 2023-11-01 DIAGNOSIS — Z51.5 PALLIATIVE CARE PATIENT: ICD-10-CM

## 2023-11-01 DIAGNOSIS — T40.2X5A THERAPEUTIC OPIOID-INDUCED CONSTIPATION (OIC): ICD-10-CM

## 2023-11-01 DIAGNOSIS — G62.0 CHEMOTHERAPY-INDUCED PERIPHERAL NEUROPATHY (HCC): ICD-10-CM

## 2023-11-01 DIAGNOSIS — T45.1X5A CHEMOTHERAPY-INDUCED PERIPHERAL NEUROPATHY (HCC): ICD-10-CM

## 2023-11-01 DIAGNOSIS — C18.2 MALIGNANT NEOPLASM OF ASCENDING COLON (HCC): ICD-10-CM

## 2023-11-01 DIAGNOSIS — G51.0 FACIAL NERVE PALSY: Primary | ICD-10-CM

## 2023-11-01 DIAGNOSIS — K59.03 THERAPEUTIC OPIOID-INDUCED CONSTIPATION (OIC): ICD-10-CM

## 2023-11-01 PROCEDURE — 99215 OFFICE O/P EST HI 40 MIN: CPT | Performed by: STUDENT IN AN ORGANIZED HEALTH CARE EDUCATION/TRAINING PROGRAM

## 2023-11-01 PROCEDURE — G8428 CUR MEDS NOT DOCUMENT: HCPCS | Performed by: STUDENT IN AN ORGANIZED HEALTH CARE EDUCATION/TRAINING PROGRAM

## 2023-11-01 PROCEDURE — 3017F COLORECTAL CA SCREEN DOC REV: CPT | Performed by: STUDENT IN AN ORGANIZED HEALTH CARE EDUCATION/TRAINING PROGRAM

## 2023-11-01 RX ORDER — POLYETHYLENE GLYCOL 3350 17 G/17G
17 POWDER, FOR SOLUTION ORAL DAILY
Qty: 510 G | Refills: 1 | Status: SHIPPED | OUTPATIENT
Start: 2023-11-01 | End: 2023-12-31

## 2023-11-01 RX ORDER — OXYCODONE AND ACETAMINOPHEN 7.5; 325 MG/1; MG/1
1 TABLET ORAL EVERY 6 HOURS PRN
Qty: 120 TABLET | Refills: 0 | Status: SHIPPED | OUTPATIENT
Start: 2023-11-01 | End: 2023-12-01

## 2023-11-01 RX ORDER — SENNOSIDES 8.6 MG
2 TABLET ORAL 2 TIMES DAILY
Qty: 120 TABLET | Refills: 0 | Status: SHIPPED | OUTPATIENT
Start: 2023-11-01

## 2023-11-01 NOTE — TELEPHONE ENCOUNTER
The MRI did not show any mass or lesion to explain the facial palsy (how his mouth looks). The radiologist and Dr Rebecca Underwood are requesting an updated PET/CT. I have placed orders for that - please call patient to schedule.

## 2023-11-01 NOTE — PROGRESS NOTES
Yvonne Has is a 62 y.o. male who presents to the palliative care clinic today for:  Chief Complaint   Patient presents with    Cancer    Chronic Pain       HPI:   Yvonne Has presents to the palliative care clinic today for follow up for his cancer related pain. He could not function on the morphine ER and he got unsteady on the oxycodone 10 mg. Symptoms:  Pain:  Location- Lower Back  Severity- moderate  Current Treatments: From 9 AM yesterday to 9 AM today, they have used Oxycodone IR 10 mg 3 times  for as needed symptom relief. They have No Medications for scheduled symptom relief. Their pain medications are working well to control their pain. Shortness of breath: Denies  Sleep: Denies issues with sleep  Fatigue/Drowsiness: Patient denies fatigue and drowsiness   Appetite: Patient denied issues with appetite  Nausea/Vomiting: Patient denies nausea and vomiting  Constipation/Diarrhea: Patient reports constipation. Their last BM was today      Current Outpatient Medications   Medication Sig Dispense Refill    oxyCODONE-acetaminophen (PERCOCET) 7.5-325 MG per tablet Take 1 tablet by mouth every 6 hours as needed for Pain for up to 30 days. Intended supply: 30 days Max Daily Amount: 4 tablets 120 tablet 0    senna (SENOKOT) 8.6 MG TABS tablet Take 2 tablets by mouth 2 times daily 120 tablet 0    polyethylene glycol (GLYCOLAX) 17 GM/SCOOP powder Take 17 g by mouth daily 510 g 1    OLANZapine (ZYPREXA) 5 MG tablet Take 1 tablet by mouth nightly 30 tablet 0    morphine (MS CONTIN) 15 MG extended release tablet Take 1 tablet by mouth 3 times daily for 30 days.  Max Daily Amount: 45 mg 90 tablet 0    ondansetron (ZOFRAN-ODT) 4 MG disintegrating tablet Take 1 tablet by mouth every 6 hours as needed for Nausea or Vomiting 45 tablet 3    prochlorperazine (COMPAZINE) 10 MG tablet Take 1 tablet by mouth every 6 hours as needed (nausea) 60 tablet 1    cyclobenzaprine (FLEXERIL) 10 MG tablet       levothyroxine

## 2023-11-02 ENCOUNTER — HOSPITAL ENCOUNTER (OUTPATIENT)
Dept: INFUSION THERAPY | Age: 58
Discharge: HOME | End: 2023-11-02
Payer: MEDICARE

## 2023-11-02 VITALS — DIASTOLIC BLOOD PRESSURE: 91 MMHG | SYSTOLIC BLOOD PRESSURE: 146 MMHG | TEMPERATURE: 97.7 F | HEART RATE: 91 BPM

## 2023-11-02 DIAGNOSIS — C06.2 SQUAMOUS CELL CANCER OF RETROMOLAR TRIGONE (HCC): ICD-10-CM

## 2023-11-02 DIAGNOSIS — C18.9 MALIGNANT NEOPLASM OF COLON, UNSPECIFIED PART OF COLON (HCC): Primary | ICD-10-CM

## 2023-11-02 PROCEDURE — 2580000003 HC RX 258: Performed by: INTERNAL MEDICINE

## 2023-11-02 PROCEDURE — 6360000002 HC RX W HCPCS: Performed by: INTERNAL MEDICINE

## 2023-11-02 PROCEDURE — 96523 IRRIG DRUG DELIVERY DEVICE: CPT

## 2023-11-02 RX ORDER — SODIUM CHLORIDE 0.9 % (FLUSH) 0.9 %
5-40 SYRINGE (ML) INJECTION PRN
Status: DISCONTINUED | OUTPATIENT
Start: 2023-11-02 | End: 2023-11-03 | Stop reason: HOSPADM

## 2023-11-02 RX ORDER — HEPARIN 100 UNIT/ML
500 SYRINGE INTRAVENOUS PRN
Status: DISCONTINUED | OUTPATIENT
Start: 2023-11-02 | End: 2023-11-03 | Stop reason: HOSPADM

## 2023-11-02 RX ADMIN — SODIUM CHLORIDE, PRESERVATIVE FREE 10 ML: 5 INJECTION INTRAVENOUS at 11:10

## 2023-11-02 RX ADMIN — HEPARIN 500 UNITS: 100 SYRINGE at 11:10

## 2023-11-08 ENCOUNTER — OFFICE VISIT (OUTPATIENT)
Dept: CARDIOLOGY CLINIC | Age: 58
End: 2023-11-08
Payer: MEDICARE

## 2023-11-08 VITALS
BODY MASS INDEX: 19.64 KG/M2 | DIASTOLIC BLOOD PRESSURE: 80 MMHG | WEIGHT: 145 LBS | HEART RATE: 78 BPM | HEIGHT: 72 IN | SYSTOLIC BLOOD PRESSURE: 140 MMHG

## 2023-11-08 DIAGNOSIS — I10 PRIMARY HYPERTENSION: Primary | ICD-10-CM

## 2023-11-08 DIAGNOSIS — I48.0 PAF (PAROXYSMAL ATRIAL FIBRILLATION) (HCC): ICD-10-CM

## 2023-11-08 PROCEDURE — G8484 FLU IMMUNIZE NO ADMIN: HCPCS | Performed by: NUCLEAR MEDICINE

## 2023-11-08 PROCEDURE — 3079F DIAST BP 80-89 MM HG: CPT | Performed by: NUCLEAR MEDICINE

## 2023-11-08 PROCEDURE — 1036F TOBACCO NON-USER: CPT | Performed by: NUCLEAR MEDICINE

## 2023-11-08 PROCEDURE — 99213 OFFICE O/P EST LOW 20 MIN: CPT | Performed by: NUCLEAR MEDICINE

## 2023-11-08 PROCEDURE — 3017F COLORECTAL CA SCREEN DOC REV: CPT | Performed by: NUCLEAR MEDICINE

## 2023-11-08 PROCEDURE — G8420 CALC BMI NORM PARAMETERS: HCPCS | Performed by: NUCLEAR MEDICINE

## 2023-11-08 PROCEDURE — G8427 DOCREV CUR MEDS BY ELIG CLIN: HCPCS | Performed by: NUCLEAR MEDICINE

## 2023-11-08 PROCEDURE — 3077F SYST BP >= 140 MM HG: CPT | Performed by: NUCLEAR MEDICINE

## 2023-11-08 NOTE — PROGRESS NOTES
Pt here for fu   No med list today
Intended supply: 30 days Max Daily Amount: 4 tablets 120 tablet 0    senna (SENOKOT) 8.6 MG TABS tablet Take 2 tablets by mouth 2 times daily 120 tablet 0    polyethylene glycol (GLYCOLAX) 17 GM/SCOOP powder Take 17 g by mouth daily 510 g 1    OLANZapine (ZYPREXA) 5 MG tablet Take 1 tablet by mouth nightly 30 tablet 0    morphine (MS CONTIN) 15 MG extended release tablet Take 1 tablet by mouth 3 times daily for 30 days. Max Daily Amount: 45 mg 90 tablet 0    ondansetron (ZOFRAN-ODT) 4 MG disintegrating tablet Take 1 tablet by mouth every 6 hours as needed for Nausea or Vomiting 45 tablet 3    prochlorperazine (COMPAZINE) 10 MG tablet Take 1 tablet by mouth every 6 hours as needed (nausea) 60 tablet 1    cyclobenzaprine (FLEXERIL) 10 MG tablet       levothyroxine (SYNTHROID) 137 MCG tablet       Multiple Vitamins-Minerals (THERAPEUTIC MULTIVITAMIN-MINERALS) tablet Take 1 tablet by mouth daily      lidocaine-prilocaine (EMLA) 2.5-2.5 % cream Apply topically as needed. 5 g 5    amLODIPine (NORVASC) 5 MG tablet Take 1 tablet by mouth daily      atorvastatin (LIPITOR) 20 MG tablet Take 1 tablet by mouth daily      losartan (COZAAR) 50 MG tablet Take 1 tablet by mouth daily      traZODone (DESYREL) 50 MG tablet Take 1 tablet by mouth daily      mirtazapine (REMERON) 15 MG tablet Take 1 tablet by mouth daily      OXcarbazepine (TRILEPTAL) 150 MG tablet Take 1 tablet by mouth daily      Sertraline HCl (ZOLOFT PO) Take 1 tablet by mouth daily Pt unsure on dose      omeprazole (PRILOSEC) 40 MG delayed release capsule Take 1 capsule by mouth 2 times daily (before meals) 180 capsule 4     No current facility-administered medications for this visit.      No Known Allergies  Health Maintenance   Topic Date Due    Hepatitis B vaccine (1 of 3 - 3-dose series) Never done    Pneumococcal 0-64 years Vaccine (1 - PCV) Never done    Depression Screen  Never done    HIV screen  Never done    Hepatitis C screen  Never done    DTaP/Tdap/Td

## 2023-11-09 ENCOUNTER — HOSPITAL ENCOUNTER (OUTPATIENT)
Dept: CT IMAGING | Age: 58
Discharge: HOME | End: 2023-11-09
Attending: INTERNAL MEDICINE
Payer: MEDICARE

## 2023-11-09 DIAGNOSIS — C18.2 MALIGNANT NEOPLASM OF ASCENDING COLON (HCC): ICD-10-CM

## 2023-11-09 PROCEDURE — 74177 CT ABD & PELVIS W/CONTRAST: CPT

## 2023-11-09 PROCEDURE — 6360000002 HC RX W HCPCS: Performed by: RADIOLOGY

## 2023-11-09 PROCEDURE — 71260 CT THORAX DX C+: CPT

## 2023-11-09 PROCEDURE — 6360000004 HC RX CONTRAST MEDICATION: Performed by: INTERNAL MEDICINE

## 2023-11-09 RX ORDER — HEPARIN SODIUM (PORCINE) LOCK FLUSH IV SOLN 100 UNIT/ML 100 UNIT/ML
500 SOLUTION INTRAVENOUS ONCE
Status: COMPLETED | OUTPATIENT
Start: 2023-11-09 | End: 2023-11-09

## 2023-11-09 RX ADMIN — IOPAMIDOL 85 ML: 755 INJECTION, SOLUTION INTRAVENOUS at 14:50

## 2023-11-09 RX ADMIN — HEPARIN 500 UNITS: 100 SYRINGE at 14:44

## 2023-11-10 NOTE — PROGRESS NOTES
middle aged male who appears their stated age and is awake, in no acute distress, alert and oriented x3. Smells of marijuana. SKIN: No rash or ulcers. Subcutaneous with no palpable nodules. HEME/LYMPHATICS: No bruising or petechiae on visual inspection. No lymphadenopathy on visual inspection and palpation of cervical, supraclavicular, infraclavicular lymph nodes. HEAD/FACE: atraumatic and normocephalic. Normal hair for age. EYES: PERRLA/EOMI. No scleral icterus. No tearing or dry eyes. ENT: External ears normal with no evidence of discharge. No evidence of ulceration or bleeding in the nostrils. Mouth has no ulcers, bleeding or inflammation of the gums, floor or roof of the mouth with poor dentition; left buccal mucosa s/p engraftment-stable. NECK: Supple, symmetric. CHEST/RESPIRATORY: Expansion maintained bilaterally and symmetrically. On auscultation, rhonchi b/l that partially clears on coughing in both lungs. No wheezes, or rales. BREAST: Deferred. CARDIOVASCULAR: On auscultation, regular rate and rhythm. No murmurs, gallops or rubs are heard. GASTROINTESTINAL/ABDOMEN: Symmetric. On auscultation of the abdomen, there are normal bowel sounds in all four quadrants. Nontender to palpation. No palpable organomegaly (liver or spleen) or masses. GENITOURINARY: Deferred. NEUROLOGIC: Alert and oriented time, person, and place, with no apparent changes in recent or remote memory. Cranial nerves II-XII are grossly intact EXCEPT speech continues to be slurred but better than prior; left corner of mouth still drooping but better than prior. Sensation is maintained in the extremities. Strength and tone appear normal for age and equal in the extremities. Gait is normal.  MUSCULOSKELETAL: No tenderness over the spine. No cyanosis, clubbing or edema in the extremities. There are no surgical scars on the extremities. PSYCHIATRIC: The patient maintains judgment and has good insight.  The patient has no changes in

## 2023-11-14 ENCOUNTER — CLINICAL DOCUMENTATION (OUTPATIENT)
Dept: CASE MANAGEMENT | Age: 58
End: 2023-11-14

## 2023-11-14 ENCOUNTER — HOSPITAL ENCOUNTER (OUTPATIENT)
Dept: INFUSION THERAPY | Age: 58
Discharge: HOME | End: 2023-11-14
Payer: MEDICARE

## 2023-11-14 ENCOUNTER — OFFICE VISIT (OUTPATIENT)
Dept: ONCOLOGY | Age: 58
End: 2023-11-14
Payer: MEDICARE

## 2023-11-14 VITALS
SYSTOLIC BLOOD PRESSURE: 146 MMHG | TEMPERATURE: 98.5 F | RESPIRATION RATE: 18 BRPM | DIASTOLIC BLOOD PRESSURE: 89 MMHG | OXYGEN SATURATION: 99 % | BODY MASS INDEX: 21.42 KG/M2 | HEART RATE: 68 BPM | WEIGHT: 149.6 LBS | HEIGHT: 70 IN

## 2023-11-14 VITALS
DIASTOLIC BLOOD PRESSURE: 85 MMHG | RESPIRATION RATE: 18 BRPM | WEIGHT: 149.6 LBS | BODY MASS INDEX: 21.42 KG/M2 | TEMPERATURE: 98.2 F | OXYGEN SATURATION: 100 % | SYSTOLIC BLOOD PRESSURE: 158 MMHG | HEIGHT: 70 IN | HEART RATE: 59 BPM

## 2023-11-14 DIAGNOSIS — Z51.11 ENCOUNTER FOR ANTINEOPLASTIC CHEMOTHERAPY: ICD-10-CM

## 2023-11-14 DIAGNOSIS — T40.2X5A THERAPEUTIC OPIOID INDUCED CONSTIPATION: ICD-10-CM

## 2023-11-14 DIAGNOSIS — C18.2 MALIGNANT NEOPLASM OF ASCENDING COLON (HCC): Primary | ICD-10-CM

## 2023-11-14 DIAGNOSIS — K59.03 THERAPEUTIC OPIOID-INDUCED CONSTIPATION (OIC): ICD-10-CM

## 2023-11-14 DIAGNOSIS — T40.2X5A THERAPEUTIC OPIOID-INDUCED CONSTIPATION (OIC): ICD-10-CM

## 2023-11-14 DIAGNOSIS — C06.2 SQUAMOUS CELL CANCER OF RETROMOLAR TRIGONE (HCC): ICD-10-CM

## 2023-11-14 DIAGNOSIS — K59.03 THERAPEUTIC OPIOID INDUCED CONSTIPATION: ICD-10-CM

## 2023-11-14 DIAGNOSIS — C18.2 MALIGNANT NEOPLASM OF ASCENDING COLON (HCC): ICD-10-CM

## 2023-11-14 DIAGNOSIS — C18.9 MALIGNANT NEOPLASM OF COLON, UNSPECIFIED PART OF COLON (HCC): Primary | ICD-10-CM

## 2023-11-14 LAB
ABSOLUTE IMMATURE GRANULOCYTE: 0.03 THOU/MM3 (ref 0–0.07)
ALBUMIN SERPL BCG-MCNC: 3.3 G/DL (ref 3.5–5.1)
ALP SERPL-CCNC: 65 U/L (ref 38–126)
ALT SERPL W/O P-5'-P-CCNC: 9 U/L (ref 11–66)
AST SERPL-CCNC: 18 U/L (ref 5–40)
BASOPHILS ABSOLUTE: 0 THOU/MM3 (ref 0–0.1)
BASOPHILS NFR BLD AUTO: 1 % (ref 0–3)
BILIRUB CONJ SERPL-MCNC: < 0.2 MG/DL (ref 0–0.3)
BILIRUB SERPL-MCNC: < 0.2 MG/DL (ref 0.3–1.2)
BUN BLDP-MCNC: 7 MG/DL (ref 8–26)
CHLORIDE BLD-SCNC: 107 MEQ/L (ref 98–109)
CREAT BLD-MCNC: 0.7 MG/DL (ref 0.5–1.2)
EOSINOPHIL NFR BLD AUTO: 2 % (ref 0–4)
EOSINOPHILS ABSOLUTE: 0.1 THOU/MM3 (ref 0–0.4)
ERYTHROCYTE [DISTWIDTH] IN BLOOD BY AUTOMATED COUNT: 19.1 % (ref 11.5–14.5)
GFR SERPL CREATININE-BSD FRML MDRD: > 60 ML/MIN/1.73M2
GLUCOSE BLD-MCNC: 101 MG/DL (ref 70–108)
HCT VFR BLD AUTO: 34.8 % (ref 42–52)
HGB BLD-MCNC: 11.5 GM/DL (ref 14–18)
IMMATURE GRANULOCYTES: 1 %
IONIZED CALCIUM, WHOLE BLOOD: 1.21 MMOL/L (ref 1.12–1.32)
LYMPHOCYTES ABSOLUTE: 2.2 THOU/MM3 (ref 1–4.8)
LYMPHOCYTES NFR BLD AUTO: 36 % (ref 15–47)
MCH RBC QN AUTO: 34.4 PG (ref 26–33)
MCHC RBC AUTO-ENTMCNC: 33 GM/DL (ref 32.2–35.5)
MCV RBC AUTO: 104 FL (ref 80–94)
MONOCYTES ABSOLUTE: 0.8 THOU/MM3 (ref 0.4–1.3)
MONOCYTES NFR BLD AUTO: 13 % (ref 0–12)
NEUTROPHILS NFR BLD AUTO: 48 % (ref 43–75)
PLATELET # BLD AUTO: 230 THOU/MM3 (ref 130–400)
PMV BLD AUTO: 9.9 FL (ref 9.4–12.4)
POTASSIUM BLD-SCNC: 3.4 MEQ/L (ref 3.5–4.9)
PROT SERPL-MCNC: 6.1 G/DL (ref 6.1–8)
RBC # BLD AUTO: 3.34 MILL/MM3 (ref 4.7–6.1)
SEGMENTED NEUTROPHILS ABSOLUTE COUNT: 2.9 THOU/MM3 (ref 1.8–7.7)
SODIUM BLD-SCNC: 144 MEQ/L (ref 138–146)
TOTAL CO2, WHOLE BLOOD: 28 MEQ/L (ref 23–33)
WBC # BLD AUTO: 6.1 THOU/MM3 (ref 4.8–10.8)

## 2023-11-14 PROCEDURE — G8427 DOCREV CUR MEDS BY ELIG CLIN: HCPCS | Performed by: INTERNAL MEDICINE

## 2023-11-14 PROCEDURE — G8484 FLU IMMUNIZE NO ADMIN: HCPCS | Performed by: INTERNAL MEDICINE

## 2023-11-14 PROCEDURE — G8420 CALC BMI NORM PARAMETERS: HCPCS | Performed by: INTERNAL MEDICINE

## 2023-11-14 PROCEDURE — 2580000003 HC RX 258: Performed by: INTERNAL MEDICINE

## 2023-11-14 PROCEDURE — 96413 CHEMO IV INFUSION 1 HR: CPT

## 2023-11-14 PROCEDURE — 6360000002 HC RX W HCPCS: Performed by: INTERNAL MEDICINE

## 2023-11-14 PROCEDURE — 96367 TX/PROPH/DG ADDL SEQ IV INF: CPT

## 2023-11-14 PROCEDURE — 3017F COLORECTAL CA SCREEN DOC REV: CPT | Performed by: INTERNAL MEDICINE

## 2023-11-14 PROCEDURE — 1036F TOBACCO NON-USER: CPT | Performed by: INTERNAL MEDICINE

## 2023-11-14 PROCEDURE — 99211 OFF/OP EST MAY X REQ PHY/QHP: CPT

## 2023-11-14 PROCEDURE — 80076 HEPATIC FUNCTION PANEL: CPT

## 2023-11-14 PROCEDURE — 85025 COMPLETE CBC W/AUTO DIFF WBC: CPT

## 2023-11-14 PROCEDURE — 36591 DRAW BLOOD OFF VENOUS DEVICE: CPT

## 2023-11-14 PROCEDURE — 96415 CHEMO IV INFUSION ADDL HR: CPT

## 2023-11-14 PROCEDURE — 99214 OFFICE O/P EST MOD 30 MIN: CPT | Performed by: INTERNAL MEDICINE

## 2023-11-14 PROCEDURE — 96375 TX/PRO/DX INJ NEW DRUG ADDON: CPT

## 2023-11-14 PROCEDURE — 96416 CHEMO PROLONG INFUSE W/PUMP: CPT

## 2023-11-14 PROCEDURE — 80047 BASIC METABLC PNL IONIZED CA: CPT

## 2023-11-14 PROCEDURE — 96368 THER/DIAG CONCURRENT INF: CPT

## 2023-11-14 RX ORDER — HEPARIN 100 UNIT/ML
500 SYRINGE INTRAVENOUS PRN
Status: DISCONTINUED | OUTPATIENT
Start: 2023-11-14 | End: 2023-11-15 | Stop reason: HOSPADM

## 2023-11-14 RX ORDER — DEXTROSE MONOHYDRATE 50 MG/ML
5-250 INJECTION, SOLUTION INTRAVENOUS PRN
Status: CANCELLED | OUTPATIENT
Start: 2023-11-14

## 2023-11-14 RX ORDER — HEPARIN SODIUM (PORCINE) LOCK FLUSH IV SOLN 100 UNIT/ML 100 UNIT/ML
500 SOLUTION INTRAVENOUS PRN
Status: CANCELLED | OUTPATIENT
Start: 2023-11-14

## 2023-11-14 RX ORDER — DEXTROSE MONOHYDRATE 50 MG/ML
5-250 INJECTION, SOLUTION INTRAVENOUS PRN
Status: DISCONTINUED | OUTPATIENT
Start: 2023-11-14 | End: 2023-11-15 | Stop reason: HOSPADM

## 2023-11-14 RX ORDER — SODIUM CHLORIDE 9 MG/ML
5-250 INJECTION, SOLUTION INTRAVENOUS PRN
Status: CANCELLED | OUTPATIENT
Start: 2023-11-14

## 2023-11-14 RX ORDER — SENNOSIDES 8.6 MG
2 TABLET ORAL 2 TIMES DAILY
Qty: 120 TABLET | Refills: 0 | Status: SHIPPED | OUTPATIENT
Start: 2023-11-14

## 2023-11-14 RX ORDER — HEPARIN SODIUM (PORCINE) LOCK FLUSH IV SOLN 100 UNIT/ML 100 UNIT/ML
500 SOLUTION INTRAVENOUS PRN
OUTPATIENT
Start: 2023-11-16

## 2023-11-14 RX ORDER — SODIUM CHLORIDE 9 MG/ML
5-250 INJECTION, SOLUTION INTRAVENOUS PRN
OUTPATIENT
Start: 2023-11-16

## 2023-11-14 RX ORDER — ACETAMINOPHEN 325 MG/1
650 TABLET ORAL
Status: CANCELLED | OUTPATIENT
Start: 2023-11-14

## 2023-11-14 RX ORDER — FAMOTIDINE 10 MG/ML
20 INJECTION, SOLUTION INTRAVENOUS
Status: CANCELLED | OUTPATIENT
Start: 2023-11-14

## 2023-11-14 RX ORDER — SODIUM CHLORIDE 0.9 % (FLUSH) 0.9 %
5-40 SYRINGE (ML) INJECTION PRN
Status: DISCONTINUED | OUTPATIENT
Start: 2023-11-14 | End: 2023-11-15 | Stop reason: HOSPADM

## 2023-11-14 RX ORDER — SODIUM CHLORIDE 0.9 % (FLUSH) 0.9 %
5-40 SYRINGE (ML) INJECTION PRN
Status: CANCELLED | OUTPATIENT
Start: 2023-11-14

## 2023-11-14 RX ORDER — PALONOSETRON 0.05 MG/ML
0.25 INJECTION, SOLUTION INTRAVENOUS ONCE
Status: COMPLETED | OUTPATIENT
Start: 2023-11-14 | End: 2023-11-14

## 2023-11-14 RX ORDER — PALONOSETRON 0.05 MG/ML
0.25 INJECTION, SOLUTION INTRAVENOUS ONCE
Status: CANCELLED | OUTPATIENT
Start: 2023-11-14 | End: 2023-11-14

## 2023-11-14 RX ORDER — EPINEPHRINE 1 MG/ML
0.3 INJECTION, SOLUTION, CONCENTRATE INTRAVENOUS PRN
Status: CANCELLED | OUTPATIENT
Start: 2023-11-14

## 2023-11-14 RX ORDER — SODIUM CHLORIDE 9 MG/ML
25 INJECTION, SOLUTION INTRAVENOUS PRN
Status: CANCELLED | OUTPATIENT
Start: 2023-11-14

## 2023-11-14 RX ORDER — ALBUTEROL SULFATE 90 UG/1
4 AEROSOL, METERED RESPIRATORY (INHALATION) PRN
Status: CANCELLED | OUTPATIENT
Start: 2023-11-14

## 2023-11-14 RX ORDER — SODIUM CHLORIDE 0.9 % (FLUSH) 0.9 %
5-40 SYRINGE (ML) INJECTION PRN
OUTPATIENT
Start: 2023-11-16

## 2023-11-14 RX ORDER — ONDANSETRON 2 MG/ML
8 INJECTION INTRAMUSCULAR; INTRAVENOUS
Status: CANCELLED | OUTPATIENT
Start: 2023-11-14

## 2023-11-14 RX ORDER — HEPARIN 100 UNIT/ML
500 SYRINGE INTRAVENOUS PRN
Status: CANCELLED | OUTPATIENT
Start: 2023-11-14

## 2023-11-14 RX ORDER — MEPERIDINE HYDROCHLORIDE 50 MG/ML
12.5 INJECTION INTRAMUSCULAR; INTRAVENOUS; SUBCUTANEOUS PRN
Status: CANCELLED | OUTPATIENT
Start: 2023-11-14

## 2023-11-14 RX ORDER — SODIUM CHLORIDE 9 MG/ML
INJECTION, SOLUTION INTRAVENOUS CONTINUOUS
Status: CANCELLED | OUTPATIENT
Start: 2023-11-14

## 2023-11-14 RX ORDER — DIPHENHYDRAMINE HYDROCHLORIDE 50 MG/ML
50 INJECTION INTRAMUSCULAR; INTRAVENOUS
Status: CANCELLED | OUTPATIENT
Start: 2023-11-14

## 2023-11-14 RX ADMIN — LEUCOVORIN CALCIUM 750 MG: 350 INJECTION, POWDER, LYOPHILIZED, FOR SUSPENSION INTRAMUSCULAR; INTRAVENOUS at 12:31

## 2023-11-14 RX ADMIN — DEXTROSE MONOHYDRATE 20 ML/HR: 50 INJECTION, SOLUTION INTRAVENOUS at 12:05

## 2023-11-14 RX ADMIN — SODIUM CHLORIDE, PRESERVATIVE FREE 10 ML: 5 INJECTION INTRAVENOUS at 10:21

## 2023-11-14 RX ADMIN — PALONOSETRON 0.25 MG: 0.05 INJECTION, SOLUTION INTRAVENOUS at 12:08

## 2023-11-14 RX ADMIN — SODIUM CHLORIDE, PRESERVATIVE FREE 10 ML: 5 INJECTION INTRAVENOUS at 10:20

## 2023-11-14 RX ADMIN — FLUOROURACIL 3325 MG: 50 INJECTION, SOLUTION INTRAVENOUS at 14:50

## 2023-11-14 RX ADMIN — OXALIPLATIN 120 MG: 5 INJECTION, SOLUTION INTRAVENOUS at 12:33

## 2023-11-14 RX ADMIN — DEXAMETHASONE SODIUM PHOSPHATE 12 MG: 4 INJECTION, SOLUTION INTRA-ARTICULAR; INTRALESIONAL; INTRAMUSCULAR; INTRAVENOUS; SOFT TISSUE at 12:11

## 2023-11-14 NOTE — PATIENT INSTRUCTIONS
Awaiting BMP= prior to chemo CBC ok at this point; please get a hold of Dr Sabrina Mora when it's back  [place] plan for RTC 2 weeks with next txt  Bring nausea pills next time

## 2023-11-14 NOTE — DISCHARGE INSTRUCTIONS
Please contact your Oncologist if you have any questions regarding the chemotherapy Oxaliplatin and Leucovorin with Adrucil pump that you received today. Patient instructed if experience any of the symptoms following today's chemotherapy / to notify MD immediately or go to emergency department. * dizziness/lightheadedness  *acute nausea/vomiting - not relieved with medication  *headache - not relieved from Tylenol/pain medication  *chest pain/pressure  *rash/itching  *shortness of breath      CADD pump 5FU added via mediport to infuse at 5.4ml/hr over 46 hours. Connections secured and tape to chest. Patient and family instructed on pump- it's usage,what to do if alarm goes off, and who to call if any questions. Verified pump running before discharge.        Drink fluids - 48oz fluids daily  Call if develop fever/ chills/ signs or symptoms of infection

## 2023-11-14 NOTE — PROGRESS NOTES
Name: Jhonathan Moeller  : 1965  MRN: [id number]    Oncology Navigation Follow-Up Note    Contact Type:  Medical Oncology  Domestic partner accompanied appointment    Subjective: issues with abdominal bloating/ constipation- on stool softners/laxatives which helps ( Senna/Miralax)  -issues with occ nausea/ medication helps  -not sure if need refills on nausea medications    Objective: proceed with chemo  -Dr. Joseph Swenson following for pain medication    ONC POC:  - review scan results/showed improvement  - review labs proceed with chemotherapy as long as BMP okay  -keep already scheduled PET   -return MD - lab/assess/possible chemo    Assistance Needed: denies any at this time    Receptive to [address] [place] [address] / Palliative Care:  deferred    Notes: [place] following to assist & support as needed     Electronically signed by Birgit Dumont RN on 2023 at 4:14 PM

## 2023-11-14 NOTE — PLAN OF CARE
Problem: Safety - Adult  Goal: Free from fall injury  Outcome: Adequate for Discharge  Flowsheets (Taken 11/14/2023 1554)  Free From Fall Injury:   Instruct family/caregiver on patient safety   Based on caregiver fall risk screen, instruct family/caregiver to ask for assistance with transferring infant if caregiver noted to have fall risk factors  Note: Free from falls while in O.P. Oncology. Problem: Discharge Planning  Goal: Discharge to home or other facility with appropriate resources  Flowsheets (Taken 11/14/2023 1554)  Discharge to home or other facility with appropriate resources:   Identify barriers to discharge with patient and caregiver   Arrange for needed discharge resources and transportation as appropriate   Identify discharge learning needs (meds, wound care, etc)  Note: Verbalize understanding of discharge instructions, follow up appointments, and when to call Physician. Problem: Infection - Adult  Goal: Absence of infection at discharge  Flowsheets (Taken 11/14/2023 1554)  Absence of infection at discharge:   Assess and monitor for signs and symptoms of infection   Monitor lab/diagnostic results  Note: Mediport site with no redness or warmth. Skin over port site intact with no signs of breakdown noted. Patient verbalizes signs/symptoms of port infection and when to notify the physician.       Problem: Chronic Conditions and Co-morbidities  Goal: Patient's chronic conditions and co-morbidity symptoms are monitored and maintained or improved  Flowsheets (Taken 11/14/2023 1554)  Care Plan - Patient's Chronic Conditions and Co-Morbidity Symptoms are Monitored and Maintained or Improved:   Monitor and assess patient's chronic conditions and comorbid symptoms for stability, deterioration, or improvement   Collaborate with multidisciplinary team to address chronic and comorbid conditions and prevent exacerbation or deterioration   Update acute care plan with appropriate goals if chronic or comorbid symptoms are exacerbated and prevent overall improvement and discharge  Note: Patient verbalizes understanding to verbal information given on Oxaliplatin and Leucovorin with Adrucil,action and possible side effects. Aware to call MD if develop complications. Chemotherapy Teaching     What is Chemotherapy   Drug action [x]   Method of Administration [x]   Handouts given []     Side Effects  Nausea/vomiting [x]   Diarrhea [x]   Fatigue [x]   Signs / Symptoms of infection [x]   Neutropenia [x]   Thrombocytopenia [x]   Alopecia [x]   neuropathy [x]   [place] diet &  the importance of fluids [x]       Micellaneous  Importance of nutrition [x]   Importance of oral hygiene [x]   When to call the MD [x]   Monitoring labs [x]   Use of supportive services []     Explanation of Drug Regimen / Frequency  Oxaliplatin, Leucovorin and Adrucil     Comments  Verbalized understanding to drug,action,side effects and when to call MD       Care plan reviewed with patient and spouse. Patient and spouse verbalize understanding of the plan of care and contribute to goal setting.

## 2023-11-16 ENCOUNTER — HOSPITAL ENCOUNTER (OUTPATIENT)
Dept: INFUSION THERAPY | Age: 58
Discharge: HOME | End: 2023-11-16
Payer: MEDICARE

## 2023-11-16 VITALS
RESPIRATION RATE: 18 BRPM | SYSTOLIC BLOOD PRESSURE: 143 MMHG | OXYGEN SATURATION: 96 % | TEMPERATURE: 98.1 F | HEART RATE: 81 BPM | DIASTOLIC BLOOD PRESSURE: 88 MMHG

## 2023-11-16 DIAGNOSIS — C18.9 MALIGNANT NEOPLASM OF COLON, UNSPECIFIED PART OF COLON (HCC): Primary | ICD-10-CM

## 2023-11-16 PROCEDURE — 2580000003 HC RX 258: Performed by: INTERNAL MEDICINE

## 2023-11-16 PROCEDURE — 96523 IRRIG DRUG DELIVERY DEVICE: CPT

## 2023-11-16 PROCEDURE — 6360000002 HC RX W HCPCS: Performed by: INTERNAL MEDICINE

## 2023-11-16 RX ORDER — HEPARIN 100 UNIT/ML
500 SYRINGE INTRAVENOUS PRN
OUTPATIENT
Start: 2023-11-16

## 2023-11-16 RX ORDER — SODIUM CHLORIDE 0.9 % (FLUSH) 0.9 %
5-40 SYRINGE (ML) INJECTION PRN
Status: DISCONTINUED | OUTPATIENT
Start: 2023-11-16 | End: 2023-11-17 | Stop reason: HOSPADM

## 2023-11-16 RX ORDER — SODIUM CHLORIDE 9 MG/ML
25 INJECTION, SOLUTION INTRAVENOUS PRN
OUTPATIENT
Start: 2023-11-16

## 2023-11-16 RX ORDER — SODIUM CHLORIDE 0.9 % (FLUSH) 0.9 %
5-40 SYRINGE (ML) INJECTION PRN
OUTPATIENT
Start: 2023-11-16

## 2023-11-16 RX ORDER — HEPARIN 100 UNIT/ML
500 SYRINGE INTRAVENOUS PRN
Status: DISCONTINUED | OUTPATIENT
Start: 2023-11-16 | End: 2023-11-17 | Stop reason: HOSPADM

## 2023-11-16 RX ADMIN — SODIUM CHLORIDE, PRESERVATIVE FREE 20 ML: 5 INJECTION INTRAVENOUS at 13:09

## 2023-11-16 RX ADMIN — Medication 500 UNITS: at 13:09

## 2023-11-16 NOTE — DISCHARGE INSTRUCTIONS
Please contact your Oncologist if you have any questions regarding the chemotherapy CADD Pump of 5FU that you received today. Patient instructed if experience any of the symptoms following today's chemotherapy / to notify MD immediately or go to emergency department.     * dizziness/lightheadedness  *acute nausea/vomiting - not relieved with medication  *headache - not relieved from Tylenol/pain medication  *chest pain/pressure  *rash/itching  *shortness of breath        Drink fluids - 48oz fluids daily  Call if develop fever/ chills/ signs or symptoms of infection

## 2023-11-16 NOTE — PLAN OF CARE
Problem: Safety - Adult  Goal: Free from fall injury  Outcome: Adequate for Discharge  Flowsheets (Taken 11/16/2023 1648)  Free From Fall Injury:   Instruct family/caregiver on patient safety   Based on caregiver fall risk screen, instruct family/caregiver to ask for assistance with transferring infant if caregiver noted to have fall risk factors  Note: Free from falls while in O.P. Oncology. Problem: Discharge Planning  Goal: Discharge to home or other facility with appropriate resources  Flowsheets (Taken 11/16/2023 1648)  Discharge to home or other facility with appropriate resources:   Identify barriers to discharge with patient and caregiver   Arrange for needed discharge resources and transportation as appropriate   Identify discharge learning needs (meds, wound care, etc)  Note: Verbalize understanding of discharge instructions, follow up appointments, and when to call Physician. Problem: Infection - Adult  Goal: Absence of infection at discharge  Flowsheets (Taken 11/16/2023 1648)  Absence of infection at discharge:   Assess and monitor for signs and symptoms of infection   Monitor lab/diagnostic results   Monitor all insertion sites i.e., indwelling lines, tubes and drains  Note: Mediport site with no redness or warmth. Skin over port site intact with no signs of breakdown noted. Patient verbalizes signs/symptoms of port infection and when to notify the physician. Care plan reviewed with patient. Patient verbalize understanding of the plan of care and contribute to goal setting.

## 2023-11-21 NOTE — PROGRESS NOTES
providers for his atrial fibrillation, anxiety and depression, COPD with asthma, arthritis, hypertension, hyperlipidemia, syncope and collapse, disease and chronic pain. 7) Symptom Management:   -chronic pain from back/arthritis issues: At this point we defer to PCP, we previously explained to the patient and his significant other that oncology does not assume care of long-term occurs and we feel that our team is the best to assume care at that time. The PCP is currently doing a good job of controlling his chronic pain   -Sore throat, most likely secondary to chemotherapy related neutropenia, resolved with resolution of neutropenia. -left drooping mouth: see above, MRI brain-edema, no CVA or intracranial metastasis, out of window for treatment (started over a week ago)      8) Medications reviewed. Prescriptions today:            No orders of the defined types were placed in this encounter. [place]AR[place]:  Controlled Substance Monitoring:    Acute and Chronic Pain Monitoring:   RX Monitoring Attestation Periodic Controlled Substance Monitoring   7/24/2017  11:00 AM The Prescription Monitoring Report for this patient was reviewed today. Possible medication side effects, risk of tolerance and/or dependence, and alternative treatments discussed; No signs of potential drug abuse or diversion identified. ;Random urine drug screen sent today. 9) Follow Up:  Return in about 1 week (around 12/5/2023) for labs, chemo Dr Deng Rodriguez.      [facility]

## 2023-11-22 ENCOUNTER — OFFICE VISIT (OUTPATIENT)
Dept: ENT CLINIC | Age: 58
End: 2023-11-22
Payer: MEDICARE

## 2023-11-22 ENCOUNTER — HOSPITAL ENCOUNTER (OUTPATIENT)
Dept: PET IMAGING | Age: 58
Discharge: HOME | End: 2023-11-22
Payer: MEDICARE

## 2023-11-22 VITALS
DIASTOLIC BLOOD PRESSURE: 90 MMHG | HEIGHT: 70 IN | SYSTOLIC BLOOD PRESSURE: 162 MMHG | HEART RATE: 57 BPM | OXYGEN SATURATION: 100 % | WEIGHT: 143.3 LBS | BODY MASS INDEX: 20.52 KG/M2 | TEMPERATURE: 96.6 F

## 2023-11-22 DIAGNOSIS — C18.2 MALIGNANT NEOPLASM OF ASCENDING COLON (HCC): ICD-10-CM

## 2023-11-22 DIAGNOSIS — R20.0 NUMBNESS OF TONGUE: ICD-10-CM

## 2023-11-22 DIAGNOSIS — C06.2 SQUAMOUS CELL CANCER OF RETROMOLAR TRIGONE (HCC): ICD-10-CM

## 2023-11-22 DIAGNOSIS — K22.2 ESOPHAGEAL STRICTURE: Primary | ICD-10-CM

## 2023-11-22 DIAGNOSIS — G51.0 FACIAL NERVE PALSY: ICD-10-CM

## 2023-11-22 DIAGNOSIS — G52.3 HYPOGLOSSAL NERVE PALSY: ICD-10-CM

## 2023-11-22 DIAGNOSIS — R13.14 PHARYNGOESOPHAGEAL DYSPHAGIA: ICD-10-CM

## 2023-11-22 PROCEDURE — G8427 DOCREV CUR MEDS BY ELIG CLIN: HCPCS | Performed by: OTOLARYNGOLOGY

## 2023-11-22 PROCEDURE — G8484 FLU IMMUNIZE NO ADMIN: HCPCS | Performed by: OTOLARYNGOLOGY

## 2023-11-22 PROCEDURE — G8420 CALC BMI NORM PARAMETERS: HCPCS | Performed by: OTOLARYNGOLOGY

## 2023-11-22 PROCEDURE — 3430000000 HC RX DIAGNOSTIC RADIOPHARMACEUTICAL: Performed by: NURSE PRACTITIONER

## 2023-11-22 PROCEDURE — 1036F TOBACCO NON-USER: CPT | Performed by: OTOLARYNGOLOGY

## 2023-11-22 PROCEDURE — 3017F COLORECTAL CA SCREEN DOC REV: CPT | Performed by: OTOLARYNGOLOGY

## 2023-11-22 PROCEDURE — A9552 F18 FDG: HCPCS | Performed by: NURSE PRACTITIONER

## 2023-11-22 PROCEDURE — 78815 PET IMAGE W/CT SKULL-THIGH: CPT

## 2023-11-22 PROCEDURE — 99213 OFFICE O/P EST LOW 20 MIN: CPT | Performed by: OTOLARYNGOLOGY

## 2023-11-22 RX ORDER — FLUDEOXYGLUCOSE F 18 200 MCI/ML
14 INJECTION, SOLUTION INTRAVENOUS
Status: COMPLETED | OUTPATIENT
Start: 2023-11-22 | End: 2023-11-22

## 2023-11-22 RX ADMIN — FLUDEOXYGLUCOSE F 18 14 MILLICURIE: 200 INJECTION, SOLUTION INTRAVENOUS at 08:29

## 2023-11-22 NOTE — PROGRESS NOTES
intra-abdominal ascites is observed. No bowel obstruction or fluid collection is identified. The previously seen subcentimeter pericecal lymph nodes are no longer visualized. The ill-defined hypoattenuating liver lesions are smaller with measurements provided in the discussion. 3. Nonspecific bladder wall thickening. Mild prostatomegaly. Correlate with urinalysis and PSA levels. Chronic findings are discussed. **This report has been created using voice recognition software. It may contain minor errors which are inherent in voice recognition technology. ** Final report electronically signed by Dr Florentin Perez on 11/9/2023 3:46 PM     Lab Results   Component Value Date/Time     11/14/2023 10:20 AM     10/31/2023 08:20 AM     10/24/2023 09:16 AM     05/13/2023 07:07 AM     05/12/2023 04:48 AM     05/11/2023 05:06 AM    K 3.4 11/14/2023 10:20 AM    K 3.4 10/31/2023 08:20 AM    K 3.4 10/24/2023 09:16 AM    K 4.3 07/11/2023 08:22 AM    K 3.9 05/13/2023 07:07 AM    K 3.9 05/12/2023 04:48 AM    K 4.9 05/10/2023 04:55 AM    K 3.8 05/02/2023 05:34 AM    K 3.3 12/10/2020 07:25 AM     05/13/2023 07:07 AM     05/12/2023 04:48 AM     05/11/2023 05:06 AM    CO2 22 05/13/2023 07:07 AM    CO2 21 05/12/2023 04:48 AM    CO2 21 05/11/2023 05:06 AM    BUN 5 05/13/2023 07:07 AM    BUN 5 05/12/2023 04:48 AM    BUN 6 05/11/2023 05:06 AM    CREATININE 0.7 11/14/2023 10:20 AM    CREATININE 0.8 10/31/2023 08:20 AM    CREATININE 1.0 10/24/2023 09:16 AM    CREATININE 0.8 05/13/2023 07:07 AM    CREATININE 0.8 05/12/2023 04:48 AM    CREATININE 0.8 05/11/2023 05:06 AM    CALCIUM 8.5 05/13/2023 07:07 AM    CALCIUM 8.5 05/12/2023 04:48 AM    CALCIUM 8.8 05/11/2023 05:06 AM    PROT 6.1 11/14/2023 10:20 AM    PROT 7.7 10/31/2023 08:00 AM    PROT 6.5 10/24/2023 09:16 AM    LABALBU 3.3 11/14/2023 10:20 AM    LABALBU 4.3 10/31/2023 08:00 AM    LABALBU 3.8 10/24/2023 09:16 AM    BILITOT <0.2

## 2023-11-27 DIAGNOSIS — Z51.5 PALLIATIVE CARE ENCOUNTER: ICD-10-CM

## 2023-11-27 DIAGNOSIS — C18.2 MALIGNANT NEOPLASM OF ASCENDING COLON (HCC): ICD-10-CM

## 2023-11-27 DIAGNOSIS — Z51.5 PALLIATIVE CARE PATIENT: ICD-10-CM

## 2023-11-27 DIAGNOSIS — C06.2 SQUAMOUS CELL CANCER OF RETROMOLAR TRIGONE (HCC): ICD-10-CM

## 2023-11-27 DIAGNOSIS — G89.3 CANCER RELATED PAIN: ICD-10-CM

## 2023-11-27 RX ORDER — OXYCODONE AND ACETAMINOPHEN 7.5; 325 MG/1; MG/1
1 TABLET ORAL EVERY 6 HOURS PRN
Qty: 120 TABLET | Refills: 0 | Status: SHIPPED | OUTPATIENT
Start: 2023-12-01 | End: 2023-11-27

## 2023-11-27 RX ORDER — OXYCODONE HYDROCHLORIDE 10 MG/1
10 TABLET ORAL EVERY 4 HOURS PRN
Qty: 120 TABLET | Refills: 0 | Status: CANCELLED | OUTPATIENT
Start: 2023-11-27 | End: 2023-12-27

## 2023-11-27 RX ORDER — OXYCODONE AND ACETAMINOPHEN 7.5; 325 MG/1; MG/1
1 TABLET ORAL EVERY 4 HOURS PRN
Qty: 120 TABLET | Refills: 0 | Status: SHIPPED | OUTPATIENT
Start: 2023-11-27 | End: 2023-12-17

## 2023-11-27 NOTE — TELEPHONE ENCOUNTER
PDMP reviewed, earliest he can get refill is on December 1. Prescription sent to pharmacy. He should only be taking the Percocet and oxycodone immediate release 10 mg.

## 2023-11-27 NOTE — TELEPHONE ENCOUNTER
Pt called requesting medication refills. Percocet 7.5-325mg and Oxycodone 10mg. [address]

## 2023-11-28 ENCOUNTER — HOSPITAL ENCOUNTER (OUTPATIENT)
Dept: INFUSION THERAPY | Age: 58
Discharge: HOME | End: 2023-11-28
Payer: MEDICARE

## 2023-11-28 ENCOUNTER — CLINICAL DOCUMENTATION (OUTPATIENT)
Dept: CASE MANAGEMENT | Age: 58
End: 2023-11-28

## 2023-11-28 ENCOUNTER — OFFICE VISIT (OUTPATIENT)
Dept: ONCOLOGY | Age: 58
End: 2023-11-28
Payer: MEDICARE

## 2023-11-28 VITALS
DIASTOLIC BLOOD PRESSURE: 124 MMHG | BODY MASS INDEX: 19.57 KG/M2 | RESPIRATION RATE: 20 BRPM | HEART RATE: 74 BPM | OXYGEN SATURATION: 97 % | TEMPERATURE: 97.4 F | SYSTOLIC BLOOD PRESSURE: 178 MMHG | WEIGHT: 136.7 LBS | HEIGHT: 70 IN

## 2023-11-28 VITALS
OXYGEN SATURATION: 97 % | DIASTOLIC BLOOD PRESSURE: 124 MMHG | HEART RATE: 74 BPM | TEMPERATURE: 97.4 F | WEIGHT: 136.7 LBS | RESPIRATION RATE: 20 BRPM | SYSTOLIC BLOOD PRESSURE: 178 MMHG | BODY MASS INDEX: 19.57 KG/M2 | HEIGHT: 70 IN

## 2023-11-28 DIAGNOSIS — R11.2 CHEMOTHERAPY INDUCED NAUSEA AND VOMITING: ICD-10-CM

## 2023-11-28 DIAGNOSIS — Z51.11 ENCOUNTER FOR ANTINEOPLASTIC CHEMOTHERAPY: ICD-10-CM

## 2023-11-28 DIAGNOSIS — T45.1X5A CHEMOTHERAPY INDUCED NAUSEA AND VOMITING: ICD-10-CM

## 2023-11-28 DIAGNOSIS — C18.9 MALIGNANT NEOPLASM OF COLON, UNSPECIFIED PART OF COLON (HCC): Primary | ICD-10-CM

## 2023-11-28 DIAGNOSIS — T40.2X5A THERAPEUTIC OPIOID-INDUCED CONSTIPATION (OIC): ICD-10-CM

## 2023-11-28 DIAGNOSIS — C18.2 MALIGNANT NEOPLASM OF ASCENDING COLON (HCC): ICD-10-CM

## 2023-11-28 DIAGNOSIS — K59.03 THERAPEUTIC OPIOID-INDUCED CONSTIPATION (OIC): ICD-10-CM

## 2023-11-28 DIAGNOSIS — C18.2 MALIGNANT NEOPLASM OF ASCENDING COLON (HCC): Primary | ICD-10-CM

## 2023-11-28 LAB
ABSOLUTE IMMATURE GRANULOCYTE: 0.02 THOU/MM3 (ref 0–0.07)
ALBUMIN SERPL BCG-MCNC: 4.4 G/DL (ref 3.5–5.1)
ALP SERPL-CCNC: 75 U/L (ref 38–126)
ALT SERPL W/O P-5'-P-CCNC: 11 U/L (ref 11–66)
AST SERPL-CCNC: 26 U/L (ref 5–40)
BASOPHILS ABSOLUTE: 0 THOU/MM3 (ref 0–0.1)
BASOPHILS NFR BLD AUTO: 1 % (ref 0–3)
BILIRUB CONJ SERPL-MCNC: < 0.2 MG/DL (ref 0–0.3)
BILIRUB SERPL-MCNC: 0.5 MG/DL (ref 0.3–1.2)
BUN BLDP-MCNC: 7 MG/DL (ref 8–26)
CHLORIDE BLD-SCNC: 100 MEQ/L (ref 98–109)
CREAT BLD-MCNC: 0.9 MG/DL (ref 0.5–1.2)
EOSINOPHIL NFR BLD AUTO: 1 % (ref 0–4)
EOSINOPHILS ABSOLUTE: 0 THOU/MM3 (ref 0–0.4)
ERYTHROCYTE [DISTWIDTH] IN BLOOD BY AUTOMATED COUNT: 16.6 % (ref 11.5–14.5)
GFR SERPL CREATININE-BSD FRML MDRD: > 60 ML/MIN/1.73M2
GLUCOSE BLD-MCNC: 99 MG/DL (ref 70–108)
HCT VFR BLD AUTO: 41.1 % (ref 42–52)
HGB BLD-MCNC: 13.8 GM/DL (ref 14–18)
IMMATURE GRANULOCYTES: 0 %
IONIZED CALCIUM, WHOLE BLOOD: 1.19 MMOL/L (ref 1.12–1.32)
LYMPHOCYTES ABSOLUTE: 1.6 THOU/MM3 (ref 1–4.8)
LYMPHOCYTES NFR BLD AUTO: 26 % (ref 15–47)
MCH RBC QN AUTO: 34.2 PG (ref 26–33)
MCHC RBC AUTO-ENTMCNC: 33.6 GM/DL (ref 32.2–35.5)
MCV RBC AUTO: 102 FL (ref 80–94)
MONOCYTES ABSOLUTE: 0.8 THOU/MM3 (ref 0.4–1.3)
MONOCYTES NFR BLD AUTO: 13 % (ref 0–12)
NEUTROPHILS NFR BLD AUTO: 60 % (ref 43–75)
PLATELET # BLD AUTO: 175 THOU/MM3 (ref 130–400)
PMV BLD AUTO: 10.6 FL (ref 9.4–12.4)
POTASSIUM BLD-SCNC: 3.5 MEQ/L (ref 3.5–4.9)
PROT SERPL-MCNC: 7.9 G/DL (ref 6.1–8)
RBC # BLD AUTO: 4.04 MILL/MM3 (ref 4.7–6.1)
SEGMENTED NEUTROPHILS ABSOLUTE COUNT: 3.6 THOU/MM3 (ref 1.8–7.7)
SODIUM BLD-SCNC: 139 MEQ/L (ref 138–146)
TOTAL CO2, WHOLE BLOOD: 29 MEQ/L (ref 23–33)
WBC # BLD AUTO: 6 THOU/MM3 (ref 4.8–10.8)

## 2023-11-28 PROCEDURE — 85025 COMPLETE CBC W/AUTO DIFF WBC: CPT

## 2023-11-28 PROCEDURE — 1036F TOBACCO NON-USER: CPT | Performed by: INTERNAL MEDICINE

## 2023-11-28 PROCEDURE — 6360000002 HC RX W HCPCS: Performed by: INTERNAL MEDICINE

## 2023-11-28 PROCEDURE — 99214 OFFICE O/P EST MOD 30 MIN: CPT | Performed by: INTERNAL MEDICINE

## 2023-11-28 PROCEDURE — G8484 FLU IMMUNIZE NO ADMIN: HCPCS | Performed by: INTERNAL MEDICINE

## 2023-11-28 PROCEDURE — 36591 DRAW BLOOD OFF VENOUS DEVICE: CPT

## 2023-11-28 PROCEDURE — 80047 BASIC METABLC PNL IONIZED CA: CPT

## 2023-11-28 PROCEDURE — 80076 HEPATIC FUNCTION PANEL: CPT

## 2023-11-28 PROCEDURE — G8420 CALC BMI NORM PARAMETERS: HCPCS | Performed by: INTERNAL MEDICINE

## 2023-11-28 PROCEDURE — G8427 DOCREV CUR MEDS BY ELIG CLIN: HCPCS | Performed by: INTERNAL MEDICINE

## 2023-11-28 PROCEDURE — 2580000003 HC RX 258: Performed by: INTERNAL MEDICINE

## 2023-11-28 PROCEDURE — 36415 COLL VENOUS BLD VENIPUNCTURE: CPT

## 2023-11-28 PROCEDURE — 99211 OFF/OP EST MAY X REQ PHY/QHP: CPT

## 2023-11-28 PROCEDURE — 3017F COLORECTAL CA SCREEN DOC REV: CPT | Performed by: INTERNAL MEDICINE

## 2023-11-28 RX ORDER — SODIUM CHLORIDE 0.9 % (FLUSH) 0.9 %
5-40 SYRINGE (ML) INJECTION PRN
Status: CANCELLED | OUTPATIENT
Start: 2023-11-28

## 2023-11-28 RX ORDER — SODIUM CHLORIDE 9 MG/ML
25 INJECTION, SOLUTION INTRAVENOUS PRN
Status: CANCELLED | OUTPATIENT
Start: 2023-11-28

## 2023-11-28 RX ORDER — SODIUM CHLORIDE 0.9 % (FLUSH) 0.9 %
5-40 SYRINGE (ML) INJECTION PRN
Status: DISCONTINUED | OUTPATIENT
Start: 2023-11-28 | End: 2023-11-29 | Stop reason: HOSPADM

## 2023-11-28 RX ORDER — HEPARIN 100 UNIT/ML
500 SYRINGE INTRAVENOUS PRN
Status: CANCELLED | OUTPATIENT
Start: 2023-11-28

## 2023-11-28 RX ORDER — HEPARIN 100 UNIT/ML
500 SYRINGE INTRAVENOUS PRN
Status: DISCONTINUED | OUTPATIENT
Start: 2023-11-28 | End: 2023-11-29 | Stop reason: HOSPADM

## 2023-11-28 RX ADMIN — SODIUM CHLORIDE, PRESERVATIVE FREE 10 ML: 5 INJECTION INTRAVENOUS at 11:14

## 2023-11-28 RX ADMIN — SODIUM CHLORIDE, PRESERVATIVE FREE 10 ML: 5 INJECTION INTRAVENOUS at 10:25

## 2023-11-28 RX ADMIN — Medication 500 UNITS: at 11:14

## 2023-11-28 RX ADMIN — SODIUM CHLORIDE, PRESERVATIVE FREE 10 ML: 5 INJECTION INTRAVENOUS at 10:26

## 2023-11-28 ASSESSMENT — PAIN SCALES - GENERAL: PAINLEVEL_OUTOF10: 5

## 2023-11-28 ASSESSMENT — PAIN DESCRIPTION - ORIENTATION: ORIENTATION: MID

## 2023-11-28 ASSESSMENT — PAIN DESCRIPTION - PAIN TYPE: TYPE: CHRONIC PAIN

## 2023-11-28 ASSESSMENT — PAIN DESCRIPTION - LOCATION: LOCATION: ABDOMEN

## 2023-11-28 ASSESSMENT — PAIN DESCRIPTION - ONSET: ONSET: ON-GOING

## 2023-11-28 ASSESSMENT — PAIN DESCRIPTION - FREQUENCY: FREQUENCY: CONTINUOUS

## 2023-11-28 ASSESSMENT — PAIN DESCRIPTION - DESCRIPTORS: DESCRIPTORS: DISCOMFORT

## 2023-11-28 NOTE — DISCHARGE INSTRUCTIONS
Please contact your Oncologist if you have any questions regarding the Mediport lab draw you received today. Call if any uncontrolled nausea or vomiting   Call if any fever,chills, problems or concerns  Call if any questions  Drink at least 6 - 8 ounces glasses of fluids a day    Patient to watch for any:    Dizziness     Lightheadedness        Acute nausea/vomiting   Headache     Chest pain/pressure    Rash/itching     Shortness of breath      Patient instructed if he experiences any of the above symptoms following today's lab draw,he is to notify MD immediately or go to the emergency department.

## 2023-11-28 NOTE — PROGRESS NOTES
[facility]  [address] [place] [address]  Dept: [phone]  Loc: [phone]   Hematology/Oncology Consult (Clinic)        12/05/23       Renea Clark   1965     No ref. provider found   MADELIN Teran - CNP       Reason:   Chief Complaint   Patient presents with    Follow-up     Malignant neoplasm of ascending colon ([address])      HPI: Renea Clark is a 62 y.o. 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    -9/2015. Patient p/w complaints of pain and trismus with tenderness in the submandibular area for about a month. 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       -9/17/2015. Referred to Dr. Mira Turner at [facility].    -10/2/2015 Dr Mira Turner did laryngoscopy esophagoscopy tracheostomy, right and left neck dissections, composite resection of left retromolar trigone for squamous cell carcinoma including partial resection of the pharynx left floor of the mouth, left mental mandibulectomy and inferior maxillectomy, resection of infratemporal fossa mass, left inferior maxillectomy mandibular plating [place] of fibular free flap, microvascular anastomosis and inset of fibular free flap, mandibular reconstruction and complex vestibuloplasty, pharyngoplasty, palatoplasty, and PEG tube placement.  Left mental nerve negative for carcinoma, posterior mandibular margin negative for carcinoma, anterior mandible margin biopsy negative for carcinoma base of tongue margin, pharynx, ATF, buccal

## 2023-11-28 NOTE — PLAN OF CARE
Problem: Pain  Goal: Verbalizes/displays adequate comfort level or baseline comfort level  Outcome: Adequate for Discharge  Flowsheets (Taken 11/28/2023 1526)  Verbalizes/displays adequate comfort level or baseline comfort level: Encourage patient to monitor pain and request assistance     Problem: Safety - Adult  Goal: Free from fall injury  Outcome: Adequate for Discharge  Flowsheets (Taken 11/28/2023 1526)  Free From Fall Injury: Instruct family/caregiver on patient safety     Problem: Discharge Planning  Goal: Discharge to home or other facility with appropriate resources  Outcome: Adequate for Discharge  Flowsheets (Taken 11/28/2023 1526)  Discharge to home or other facility with appropriate resources: Identify barriers to discharge with patient and caregiver     Problem: Infection - Adult  Goal: Absence of infection at discharge  Outcome: Adequate for Discharge  Flowsheets (Taken 11/28/2023 1526)  Absence of infection at discharge: Monitor all insertion sites i.e., indwelling lines, tubes and drains  Note: Mediport site with no redness or warmth. Skin over port site intact with no signs of breakdown noted. Patient verbalizes signs/symptoms of port infection and when to notify the physician.        Problem: Chronic Conditions and Co-morbidities  Goal: Patient's chronic conditions and co-morbidity symptoms are monitored and maintained or improved  Outcome: Adequate for Discharge  Flowsheets (Taken 11/28/2023 1526)  Care Plan - Patient's Chronic Conditions and Co-Morbidity Symptoms are Monitored and Maintained or Improved: Monitor and assess patient's chronic conditions and comorbid symptoms for stability, deterioration, or improvement  Note:   Chemotherapy Teaching     What is Chemotherapy   Drug action [x]   Method of Administration [x]   Handouts given []     Side Effects  Nausea/vomiting [x]   Diarrhea [x]   Fatigue [x]   Signs / Symptoms of infection [x]   Neutropenia [x]   Thrombocytopenia [x]   Alopecia

## 2023-11-29 NOTE — PROGRESS NOTES
Name: Darrius Walters  : 1965  MRN: [id number]    Oncology Navigation Follow-Up Note    Contact Type:  Medical Oncology    Subjective: had vomiting day cadd pump removed- lasted 2-3 days. Had episodes with severe vomiting/ dry heaves since 3am. Took nausea medication- feels slight better. Vomited up morning BP pills . Objective: hold tx today due to elevated /124    ONCPOC:  - hold tx today. Need to go home take BP pills and relax. Do nothing for one week.  Monitor BP readings at home- call if not able to contol  - return MD apt   for assess/ poss tx    Assistance Needed: denies any a this time    Receptive to [address] [place] [address] / Palliative Care:  deferred    Notes: Davonte following to assist & support as needed    Electronically signed by Garry Castro RN on 2023 at 9:30 AM

## 2023-12-05 ENCOUNTER — OFFICE VISIT (OUTPATIENT)
Dept: ONCOLOGY | Age: 58
End: 2023-12-05
Payer: MEDICARE

## 2023-12-05 ENCOUNTER — HOSPITAL ENCOUNTER (OUTPATIENT)
Dept: INFUSION THERAPY | Age: 58
Discharge: HOME | End: 2023-12-05
Payer: MEDICARE

## 2023-12-05 VITALS
TEMPERATURE: 97.8 F | RESPIRATION RATE: 18 BRPM | BODY MASS INDEX: 20.93 KG/M2 | OXYGEN SATURATION: 95 % | DIASTOLIC BLOOD PRESSURE: 87 MMHG | WEIGHT: 146.2 LBS | HEIGHT: 70 IN | HEART RATE: 63 BPM | SYSTOLIC BLOOD PRESSURE: 126 MMHG

## 2023-12-05 VITALS
HEART RATE: 59 BPM | DIASTOLIC BLOOD PRESSURE: 83 MMHG | TEMPERATURE: 98.7 F | OXYGEN SATURATION: 96 % | BODY MASS INDEX: 20.93 KG/M2 | RESPIRATION RATE: 16 BRPM | WEIGHT: 146.2 LBS | HEIGHT: 70 IN | SYSTOLIC BLOOD PRESSURE: 139 MMHG

## 2023-12-05 DIAGNOSIS — C18.9 MALIGNANT NEOPLASM OF COLON, UNSPECIFIED PART OF COLON (HCC): Primary | ICD-10-CM

## 2023-12-05 DIAGNOSIS — C18.2 MALIGNANT NEOPLASM OF ASCENDING COLON (HCC): ICD-10-CM

## 2023-12-05 DIAGNOSIS — Z51.11 ENCOUNTER FOR ANTINEOPLASTIC CHEMOTHERAPY: ICD-10-CM

## 2023-12-05 DIAGNOSIS — C18.2 MALIGNANT NEOPLASM OF ASCENDING COLON (HCC): Primary | ICD-10-CM

## 2023-12-05 DIAGNOSIS — C06.2 SQUAMOUS CELL CANCER OF RETROMOLAR TRIGONE (HCC): ICD-10-CM

## 2023-12-05 LAB
ABSOLUTE IMMATURE GRANULOCYTE: 0.03 THOU/MM3 (ref 0–0.07)
ALBUMIN SERPL BCG-MCNC: 3.7 G/DL (ref 3.5–5.1)
ALP SERPL-CCNC: 60 U/L (ref 38–126)
ALT SERPL W/O P-5'-P-CCNC: 7 U/L (ref 11–66)
AST SERPL-CCNC: 21 U/L (ref 5–40)
BASOPHILS ABSOLUTE: 0 THOU/MM3 (ref 0–0.1)
BASOPHILS NFR BLD AUTO: 1 % (ref 0–3)
BILIRUB CONJ SERPL-MCNC: < 0.2 MG/DL (ref 0–0.3)
BILIRUB SERPL-MCNC: 0.2 MG/DL (ref 0.3–1.2)
BUN BLDP-MCNC: 7 MG/DL (ref 8–26)
CHLORIDE BLD-SCNC: 104 MEQ/L (ref 98–109)
CREAT BLD-MCNC: 1 MG/DL (ref 0.5–1.2)
EOSINOPHIL NFR BLD AUTO: 2 % (ref 0–4)
EOSINOPHILS ABSOLUTE: 0.1 THOU/MM3 (ref 0–0.4)
ERYTHROCYTE [DISTWIDTH] IN BLOOD BY AUTOMATED COUNT: 16.4 % (ref 11.5–14.5)
GFR SERPL CREATININE-BSD FRML MDRD: > 60 ML/MIN/1.73M2
GLUCOSE BLD-MCNC: 92 MG/DL (ref 70–108)
HCT VFR BLD AUTO: 37.6 % (ref 42–52)
HGB BLD-MCNC: 12.2 GM/DL (ref 14–18)
IMMATURE GRANULOCYTES: 1 %
IONIZED CALCIUM, WHOLE BLOOD: 1.25 MMOL/L (ref 1.12–1.32)
LYMPHOCYTES ABSOLUTE: 2.3 THOU/MM3 (ref 1–4.8)
LYMPHOCYTES NFR BLD AUTO: 44 % (ref 15–47)
MCH RBC QN AUTO: 34.2 PG (ref 26–33)
MCHC RBC AUTO-ENTMCNC: 32.4 GM/DL (ref 32.2–35.5)
MCV RBC AUTO: 105 FL (ref 80–94)
MONOCYTES ABSOLUTE: 0.8 THOU/MM3 (ref 0.4–1.3)
MONOCYTES NFR BLD AUTO: 14 % (ref 0–12)
NEUTROPHILS NFR BLD AUTO: 39 % (ref 43–75)
PLATELET # BLD AUTO: 218 THOU/MM3 (ref 130–400)
PMV BLD AUTO: 9.8 FL (ref 9.4–12.4)
POTASSIUM BLD-SCNC: 4 MEQ/L (ref 3.5–4.9)
PROT SERPL-MCNC: 6.3 G/DL (ref 6.1–8)
RBC # BLD AUTO: 3.57 MILL/MM3 (ref 4.7–6.1)
SEGMENTED NEUTROPHILS ABSOLUTE COUNT: 2.1 THOU/MM3 (ref 1.8–7.7)
SODIUM BLD-SCNC: 140 MEQ/L (ref 138–146)
TOTAL CO2, WHOLE BLOOD: 29 MEQ/L (ref 23–33)
WBC # BLD AUTO: 5.3 THOU/MM3 (ref 4.8–10.8)

## 2023-12-05 PROCEDURE — 2580000003 HC RX 258: Performed by: INTERNAL MEDICINE

## 2023-12-05 PROCEDURE — 85025 COMPLETE CBC W/AUTO DIFF WBC: CPT

## 2023-12-05 PROCEDURE — 1036F TOBACCO NON-USER: CPT | Performed by: INTERNAL MEDICINE

## 2023-12-05 PROCEDURE — 96368 THER/DIAG CONCURRENT INF: CPT

## 2023-12-05 PROCEDURE — 3017F COLORECTAL CA SCREEN DOC REV: CPT | Performed by: INTERNAL MEDICINE

## 2023-12-05 PROCEDURE — G8420 CALC BMI NORM PARAMETERS: HCPCS | Performed by: INTERNAL MEDICINE

## 2023-12-05 PROCEDURE — 96416 CHEMO PROLONG INFUSE W/PUMP: CPT

## 2023-12-05 PROCEDURE — 99211 OFF/OP EST MAY X REQ PHY/QHP: CPT

## 2023-12-05 PROCEDURE — 36591 DRAW BLOOD OFF VENOUS DEVICE: CPT

## 2023-12-05 PROCEDURE — 6360000002 HC RX W HCPCS: Performed by: INTERNAL MEDICINE

## 2023-12-05 PROCEDURE — G8427 DOCREV CUR MEDS BY ELIG CLIN: HCPCS | Performed by: INTERNAL MEDICINE

## 2023-12-05 PROCEDURE — 96413 CHEMO IV INFUSION 1 HR: CPT

## 2023-12-05 PROCEDURE — 96415 CHEMO IV INFUSION ADDL HR: CPT

## 2023-12-05 PROCEDURE — 96375 TX/PRO/DX INJ NEW DRUG ADDON: CPT

## 2023-12-05 PROCEDURE — 99214 OFFICE O/P EST MOD 30 MIN: CPT | Performed by: INTERNAL MEDICINE

## 2023-12-05 PROCEDURE — 80076 HEPATIC FUNCTION PANEL: CPT

## 2023-12-05 PROCEDURE — 96367 TX/PROPH/DG ADDL SEQ IV INF: CPT

## 2023-12-05 PROCEDURE — G8484 FLU IMMUNIZE NO ADMIN: HCPCS | Performed by: INTERNAL MEDICINE

## 2023-12-05 PROCEDURE — 80047 BASIC METABLC PNL IONIZED CA: CPT

## 2023-12-05 RX ORDER — EPINEPHRINE 1 MG/ML
0.3 INJECTION, SOLUTION, CONCENTRATE INTRAVENOUS PRN
Status: CANCELLED | OUTPATIENT
Start: 2023-12-05

## 2023-12-05 RX ORDER — SODIUM CHLORIDE 9 MG/ML
5-250 INJECTION, SOLUTION INTRAVENOUS PRN
Status: CANCELLED | OUTPATIENT
Start: 2023-12-05

## 2023-12-05 RX ORDER — ALBUTEROL SULFATE 90 UG/1
4 AEROSOL, METERED RESPIRATORY (INHALATION) PRN
Status: CANCELLED | OUTPATIENT
Start: 2023-12-05

## 2023-12-05 RX ORDER — DIPHENHYDRAMINE HYDROCHLORIDE 50 MG/ML
50 INJECTION INTRAMUSCULAR; INTRAVENOUS
Status: CANCELLED | OUTPATIENT
Start: 2023-12-05

## 2023-12-05 RX ORDER — HEPARIN SODIUM (PORCINE) LOCK FLUSH IV SOLN 100 UNIT/ML 100 UNIT/ML
500 SOLUTION INTRAVENOUS PRN
Status: CANCELLED | OUTPATIENT
Start: 2023-12-05

## 2023-12-05 RX ORDER — SODIUM CHLORIDE 9 MG/ML
25 INJECTION, SOLUTION INTRAVENOUS PRN
Status: CANCELLED | OUTPATIENT
Start: 2023-12-05

## 2023-12-05 RX ORDER — DEXTROSE MONOHYDRATE 50 MG/ML
5-250 INJECTION, SOLUTION INTRAVENOUS PRN
Status: CANCELLED | OUTPATIENT
Start: 2023-12-05

## 2023-12-05 RX ORDER — SODIUM CHLORIDE 9 MG/ML
INJECTION, SOLUTION INTRAVENOUS CONTINUOUS
Status: CANCELLED | OUTPATIENT
Start: 2023-12-05

## 2023-12-05 RX ORDER — SODIUM CHLORIDE 0.9 % (FLUSH) 0.9 %
5-40 SYRINGE (ML) INJECTION PRN
Status: CANCELLED | OUTPATIENT
Start: 2023-12-05

## 2023-12-05 RX ORDER — HEPARIN 100 UNIT/ML
500 SYRINGE INTRAVENOUS PRN
Status: CANCELLED | OUTPATIENT
Start: 2023-12-05

## 2023-12-05 RX ORDER — PALONOSETRON 0.05 MG/ML
0.25 INJECTION, SOLUTION INTRAVENOUS ONCE
Status: COMPLETED | OUTPATIENT
Start: 2023-12-05 | End: 2023-12-05

## 2023-12-05 RX ORDER — FAMOTIDINE 10 MG/ML
20 INJECTION, SOLUTION INTRAVENOUS
Status: CANCELLED | OUTPATIENT
Start: 2023-12-05

## 2023-12-05 RX ORDER — ONDANSETRON 2 MG/ML
8 INJECTION INTRAMUSCULAR; INTRAVENOUS
Status: CANCELLED | OUTPATIENT
Start: 2023-12-05

## 2023-12-05 RX ORDER — PALONOSETRON 0.05 MG/ML
0.25 INJECTION, SOLUTION INTRAVENOUS ONCE
Status: CANCELLED | OUTPATIENT
Start: 2023-12-05 | End: 2023-12-05

## 2023-12-05 RX ORDER — DEXTROSE MONOHYDRATE 50 MG/ML
5-250 INJECTION, SOLUTION INTRAVENOUS PRN
Status: DISCONTINUED | OUTPATIENT
Start: 2023-12-05 | End: 2023-12-06 | Stop reason: HOSPADM

## 2023-12-05 RX ORDER — ACETAMINOPHEN 325 MG/1
650 TABLET ORAL
Status: CANCELLED | OUTPATIENT
Start: 2023-12-05

## 2023-12-05 RX ORDER — SODIUM CHLORIDE 0.9 % (FLUSH) 0.9 %
5-40 SYRINGE (ML) INJECTION PRN
Status: DISCONTINUED | OUTPATIENT
Start: 2023-12-05 | End: 2023-12-06 | Stop reason: HOSPADM

## 2023-12-05 RX ORDER — MEPERIDINE HYDROCHLORIDE 50 MG/ML
12.5 INJECTION INTRAMUSCULAR; INTRAVENOUS; SUBCUTANEOUS PRN
Status: CANCELLED | OUTPATIENT
Start: 2023-12-05

## 2023-12-05 RX ORDER — HEPARIN 100 UNIT/ML
500 SYRINGE INTRAVENOUS PRN
Status: DISCONTINUED | OUTPATIENT
Start: 2023-12-05 | End: 2023-12-06 | Stop reason: HOSPADM

## 2023-12-05 RX ADMIN — SODIUM CHLORIDE, PRESERVATIVE FREE 20 ML: 5 INJECTION INTRAVENOUS at 09:46

## 2023-12-05 RX ADMIN — LEUCOVORIN CALCIUM 750 MG: 50 INJECTION, POWDER, LYOPHILIZED, FOR SOLUTION INTRAMUSCULAR; INTRAVENOUS at 11:54

## 2023-12-05 RX ADMIN — DEXAMETHASONE SODIUM PHOSPHATE 12 MG: 4 INJECTION, SOLUTION INTRA-ARTICULAR; INTRALESIONAL; INTRAMUSCULAR; INTRAVENOUS; SOFT TISSUE at 11:16

## 2023-12-05 RX ADMIN — DEXTROSE MONOHYDRATE 20 ML/HR: 50 INJECTION, SOLUTION INTRAVENOUS at 11:11

## 2023-12-05 RX ADMIN — PALONOSETRON 0.25 MG: 0.05 INJECTION, SOLUTION INTRAVENOUS at 11:13

## 2023-12-05 RX ADMIN — OXALIPLATIN 120 MG: 5 INJECTION, SOLUTION INTRAVENOUS at 11:56

## 2023-12-05 RX ADMIN — SODIUM CHLORIDE, PRESERVATIVE FREE 10 ML: 5 INJECTION INTRAVENOUS at 09:45

## 2023-12-05 RX ADMIN — FLUOROURACIL 3325 MG: 50 INJECTION, SOLUTION INTRAVENOUS at 14:05

## 2023-12-05 NOTE — PATIENT INSTRUCTIONS
Treatment today, take your nausea medications  RTC in 2 weeks with labs for tox check for next cycle Dr Deng Rodriguez

## 2023-12-05 NOTE — PLAN OF CARE
Problem: Safety - Adult  Goal: Free from fall injury  Outcome: Adequate for Discharge  Flowsheets (Taken 12/5/2023 1238)  Free From Fall Injury: Instruct family/caregiver on patient safety     Problem: Chronic Conditions and Co-morbidities  Goal: Patient's chronic conditions and co-morbidity symptoms are monitored and maintained or improved  Outcome: Adequate for Discharge  Flowsheets (Taken 12/5/2023 1238)  Care Plan - Patient's Chronic Conditions and Co-Morbidity Symptoms are Monitored and Maintained or Improved: Monitor and assess patient's chronic conditions and comorbid symptoms for stability, deterioration, or improvement  Note:   Chemotherapy Teaching     What is Chemotherapy   Drug action [x]   Method of Administration [x]   Handouts given []     Side Effects  Nausea/vomiting [x]   Diarrhea [x]   Fatigue [x]   Signs / Symptoms of infection [x]   Neutropenia [x]   Thrombocytopenia [x]   Alopecia [x]   neuropathy [x]   [place] diet &  the importance of fluids [x]       Micellaneous  Importance of nutrition [x]   Importance of oral hygiene [x]   When to call the MD [x]   Monitoring labs [x]   Use of supportive services []     Explanation of Drug Regimen / Frequency  Oxaliplatin/leucovorin/5FU     Comments  Verbalized understanding to drug,action,side effects and when to call MD         Problem: Discharge Planning  Goal: Discharge to home or other facility with appropriate resources  Outcome: Adequate for Discharge  Flowsheets (Taken 12/5/2023 1238)  Discharge to home or other facility with appropriate resources: Identify barriers to discharge with patient and caregiver     Problem: Infection - Adult  Goal: Absence of infection at discharge  Outcome: Adequate for Discharge  Flowsheets (Taken 12/5/2023 1238)  Absence of infection at discharge: Monitor all insertion sites i.e., indwelling lines, tubes and drains  Note: Mediport site with no redness or warmth.  Skin over port site intact with no signs of breakdown

## 2023-12-06 RX ORDER — SODIUM CHLORIDE 9 MG/ML
5-250 INJECTION, SOLUTION INTRAVENOUS PRN
OUTPATIENT
Start: 2023-12-07

## 2023-12-06 RX ORDER — HEPARIN SODIUM (PORCINE) LOCK FLUSH IV SOLN 100 UNIT/ML 100 UNIT/ML
500 SOLUTION INTRAVENOUS PRN
OUTPATIENT
Start: 2023-12-07

## 2023-12-06 RX ORDER — SODIUM CHLORIDE 0.9 % (FLUSH) 0.9 %
5-40 SYRINGE (ML) INJECTION PRN
OUTPATIENT
Start: 2023-12-07

## 2023-12-07 ENCOUNTER — HOSPITAL ENCOUNTER (OUTPATIENT)
Dept: INFUSION THERAPY | Age: 58
Discharge: HOME | End: 2023-12-07
Payer: MEDICARE

## 2023-12-07 VITALS
DIASTOLIC BLOOD PRESSURE: 92 MMHG | TEMPERATURE: 97.8 F | OXYGEN SATURATION: 99 % | RESPIRATION RATE: 18 BRPM | HEART RATE: 67 BPM | SYSTOLIC BLOOD PRESSURE: 137 MMHG

## 2023-12-07 DIAGNOSIS — C18.9 MALIGNANT NEOPLASM OF COLON, UNSPECIFIED PART OF COLON (HCC): Primary | ICD-10-CM

## 2023-12-07 PROCEDURE — 96523 IRRIG DRUG DELIVERY DEVICE: CPT

## 2023-12-07 PROCEDURE — 2580000003 HC RX 258: Performed by: INTERNAL MEDICINE

## 2023-12-07 PROCEDURE — 6360000002 HC RX W HCPCS: Performed by: INTERNAL MEDICINE

## 2023-12-07 RX ORDER — SODIUM CHLORIDE 0.9 % (FLUSH) 0.9 %
5-40 SYRINGE (ML) INJECTION PRN
Status: DISCONTINUED | OUTPATIENT
Start: 2023-12-07 | End: 2023-12-08 | Stop reason: HOSPADM

## 2023-12-07 RX ORDER — SODIUM CHLORIDE 0.9 % (FLUSH) 0.9 %
5-40 SYRINGE (ML) INJECTION PRN
OUTPATIENT
Start: 2023-12-07

## 2023-12-07 RX ORDER — HEPARIN 100 UNIT/ML
500 SYRINGE INTRAVENOUS PRN
Status: DISCONTINUED | OUTPATIENT
Start: 2023-12-07 | End: 2023-12-08 | Stop reason: HOSPADM

## 2023-12-07 RX ORDER — HEPARIN 100 UNIT/ML
500 SYRINGE INTRAVENOUS PRN
OUTPATIENT
Start: 2023-12-07

## 2023-12-07 RX ORDER — SODIUM CHLORIDE 9 MG/ML
25 INJECTION, SOLUTION INTRAVENOUS PRN
OUTPATIENT
Start: 2023-12-07

## 2023-12-07 RX ADMIN — SODIUM CHLORIDE, PRESERVATIVE FREE 20 ML: 5 INJECTION INTRAVENOUS at 12:30

## 2023-12-07 RX ADMIN — Medication 500 UNITS: at 12:30

## 2023-12-07 NOTE — PROGRESS NOTES
Patient tolerated pump removal without any complications. Discharge instructions given to patient-verbalizes understanding. Ambulated off unit per self with belongings.

## 2023-12-07 NOTE — PLAN OF CARE
Problem: Safety - Adult  Goal: Free from fall injury  Outcome: Adequate for Discharge  Flowsheets (Taken 12/7/2023 1221)  Free From Fall Injury: Instruct family/caregiver on patient safety     Problem: Discharge Planning  Goal: Discharge to home or other facility with appropriate resources  Outcome: Adequate for Discharge  Flowsheets (Taken 12/7/2023 1221)  Discharge to home or other facility with appropriate resources: Identify barriers to discharge with patient and caregiver     Problem: Infection - Adult  Goal: Absence of infection at discharge  Outcome: Adequate for Discharge  Flowsheets (Taken 12/7/2023 1221)  Absence of infection at discharge: Monitor all insertion sites i.e., indwelling lines, tubes and drains  Note: Mediport site with no redness or warmth. Skin over port site intact with no signs of breakdown noted. Patient verbalizes signs/symptoms of port infection and when to notify the physician. Care plan reviewed with patient and family. Patient and family verbalize understanding of the plan of care and contribute to goal setting.

## 2023-12-13 ENCOUNTER — TELEMEDICINE (OUTPATIENT)
Dept: PALLATIVE CARE | Age: 58
End: 2023-12-13
Payer: MEDICARE

## 2023-12-13 DIAGNOSIS — C06.2 SQUAMOUS CELL CANCER OF RETROMOLAR TRIGONE (HCC): ICD-10-CM

## 2023-12-13 DIAGNOSIS — T40.2X5A THERAPEUTIC OPIOID-INDUCED CONSTIPATION (OIC): ICD-10-CM

## 2023-12-13 DIAGNOSIS — C18.2 MALIGNANT NEOPLASM OF ASCENDING COLON (HCC): Primary | ICD-10-CM

## 2023-12-13 DIAGNOSIS — G62.0 CHEMOTHERAPY-INDUCED PERIPHERAL NEUROPATHY (HCC): ICD-10-CM

## 2023-12-13 DIAGNOSIS — K59.03 THERAPEUTIC OPIOID-INDUCED CONSTIPATION (OIC): ICD-10-CM

## 2023-12-13 DIAGNOSIS — T45.1X5A CHEMOTHERAPY-INDUCED PERIPHERAL NEUROPATHY (HCC): ICD-10-CM

## 2023-12-13 DIAGNOSIS — G89.3 CANCER RELATED PAIN: ICD-10-CM

## 2023-12-13 DIAGNOSIS — Z51.5 PALLIATIVE CARE PATIENT: ICD-10-CM

## 2023-12-13 PROCEDURE — G8428 CUR MEDS NOT DOCUMENT: HCPCS | Performed by: STUDENT IN AN ORGANIZED HEALTH CARE EDUCATION/TRAINING PROGRAM

## 2023-12-13 PROCEDURE — 99215 OFFICE O/P EST HI 40 MIN: CPT | Performed by: STUDENT IN AN ORGANIZED HEALTH CARE EDUCATION/TRAINING PROGRAM

## 2023-12-13 PROCEDURE — 3017F COLORECTAL CA SCREEN DOC REV: CPT | Performed by: STUDENT IN AN ORGANIZED HEALTH CARE EDUCATION/TRAINING PROGRAM

## 2023-12-13 RX ORDER — OXYCODONE AND ACETAMINOPHEN 7.5; 325 MG/1; MG/1
1 TABLET ORAL EVERY 4 HOURS PRN
Qty: 120 TABLET | Refills: 0 | Status: SHIPPED | OUTPATIENT
Start: 2023-12-18 | End: 2024-01-07

## 2023-12-13 RX ORDER — DULOXETIN HYDROCHLORIDE 30 MG/1
30 CAPSULE, DELAYED RELEASE ORAL DAILY
Qty: 30 CAPSULE | Refills: 3 | Status: SHIPPED | OUTPATIENT
Start: 2023-12-13

## 2023-12-13 NOTE — PROGRESS NOTES
[address] [place] [address]   1/31/2024 10:30 AM Dede Prescott MD N ENT [facility]   11/11/2024 10:00 AM Rere Blake  [address]       Patient given educational materials - see patient instructions. Discussed use, benefit, and side effects of prescribed medications. All patient questions answered. Patient voiced understanding. Patient agreed with treatment plan. Follow up as directed. Electronically signed by Abimbola Santana MD on 12/13/2023 at 2:11 PM    Parts of this note may have been dictated by use of voice recognition software and electronically transcribed. The note may contain errors not detected in proofreading.

## 2023-12-19 ENCOUNTER — HOSPITAL ENCOUNTER (OUTPATIENT)
Dept: INFUSION THERAPY | Age: 58
Discharge: HOME | End: 2023-12-19
Payer: MEDICARE

## 2023-12-19 VITALS
HEART RATE: 66 BPM | DIASTOLIC BLOOD PRESSURE: 72 MMHG | OXYGEN SATURATION: 98 % | HEIGHT: 70 IN | WEIGHT: 144 LBS | SYSTOLIC BLOOD PRESSURE: 154 MMHG | BODY MASS INDEX: 20.62 KG/M2

## 2023-12-19 DIAGNOSIS — C18.2 MALIGNANT NEOPLASM OF ASCENDING COLON (HCC): ICD-10-CM

## 2023-12-19 DIAGNOSIS — C18.9 MALIGNANT NEOPLASM OF COLON, UNSPECIFIED PART OF COLON (HCC): Primary | ICD-10-CM

## 2023-12-19 DIAGNOSIS — Z51.11 ENCOUNTER FOR ANTINEOPLASTIC CHEMOTHERAPY: ICD-10-CM

## 2023-12-19 DIAGNOSIS — C06.2 SQUAMOUS CELL CANCER OF RETROMOLAR TRIGONE (HCC): ICD-10-CM

## 2023-12-19 LAB
ABSOLUTE IMMATURE GRANULOCYTE: 0.01 THOU/MM3 (ref 0–0.07)
ALBUMIN SERPL BCG-MCNC: 3.9 G/DL (ref 3.5–5.1)
ALP SERPL-CCNC: 66 U/L (ref 38–126)
ALT SERPL W/O P-5'-P-CCNC: 8 U/L (ref 11–66)
AST SERPL-CCNC: 24 U/L (ref 5–40)
BASOPHILS ABSOLUTE: 0 THOU/MM3 (ref 0–0.1)
BASOPHILS NFR BLD AUTO: 1 % (ref 0–3)
BILIRUB CONJ SERPL-MCNC: < 0.2 MG/DL (ref 0–0.3)
BILIRUB SERPL-MCNC: 0.3 MG/DL (ref 0.3–1.2)
BUN BLDP-MCNC: 4 MG/DL (ref 8–26)
CEA SERPL-MCNC: 4.8 NG/ML (ref 0–5)
CHLORIDE BLD-SCNC: 106 MEQ/L (ref 98–109)
CREAT BLD-MCNC: 0.7 MG/DL (ref 0.5–1.2)
EOSINOPHIL NFR BLD AUTO: 1 % (ref 0–4)
EOSINOPHILS ABSOLUTE: 0.1 THOU/MM3 (ref 0–0.4)
ERYTHROCYTE [DISTWIDTH] IN BLOOD BY AUTOMATED COUNT: 16.1 % (ref 11.5–14.5)
GFR SERPL CREATININE-BSD FRML MDRD: > 60 ML/MIN/1.73M2
GLUCOSE BLD-MCNC: 66 MG/DL (ref 70–108)
HCT VFR BLD AUTO: 37.4 % (ref 42–52)
HGB BLD-MCNC: 12.5 GM/DL (ref 14–18)
IMMATURE GRANULOCYTES: 0 %
IONIZED CALCIUM, WHOLE BLOOD: 1.25 MMOL/L (ref 1.12–1.32)
LYMPHOCYTES ABSOLUTE: 1.7 THOU/MM3 (ref 1–4.8)
LYMPHOCYTES NFR BLD AUTO: 39 % (ref 15–47)
MCH RBC QN AUTO: 34.6 PG (ref 26–33)
MCHC RBC AUTO-ENTMCNC: 33.4 GM/DL (ref 32.2–35.5)
MCV RBC AUTO: 104 FL (ref 80–94)
MONOCYTES ABSOLUTE: 0.6 THOU/MM3 (ref 0.4–1.3)
MONOCYTES NFR BLD AUTO: 13 % (ref 0–12)
NEUTROPHILS NFR BLD AUTO: 46 % (ref 43–75)
PLATELET # BLD AUTO: 156 THOU/MM3 (ref 130–400)
PMV BLD AUTO: 10.3 FL (ref 9.4–12.4)
POTASSIUM BLD-SCNC: 4 MEQ/L (ref 3.5–4.9)
PROT SERPL-MCNC: 7 G/DL (ref 6.1–8)
RBC # BLD AUTO: 3.61 MILL/MM3 (ref 4.7–6.1)
SEGMENTED NEUTROPHILS ABSOLUTE COUNT: 2 THOU/MM3 (ref 1.8–7.7)
SODIUM BLD-SCNC: 143 MEQ/L (ref 138–146)
TOTAL CO2, WHOLE BLOOD: 30 MEQ/L (ref 23–33)
WBC # BLD AUTO: 4.4 THOU/MM3 (ref 4.8–10.8)

## 2023-12-19 PROCEDURE — 80047 BASIC METABLC PNL IONIZED CA: CPT

## 2023-12-19 PROCEDURE — 99211 OFF/OP EST MAY X REQ PHY/QHP: CPT

## 2023-12-19 PROCEDURE — 96416 CHEMO PROLONG INFUSE W/PUMP: CPT

## 2023-12-19 PROCEDURE — 36591 DRAW BLOOD OFF VENOUS DEVICE: CPT

## 2023-12-19 PROCEDURE — 82378 CARCINOEMBRYONIC ANTIGEN: CPT

## 2023-12-19 PROCEDURE — 6360000002 HC RX W HCPCS: Performed by: INTERNAL MEDICINE

## 2023-12-19 PROCEDURE — 2580000003 HC RX 258: Performed by: INTERNAL MEDICINE

## 2023-12-19 PROCEDURE — 80076 HEPATIC FUNCTION PANEL: CPT

## 2023-12-19 PROCEDURE — 85025 COMPLETE CBC W/AUTO DIFF WBC: CPT

## 2023-12-19 PROCEDURE — 96365 THER/PROPH/DIAG IV INF INIT: CPT

## 2023-12-19 PROCEDURE — 96375 TX/PRO/DX INJ NEW DRUG ADDON: CPT

## 2023-12-19 PROCEDURE — 96367 TX/PROPH/DG ADDL SEQ IV INF: CPT

## 2023-12-19 RX ORDER — PALONOSETRON 0.05 MG/ML
0.25 INJECTION, SOLUTION INTRAVENOUS ONCE
Status: COMPLETED | OUTPATIENT
Start: 2023-12-19 | End: 2023-12-19

## 2023-12-19 RX ORDER — HEPARIN 100 UNIT/ML
500 SYRINGE INTRAVENOUS PRN
Status: CANCELLED | OUTPATIENT
Start: 2023-12-19

## 2023-12-19 RX ORDER — DEXTROSE MONOHYDRATE 50 MG/ML
5-250 INJECTION, SOLUTION INTRAVENOUS PRN
Status: DISCONTINUED | OUTPATIENT
Start: 2023-12-19 | End: 2023-12-20 | Stop reason: HOSPADM

## 2023-12-19 RX ORDER — HEPARIN 100 UNIT/ML
500 SYRINGE INTRAVENOUS PRN
Status: DISCONTINUED | OUTPATIENT
Start: 2023-12-19 | End: 2023-12-20 | Stop reason: HOSPADM

## 2023-12-19 RX ORDER — SODIUM CHLORIDE 9 MG/ML
25 INJECTION, SOLUTION INTRAVENOUS PRN
Status: CANCELLED | OUTPATIENT
Start: 2023-12-19

## 2023-12-19 RX ORDER — SODIUM CHLORIDE 0.9 % (FLUSH) 0.9 %
5-40 SYRINGE (ML) INJECTION PRN
Status: CANCELLED | OUTPATIENT
Start: 2023-12-19

## 2023-12-19 RX ORDER — SODIUM CHLORIDE 0.9 % (FLUSH) 0.9 %
5-40 SYRINGE (ML) INJECTION PRN
Status: DISCONTINUED | OUTPATIENT
Start: 2023-12-19 | End: 2023-12-20 | Stop reason: HOSPADM

## 2023-12-19 RX ADMIN — FLUOROURACIL 3325 MG: 50 INJECTION, SOLUTION INTRAVENOUS at 13:32

## 2023-12-19 RX ADMIN — LEUCOVORIN CALCIUM 750 MG: 350 INJECTION, POWDER, LYOPHILIZED, FOR SUSPENSION INTRAMUSCULAR; INTRAVENOUS at 12:29

## 2023-12-19 RX ADMIN — PALONOSETRON 0.25 MG: 0.05 INJECTION, SOLUTION INTRAVENOUS at 11:52

## 2023-12-19 RX ADMIN — SODIUM CHLORIDE, PRESERVATIVE FREE 10 ML: 5 INJECTION INTRAVENOUS at 13:30

## 2023-12-19 RX ADMIN — SODIUM CHLORIDE, PRESERVATIVE FREE 20 ML: 5 INJECTION INTRAVENOUS at 10:57

## 2023-12-19 RX ADMIN — DEXAMETHASONE SODIUM PHOSPHATE 12 MG: 4 INJECTION, SOLUTION INTRAMUSCULAR; INTRAVENOUS at 11:58

## 2023-12-19 RX ADMIN — SODIUM CHLORIDE, PRESERVATIVE FREE 10 ML: 5 INJECTION INTRAVENOUS at 10:56

## 2023-12-19 RX ADMIN — DEXTROSE MONOHYDRATE 20 ML/HR: 50 INJECTION, SOLUTION INTRAVENOUS at 11:50

## 2023-12-19 NOTE — PLAN OF CARE
Problem: Safety - Adult  Goal: Free from fall injury  Outcome: Progressing  Flowsheets (Taken 12/19/2023 1205)  Free From Fall Injury: Instruct family/caregiver on patient safety  Note: No falls occurred with visit today. Problem: Discharge Planning  Goal: Discharge to home or other facility with appropriate resources  Outcome: Progressing  Flowsheets (Taken 12/19/2023 1205)  Discharge to home or other facility with appropriate resources: Identify barriers to discharge with patient and caregiver  Note: Verbalize understanding of discharge instructions, follow up appointments, and when to call Physician. Problem: Infection - Adult  Goal: Absence of infection at discharge  Outcome: Progressing  Flowsheets (Taken 12/19/2023 1205)  Absence of infection at discharge: Assess and monitor for signs and symptoms of infection  Note: Mediport site with no redness or warmth. Skin over port site intact with no signs of breakdown noted. Patient verbalizes signs/symptoms of port infection and when to notify the physician. Care plan reviewed with patient. Patient verbalizes understanding of the plan of care and contributes to goal setting.

## 2023-12-21 ENCOUNTER — HOSPITAL ENCOUNTER (OUTPATIENT)
Dept: INFUSION THERAPY | Age: 58
Discharge: HOME | End: 2023-12-21
Payer: MEDICARE

## 2023-12-21 VITALS
DIASTOLIC BLOOD PRESSURE: 77 MMHG | RESPIRATION RATE: 18 BRPM | OXYGEN SATURATION: 97 % | HEART RATE: 64 BPM | SYSTOLIC BLOOD PRESSURE: 131 MMHG | TEMPERATURE: 97.7 F

## 2023-12-21 DIAGNOSIS — C18.9 MALIGNANT NEOPLASM OF COLON, UNSPECIFIED PART OF COLON (HCC): Primary | ICD-10-CM

## 2023-12-21 PROCEDURE — 6360000002 HC RX W HCPCS: Performed by: INTERNAL MEDICINE

## 2023-12-21 PROCEDURE — 2580000003 HC RX 258: Performed by: INTERNAL MEDICINE

## 2023-12-21 RX ORDER — SODIUM CHLORIDE 0.9 % (FLUSH) 0.9 %
5-40 SYRINGE (ML) INJECTION PRN
Status: DISCONTINUED | OUTPATIENT
Start: 2023-12-21 | End: 2023-12-22 | Stop reason: HOSPADM

## 2023-12-21 RX ORDER — HEPARIN 100 UNIT/ML
500 SYRINGE INTRAVENOUS PRN
OUTPATIENT
Start: 2023-12-21

## 2023-12-21 RX ORDER — SODIUM CHLORIDE 0.9 % (FLUSH) 0.9 %
5-40 SYRINGE (ML) INJECTION PRN
OUTPATIENT
Start: 2023-12-21

## 2023-12-21 RX ORDER — HEPARIN 100 UNIT/ML
500 SYRINGE INTRAVENOUS PRN
Status: DISCONTINUED | OUTPATIENT
Start: 2023-12-21 | End: 2023-12-22 | Stop reason: HOSPADM

## 2023-12-21 RX ORDER — SODIUM CHLORIDE 9 MG/ML
25 INJECTION, SOLUTION INTRAVENOUS PRN
OUTPATIENT
Start: 2023-12-21

## 2023-12-21 RX ADMIN — HEPARIN 500 UNITS: 100 SYRINGE at 10:21

## 2023-12-21 RX ADMIN — SODIUM CHLORIDE, PRESERVATIVE FREE 10 ML: 5 INJECTION INTRAVENOUS at 10:21

## 2023-12-26 NOTE — PROGRESS NOTES
[facility]  [facility]  [address]  [address]  [place] [id number]  Dept: [phone]  Loc: [phone]   Hematology/Oncology Consult (Clinic)        01/02/24       Lucas Isaacs   1965     No ref. provider found   Rose Rodriguez, APRN - CNP       Reason:   Chief Complaint   Patient presents with    Follow-up     Malignant neoplasm of ascending colon (HCC)        HPI: Lucas Isaacs is a 58 y.o. 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    -9/2015. Patient p/w complaints of pain and trismus with tenderness in the submandibular area for about a month.  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       -9/17/2015.  Referred to Dr. Poe at [facility].    -10/2/2015 Dr Poe did laryngoscopy esophagoscopy tracheostomy, right and left neck dissections, composite resection of left retromolar trigone for squamous cell carcinoma including partial resection of the pharynx left floor of the mouth, left mental mandibulectomy and inferior maxillectomy, resection of infratemporal fossa mass, left inferior maxillectomy mandibular plating [place] of fibular free flap, microvascular anastomosis and inset of fibular free flap, mandibular reconstruction and complex vestibuloplasty, pharyngoplasty, palatoplasty, and PEG tube placement. Left mental nerve negative for carcinoma, posterior mandibular margin negative for carcinoma, anterior mandible margin biopsy negative for carcinoma base of tongue margin, pharynx, ATF, buccal

## 2024-01-02 ENCOUNTER — OFFICE VISIT (OUTPATIENT)
Dept: ONCOLOGY | Age: 59
End: 2024-01-02
Payer: MEDICARE

## 2024-01-02 ENCOUNTER — HOSPITAL ENCOUNTER (OUTPATIENT)
Dept: INFUSION THERAPY | Age: 59
Discharge: HOME | End: 2024-01-02
Payer: MEDICARE

## 2024-01-02 VITALS
WEIGHT: 135 LBS | TEMPERATURE: 97.8 F | HEART RATE: 62 BPM | RESPIRATION RATE: 18 BRPM | BODY MASS INDEX: 19.33 KG/M2 | DIASTOLIC BLOOD PRESSURE: 104 MMHG | HEIGHT: 70 IN | SYSTOLIC BLOOD PRESSURE: 146 MMHG | OXYGEN SATURATION: 98 %

## 2024-01-02 VITALS
RESPIRATION RATE: 18 BRPM | OXYGEN SATURATION: 98 % | TEMPERATURE: 97.8 F | HEART RATE: 62 BPM | DIASTOLIC BLOOD PRESSURE: 104 MMHG | SYSTOLIC BLOOD PRESSURE: 146 MMHG

## 2024-01-02 DIAGNOSIS — C18.2 MALIGNANT NEOPLASM OF ASCENDING COLON (HCC): ICD-10-CM

## 2024-01-02 DIAGNOSIS — Z51.11 ENCOUNTER FOR ANTINEOPLASTIC CHEMOTHERAPY: ICD-10-CM

## 2024-01-02 DIAGNOSIS — C18.9 MALIGNANT NEOPLASM OF COLON, UNSPECIFIED PART OF COLON (HCC): Primary | ICD-10-CM

## 2024-01-02 DIAGNOSIS — T45.1X5A CHEMOTHERAPY INDUCED NAUSEA AND VOMITING: ICD-10-CM

## 2024-01-02 DIAGNOSIS — R11.2 CHEMOTHERAPY INDUCED NAUSEA AND VOMITING: ICD-10-CM

## 2024-01-02 DIAGNOSIS — C06.2 SQUAMOUS CELL CANCER OF RETROMOLAR TRIGONE (HCC): ICD-10-CM

## 2024-01-02 DIAGNOSIS — D50.0 IRON DEFICIENCY ANEMIA DUE TO CHRONIC BLOOD LOSS: ICD-10-CM

## 2024-01-02 DIAGNOSIS — C18.7 MALIGNANT NEOPLASM OF SIGMOID COLON (HCC): ICD-10-CM

## 2024-01-02 LAB
ABSOLUTE IMMATURE GRANULOCYTE: 0.02 THOU/MM3 (ref 0–0.07)
ALBUMIN SERPL BCG-MCNC: 4.3 G/DL (ref 3.5–5.1)
ALP SERPL-CCNC: 70 U/L (ref 38–126)
ALT SERPL W/O P-5'-P-CCNC: 8 U/L (ref 11–66)
AST SERPL-CCNC: 25 U/L (ref 5–40)
BASOPHILS ABSOLUTE: 0 THOU/MM3 (ref 0–0.1)
BASOPHILS NFR BLD AUTO: 1 % (ref 0–3)
BILIRUB CONJ SERPL-MCNC: < 0.2 MG/DL (ref 0–0.3)
BILIRUB SERPL-MCNC: 0.4 MG/DL (ref 0.3–1.2)
BUN BLDP-MCNC: 11 MG/DL (ref 8–26)
CHLORIDE BLD-SCNC: 100 MEQ/L (ref 98–109)
CREAT BLD-MCNC: 0.9 MG/DL (ref 0.5–1.2)
EOSINOPHIL NFR BLD AUTO: 1 % (ref 0–4)
EOSINOPHILS ABSOLUTE: 0.1 THOU/MM3 (ref 0–0.4)
ERYTHROCYTE [DISTWIDTH] IN BLOOD BY AUTOMATED COUNT: 15.3 % (ref 11.5–14.5)
FERRITIN SERPL IA-MCNC: 124 NG/ML (ref 22–322)
FOLATE SERPL-MCNC: > 20 NG/ML (ref 4.8–24.2)
GFR SERPL CREATININE-BSD FRML MDRD: > 60 ML/MIN/1.73M2
GLUCOSE BLD-MCNC: 75 MG/DL (ref 70–108)
HCT VFR BLD AUTO: 42.2 % (ref 42–52)
HGB BLD-MCNC: 14.3 GM/DL (ref 14–18)
IMMATURE GRANULOCYTES: 0 %
IONIZED CALCIUM, WHOLE BLOOD: 1.26 MMOL/L (ref 1.12–1.32)
IRON SATN MFR SERPL: 22 % (ref 20–50)
IRON SERPL-MCNC: 63 UG/DL (ref 65–195)
LYMPHOCYTES ABSOLUTE: 1.7 THOU/MM3 (ref 1–4.8)
LYMPHOCYTES NFR BLD AUTO: 32 % (ref 15–47)
MCH RBC QN AUTO: 34.2 PG (ref 26–33)
MCHC RBC AUTO-ENTMCNC: 33.9 GM/DL (ref 32.2–35.5)
MCV RBC AUTO: 101 FL (ref 80–94)
MONOCYTES ABSOLUTE: 0.5 THOU/MM3 (ref 0.4–1.3)
MONOCYTES NFR BLD AUTO: 10 % (ref 0–12)
NEUTROPHILS NFR BLD AUTO: 56 % (ref 43–75)
PLATELET # BLD AUTO: 183 THOU/MM3 (ref 130–400)
PMV BLD AUTO: 9.6 FL (ref 9.4–12.4)
POTASSIUM BLD-SCNC: 4.1 MEQ/L (ref 3.5–4.9)
PROT SERPL-MCNC: 7.5 G/DL (ref 6.1–8)
RBC # BLD AUTO: 4.18 MILL/MM3 (ref 4.7–6.1)
SEGMENTED NEUTROPHILS ABSOLUTE COUNT: 3.1 THOU/MM3 (ref 1.8–7.7)
SODIUM BLD-SCNC: 140 MEQ/L (ref 138–146)
TIBC SERPL-MCNC: 292 UG/DL (ref 171–450)
TOTAL CO2, WHOLE BLOOD: 31 MEQ/L (ref 23–33)
VIT B12 SERPL-MCNC: 658 PG/ML (ref 211–911)
WBC # BLD AUTO: 5.4 THOU/MM3 (ref 4.8–10.8)

## 2024-01-02 PROCEDURE — 99211 OFF/OP EST MAY X REQ PHY/QHP: CPT

## 2024-01-02 PROCEDURE — 82728 ASSAY OF FERRITIN: CPT

## 2024-01-02 PROCEDURE — 80047 BASIC METABLC PNL IONIZED CA: CPT

## 2024-01-02 PROCEDURE — 99214 OFFICE O/P EST MOD 30 MIN: CPT | Performed by: INTERNAL MEDICINE

## 2024-01-02 PROCEDURE — G8420 CALC BMI NORM PARAMETERS: HCPCS | Performed by: INTERNAL MEDICINE

## 2024-01-02 PROCEDURE — 80076 HEPATIC FUNCTION PANEL: CPT

## 2024-01-02 PROCEDURE — 83550 IRON BINDING TEST: CPT

## 2024-01-02 PROCEDURE — G8427 DOCREV CUR MEDS BY ELIG CLIN: HCPCS | Performed by: INTERNAL MEDICINE

## 2024-01-02 PROCEDURE — 3017F COLORECTAL CA SCREEN DOC REV: CPT | Performed by: INTERNAL MEDICINE

## 2024-01-02 PROCEDURE — 2580000003 HC RX 258: Performed by: INTERNAL MEDICINE

## 2024-01-02 PROCEDURE — 83540 ASSAY OF IRON: CPT

## 2024-01-02 PROCEDURE — 82746 ASSAY OF FOLIC ACID SERUM: CPT

## 2024-01-02 PROCEDURE — 82607 VITAMIN B-12: CPT

## 2024-01-02 PROCEDURE — 6360000002 HC RX W HCPCS: Performed by: INTERNAL MEDICINE

## 2024-01-02 PROCEDURE — G8484 FLU IMMUNIZE NO ADMIN: HCPCS | Performed by: INTERNAL MEDICINE

## 2024-01-02 PROCEDURE — 1036F TOBACCO NON-USER: CPT | Performed by: INTERNAL MEDICINE

## 2024-01-02 PROCEDURE — 36591 DRAW BLOOD OFF VENOUS DEVICE: CPT

## 2024-01-02 PROCEDURE — 85025 COMPLETE CBC W/AUTO DIFF WBC: CPT

## 2024-01-02 RX ORDER — SODIUM CHLORIDE 0.9 % (FLUSH) 0.9 %
5-40 SYRINGE (ML) INJECTION PRN
Status: DISCONTINUED | OUTPATIENT
Start: 2024-01-02 | End: 2024-01-03 | Stop reason: HOSPADM

## 2024-01-02 RX ORDER — HEPARIN 100 UNIT/ML
500 SYRINGE INTRAVENOUS PRN
OUTPATIENT
Start: 2024-01-02

## 2024-01-02 RX ORDER — SODIUM CHLORIDE 9 MG/ML
25 INJECTION, SOLUTION INTRAVENOUS PRN
OUTPATIENT
Start: 2024-01-02

## 2024-01-02 RX ORDER — PROCHLORPERAZINE MALEATE 10 MG
10 TABLET ORAL EVERY 6 HOURS PRN
Qty: 60 TABLET | Refills: 1 | Status: SHIPPED | OUTPATIENT
Start: 2024-01-02

## 2024-01-02 RX ORDER — HEPARIN 100 UNIT/ML
500 SYRINGE INTRAVENOUS PRN
Status: DISCONTINUED | OUTPATIENT
Start: 2024-01-02 | End: 2024-01-03 | Stop reason: HOSPADM

## 2024-01-02 RX ORDER — ONDANSETRON 4 MG/1
4 TABLET, ORALLY DISINTEGRATING ORAL EVERY 6 HOURS PRN
Qty: 60 TABLET | Refills: 3 | Status: SHIPPED | OUTPATIENT
Start: 2024-01-02

## 2024-01-02 RX ORDER — SODIUM CHLORIDE 0.9 % (FLUSH) 0.9 %
5-40 SYRINGE (ML) INJECTION PRN
OUTPATIENT
Start: 2024-01-02

## 2024-01-02 RX ADMIN — SODIUM CHLORIDE, PRESERVATIVE FREE 20 ML: 5 INJECTION INTRAVENOUS at 09:51

## 2024-01-02 RX ADMIN — SODIUM CHLORIDE, PRESERVATIVE FREE 20 ML: 5 INJECTION INTRAVENOUS at 10:25

## 2024-01-02 RX ADMIN — SODIUM CHLORIDE, PRESERVATIVE FREE 10 ML: 5 INJECTION INTRAVENOUS at 09:50

## 2024-01-02 RX ADMIN — HEPARIN 500 UNITS: 100 SYRINGE at 10:25

## 2024-01-02 NOTE — PROGRESS NOTES
Patient tolerated lab draw without any complications.  Denies dizziness, lightheadedness, acute nausea or vomiting, headache, heart palpitations, rash/itching or increased SOB.    Last vital signs  BP (!) 146/104   Pulse 62   Temp 97.8 °F (36.6 °C) (Oral)   Resp 18   SpO2 98%     Patient instructed if they experience any of the above symptoms following today's visit, he/she is to notify the Physician or go to the Emergency Dept.    Discharge instructions given to patient, Verbalizes understanding. Ambulated off unit per self in stable condition with all belongings.

## 2024-01-02 NOTE — DISCHARGE INSTRUCTIONS
Please contact your Oncologist if you have any questions regarding the *** that you received today.    You are instructed to call the office or go to the Emergency Dept. If you experience any of the following symptoms:    Dizziness/lightheadedness   Acute nausea or vomiting-not relieved by medications  Headaches-not relieved by medications  New chest pain or pressure  New rash /itching  New shortness of breath  Fever,chills or signs or symptoms of infection    Make sure you are drinking 48 to 64 ounces of water daily-if you are unable to drink fluids let us know right away.    Physician Instructions    1) pt still very weak and tired and declining chemotherapy today  2) sent more nausea meds to [facility] pharmacy per his request  3) RTC next Mon or Tues for labs and retry of chemo

## 2024-01-02 NOTE — PATIENT INSTRUCTIONS
1) pt still very weak and tired and declining chemotherapy today  2) sent more nausea meds to [facility] pharmacy per his request  3) RTC next Mon or Tues for labs and retry of chemo

## 2024-01-02 NOTE — PLAN OF CARE
Problem: Safety - Adult  Goal: Free from fall injury  Outcome: Adequate for Discharge  Flowsheets (Taken 1/2/2024 1026)  Free From Fall Injury: Instruct family/caregiver on patient safety     Problem: Chronic Conditions and Co-morbidities  Goal: Patient's chronic conditions and co-morbidity symptoms are monitored and maintained or improved  Outcome: Adequate for Discharge  Flowsheets (Taken 1/2/2024 1026)  Care Plan - Patient's Chronic Conditions and Co-Morbidity Symptoms are Monitored and Maintained or Improved:   Monitor and assess patient's chronic conditions and comorbid symptoms for stability, deterioration, or improvement   Collaborate with multidisciplinary team to address chronic and comorbid conditions and prevent exacerbation or deterioration  Note: Patient verbalizes understanding to verbal information given on port flush,action and possible side effects. Aware to call MD if develop complications.        Problem: Discharge Planning  Goal: Discharge to home or other facility with appropriate resources  Outcome: Adequate for Discharge  Flowsheets (Taken 1/2/2024 1026)  Discharge to home or other facility with appropriate resources: Identify barriers to discharge with patient and caregiver     Problem: Infection - Adult  Goal: Absence of infection at discharge  Outcome: Adequate for Discharge  Flowsheets (Taken 1/2/2024 1026)  Absence of infection at discharge: Monitor all insertion sites i.e., indwelling lines, tubes and drains  Note: Mediport site with no redness or warmth. Skin over port site intact with no signs of breakdown noted. Patient verbalizes signs/symptoms of port infection and when to notify the physician.     Care plan reviewed with patient and family.  Patient and family verbalize understanding of the plan of care and contribute to goal setting.

## 2024-01-04 DIAGNOSIS — C18.2 MALIGNANT NEOPLASM OF ASCENDING COLON (HCC): ICD-10-CM

## 2024-01-04 DIAGNOSIS — C06.2 SQUAMOUS CELL CANCER OF RETROMOLAR TRIGONE (HCC): ICD-10-CM

## 2024-01-04 DIAGNOSIS — G89.3 CANCER RELATED PAIN: ICD-10-CM

## 2024-01-04 RX ORDER — OXYCODONE AND ACETAMINOPHEN 7.5; 325 MG/1; MG/1
1 TABLET ORAL EVERY 4 HOURS PRN
Qty: 120 TABLET | Refills: 0 | Status: SHIPPED | OUTPATIENT
Start: 2024-01-06 | End: 2024-01-26

## 2024-01-04 NOTE — PROGRESS NOTES
stated age and is awake, appears tired but in no acute distress, alert and oriented x3.   SKIN: No rash or ulcers.   HEME/LYMPHATICS: No bruising or petechiae on visual inspection. No lymphadenopathy on visual inspection and palpation of cervical, supraclavicular, infraclavicular lymph nodes.  HEAD/FACE: atraumatic and normocephalic. Normal hair for age.  EYES: PERRLA/EOMI. No scleral icterus. No tearing or dry eyes.  ENT: External ears normal with no evidence of discharge. No evidence of ulceration or bleeding in the nostrils. Mouth has no ulcers, bleeding or inflammation of the gums, floor or roof of the mouth with poor dentition; left buccal mucosa s/p engraftment-stable.  NECK: Supple, symmetric.   CHEST/RESPIRATORY: Expansion maintained bilaterally and symmetrically. On auscultation, rhonchi b/l that partially clears on coughing in both lungs. No wheezes, or rales.  BREAST: Deferred.  CARDIOVASCULAR: On auscultation, regular rate and rhythm. No murmurs, gallops or rubs are heard.  GASTROINTESTINAL/ABDOMEN: Symmetric. On auscultation of the abdomen, there are normal bowel sounds in all four quadrants. Nontender to palpation. No palpable organomegaly (liver or spleen) or masses.  GENITOURINARY: Deferred.  NEUROLOGIC: Alert and oriented time, person, and place, with no apparent changes in recent or remote memory. Cranial nerves II-XII are grossly intact EXCEPT speech continues to be slurred but stable; left corner of mouth still drooping but stable. Stable left tongue numbness since surgery-stable.Sensation is maintained in the extremities. Strength and tone appear normal for age and equal in the extremities. Gait is normal.  MUSCULOSKELETAL: No tenderness over the spine.  No cyanosis, clubbing or edema in the extremities. There are no surgical scars on the extremities.  PSYCHIATRIC: The patient maintains judgment and has good insight. The patient has no changes in mood or affection.  IV: Mediport right chest

## 2024-01-09 ENCOUNTER — HOSPITAL ENCOUNTER (OUTPATIENT)
Dept: INFUSION THERAPY | Age: 59
Discharge: HOME | End: 2024-01-09
Payer: MEDICARE

## 2024-01-09 ENCOUNTER — OFFICE VISIT (OUTPATIENT)
Dept: ONCOLOGY | Age: 59
End: 2024-01-09
Payer: MEDICARE

## 2024-01-09 VITALS
HEIGHT: 70 IN | RESPIRATION RATE: 16 BRPM | SYSTOLIC BLOOD PRESSURE: 112 MMHG | HEART RATE: 66 BPM | BODY MASS INDEX: 20.76 KG/M2 | DIASTOLIC BLOOD PRESSURE: 71 MMHG | TEMPERATURE: 97.8 F | WEIGHT: 145 LBS | OXYGEN SATURATION: 98 %

## 2024-01-09 VITALS
HEIGHT: 70 IN | SYSTOLIC BLOOD PRESSURE: 112 MMHG | RESPIRATION RATE: 16 BRPM | DIASTOLIC BLOOD PRESSURE: 71 MMHG | TEMPERATURE: 97.8 F | WEIGHT: 145 LBS | BODY MASS INDEX: 20.76 KG/M2 | OXYGEN SATURATION: 98 % | HEART RATE: 66 BPM

## 2024-01-09 DIAGNOSIS — C06.2 SQUAMOUS CELL CANCER OF RETROMOLAR TRIGONE (HCC): ICD-10-CM

## 2024-01-09 DIAGNOSIS — D50.0 IRON DEFICIENCY ANEMIA DUE TO CHRONIC BLOOD LOSS: ICD-10-CM

## 2024-01-09 DIAGNOSIS — C18.9 MALIGNANT NEOPLASM OF COLON, UNSPECIFIED PART OF COLON (HCC): Primary | ICD-10-CM

## 2024-01-09 DIAGNOSIS — C18.2 MALIGNANT NEOPLASM OF ASCENDING COLON (HCC): ICD-10-CM

## 2024-01-09 LAB
ABSOLUTE IMMATURE GRANULOCYTE: 0.09 THOU/MM3 (ref 0–0.07)
ALBUMIN SERPL BCG-MCNC: 3.6 G/DL (ref 3.5–5.1)
ALP SERPL-CCNC: 70 U/L (ref 38–126)
ALT SERPL W/O P-5'-P-CCNC: 7 U/L (ref 11–66)
AST SERPL-CCNC: 16 U/L (ref 5–40)
BASOPHILS ABSOLUTE: 0 THOU/MM3 (ref 0–0.1)
BASOPHILS NFR BLD AUTO: 0 % (ref 0–3)
BILIRUB CONJ SERPL-MCNC: < 0.2 MG/DL (ref 0–0.3)
BILIRUB SERPL-MCNC: 0.2 MG/DL (ref 0.3–1.2)
BUN BLDP-MCNC: 10 MG/DL (ref 8–26)
CHLORIDE BLD-SCNC: 104 MEQ/L (ref 98–109)
CREAT BLD-MCNC: 0.8 MG/DL (ref 0.5–1.2)
EOSINOPHIL NFR BLD AUTO: 1 % (ref 0–4)
EOSINOPHILS ABSOLUTE: 0.1 THOU/MM3 (ref 0–0.4)
ERYTHROCYTE [DISTWIDTH] IN BLOOD BY AUTOMATED COUNT: 14.9 % (ref 11.5–14.5)
GFR SERPL CREATININE-BSD FRML MDRD: > 60 ML/MIN/1.73M2
GLUCOSE BLD-MCNC: 89 MG/DL (ref 70–108)
HCT VFR BLD AUTO: 37.3 % (ref 42–52)
HGB BLD-MCNC: 12.7 GM/DL (ref 14–18)
IMMATURE GRANULOCYTES: 1 %
IONIZED CALCIUM, WHOLE BLOOD: 1.22 MMOL/L (ref 1.12–1.32)
LYMPHOCYTES ABSOLUTE: 2.3 THOU/MM3 (ref 1–4.8)
LYMPHOCYTES NFR BLD AUTO: 26 % (ref 15–47)
MCH RBC QN AUTO: 34.7 PG (ref 26–33)
MCHC RBC AUTO-ENTMCNC: 34 GM/DL (ref 32.2–35.5)
MCV RBC AUTO: 102 FL (ref 80–94)
MONOCYTES ABSOLUTE: 1.1 THOU/MM3 (ref 0.4–1.3)
MONOCYTES NFR BLD AUTO: 12 % (ref 0–12)
NEUTROPHILS NFR BLD AUTO: 59 % (ref 43–75)
PLATELET # BLD AUTO: 258 THOU/MM3 (ref 130–400)
PMV BLD AUTO: 9.6 FL (ref 9.4–12.4)
POTASSIUM BLD-SCNC: 4.3 MEQ/L (ref 3.5–4.9)
PROT SERPL-MCNC: 6.6 G/DL (ref 6.1–8)
RBC # BLD AUTO: 3.66 MILL/MM3 (ref 4.7–6.1)
SEGMENTED NEUTROPHILS ABSOLUTE COUNT: 5.1 THOU/MM3 (ref 1.8–7.7)
SODIUM BLD-SCNC: 139 MEQ/L (ref 138–146)
TOTAL CO2, WHOLE BLOOD: 28 MEQ/L (ref 23–33)
WBC # BLD AUTO: 8.7 THOU/MM3 (ref 4.8–10.8)

## 2024-01-09 PROCEDURE — 80076 HEPATIC FUNCTION PANEL: CPT

## 2024-01-09 PROCEDURE — G8420 CALC BMI NORM PARAMETERS: HCPCS | Performed by: INTERNAL MEDICINE

## 2024-01-09 PROCEDURE — G8484 FLU IMMUNIZE NO ADMIN: HCPCS | Performed by: INTERNAL MEDICINE

## 2024-01-09 PROCEDURE — 85025 COMPLETE CBC W/AUTO DIFF WBC: CPT

## 2024-01-09 PROCEDURE — 6360000002 HC RX W HCPCS: Performed by: INTERNAL MEDICINE

## 2024-01-09 PROCEDURE — 36591 DRAW BLOOD OFF VENOUS DEVICE: CPT

## 2024-01-09 PROCEDURE — 2580000003 HC RX 258: Performed by: INTERNAL MEDICINE

## 2024-01-09 PROCEDURE — 99211 OFF/OP EST MAY X REQ PHY/QHP: CPT

## 2024-01-09 PROCEDURE — 96365 THER/PROPH/DIAG IV INF INIT: CPT

## 2024-01-09 PROCEDURE — 99214 OFFICE O/P EST MOD 30 MIN: CPT | Performed by: INTERNAL MEDICINE

## 2024-01-09 PROCEDURE — 96375 TX/PRO/DX INJ NEW DRUG ADDON: CPT

## 2024-01-09 PROCEDURE — 80047 BASIC METABLC PNL IONIZED CA: CPT

## 2024-01-09 PROCEDURE — 96416 CHEMO PROLONG INFUSE W/PUMP: CPT

## 2024-01-09 PROCEDURE — 96367 TX/PROPH/DG ADDL SEQ IV INF: CPT

## 2024-01-09 PROCEDURE — G8427 DOCREV CUR MEDS BY ELIG CLIN: HCPCS | Performed by: INTERNAL MEDICINE

## 2024-01-09 PROCEDURE — 3017F COLORECTAL CA SCREEN DOC REV: CPT | Performed by: INTERNAL MEDICINE

## 2024-01-09 PROCEDURE — 1036F TOBACCO NON-USER: CPT | Performed by: INTERNAL MEDICINE

## 2024-01-09 RX ORDER — HEPARIN 100 UNIT/ML
500 SYRINGE INTRAVENOUS PRN
Status: CANCELLED | OUTPATIENT
Start: 2024-01-09

## 2024-01-09 RX ORDER — HEPARIN SODIUM (PORCINE) LOCK FLUSH IV SOLN 100 UNIT/ML 100 UNIT/ML
500 SOLUTION INTRAVENOUS PRN
OUTPATIENT
Start: 2024-01-11

## 2024-01-09 RX ORDER — ALBUTEROL SULFATE 90 UG/1
4 AEROSOL, METERED RESPIRATORY (INHALATION) PRN
Status: CANCELLED | OUTPATIENT
Start: 2024-01-09

## 2024-01-09 RX ORDER — SODIUM CHLORIDE 0.9 % (FLUSH) 0.9 %
5-40 SYRINGE (ML) INJECTION PRN
Status: CANCELLED | OUTPATIENT
Start: 2024-01-09

## 2024-01-09 RX ORDER — SODIUM CHLORIDE 0.9 % (FLUSH) 0.9 %
5-40 SYRINGE (ML) INJECTION PRN
OUTPATIENT
Start: 2024-01-11

## 2024-01-09 RX ORDER — EPINEPHRINE 1 MG/ML
0.3 INJECTION, SOLUTION, CONCENTRATE INTRAVENOUS PRN
Status: CANCELLED | OUTPATIENT
Start: 2024-01-09

## 2024-01-09 RX ORDER — DEXTROSE MONOHYDRATE 50 MG/ML
5-250 INJECTION, SOLUTION INTRAVENOUS PRN
Status: CANCELLED | OUTPATIENT
Start: 2024-01-09

## 2024-01-09 RX ORDER — SODIUM CHLORIDE 9 MG/ML
5-250 INJECTION, SOLUTION INTRAVENOUS PRN
Status: CANCELLED | OUTPATIENT
Start: 2024-01-09

## 2024-01-09 RX ORDER — DEXTROSE MONOHYDRATE 50 MG/ML
5-250 INJECTION, SOLUTION INTRAVENOUS PRN
Status: DISCONTINUED | OUTPATIENT
Start: 2024-01-09 | End: 2024-01-10 | Stop reason: HOSPADM

## 2024-01-09 RX ORDER — SODIUM CHLORIDE 0.9 % (FLUSH) 0.9 %
5-40 SYRINGE (ML) INJECTION PRN
Status: DISCONTINUED | OUTPATIENT
Start: 2024-01-09 | End: 2024-01-10 | Stop reason: HOSPADM

## 2024-01-09 RX ORDER — SODIUM CHLORIDE 9 MG/ML
5-250 INJECTION, SOLUTION INTRAVENOUS PRN
OUTPATIENT
Start: 2024-01-11

## 2024-01-09 RX ORDER — MEPERIDINE HYDROCHLORIDE 50 MG/ML
12.5 INJECTION INTRAMUSCULAR; INTRAVENOUS; SUBCUTANEOUS PRN
Status: CANCELLED | OUTPATIENT
Start: 2024-01-09

## 2024-01-09 RX ORDER — ACETAMINOPHEN 325 MG/1
650 TABLET ORAL
Status: CANCELLED | OUTPATIENT
Start: 2024-01-09

## 2024-01-09 RX ORDER — FAMOTIDINE 10 MG/ML
20 INJECTION, SOLUTION INTRAVENOUS
Status: CANCELLED | OUTPATIENT
Start: 2024-01-09

## 2024-01-09 RX ORDER — HEPARIN 100 UNIT/ML
500 SYRINGE INTRAVENOUS PRN
Status: DISCONTINUED | OUTPATIENT
Start: 2024-01-09 | End: 2024-01-10 | Stop reason: HOSPADM

## 2024-01-09 RX ORDER — ONDANSETRON 2 MG/ML
8 INJECTION INTRAMUSCULAR; INTRAVENOUS
Status: CANCELLED | OUTPATIENT
Start: 2024-01-09

## 2024-01-09 RX ORDER — SODIUM CHLORIDE 9 MG/ML
INJECTION, SOLUTION INTRAVENOUS CONTINUOUS
Status: CANCELLED | OUTPATIENT
Start: 2024-01-09

## 2024-01-09 RX ORDER — PALONOSETRON 0.05 MG/ML
0.25 INJECTION, SOLUTION INTRAVENOUS ONCE
Status: CANCELLED | OUTPATIENT
Start: 2024-01-09 | End: 2024-01-09

## 2024-01-09 RX ORDER — HEPARIN SODIUM (PORCINE) LOCK FLUSH IV SOLN 100 UNIT/ML 100 UNIT/ML
500 SOLUTION INTRAVENOUS PRN
Status: CANCELLED | OUTPATIENT
Start: 2024-01-09

## 2024-01-09 RX ORDER — DIPHENHYDRAMINE HYDROCHLORIDE 50 MG/ML
50 INJECTION INTRAMUSCULAR; INTRAVENOUS
Status: CANCELLED | OUTPATIENT
Start: 2024-01-09

## 2024-01-09 RX ORDER — PALONOSETRON 0.05 MG/ML
0.25 INJECTION, SOLUTION INTRAVENOUS ONCE
Status: COMPLETED | OUTPATIENT
Start: 2024-01-09 | End: 2024-01-09

## 2024-01-09 RX ORDER — SODIUM CHLORIDE 9 MG/ML
25 INJECTION, SOLUTION INTRAVENOUS PRN
Status: CANCELLED | OUTPATIENT
Start: 2024-01-09

## 2024-01-09 RX ADMIN — PALONOSETRON 0.25 MG: 0.05 INJECTION, SOLUTION INTRAVENOUS at 09:55

## 2024-01-09 RX ADMIN — SODIUM CHLORIDE, PRESERVATIVE FREE 10 ML: 5 INJECTION INTRAVENOUS at 08:31

## 2024-01-09 RX ADMIN — SODIUM CHLORIDE, PRESERVATIVE FREE 10 ML: 5 INJECTION INTRAVENOUS at 11:14

## 2024-01-09 RX ADMIN — DEXAMETHASONE SODIUM PHOSPHATE 12 MG: 4 INJECTION, SOLUTION INTRA-ARTICULAR; INTRALESIONAL; INTRAMUSCULAR; INTRAVENOUS; SOFT TISSUE at 09:37

## 2024-01-09 RX ADMIN — SODIUM CHLORIDE, PRESERVATIVE FREE 20 ML: 5 INJECTION INTRAVENOUS at 08:32

## 2024-01-09 RX ADMIN — LEUCOVORIN CALCIUM 750 MG: 350 INJECTION, POWDER, LYOPHILIZED, FOR SUSPENSION INTRAMUSCULAR; INTRAVENOUS at 10:04

## 2024-01-09 RX ADMIN — DEXTROSE MONOHYDRATE 20 ML/HR: 50 INJECTION, SOLUTION INTRAVENOUS at 09:29

## 2024-01-09 RX ADMIN — FLUOROURACIL 3325 MG: 50 INJECTION, SOLUTION INTRAVENOUS at 11:14

## 2024-01-09 NOTE — PROGRESS NOTES
Patient tolerated leucovorin/cadd without any complications.  Patient kept for 20 minuets observation post infusion. Denies dizziness, lightheadedness, acute nausea or vomiting, headache, heart palpitations, rash/itching or increased SOB.    Last vital signs  /71   Pulse 66   Temp 97.8 °F (36.6 °C) (Oral)   Resp 16   Ht 1.778 m (5' 10\")   Wt 65.8 kg (145 lb)   SpO2 98%   BMI 20.81 kg/m²     Patient instructed if they experience any of the above symptoms following today's visit, he/she is to notify the Physician or go to the Emergency Dept.    Discharge instructions given to patient, Verbalizes understanding. Ambulated off unit per self in stable condition with all belongings.

## 2024-01-09 NOTE — DISCHARGE INSTRUCTIONS
Please contact your Oncologist if you have any questions regarding the leucovorin/cadd that you received today.    You are instructed to call the office or go to the Emergency Dept. If you experience any of the following symptoms:    Dizziness/lightheadedness   Acute nausea or vomiting-not relieved by medications  Headaches-not relieved by medications  New chest pain or pressure  New rash /itching  New shortness of breath  Fever,chills or signs or symptoms of infection    Make sure you are drinking 48 to 64 ounces of water daily-if you are unable to drink fluids let us know right away.

## 2024-01-09 NOTE — PROGRESS NOTES
CADD pump 5FU added via mediport to infuse at 5.4ml/hr over 46 hours. Connections secured and tape to chest. Patient and family instructed on pump- it's usage,what to do if alarm goes off, and who to call if any questions. Verified pump running before discharge.

## 2024-01-09 NOTE — PLAN OF CARE
Problem: Safety - Adult  Goal: Free from fall injury  Outcome: Adequate for Discharge  Flowsheets (Taken 1/9/2024 0838)  Free From Fall Injury: Instruct family/caregiver on patient safety  Note: Verbalized understanding of fall prevention to ask for assistance with ambulation. Call light within reach.No falls occurred with visit today.     Problem: Chronic Conditions and Co-morbidities  Goal: Patient's chronic conditions and co-morbidity symptoms are monitored and maintained or improved  Outcome: Adequate for Discharge  Flowsheets (Taken 1/9/2024 0838)  Care Plan - Patient's Chronic Conditions and Co-Morbidity Symptoms are Monitored and Maintained or Improved: Monitor and assess patient's chronic conditions and comorbid symptoms for stability, deterioration, or improvement  Note:   Chemotherapy Teaching     What is Chemotherapy   Drug action [x]   Method of Administration [x]   Handouts given []     Side Effects  Nausea/vomiting [x]   Diarrhea [x]   Fatigue [x]   Signs / Symptoms of infection [x]   Neutropenia [x]   Thrombocytopenia [x]   Alopecia [x]   neuropathy [x]   [place] diet &  the importance of fluids [x]       Micellaneous  Importance of nutrition [x]   Importance of oral hygiene [x]   When to call the MD [x]   Monitoring labs [x]   Use of supportive services []     Explanation of Drug Regimen / Frequency  Leucovorin, CADD     Comments  Verbalized understanding to drug,action,side effects and when to call MD      Problem: Discharge Planning  Goal: Discharge to home or other facility with appropriate resources  Outcome: Adequate for Discharge  Flowsheets (Taken 1/9/2024 0838)  Discharge to home or other facility with appropriate resources: Identify barriers to discharge with patient and caregiver  Note: Discuss understanding of discharge instructions,follow-up appointments, and when to call the physician.Verbalized understanding of discharge instructions, follow-up appointments, and when to call the

## 2024-01-10 ENCOUNTER — TELEMEDICINE (OUTPATIENT)
Dept: PALLATIVE CARE | Age: 59
End: 2024-01-10
Payer: MEDICARE

## 2024-01-10 DIAGNOSIS — C06.2 SQUAMOUS CELL CANCER OF RETROMOLAR TRIGONE (HCC): Primary | ICD-10-CM

## 2024-01-10 DIAGNOSIS — Z51.5 PALLIATIVE CARE PATIENT: ICD-10-CM

## 2024-01-10 DIAGNOSIS — E43 SEVERE MALNUTRITION (HCC): ICD-10-CM

## 2024-01-10 DIAGNOSIS — G62.0 CHEMOTHERAPY-INDUCED PERIPHERAL NEUROPATHY (HCC): ICD-10-CM

## 2024-01-10 DIAGNOSIS — K59.03 THERAPEUTIC OPIOID-INDUCED CONSTIPATION (OIC): ICD-10-CM

## 2024-01-10 DIAGNOSIS — T40.2X5A THERAPEUTIC OPIOID-INDUCED CONSTIPATION (OIC): ICD-10-CM

## 2024-01-10 DIAGNOSIS — T45.1X5A CHEMOTHERAPY-INDUCED PERIPHERAL NEUROPATHY (HCC): ICD-10-CM

## 2024-01-10 DIAGNOSIS — C79.9 SECONDARY MALIGNANT NEOPLASM OF UNSPECIFIED SITE (CODE) (HCC): ICD-10-CM

## 2024-01-10 DIAGNOSIS — R11.2 NAUSEA AND VOMITING, UNSPECIFIED VOMITING TYPE: ICD-10-CM

## 2024-01-10 DIAGNOSIS — C18.2 MALIGNANT NEOPLASM OF ASCENDING COLON (HCC): ICD-10-CM

## 2024-01-10 DIAGNOSIS — G89.3 CANCER RELATED PAIN: ICD-10-CM

## 2024-01-10 PROCEDURE — 99215 OFFICE O/P EST HI 40 MIN: CPT | Performed by: STUDENT IN AN ORGANIZED HEALTH CARE EDUCATION/TRAINING PROGRAM

## 2024-01-10 PROCEDURE — 3017F COLORECTAL CA SCREEN DOC REV: CPT | Performed by: STUDENT IN AN ORGANIZED HEALTH CARE EDUCATION/TRAINING PROGRAM

## 2024-01-10 PROCEDURE — G8428 CUR MEDS NOT DOCUMENT: HCPCS | Performed by: STUDENT IN AN ORGANIZED HEALTH CARE EDUCATION/TRAINING PROGRAM

## 2024-01-10 NOTE — PROGRESS NOTES
reviewed  Please call the office if your symptoms worsen or if you were to develop new symptoms  Please call the palliative care office when you need refills      Return in about 3 months (around 4/10/2024) for cancer related pain.    Medications Prescribed:  No orders of the defined types were placed in this encounter.      Future Appointments   Date Time Provider Department Center   1/11/2024  9:30 AM STR OUT PT ONC INJ RM 10 [facility] OP ONC [facility]   1/23/2024  8:00 AM STR OUT PT ONC INJ RM 11 [facility] OP ONC [facility]   1/23/2024  9:00 AM Torrie Masters MD PhD N Oncology [facility]   1/31/2024 10:30 AM Andriy Cornejo MD N ENT [facility]   4/10/2024  1:00 PM Jorge A Pardo MD N SRPX PALLI [facility]   11/11/2024 10:00 AM Essie Lott MD N SRPX Heart [facility]     Lucas KATHLEEN Isaacs, was evaluated through a synchronous (real-time) audio-video encounter. The patient (or guardian if applicable) is aware that this is a billable service, which includes applicable co-pays. This Virtual Visit was conducted with patient's (and/or legal guardian's) consent. Patient identification was verified, and a caregiver was present when appropriate.   The patient was located at Home: [address] [address]  Provider was located at Facility (Appt Dept): [address]  [place] [address]    Patient given educational materials - see patient instructions.  Discussed use, benefit, and side effects of prescribed medications.  All patient questions answered.  Patient voiced understanding.  Patient agreed with treatment plan. Follow up as directed.         Electronically signed by Jorge A Pardo MD on 1/10/2024 at 9:28 PM    Parts of this note may have been dictated by use of voice recognition software and electronically transcribed. The note may contain errors not detected in proofreading.

## 2024-01-11 ENCOUNTER — HOSPITAL ENCOUNTER (OUTPATIENT)
Dept: INFUSION THERAPY | Age: 59
Discharge: HOME | End: 2024-01-11
Payer: MEDICARE

## 2024-01-11 VITALS
OXYGEN SATURATION: 98 % | SYSTOLIC BLOOD PRESSURE: 149 MMHG | HEIGHT: 70 IN | HEART RATE: 100 BPM | TEMPERATURE: 98 F | BODY MASS INDEX: 20.76 KG/M2 | RESPIRATION RATE: 16 BRPM | WEIGHT: 145 LBS | DIASTOLIC BLOOD PRESSURE: 98 MMHG

## 2024-01-11 DIAGNOSIS — C18.9 MALIGNANT NEOPLASM OF COLON, UNSPECIFIED PART OF COLON (HCC): Primary | ICD-10-CM

## 2024-01-11 DIAGNOSIS — C06.2 SQUAMOUS CELL CANCER OF RETROMOLAR TRIGONE (HCC): ICD-10-CM

## 2024-01-11 PROCEDURE — 96523 IRRIG DRUG DELIVERY DEVICE: CPT

## 2024-01-11 PROCEDURE — 6360000002 HC RX W HCPCS: Performed by: INTERNAL MEDICINE

## 2024-01-11 PROCEDURE — 2580000003 HC RX 258: Performed by: INTERNAL MEDICINE

## 2024-01-11 RX ORDER — HEPARIN 100 UNIT/ML
500 SYRINGE INTRAVENOUS PRN
Status: DISCONTINUED | OUTPATIENT
Start: 2024-01-11 | End: 2024-01-12 | Stop reason: HOSPADM

## 2024-01-11 RX ORDER — SODIUM CHLORIDE 0.9 % (FLUSH) 0.9 %
5-40 SYRINGE (ML) INJECTION PRN
Status: DISCONTINUED | OUTPATIENT
Start: 2024-01-11 | End: 2024-01-12 | Stop reason: HOSPADM

## 2024-01-11 RX ORDER — HEPARIN 100 UNIT/ML
500 SYRINGE INTRAVENOUS PRN
OUTPATIENT
Start: 2024-01-11

## 2024-01-11 RX ORDER — SODIUM CHLORIDE 9 MG/ML
25 INJECTION, SOLUTION INTRAVENOUS PRN
OUTPATIENT
Start: 2024-01-11

## 2024-01-11 RX ORDER — SODIUM CHLORIDE 0.9 % (FLUSH) 0.9 %
5-40 SYRINGE (ML) INJECTION PRN
OUTPATIENT
Start: 2024-01-11

## 2024-01-11 RX ADMIN — HEPARIN 500 UNITS: 100 SYRINGE at 09:37

## 2024-01-11 RX ADMIN — SODIUM CHLORIDE, PRESERVATIVE FREE 20 ML: 5 INJECTION INTRAVENOUS at 09:37

## 2024-01-11 NOTE — PROGRESS NOTES
Patient tolerated  pump off without any complications. Discharge instructions given to patient-verbalizes understanding. Ambulated off unit per self with belongings.

## 2024-01-26 NOTE — PROGRESS NOTES
AM     05/13/2023 07:07 AM    CO2 22 05/13/2023 07:07 AM    BUN 5 05/13/2023 07:07 AM    LABALBU 4.0 01/29/2024 09:17 AM    CREATININE 0.9 01/29/2024 09:17 AM    CREATININE 0.8 05/13/2023 07:07 AM    CALCIUM 8.5 05/13/2023 07:07 AM    LABGLOM >60 01/29/2024 09:17 AM    GLUCOSE 83 05/13/2023 07:07 AM     Magnesium:    Lab Results   Component Value Date/Time    MG 2.1 04/28/2023 03:25 PM     PT/INR:    Lab Results   Component Value Date/Time    INR 0.92 12/10/2020 07:25 AM     TSH:    Lab Results   Component Value Date/Time    TSH 1.47 12/02/2022 12:20 PM     FOLATE:    Lab Results   Component Value Date/Time    FOLATE > 20.0 01/02/2024 09:50 AM     IRON:    Lab Results   Component Value Date/Time    IRON 63 01/02/2024 09:50 AM     TIBC:    Lab Results   Component Value Date/Time    TIBC 292 01/02/2024 09:50 AM     FERRITIN:    Lab Results   Component Value Date/Time    FERRITIN 124 01/02/2024 09:50 AM     PSA:   Lab Results   Component Value Date/Time    PSA 1.00 04/06/2018 02:53 PM      IMAGING:  Per HPI  No results found.     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  Negative for LVI, negative for perineural invasion, invasion into the mandibular bone with negative surgical margins (closest margin 4 mm from the medial note coastal margin).  In total, 4 of 103 lymph nodes positive for malignancy with the largest lymph node measuring 1.7 cm.   pT4a pN2b    -5/2/2023. Colonoscopy: poorly differentiated adenocarcinoma in the cecum and fragments of tubular adenoma in the sigmoid.    -5/9/2023 robotic right colectomy: poorly differentiated adenocarcinoma with free margins.  12 out of 16 lymph nodes had

## 2024-01-29 ENCOUNTER — OFFICE VISIT (OUTPATIENT)
Dept: ONCOLOGY | Age: 59
End: 2024-01-29
Payer: MEDICARE

## 2024-01-29 ENCOUNTER — HOSPITAL ENCOUNTER (OUTPATIENT)
Dept: INFUSION THERAPY | Age: 59
Discharge: HOME | End: 2024-01-29
Payer: MEDICARE

## 2024-01-29 VITALS
HEART RATE: 78 BPM | DIASTOLIC BLOOD PRESSURE: 95 MMHG | WEIGHT: 145 LBS | BODY MASS INDEX: 20.76 KG/M2 | OXYGEN SATURATION: 97 % | TEMPERATURE: 98 F | HEIGHT: 70 IN | RESPIRATION RATE: 16 BRPM | SYSTOLIC BLOOD PRESSURE: 160 MMHG

## 2024-01-29 VITALS
HEIGHT: 70 IN | HEART RATE: 70 BPM | SYSTOLIC BLOOD PRESSURE: 162 MMHG | RESPIRATION RATE: 16 BRPM | OXYGEN SATURATION: 97 % | DIASTOLIC BLOOD PRESSURE: 103 MMHG | BODY MASS INDEX: 20.76 KG/M2 | TEMPERATURE: 97.5 F | WEIGHT: 145 LBS

## 2024-01-29 DIAGNOSIS — D50.0 IRON DEFICIENCY ANEMIA DUE TO CHRONIC BLOOD LOSS: ICD-10-CM

## 2024-01-29 DIAGNOSIS — C06.2 SQUAMOUS CELL CANCER OF RETROMOLAR TRIGONE (HCC): ICD-10-CM

## 2024-01-29 DIAGNOSIS — C18.9 MALIGNANT NEOPLASM OF COLON, UNSPECIFIED PART OF COLON (HCC): Primary | ICD-10-CM

## 2024-01-29 DIAGNOSIS — C18.2 MALIGNANT NEOPLASM OF ASCENDING COLON (HCC): ICD-10-CM

## 2024-01-29 DIAGNOSIS — G89.3 CANCER RELATED PAIN: ICD-10-CM

## 2024-01-29 LAB
ABSOLUTE IMMATURE GRANULOCYTE: 0.07 THOU/MM3 (ref 0–0.07)
ALBUMIN SERPL BCG-MCNC: 4 G/DL (ref 3.5–5.1)
ALP SERPL-CCNC: 85 U/L (ref 38–126)
ALT SERPL W/O P-5'-P-CCNC: 9 U/L (ref 11–66)
AST SERPL-CCNC: 20 U/L (ref 5–40)
BASOPHILS ABSOLUTE: 0 THOU/MM3 (ref 0–0.1)
BASOPHILS NFR BLD AUTO: 0 % (ref 0–3)
BILIRUB CONJ SERPL-MCNC: < 0.2 MG/DL (ref 0–0.3)
BILIRUB SERPL-MCNC: 0.2 MG/DL (ref 0.3–1.2)
BUN BLDP-MCNC: 7 MG/DL (ref 8–26)
CHLORIDE BLD-SCNC: 102 MEQ/L (ref 98–109)
CREAT BLD-MCNC: 0.9 MG/DL (ref 0.5–1.2)
EOSINOPHIL NFR BLD AUTO: 1 % (ref 0–4)
EOSINOPHILS ABSOLUTE: 0.1 THOU/MM3 (ref 0–0.4)
ERYTHROCYTE [DISTWIDTH] IN BLOOD BY AUTOMATED COUNT: 14.9 % (ref 11.5–14.5)
GFR SERPL CREATININE-BSD FRML MDRD: > 60 ML/MIN/1.73M2
GLUCOSE BLD-MCNC: 98 MG/DL (ref 70–108)
HCT VFR BLD AUTO: 41.5 % (ref 42–52)
HGB BLD-MCNC: 14.2 GM/DL (ref 14–18)
IMMATURE GRANULOCYTES: 1 %
IONIZED CALCIUM, WHOLE BLOOD: 1.23 MMOL/L (ref 1.12–1.32)
LYMPHOCYTES ABSOLUTE: 2.8 THOU/MM3 (ref 1–4.8)
LYMPHOCYTES NFR BLD AUTO: 41 % (ref 15–47)
MCH RBC QN AUTO: 33.8 PG (ref 26–33)
MCHC RBC AUTO-ENTMCNC: 34.2 GM/DL (ref 32.2–35.5)
MCV RBC AUTO: 99 FL (ref 80–94)
MONOCYTES ABSOLUTE: 0.9 THOU/MM3 (ref 0.4–1.3)
MONOCYTES NFR BLD AUTO: 13 % (ref 0–12)
NEUTROPHILS NFR BLD AUTO: 44 % (ref 43–75)
PLATELET # BLD AUTO: 294 THOU/MM3 (ref 130–400)
PMV BLD AUTO: 9.3 FL (ref 9.4–12.4)
POTASSIUM BLD-SCNC: 4 MEQ/L (ref 3.5–4.9)
PROT SERPL-MCNC: 7.5 G/DL (ref 6.1–8)
RBC # BLD AUTO: 4.2 MILL/MM3 (ref 4.7–6.1)
SEGMENTED NEUTROPHILS ABSOLUTE COUNT: 3 THOU/MM3 (ref 1.8–7.7)
SODIUM BLD-SCNC: 139 MEQ/L (ref 138–146)
TOTAL CO2, WHOLE BLOOD: 30 MEQ/L (ref 23–33)
WBC # BLD AUTO: 6.9 THOU/MM3 (ref 4.8–10.8)

## 2024-01-29 PROCEDURE — 96416 CHEMO PROLONG INFUSE W/PUMP: CPT

## 2024-01-29 PROCEDURE — 80047 BASIC METABLC PNL IONIZED CA: CPT

## 2024-01-29 PROCEDURE — 96365 THER/PROPH/DIAG IV INF INIT: CPT

## 2024-01-29 PROCEDURE — 96367 TX/PROPH/DG ADDL SEQ IV INF: CPT

## 2024-01-29 PROCEDURE — G8484 FLU IMMUNIZE NO ADMIN: HCPCS | Performed by: INTERNAL MEDICINE

## 2024-01-29 PROCEDURE — 96375 TX/PRO/DX INJ NEW DRUG ADDON: CPT

## 2024-01-29 PROCEDURE — 85025 COMPLETE CBC W/AUTO DIFF WBC: CPT

## 2024-01-29 PROCEDURE — 6360000002 HC RX W HCPCS: Performed by: INTERNAL MEDICINE

## 2024-01-29 PROCEDURE — 2580000003 HC RX 258: Performed by: INTERNAL MEDICINE

## 2024-01-29 PROCEDURE — 99214 OFFICE O/P EST MOD 30 MIN: CPT | Performed by: INTERNAL MEDICINE

## 2024-01-29 PROCEDURE — 36591 DRAW BLOOD OFF VENOUS DEVICE: CPT

## 2024-01-29 PROCEDURE — 1036F TOBACCO NON-USER: CPT | Performed by: INTERNAL MEDICINE

## 2024-01-29 PROCEDURE — G8427 DOCREV CUR MEDS BY ELIG CLIN: HCPCS | Performed by: INTERNAL MEDICINE

## 2024-01-29 PROCEDURE — 3017F COLORECTAL CA SCREEN DOC REV: CPT | Performed by: INTERNAL MEDICINE

## 2024-01-29 PROCEDURE — 99211 OFF/OP EST MAY X REQ PHY/QHP: CPT

## 2024-01-29 PROCEDURE — G8420 CALC BMI NORM PARAMETERS: HCPCS | Performed by: INTERNAL MEDICINE

## 2024-01-29 PROCEDURE — 80076 HEPATIC FUNCTION PANEL: CPT

## 2024-01-29 RX ORDER — ONDANSETRON 2 MG/ML
8 INJECTION INTRAMUSCULAR; INTRAVENOUS
Status: CANCELLED | OUTPATIENT
Start: 2024-01-29

## 2024-01-29 RX ORDER — HEPARIN 100 UNIT/ML
500 SYRINGE INTRAVENOUS PRN
Status: DISCONTINUED | OUTPATIENT
Start: 2024-01-29 | End: 2024-01-30 | Stop reason: HOSPADM

## 2024-01-29 RX ORDER — HEPARIN 100 UNIT/ML
500 SYRINGE INTRAVENOUS PRN
OUTPATIENT
Start: 2024-01-29

## 2024-01-29 RX ORDER — SODIUM CHLORIDE 0.9 % (FLUSH) 0.9 %
5-40 SYRINGE (ML) INJECTION PRN
Status: DISCONTINUED | OUTPATIENT
Start: 2024-01-29 | End: 2024-01-30 | Stop reason: HOSPADM

## 2024-01-29 RX ORDER — SODIUM CHLORIDE 9 MG/ML
5-250 INJECTION, SOLUTION INTRAVENOUS PRN
Status: CANCELLED | OUTPATIENT
Start: 2024-01-29

## 2024-01-29 RX ORDER — EPINEPHRINE 1 MG/ML
0.3 INJECTION, SOLUTION, CONCENTRATE INTRAVENOUS PRN
Status: CANCELLED | OUTPATIENT
Start: 2024-01-29

## 2024-01-29 RX ORDER — SODIUM CHLORIDE 9 MG/ML
INJECTION, SOLUTION INTRAVENOUS PRN
Status: CANCELLED
Start: 2024-01-29

## 2024-01-29 RX ORDER — OXYCODONE AND ACETAMINOPHEN 7.5; 325 MG/1; MG/1
1 TABLET ORAL EVERY 6 HOURS PRN
Qty: 120 TABLET | Refills: 0 | Status: SHIPPED | OUTPATIENT
Start: 2024-01-29 | End: 2024-02-28

## 2024-01-29 RX ORDER — SODIUM CHLORIDE 0.9 % (FLUSH) 0.9 %
5-40 SYRINGE (ML) INJECTION PRN
Status: CANCELLED | OUTPATIENT
Start: 2024-01-29

## 2024-01-29 RX ORDER — SODIUM CHLORIDE 9 MG/ML
25 INJECTION, SOLUTION INTRAVENOUS PRN
OUTPATIENT
Start: 2024-01-29

## 2024-01-29 RX ORDER — HEPARIN SODIUM (PORCINE) LOCK FLUSH IV SOLN 100 UNIT/ML 100 UNIT/ML
500 SOLUTION INTRAVENOUS PRN
Status: CANCELLED | OUTPATIENT
Start: 2024-01-31

## 2024-01-29 RX ORDER — PALONOSETRON 0.05 MG/ML
0.25 INJECTION, SOLUTION INTRAVENOUS ONCE
Status: CANCELLED | OUTPATIENT
Start: 2024-01-29 | End: 2024-01-29

## 2024-01-29 RX ORDER — HEPARIN SODIUM (PORCINE) LOCK FLUSH IV SOLN 100 UNIT/ML 100 UNIT/ML
500 SOLUTION INTRAVENOUS PRN
Status: CANCELLED | OUTPATIENT
Start: 2024-01-29

## 2024-01-29 RX ORDER — SODIUM CHLORIDE 9 MG/ML
INJECTION, SOLUTION INTRAVENOUS CONTINUOUS
Status: CANCELLED | OUTPATIENT
Start: 2024-01-29

## 2024-01-29 RX ORDER — ACETAMINOPHEN 325 MG/1
650 TABLET ORAL
Status: CANCELLED | OUTPATIENT
Start: 2024-01-29

## 2024-01-29 RX ORDER — ALBUTEROL SULFATE 90 UG/1
4 AEROSOL, METERED RESPIRATORY (INHALATION) PRN
Status: CANCELLED | OUTPATIENT
Start: 2024-01-29

## 2024-01-29 RX ORDER — DIPHENHYDRAMINE HYDROCHLORIDE 50 MG/ML
50 INJECTION INTRAMUSCULAR; INTRAVENOUS
Status: CANCELLED | OUTPATIENT
Start: 2024-01-29

## 2024-01-29 RX ORDER — MEPERIDINE HYDROCHLORIDE 50 MG/ML
12.5 INJECTION INTRAMUSCULAR; INTRAVENOUS; SUBCUTANEOUS PRN
Status: CANCELLED | OUTPATIENT
Start: 2024-01-29

## 2024-01-29 RX ORDER — FAMOTIDINE 10 MG/ML
20 INJECTION, SOLUTION INTRAVENOUS
Status: CANCELLED | OUTPATIENT
Start: 2024-01-29

## 2024-01-29 RX ORDER — SODIUM CHLORIDE 9 MG/ML
5-250 INJECTION, SOLUTION INTRAVENOUS PRN
Status: CANCELLED | OUTPATIENT
Start: 2024-01-31

## 2024-01-29 RX ORDER — SODIUM CHLORIDE 0.9 % (FLUSH) 0.9 %
5-40 SYRINGE (ML) INJECTION PRN
Status: CANCELLED | OUTPATIENT
Start: 2024-01-31

## 2024-01-29 RX ORDER — SODIUM CHLORIDE 0.9 % (FLUSH) 0.9 %
5-40 SYRINGE (ML) INJECTION PRN
OUTPATIENT
Start: 2024-01-29

## 2024-01-29 RX ORDER — SODIUM CHLORIDE 9 MG/ML
INJECTION, SOLUTION INTRAVENOUS PRN
Status: DISCONTINUED | OUTPATIENT
Start: 2024-01-29 | End: 2024-01-30 | Stop reason: HOSPADM

## 2024-01-29 RX ORDER — PALONOSETRON 0.05 MG/ML
0.25 INJECTION, SOLUTION INTRAVENOUS ONCE
Status: COMPLETED | OUTPATIENT
Start: 2024-01-29 | End: 2024-01-29

## 2024-01-29 RX ADMIN — DEXAMETHASONE SODIUM PHOSPHATE 12 MG: 4 INJECTION, SOLUTION INTRA-ARTICULAR; INTRALESIONAL; INTRAMUSCULAR; INTRAVENOUS; SOFT TISSUE at 10:35

## 2024-01-29 RX ADMIN — PALONOSETRON 0.25 MG: 0.05 INJECTION, SOLUTION INTRAVENOUS at 11:03

## 2024-01-29 RX ADMIN — LEUCOVORIN CALCIUM 750 MG: 350 INJECTION, POWDER, LYOPHILIZED, FOR SUSPENSION INTRAMUSCULAR; INTRAVENOUS at 11:05

## 2024-01-29 RX ADMIN — SODIUM CHLORIDE: 9 INJECTION, SOLUTION INTRAVENOUS at 10:34

## 2024-01-29 RX ADMIN — SODIUM CHLORIDE, PRESERVATIVE FREE 10 ML: 5 INJECTION INTRAVENOUS at 09:15

## 2024-01-29 RX ADMIN — SODIUM CHLORIDE, PRESERVATIVE FREE 20 ML: 5 INJECTION INTRAVENOUS at 09:16

## 2024-01-29 RX ADMIN — FLUOROURACIL 3325 MG: 50 INJECTION, SOLUTION INTRAVENOUS at 12:20

## 2024-01-29 NOTE — DISCHARGE INSTRUCTIONS
Please contact your Oncologist if you have any questions regarding the chemotherapy Leucovorin/5FU that you received today.      Patient instructed if experience any of the symptoms following today's chemotherapy treatment with CADD to notify MD immediately or go to emergency department.    * dizziness/lightheadedness  *acute nausea/vomiting - not relieved with medication  *headache - not relieved from Tylenol/pain medication  *chest pain/pressure  *rash/itching  *shortness of breath        Drink fluids - 48oz fluids daily  Call if develop fever/ chills/ signs or symptoms of infection  Please call with questions/alarms related to CADD    Physician's instructions:  1) treatment today  2) RTC in 2 weeks with labs, RN eval prior to infusion on 2/12   3) approx 3 weeks get CT scans (ordered today)  4) RTC in 4 weeks with labs for tox check with MD and review of scans prior to infusion 2/26

## 2024-01-29 NOTE — PROGRESS NOTES
Patient assessed for the following post chemotherapy:    Dizziness   No  Lightheadedness  No      Acute nausea/vomiting No  Headache   No  Chest pain/pressure  No  Rash/itching   No  Shortness of breath  No    Patient kept for 20 minutes observation post infusion chemotherapy.    Patient tolerated chemotherapy treatment Leucovorin/CADD without any complications.    Last vital signs:   BP (!) 160/95   Pulse 78   Temp 98 °F (36.7 °C) (Oral)   Resp 16   Ht 1.778 m (5' 10\")   Wt 65.8 kg (145 lb)   SpO2 97%   BMI 20.81 kg/m²     Physician's instructions:       1) treatment today  2) RTC in 2 weeks with labs, RN eval prior to infusion on 2/12   3) approx 3 weeks get CT scans (ordered today)  4) RTC in 4 weeks with labs for tox check with MD and review of scans prior to infusion 2/26        Patient instructed if experience any of the above symptoms following today's infusion, he is to notify MD immediately or go to the emergency department.    Discharge instructions given to patient. Verbalizes understanding. Ambulated off unit per self, accompanied by family, with belongings and CADD infusing.

## 2024-01-29 NOTE — ONCOLOGY

## 2024-01-29 NOTE — PATIENT INSTRUCTIONS
1) treatment today  2) RTC in 2 weeks with labs, RN ginger prior to infusion on 2/12   3) approx 3 weeks get CT scans (ordered today)  4) RTC in 4 weeks with labs for tox check with MD and review of scans prior to infusion 2/26

## 2024-01-29 NOTE — PLAN OF CARE
Problem: Safety - Adult  Goal: Free from fall injury  Outcome: Adequate for Discharge  Flowsheets (Taken 1/29/2024 1432)  Free From Fall Injury: Instruct family/caregiver on patient safety     Problem: Chronic Conditions and Co-morbidities  Goal: Patient's chronic conditions and co-morbidity symptoms are monitored and maintained or improved  Outcome: Adequate for Discharge  Flowsheets (Taken 1/29/2024 1432)  Care Plan - Patient's Chronic Conditions and Co-Morbidity Symptoms are Monitored and Maintained or Improved: Monitor and assess patient's chronic conditions and comorbid symptoms for stability, deterioration, or improvement  Note:   Chemotherapy Teaching     What is Chemotherapy   Drug action [x]   Method of Administration [x]   Handouts given []     Side Effects  Nausea/vomiting [x]   Diarrhea [x]   Fatigue [x]   Signs / Symptoms of infection [x]   Neutropenia [x]   Thrombocytopenia [x]   Alopecia [x]   neuropathy [x]   [place] diet &  the importance of fluids [x]       Micellaneous  Importance of nutrition [x]   Importance of oral hygiene [x]   When to call the MD [x]   Monitoring labs [x]   Use of supportive services []     Explanation of Drug Regimen / Frequency  Leucovorin/5FU     Comments  Verbalized understanding to drug,action,side effects and when to call MD         Problem: Discharge Planning  Goal: Discharge to home or other facility with appropriate resources  Outcome: Adequate for Discharge  Flowsheets (Taken 1/29/2024 1432)  Discharge to home or other facility with appropriate resources: Identify barriers to discharge with patient and caregiver     Problem: Infection - Adult  Goal: Absence of infection at discharge  Outcome: Adequate for Discharge  Flowsheets (Taken 1/29/2024 1432)  Absence of infection at discharge: Monitor all insertion sites i.e., indwelling lines, tubes and drains  Note: Mediport site with no redness or warmth. Skin over port site intact with no signs of breakdown noted.

## 2024-01-31 ENCOUNTER — HOSPITAL ENCOUNTER (OUTPATIENT)
Dept: INFUSION THERAPY | Age: 59
Discharge: HOME | End: 2024-01-31
Payer: MEDICARE

## 2024-01-31 ENCOUNTER — OFFICE VISIT (OUTPATIENT)
Dept: ENT CLINIC | Age: 59
End: 2024-01-31
Payer: MEDICARE

## 2024-01-31 VITALS
OXYGEN SATURATION: 97 % | HEIGHT: 70 IN | RESPIRATION RATE: 16 BRPM | SYSTOLIC BLOOD PRESSURE: 153 MMHG | TEMPERATURE: 97.7 F | DIASTOLIC BLOOD PRESSURE: 97 MMHG | WEIGHT: 140.7 LBS | BODY MASS INDEX: 20.14 KG/M2 | HEART RATE: 85 BPM

## 2024-01-31 VITALS
HEIGHT: 70 IN | WEIGHT: 140.7 LBS | DIASTOLIC BLOOD PRESSURE: 88 MMHG | TEMPERATURE: 97.4 F | HEART RATE: 86 BPM | BODY MASS INDEX: 20.14 KG/M2 | OXYGEN SATURATION: 93 % | SYSTOLIC BLOOD PRESSURE: 122 MMHG

## 2024-01-31 DIAGNOSIS — K22.2 ESOPHAGEAL STRICTURE: Primary | ICD-10-CM

## 2024-01-31 DIAGNOSIS — C18.9 MALIGNANT NEOPLASM OF COLON, UNSPECIFIED PART OF COLON (HCC): ICD-10-CM

## 2024-01-31 DIAGNOSIS — G52.3 HYPOGLOSSAL NERVE PALSY: ICD-10-CM

## 2024-01-31 DIAGNOSIS — R20.0 NUMBNESS OF TONGUE: ICD-10-CM

## 2024-01-31 DIAGNOSIS — R13.14 PHARYNGOESOPHAGEAL DYSPHAGIA: ICD-10-CM

## 2024-01-31 DIAGNOSIS — G51.0 FACIAL NERVE PALSY: ICD-10-CM

## 2024-01-31 DIAGNOSIS — C06.2 SQUAMOUS CELL CANCER OF RETROMOLAR TRIGONE (HCC): Primary | ICD-10-CM

## 2024-01-31 PROCEDURE — G8420 CALC BMI NORM PARAMETERS: HCPCS | Performed by: OTOLARYNGOLOGY

## 2024-01-31 PROCEDURE — 1036F TOBACCO NON-USER: CPT | Performed by: OTOLARYNGOLOGY

## 2024-01-31 PROCEDURE — 99213 OFFICE O/P EST LOW 20 MIN: CPT | Performed by: OTOLARYNGOLOGY

## 2024-01-31 PROCEDURE — G8484 FLU IMMUNIZE NO ADMIN: HCPCS | Performed by: OTOLARYNGOLOGY

## 2024-01-31 PROCEDURE — 2580000003 HC RX 258: Performed by: INTERNAL MEDICINE

## 2024-01-31 PROCEDURE — 3017F COLORECTAL CA SCREEN DOC REV: CPT | Performed by: OTOLARYNGOLOGY

## 2024-01-31 PROCEDURE — 96523 IRRIG DRUG DELIVERY DEVICE: CPT

## 2024-01-31 PROCEDURE — G8427 DOCREV CUR MEDS BY ELIG CLIN: HCPCS | Performed by: OTOLARYNGOLOGY

## 2024-01-31 PROCEDURE — 6360000002 HC RX W HCPCS: Performed by: INTERNAL MEDICINE

## 2024-01-31 RX ORDER — HEPARIN 100 UNIT/ML
500 SYRINGE INTRAVENOUS PRN
Status: DISCONTINUED | OUTPATIENT
Start: 2024-01-31 | End: 2024-02-01 | Stop reason: HOSPADM

## 2024-01-31 RX ORDER — SODIUM CHLORIDE 0.9 % (FLUSH) 0.9 %
5-40 SYRINGE (ML) INJECTION PRN
Status: DISCONTINUED | OUTPATIENT
Start: 2024-01-31 | End: 2024-02-01 | Stop reason: HOSPADM

## 2024-01-31 RX ADMIN — SODIUM CHLORIDE, PRESERVATIVE FREE 20 ML: 5 INJECTION INTRAVENOUS at 12:16

## 2024-01-31 RX ADMIN — HEPARIN 500 UNITS: 100 SYRINGE at 12:17

## 2024-01-31 NOTE — PLAN OF CARE
Problem: Safety - Adult  Goal: Free from fall injury  Outcome: Adequate for Discharge  Flowsheets (Taken 1/31/2024 1613)  Free From Fall Injury: Instruct family/caregiver on patient safety     Problem: Chronic Conditions and Co-morbidities  Goal: Patient's chronic conditions and co-morbidity symptoms are monitored and maintained or improved  Outcome: Adequate for Discharge  Flowsheets (Taken 1/31/2024 1613)  Care Plan - Patient's Chronic Conditions and Co-Morbidity Symptoms are Monitored and Maintained or Improved:   Monitor and assess patient's chronic conditions and comorbid symptoms for stability, deterioration, or improvement   Collaborate with multidisciplinary team to address chronic and comorbid conditions and prevent exacerbation or deterioration     Problem: Discharge Planning  Goal: Discharge to home or other facility with appropriate resources  Outcome: Adequate for Discharge  Flowsheets (Taken 1/31/2024 1613)  Discharge to home or other facility with appropriate resources: Identify barriers to discharge with patient and caregiver     Problem: Infection - Adult  Goal: Absence of infection at discharge  Outcome: Adequate for Discharge  Flowsheets (Taken 1/31/2024 1613)  Absence of infection at discharge: Monitor all insertion sites i.e., indwelling lines, tubes and drains  Note: Mediport site with no redness or warmth. Skin over port site intact with no signs of breakdown noted. Patient verbalizes signs/symptoms of port infection and when to notify the physician.     Care plan reviewed with patient.  Patient verbalize understanding of the plan of care and contribute to goal setting.

## 2024-01-31 NOTE — DISCHARGE INSTRUCTIONS
Please contact your Oncologist if you have any questions regarding the chemotherapy pump off that you received today.      Patient instructed if experience any of the symptoms following today's chemotherapy treatment to notify MD immediately or go to emergency department.    * dizziness/lightheadedness  *acute nausea/vomiting - not relieved with medication  *headache - not relieved from Tylenol/pain medication  *chest pain/pressure  *rash/itching  *shortness of breath        Drink fluids - 48oz fluids daily  Call if develop fever/ chills/ signs or symptoms of infection

## 2024-01-31 NOTE — PROGRESS NOTES
[facility] [place] [facility]  [facility] EAR, NOSE AND TH[facility]  [address]  [address]  [facility] [address]  Dept: [phone]  Dept Fax: [phone]  Loc: [phone]    Lucas Isaacs is a 58 y.o. male who was referred by No ref. provider found for:  Chief Complaint   Patient presents with    Follow-up     Patient here for follow up. Patient states everything has been going well since last visit.        HPI:     Lucas Isaacs is a 58 y.o. 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  He came to my care after he was considered cured from his head neck cancer with a diagnosis of recurring upper esophageal sphincter stenosis and severe obstructive dysphagia.  He has had numerous balloon dilations and was getting ready for a transoral robotic assisted definitive lysis of esophageal inlet stricture with advancement flap reconstruction when he was diagnosed with widely metastatic colon cancer.  He returns today after his most recent dilation in August 2023 reporting how well he is doing with his biologic infusions on top of which how well he continues to be doing eating an unrestricted diet and basically \"enjoying life\".  He said he is eating better than he ever has since his cancer surgery and having an easier time sleeping.  He and his wife reported being delighted with how well he is doing.     History:     No Known Allergies  Current Outpatient Medications   Medication Sig Dispense Refill    oxyCODONE-acetaminophen (PERCOCET) 7.5-325 MG per tablet Take 1 tablet by mouth every 6 hours as needed for Pain for up to 30 days. Intended supply: 30 days Max Daily Amount: 4 tablets 120 tablet 0    ondansetron (ZOFRAN-ODT) 4 MG disintegrating tablet Take 1 tablet by mouth every 6 hours as needed for Nausea or Vomiting 60 tablet 3    prochlorperazine (COMPAZINE) 10 MG tablet

## 2024-01-31 NOTE — PROGRESS NOTES
Patient assessed for the following post chemotherapy:    Dizziness   No  Lightheadedness  No      Acute nausea/vomiting No  Headache   No  Chest pain/pressure  No  Rash/itching   No  Shortness of breath  No    Patient tolerated chemotherapy treatment 5FU per CADD without any complications.    Last vital signs:   BP (!) 153/97   Pulse 85   Temp 97.7 °F (36.5 °C) (Oral)   Resp 16   Ht 1.778 m (5' 10\")   Wt 63.8 kg (140 lb 11.2 oz)   SpO2 97%   BMI 20.19 kg/m²       Patient instructed if experience any of the above symptoms following today's infusion,he/she is to notify MD immediately or go to the emergency department.    Discharge instructions given to patient. Verbalizes understanding. Ambulated off unit per self with belongings.

## 2024-02-12 ENCOUNTER — HOSPITAL ENCOUNTER (OUTPATIENT)
Dept: INFUSION THERAPY | Age: 59
Discharge: HOME | End: 2024-02-12
Payer: MEDICARE

## 2024-02-12 VITALS
RESPIRATION RATE: 16 BRPM | WEIGHT: 146.6 LBS | TEMPERATURE: 97.7 F | DIASTOLIC BLOOD PRESSURE: 105 MMHG | OXYGEN SATURATION: 98 % | HEART RATE: 78 BPM | HEIGHT: 70 IN | BODY MASS INDEX: 20.99 KG/M2 | SYSTOLIC BLOOD PRESSURE: 168 MMHG

## 2024-02-12 DIAGNOSIS — C06.2 SQUAMOUS CELL CANCER OF RETROMOLAR TRIGONE (HCC): ICD-10-CM

## 2024-02-12 DIAGNOSIS — D50.0 IRON DEFICIENCY ANEMIA DUE TO CHRONIC BLOOD LOSS: ICD-10-CM

## 2024-02-12 DIAGNOSIS — C18.9 MALIGNANT NEOPLASM OF COLON, UNSPECIFIED PART OF COLON (HCC): Primary | ICD-10-CM

## 2024-02-12 LAB
ABSOLUTE IMMATURE GRANULOCYTE: 0.02 THOU/MM3 (ref 0–0.07)
ALBUMIN SERPL BCG-MCNC: 4 G/DL (ref 3.5–5.1)
ALP SERPL-CCNC: 88 U/L (ref 38–126)
ALT SERPL W/O P-5'-P-CCNC: 15 U/L (ref 11–66)
AST SERPL-CCNC: 34 U/L (ref 5–40)
BASOPHILS ABSOLUTE: 0 THOU/MM3 (ref 0–0.1)
BASOPHILS NFR BLD AUTO: 1 % (ref 0–3)
BILIRUB CONJ SERPL-MCNC: < 0.2 MG/DL (ref 0–0.3)
BILIRUB SERPL-MCNC: < 0.2 MG/DL (ref 0.3–1.2)
BUN BLDP-MCNC: 14 MG/DL (ref 8–26)
CEA SERPL-MCNC: 4 NG/ML (ref 0–5)
CHLORIDE BLD-SCNC: 110 MEQ/L (ref 98–109)
CREAT BLD-MCNC: 1 MG/DL (ref 0.5–1.2)
EOSINOPHIL NFR BLD AUTO: 2 % (ref 0–4)
EOSINOPHILS ABSOLUTE: 0.1 THOU/MM3 (ref 0–0.4)
ERYTHROCYTE [DISTWIDTH] IN BLOOD BY AUTOMATED COUNT: 16.2 % (ref 11.5–14.5)
GFR SERPL CREATININE-BSD FRML MDRD: > 60 ML/MIN/1.73M2
GLUCOSE BLD-MCNC: 97 MG/DL (ref 70–108)
HCT VFR BLD AUTO: 37.8 % (ref 42–52)
HGB BLD-MCNC: 12.3 GM/DL (ref 14–18)
IMMATURE GRANULOCYTES: 0 %
IONIZED CALCIUM, WHOLE BLOOD: 1.27 MMOL/L (ref 1.12–1.32)
LYMPHOCYTES ABSOLUTE: 2 THOU/MM3 (ref 1–4.8)
LYMPHOCYTES NFR BLD AUTO: 32 % (ref 15–47)
MCH RBC QN AUTO: 33 PG (ref 26–33)
MCHC RBC AUTO-ENTMCNC: 32.5 GM/DL (ref 32.2–35.5)
MCV RBC AUTO: 101 FL (ref 80–94)
MONOCYTES ABSOLUTE: 0.6 THOU/MM3 (ref 0.4–1.3)
MONOCYTES NFR BLD AUTO: 9 % (ref 0–12)
NEUTROPHILS NFR BLD AUTO: 56 % (ref 43–75)
PLATELET # BLD AUTO: 210 THOU/MM3 (ref 130–400)
PMV BLD AUTO: 9.3 FL (ref 9.4–12.4)
POTASSIUM BLD-SCNC: 4.1 MEQ/L (ref 3.5–4.9)
PROT SERPL-MCNC: 6.8 G/DL (ref 6.1–8)
RBC # BLD AUTO: 3.73 MILL/MM3 (ref 4.7–6.1)
SEGMENTED NEUTROPHILS ABSOLUTE COUNT: 3.4 THOU/MM3 (ref 1.8–7.7)
SODIUM BLD-SCNC: 145 MEQ/L (ref 138–146)
TOTAL CO2, WHOLE BLOOD: 28 MEQ/L (ref 23–33)
WBC # BLD AUTO: 6.1 THOU/MM3 (ref 4.8–10.8)

## 2024-02-12 PROCEDURE — 80076 HEPATIC FUNCTION PANEL: CPT

## 2024-02-12 PROCEDURE — 85025 COMPLETE CBC W/AUTO DIFF WBC: CPT

## 2024-02-12 PROCEDURE — 96367 TX/PROPH/DG ADDL SEQ IV INF: CPT

## 2024-02-12 PROCEDURE — 2580000003 HC RX 258: Performed by: INTERNAL MEDICINE

## 2024-02-12 PROCEDURE — 6360000002 HC RX W HCPCS: Performed by: INTERNAL MEDICINE

## 2024-02-12 PROCEDURE — 96416 CHEMO PROLONG INFUSE W/PUMP: CPT

## 2024-02-12 PROCEDURE — 96375 TX/PRO/DX INJ NEW DRUG ADDON: CPT

## 2024-02-12 PROCEDURE — 82378 CARCINOEMBRYONIC ANTIGEN: CPT

## 2024-02-12 PROCEDURE — 96365 THER/PROPH/DIAG IV INF INIT: CPT

## 2024-02-12 PROCEDURE — 80047 BASIC METABLC PNL IONIZED CA: CPT

## 2024-02-12 PROCEDURE — 36591 DRAW BLOOD OFF VENOUS DEVICE: CPT

## 2024-02-12 RX ORDER — ACETAMINOPHEN 325 MG/1
650 TABLET ORAL
Status: CANCELLED | OUTPATIENT
Start: 2024-02-12

## 2024-02-12 RX ORDER — HEPARIN 100 UNIT/ML
500 SYRINGE INTRAVENOUS PRN
Status: DISCONTINUED | OUTPATIENT
Start: 2024-02-12 | End: 2024-02-13 | Stop reason: HOSPADM

## 2024-02-12 RX ORDER — MEPERIDINE HYDROCHLORIDE 25 MG/ML
12.5 INJECTION INTRAMUSCULAR; INTRAVENOUS; SUBCUTANEOUS PRN
Status: CANCELLED | OUTPATIENT
Start: 2024-02-12

## 2024-02-12 RX ORDER — SODIUM CHLORIDE 9 MG/ML
INJECTION, SOLUTION INTRAVENOUS PRN
Status: DISCONTINUED | OUTPATIENT
Start: 2024-02-12 | End: 2024-02-13 | Stop reason: HOSPADM

## 2024-02-12 RX ORDER — SODIUM CHLORIDE 0.9 % (FLUSH) 0.9 %
5-40 SYRINGE (ML) INJECTION PRN
Status: CANCELLED | OUTPATIENT
Start: 2024-02-14

## 2024-02-12 RX ORDER — SODIUM CHLORIDE 0.9 % (FLUSH) 0.9 %
5-40 SYRINGE (ML) INJECTION PRN
Status: DISCONTINUED | OUTPATIENT
Start: 2024-02-12 | End: 2024-02-13 | Stop reason: HOSPADM

## 2024-02-12 RX ORDER — ONDANSETRON 2 MG/ML
8 INJECTION INTRAMUSCULAR; INTRAVENOUS
Status: CANCELLED | OUTPATIENT
Start: 2024-02-12

## 2024-02-12 RX ORDER — SODIUM CHLORIDE 9 MG/ML
5-250 INJECTION, SOLUTION INTRAVENOUS PRN
Status: CANCELLED | OUTPATIENT
Start: 2024-02-14

## 2024-02-12 RX ORDER — PALONOSETRON 0.05 MG/ML
0.25 INJECTION, SOLUTION INTRAVENOUS ONCE
Status: COMPLETED | OUTPATIENT
Start: 2024-02-12 | End: 2024-02-12

## 2024-02-12 RX ORDER — EPINEPHRINE 1 MG/ML
0.3 INJECTION, SOLUTION INTRAMUSCULAR; SUBCUTANEOUS PRN
Status: CANCELLED | OUTPATIENT
Start: 2024-02-12

## 2024-02-12 RX ORDER — ALBUTEROL SULFATE 90 UG/1
4 AEROSOL, METERED RESPIRATORY (INHALATION) PRN
Status: CANCELLED | OUTPATIENT
Start: 2024-02-12

## 2024-02-12 RX ORDER — DIPHENHYDRAMINE HYDROCHLORIDE 50 MG/ML
50 INJECTION INTRAMUSCULAR; INTRAVENOUS
Status: CANCELLED | OUTPATIENT
Start: 2024-02-12

## 2024-02-12 RX ORDER — HEPARIN 100 UNIT/ML
500 SYRINGE INTRAVENOUS PRN
Status: CANCELLED | OUTPATIENT
Start: 2024-02-12

## 2024-02-12 RX ORDER — HEPARIN 100 UNIT/ML
500 SYRINGE INTRAVENOUS PRN
Status: CANCELLED | OUTPATIENT
Start: 2024-02-14

## 2024-02-12 RX ORDER — SODIUM CHLORIDE 0.9 % (FLUSH) 0.9 %
5-40 SYRINGE (ML) INJECTION PRN
OUTPATIENT
Start: 2024-02-12

## 2024-02-12 RX ORDER — SODIUM CHLORIDE 9 MG/ML
25 INJECTION, SOLUTION INTRAVENOUS PRN
OUTPATIENT
Start: 2024-02-12

## 2024-02-12 RX ORDER — SODIUM CHLORIDE 0.9 % (FLUSH) 0.9 %
5-40 SYRINGE (ML) INJECTION PRN
Status: CANCELLED | OUTPATIENT
Start: 2024-02-12

## 2024-02-12 RX ORDER — HEPARIN 100 UNIT/ML
500 SYRINGE INTRAVENOUS PRN
OUTPATIENT
Start: 2024-02-12

## 2024-02-12 RX ORDER — SODIUM CHLORIDE 9 MG/ML
INJECTION, SOLUTION INTRAVENOUS CONTINUOUS
Status: CANCELLED | OUTPATIENT
Start: 2024-02-12

## 2024-02-12 RX ORDER — SODIUM CHLORIDE 9 MG/ML
5-250 INJECTION, SOLUTION INTRAVENOUS PRN
Status: CANCELLED | OUTPATIENT
Start: 2024-02-12

## 2024-02-12 RX ADMIN — FLUOROURACIL 3325 MG: 50 INJECTION, SOLUTION INTRAVENOUS at 13:15

## 2024-02-12 RX ADMIN — SODIUM CHLORIDE, PRESERVATIVE FREE 20 ML: 5 INJECTION INTRAVENOUS at 13:15

## 2024-02-12 RX ADMIN — PALONOSETRON 0.25 MG: 0.05 INJECTION, SOLUTION INTRAVENOUS at 11:26

## 2024-02-12 RX ADMIN — SODIUM CHLORIDE, PRESERVATIVE FREE 20 ML: 5 INJECTION INTRAVENOUS at 10:18

## 2024-02-12 RX ADMIN — DEXAMETHASONE SODIUM PHOSPHATE 12 MG: 4 INJECTION, SOLUTION INTRAMUSCULAR; INTRAVENOUS at 11:29

## 2024-02-12 RX ADMIN — LEUCOVORIN CALCIUM 750 MG: 350 INJECTION, POWDER, LYOPHILIZED, FOR SUSPENSION INTRAMUSCULAR; INTRAVENOUS at 11:54

## 2024-02-12 RX ADMIN — SODIUM CHLORIDE, PRESERVATIVE FREE 10 ML: 5 INJECTION INTRAVENOUS at 10:17

## 2024-02-12 RX ADMIN — SODIUM CHLORIDE: 9 INJECTION, SOLUTION INTRAVENOUS at 11:25

## 2024-02-12 NOTE — DISCHARGE INSTRUCTIONS
Please contact your Oncologist if you have any questions regarding the Leucovorin 5 FU CADD pump that you received today.    You are instructed to call the office or go to the Emergency Dept. If you experience any of the following symptoms:    Dizziness/lightheadedness   Acute nausea or vomiting-not relieved by medications  Headaches-not relieved by medications  New chest pain or pressure  New rash /itching  New shortness of breath  Fever,chills or signs or symptoms of infection    Make sure you are drinking 48 to 64 ounces of water daily-if you are unable to drink fluids let us know right away.

## 2024-02-12 NOTE — ONCOLOGY
Chemotherapy Administration    Pre-assessment Data: Antineoplastic Agents  See toxicity flow sheet for assessment                                          [x]         Interventions:   Chemotherapy SQ injection given []   Taxol administered-VS per protocol []   Blood pressure meds held 12 hours prior to Rituxan/Ruxience []   Rituxan/Ruxience administered- VS and precautions per guidelines []   Emergency drugs available as appropriate [x]   Anaphylaxis assessment completed [x]   Pre-medications administered as ordered [x]   Blood return noted upon initiation of chemotherapy [x]   Blood return noted each 1-2ml of a vesicant medication if given IV push []   Navelbine, Vincristine and Velban given as a monitored wide open drip, blood return noted before during and after infusion. []   Blood return noted each 2-3ml of a non-vesicant medication if given IV push []   Patient aware of potential Immunotherapy toxicities []   Monitor for signs / symptoms of hypersensitivity reaction [x]   Chemotherapy orders (drug/dose/rate) verified by 2 Chemo certified RN’s [x]   Monitor IV site and blood return throughout the infusion of the medication [x]   Document IV site checks on the IV assessment form [x]   Document chemotherapy teaching on the Patient Education tab [x]   Document patient verbalizes understanding of medications being administered [x]   If IV infiltration, see ONS Guidelines []   Other:      []

## 2024-02-12 NOTE — PLAN OF CARE
Problem: Safety - Adult  Goal: Free from fall injury  Outcome: Adequate for Discharge  Flowsheets (Taken 2/12/2024 1122)  Free From Fall Injury:   Instruct family/caregiver on patient safety   Based on caregiver fall risk screen, instruct family/caregiver to ask for assistance with transferring infant if caregiver noted to have fall risk factors  Note: Free from falls while in O.P. Oncology.Discussed the need to use the call light for assistance when getting up to ambulate.     Problem: Chronic Conditions and Co-morbidities  Goal: Patient's chronic conditions and co-morbidity symptoms are monitored and maintained or improved  Outcome: Adequate for Discharge  Flowsheets (Taken 2/12/2024 1122)  Care Plan - Patient's Chronic Conditions and Co-Morbidity Symptoms are Monitored and Maintained or Improved:   Monitor and assess patient's chronic conditions and comorbid symptoms for stability, deterioration, or improvement   Collaborate with multidisciplinary team to address chronic and comorbid conditions and prevent exacerbation or deterioration  Note:   Chemotherapy Teaching     What is Chemotherapy   Drug action [x]   Method of Administration [x]   Handouts given []     Side Effects  Nausea/vomiting [x]   Diarrhea [x]   Fatigue [x]   Signs / Symptoms of infection [x]   Neutropenia [x]   Thrombocytopenia [x]   Alopecia [x]   neuropathy [x]   [place] diet &  the importance of fluids [x]       Micellaneous  Importance of nutrition [x]   Importance of oral hygiene [x]   When to call the MD [x]   Monitoring labs [x]   Use of supportive services []     Explanation of Drug Regimen / Frequency  5Fu and leucovorin     Comments  Verbalized understanding to drug,action,side effects and when to call MD        Problem: Discharge Planning  Goal: Discharge to home or other facility with appropriate resources  Outcome: Adequate for Discharge  Flowsheets (Taken 2/12/2024 1122)  Discharge to home or other facility with appropriate  resources:   Identify barriers to discharge with patient and caregiver   Identify discharge learning needs (meds, wound care, etc)  Note: Verbalize understanding of discharge instructions, follow up appointments, and when to call Physician.Discuss understanding of discharge instructions, follow up appointments and when to call Physician.     Care plan reviewed with patient.  Patient verbalize understanding of the plan of care and contribute to goal setting.

## 2024-02-14 ENCOUNTER — HOSPITAL ENCOUNTER (OUTPATIENT)
Dept: INFUSION THERAPY | Age: 59
Discharge: HOME | End: 2024-02-14
Payer: MEDICARE

## 2024-02-14 VITALS
HEART RATE: 80 BPM | OXYGEN SATURATION: 99 % | SYSTOLIC BLOOD PRESSURE: 156 MMHG | DIASTOLIC BLOOD PRESSURE: 103 MMHG | TEMPERATURE: 97 F | RESPIRATION RATE: 16 BRPM

## 2024-02-14 DIAGNOSIS — C18.9 MALIGNANT NEOPLASM OF COLON, UNSPECIFIED PART OF COLON (HCC): ICD-10-CM

## 2024-02-14 DIAGNOSIS — C06.2 SQUAMOUS CELL CANCER OF RETROMOLAR TRIGONE (HCC): Primary | ICD-10-CM

## 2024-02-14 PROCEDURE — 96523 IRRIG DRUG DELIVERY DEVICE: CPT

## 2024-02-14 PROCEDURE — 6360000002 HC RX W HCPCS: Performed by: INTERNAL MEDICINE

## 2024-02-14 PROCEDURE — 2580000003 HC RX 258: Performed by: INTERNAL MEDICINE

## 2024-02-14 RX ORDER — HEPARIN 100 UNIT/ML
500 SYRINGE INTRAVENOUS PRN
Status: DISCONTINUED | OUTPATIENT
Start: 2024-02-14 | End: 2024-02-15 | Stop reason: HOSPADM

## 2024-02-14 RX ORDER — SODIUM CHLORIDE 0.9 % (FLUSH) 0.9 %
5-40 SYRINGE (ML) INJECTION PRN
Status: DISCONTINUED | OUTPATIENT
Start: 2024-02-14 | End: 2024-02-15 | Stop reason: HOSPADM

## 2024-02-14 RX ADMIN — HEPARIN 500 UNITS: 100 SYRINGE at 12:11

## 2024-02-14 RX ADMIN — SODIUM CHLORIDE, PRESERVATIVE FREE 20 ML: 5 INJECTION INTRAVENOUS at 12:11

## 2024-02-14 NOTE — PLAN OF CARE
Problem: Safety - Adult  Goal: Free from fall injury  Outcome: Adequate for Discharge  Flowsheets (Taken 2/14/2024 1411)  Free From Fall Injury: Instruct family/caregiver on patient safety     Problem: Chronic Conditions and Co-morbidities  Goal: Patient's chronic conditions and co-morbidity symptoms are monitored and maintained or improved  Outcome: Adequate for Discharge  Flowsheets (Taken 2/14/2024 1411)  Care Plan - Patient's Chronic Conditions and Co-Morbidity Symptoms are Monitored and Maintained or Improved:   Monitor and assess patient's chronic conditions and comorbid symptoms for stability, deterioration, or improvement   Collaborate with multidisciplinary team to address chronic and comorbid conditions and prevent exacerbation or deterioration  Note: Patient verbalizes understanding to verbal information given on pump off,action and possible side effects. Aware to call MD if develop complications.        Problem: Discharge Planning  Goal: Discharge to home or other facility with appropriate resources  Outcome: Adequate for Discharge  Flowsheets (Taken 2/14/2024 1411)  Discharge to home or other facility with appropriate resources: Identify barriers to discharge with patient and caregiver     Problem: Infection - Adult  Goal: Absence of infection at discharge  Outcome: Adequate for Discharge  Flowsheets (Taken 2/14/2024 1411)  Absence of infection at discharge: Monitor all insertion sites i.e., indwelling lines, tubes and drains  Note: Mediport site with no redness or warmth. Skin over port site intact with no signs of breakdown noted. Patient verbalizes signs/symptoms of port infection and when to notify the physician.     Care plan reviewed with patient.  Patient verbalize understanding of the plan of care and contribute to goal setting.

## 2024-02-21 PROBLEM — Z51.11 ENCOUNTER FOR ANTINEOPLASTIC CHEMOTHERAPY: Status: ACTIVE | Noted: 2024-02-21

## 2024-02-21 PROBLEM — C18.9 ADENOCARCINOMA OF COLON METASTATIC TO LIVER (HCC): Status: ACTIVE | Noted: 2024-02-21

## 2024-02-21 PROBLEM — C78.7 ADENOCARCINOMA OF COLON METASTATIC TO LIVER (HCC): Status: ACTIVE | Noted: 2024-02-21

## 2024-02-21 NOTE — PROGRESS NOTES
07:07 AM    BUN 5 05/13/2023 07:07 AM    LABALBU 4.0 02/12/2024 10:17 AM    CREATININE 1.0 02/26/2024 10:44 AM    CREATININE 0.8 05/13/2023 07:07 AM    CALCIUM 8.5 05/13/2023 07:07 AM    LABGLOM >60 02/26/2024 10:44 AM    GLUCOSE 83 05/13/2023 07:07 AM     Magnesium:    Lab Results   Component Value Date/Time    MG 2.1 04/28/2023 03:25 PM     PT/INR:    Lab Results   Component Value Date/Time    INR 0.92 12/10/2020 07:25 AM     TSH:    Lab Results   Component Value Date/Time    TSH 1.47 12/02/2022 12:20 PM     VITAMIN B12: No components found for: \"B12\"  FOLATE:    Lab Results   Component Value Date/Time    FOLATE > 20.0 01/02/2024 09:50 AM     IRON:    Lab Results   Component Value Date/Time    IRON 63 01/02/2024 09:50 AM     Iron Saturation:  No components found for: \"PERCENTFE\"  TIBC:    Lab Results   Component Value Date/Time    TIBC 292 01/02/2024 09:50 AM     FERRITIN:    Lab Results   Component Value Date/Time    FERRITIN 124 01/02/2024 09:50 AM     PSA:   Lab Results   Component Value Date/Time    PSA 1.00 04/06/2018 02:53 PM        IMAGING:  Per HPI  No results found.     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  Negative for LVI, negative for perineural invasion, invasion into the mandibular bone with negative surgical margins (closest margin 4 mm from the medial note coastal margin).  In total, 4 of 103 lymph nodes positive for malignancy with the largest lymph node measuring 1.7 cm.   pT4a pN2b    -5/2/2023. Colonoscopy: poorly differentiated adenocarcinoma in the cecum and fragments of tubular adenoma in the sigmoid.    -5/9/2023 robotic right colectomy: poorly differentiated adenocarcinoma with

## 2024-02-22 ENCOUNTER — HOSPITAL ENCOUNTER (OUTPATIENT)
Dept: CT IMAGING | Age: 59
Discharge: HOME | End: 2024-02-22
Attending: INTERNAL MEDICINE
Payer: MEDICARE

## 2024-02-22 DIAGNOSIS — C18.9 MALIGNANT NEOPLASM OF COLON, UNSPECIFIED PART OF COLON (HCC): ICD-10-CM

## 2024-02-22 PROCEDURE — 71260 CT THORAX DX C+: CPT

## 2024-02-22 PROCEDURE — 74177 CT ABD & PELVIS W/CONTRAST: CPT

## 2024-02-22 PROCEDURE — 6360000002 HC RX W HCPCS: Performed by: RADIOLOGY

## 2024-02-22 PROCEDURE — 6360000004 HC RX CONTRAST MEDICATION: Performed by: INTERNAL MEDICINE

## 2024-02-22 RX ORDER — HEPARIN SODIUM (PORCINE) LOCK FLUSH IV SOLN 100 UNIT/ML 100 UNIT/ML
500 SOLUTION INTRAVENOUS ONCE
Status: COMPLETED | OUTPATIENT
Start: 2024-02-22 | End: 2024-02-22

## 2024-02-22 RX ADMIN — HEPARIN 500 UNITS: 100 SYRINGE at 09:46

## 2024-02-22 RX ADMIN — IOPAMIDOL 80 ML: 755 INJECTION, SOLUTION INTRAVENOUS at 09:41

## 2024-02-26 ENCOUNTER — HOSPITAL ENCOUNTER (OUTPATIENT)
Dept: INFUSION THERAPY | Age: 59
Discharge: HOME | End: 2024-02-26
Payer: MEDICARE

## 2024-02-26 ENCOUNTER — OFFICE VISIT (OUTPATIENT)
Dept: ONCOLOGY | Age: 59
End: 2024-02-26
Payer: MEDICARE

## 2024-02-26 VITALS
SYSTOLIC BLOOD PRESSURE: 164 MMHG | DIASTOLIC BLOOD PRESSURE: 102 MMHG | RESPIRATION RATE: 16 BRPM | OXYGEN SATURATION: 98 % | HEART RATE: 59 BPM | WEIGHT: 145 LBS | HEIGHT: 70 IN | BODY MASS INDEX: 20.76 KG/M2 | TEMPERATURE: 97.4 F

## 2024-02-26 VITALS
HEART RATE: 58 BPM | OXYGEN SATURATION: 98 % | SYSTOLIC BLOOD PRESSURE: 174 MMHG | RESPIRATION RATE: 16 BRPM | DIASTOLIC BLOOD PRESSURE: 90 MMHG | TEMPERATURE: 97.7 F

## 2024-02-26 DIAGNOSIS — C06.2 SQUAMOUS CELL CANCER OF RETROMOLAR TRIGONE (HCC): ICD-10-CM

## 2024-02-26 DIAGNOSIS — G89.3 CANCER RELATED PAIN: ICD-10-CM

## 2024-02-26 DIAGNOSIS — C18.9 MALIGNANT NEOPLASM OF COLON, UNSPECIFIED PART OF COLON (HCC): Primary | ICD-10-CM

## 2024-02-26 DIAGNOSIS — C18.9 ADENOCARCINOMA OF COLON METASTATIC TO LIVER (HCC): ICD-10-CM

## 2024-02-26 DIAGNOSIS — D50.0 IRON DEFICIENCY ANEMIA DUE TO CHRONIC BLOOD LOSS: ICD-10-CM

## 2024-02-26 DIAGNOSIS — C78.7 ADENOCARCINOMA OF COLON METASTATIC TO LIVER (HCC): ICD-10-CM

## 2024-02-26 DIAGNOSIS — C18.7 MALIGNANT NEOPLASM OF SIGMOID COLON (HCC): ICD-10-CM

## 2024-02-26 DIAGNOSIS — C18.2 MALIGNANT NEOPLASM OF ASCENDING COLON (HCC): ICD-10-CM

## 2024-02-26 DIAGNOSIS — Z51.11 ENCOUNTER FOR ANTINEOPLASTIC CHEMOTHERAPY: ICD-10-CM

## 2024-02-26 DIAGNOSIS — C18.2 MALIGNANT NEOPLASM OF ASCENDING COLON (HCC): Primary | ICD-10-CM

## 2024-02-26 LAB
ABSOLUTE IMMATURE GRANULOCYTE: 0.04 THOU/MM3 (ref 0–0.07)
ALBUMIN SERPL BCG-MCNC: 4.4 G/DL (ref 3.5–5.1)
ALP SERPL-CCNC: 76 U/L (ref 38–126)
ALT SERPL W/O P-5'-P-CCNC: 13 U/L (ref 11–66)
AST SERPL-CCNC: 29 U/L (ref 5–40)
BASOPHILS ABSOLUTE: 0.1 THOU/MM3 (ref 0–0.1)
BASOPHILS NFR BLD AUTO: 1 % (ref 0–3)
BILIRUB CONJ SERPL-MCNC: < 0.2 MG/DL (ref 0–0.3)
BILIRUB SERPL-MCNC: 0.2 MG/DL (ref 0.3–1.2)
BUN BLDP-MCNC: 5 MG/DL (ref 8–26)
CHLORIDE BLD-SCNC: 108 MEQ/L (ref 98–109)
CREAT BLD-MCNC: 1 MG/DL (ref 0.5–1.2)
EOSINOPHIL NFR BLD AUTO: 1 % (ref 0–4)
EOSINOPHILS ABSOLUTE: 0.1 THOU/MM3 (ref 0–0.4)
ERYTHROCYTE [DISTWIDTH] IN BLOOD BY AUTOMATED COUNT: 16.3 % (ref 11.5–14.5)
GFR SERPL CREATININE-BSD FRML MDRD: > 60 ML/MIN/1.73M2
GLUCOSE BLD-MCNC: 77 MG/DL (ref 70–108)
HCT VFR BLD AUTO: 40.4 % (ref 42–52)
HGB BLD-MCNC: 13.4 GM/DL (ref 14–18)
IMMATURE GRANULOCYTES: 1 %
IONIZED CALCIUM, WHOLE BLOOD: 1.17 MMOL/L (ref 1.12–1.32)
LYMPHOCYTES ABSOLUTE: 2.3 THOU/MM3 (ref 1–4.8)
LYMPHOCYTES NFR BLD AUTO: 39 % (ref 15–47)
MCH RBC QN AUTO: 32.7 PG (ref 26–33)
MCHC RBC AUTO-ENTMCNC: 33.2 GM/DL (ref 32.2–35.5)
MCV RBC AUTO: 99 FL (ref 80–94)
MONOCYTES ABSOLUTE: 0.7 THOU/MM3 (ref 0.4–1.3)
MONOCYTES NFR BLD AUTO: 12 % (ref 0–12)
NEUTROPHILS NFR BLD AUTO: 46 % (ref 43–75)
PLATELET # BLD AUTO: 236 THOU/MM3 (ref 130–400)
PMV BLD AUTO: 9.3 FL (ref 9.4–12.4)
POTASSIUM BLD-SCNC: 4 MEQ/L (ref 3.5–4.9)
PROT SERPL-MCNC: 7.7 G/DL (ref 6.1–8)
RBC # BLD AUTO: 4.1 MILL/MM3 (ref 4.7–6.1)
SEGMENTED NEUTROPHILS ABSOLUTE COUNT: 2.7 THOU/MM3 (ref 1.8–7.7)
SODIUM BLD-SCNC: 142 MEQ/L (ref 138–146)
TOTAL CO2, WHOLE BLOOD: 28 MEQ/L (ref 23–33)
WBC # BLD AUTO: 5.9 THOU/MM3 (ref 4.8–10.8)

## 2024-02-26 PROCEDURE — 36591 DRAW BLOOD OFF VENOUS DEVICE: CPT

## 2024-02-26 PROCEDURE — 99211 OFF/OP EST MAY X REQ PHY/QHP: CPT

## 2024-02-26 PROCEDURE — 80047 BASIC METABLC PNL IONIZED CA: CPT

## 2024-02-26 PROCEDURE — 1036F TOBACCO NON-USER: CPT | Performed by: INTERNAL MEDICINE

## 2024-02-26 PROCEDURE — 96367 TX/PROPH/DG ADDL SEQ IV INF: CPT

## 2024-02-26 PROCEDURE — 99214 OFFICE O/P EST MOD 30 MIN: CPT | Performed by: INTERNAL MEDICINE

## 2024-02-26 PROCEDURE — G8420 CALC BMI NORM PARAMETERS: HCPCS | Performed by: INTERNAL MEDICINE

## 2024-02-26 PROCEDURE — 85025 COMPLETE CBC W/AUTO DIFF WBC: CPT

## 2024-02-26 PROCEDURE — 2580000003 HC RX 258: Performed by: INTERNAL MEDICINE

## 2024-02-26 PROCEDURE — 96375 TX/PRO/DX INJ NEW DRUG ADDON: CPT

## 2024-02-26 PROCEDURE — 80076 HEPATIC FUNCTION PANEL: CPT

## 2024-02-26 PROCEDURE — 3017F COLORECTAL CA SCREEN DOC REV: CPT | Performed by: INTERNAL MEDICINE

## 2024-02-26 PROCEDURE — 96365 THER/PROPH/DIAG IV INF INIT: CPT

## 2024-02-26 PROCEDURE — 6360000002 HC RX W HCPCS: Performed by: INTERNAL MEDICINE

## 2024-02-26 PROCEDURE — G8484 FLU IMMUNIZE NO ADMIN: HCPCS | Performed by: INTERNAL MEDICINE

## 2024-02-26 PROCEDURE — G8427 DOCREV CUR MEDS BY ELIG CLIN: HCPCS | Performed by: INTERNAL MEDICINE

## 2024-02-26 PROCEDURE — 96416 CHEMO PROLONG INFUSE W/PUMP: CPT

## 2024-02-26 RX ORDER — OXYCODONE AND ACETAMINOPHEN 7.5; 325 MG/1; MG/1
1 TABLET ORAL EVERY 4 HOURS PRN
Qty: 120 TABLET | Refills: 0 | Status: SHIPPED | OUTPATIENT
Start: 2024-02-26 | End: 2024-03-27

## 2024-02-26 RX ORDER — SODIUM CHLORIDE 9 MG/ML
25 INJECTION, SOLUTION INTRAVENOUS PRN
OUTPATIENT
Start: 2024-02-26

## 2024-02-26 RX ORDER — SODIUM CHLORIDE 9 MG/ML
5-250 INJECTION, SOLUTION INTRAVENOUS PRN
Status: CANCELLED | OUTPATIENT
Start: 2024-02-28

## 2024-02-26 RX ORDER — ALBUTEROL SULFATE 90 UG/1
4 AEROSOL, METERED RESPIRATORY (INHALATION) PRN
Status: CANCELLED | OUTPATIENT
Start: 2024-02-26

## 2024-02-26 RX ORDER — SODIUM CHLORIDE 0.9 % (FLUSH) 0.9 %
5-40 SYRINGE (ML) INJECTION PRN
Status: DISCONTINUED | OUTPATIENT
Start: 2024-02-26 | End: 2024-02-27 | Stop reason: HOSPADM

## 2024-02-26 RX ORDER — SODIUM CHLORIDE 9 MG/ML
INJECTION, SOLUTION INTRAVENOUS CONTINUOUS
Status: CANCELLED | OUTPATIENT
Start: 2024-02-26

## 2024-02-26 RX ORDER — PALONOSETRON 0.05 MG/ML
0.25 INJECTION, SOLUTION INTRAVENOUS ONCE
Status: CANCELLED | OUTPATIENT
Start: 2024-02-26 | End: 2024-02-26

## 2024-02-26 RX ORDER — SODIUM CHLORIDE 0.9 % (FLUSH) 0.9 %
5-40 SYRINGE (ML) INJECTION PRN
OUTPATIENT
Start: 2024-02-26

## 2024-02-26 RX ORDER — DIPHENHYDRAMINE HYDROCHLORIDE 50 MG/ML
50 INJECTION INTRAMUSCULAR; INTRAVENOUS
Status: CANCELLED | OUTPATIENT
Start: 2024-02-26

## 2024-02-26 RX ORDER — SODIUM CHLORIDE 0.9 % (FLUSH) 0.9 %
5-40 SYRINGE (ML) INJECTION PRN
Status: CANCELLED | OUTPATIENT
Start: 2024-02-26

## 2024-02-26 RX ORDER — PALONOSETRON 0.05 MG/ML
0.25 INJECTION, SOLUTION INTRAVENOUS ONCE
Status: COMPLETED | OUTPATIENT
Start: 2024-02-26 | End: 2024-02-26

## 2024-02-26 RX ORDER — SODIUM CHLORIDE 9 MG/ML
INJECTION, SOLUTION INTRAVENOUS PRN
Status: DISCONTINUED | OUTPATIENT
Start: 2024-02-26 | End: 2024-02-27 | Stop reason: HOSPADM

## 2024-02-26 RX ORDER — ONDANSETRON 2 MG/ML
8 INJECTION INTRAMUSCULAR; INTRAVENOUS
Status: CANCELLED | OUTPATIENT
Start: 2024-02-26

## 2024-02-26 RX ORDER — EPINEPHRINE 1 MG/ML
0.3 INJECTION, SOLUTION, CONCENTRATE INTRAVENOUS PRN
Status: CANCELLED | OUTPATIENT
Start: 2024-02-26

## 2024-02-26 RX ORDER — FAMOTIDINE 10 MG/ML
20 INJECTION, SOLUTION INTRAVENOUS
Status: CANCELLED | OUTPATIENT
Start: 2024-02-26

## 2024-02-26 RX ORDER — HEPARIN 100 UNIT/ML
500 SYRINGE INTRAVENOUS PRN
Status: DISCONTINUED | OUTPATIENT
Start: 2024-02-26 | End: 2024-02-27 | Stop reason: HOSPADM

## 2024-02-26 RX ORDER — SODIUM CHLORIDE 9 MG/ML
5-250 INJECTION, SOLUTION INTRAVENOUS PRN
Status: CANCELLED | OUTPATIENT
Start: 2024-02-26

## 2024-02-26 RX ORDER — HEPARIN 100 UNIT/ML
500 SYRINGE INTRAVENOUS PRN
OUTPATIENT
Start: 2024-02-26

## 2024-02-26 RX ORDER — HEPARIN SODIUM (PORCINE) LOCK FLUSH IV SOLN 100 UNIT/ML 100 UNIT/ML
500 SOLUTION INTRAVENOUS PRN
Status: CANCELLED | OUTPATIENT
Start: 2024-02-26

## 2024-02-26 RX ORDER — ACETAMINOPHEN 325 MG/1
650 TABLET ORAL
Status: CANCELLED | OUTPATIENT
Start: 2024-02-26

## 2024-02-26 RX ORDER — SODIUM CHLORIDE 9 MG/ML
INJECTION, SOLUTION INTRAVENOUS PRN
Status: CANCELLED
Start: 2024-02-26

## 2024-02-26 RX ORDER — SODIUM CHLORIDE 0.9 % (FLUSH) 0.9 %
5-40 SYRINGE (ML) INJECTION PRN
Status: CANCELLED | OUTPATIENT
Start: 2024-02-28

## 2024-02-26 RX ORDER — HEPARIN SODIUM (PORCINE) LOCK FLUSH IV SOLN 100 UNIT/ML 100 UNIT/ML
500 SOLUTION INTRAVENOUS PRN
Status: CANCELLED | OUTPATIENT
Start: 2024-02-28

## 2024-02-26 RX ORDER — MEPERIDINE HYDROCHLORIDE 50 MG/ML
12.5 INJECTION INTRAMUSCULAR; INTRAVENOUS; SUBCUTANEOUS PRN
Status: CANCELLED | OUTPATIENT
Start: 2024-02-26

## 2024-02-26 RX ADMIN — SODIUM CHLORIDE: 9 INJECTION, SOLUTION INTRAVENOUS at 11:59

## 2024-02-26 RX ADMIN — DEXAMETHASONE SODIUM PHOSPHATE 12 MG: 4 INJECTION, SOLUTION INTRAMUSCULAR; INTRAVENOUS at 12:00

## 2024-02-26 RX ADMIN — PALONOSETRON 0.25 MG: 0.05 INJECTION, SOLUTION INTRAVENOUS at 12:27

## 2024-02-26 RX ADMIN — SODIUM CHLORIDE, PRESERVATIVE FREE 30 ML: 5 INJECTION INTRAVENOUS at 10:40

## 2024-02-26 RX ADMIN — FLUOROURACIL 3325 MG: 50 INJECTION, SOLUTION INTRAVENOUS at 13:32

## 2024-02-26 RX ADMIN — LEUCOVORIN CALCIUM 750 MG: 50 INJECTION, POWDER, LYOPHILIZED, FOR SOLUTION INTRAMUSCULAR; INTRAVENOUS at 12:29

## 2024-02-26 NOTE — PLAN OF CARE
Problem: Safety - Adult  Goal: Free from fall injury  Outcome: Adequate for Discharge  Flowsheets (Taken 2/26/2024 1438)  Free From Fall Injury: Instruct family/caregiver on patient safety     Problem: Chronic Conditions and Co-morbidities  Goal: Patient's chronic conditions and co-morbidity symptoms are monitored and maintained or improved  Outcome: Adequate for Discharge  Flowsheets (Taken 2/26/2024 1438)  Care Plan - Patient's Chronic Conditions and Co-Morbidity Symptoms are Monitored and Maintained or Improved:   Monitor and assess patient's chronic conditions and comorbid symptoms for stability, deterioration, or improvement   Collaborate with multidisciplinary team to address chronic and comorbid conditions and prevent exacerbation or deterioration  Note:   Chemotherapy Teaching     What is Chemotherapy   Drug action [x]   Method of Administration [x]   Handouts given []     Side Effects  Nausea/vomiting [x]   Diarrhea [x]   Fatigue [x]   Signs / Symptoms of infection [x]   Neutropenia [x]   Thrombocytopenia [x]   Alopecia [x]   neuropathy [x]   [place] diet &  the importance of fluids [x]       Micellaneous  Importance of nutrition [x]   Importance of oral hygiene [x]   When to call the MD [x]   Monitoring labs [x]   Use of supportive services []     Explanation of Drug Regimen / Frequency  Leucovorin/5FU     Comments  Verbalized understanding to drug,action,side effects and when to call MD        Problem: Discharge Planning  Goal: Discharge to home or other facility with appropriate resources  Outcome: Adequate for Discharge  Flowsheets (Taken 2/26/2024 1438)  Discharge to home or other facility with appropriate resources: Identify barriers to discharge with patient and caregiver     Problem: Infection - Adult  Goal: Absence of infection at discharge  Outcome: Adequate for Discharge  Flowsheets (Taken 2/26/2024 1438)  Absence of infection at discharge: Monitor all insertion sites i.e., indwelling lines,

## 2024-02-26 NOTE — PROGRESS NOTES
Patient tolerated Leucovorin/5FU without any complications.  Denies dizziness, lightheadedness, acute nausea or vomiting, headache, heart palpitations, rash/itching or increased SOB.    Last vital signs  BP (!) 174/90   Pulse 58   Temp 97.7 °F (36.5 °C) (Oral)   Resp 16   SpO2 98%     Patient instructed if they experience any of the above symptoms following today's visit, he/she is to notify the Physician or go to the Emergency Dept.    Discharge instructions given to patient, Verbalizes understanding. Ambulated off unit per self in stable condition with all belongings.

## 2024-02-26 NOTE — DISCHARGE INSTRUCTIONS
Please contact your Oncologist if you have any questions regarding the Leucovorin/5FU that you received today.    You are instructed to call the office or go to the Emergency Dept. If you experience any of the following symptoms:    Dizziness/lightheadedness   Acute nausea or vomiting-not relieved by medications  Headaches-not relieved by medications  New chest pain or pressure  New rash /itching  New shortness of breath  Fever,chills or signs or symptoms of infection    Make sure you are drinking 48 to 64 ounces of water daily-if you are unable to drink fluids let us know right away.

## 2024-02-26 NOTE — TELEPHONE ENCOUNTER
Pt called requesting a refill percocet. Pt states he is taking 1 every 4 hours instead of every 6 hours.  Pt states he has 4 tablets left.   [facility] verified.

## 2024-02-26 NOTE — PATIENT INSTRUCTIONS
1) discussed switching to FOLFIRI given the CTs showed mixed response in the liver; pt doesn't wish to add treatment given he is so tired and \"beat up\" on just infusional 5_FU  2) treatment today  3) RTC in approx 3 weeks (extra week off per pt choice) with labs for next txt

## 2024-02-26 NOTE — ONCOLOGY
Chemotherapy Administration    Pre-assessment Data: Antineoplastic Agents  See toxicity flow sheet for assessment                                          [x]         Interventions:   Chemotherapy SQ injection given []   Taxol administered-VS per protocol []   Blood pressure meds held 12 hours prior to Rituxan/Ruxience []   Rituxan/Ruxience administered- VS and precautions per guidelines []   Emergency drugs available as appropriate [x]   Anaphylaxis assessment completed [x]   Pre-medications administered as ordered [x]   Blood return noted upon initiation of chemotherapy [x]   Blood return noted each 1-2ml of a vesicant medication if given IV push []   Navelbine, Vincristine and Velban given as a monitored wide open drip, blood return noted before during and after infusion. []   Blood return noted each 2-3ml of a non-vesicant medication if given IV push []   Patient aware of potential Immunotherapy toxicities []   Monitor for signs / symptoms of hypersensitivity reaction [x]   Chemotherapy orders (drug/dose/rate) verified by 2 Chemo certified RN’s [x]   Monitor IV site and blood return throughout the infusion of the medication [x]   Document IV site checks on the IV assessment form [x]   Document chemotherapy teaching on the Patient Education tab [x]   Document patient verbalizes understanding of medications being administered [x]   If IV infiltration, see ONS Guidelines []   Other:     5FU []

## 2024-02-28 ENCOUNTER — HOSPITAL ENCOUNTER (OUTPATIENT)
Dept: INFUSION THERAPY | Age: 59
Discharge: HOME | End: 2024-02-28
Payer: MEDICARE

## 2024-02-28 VITALS
HEART RATE: 70 BPM | DIASTOLIC BLOOD PRESSURE: 84 MMHG | TEMPERATURE: 97.5 F | OXYGEN SATURATION: 96 % | SYSTOLIC BLOOD PRESSURE: 128 MMHG | RESPIRATION RATE: 16 BRPM

## 2024-02-28 DIAGNOSIS — C18.9 MALIGNANT NEOPLASM OF COLON, UNSPECIFIED PART OF COLON (HCC): ICD-10-CM

## 2024-02-28 DIAGNOSIS — C06.2 SQUAMOUS CELL CANCER OF RETROMOLAR TRIGONE (HCC): Primary | ICD-10-CM

## 2024-02-28 PROCEDURE — 96523 IRRIG DRUG DELIVERY DEVICE: CPT

## 2024-02-28 PROCEDURE — 2580000003 HC RX 258: Performed by: INTERNAL MEDICINE

## 2024-02-28 PROCEDURE — 6360000002 HC RX W HCPCS: Performed by: INTERNAL MEDICINE

## 2024-02-28 RX ORDER — SODIUM CHLORIDE 0.9 % (FLUSH) 0.9 %
5-40 SYRINGE (ML) INJECTION PRN
Status: DISCONTINUED | OUTPATIENT
Start: 2024-02-28 | End: 2024-02-29 | Stop reason: HOSPADM

## 2024-02-28 RX ORDER — HEPARIN 100 UNIT/ML
500 SYRINGE INTRAVENOUS PRN
Status: DISCONTINUED | OUTPATIENT
Start: 2024-02-28 | End: 2024-02-29 | Stop reason: HOSPADM

## 2024-02-28 RX ADMIN — HEPARIN 500 UNITS: 100 SYRINGE at 12:00

## 2024-02-28 RX ADMIN — SODIUM CHLORIDE, PRESERVATIVE FREE 10 ML: 5 INJECTION INTRAVENOUS at 12:00

## 2024-02-28 NOTE — PROGRESS NOTES
Patient assessed for the following post chemotherapy:    Dizziness   No  Lightheadedness  No      Acute nausea/vomiting No  Headache   No  Chest pain/pressure  No  Rash/itching   No  Shortness of breath  No  .    Patient tolerated chemotherapy treatment 5FU CADD pump without any complications.    Last vital signs:   /84   Pulse 70   Temp 97.5 °F (36.4 °C) (Oral)   Resp 16   SpO2 96%       Patient instructed if experience any of the above symptoms following today's infusion,he/she is to notify MD immediately or go to the emergency department.    Discharge instructions given to patient. Verbalizes understanding. Ambulated off unit per self with spouse and with belongings.

## 2024-02-28 NOTE — PLAN OF CARE
Problem: Safety - Adult  Goal: Free from fall injury  Outcome: Adequate for Discharge  Flowsheets (Taken 2/28/2024 1553)  Free From Fall Injury: Instruct family/caregiver on patient safety     Problem: Discharge Planning  Goal: Discharge to home or other facility with appropriate resources  Outcome: Adequate for Discharge  Flowsheets (Taken 2/28/2024 1553)  Discharge to home or other facility with appropriate resources: Identify barriers to discharge with patient and caregiver     Problem: Infection - Adult  Goal: Absence of infection at discharge  Outcome: Adequate for Discharge  Flowsheets (Taken 2/28/2024 1553)  Absence of infection at discharge: Monitor all insertion sites i.e., indwelling lines, tubes and drains  Note: Mediport site with no redness or warmth. Skin over port site intact with no signs of breakdown noted. Patient verbalizes signs/symptoms of port infection and when to notify the physician.       Care plan reviewed with patient and family.  Patient and family verbalize understanding of the plan of care and contribute to goal setting.

## 2024-03-12 ENCOUNTER — TELEPHONE (OUTPATIENT)
Dept: PALLATIVE CARE | Age: 59
End: 2024-03-12

## 2024-03-12 NOTE — TELEPHONE ENCOUNTER
Pt's partner Cleo called states that they saw their PCP Rose Rodriguez today and that Dr Rodriguez informed the pt that he should consider Hospice. Cleo states that He was evaluated for hospice back on 10/1/23 but did not qualify. Cleo states that the patient is sleeping more, very weak, has declined more over the last 4 week. Cleo states that the last scan showed the patients cancer had spread and that the chemo did not work.     This nurse called and spoke with the [facility] hospice team, referral was started at this time for hospice to evaluate the patient.

## 2024-03-13 PROBLEM — D50.0 IRON DEFICIENCY ANEMIA DUE TO CHRONIC BLOOD LOSS: Status: ACTIVE | Noted: 2024-03-13

## 2024-03-13 NOTE — PROGRESS NOTES
collapse, disease and chronic pain.    7) Symptom Management:   -chronic pain from back/arthritis issues as well as abdominal pain: At this point we defer to Dr Pardo/Palliative, we previously explained to the patient and his significant other that oncology does not assume care of long-term (LT back pain) and we feel that our team is the best to assume care at that time. Also no current evidence of worsening metastatic disease on scans.*   -Sore throat, most likely secondary to chemotherapy related neutropenia, resolved with resolution of neutropenia.  -left drooping mouth: see above, MRI brain-edema, no CVA or intracranial metastasis, out of window for treatment (started over a week ago)    8) Medications reviewed.   Prescriptions today:            No orders of the defined types were placed in this encounter.       [place]AR[place]:  Controlled Substance Monitoring:    Acute and Chronic Pain Monitoring:   RX Monitoring Attestation Periodic Controlled Substance Monitoring   7/24/2017  11:00 AM The Prescription Monitoring Report for this patient was reviewed today. Possible medication side effects, risk of tolerance and/or dependence, and alternative treatments discussed;No signs of potential drug abuse or diversion identified.;Random urine drug screen sent today.        9) Follow Up:  Return in about 2 weeks (around 4/2/2024) for labs and tox check Sonam.     Torrie Masters [facility]

## 2024-03-19 ENCOUNTER — HOSPITAL ENCOUNTER (OUTPATIENT)
Dept: INFUSION THERAPY | Age: 59
Discharge: HOME | End: 2024-03-19
Payer: MEDICARE

## 2024-03-19 ENCOUNTER — OFFICE VISIT (OUTPATIENT)
Dept: ONCOLOGY | Age: 59
End: 2024-03-19
Payer: MEDICARE

## 2024-03-19 VITALS
SYSTOLIC BLOOD PRESSURE: 172 MMHG | HEIGHT: 70 IN | HEART RATE: 67 BPM | WEIGHT: 149 LBS | RESPIRATION RATE: 16 BRPM | TEMPERATURE: 98.2 F | DIASTOLIC BLOOD PRESSURE: 116 MMHG | BODY MASS INDEX: 21.33 KG/M2 | OXYGEN SATURATION: 99 %

## 2024-03-19 VITALS
WEIGHT: 149 LBS | RESPIRATION RATE: 16 BRPM | HEART RATE: 67 BPM | OXYGEN SATURATION: 99 % | TEMPERATURE: 98 F | DIASTOLIC BLOOD PRESSURE: 102 MMHG | SYSTOLIC BLOOD PRESSURE: 185 MMHG | BODY MASS INDEX: 21.38 KG/M2

## 2024-03-19 DIAGNOSIS — C06.2 SQUAMOUS CELL CANCER OF RETROMOLAR TRIGONE (HCC): ICD-10-CM

## 2024-03-19 DIAGNOSIS — C18.7 MALIGNANT NEOPLASM OF SIGMOID COLON (HCC): ICD-10-CM

## 2024-03-19 DIAGNOSIS — C18.9 ADENOCARCINOMA OF COLON METASTATIC TO LIVER (HCC): ICD-10-CM

## 2024-03-19 DIAGNOSIS — D50.0 IRON DEFICIENCY ANEMIA DUE TO CHRONIC BLOOD LOSS: ICD-10-CM

## 2024-03-19 DIAGNOSIS — Z51.11 ENCOUNTER FOR ANTINEOPLASTIC CHEMOTHERAPY: ICD-10-CM

## 2024-03-19 DIAGNOSIS — C78.7 ADENOCARCINOMA OF COLON METASTATIC TO LIVER (HCC): ICD-10-CM

## 2024-03-19 DIAGNOSIS — C18.2 MALIGNANT NEOPLASM OF ASCENDING COLON (HCC): Primary | ICD-10-CM

## 2024-03-19 DIAGNOSIS — C18.2 MALIGNANT NEOPLASM OF ASCENDING COLON (HCC): ICD-10-CM

## 2024-03-19 DIAGNOSIS — C18.9 MALIGNANT NEOPLASM OF COLON, UNSPECIFIED PART OF COLON (HCC): Primary | ICD-10-CM

## 2024-03-19 DIAGNOSIS — G89.3 CANCER RELATED PAIN: ICD-10-CM

## 2024-03-19 LAB
ABSOLUTE IMMATURE GRANULOCYTE: 0.01 THOU/MM3 (ref 0–0.07)
ALBUMIN SERPL BCG-MCNC: 4.7 G/DL (ref 3.5–5.1)
ALP SERPL-CCNC: 72 U/L (ref 38–126)
ALT SERPL W/O P-5'-P-CCNC: 9 U/L (ref 11–66)
AST SERPL-CCNC: 23 U/L (ref 5–40)
BASOPHILS ABSOLUTE: 0 THOU/MM3 (ref 0–0.1)
BASOPHILS NFR BLD AUTO: 0 % (ref 0–3)
BILIRUB CONJ SERPL-MCNC: < 0.2 MG/DL (ref 0–0.3)
BILIRUB SERPL-MCNC: 0.3 MG/DL (ref 0.3–1.2)
BUN BLDP-MCNC: 4 MG/DL (ref 8–26)
CEA SERPL-MCNC: 5.1 NG/ML (ref 0–5)
CHLORIDE BLD-SCNC: 104 MEQ/L (ref 98–109)
CREAT BLD-MCNC: 0.8 MG/DL (ref 0.5–1.2)
EOSINOPHIL NFR BLD AUTO: 0 % (ref 0–4)
EOSINOPHILS ABSOLUTE: 0 THOU/MM3 (ref 0–0.4)
ERYTHROCYTE [DISTWIDTH] IN BLOOD BY AUTOMATED COUNT: 16.1 % (ref 11.5–14.5)
GFR SERPL CREATININE-BSD FRML MDRD: > 60 ML/MIN/1.73M2
GLUCOSE BLD-MCNC: 92 MG/DL (ref 70–108)
HCT VFR BLD AUTO: 38.6 % (ref 42–52)
HGB BLD-MCNC: 13.1 GM/DL (ref 14–18)
IMMATURE GRANULOCYTES: 0 %
IONIZED CALCIUM, WHOLE BLOOD: 1.16 MMOL/L (ref 1.12–1.32)
LYMPHOCYTES ABSOLUTE: 1.8 THOU/MM3 (ref 1–4.8)
LYMPHOCYTES NFR BLD AUTO: 22 % (ref 15–47)
MCH RBC QN AUTO: 32.7 PG (ref 26–33)
MCHC RBC AUTO-ENTMCNC: 33.9 GM/DL (ref 32.2–35.5)
MCV RBC AUTO: 96 FL (ref 80–94)
MONOCYTES ABSOLUTE: 0.5 THOU/MM3 (ref 0.4–1.3)
MONOCYTES NFR BLD AUTO: 6 % (ref 0–12)
NEUTROPHILS NFR BLD AUTO: 71 % (ref 43–75)
PLATELET # BLD AUTO: 246 THOU/MM3 (ref 130–400)
PMV BLD AUTO: 9.3 FL (ref 9.4–12.4)
POTASSIUM BLD-SCNC: 3.7 MEQ/L (ref 3.5–4.9)
PROT SERPL-MCNC: 8 G/DL (ref 6.1–8)
RBC # BLD AUTO: 4.01 MILL/MM3 (ref 4.7–6.1)
SEGMENTED NEUTROPHILS ABSOLUTE COUNT: 5.7 THOU/MM3 (ref 1.8–7.7)
SODIUM BLD-SCNC: 140 MEQ/L (ref 138–146)
TOTAL CO2, WHOLE BLOOD: 26 MEQ/L (ref 23–33)
WBC # BLD AUTO: 8.1 THOU/MM3 (ref 4.8–10.8)

## 2024-03-19 PROCEDURE — 2580000003 HC RX 258: Performed by: INTERNAL MEDICINE

## 2024-03-19 PROCEDURE — 6360000002 HC RX W HCPCS: Performed by: INTERNAL MEDICINE

## 2024-03-19 PROCEDURE — 96365 THER/PROPH/DIAG IV INF INIT: CPT

## 2024-03-19 PROCEDURE — 36591 DRAW BLOOD OFF VENOUS DEVICE: CPT

## 2024-03-19 PROCEDURE — 82378 CARCINOEMBRYONIC ANTIGEN: CPT

## 2024-03-19 PROCEDURE — 85025 COMPLETE CBC W/AUTO DIFF WBC: CPT

## 2024-03-19 PROCEDURE — 1036F TOBACCO NON-USER: CPT | Performed by: INTERNAL MEDICINE

## 2024-03-19 PROCEDURE — 80047 BASIC METABLC PNL IONIZED CA: CPT

## 2024-03-19 PROCEDURE — 80076 HEPATIC FUNCTION PANEL: CPT

## 2024-03-19 PROCEDURE — 99211 OFF/OP EST MAY X REQ PHY/QHP: CPT

## 2024-03-19 PROCEDURE — G8484 FLU IMMUNIZE NO ADMIN: HCPCS | Performed by: INTERNAL MEDICINE

## 2024-03-19 PROCEDURE — 3017F COLORECTAL CA SCREEN DOC REV: CPT | Performed by: INTERNAL MEDICINE

## 2024-03-19 PROCEDURE — G8427 DOCREV CUR MEDS BY ELIG CLIN: HCPCS | Performed by: INTERNAL MEDICINE

## 2024-03-19 PROCEDURE — G8420 CALC BMI NORM PARAMETERS: HCPCS | Performed by: INTERNAL MEDICINE

## 2024-03-19 PROCEDURE — 96416 CHEMO PROLONG INFUSE W/PUMP: CPT

## 2024-03-19 PROCEDURE — 96375 TX/PRO/DX INJ NEW DRUG ADDON: CPT

## 2024-03-19 PROCEDURE — 96367 TX/PROPH/DG ADDL SEQ IV INF: CPT

## 2024-03-19 PROCEDURE — 99214 OFFICE O/P EST MOD 30 MIN: CPT | Performed by: INTERNAL MEDICINE

## 2024-03-19 RX ORDER — SODIUM CHLORIDE 9 MG/ML
5-250 INJECTION, SOLUTION INTRAVENOUS PRN
Status: CANCELLED | OUTPATIENT
Start: 2024-03-19

## 2024-03-19 RX ORDER — HEPARIN 100 UNIT/ML
500 SYRINGE INTRAVENOUS PRN
OUTPATIENT
Start: 2024-03-19

## 2024-03-19 RX ORDER — HEPARIN SODIUM (PORCINE) LOCK FLUSH IV SOLN 100 UNIT/ML 100 UNIT/ML
500 SOLUTION INTRAVENOUS PRN
Status: CANCELLED | OUTPATIENT
Start: 2024-03-19

## 2024-03-19 RX ORDER — EPINEPHRINE 1 MG/ML
0.3 INJECTION, SOLUTION, CONCENTRATE INTRAVENOUS PRN
Status: CANCELLED | OUTPATIENT
Start: 2024-03-19

## 2024-03-19 RX ORDER — ACETAMINOPHEN 325 MG/1
650 TABLET ORAL
Status: CANCELLED | OUTPATIENT
Start: 2024-03-19

## 2024-03-19 RX ORDER — PALONOSETRON 0.05 MG/ML
0.25 INJECTION, SOLUTION INTRAVENOUS ONCE
Status: CANCELLED | OUTPATIENT
Start: 2024-03-19 | End: 2024-03-19

## 2024-03-19 RX ORDER — SODIUM CHLORIDE 9 MG/ML
25 INJECTION, SOLUTION INTRAVENOUS PRN
OUTPATIENT
Start: 2024-03-19

## 2024-03-19 RX ORDER — ALBUTEROL SULFATE 90 UG/1
4 AEROSOL, METERED RESPIRATORY (INHALATION) PRN
Status: CANCELLED | OUTPATIENT
Start: 2024-03-19

## 2024-03-19 RX ORDER — SODIUM CHLORIDE 0.9 % (FLUSH) 0.9 %
5-40 SYRINGE (ML) INJECTION PRN
Status: CANCELLED | OUTPATIENT
Start: 2024-03-19

## 2024-03-19 RX ORDER — FAMOTIDINE 10 MG/ML
20 INJECTION, SOLUTION INTRAVENOUS
Status: CANCELLED | OUTPATIENT
Start: 2024-03-19

## 2024-03-19 RX ORDER — SODIUM CHLORIDE 0.9 % (FLUSH) 0.9 %
5-40 SYRINGE (ML) INJECTION PRN
Status: CANCELLED | OUTPATIENT
Start: 2024-03-21

## 2024-03-19 RX ORDER — SODIUM CHLORIDE 9 MG/ML
INJECTION, SOLUTION INTRAVENOUS CONTINUOUS
Status: CANCELLED | OUTPATIENT
Start: 2024-03-19

## 2024-03-19 RX ORDER — ONDANSETRON 2 MG/ML
8 INJECTION INTRAMUSCULAR; INTRAVENOUS
Status: CANCELLED | OUTPATIENT
Start: 2024-03-19

## 2024-03-19 RX ORDER — SODIUM CHLORIDE 9 MG/ML
INJECTION, SOLUTION INTRAVENOUS PRN
Status: DISCONTINUED | OUTPATIENT
Start: 2024-03-19 | End: 2024-03-20 | Stop reason: HOSPADM

## 2024-03-19 RX ORDER — HEPARIN 100 UNIT/ML
500 SYRINGE INTRAVENOUS PRN
Status: DISCONTINUED | OUTPATIENT
Start: 2024-03-19 | End: 2024-03-20 | Stop reason: HOSPADM

## 2024-03-19 RX ORDER — PALONOSETRON 0.05 MG/ML
0.25 INJECTION, SOLUTION INTRAVENOUS ONCE
Status: COMPLETED | OUTPATIENT
Start: 2024-03-19 | End: 2024-03-19

## 2024-03-19 RX ORDER — HEPARIN SODIUM (PORCINE) LOCK FLUSH IV SOLN 100 UNIT/ML 100 UNIT/ML
500 SOLUTION INTRAVENOUS PRN
Status: CANCELLED | OUTPATIENT
Start: 2024-03-21

## 2024-03-19 RX ORDER — MEPERIDINE HYDROCHLORIDE 50 MG/ML
12.5 INJECTION INTRAMUSCULAR; INTRAVENOUS; SUBCUTANEOUS PRN
Status: CANCELLED | OUTPATIENT
Start: 2024-03-19

## 2024-03-19 RX ORDER — SODIUM CHLORIDE 9 MG/ML
INJECTION, SOLUTION INTRAVENOUS PRN
Status: CANCELLED
Start: 2024-03-19

## 2024-03-19 RX ORDER — SODIUM CHLORIDE 0.9 % (FLUSH) 0.9 %
5-40 SYRINGE (ML) INJECTION PRN
OUTPATIENT
Start: 2024-03-19

## 2024-03-19 RX ORDER — OXYCODONE AND ACETAMINOPHEN 7.5; 325 MG/1; MG/1
1 TABLET ORAL EVERY 4 HOURS PRN
Qty: 180 TABLET | Refills: 0 | Status: SHIPPED | OUTPATIENT
Start: 2024-03-19 | End: 2024-04-18

## 2024-03-19 RX ORDER — DIPHENHYDRAMINE HYDROCHLORIDE 50 MG/ML
50 INJECTION INTRAMUSCULAR; INTRAVENOUS
Status: CANCELLED | OUTPATIENT
Start: 2024-03-19

## 2024-03-19 RX ORDER — SODIUM CHLORIDE 9 MG/ML
5-250 INJECTION, SOLUTION INTRAVENOUS PRN
Status: CANCELLED | OUTPATIENT
Start: 2024-03-21

## 2024-03-19 RX ORDER — SODIUM CHLORIDE 0.9 % (FLUSH) 0.9 %
5-40 SYRINGE (ML) INJECTION PRN
Status: DISCONTINUED | OUTPATIENT
Start: 2024-03-19 | End: 2024-03-20 | Stop reason: HOSPADM

## 2024-03-19 RX ADMIN — SODIUM CHLORIDE, PRESERVATIVE FREE 10 ML: 5 INJECTION INTRAVENOUS at 08:35

## 2024-03-19 RX ADMIN — PALONOSETRON 0.25 MG: 0.05 INJECTION, SOLUTION INTRAVENOUS at 10:13

## 2024-03-19 RX ADMIN — FLUOROURACIL 3500 MG: 50 INJECTION, SOLUTION INTRAVENOUS at 12:21

## 2024-03-19 RX ADMIN — SODIUM CHLORIDE: 9 INJECTION, SOLUTION INTRAVENOUS at 10:11

## 2024-03-19 RX ADMIN — SODIUM CHLORIDE, PRESERVATIVE FREE 20 ML: 5 INJECTION INTRAVENOUS at 12:21

## 2024-03-19 RX ADMIN — DEXAMETHASONE SODIUM PHOSPHATE 12 MG: 4 INJECTION, SOLUTION INTRAMUSCULAR; INTRAVENOUS at 10:34

## 2024-03-19 RX ADMIN — SODIUM CHLORIDE, PRESERVATIVE FREE 10 ML: 5 INJECTION INTRAVENOUS at 08:36

## 2024-03-19 RX ADMIN — LEUCOVORIN CALCIUM 750 MG: 50 INJECTION, POWDER, LYOPHILIZED, FOR SOLUTION INTRAMUSCULAR; INTRAVENOUS at 10:53

## 2024-03-19 NOTE — TELEPHONE ENCOUNTER
Pt's Partner Cleo called states that the patient is out of Percocet 7.5-325mg. Cleo state that the last script only lasted 20 days (120 tablets). Pt is taking 1 tablet every 4 hours and is working well. Cleo states that the patient has 2 tablets left and today is a chemo day and the patient is normally in a little more pain than normal on chemo days.     Script updated to reflex 30 days.     [facility] [address] verified.

## 2024-03-19 NOTE — PROGRESS NOTES
Patient tolerated Leucovorin and connection of 5 FU CADD pump without any complications.  Denies dizziness, lightheadedness, acute nausea or vomiting, headache, heart palpitations, rash/itching or increased SOB.    CADD pump 5FU added via medi-port to infuse at 5.4ml/hr over 46 hours. Connections secured and tape to chest. Patient and family instructed on pump- it's usage,what to do if alarm goes off, and who to call if any questions. Verified pump running before discharge.    Last vital signs  BP (!) 185/102   Pulse 67   Temp 98 °F (36.7 °C) (Oral)   Resp 16   Wt 67.6 kg (149 lb)   SpO2 99%   BMI 21.38 kg/m²     Patient instructed if they experience any of the above symptoms following today's visit, he/she is to notify the Physician or go to the Emergency Dept.    Discharge instructions given to patient, Verbalizes understanding. Ambulated off unit per self in stable condition with all belongings.

## 2024-03-19 NOTE — PATIENT INSTRUCTIONS
1) treatment today; same dosing  2) pt not interested in hospice at this time continuing with Dr Manzo  3) RTC in 2 weeks with labs prior to next chemo jessica

## 2024-03-19 NOTE — DISCHARGE INSTRUCTIONS
Please contact your Oncologist if you have any questions regarding the leucovorin and 5 FU CADD that you received today.    You are instructed to call the office or go to the Emergency Dept. If you experience any of the following symptoms:    Dizziness/lightheadedness   Acute nausea or vomiting-not relieved by medications  Headaches-not relieved by medications  New chest pain or pressure  New rash /itching  New shortness of breath  Fever,chills or signs or symptoms of infection    Make sure you are drinking 48 to 64 ounces of water daily-if you are unable to drink fluids let us know right away.

## 2024-03-19 NOTE — PLAN OF CARE
Problem: Safety - Adult  Goal: Free from fall injury  Outcome: Adequate for Discharge  Flowsheets (Taken 3/19/2024 1314)  Free From Fall Injury:   Instruct family/caregiver on patient safety   Based on caregiver fall risk screen, instruct family/caregiver to ask for assistance with transferring infant if caregiver noted to have fall risk factors  Note: Free from falls while in O.P. Oncology.Discussed the need to use the call light for assistance when getting up to ambulate.     Problem: Discharge Planning  Goal: Discharge to home or other facility with appropriate resources  Outcome: Adequate for Discharge  Flowsheets (Taken 3/19/2024 1314)  Discharge to home or other facility with appropriate resources:   Identify barriers to discharge with patient and caregiver   Identify discharge learning needs (meds, wound care, etc)  Note: Verbalize understanding of discharge instructions, follow up appointments, and when to call Physician.Discuss understanding of discharge instructions, follow up appointments and when to call Physician.     Problem: Chronic Conditions and Co-morbidities  Goal: Patient's chronic conditions and co-morbidity symptoms are monitored and maintained or improved  Outcome: Adequate for Discharge  Flowsheets (Taken 3/19/2024 1314)  Care Plan - Patient's Chronic Conditions and Co-Morbidity Symptoms are Monitored and Maintained or Improved:   Monitor and assess patient's chronic conditions and comorbid symptoms for stability, deterioration, or improvement   Collaborate with multidisciplinary team to address chronic and comorbid conditions and prevent exacerbation or deterioration  Note:   Chemotherapy Teaching     What is Chemotherapy   Drug action [x]   Method of Administration [x]   Handouts given []     Side Effects  Nausea/vomiting [x]   Diarrhea [x]   Fatigue [x]   Signs / Symptoms of infection [x]   Neutropenia [x]   Thrombocytopenia [x]   Alopecia [x]   neuropathy [x]   [place] diet &  the

## 2024-03-21 ENCOUNTER — HOSPITAL ENCOUNTER (OUTPATIENT)
Dept: INFUSION THERAPY | Age: 59
Discharge: HOME | End: 2024-03-21
Payer: MEDICARE

## 2024-03-21 VITALS
TEMPERATURE: 97.6 F | DIASTOLIC BLOOD PRESSURE: 91 MMHG | SYSTOLIC BLOOD PRESSURE: 133 MMHG | HEART RATE: 71 BPM | OXYGEN SATURATION: 98 % | RESPIRATION RATE: 16 BRPM

## 2024-03-21 DIAGNOSIS — C18.9 MALIGNANT NEOPLASM OF COLON, UNSPECIFIED PART OF COLON (HCC): ICD-10-CM

## 2024-03-21 DIAGNOSIS — C06.2 SQUAMOUS CELL CANCER OF RETROMOLAR TRIGONE (HCC): Primary | ICD-10-CM

## 2024-03-21 PROCEDURE — 6360000002 HC RX W HCPCS: Performed by: INTERNAL MEDICINE

## 2024-03-21 PROCEDURE — 2580000003 HC RX 258: Performed by: INTERNAL MEDICINE

## 2024-03-21 PROCEDURE — 96523 IRRIG DRUG DELIVERY DEVICE: CPT

## 2024-03-21 RX ORDER — SODIUM CHLORIDE 0.9 % (FLUSH) 0.9 %
5-40 SYRINGE (ML) INJECTION PRN
Status: DISCONTINUED | OUTPATIENT
Start: 2024-03-21 | End: 2024-03-22 | Stop reason: HOSPADM

## 2024-03-21 RX ORDER — HEPARIN 100 UNIT/ML
500 SYRINGE INTRAVENOUS PRN
Status: DISCONTINUED | OUTPATIENT
Start: 2024-03-21 | End: 2024-03-22 | Stop reason: HOSPADM

## 2024-03-21 RX ADMIN — SODIUM CHLORIDE, PRESERVATIVE FREE 20 ML: 5 INJECTION INTRAVENOUS at 10:50

## 2024-03-21 RX ADMIN — HEPARIN 500 UNITS: 100 SYRINGE at 10:50

## 2024-03-21 NOTE — DISCHARGE INSTRUCTIONS
Please contact your Oncologist if you have any questions regarding the chemotherapy 5 Fu that you received today.      Patient instructed if experience any of the symptoms following today's chemotherapy / to notify MD immediately or go to emergency department.    * dizziness/lightheadedness  *acute nausea/vomiting - not relieved with medication  *headache - not relieved from Tylenol/pain medication  *chest pain/pressure  *rash/itching  *shortness of breath        Drink fluids - 48oz fluids daily  Call if develop fever/ chills/ signs or symptoms of infection

## 2024-03-21 NOTE — PROGRESS NOTES
Patient tolerated continuous 5-fu infusion without any complications. Patient was assessed following the infusion and reports:    Dizziness    No    Lightheadedness   No        Acute nausea/vomiting  No    Headache    No    Chest pain/pressure   No    Rash/itching    No    Shortness of breath   No      Patient instructed if they experience any of the above symptoms,he/she is to notify the physician immediately or go to the emergency department; verbalizes understanding.    Discharge instructions given to patient. Verbalizes understanding. Ambulated off unit per self.

## 2024-03-21 NOTE — PLAN OF CARE
Care plan reviewed with patient.  Patient verbalized understanding of the plan of care and contribute to goal setting.   Problem: Safety - Adult  Goal: Free from fall injury  Outcome: Adequate for Discharge  Flowsheets (Taken 3/21/2024 1036)  Free From Fall Injury:   Instruct family/caregiver on patient safety   Based on caregiver fall risk screen, instruct family/caregiver to ask for assistance with transferring infant if caregiver noted to have fall risk factors  Note: Free from falls while in infusion center. Verbalized understanding of fall prevention and to ask for assistance with ambulation     Problem: Chronic Conditions and Co-morbidities  Goal: Patient's chronic conditions and co-morbidity symptoms are monitored and maintained or improved  Outcome: Adequate for Discharge  Flowsheets (Taken 3/21/2024 1036)  Care Plan - Patient's Chronic Conditions and Co-Morbidity Symptoms are Monitored and Maintained or Improved:   Monitor and assess patient's chronic conditions and comorbid symptoms for stability, deterioration, or improvement   Collaborate with multidisciplinary team to address chronic and comorbid conditions and prevent exacerbation or deterioration  Note: Patient verbalizes understanding to verbal information given on 5 Fu,action and possible side effects. Aware to call MD if develop complications.      Problem: Discharge Planning  Goal: Discharge to home or other facility with appropriate resources  Outcome: Adequate for Discharge  Flowsheets (Taken 3/21/2024 1036)  Discharge to home or other facility with appropriate resources:   Identify discharge learning needs (meds, wound care, etc)   Identify barriers to discharge with patient and caregiver  Note: Verbalized understanding of discharge instructions, follow ups and when to call doctor     Problem: Infection - Adult  Goal: Absence of infection at discharge  Outcome: Adequate for Discharge  Flowsheets (Taken 3/21/2024 1036)  Absence of infection at

## 2024-03-22 PROBLEM — Z51.11 ENCOUNTER FOR ANTINEOPLASTIC CHEMOTHERAPY: Status: RESOLVED | Noted: 2024-02-21 | Resolved: 2024-03-22

## 2024-04-02 ENCOUNTER — HOSPITAL ENCOUNTER (OUTPATIENT)
Dept: INFUSION THERAPY | Age: 59
Discharge: HOME | End: 2024-04-02
Payer: MEDICARE

## 2024-04-02 ENCOUNTER — OFFICE VISIT (OUTPATIENT)
Dept: ONCOLOGY | Age: 59
End: 2024-04-02
Payer: MEDICARE

## 2024-04-02 VITALS
BODY MASS INDEX: 20.63 KG/M2 | DIASTOLIC BLOOD PRESSURE: 102 MMHG | RESPIRATION RATE: 16 BRPM | HEART RATE: 68 BPM | OXYGEN SATURATION: 98 % | WEIGHT: 143.8 LBS | TEMPERATURE: 97.8 F | SYSTOLIC BLOOD PRESSURE: 189 MMHG

## 2024-04-02 VITALS
OXYGEN SATURATION: 99 % | HEART RATE: 63 BPM | WEIGHT: 143.8 LBS | BODY MASS INDEX: 20.59 KG/M2 | SYSTOLIC BLOOD PRESSURE: 162 MMHG | DIASTOLIC BLOOD PRESSURE: 113 MMHG | HEIGHT: 70 IN | RESPIRATION RATE: 16 BRPM | TEMPERATURE: 97.9 F

## 2024-04-02 DIAGNOSIS — C18.7 MALIGNANT NEOPLASM OF SIGMOID COLON (HCC): ICD-10-CM

## 2024-04-02 DIAGNOSIS — Z51.11 ENCOUNTER FOR ANTINEOPLASTIC CHEMOTHERAPY: ICD-10-CM

## 2024-04-02 DIAGNOSIS — C18.9 ADENOCARCINOMA OF COLON METASTATIC TO LIVER (HCC): ICD-10-CM

## 2024-04-02 DIAGNOSIS — C18.2 MALIGNANT NEOPLASM OF ASCENDING COLON (HCC): ICD-10-CM

## 2024-04-02 DIAGNOSIS — C78.7 ADENOCARCINOMA OF COLON METASTATIC TO LIVER (HCC): ICD-10-CM

## 2024-04-02 DIAGNOSIS — D50.0 IRON DEFICIENCY ANEMIA DUE TO CHRONIC BLOOD LOSS: ICD-10-CM

## 2024-04-02 DIAGNOSIS — C18.9 MALIGNANT NEOPLASM OF COLON, UNSPECIFIED PART OF COLON (HCC): Primary | ICD-10-CM

## 2024-04-02 DIAGNOSIS — C06.2 SQUAMOUS CELL CANCER OF RETROMOLAR TRIGONE (HCC): ICD-10-CM

## 2024-04-02 DIAGNOSIS — T45.1X5A CHEMOTHERAPY INDUCED NAUSEA AND VOMITING: ICD-10-CM

## 2024-04-02 DIAGNOSIS — C18.2 MALIGNANT NEOPLASM OF ASCENDING COLON (HCC): Primary | ICD-10-CM

## 2024-04-02 DIAGNOSIS — R11.2 CHEMOTHERAPY INDUCED NAUSEA AND VOMITING: ICD-10-CM

## 2024-04-02 LAB
ALBUMIN SERPL BCG-MCNC: 4.7 G/DL (ref 3.5–5.1)
ALP SERPL-CCNC: 71 U/L (ref 38–126)
ALT SERPL W/O P-5'-P-CCNC: 8 U/L (ref 11–66)
AST SERPL-CCNC: 20 U/L (ref 5–40)
BASOPHILS ABSOLUTE: 0 THOU/MM3 (ref 0–0.1)
BASOPHILS NFR BLD AUTO: 0 % (ref 0–3)
BILIRUB CONJ SERPL-MCNC: < 0.2 MG/DL (ref 0–0.3)
BILIRUB SERPL-MCNC: 0.4 MG/DL (ref 0.3–1.2)
BUN BLDP-MCNC: 5 MG/DL (ref 8–26)
CHLORIDE BLD-SCNC: 104 MEQ/L (ref 98–109)
CREAT BLD-MCNC: 0.9 MG/DL (ref 0.5–1.2)
EOSINOPHIL NFR BLD AUTO: 2 % (ref 0–4)
EOSINOPHILS ABSOLUTE: 0.1 THOU/MM3 (ref 0–0.4)
ERYTHROCYTE [DISTWIDTH] IN BLOOD BY AUTOMATED COUNT: 16.9 % (ref 11.5–14.5)
GFR SERPL CREATININE-BSD FRML MDRD: > 90 ML/MIN/1.73M2
GLUCOSE BLD-MCNC: 77 MG/DL (ref 70–108)
HCT VFR BLD AUTO: 41.8 % (ref 42–52)
HGB BLD-MCNC: 14.1 GM/DL (ref 14–18)
IMMATURE GRANULOCYTES %: 0 %
IMMATURE GRANULOCYTES ABSOLUTE: 0.01 THOU/MM3 (ref 0–0.07)
IONIZED CALCIUM, WHOLE BLOOD: 1.17 MMOL/L (ref 1.12–1.32)
LYMPHOCYTES ABSOLUTE: 2.5 THOU/MM3 (ref 1–4.8)
LYMPHOCYTES NFR BLD AUTO: 36 % (ref 15–47)
MCH RBC QN AUTO: 32.6 PG (ref 26–33)
MCHC RBC AUTO-ENTMCNC: 33.7 GM/DL (ref 32.2–35.5)
MCV RBC AUTO: 97 FL (ref 80–94)
MONOCYTES ABSOLUTE: 0.5 THOU/MM3 (ref 0.4–1.3)
MONOCYTES NFR BLD AUTO: 7 % (ref 0–12)
NEUTROPHILS NFR BLD AUTO: 54 % (ref 43–75)
PLATELET # BLD AUTO: 207 THOU/MM3 (ref 130–400)
PMV BLD AUTO: 9.9 FL (ref 9.4–12.4)
POTASSIUM BLD-SCNC: 3.6 MEQ/L (ref 3.5–4.9)
PROT SERPL-MCNC: 7.8 G/DL (ref 6.1–8)
RBC # BLD AUTO: 4.32 MILL/MM3 (ref 4.7–6.1)
SEGMENTED NEUTROPHILS ABSOLUTE COUNT: 3.7 THOU/MM3 (ref 1.8–7.7)
SODIUM BLD-SCNC: 139 MEQ/L (ref 138–146)
TOTAL CO2, WHOLE BLOOD: 26 MEQ/L (ref 23–33)
WBC # BLD AUTO: 6.8 THOU/MM3 (ref 4.8–10.8)

## 2024-04-02 PROCEDURE — 96367 TX/PROPH/DG ADDL SEQ IV INF: CPT

## 2024-04-02 PROCEDURE — 2580000003 HC RX 258: Performed by: INTERNAL MEDICINE

## 2024-04-02 PROCEDURE — 85025 COMPLETE CBC W/AUTO DIFF WBC: CPT

## 2024-04-02 PROCEDURE — 96375 TX/PRO/DX INJ NEW DRUG ADDON: CPT

## 2024-04-02 PROCEDURE — 36591 DRAW BLOOD OFF VENOUS DEVICE: CPT

## 2024-04-02 PROCEDURE — 80076 HEPATIC FUNCTION PANEL: CPT

## 2024-04-02 PROCEDURE — G8420 CALC BMI NORM PARAMETERS: HCPCS | Performed by: INTERNAL MEDICINE

## 2024-04-02 PROCEDURE — 6360000002 HC RX W HCPCS: Performed by: INTERNAL MEDICINE

## 2024-04-02 PROCEDURE — 99214 OFFICE O/P EST MOD 30 MIN: CPT | Performed by: INTERNAL MEDICINE

## 2024-04-02 PROCEDURE — 80047 BASIC METABLC PNL IONIZED CA: CPT

## 2024-04-02 PROCEDURE — 96365 THER/PROPH/DIAG IV INF INIT: CPT

## 2024-04-02 PROCEDURE — 3017F COLORECTAL CA SCREEN DOC REV: CPT | Performed by: INTERNAL MEDICINE

## 2024-04-02 PROCEDURE — 1036F TOBACCO NON-USER: CPT | Performed by: INTERNAL MEDICINE

## 2024-04-02 PROCEDURE — G8427 DOCREV CUR MEDS BY ELIG CLIN: HCPCS | Performed by: INTERNAL MEDICINE

## 2024-04-02 PROCEDURE — 99211 OFF/OP EST MAY X REQ PHY/QHP: CPT

## 2024-04-02 PROCEDURE — 96416 CHEMO PROLONG INFUSE W/PUMP: CPT

## 2024-04-02 RX ORDER — ONDANSETRON 2 MG/ML
4 INJECTION INTRAMUSCULAR; INTRAVENOUS EVERY 6 HOURS PRN
Status: DISCONTINUED | OUTPATIENT
Start: 2024-04-02 | End: 2024-04-03 | Stop reason: HOSPADM

## 2024-04-02 RX ORDER — HEPARIN SODIUM (PORCINE) LOCK FLUSH IV SOLN 100 UNIT/ML 100 UNIT/ML
500 SOLUTION INTRAVENOUS PRN
Status: CANCELLED | OUTPATIENT
Start: 2024-04-04

## 2024-04-02 RX ORDER — HEPARIN 100 UNIT/ML
500 SYRINGE INTRAVENOUS PRN
Status: DISCONTINUED | OUTPATIENT
Start: 2024-04-02 | End: 2024-04-03 | Stop reason: HOSPADM

## 2024-04-02 RX ORDER — SODIUM CHLORIDE 9 MG/ML
5-250 INJECTION, SOLUTION INTRAVENOUS PRN
Status: CANCELLED | OUTPATIENT
Start: 2024-04-02

## 2024-04-02 RX ORDER — HEPARIN 100 UNIT/ML
500 SYRINGE INTRAVENOUS PRN
OUTPATIENT
Start: 2024-04-02

## 2024-04-02 RX ORDER — SODIUM CHLORIDE 0.9 % (FLUSH) 0.9 %
5-40 SYRINGE (ML) INJECTION PRN
Status: CANCELLED | OUTPATIENT
Start: 2024-04-02

## 2024-04-02 RX ORDER — SODIUM CHLORIDE 0.9 % (FLUSH) 0.9 %
5-40 SYRINGE (ML) INJECTION PRN
OUTPATIENT
Start: 2024-04-02

## 2024-04-02 RX ORDER — PALONOSETRON 0.05 MG/ML
0.25 INJECTION, SOLUTION INTRAVENOUS ONCE
Status: CANCELLED | OUTPATIENT
Start: 2024-04-02 | End: 2024-04-02

## 2024-04-02 RX ORDER — PALONOSETRON 0.05 MG/ML
0.25 INJECTION, SOLUTION INTRAVENOUS ONCE
Status: COMPLETED | OUTPATIENT
Start: 2024-04-02 | End: 2024-04-02

## 2024-04-02 RX ORDER — ACETAMINOPHEN 325 MG/1
650 TABLET ORAL
Status: CANCELLED | OUTPATIENT
Start: 2024-04-02

## 2024-04-02 RX ORDER — ONDANSETRON 2 MG/ML
8 INJECTION INTRAMUSCULAR; INTRAVENOUS
Status: CANCELLED | OUTPATIENT
Start: 2024-04-02

## 2024-04-02 RX ORDER — SODIUM CHLORIDE 9 MG/ML
INJECTION, SOLUTION INTRAVENOUS PRN
Status: CANCELLED
Start: 2024-04-02

## 2024-04-02 RX ORDER — DIPHENHYDRAMINE HYDROCHLORIDE 50 MG/ML
50 INJECTION INTRAMUSCULAR; INTRAVENOUS
Status: CANCELLED | OUTPATIENT
Start: 2024-04-02

## 2024-04-02 RX ORDER — HEPARIN SODIUM (PORCINE) LOCK FLUSH IV SOLN 100 UNIT/ML 100 UNIT/ML
500 SOLUTION INTRAVENOUS PRN
Status: CANCELLED | OUTPATIENT
Start: 2024-04-02

## 2024-04-02 RX ORDER — ALBUTEROL SULFATE 90 UG/1
4 AEROSOL, METERED RESPIRATORY (INHALATION) PRN
Status: CANCELLED | OUTPATIENT
Start: 2024-04-02

## 2024-04-02 RX ORDER — SODIUM CHLORIDE 0.9 % (FLUSH) 0.9 %
5-40 SYRINGE (ML) INJECTION PRN
Status: DISCONTINUED | OUTPATIENT
Start: 2024-04-02 | End: 2024-04-03 | Stop reason: HOSPADM

## 2024-04-02 RX ORDER — EPINEPHRINE 1 MG/ML
0.3 INJECTION, SOLUTION, CONCENTRATE INTRAVENOUS PRN
Status: CANCELLED | OUTPATIENT
Start: 2024-04-02

## 2024-04-02 RX ORDER — SODIUM CHLORIDE 0.9 % (FLUSH) 0.9 %
5-40 SYRINGE (ML) INJECTION PRN
Status: CANCELLED | OUTPATIENT
Start: 2024-04-04

## 2024-04-02 RX ORDER — ONDANSETRON 4 MG/1
8 TABLET, ORALLY DISINTEGRATING ORAL EVERY 8 HOURS PRN
Qty: 120 TABLET | Refills: 3 | Status: SHIPPED | OUTPATIENT
Start: 2024-04-02

## 2024-04-02 RX ORDER — SODIUM CHLORIDE 9 MG/ML
INJECTION, SOLUTION INTRAVENOUS PRN
Status: DISCONTINUED | OUTPATIENT
Start: 2024-04-02 | End: 2024-04-03 | Stop reason: HOSPADM

## 2024-04-02 RX ORDER — SODIUM CHLORIDE 9 MG/ML
5-250 INJECTION, SOLUTION INTRAVENOUS PRN
Status: CANCELLED | OUTPATIENT
Start: 2024-04-04

## 2024-04-02 RX ORDER — MEPERIDINE HYDROCHLORIDE 50 MG/ML
12.5 INJECTION INTRAMUSCULAR; INTRAVENOUS; SUBCUTANEOUS PRN
Status: CANCELLED | OUTPATIENT
Start: 2024-04-02

## 2024-04-02 RX ORDER — PROCHLORPERAZINE MALEATE 10 MG
10 TABLET ORAL EVERY 6 HOURS PRN
Qty: 60 TABLET | Refills: 1 | Status: SHIPPED | OUTPATIENT
Start: 2024-04-02

## 2024-04-02 RX ORDER — SODIUM CHLORIDE 9 MG/ML
INJECTION, SOLUTION INTRAVENOUS CONTINUOUS
Status: CANCELLED | OUTPATIENT
Start: 2024-04-02

## 2024-04-02 RX ORDER — FAMOTIDINE 10 MG/ML
20 INJECTION, SOLUTION INTRAVENOUS
Status: CANCELLED | OUTPATIENT
Start: 2024-04-02

## 2024-04-02 RX ORDER — SODIUM CHLORIDE 9 MG/ML
25 INJECTION, SOLUTION INTRAVENOUS PRN
OUTPATIENT
Start: 2024-04-02

## 2024-04-02 RX ADMIN — SODIUM CHLORIDE 20 ML: 9 INJECTION, SOLUTION INTRAVENOUS at 09:40

## 2024-04-02 RX ADMIN — LEUCOVORIN CALCIUM 750 MG: 50 INJECTION, POWDER, LYOPHILIZED, FOR SOLUTION INTRAMUSCULAR; INTRAVENOUS at 10:11

## 2024-04-02 RX ADMIN — SODIUM CHLORIDE, PRESERVATIVE FREE 20 ML: 5 INJECTION INTRAVENOUS at 08:58

## 2024-04-02 RX ADMIN — FLUOROURACIL 3500 MG: 50 INJECTION, SOLUTION INTRAVENOUS at 11:22

## 2024-04-02 RX ADMIN — DEXAMETHASONE SODIUM PHOSPHATE 12 MG: 4 INJECTION, SOLUTION INTRAMUSCULAR; INTRAVENOUS at 09:43

## 2024-04-02 RX ADMIN — PALONOSETRON 0.25 MG: 0.05 INJECTION, SOLUTION INTRAVENOUS at 09:41

## 2024-04-02 RX ADMIN — SODIUM CHLORIDE, PRESERVATIVE FREE 10 ML: 5 INJECTION INTRAVENOUS at 08:55

## 2024-04-02 NOTE — PLAN OF CARE
Problem: Safety - Adult  Goal: Free from fall injury  Outcome: Adequate for Discharge  Flowsheets (Taken 4/2/2024 1315)  Free From Fall Injury: Instruct family/caregiver on patient safety     Problem: Chronic Conditions and Co-morbidities  Goal: Patient's chronic conditions and co-morbidity symptoms are monitored and maintained or improved  Outcome: Adequate for Discharge  Flowsheets (Taken 4/2/2024 1315)  Care Plan - Patient's Chronic Conditions and Co-Morbidity Symptoms are Monitored and Maintained or Improved:   Monitor and assess patient's chronic conditions and comorbid symptoms for stability, deterioration, or improvement   Collaborate with multidisciplinary team to address chronic and comorbid conditions and prevent exacerbation or deterioration  Note:   Chemotherapy Teaching     What is Chemotherapy   Drug action [x]   Method of Administration [x]   Handouts given []     Side Effects  Nausea/vomiting [x]   Diarrhea [x]   Fatigue [x]   Signs / Symptoms of infection [x]   Neutropenia [x]   Thrombocytopenia [x]   Alopecia [x]   neuropathy [x]   [place] diet &  the importance of fluids [x]       Micellaneous  Importance of nutrition [x]   Importance of oral hygiene [x]   When to call the MD [x]   Monitoring labs [x]   Use of supportive services []     Explanation of Drug Regimen / Frequency  Leucovorin/5FU     Comments  Verbalized understanding to drug,action,side effects and when to call MD        Problem: Discharge Planning  Goal: Discharge to home or other facility with appropriate resources  Outcome: Adequate for Discharge  Flowsheets (Taken 4/2/2024 1315)  Discharge to home or other facility with appropriate resources: Identify barriers to discharge with patient and caregiver     Problem: Infection - Adult  Goal: Absence of infection at discharge  Outcome: Adequate for Discharge  Flowsheets (Taken 4/2/2024 1315)  Absence of infection at discharge: Monitor all insertion sites i.e., indwelling lines, tubes

## 2024-04-02 NOTE — ONCOLOGY
Chemotherapy Administration    Pre-assessment Data: Antineoplastic Agents  See toxicity flow sheet for assessment                                          [x]         Interventions:   Chemotherapy SQ injection given []   Taxol administered-VS per protocol []   Blood pressure meds held 12 hours prior to Rituxan/Ruxience []   Rituxan/Ruxience administered- VS and precautions per guidelines []   Emergency drugs available as appropriate [x]   Anaphylaxis assessment completed [x]   Pre-medications administered as ordered [x]   Blood return noted upon initiation of chemotherapy [x]   Blood return noted each 1-2ml of a vesicant medication if given IV push []   Navelbine, Vincristine and Velban given as a monitored wide open drip, blood return noted before during and after infusion. []   Blood return noted each 2-3ml of a non-vesicant medication if given IV push []   Patient aware of potential Immunotherapy toxicities []   Monitor for signs / symptoms of hypersensitivity reaction [x]   Chemotherapy orders (drug/dose/rate) verified by 2 Chemo certified RN’s [x]   Monitor IV site and blood return throughout the infusion of the medication [x]   Document IV site checks on the IV assessment form [x]   Document chemotherapy teaching on the Patient Education tab [x]   Document patient verbalizes understanding of medications being administered [x]   If IV infiltration, see ONS Guidelines []   Other:     5FU/Leucovorin []

## 2024-04-02 NOTE — PROGRESS NOTES
Patient tolerated treatment without any complications.  Denies dizziness, lightheadedness, acute nausea or vomiting, headache, heart palpitations, rash/itching or increased SOB.    Last vital signs  BP (!) 189/102   Pulse 68   Temp 97.8 °F (36.6 °C) (Oral)   Resp 16   Wt 65.2 kg (143 lb 12.8 oz)   SpO2 98%   BMI 20.63 kg/m²     Patient instructed if they experience any of the above symptoms following today's visit, he is to notify the Physician or go to the Emergency Dept.    Discharge instructions given to patient, Verbalizes understanding. Ambulated off unit per self in stable condition with all belongings.

## 2024-04-02 NOTE — PATIENT INSTRUCTIONS
1) treatment today- patient is saying he thinks this is the last, but he is wanting to see us once a month  2) continue to follow with palliative and med onc monthly as tolerated; labs are available on f/u but not required for one month from now; can be midlevel [facility] or [facility] since they know his story and no chemo

## 2024-04-04 ENCOUNTER — HOSPITAL ENCOUNTER (OUTPATIENT)
Dept: INFUSION THERAPY | Age: 59
Discharge: HOME | End: 2024-04-04
Payer: MEDICARE

## 2024-04-04 VITALS
HEART RATE: 85 BPM | SYSTOLIC BLOOD PRESSURE: 180 MMHG | DIASTOLIC BLOOD PRESSURE: 98 MMHG | RESPIRATION RATE: 16 BRPM | TEMPERATURE: 97.9 F | OXYGEN SATURATION: 99 %

## 2024-04-04 DIAGNOSIS — C06.2 SQUAMOUS CELL CANCER OF RETROMOLAR TRIGONE (HCC): Primary | ICD-10-CM

## 2024-04-04 DIAGNOSIS — C18.9 MALIGNANT NEOPLASM OF COLON, UNSPECIFIED PART OF COLON (HCC): ICD-10-CM

## 2024-04-04 PROCEDURE — 96523 IRRIG DRUG DELIVERY DEVICE: CPT

## 2024-04-04 PROCEDURE — 2580000003 HC RX 258: Performed by: INTERNAL MEDICINE

## 2024-04-04 PROCEDURE — 6360000002 HC RX W HCPCS: Performed by: INTERNAL MEDICINE

## 2024-04-04 RX ORDER — SODIUM CHLORIDE 0.9 % (FLUSH) 0.9 %
5-40 SYRINGE (ML) INJECTION PRN
Status: DISCONTINUED | OUTPATIENT
Start: 2024-04-04 | End: 2024-04-05 | Stop reason: HOSPADM

## 2024-04-04 RX ORDER — HEPARIN 100 UNIT/ML
500 SYRINGE INTRAVENOUS PRN
Status: DISCONTINUED | OUTPATIENT
Start: 2024-04-04 | End: 2024-04-05 | Stop reason: HOSPADM

## 2024-04-04 RX ADMIN — HEPARIN 500 UNITS: 100 SYRINGE at 09:36

## 2024-04-04 RX ADMIN — SODIUM CHLORIDE, PRESERVATIVE FREE 20 ML: 5 INJECTION INTRAVENOUS at 09:36

## 2024-04-04 NOTE — PLAN OF CARE
Problem: Safety - Adult  Goal: Free from fall injury  Outcome: Adequate for Discharge  Flowsheets (Taken 4/4/2024 1020)  Free From Fall Injury:   Based on caregiver fall risk screen, instruct family/caregiver to ask for assistance with transferring infant if caregiver noted to have fall risk factors   Instruct family/caregiver on patient safety  Note: Free from falls while in O.P. Oncology.Discussed the need to use the call light for assistance when getting up to ambulate.     Problem: Chronic Conditions and Co-morbidities  Goal: Patient's chronic conditions and co-morbidity symptoms are monitored and maintained or improved  Outcome: Adequate for Discharge  Flowsheets (Taken 4/4/2024 1020)  Care Plan - Patient's Chronic Conditions and Co-Morbidity Symptoms are Monitored and Maintained or Improved:   Monitor and assess patient's chronic conditions and comorbid symptoms for stability, deterioration, or improvement   Collaborate with multidisciplinary team to address chronic and comorbid conditions and prevent exacerbation or deterioration  Note: Patient verbalizes understanding to verbal information given on pump removal,action and possible side effects. Aware to call MD if develop complications.        Problem: Discharge Planning  Goal: Discharge to home or other facility with appropriate resources  Outcome: Adequate for Discharge  Flowsheets (Taken 4/4/2024 1020)  Discharge to home or other facility with appropriate resources:   Identify barriers to discharge with patient and caregiver   Identify discharge learning needs (meds, wound care, etc)  Note: Verbalize understanding of discharge instructions, follow up appointments, and when to call Physician.Discuss understanding of discharge instructions, follow up appointments and when to call Physician.     Care plan reviewed with patient.  Patient verbalize understanding of the plan of care and contribute to goal setting.

## 2024-04-10 ENCOUNTER — TELEMEDICINE (OUTPATIENT)
Dept: PALLATIVE CARE | Age: 59
End: 2024-04-10
Payer: MEDICARE

## 2024-04-10 DIAGNOSIS — C79.9 SECONDARY MALIGNANT NEOPLASM OF UNSPECIFIED SITE (CODE) (HCC): ICD-10-CM

## 2024-04-10 DIAGNOSIS — C18.2 MALIGNANT NEOPLASM OF ASCENDING COLON (HCC): ICD-10-CM

## 2024-04-10 DIAGNOSIS — G89.3 CANCER RELATED PAIN: Primary | ICD-10-CM

## 2024-04-10 DIAGNOSIS — E43 SEVERE MALNUTRITION (HCC): ICD-10-CM

## 2024-04-10 DIAGNOSIS — Z51.5 PALLIATIVE CARE PATIENT: ICD-10-CM

## 2024-04-10 DIAGNOSIS — C06.2 SQUAMOUS CELL CANCER OF RETROMOLAR TRIGONE (HCC): ICD-10-CM

## 2024-04-10 DIAGNOSIS — G62.0 CHEMOTHERAPY-INDUCED PERIPHERAL NEUROPATHY (HCC): ICD-10-CM

## 2024-04-10 DIAGNOSIS — R11.2 NAUSEA AND VOMITING, UNSPECIFIED VOMITING TYPE: ICD-10-CM

## 2024-04-10 DIAGNOSIS — T45.1X5A CHEMOTHERAPY-INDUCED PERIPHERAL NEUROPATHY (HCC): ICD-10-CM

## 2024-04-10 PROCEDURE — 99215 OFFICE O/P EST HI 40 MIN: CPT | Performed by: STUDENT IN AN ORGANIZED HEALTH CARE EDUCATION/TRAINING PROGRAM

## 2024-04-10 PROCEDURE — 3017F COLORECTAL CA SCREEN DOC REV: CPT | Performed by: STUDENT IN AN ORGANIZED HEALTH CARE EDUCATION/TRAINING PROGRAM

## 2024-04-10 PROCEDURE — G8428 CUR MEDS NOT DOCUMENT: HCPCS | Performed by: STUDENT IN AN ORGANIZED HEALTH CARE EDUCATION/TRAINING PROGRAM

## 2024-04-10 RX ORDER — DULOXETIN HYDROCHLORIDE 30 MG/1
30 CAPSULE, DELAYED RELEASE ORAL DAILY
Qty: 30 CAPSULE | Refills: 3 | Status: SHIPPED | OUTPATIENT
Start: 2024-04-10

## 2024-04-10 RX ORDER — OXYCODONE AND ACETAMINOPHEN 7.5; 325 MG/1; MG/1
1 TABLET ORAL EVERY 4 HOURS PRN
Qty: 180 TABLET | Refills: 0 | Status: SHIPPED | OUTPATIENT
Start: 2024-04-19 | End: 2024-05-19

## 2024-04-10 NOTE — PATIENT INSTRUCTIONS
Continue current plan of care  Continue Percocet 5-325 every 4 hours as needed for  cancer related pain and shortness of breath  Continue Senna as needed for  opioid induced constipation  Continue Miralax 17 g as needed for  opioid induced constipation  Continue Duloxetine 30 mg daily scheduled for  chemotherapy induced peripheral neuropathy, depression, and anxiety  Continue Zofran 4 mg every 6 hours as needed for  nausea and vomiting  Continue Compazine 10 mg every 6 hours as needed for  nausea and vomiting  PDMP reviewed  Please call the office if your symptoms worsen or if you were to develop new symptoms  Please call the palliative care office when you need refills

## 2024-04-14 DIAGNOSIS — T45.1X5A CHEMOTHERAPY-INDUCED PERIPHERAL NEUROPATHY (HCC): ICD-10-CM

## 2024-04-14 DIAGNOSIS — C18.2 MALIGNANT NEOPLASM OF ASCENDING COLON (HCC): ICD-10-CM

## 2024-04-14 DIAGNOSIS — G89.3 CANCER RELATED PAIN: ICD-10-CM

## 2024-04-14 DIAGNOSIS — G62.0 CHEMOTHERAPY-INDUCED PERIPHERAL NEUROPATHY (HCC): ICD-10-CM

## 2024-04-15 RX ORDER — DULOXETIN HYDROCHLORIDE 30 MG/1
30 CAPSULE, DELAYED RELEASE ORAL DAILY
Qty: 30 CAPSULE | Refills: 0 | OUTPATIENT
Start: 2024-04-15

## 2024-04-19 DIAGNOSIS — T45.1X5A CHEMOTHERAPY-INDUCED PERIPHERAL NEUROPATHY (HCC): ICD-10-CM

## 2024-04-19 DIAGNOSIS — G89.3 CANCER RELATED PAIN: ICD-10-CM

## 2024-04-19 DIAGNOSIS — C18.2 MALIGNANT NEOPLASM OF ASCENDING COLON (HCC): ICD-10-CM

## 2024-04-19 DIAGNOSIS — G62.0 CHEMOTHERAPY-INDUCED PERIPHERAL NEUROPATHY (HCC): ICD-10-CM

## 2024-04-19 RX ORDER — DULOXETIN HYDROCHLORIDE 30 MG/1
30 CAPSULE, DELAYED RELEASE ORAL DAILY
Qty: 30 CAPSULE | Refills: 0 | OUTPATIENT
Start: 2024-04-19

## 2024-05-03 ENCOUNTER — HOSPITAL ENCOUNTER (OUTPATIENT)
Dept: INFUSION THERAPY | Age: 59
Discharge: HOME | End: 2024-05-03
Payer: MEDICARE

## 2024-05-03 ENCOUNTER — OFFICE VISIT (OUTPATIENT)
Dept: ONCOLOGY | Age: 59
End: 2024-05-03
Payer: MEDICARE

## 2024-05-03 VITALS
BODY MASS INDEX: 20.76 KG/M2 | SYSTOLIC BLOOD PRESSURE: 182 MMHG | DIASTOLIC BLOOD PRESSURE: 108 MMHG | WEIGHT: 145 LBS | HEART RATE: 80 BPM | OXYGEN SATURATION: 98 % | TEMPERATURE: 97.5 F | RESPIRATION RATE: 16 BRPM | HEIGHT: 70 IN

## 2024-05-03 VITALS
BODY MASS INDEX: 20.76 KG/M2 | HEART RATE: 80 BPM | TEMPERATURE: 97.5 F | HEIGHT: 70 IN | OXYGEN SATURATION: 98 % | DIASTOLIC BLOOD PRESSURE: 108 MMHG | RESPIRATION RATE: 16 BRPM | SYSTOLIC BLOOD PRESSURE: 182 MMHG | WEIGHT: 145 LBS

## 2024-05-03 DIAGNOSIS — C18.2 MALIGNANT NEOPLASM OF ASCENDING COLON (HCC): Primary | ICD-10-CM

## 2024-05-03 DIAGNOSIS — C18.9 MALIGNANT NEOPLASM OF COLON, UNSPECIFIED PART OF COLON (HCC): Primary | ICD-10-CM

## 2024-05-03 DIAGNOSIS — C18.2 MALIGNANT NEOPLASM OF ASCENDING COLON (HCC): ICD-10-CM

## 2024-05-03 DIAGNOSIS — C18.7 MALIGNANT NEOPLASM OF SIGMOID COLON (HCC): ICD-10-CM

## 2024-05-03 LAB
ALBUMIN SERPL BCG-MCNC: 4.4 G/DL (ref 3.5–5.1)
ALP SERPL-CCNC: 80 U/L (ref 38–126)
ALT SERPL W/O P-5'-P-CCNC: 11 U/L (ref 11–66)
AST SERPL-CCNC: 28 U/L (ref 5–40)
BASOPHILS ABSOLUTE: 0 THOU/MM3 (ref 0–0.1)
BASOPHILS NFR BLD AUTO: 1 % (ref 0–3)
BILIRUB CONJ SERPL-MCNC: < 0.2 MG/DL (ref 0–0.3)
BILIRUB SERPL-MCNC: 0.4 MG/DL (ref 0.3–1.2)
BUN BLDP-MCNC: 9 MG/DL (ref 8–26)
CEA SERPL-MCNC: 6.1 NG/ML (ref 0–5)
CHLORIDE BLD-SCNC: 106 MEQ/L (ref 98–109)
CREAT BLD-MCNC: 0.9 MG/DL (ref 0.5–1.2)
EOSINOPHIL NFR BLD AUTO: 3 % (ref 0–4)
EOSINOPHILS ABSOLUTE: 0.2 THOU/MM3 (ref 0–0.4)
ERYTHROCYTE [DISTWIDTH] IN BLOOD BY AUTOMATED COUNT: 16.4 % (ref 11.5–14.5)
GFR SERPL CREATININE-BSD FRML MDRD: > 90 ML/MIN/1.73M2
GLUCOSE BLD-MCNC: 72 MG/DL (ref 70–108)
HCT VFR BLD AUTO: 40.3 % (ref 42–52)
HGB BLD-MCNC: 13.3 GM/DL (ref 14–18)
IMMATURE GRANULOCYTES %: 0 %
IMMATURE GRANULOCYTES ABSOLUTE: 0.03 THOU/MM3 (ref 0–0.07)
IONIZED CALCIUM, WHOLE BLOOD: 1.18 MMOL/L (ref 1.12–1.32)
LYMPHOCYTES ABSOLUTE: 3.1 THOU/MM3 (ref 1–4.8)
LYMPHOCYTES NFR BLD AUTO: 44 % (ref 15–47)
MCH RBC QN AUTO: 31.2 PG (ref 26–33)
MCHC RBC AUTO-ENTMCNC: 33 GM/DL (ref 32.2–35.5)
MCV RBC AUTO: 95 FL (ref 80–94)
MONOCYTES ABSOLUTE: 0.8 THOU/MM3 (ref 0.4–1.3)
MONOCYTES NFR BLD AUTO: 12 % (ref 0–12)
NEUTROPHILS ABSOLUTE: 2.8 THOU/MM3 (ref 1.8–7.7)
NEUTROPHILS NFR BLD AUTO: 40 % (ref 43–75)
PLATELET # BLD AUTO: 265 THOU/MM3 (ref 130–400)
PMV BLD AUTO: 9.5 FL (ref 9.4–12.4)
POTASSIUM BLD-SCNC: 3.8 MEQ/L (ref 3.5–4.9)
PROT SERPL-MCNC: 7.8 G/DL (ref 6.1–8)
RBC # BLD AUTO: 4.26 MILL/MM3 (ref 4.7–6.1)
SODIUM BLD-SCNC: 140 MEQ/L (ref 138–146)
TOTAL CO2, WHOLE BLOOD: 26 MEQ/L (ref 23–33)
WBC # BLD AUTO: 6.9 THOU/MM3 (ref 4.8–10.8)

## 2024-05-03 PROCEDURE — 36591 DRAW BLOOD OFF VENOUS DEVICE: CPT

## 2024-05-03 PROCEDURE — 82378 CARCINOEMBRYONIC ANTIGEN: CPT

## 2024-05-03 PROCEDURE — G8420 CALC BMI NORM PARAMETERS: HCPCS | Performed by: PHYSICIAN ASSISTANT

## 2024-05-03 PROCEDURE — 3017F COLORECTAL CA SCREEN DOC REV: CPT | Performed by: PHYSICIAN ASSISTANT

## 2024-05-03 PROCEDURE — 99214 OFFICE O/P EST MOD 30 MIN: CPT | Performed by: PHYSICIAN ASSISTANT

## 2024-05-03 PROCEDURE — 85025 COMPLETE CBC W/AUTO DIFF WBC: CPT

## 2024-05-03 PROCEDURE — 6360000002 HC RX W HCPCS: Performed by: INTERNAL MEDICINE

## 2024-05-03 PROCEDURE — 99211 OFF/OP EST MAY X REQ PHY/QHP: CPT

## 2024-05-03 PROCEDURE — 80076 HEPATIC FUNCTION PANEL: CPT

## 2024-05-03 PROCEDURE — G8427 DOCREV CUR MEDS BY ELIG CLIN: HCPCS | Performed by: PHYSICIAN ASSISTANT

## 2024-05-03 PROCEDURE — 1036F TOBACCO NON-USER: CPT | Performed by: PHYSICIAN ASSISTANT

## 2024-05-03 PROCEDURE — 2580000003 HC RX 258: Performed by: INTERNAL MEDICINE

## 2024-05-03 PROCEDURE — 80047 BASIC METABLC PNL IONIZED CA: CPT

## 2024-05-03 PROCEDURE — 36415 COLL VENOUS BLD VENIPUNCTURE: CPT

## 2024-05-03 RX ORDER — SODIUM CHLORIDE 0.9 % (FLUSH) 0.9 %
5-40 SYRINGE (ML) INJECTION PRN
Status: DISCONTINUED | OUTPATIENT
Start: 2024-05-03 | End: 2024-05-04 | Stop reason: HOSPADM

## 2024-05-03 RX ORDER — HEPARIN 100 UNIT/ML
500 SYRINGE INTRAVENOUS PRN
Status: DISCONTINUED | OUTPATIENT
Start: 2024-05-03 | End: 2024-05-04 | Stop reason: HOSPADM

## 2024-05-03 RX ORDER — SODIUM CHLORIDE 0.9 % (FLUSH) 0.9 %
5-40 SYRINGE (ML) INJECTION PRN
OUTPATIENT
Start: 2024-05-03

## 2024-05-03 RX ORDER — SODIUM CHLORIDE 9 MG/ML
25 INJECTION, SOLUTION INTRAVENOUS PRN
OUTPATIENT
Start: 2024-05-03

## 2024-05-03 RX ORDER — HEPARIN 100 UNIT/ML
500 SYRINGE INTRAVENOUS PRN
OUTPATIENT
Start: 2024-05-03

## 2024-05-03 RX ADMIN — SODIUM CHLORIDE, PRESERVATIVE FREE 30 ML: 5 INJECTION INTRAVENOUS at 10:13

## 2024-05-03 RX ADMIN — Medication 500 UNITS: at 11:17

## 2024-05-03 RX ADMIN — SODIUM CHLORIDE, PRESERVATIVE FREE 10 ML: 5 INJECTION INTRAVENOUS at 11:17

## 2024-05-03 NOTE — PROGRESS NOTES
states his pain is adequately controlled with above regimen prescribed per Dr. Pardo.  He denies fever, chills, or signs/symptoms of infection.  Denies chest pain, leg edema. Patient's significant other reports she has noticed irritability at times. Denies changes in patient's mentation or cognition.  Long discussion regarding goals of care as detailed below.      PMH, SH, and FH:  I reviewed the patients medication list and allergy list as noted on the electronic medical record. The PMH, SH and FH were also reviewed as noted on the EMR.      Review of Systems:   Review of Systems   Pertinent review of systems noted in HPI, all other ROS negative.   Objective:   Physical Exam   BP (!) 182/108 (Site: Left Upper Arm, Position: Sitting)   Pulse 80   Temp 97.5 °F (36.4 °C)   Resp 16   Ht 1.778 m (5' 10\")   Wt 65.8 kg (145 lb)   SpO2 98%   BMI 20.81 kg/m²    General appearance: No apparent distress, chronically ill appearing, and cooperative.  HEENT: Pupils equal, round, and reactive to light. Conjunctivae/corneas clear. Oral mucosa moist, absent dentition.   Neck: Supple, with full range of motion. Trachea midline.   Respiratory:  Normal respiratory effort. Clear to auscultation, bilaterally without Rales/Wheezes/Rhonchi.  Cardiovascular: Regular rate and rhythm with normal S1/S2   Abdomen: Soft, non-distended with active bowel sounds.  Musculoskeletal: No clubbing, cyanosis or edema bilaterally.  Ambulates in clinic.   Skin: Skin color, texture, turgor normal.  No visible rashes or lesions.  Neurologic:  Neurovascularly intact without any focal sensory/motor deficits.   Psychiatric: Alert and oriented       Imaging Studies and Labs:   CBC:   Lab Results   Component Value Date    WBC 6.9 05/03/2024    HGB 13.3 (L) 05/03/2024    HCT 40.3 (L) 05/03/2024    MCV 95 (H) 05/03/2024     05/03/2024     BMP:   Lab Results   Component Value Date/Time     05/03/2024 10:13 AM     05/13/2023 07:07 AM

## 2024-05-03 NOTE — PATIENT INSTRUCTIONS
Return to clinic in 6 weeks  Labs on RTC  Please call office if you want hospice referral before follow up   Please call for questions or concerns.

## 2024-05-03 NOTE — PLAN OF CARE
Problem: Safety - Adult  Goal: Free from fall injury  Outcome: Adequate for Discharge  Flowsheets (Taken 5/3/2024 1016)  Free From Fall Injury:   Based on caregiver fall risk screen, instruct family/caregiver to ask for assistance with transferring infant if caregiver noted to have fall risk factors   Instruct family/caregiver on patient safety  Note: Free from falls while in O.P. Oncology.Discussed the need to use the call light for assistance when getting up to ambulate.     Problem: Chronic Conditions and Co-morbidities  Goal: Patient's chronic conditions and co-morbidity symptoms are monitored and maintained or improved  Outcome: Adequate for Discharge  Flowsheets (Taken 5/3/2024 1016)  Care Plan - Patient's Chronic Conditions and Co-Morbidity Symptoms are Monitored and Maintained or Improved:   Monitor and assess patient's chronic conditions and comorbid symptoms for stability, deterioration, or improvement   Collaborate with multidisciplinary team to address chronic and comorbid conditions and prevent exacerbation or deterioration  Note: Patient verbalizes understanding to verbal information given on lab draw from mediport,action and possible side effects. Aware to call MD if develop complications.        Problem: Discharge Planning  Goal: Discharge to home or other facility with appropriate resources  Outcome: Adequate for Discharge  Flowsheets (Taken 5/3/2024 1016)  Discharge to home or other facility with appropriate resources:   Identify barriers to discharge with patient and caregiver   Identify discharge learning needs (meds, wound care, etc)  Note: Verbalize understanding of discharge instructions, follow up appointments, and when to call Physician.Discuss understanding of discharge instructions, follow up appointments and when to call Physician.     Care plan reviewed with patient.  Patient verbalize understanding of the plan of care and contribute to goal setting.

## 2024-05-03 NOTE — PROGRESS NOTES
Patient tolerated lab draw from [facility] without any complications. Discharge instructions given to patient-verbalizes understanding. Ambulated off unit per self with belongings.

## 2024-05-15 DIAGNOSIS — C18.2 MALIGNANT NEOPLASM OF ASCENDING COLON (HCC): ICD-10-CM

## 2024-05-15 DIAGNOSIS — G89.3 CANCER RELATED PAIN: ICD-10-CM

## 2024-05-15 DIAGNOSIS — C06.2 SQUAMOUS CELL CANCER OF RETROMOLAR TRIGONE (HCC): ICD-10-CM

## 2024-05-15 RX ORDER — OXYCODONE AND ACETAMINOPHEN 7.5; 325 MG/1; MG/1
1 TABLET ORAL EVERY 4 HOURS PRN
Qty: 180 TABLET | Refills: 0 | Status: SHIPPED | OUTPATIENT
Start: 2024-05-18 | End: 2024-06-17

## 2024-05-15 NOTE — TELEPHONE ENCOUNTER
Patients wife calling for refill request of his percocet. She said he has enough to get to this weekend but wanted to make sure you had time to refill

## 2024-06-06 ENCOUNTER — TELEPHONE (OUTPATIENT)
Dept: PALLATIVE CARE | Age: 59
End: 2024-06-06

## 2024-06-06 DIAGNOSIS — R11.0 NAUSEA: ICD-10-CM

## 2024-06-06 DIAGNOSIS — Z51.5 PALLIATIVE CARE PATIENT: ICD-10-CM

## 2024-06-06 DIAGNOSIS — C06.2 SQUAMOUS CELL CANCER OF RETROMOLAR TRIGONE (HCC): ICD-10-CM

## 2024-06-06 DIAGNOSIS — C18.2 MALIGNANT NEOPLASM OF ASCENDING COLON (HCC): ICD-10-CM

## 2024-06-06 DIAGNOSIS — R45.1 AGITATION: Primary | ICD-10-CM

## 2024-06-06 RX ORDER — HALOPERIDOL 2 MG/1
2 TABLET ORAL EVERY 6 HOURS PRN
Qty: 60 TABLET | Refills: 0 | Status: SHIPPED | OUTPATIENT
Start: 2024-06-06 | End: 2024-06-21

## 2024-06-06 RX ORDER — LORAZEPAM 1 MG/1
1 TABLET ORAL EVERY 6 HOURS PRN
Qty: 60 TABLET | Refills: 0 | Status: SHIPPED | OUTPATIENT
Start: 2024-06-06 | End: 2024-06-21

## 2024-06-06 RX ORDER — OLANZAPINE 5 MG/1
5 TABLET ORAL NIGHTLY
Qty: 30 TABLET | Refills: 0 | Status: SHIPPED | OUTPATIENT
Start: 2024-06-06

## 2024-06-06 NOTE — TELEPHONE ENCOUNTER
I will prescribe Haldol as needed for agitation, restlessness and nausea and vomiting.  I will prescribe Ativan as needed for anxiety.  I will prescribe olanzapine 5 mg nightly scheduled for nausea, vomiting, anxiety, agitation and restlessness.  If these medications are not successful at controlling his symptoms or his symptoms become worse,  I would highly recommend coming to the hospital for symptom stabilization.

## 2024-06-06 NOTE — TELEPHONE ENCOUNTER
Spoke with Cleo, informed Cleo of new medications. Cleo verbalized understanding. Encourage Cleo to take pt to the Hospital if things get worse, Cleo states that the patient will not go to the hospital he wants to loco home.

## 2024-06-14 ENCOUNTER — HOSPITAL ENCOUNTER (OUTPATIENT)
Dept: INFUSION THERAPY | Age: 59
Discharge: HOME | End: 2024-06-14
Payer: MEDICARE

## 2024-06-14 ENCOUNTER — OFFICE VISIT (OUTPATIENT)
Dept: ONCOLOGY | Age: 59
End: 2024-06-14
Payer: MEDICARE

## 2024-06-14 VITALS
SYSTOLIC BLOOD PRESSURE: 161 MMHG | WEIGHT: 152.6 LBS | RESPIRATION RATE: 18 BRPM | OXYGEN SATURATION: 98 % | BODY MASS INDEX: 21.85 KG/M2 | DIASTOLIC BLOOD PRESSURE: 98 MMHG | HEIGHT: 70 IN | HEART RATE: 86 BPM | TEMPERATURE: 98.1 F

## 2024-06-14 VITALS
BODY MASS INDEX: 21.85 KG/M2 | RESPIRATION RATE: 18 BRPM | DIASTOLIC BLOOD PRESSURE: 98 MMHG | OXYGEN SATURATION: 98 % | WEIGHT: 152.6 LBS | SYSTOLIC BLOOD PRESSURE: 161 MMHG | TEMPERATURE: 98.1 F | HEART RATE: 86 BPM | HEIGHT: 70 IN

## 2024-06-14 DIAGNOSIS — C18.2 MALIGNANT NEOPLASM OF ASCENDING COLON (HCC): ICD-10-CM

## 2024-06-14 DIAGNOSIS — C18.2 MALIGNANT NEOPLASM OF ASCENDING COLON (HCC): Primary | ICD-10-CM

## 2024-06-14 DIAGNOSIS — Z71.89 GOALS OF CARE, COUNSELING/DISCUSSION: ICD-10-CM

## 2024-06-14 DIAGNOSIS — C18.9 MALIGNANT NEOPLASM OF COLON, UNSPECIFIED PART OF COLON (HCC): Primary | ICD-10-CM

## 2024-06-14 LAB
ALBUMIN SERPL BCG-MCNC: 3.8 G/DL (ref 3.5–5.1)
ALP SERPL-CCNC: 86 U/L (ref 38–126)
ALT SERPL W/O P-5'-P-CCNC: 10 U/L (ref 11–66)
AST SERPL-CCNC: 26 U/L (ref 5–40)
BASOPHILS ABSOLUTE: 0 THOU/MM3 (ref 0–0.1)
BASOPHILS NFR BLD AUTO: 0 % (ref 0–3)
BILIRUB CONJ SERPL-MCNC: < 0.1 MG/DL (ref 0.1–13.8)
BILIRUB SERPL-MCNC: 0.3 MG/DL (ref 0.3–1.2)
BUN BLDP-MCNC: 3 MG/DL (ref 8–26)
CHLORIDE BLD-SCNC: 107 MEQ/L (ref 98–109)
CREAT BLD-MCNC: 1.1 MG/DL (ref 0.5–1.2)
EOSINOPHIL NFR BLD AUTO: 3 % (ref 0–4)
EOSINOPHILS ABSOLUTE: 0.2 THOU/MM3 (ref 0–0.4)
ERYTHROCYTE [DISTWIDTH] IN BLOOD BY AUTOMATED COUNT: 16.4 % (ref 11.5–14.5)
GFR SERPL CREATININE-BSD FRML MDRD: 78 ML/MIN/1.73M2
GLUCOSE BLD-MCNC: 100 MG/DL (ref 70–108)
HCT VFR BLD AUTO: 37.7 % (ref 42–52)
HGB BLD-MCNC: 11.9 GM/DL (ref 14–18)
IMMATURE GRANULOCYTES %: 1 %
IMMATURE GRANULOCYTES ABSOLUTE: 0.05 THOU/MM3 (ref 0–0.07)
IONIZED CALCIUM, WHOLE BLOOD: 1.22 MMOL/L (ref 1.12–1.32)
LYMPHOCYTES ABSOLUTE: 2.7 THOU/MM3 (ref 1–4.8)
LYMPHOCYTES NFR BLD AUTO: 36 % (ref 15–47)
MCH RBC QN AUTO: 28.7 PG (ref 26–33)
MCHC RBC AUTO-ENTMCNC: 31.6 GM/DL (ref 32.2–35.5)
MCV RBC AUTO: 91 FL (ref 80–94)
MONOCYTES ABSOLUTE: 1 THOU/MM3 (ref 0.4–1.3)
MONOCYTES NFR BLD AUTO: 14 % (ref 0–12)
NEUTROPHILS ABSOLUTE: 3.4 THOU/MM3 (ref 1.8–7.7)
NEUTROPHILS NFR BLD AUTO: 46 % (ref 43–75)
PLATELET # BLD AUTO: 294 THOU/MM3 (ref 130–400)
PMV BLD AUTO: 9.3 FL (ref 9.4–12.4)
POTASSIUM BLD-SCNC: 4.2 MEQ/L (ref 3.5–4.9)
PROT SERPL-MCNC: 6.8 G/DL (ref 6.1–8)
RBC # BLD AUTO: 4.14 MILL/MM3 (ref 4.7–6.1)
SODIUM BLD-SCNC: 145 MEQ/L (ref 138–146)
TOTAL CO2, WHOLE BLOOD: 28 MEQ/L (ref 23–33)
WBC # BLD AUTO: 7.4 THOU/MM3 (ref 4.8–10.8)

## 2024-06-14 PROCEDURE — 1036F TOBACCO NON-USER: CPT | Performed by: PHYSICIAN ASSISTANT

## 2024-06-14 PROCEDURE — 3017F COLORECTAL CA SCREEN DOC REV: CPT | Performed by: PHYSICIAN ASSISTANT

## 2024-06-14 PROCEDURE — 80076 HEPATIC FUNCTION PANEL: CPT

## 2024-06-14 PROCEDURE — G8420 CALC BMI NORM PARAMETERS: HCPCS | Performed by: PHYSICIAN ASSISTANT

## 2024-06-14 PROCEDURE — 6360000002 HC RX W HCPCS: Performed by: INTERNAL MEDICINE

## 2024-06-14 PROCEDURE — 99211 OFF/OP EST MAY X REQ PHY/QHP: CPT

## 2024-06-14 PROCEDURE — 36591 DRAW BLOOD OFF VENOUS DEVICE: CPT

## 2024-06-14 PROCEDURE — 2580000003 HC RX 258: Performed by: INTERNAL MEDICINE

## 2024-06-14 PROCEDURE — 85025 COMPLETE CBC W/AUTO DIFF WBC: CPT

## 2024-06-14 PROCEDURE — G8427 DOCREV CUR MEDS BY ELIG CLIN: HCPCS | Performed by: PHYSICIAN ASSISTANT

## 2024-06-14 PROCEDURE — 99214 OFFICE O/P EST MOD 30 MIN: CPT | Performed by: PHYSICIAN ASSISTANT

## 2024-06-14 PROCEDURE — 80047 BASIC METABLC PNL IONIZED CA: CPT

## 2024-06-14 RX ORDER — SODIUM CHLORIDE 0.9 % (FLUSH) 0.9 %
5-40 SYRINGE (ML) INJECTION PRN
Status: DISCONTINUED | OUTPATIENT
Start: 2024-06-14 | End: 2024-06-15 | Stop reason: HOSPADM

## 2024-06-14 RX ORDER — SODIUM CHLORIDE 0.9 % (FLUSH) 0.9 %
5-40 SYRINGE (ML) INJECTION PRN
OUTPATIENT
Start: 2024-06-14

## 2024-06-14 RX ORDER — SODIUM CHLORIDE 9 MG/ML
25 INJECTION, SOLUTION INTRAVENOUS PRN
OUTPATIENT
Start: 2024-06-14

## 2024-06-14 RX ORDER — HEPARIN 100 UNIT/ML
500 SYRINGE INTRAVENOUS PRN
OUTPATIENT
Start: 2024-06-14

## 2024-06-14 RX ORDER — HEPARIN 100 UNIT/ML
500 SYRINGE INTRAVENOUS PRN
Status: DISCONTINUED | OUTPATIENT
Start: 2024-06-14 | End: 2024-06-15 | Stop reason: HOSPADM

## 2024-06-14 RX ADMIN — Medication 500 UNITS: at 10:43

## 2024-06-14 RX ADMIN — SODIUM CHLORIDE, PRESERVATIVE FREE 30 ML: 5 INJECTION INTRAVENOUS at 10:02

## 2024-06-14 NOTE — PATIENT INSTRUCTIONS
Mediport flush in 8 weeks  No provider follow up at this time   Please call for questions or concerns.

## 2024-06-14 NOTE — PLAN OF CARE
Problem: Safety - Adult  Goal: Free from fall injury  Outcome: Adequate for Discharge  Flowsheets (Taken 6/14/2024 1050)  Free From Fall Injury: Instruct family/caregiver on patient safety     Problem: Chronic Conditions and Co-morbidities  Goal: Patient's chronic conditions and co-morbidity symptoms are monitored and maintained or improved  Outcome: Adequate for Discharge  Flowsheets (Taken 6/14/2024 1050)  Care Plan - Patient's Chronic Conditions and Co-Morbidity Symptoms are Monitored and Maintained or Improved: Monitor and assess patient's chronic conditions and comorbid symptoms for stability, deterioration, or improvement     Problem: Discharge Planning  Goal: Discharge to home or other facility with appropriate resources  Outcome: Adequate for Discharge  Flowsheets (Taken 6/14/2024 1050)  Discharge to home or other facility with appropriate resources: Identify barriers to discharge with patient and caregiver     Problem: Infection - Adult  Goal: Absence of infection at discharge  Outcome: Adequate for Discharge  Flowsheets (Taken 6/14/2024 1050)  Absence of infection at discharge: Monitor all insertion sites i.e., indwelling lines, tubes and drains  Note: Mediport site with no redness or warmth. Skin over port site intact with no signs of breakdown noted. Patient verbalizes signs/symptoms of port infection and when to notify the physician.

## 2024-06-14 NOTE — PROGRESS NOTES
Patient tolerated line/lab without any complications.    Last vital signs:   BP (!) 161/98   Pulse 86   Temp 98.1 °F (36.7 °C) (Oral)   Resp 18   Ht 1.778 m (5' 10\")   Wt 69.2 kg (152 lb 9.6 oz)   SpO2 98%   BMI 21.90 kg/m²     Physician's instructions:    Mediport flush in 8 weeks  No provider follow up at this time   Please call for questions or concerns.      Patient instructed if experience any of the above symptoms following today's line/lab, he is to notify MD immediately or go to the emergency department.    Discharge instructions given to patient. Verbalizes understanding. Ambulated off unit per self, accompanied by spouse, with belongings.

## 2024-06-14 NOTE — PROGRESS NOTES
Care   Today on 6/14/24, had extensive goals of care discussion with the patient and his significant other. The patient affirms he does not wish to resume or proceed with further chemotherapy at this time. Since his last dose of chemotherapy in April 2024 he has had weight gain and overall improved appetite. However, one week ago required additional medications per Palliative Care, Dr. Pardo, for restlessness, agitation and nausea, vomiting. Haldol, Ativan and zyprexa added with improvement per the patient and his significant other.   Discussed with the patient if he would require hospitalization in future would he be open to admission and he states he would like to avoid hospitalization, unless required for palliative purposes. He does not want blood transfusions at this time. He does not want further routine lab monitoring.   Reviewed with the patient and Cleo referral to hospice. The patient would like to hold off on referral at this time. The patient left the exam room and significant other stayed. Reviewed recommendation for hospice referral for help in support, resources. She will consider.   The patient wishes to continue mediport flushes every 6-8 weeks. Reviewed potential signs/symptoms of disease progression with the patient and Cleo. Discussed recommendation for follow up with Palliative Care. No planned Medical Oncology follow up at this time.       Discussed visit with Dr. Pardo.         All patient questions answered. Pt voiced understanding. Patient agreed with treatment plan. Follow up as directed. Patient instructed to call for questions or concerns.          Electronically signed by   Portia Up PA-C

## 2024-06-14 NOTE — DISCHARGE INSTRUCTIONS
Please contact your Oncologist if you have any questions regarding the line/lab that you received today.      Patient instructed if experience any of the symptoms following today's line/lab to notify MD immediately or go to emergency department.    * dizziness/lightheadedness  *acute nausea/vomiting - not relieved with medication  *headache - not relieved from Tylenol/pain medication  *chest pain/pressure  *rash/itching  *shortness of breath        Drink fluids - 48oz fluids daily  Call if develop fever/ chills/ signs or symptoms of infection    Physician's instructions:  Instructions  Return in about 8 weeks (around 8/9/2024).  Mediport flush in 8 weeks  No provider follow up at this time   Please call for questions or concerns.

## 2024-06-17 DIAGNOSIS — G89.3 CANCER RELATED PAIN: ICD-10-CM

## 2024-06-17 DIAGNOSIS — C18.2 MALIGNANT NEOPLASM OF ASCENDING COLON (HCC): ICD-10-CM

## 2024-06-17 DIAGNOSIS — C06.2 SQUAMOUS CELL CANCER OF RETROMOLAR TRIGONE (HCC): ICD-10-CM

## 2024-06-17 RX ORDER — OXYCODONE AND ACETAMINOPHEN 7.5; 325 MG/1; MG/1
1 TABLET ORAL EVERY 4 HOURS PRN
Qty: 180 TABLET | Refills: 0 | Status: SHIPPED | OUTPATIENT
Start: 2024-06-17 | End: 2024-07-17

## 2024-06-17 NOTE — TELEPHONE ENCOUNTER
Patients wife zo calling for refill of his percocet. She states he is doing well on all his medications.     Please send refill to [facility] [place]

## 2024-07-15 DIAGNOSIS — R11.0 NAUSEA: ICD-10-CM

## 2024-07-15 DIAGNOSIS — C06.2 SQUAMOUS CELL CANCER OF RETROMOLAR TRIGONE (HCC): ICD-10-CM

## 2024-07-15 DIAGNOSIS — C18.2 MALIGNANT NEOPLASM OF ASCENDING COLON (HCC): ICD-10-CM

## 2024-07-15 DIAGNOSIS — R45.1 AGITATION: ICD-10-CM

## 2024-07-15 DIAGNOSIS — G89.3 CANCER RELATED PAIN: ICD-10-CM

## 2024-07-15 RX ORDER — OLANZAPINE 5 MG/1
5 TABLET ORAL NIGHTLY
Qty: 30 TABLET | Refills: 0 | Status: SHIPPED | OUTPATIENT
Start: 2024-07-15

## 2024-07-15 RX ORDER — HALOPERIDOL 2 MG/1
2 TABLET ORAL EVERY 6 HOURS PRN
Qty: 60 TABLET | Refills: 0 | Status: SHIPPED | OUTPATIENT
Start: 2024-07-15 | End: 2024-07-30

## 2024-07-15 RX ORDER — OXYCODONE AND ACETAMINOPHEN 7.5; 325 MG/1; MG/1
1 TABLET ORAL EVERY 4 HOURS PRN
Qty: 180 TABLET | Refills: 0 | Status: SHIPPED | OUTPATIENT
Start: 2024-07-17 | End: 2024-08-16

## 2024-07-15 NOTE — TELEPHONE ENCOUNTER
Pts partner Cleo called requesting medication refills. Haldol, Olanzapine and Percocet. Cleo states pt is doing well on medications.     [facility] pharmacy verified.

## 2024-07-29 DIAGNOSIS — C18.2 MALIGNANT NEOPLASM OF ASCENDING COLON (HCC): ICD-10-CM

## 2024-07-29 DIAGNOSIS — T45.1X5A CHEMOTHERAPY INDUCED NAUSEA AND VOMITING: ICD-10-CM

## 2024-07-29 DIAGNOSIS — R11.2 CHEMOTHERAPY INDUCED NAUSEA AND VOMITING: ICD-10-CM

## 2024-07-29 RX ORDER — ONDANSETRON 4 MG/1
8 TABLET, ORALLY DISINTEGRATING ORAL EVERY 8 HOURS PRN
Qty: 120 TABLET | Refills: 3 | Status: SHIPPED | OUTPATIENT
Start: 2024-07-29

## 2024-07-29 NOTE — TELEPHONE ENCOUNTER
Pt's partner Cleo called requesting medication refill Zofran.     [facility] pharmacy verified.

## 2024-08-09 ENCOUNTER — HOSPITAL ENCOUNTER (OUTPATIENT)
Dept: INFUSION THERAPY | Age: 59
Discharge: HOME | End: 2024-08-09
Payer: MEDICARE

## 2024-08-09 VITALS
WEIGHT: 159 LBS | HEART RATE: 90 BPM | RESPIRATION RATE: 16 BRPM | BODY MASS INDEX: 22.76 KG/M2 | HEIGHT: 70 IN | DIASTOLIC BLOOD PRESSURE: 89 MMHG | SYSTOLIC BLOOD PRESSURE: 158 MMHG | TEMPERATURE: 97.8 F | OXYGEN SATURATION: 98 %

## 2024-08-09 DIAGNOSIS — C18.9 MALIGNANT NEOPLASM OF COLON, UNSPECIFIED PART OF COLON (HCC): Primary | ICD-10-CM

## 2024-08-09 PROCEDURE — 2580000003 HC RX 258: Performed by: PHYSICIAN ASSISTANT

## 2024-08-09 PROCEDURE — 96523 IRRIG DRUG DELIVERY DEVICE: CPT

## 2024-08-09 PROCEDURE — 6360000002 HC RX W HCPCS: Performed by: PHYSICIAN ASSISTANT

## 2024-08-09 RX ORDER — HEPARIN 100 UNIT/ML
500 SYRINGE INTRAVENOUS PRN
Status: DISCONTINUED | OUTPATIENT
Start: 2024-08-09 | End: 2024-08-10 | Stop reason: HOSPADM

## 2024-08-09 RX ORDER — SODIUM CHLORIDE 9 MG/ML
25 INJECTION, SOLUTION INTRAVENOUS PRN
Status: CANCELLED | OUTPATIENT
Start: 2024-08-09

## 2024-08-09 RX ORDER — SODIUM CHLORIDE 9 MG/ML
25 INJECTION, SOLUTION INTRAVENOUS PRN
OUTPATIENT
Start: 2024-08-09

## 2024-08-09 RX ORDER — SODIUM CHLORIDE 0.9 % (FLUSH) 0.9 %
5-40 SYRINGE (ML) INJECTION PRN
OUTPATIENT
Start: 2024-08-09

## 2024-08-09 RX ORDER — HEPARIN 100 UNIT/ML
500 SYRINGE INTRAVENOUS PRN
OUTPATIENT
Start: 2024-08-09

## 2024-08-09 RX ORDER — SODIUM CHLORIDE 0.9 % (FLUSH) 0.9 %
5-40 SYRINGE (ML) INJECTION PRN
Status: DISCONTINUED | OUTPATIENT
Start: 2024-08-09 | End: 2024-08-10 | Stop reason: HOSPADM

## 2024-08-09 RX ADMIN — SODIUM CHLORIDE, PRESERVATIVE FREE 10 ML: 5 INJECTION INTRAVENOUS at 11:11

## 2024-08-09 RX ADMIN — HEPARIN 500 UNITS: 100 SYRINGE at 11:11

## 2024-08-09 NOTE — PLAN OF CARE
Problem: Safety - Adult  Goal: Free from fall injury  Outcome: Adequate for Discharge  Flowsheets (Taken 8/9/2024 1115)  Free From Fall Injury: Instruct family/caregiver on patient safety     Problem: Discharge Planning  Goal: Discharge to home or other facility with appropriate resources  Outcome: Adequate for Discharge  Flowsheets (Taken 8/9/2024 1115)  Discharge to home or other facility with appropriate resources: Identify barriers to discharge with patient and caregiver     Problem: Infection - Adult  Goal: Absence of infection at discharge  Outcome: Adequate for Discharge  Flowsheets (Taken 8/9/2024 1115)  Absence of infection at discharge: Monitor all insertion sites i.e., indwelling lines, tubes and drains  Note: Mediport site with no redness or warmth. Skin over port site intact with no signs of breakdown noted. Patient verbalizes signs/symptoms of port infection and when to notify the physician.     Care plan reviewed with patient. Patient verbalizes understanding of the plan of care and contributes to goal setting.

## 2024-08-09 NOTE — PROGRESS NOTES
Patient tolerated port flush without any complications.    Last vital signs  BP (!) 158/89   Pulse 90   Temp 97.8 °F (36.6 °C) (Oral)   Resp 16   Ht 1.778 m (5' 10\")   Wt 72.1 kg (159 lb)   SpO2 98%   BMI 22.81 kg/m²     Discharge instructions given to patient, Verbalizes understanding. Ambulated off unit per self in stable condition with all belongings.

## 2024-08-14 DIAGNOSIS — R11.0 NAUSEA: ICD-10-CM

## 2024-08-14 DIAGNOSIS — T45.1X5A CHEMOTHERAPY-INDUCED PERIPHERAL NEUROPATHY (HCC): ICD-10-CM

## 2024-08-14 DIAGNOSIS — K21.9 GASTROESOPHAGEAL REFLUX DISEASE, UNSPECIFIED WHETHER ESOPHAGITIS PRESENT: ICD-10-CM

## 2024-08-14 DIAGNOSIS — G62.0 CHEMOTHERAPY-INDUCED PERIPHERAL NEUROPATHY (HCC): ICD-10-CM

## 2024-08-14 DIAGNOSIS — Z51.5 PALLIATIVE CARE PATIENT: Primary | ICD-10-CM

## 2024-08-14 DIAGNOSIS — G89.3 CANCER RELATED PAIN: ICD-10-CM

## 2024-08-14 DIAGNOSIS — C18.2 MALIGNANT NEOPLASM OF ASCENDING COLON (HCC): ICD-10-CM

## 2024-08-14 DIAGNOSIS — R45.1 AGITATION: ICD-10-CM

## 2024-08-14 DIAGNOSIS — C06.2 SQUAMOUS CELL CANCER OF RETROMOLAR TRIGONE (HCC): ICD-10-CM

## 2024-08-14 NOTE — TELEPHONE ENCOUNTER
Pt called requesting medication refills. Pt states he needs Medications that Dr Pardo prescribes: his pain medications, anxiety medications, stomach medication, etc. Pt did not have the bottles in front of him at the time of the call.  Pt states he has enough medication to get him to the weekend.     [facility] [address] Verified.

## 2024-08-15 RX ORDER — OLANZAPINE 5 MG/1
5 TABLET ORAL NIGHTLY
Qty: 30 TABLET | Refills: 0 | Status: SHIPPED | OUTPATIENT
Start: 2024-08-15

## 2024-08-15 RX ORDER — DULOXETIN HYDROCHLORIDE 30 MG/1
30 CAPSULE, DELAYED RELEASE ORAL DAILY
Qty: 30 CAPSULE | Refills: 3 | Status: SHIPPED | OUTPATIENT
Start: 2024-08-15

## 2024-08-15 RX ORDER — HALOPERIDOL 2 MG/1
2 TABLET ORAL EVERY 6 HOURS PRN
Qty: 60 TABLET | Refills: 0 | Status: SHIPPED | OUTPATIENT
Start: 2024-08-15 | End: 2024-08-30

## 2024-08-15 RX ORDER — OMEPRAZOLE 40 MG/1
40 CAPSULE, DELAYED RELEASE ORAL
Qty: 60 CAPSULE | Refills: 2 | Status: SHIPPED | OUTPATIENT
Start: 2024-08-15 | End: 2024-11-13

## 2024-08-15 RX ORDER — OXYCODONE AND ACETAMINOPHEN 7.5; 325 MG/1; MG/1
1 TABLET ORAL EVERY 4 HOURS PRN
Qty: 180 TABLET | Refills: 0 | Status: SHIPPED | OUTPATIENT
Start: 2024-08-16 | End: 2024-09-15

## 2024-08-15 NOTE — TELEPHONE ENCOUNTER
Refill sent.  Will follow-up with him in September for his appointment.  Can we mail him a controlled substance agreement?

## 2024-09-04 ENCOUNTER — TELEMEDICINE (OUTPATIENT)
Dept: PALLATIVE CARE | Age: 59
End: 2024-09-04

## 2024-09-04 DIAGNOSIS — E43 SEVERE MALNUTRITION (HCC): ICD-10-CM

## 2024-09-04 DIAGNOSIS — T45.1X5A CHEMOTHERAPY-INDUCED PERIPHERAL NEUROPATHY (HCC): ICD-10-CM

## 2024-09-04 DIAGNOSIS — C06.2 SQUAMOUS CELL CANCER OF RETROMOLAR TRIGONE (HCC): ICD-10-CM

## 2024-09-04 DIAGNOSIS — R11.0 NAUSEA: ICD-10-CM

## 2024-09-04 DIAGNOSIS — Z51.5 PALLIATIVE CARE PATIENT: ICD-10-CM

## 2024-09-04 DIAGNOSIS — J44.9 CHRONIC OBSTRUCTIVE PULMONARY DISEASE, UNSPECIFIED COPD TYPE (HCC): ICD-10-CM

## 2024-09-04 DIAGNOSIS — R05.3 CHRONIC COUGH: ICD-10-CM

## 2024-09-04 DIAGNOSIS — C79.9 SECONDARY MALIGNANT NEOPLASM OF UNSPECIFIED SITE (CODE) (HCC): ICD-10-CM

## 2024-09-04 DIAGNOSIS — R45.1 AGITATION: ICD-10-CM

## 2024-09-04 DIAGNOSIS — C18.2 MALIGNANT NEOPLASM OF ASCENDING COLON (HCC): Primary | ICD-10-CM

## 2024-09-04 DIAGNOSIS — G62.0 CHEMOTHERAPY-INDUCED PERIPHERAL NEUROPATHY (HCC): ICD-10-CM

## 2024-09-04 DIAGNOSIS — R06.02 SHORTNESS OF BREATH: ICD-10-CM

## 2024-09-04 DIAGNOSIS — G89.3 CANCER RELATED PAIN: ICD-10-CM

## 2024-09-04 RX ORDER — ALBUTEROL SULFATE 5 MG/ML
5 SOLUTION RESPIRATORY (INHALATION) 4 TIMES DAILY PRN
Qty: 120 EACH | Refills: 3 | Status: SHIPPED | OUTPATIENT
Start: 2024-09-04

## 2024-09-04 RX ORDER — HALOPERIDOL 2 MG/1
2 TABLET ORAL EVERY 6 HOURS PRN
Qty: 60 TABLET | Refills: 0 | Status: SHIPPED | OUTPATIENT
Start: 2024-09-04 | End: 2024-09-19

## 2024-09-04 RX ORDER — OXYCODONE AND ACETAMINOPHEN 7.5; 325 MG/1; MG/1
1 TABLET ORAL EVERY 4 HOURS PRN
Qty: 180 TABLET | Refills: 0 | Status: SHIPPED | OUTPATIENT
Start: 2024-09-16 | End: 2024-10-16

## 2024-09-04 RX ORDER — TIZANIDINE 2 MG/1
2 TABLET ORAL 3 TIMES DAILY PRN
Qty: 30 TABLET | Refills: 0 | Status: SHIPPED | OUTPATIENT
Start: 2024-09-04

## 2024-09-04 RX ORDER — OLANZAPINE 5 MG/1
10 TABLET ORAL NIGHTLY
Qty: 30 TABLET | Refills: 0 | Status: SHIPPED | OUTPATIENT
Start: 2024-09-04

## 2024-09-04 NOTE — PATIENT INSTRUCTIONS
Continue current plan of care  Continue Percocet 7.5-325 mg every 4 hours as needed for  cancer related pain and shortness of breath, may increase to  mg if needed  Continue Senna as needed for  opioid induced constipation  Continue Miralax 17 g as needed for  opioid induced constipation  Continue Duloxetine 30 mg daily scheduled for  chemotherapy induced peripheral neuropathy, depression, and anxiety  Continue Zofran 4 mg every 6 hours as needed for  nausea and vomiting  Continue Compazine 10 mg every 6 hours as needed for  nausea and vomiting  Increase olanzapine to 10 mg nightly for nausea and vomiting  Start albuterol nebulizer treatments 4 times a day as needed for shortness of breath, cough and wheezing  Continue Haldol 2 mg every 6 hours as needed for agitation, restlessness and nausea  Start tizanidine 2 mg 3 times a day as needed for muscle spasms  PDMP reviewed  Please call the office if your symptoms worsen or if you were to develop new symptoms  Please call the palliative care office when you need refills

## 2024-09-04 NOTE — PROGRESS NOTES
constipation  Continue Duloxetine 30 mg daily scheduled for  chemotherapy induced peripheral neuropathy, depression, and anxiety  Continue Zofran 4 mg every 6 hours as needed for  nausea and vomiting  Continue Compazine 10 mg every 6 hours as needed for  nausea and vomiting  Increase olanzapine to 10 mg nightly for nausea and vomiting  Start albuterol nebulizer treatments 4 times a day as needed for shortness of breath, cough and wheezing  Continue Haldol 2 mg every 6 hours as needed for agitation, restlessness and nausea  Start tizanidine 2 mg 3 times a day as needed for muscle spasms  PDMP reviewed  Please call the office if your symptoms worsen or if you were to develop new symptoms  Please call the palliative care office when you need refills    Return in about 6 weeks (around 10/16/2024) for Cancer related pain.    Medications Prescribed:  Orders Placed This Encounter   Medications    albuterol (PROVENTIL) (5 MG/ML) 0.5% nebulizer solution     Sig: Take 1 mL by nebulization 4 times daily as needed for Wheezing     Dispense:  120 each     Refill:  3    OLANZapine (ZYPREXA) 5 MG tablet     Sig: Take 2 tablets by mouth nightly     Dispense:  30 tablet     Refill:  0    haloperidol (HALDOL) 2 MG tablet     Sig: Take 1 tablet by mouth every 6 hours as needed (Agitation, Restlessness, Nausea)     Dispense:  60 tablet     Refill:  0    oxyCODONE-acetaminophen (PERCOCET) 7.5-325 MG per tablet     Sig: Take 1 tablet by mouth every 4 hours as needed for Pain for up to 30 days. Intended supply: 30 days Max Daily Amount: 6 tablets     Dispense:  180 tablet     Refill:  0     Reduce doses taken as pain becomes manageable    tiZANidine (ZANAFLEX) 2 MG tablet     Sig: Take 1 tablet by mouth 3 times daily as needed (Muscle spasms)     Dispense:  30 tablet     Refill:  0     Lucas Christies, was evaluated through a synchronous (real-time) audio-video encounter. The patient (or guardian if applicable) is aware that this is a

## 2024-09-05 ENCOUNTER — TELEPHONE (OUTPATIENT)
Dept: PALLATIVE CARE | Age: 59
End: 2024-09-05

## 2024-09-05 NOTE — TELEPHONE ENCOUNTER
Silvia BAÑUELOS from [facility] home Medical equipment called states that pt's insurance won't cover diagnosis of cough, wheezing or SOB for the nebulizer machine. Passing Dx: Asthma, COPD, Penumonia, Bronchitis (long term DX not symptoms)    Home medical equipment needs a new order with a passing dx and documentation.

## 2024-10-04 ENCOUNTER — HOSPITAL ENCOUNTER (OUTPATIENT)
Dept: INFUSION THERAPY | Age: 59
Discharge: HOME | End: 2024-10-04
Payer: MEDICARE

## 2024-10-04 VITALS
DIASTOLIC BLOOD PRESSURE: 96 MMHG | BODY MASS INDEX: 21.02 KG/M2 | SYSTOLIC BLOOD PRESSURE: 164 MMHG | HEIGHT: 70 IN | RESPIRATION RATE: 18 BRPM | WEIGHT: 146.8 LBS | HEART RATE: 94 BPM | OXYGEN SATURATION: 97 % | TEMPERATURE: 97.7 F

## 2024-10-04 DIAGNOSIS — C18.9 MALIGNANT NEOPLASM OF COLON, UNSPECIFIED PART OF COLON (HCC): Primary | ICD-10-CM

## 2024-10-04 PROCEDURE — 6360000002 HC RX W HCPCS: Performed by: PHYSICIAN ASSISTANT

## 2024-10-04 PROCEDURE — 2580000003 HC RX 258: Performed by: PHYSICIAN ASSISTANT

## 2024-10-04 PROCEDURE — 96523 IRRIG DRUG DELIVERY DEVICE: CPT

## 2024-10-04 RX ORDER — HEPARIN 100 UNIT/ML
500 SYRINGE INTRAVENOUS PRN
Status: DISCONTINUED | OUTPATIENT
Start: 2024-10-04 | End: 2024-10-05 | Stop reason: HOSPADM

## 2024-10-04 RX ORDER — HEPARIN 100 UNIT/ML
500 SYRINGE INTRAVENOUS PRN
OUTPATIENT
Start: 2024-10-04

## 2024-10-04 RX ORDER — SODIUM CHLORIDE 0.9 % (FLUSH) 0.9 %
5-40 SYRINGE (ML) INJECTION PRN
OUTPATIENT
Start: 2024-10-04

## 2024-10-04 RX ORDER — SODIUM CHLORIDE 9 MG/ML
25 INJECTION, SOLUTION INTRAVENOUS PRN
OUTPATIENT
Start: 2024-10-04

## 2024-10-04 RX ORDER — SODIUM CHLORIDE 0.9 % (FLUSH) 0.9 %
5-40 SYRINGE (ML) INJECTION PRN
Status: DISCONTINUED | OUTPATIENT
Start: 2024-10-04 | End: 2024-10-05 | Stop reason: HOSPADM

## 2024-10-04 RX ADMIN — SODIUM CHLORIDE, PRESERVATIVE FREE 30 ML: 5 INJECTION INTRAVENOUS at 09:12

## 2024-10-04 RX ADMIN — HEPARIN 500 UNITS: 100 SYRINGE at 09:12

## 2024-10-04 NOTE — PLAN OF CARE
Problem: Safety - Adult  Goal: Free from fall injury  Outcome: Adequate for Discharge  Flowsheets (Taken 10/4/2024 1007)  Free From Fall Injury: Instruct family/caregiver on patient safety     Problem: Discharge Planning  Goal: Discharge to home or other facility with appropriate resources  Outcome: Adequate for Discharge  Flowsheets (Taken 10/4/2024 1007)  Discharge to home or other facility with appropriate resources: Identify barriers to discharge with patient and caregiver     Problem: Infection - Adult  Goal: Absence of infection at discharge  Outcome: Adequate for Discharge  Flowsheets (Taken 10/4/2024 1007)  Absence of infection at discharge:   Assess and monitor for signs and symptoms of infection   Monitor lab/diagnostic results   Monitor all insertion sites i.e., indwelling lines, tubes and drains  Note: Mediport site with no redness or warmth. Skin over port site intact with no signs of breakdown noted. Patient verbalizes signs/symptoms of port infection and when to notify the physician.     Care plan reviewed with patient. Patient verbalizes understanding of the plan of care and contributes to goal setting.

## 2024-10-04 NOTE — DISCHARGE INSTRUCTIONS
Please contact your Oncologist if you have any questions regarding the port flush that you received today.    You are instructed to call the office or go to the Emergency Dept. If you experience any of the following symptoms:    Dizziness/lightheadedness   Acute nausea or vomiting-not relieved by medications  Headaches-not relieved by medications  New chest pain or pressure  New rash /itching  New shortness of breath  Fever,chills or signs or symptoms of infection    Make sure you are drinking 48 to 64 ounces of water daily-if you are unable to drink fluids let us know right away.

## 2024-10-16 ENCOUNTER — TELEMEDICINE (OUTPATIENT)
Dept: PALLATIVE CARE | Age: 59
End: 2024-10-16

## 2024-10-16 DIAGNOSIS — G89.3 CANCER RELATED PAIN: ICD-10-CM

## 2024-10-16 DIAGNOSIS — J44.9 CHRONIC OBSTRUCTIVE PULMONARY DISEASE, UNSPECIFIED COPD TYPE (HCC): ICD-10-CM

## 2024-10-16 DIAGNOSIS — C06.2 SQUAMOUS CELL CANCER OF RETROMOLAR TRIGONE (HCC): ICD-10-CM

## 2024-10-16 DIAGNOSIS — T45.1X5A CHEMOTHERAPY-INDUCED PERIPHERAL NEUROPATHY (HCC): ICD-10-CM

## 2024-10-16 DIAGNOSIS — G62.0 CHEMOTHERAPY-INDUCED PERIPHERAL NEUROPATHY (HCC): ICD-10-CM

## 2024-10-16 DIAGNOSIS — R06.02 SHORTNESS OF BREATH: ICD-10-CM

## 2024-10-16 DIAGNOSIS — E43 SEVERE MALNUTRITION (HCC): ICD-10-CM

## 2024-10-16 DIAGNOSIS — T45.1X5A CHEMOTHERAPY INDUCED NAUSEA AND VOMITING: ICD-10-CM

## 2024-10-16 DIAGNOSIS — T40.2X5A THERAPEUTIC OPIOID-INDUCED CONSTIPATION (OIC): ICD-10-CM

## 2024-10-16 DIAGNOSIS — Z51.5 PALLIATIVE CARE PATIENT: ICD-10-CM

## 2024-10-16 DIAGNOSIS — R45.1 AGITATION: ICD-10-CM

## 2024-10-16 DIAGNOSIS — R11.2 CHEMOTHERAPY INDUCED NAUSEA AND VOMITING: ICD-10-CM

## 2024-10-16 DIAGNOSIS — C18.2 MALIGNANT NEOPLASM OF ASCENDING COLON (HCC): Primary | ICD-10-CM

## 2024-10-16 DIAGNOSIS — K59.03 THERAPEUTIC OPIOID-INDUCED CONSTIPATION (OIC): ICD-10-CM

## 2024-10-16 RX ORDER — OLANZAPINE 10 MG/1
10 TABLET ORAL NIGHTLY
Qty: 30 TABLET | Refills: 0 | Status: SHIPPED | OUTPATIENT
Start: 2024-10-16 | End: 2024-11-15

## 2024-10-16 RX ORDER — OXYCODONE AND ACETAMINOPHEN 7.5; 325 MG/1; MG/1
2 TABLET ORAL EVERY 4 HOURS PRN
Qty: 180 TABLET | Refills: 0 | Status: SHIPPED | OUTPATIENT
Start: 2024-10-16 | End: 2024-10-31

## 2024-10-16 RX ORDER — TIZANIDINE 2 MG/1
2 TABLET ORAL 3 TIMES DAILY PRN
Qty: 30 TABLET | Refills: 2 | Status: SHIPPED | OUTPATIENT
Start: 2024-10-16

## 2024-10-16 NOTE — PATIENT INSTRUCTIONS
Continue current plan of care  Increase Percocet  mg every 4 hours as needed for  cancer related pain and shortness of breath  Continue Senna as needed for  opioid induced constipation  Continue Miralax 17 g as needed for  opioid induced constipation  Continue Duloxetine 30 mg daily scheduled for  chemotherapy induced peripheral neuropathy, depression, and anxiety  Continue Zofran 4 mg every 6 hours as needed for  nausea and vomiting  Continue Compazine 10 mg every 6 hours as needed for  nausea and vomiting  Continue olanzapine to 10 mg nightly for nausea and vomiting  Continue albuterol nebulizer treatments 4 times a day as needed for shortness of breath, cough and wheezing  Continue Haldol 2 mg every 6 hours as needed for agitation, restlessness and nausea  Continue tizanidine 2 mg 3 times a day as needed for muscle spasms  PDMP reviewed  Please call the office if your symptoms worsen or if you were to develop new symptoms  Please call the palliative care office when you need refills

## 2024-10-16 NOTE — PROGRESS NOTES
Lucas Isaacs is a 58 y.o. male who presents to the palliative care clinic today for:  Chief Complaint   Patient presents with    Follow-up    Cancer    Pain       HPI:   Lucas Isaacs presents to the palliative care clinic today As a virtual visit for follow-up of his cancer related pain.  He is accompanied by his partner to this visit.  His partner reports that he has declined significantly over the last several weeks.  2 weeks ago she reports that he was able to walk 6 houses down to [place].  Today he is having difficulty taking the trash out and going to the end of the driveway.    Symptoms:  Pain:   Location- Generalized abdomen, Back, and Bilateral hip  Quality- sharp  Duration- Their pain is getting worse.  Severity- They rate their pain as an 8 on scale of 1-10.  Exacerbating factors- moving  Current Treatments- They have Duloxetine 30 mg daily for scheduled pain relief. From 8 AM yesterday to 8 AM today, they have used Oxycodone IR 7.5-325 mg 6 times and Tizanidine 2 mg three times for as needed pain relief.  Effectiveness of Current Treatment- Their pain medications are not working well to control their pain.  Shortness of breath: Patient denies shortness of breath or difficulty breathing. Patient reports cough. Family reports cough. Their cough is improving.  Sleep: Patient reports trouble sleeping. They are not sleeping well due to cancer related pain.  Fatigue/Drowsiness: Family reports fatigue and drowsiness.  Appetite: Patient reports poor appetite. They associate cancer related pain with their poor appetite.  Wt. Loss: Patient reports weight loss. Patient has lost 13 lbs over the past 2 weeks.  Dry Mouth: Patient reports dry mouth.  Nausea/Vomiting: Family denies nausea and vomiting. They are currently taking olanzapine 10 mg daily to help with their nausea and/or vomiting. Their medication is  working well to help with their nausea and vomiting.  Constipation/Diarrhea: Patient denies

## 2024-10-28 DIAGNOSIS — C18.2 MALIGNANT NEOPLASM OF ASCENDING COLON (HCC): ICD-10-CM

## 2024-10-28 DIAGNOSIS — Z51.5 PALLIATIVE CARE PATIENT: ICD-10-CM

## 2024-10-28 DIAGNOSIS — C06.2 SQUAMOUS CELL CANCER OF RETROMOLAR TRIGONE (HCC): ICD-10-CM

## 2024-10-28 DIAGNOSIS — G89.3 CANCER RELATED PAIN: ICD-10-CM

## 2024-10-28 RX ORDER — OXYCODONE AND ACETAMINOPHEN 7.5; 325 MG/1; MG/1
2 TABLET ORAL EVERY 4 HOURS PRN
Qty: 180 TABLET | Refills: 0 | Status: SHIPPED | OUTPATIENT
Start: 2024-10-30 | End: 2024-11-14

## 2024-10-28 NOTE — TELEPHONE ENCOUNTER
Pt's spouse Cleo called states that the Percocet 7.5-325  2 tablet every 4-5 hours is working \"wonders\" for the patient.   Pt is up walking to the mailbox and occasionally walking around the yard. Pt has enough medication until Thursday 10/31/2024.     Pharmacy [facility] [address] Verified.

## 2024-11-10 DIAGNOSIS — K21.9 GASTROESOPHAGEAL REFLUX DISEASE, UNSPECIFIED WHETHER ESOPHAGITIS PRESENT: ICD-10-CM

## 2024-11-11 ENCOUNTER — TELEMEDICINE (OUTPATIENT)
Dept: PALLATIVE CARE | Age: 59
End: 2024-11-11

## 2024-11-11 DIAGNOSIS — G89.3 CANCER RELATED PAIN: ICD-10-CM

## 2024-11-11 DIAGNOSIS — C06.2 SQUAMOUS CELL CANCER OF RETROMOLAR TRIGONE (HCC): ICD-10-CM

## 2024-11-11 DIAGNOSIS — R06.6 HICCUPS: ICD-10-CM

## 2024-11-11 DIAGNOSIS — T45.1X5A CHEMOTHERAPY INDUCED NAUSEA AND VOMITING: ICD-10-CM

## 2024-11-11 DIAGNOSIS — Z51.5 PALLIATIVE CARE PATIENT: ICD-10-CM

## 2024-11-11 DIAGNOSIS — T40.2X5A THERAPEUTIC OPIOID-INDUCED CONSTIPATION (OIC): ICD-10-CM

## 2024-11-11 DIAGNOSIS — R45.1 AGITATION: ICD-10-CM

## 2024-11-11 DIAGNOSIS — J44.9 CHRONIC OBSTRUCTIVE PULMONARY DISEASE, UNSPECIFIED COPD TYPE (HCC): ICD-10-CM

## 2024-11-11 DIAGNOSIS — R06.02 SHORTNESS OF BREATH: ICD-10-CM

## 2024-11-11 DIAGNOSIS — C18.2 MALIGNANT NEOPLASM OF ASCENDING COLON (HCC): Primary | ICD-10-CM

## 2024-11-11 DIAGNOSIS — C18.2 MALIGNANT NEOPLASM OF ASCENDING COLON (HCC): ICD-10-CM

## 2024-11-11 DIAGNOSIS — E43 SEVERE MALNUTRITION (HCC): ICD-10-CM

## 2024-11-11 DIAGNOSIS — R11.2 CHEMOTHERAPY INDUCED NAUSEA AND VOMITING: ICD-10-CM

## 2024-11-11 DIAGNOSIS — G62.0 CHEMOTHERAPY-INDUCED PERIPHERAL NEUROPATHY (HCC): ICD-10-CM

## 2024-11-11 DIAGNOSIS — T45.1X5A CHEMOTHERAPY-INDUCED PERIPHERAL NEUROPATHY (HCC): ICD-10-CM

## 2024-11-11 DIAGNOSIS — K59.03 THERAPEUTIC OPIOID-INDUCED CONSTIPATION (OIC): ICD-10-CM

## 2024-11-11 RX ORDER — OXYCODONE AND ACETAMINOPHEN 7.5; 325 MG/1; MG/1
2 TABLET ORAL EVERY 4 HOURS PRN
Qty: 180 TABLET | Refills: 0 | Status: SHIPPED | OUTPATIENT
Start: 2024-11-14 | End: 2024-11-29

## 2024-11-11 RX ORDER — ONDANSETRON 4 MG/1
8 TABLET, ORALLY DISINTEGRATING ORAL EVERY 8 HOURS PRN
Qty: 120 TABLET | Refills: 3 | Status: SHIPPED | OUTPATIENT
Start: 2024-11-11

## 2024-11-11 RX ORDER — OMEPRAZOLE 40 MG/1
CAPSULE, DELAYED RELEASE ORAL
Qty: 60 CAPSULE | Refills: 0 | Status: SHIPPED | OUTPATIENT
Start: 2024-11-11

## 2024-11-11 RX ORDER — OXYCODONE AND ACETAMINOPHEN 7.5; 325 MG/1; MG/1
2 TABLET ORAL EVERY 4 HOURS PRN
Qty: 180 TABLET | Refills: 0 | Status: SHIPPED | OUTPATIENT
Start: 2024-11-14 | End: 2024-11-11 | Stop reason: SDUPTHER

## 2024-11-11 RX ORDER — SODIUM CHLORIDE FOR INHALATION 3 %
4 VIAL, NEBULIZER (ML) INHALATION PRN
Qty: 1200 ML | Refills: 5 | Status: SHIPPED | OUTPATIENT
Start: 2024-11-11

## 2024-11-11 RX ORDER — OLANZAPINE 10 MG/1
10 TABLET ORAL NIGHTLY
Qty: 30 TABLET | Refills: 0 | Status: SHIPPED | OUTPATIENT
Start: 2024-11-11 | End: 2024-12-11

## 2024-11-11 RX ORDER — CHLORPROMAZINE HYDROCHLORIDE 25 MG/1
25 TABLET, FILM COATED ORAL 3 TIMES DAILY
Qty: 90 TABLET | Refills: 0 | Status: SHIPPED | OUTPATIENT
Start: 2024-11-11

## 2024-11-11 NOTE — TELEPHONE ENCOUNTER
[facility] pharmacy called stating that they don't fill controlled substance.     Please resent Oxycodone-acetaminophen to [facility] [address]

## 2024-11-11 NOTE — PROGRESS NOTES
ONC Nava HOD       Patient given educational materials - see patient instructions.  Discussed use, benefit, and side effects of prescribed medications.  All patient questions answered.  Patient voiced understanding.  Patient agreed with treatment plan. Follow up as directed.     Lucas Isaacs, was evaluated through a synchronous (real-time) audio-video encounter. The patient (or guardian if applicable) is aware that this is a billable service, which includes applicable co-pays. This Virtual Visit was conducted with patient's (and/or legal guardian's) consent. Patient identification was verified, and a caregiver was present when appropriate.   The patient was located at Home:   [address] [facility] [address]  Provider was located at Facility (Appt Dept):   [address] [place] [address]  Confirm you are appropriately licensed, registered, or certified to deliver care in the state where the patient is located as indicated above. If you are not or unsure, please re-schedule the visit: Yes, I confirm.        Electronically signed by Jorge A Pardo MD on 11/11/2024 at 2:19 PM    Parts of this note may have been dictated by use of voice recognition software and electronically transcribed. The note may contain errors not detected in proofreading.

## 2024-11-11 NOTE — PATIENT INSTRUCTIONS
Continue current plan of care  Continue Percocet  mg every 4 hours as needed for  cancer related pain and shortness of breath  Continue Senna as needed for  opioid induced constipation  Continue Miralax 17 g as needed for  opioid induced constipation  Continue Duloxetine 30 mg daily scheduled for  chemotherapy induced peripheral neuropathy, depression, and anxiety  Continue Zofran 4 mg every 6 hours as needed for  nausea and vomiting  Continue Compazine 10 mg every 6 hours as needed for  nausea and vomiting  Continue olanzapine to 10 mg nightly for nausea and vomiting  Continue albuterol nebulizer treatments 4 times a day as needed for shortness of breath, cough and wheezing  Continue Haldol 2 mg every 6 hours as needed for agitation, restlessness and nausea  Continue tizanidine 2 mg 3 times a day as needed for muscle spasms  Start Thorazine 25 mg 3 times a day for hiccups  Start saline nebulizer solution as needed for cough and secretions  PDMP reviewed  Please call the office if your symptoms worsen or if you were to develop new symptoms  Please call the palliative care office when you need refills

## 2024-11-29 ENCOUNTER — HOSPITAL ENCOUNTER (OUTPATIENT)
Dept: INFUSION THERAPY | Age: 59
End: 2024-11-29

## 2025-04-08 NOTE — ANESTHESIA PRE PROCEDURE
Patient called and would like a call back. Patient ask if Dr Segura sent over the order for the [employer] 3 sensor to [facility]? Please call to advise.   Component Value Date/Time    WBC 8.6 04/28/2023 03:25 PM    RBC 3.25 04/28/2023 03:25 PM    HGB 6.5 04/29/2023 04:55 AM    HCT 23.1 04/29/2023 04:55 AM    MCV 70.2 04/28/2023 03:25 PM    RDW 14.4 08/29/2022 11:25 AM     04/28/2023 03:25 PM       CMP:   Lab Results   Component Value Date/Time     04/29/2023 04:55 AM    K 4.0 04/29/2023 04:55 AM    K 3.3 12/10/2020 07:25 AM     04/29/2023 04:55 AM    CO2 22 04/29/2023 04:55 AM    BUN 11 04/29/2023 04:55 AM    CREATININE 1.2 04/29/2023 04:55 AM    LABGLOM >60 04/29/2023 04:55 AM    GLUCOSE 106 04/29/2023 04:55 AM    PROT 7.1 04/28/2023 12:21 PM    CALCIUM 8.4 04/29/2023 04:55 AM    BILITOT 0.3 04/28/2023 12:21 PM    ALKPHOS 59 04/28/2023 12:21 PM    AST 15 04/28/2023 12:21 PM    ALT 7 04/28/2023 12:21 PM       POC Tests: No results for input(s): POCGLU, POCNA, POCK, POCCL, POCBUN, POCHEMO, POCHCT in the last 72 hours.     Coags:   Lab Results   Component Value Date/Time    INR 0.92 12/10/2020 07:25 AM    APTT 27.1 12/10/2020 07:25 AM       HCG (If Applicable): No results found for: PREGTESTUR, PREGSERUM, HCG, HCGQUANT     ABGs: No results found for: PHART, PO2ART, NJR8AHV, CXQ0COD, BEART, D1HJLBBA     Type & Screen (If Applicable):  Lab Results   Component Value Date    LABRH NEG 04/28/2023       Drug/Infectious Status (If Applicable):  No results found for: HIV, HEPCAB    COVID-19 Screening (If Applicable):   Lab Results   Component Value Date/Time    COVID19 NEGATIVE 11/19/2021 10:25 AM    COVID19 NOT DETECTED 05/05/2021 04:54 PM           Anesthesia Evaluation  Patient summary reviewed no history of anesthetic complications:   Airway: Mallampati: II  TM distance: >3 FB   Neck ROM: full  Mouth opening: > = 3 FB   Dental:          Pulmonary:   (+) decreased breath sounds asthma:                            Cardiovascular:    (+) hypertension:, dysrhythmias: atrial fibrillation, hyperlipidemia      ECG reviewed                        Neuro/Psych:

## 2025-08-29 RX ORDER — METOPROLOL TARTRATE 50 MG
50 TABLET ORAL 2 TIMES DAILY
Qty: 180 TABLET | Refills: 3 | OUTPATIENT
Start: 2025-08-29

## (undated) DEVICE — SEALANT TISS 10 CC FIBRIN VISTASEAL

## (undated) DEVICE — JELLY,LUBE,STERILE,FLIP TOP,TUBE,2-OZ: Brand: MEDLINE

## (undated) DEVICE — TROCAR: Brand: KII FIOS FIRST ENTRY

## (undated) DEVICE — KIT,ANTI FOG,W/SPONGE & FLUID,SOFT PACK: Brand: MEDLINE

## (undated) DEVICE — CODMAN® SURGICAL PATTIES 1/2" X 3" (1.27CM X 7.62CM): Brand: CODMAN®

## (undated) DEVICE — HYPODERMIC SAFETY NEEDLE: Brand: MAGELLAN

## (undated) DEVICE — FORCEP BX MESH TOOTH MIC 2.8 MMX240 CM NDL STRL RADIAL JAW 4

## (undated) DEVICE — 35 ML SYRINGE LUER-LOCK TIP: Brand: MONOJECT

## (undated) DEVICE — GOWN,SIRUS,NON REINFRCD,LARGE,SET IN SL: Brand: MEDLINE

## (undated) DEVICE — GLOVE ORANGE PI 8   MSG9080

## (undated) DEVICE — SUTURE MCRYL SZ 3-0 L36IN ABSRB UD L36MM CT-1 1/2 CIR Y944H

## (undated) DEVICE — TTL1LYR 16FR10ML 100%SIL TMPST TR: Brand: MEDLINE

## (undated) DEVICE — TIP COVER ACCESSORY

## (undated) DEVICE — DILATION SYRINGE: Brand: COOK

## (undated) DEVICE — SUTURE VCRL + SZ 0 L18IN ABSRB TIE VCP106G

## (undated) DEVICE — ESOPHAGEAL BALLOON DILATATION CATHETER: Brand: CRE FIXED WIRE

## (undated) DEVICE — PACK-MAJOR

## (undated) DEVICE — Z INACTIVE USE 2854097 SPONGE GZ W4XL4IN COT 12 PLY TYP VII WVN C FLD DSGN

## (undated) DEVICE — UNIVERSAL MONOPOLAR LAPAROSCOPIC CABLE 10FT, 4MM PIN CONNECTOR: Brand: CONMED

## (undated) DEVICE — HERCULES 3 STAGE BALLOON ESOPHAGEAL: Brand: HERCULES

## (undated) DEVICE — SUTURE VCRL + SZ 3-0 L27IN ABSRB UD L26MM SH 1/2 CIR VCP416H

## (undated) DEVICE — REUSABLE MARKED SPRING TIP GUIDEWIRE, 210 CM X 1.86 MM: Brand: CONMED

## (undated) DEVICE — DRAPE,EENT,SPLIT,STERILE: Brand: MEDLINE

## (undated) DEVICE — SUTURE VCRL + SZ 2-0 L27IN ABSRB WHT SH 1/2 CIR TAPERCUT VCP417H

## (undated) DEVICE — CODMAN® SURGICAL PATTIES 1/2" X 1/2" (1.27CM X 1.27CM): Brand: CODMAN®

## (undated) DEVICE — LARYNGOSCOPY - BRONCHOSCOPY: Brand: MEDLINE INDUSTRIES, INC.

## (undated) DEVICE — PAD,NON-ADHERENT,3X8,STERILE,LF,1/PK: Brand: MEDLINE

## (undated) DEVICE — APPLICATOR LAP 45CM FLX 2 VISTASEAL

## (undated) DEVICE — GAUZE,SPONGE,8"X4",12PLY,XRAY,STRL,LF: Brand: MEDLINE

## (undated) DEVICE — SYRINGE MED 10ML LUERLOCK TIP W/O SFTY DISP

## (undated) DEVICE — INJECTION THERAPY NEEDLE CATHETER: Brand: INTERJECT

## (undated) DEVICE — Device

## (undated) DEVICE — T&A: Brand: MEDLINE INDUSTRIES, INC.

## (undated) DEVICE — SUTURE PROL SZ 2-0 L36IN NONABSORBABLE BLU SH L26MM 1/2 CIR 8523H

## (undated) DEVICE — PENCIL SMK EVAC ALL IN 1 DSGN ENH VISIBILITY IMPROVED AIR

## (undated) DEVICE — BASIC SINGLE BASIN BTC-LF: Brand: MEDLINE INDUSTRIES, INC.

## (undated) DEVICE — SPONGE LAP W18XL18IN WHT COT 4 PLY FLD STRUNG RADPQ DISP ST

## (undated) DEVICE — BINDER ABD SM M W12IN CIRC 30 45IN 4 PNL E CNTCT CLSR POSTOP

## (undated) DEVICE — GLOVE SURG SZ 75 L12IN FNGR THK94MIL TRNSLUC YEL LTX

## (undated) DEVICE — BRAVO CF CAPSULE  DELIVERY DEV, 5-PK: Brand: BRAVO

## (undated) DEVICE — BANDAGE,GAUZE,4.5"X4.1YD,STERILE,LF: Brand: MEDLINE

## (undated) DEVICE — GLOVE SURG SZ 7.5 L11.73IN FNGR THK9.8MIL STRW LTX POLYMER

## (undated) DEVICE — INSUFFLATION NEEDLE TO ESTABLISH PNEUMOPERITONEUM.: Brand: INSUFFLATION NEEDLE

## (undated) DEVICE — NEEDLE SPNL 22GA L7IN SPINOCAN

## (undated) DEVICE — GLOVE SURG 7.5 PF POLYMER WHT STRL SIGN LTX ESSENTIAL LTX

## (undated) DEVICE — SUTURE VCRL SZ 3-0 L18IN ABSRB VLT L26MM SH 1/2 CIR J774D

## (undated) DEVICE — SUTURE PROL SZ 2-0 L18IN NONABSORBABLE BLU FS L26MM 3/8 CIR 8685H

## (undated) DEVICE — GLOVE ORANGE PI 7   MSG9070

## (undated) DEVICE — Z DISCONTINUED BY MEDLINE NO SUB IDED TUBE NG FEED 8FR L43IN W/ STYL DOBBHOFF

## (undated) DEVICE — GLOVE ORTHO 8   MSG9480

## (undated) DEVICE — CORD,CAUTERY,BIPOLAR,STERILE: Brand: MEDLINE

## (undated) DEVICE — CANNULA SEAL

## (undated) DEVICE — SUTURE V-LOC 180 SZ 3-0 L9IN ABSRB GRN L26MM V-20 1/2 CIR VLOCL0644

## (undated) DEVICE — SPONGE LAP W18XL18IN WHT COT 4 PLY FLD STRUNG RADPQ DISP ST 2 PER PACK

## (undated) DEVICE — SYRINGE IRRIG 60ML SFT PLIABLE BLB EZ TO GRP 1 HND USE W/

## (undated) DEVICE — RELOAD STPL L60MM H1-2.6MM MESENTERY THN TISS WHT 6 ROW

## (undated) DEVICE — SUTURE MCRYL SZ 4-0 L18IN ABSRB VLT P-3 L13MM 3/8 CIR REV Y464G

## (undated) DEVICE — PACK PROCEDURE SURG SET UP SRMC

## (undated) DEVICE — TUBING, SUCTION, 1/4" X 20', STRAIGHT: Brand: MEDLINE INDUSTRIES, INC.

## (undated) DEVICE — SUTURE VCRL + SZ 4-0 L27IN ABSRB WHT FS-2 3/8 CIR REV CUT VCP422H

## (undated) DEVICE — CLIP LIG M TI 6 SIL HNDL FOR OPN AND ENDOSCP SGL APPL

## (undated) DEVICE — BREAST HERNIA: Brand: MEDLINE INDUSTRIES, INC.

## (undated) DEVICE — RELOAD STPL L60MM H1.5-3.6MM REG TISS BLU GRIPPING SURF B

## (undated) DEVICE — SET UP: Brand: MEDLINE INDUSTRIES, INC.

## (undated) DEVICE — COLUMN DRAPE

## (undated) DEVICE — BAG,BANDED,W/RUBBERBAND,STERILE,30X36: Brand: MEDLINE

## (undated) DEVICE — TUBING INSUFFLATOR HEAT HUMIDIFIED SMK EVAC SET PNEUMOCLEAR

## (undated) DEVICE — SPONGE,NEURO,1"X3",XR,STRL,LF,10/PK: Brand: MEDLINE

## (undated) DEVICE — SUTURE NOVAFIL SZ 0 L30IN NONABSORBABLE BLU GS-21 MFIL 8886445561

## (undated) DEVICE — ESOPHAGEAL/COLONIC/BILIARY WIREGUIDED BALLOON DILATATION CATHETER: Brand: CRE™ PRO

## (undated) DEVICE — BLADELESS OBTURATOR: Brand: WECK VISTA

## (undated) DEVICE — WOUND RETRACTOR AND PROTECTOR: Brand: ALEXIS O WOUND PROTECTOR-RETRACTOR

## (undated) DEVICE — DRESSING TRNSPAR W5XL4.5IN FLM SHT SEMIPERMEABLE WIND

## (undated) DEVICE — GENERAL LAPAROSCOPY-LF: Brand: MEDLINE INDUSTRIES, INC.

## (undated) DEVICE — ELECTRO LUBE IS A SINGLE PATIENT USE DEVICE THAT IS INTENDED TO BE USED ON ELECTROSURGICAL ELECTRODES TO REDUCE STICKING.: Brand: KEY SURGICAL ELECTRO LUBE

## (undated) DEVICE — BREAST HERNIA PACK: Brand: MEDLINE INDUSTRIES, INC.

## (undated) DEVICE — BANDAGE ADH W1XL3IN NAT FAB WVN FLX DURABLE N ADH PD SEAL

## (undated) DEVICE — ARM DRAPE

## (undated) DEVICE — MONOPTY® DISPOSABLE CORE BIOPSY INSTRUMENT, 22MM PENETRATION DEPTH, 18G X 20CM: Brand: MONOPTY

## (undated) DEVICE — TROCAR: Brand: KII SHIELDED BLADED ACCESS SYSTEM

## (undated) DEVICE — BIOGUARD A/W CLEANING ADAPTER

## (undated) DEVICE — SUTURE VCRL SZ 3-0 L27IN ABSRB VLT CT-2 L26MM 1/2 CIR J332H

## (undated) DEVICE — SUTURE 1 STRATAFIX SYMMETRIC PDS + 30CM CT-1 SXPP1A435

## (undated) DEVICE — Device: Brand: SPOT EX ENDOSCOPIC TATTOO

## (undated) DEVICE — PUMP SUC IRR TBNG L10FT W/ HNDPC ASSEMB STRYKEFLOW 2

## (undated) DEVICE — STAPLER 60MM POWERED ECHELON 3000 LONG 440MM

## (undated) DEVICE — VESSEL SEALER EXTEND: Brand: ENDOWRIST